# Patient Record
Sex: FEMALE | Race: WHITE | NOT HISPANIC OR LATINO | Employment: OTHER | URBAN - METROPOLITAN AREA
[De-identification: names, ages, dates, MRNs, and addresses within clinical notes are randomized per-mention and may not be internally consistent; named-entity substitution may affect disease eponyms.]

---

## 2017-03-06 ENCOUNTER — TRANSCRIBE ORDERS (OUTPATIENT)
Dept: ADMINISTRATIVE | Facility: HOSPITAL | Age: 82
End: 2017-03-06

## 2017-03-06 DIAGNOSIS — Z12.31 VISIT FOR SCREENING MAMMOGRAM: Primary | ICD-10-CM

## 2017-03-22 DIAGNOSIS — Z12.31 ENCOUNTER FOR SCREENING MAMMOGRAM FOR MALIGNANT NEOPLASM OF BREAST: ICD-10-CM

## 2017-03-24 ENCOUNTER — HOSPITAL ENCOUNTER (OUTPATIENT)
Dept: RADIOLOGY | Facility: HOSPITAL | Age: 82
Discharge: HOME | End: 2017-03-24
Payer: COMMERCIAL

## 2017-03-24 DIAGNOSIS — Z12.31 VISIT FOR SCREENING MAMMOGRAM: ICD-10-CM

## 2017-03-24 PROCEDURE — G0202 SCR MAMMO BI INCL CAD: HCPCS

## 2017-03-27 ENCOUNTER — GENERIC CONVERSION - ENCOUNTER (OUTPATIENT)
Dept: OTHER | Facility: OTHER | Age: 82
End: 2017-03-27

## 2017-04-04 ENCOUNTER — LAB CONVERSION - ENCOUNTER (OUTPATIENT)
Dept: OTHER | Facility: OTHER | Age: 82
End: 2017-04-04

## 2017-04-04 LAB — HBA1C MFR BLD HPLC: 6 % OF TOTAL HGB

## 2017-04-10 ENCOUNTER — ALLSCRIPTS OFFICE VISIT (OUTPATIENT)
Dept: OTHER | Facility: OTHER | Age: 82
End: 2017-04-10

## 2017-05-28 ENCOUNTER — GENERIC CONVERSION - ENCOUNTER (OUTPATIENT)
Dept: OTHER | Facility: OTHER | Age: 82
End: 2017-05-28

## 2017-08-08 ENCOUNTER — LAB CONVERSION - ENCOUNTER (OUTPATIENT)
Dept: OTHER | Facility: OTHER | Age: 82
End: 2017-08-08

## 2017-08-08 LAB
A/G RATIO (HISTORICAL): 1.6 (CALC) (ref 1–2.5)
ALBUMIN SERPL BCP-MCNC: 4.1 G/DL (ref 3.6–5.1)
ALP SERPL-CCNC: 83 U/L (ref 33–130)
ALT SERPL W P-5'-P-CCNC: 22 U/L (ref 6–29)
AST SERPL W P-5'-P-CCNC: 23 U/L (ref 10–35)
BILIRUB SERPL-MCNC: 0.8 MG/DL (ref 0.2–1.2)
BUN SERPL-MCNC: 13 MG/DL (ref 7–25)
BUN/CREA RATIO (HISTORICAL): ABNORMAL (CALC) (ref 6–22)
CALCIUM SERPL-MCNC: 9.3 MG/DL (ref 8.6–10.4)
CHLORIDE SERPL-SCNC: 99 MMOL/L (ref 98–110)
CHOLEST SERPL-MCNC: 237 MG/DL (ref 125–200)
CHOLEST/HDLC SERPL: 4.2 (CALC)
CO2 SERPL-SCNC: 26 MMOL/L (ref 20–31)
CREAT SERPL-MCNC: 0.85 MG/DL (ref 0.6–0.88)
EGFR AFRICAN AMERICAN (HISTORICAL): 73 ML/MIN/1.73M2
EGFR-AMERICAN CALC (HISTORICAL): 63 ML/MIN/1.73M2
GAMMA GLOBULIN (HISTORICAL): 2.6 G/DL (CALC) (ref 1.9–3.7)
GLUCOSE (HISTORICAL): 95 MG/DL (ref 65–99)
HBA1C MFR BLD HPLC: 6 % OF TOTAL HGB
HDLC SERPL-MCNC: 56 MG/DL
LDL CHOLESTEROL (HISTORICAL): 158 MG/DL (CALC)
NON-HDL-CHOL (CHOL-HDL) (HISTORICAL): 181 MG/DL (CALC)
POTASSIUM SERPL-SCNC: 4.2 MMOL/L (ref 3.5–5.3)
SODIUM SERPL-SCNC: 134 MMOL/L (ref 135–146)
TOTAL PROTEIN (HISTORICAL): 6.7 G/DL (ref 6.1–8.1)
TRIGL SERPL-MCNC: 116 MG/DL

## 2017-08-21 ENCOUNTER — ALLSCRIPTS OFFICE VISIT (OUTPATIENT)
Dept: OTHER | Facility: OTHER | Age: 82
End: 2017-08-21

## 2017-08-21 DIAGNOSIS — E11.9 TYPE 2 DIABETES MELLITUS WITHOUT COMPLICATIONS (HCC): ICD-10-CM

## 2017-08-21 DIAGNOSIS — I10 ESSENTIAL (PRIMARY) HYPERTENSION: ICD-10-CM

## 2017-08-21 DIAGNOSIS — E78.5 HYPERLIPIDEMIA: ICD-10-CM

## 2017-09-15 ENCOUNTER — ALLSCRIPTS OFFICE VISIT (OUTPATIENT)
Dept: OTHER | Facility: OTHER | Age: 82
End: 2017-09-15

## 2017-09-29 ENCOUNTER — ALLSCRIPTS OFFICE VISIT (OUTPATIENT)
Dept: OTHER | Facility: OTHER | Age: 82
End: 2017-09-29

## 2017-10-17 ENCOUNTER — HOSPITAL ENCOUNTER (EMERGENCY)
Facility: HOSPITAL | Age: 82
Discharge: HOME | End: 2017-10-17
Attending: EMERGENCY MEDICINE | Admitting: EMERGENCY MEDICINE
Payer: COMMERCIAL

## 2017-10-17 ENCOUNTER — APPOINTMENT (EMERGENCY)
Dept: RADIOLOGY | Facility: HOSPITAL | Age: 82
End: 2017-10-17
Payer: COMMERCIAL

## 2017-10-17 VITALS
WEIGHT: 213 LBS | TEMPERATURE: 97.4 F | SYSTOLIC BLOOD PRESSURE: 235 MMHG | OXYGEN SATURATION: 94 % | HEART RATE: 56 BPM | RESPIRATION RATE: 13 BRPM | DIASTOLIC BLOOD PRESSURE: 107 MMHG

## 2017-10-17 DIAGNOSIS — H81.90 EPISODIC RECURRENT VERTIGO: Primary | ICD-10-CM

## 2017-10-17 LAB
ALBUMIN SERPL BCP-MCNC: 3.9 G/DL (ref 3.5–5)
ALP SERPL-CCNC: 99 U/L (ref 46–116)
ALT SERPL W P-5'-P-CCNC: 26 U/L (ref 12–78)
ANION GAP SERPL CALCULATED.3IONS-SCNC: 7 MMOL/L (ref 4–13)
APTT PPP: 26 SECONDS (ref 23–35)
AST SERPL W P-5'-P-CCNC: 21 U/L (ref 5–45)
BACTERIA UR QL AUTO: NORMAL /HPF
BASOPHILS # BLD AUTO: 0.04 THOUSANDS/ΜL (ref 0–0.1)
BASOPHILS NFR BLD AUTO: 1 % (ref 0–1)
BILIRUB SERPL-MCNC: 0.56 MG/DL (ref 0.2–1)
BILIRUB UR QL STRIP: NEGATIVE
BUN SERPL-MCNC: 12 MG/DL (ref 5–25)
CALCIUM SERPL-MCNC: 8.8 MG/DL (ref 8.3–10.1)
CHLORIDE SERPL-SCNC: 96 MMOL/L (ref 100–108)
CLARITY UR: CLEAR
CO2 SERPL-SCNC: 29 MMOL/L (ref 21–32)
COLOR UR: YELLOW
CREAT SERPL-MCNC: 0.82 MG/DL (ref 0.6–1.3)
EOSINOPHIL # BLD AUTO: 0.05 THOUSAND/ΜL (ref 0–0.61)
EOSINOPHIL NFR BLD AUTO: 1 % (ref 0–6)
ERYTHROCYTE [DISTWIDTH] IN BLOOD BY AUTOMATED COUNT: 12.8 % (ref 11.6–15.1)
GFR SERPL CREATININE-BSD FRML MDRD: 66 ML/MIN/1.73SQ M
GLUCOSE SERPL-MCNC: 146 MG/DL (ref 65–140)
GLUCOSE UR STRIP-MCNC: NEGATIVE MG/DL
HCT VFR BLD AUTO: 38.7 % (ref 34.8–46.1)
HGB BLD-MCNC: 13.6 G/DL (ref 11.5–15.4)
HGB UR QL STRIP.AUTO: ABNORMAL
HYALINE CASTS #/AREA URNS LPF: NORMAL /LPF
INR PPP: 0.96 (ref 0.86–1.16)
KETONES UR STRIP-MCNC: NEGATIVE MG/DL
LEUKOCYTE ESTERASE UR QL STRIP: NEGATIVE
LIPASE SERPL-CCNC: 95 U/L (ref 73–393)
LYMPHOCYTES # BLD AUTO: 1.73 THOUSANDS/ΜL (ref 0.6–4.47)
LYMPHOCYTES NFR BLD AUTO: 22 % (ref 14–44)
MCH RBC QN AUTO: 31.4 PG (ref 26.8–34.3)
MCHC RBC AUTO-ENTMCNC: 35.1 G/DL (ref 31.4–37.4)
MCV RBC AUTO: 89 FL (ref 82–98)
MONOCYTES # BLD AUTO: 0.16 THOUSAND/ΜL (ref 0.17–1.22)
MONOCYTES NFR BLD AUTO: 2 % (ref 4–12)
NEUTROPHILS # BLD AUTO: 5.79 THOUSANDS/ΜL (ref 1.85–7.62)
NEUTS SEG NFR BLD AUTO: 74 % (ref 43–75)
NITRITE UR QL STRIP: NEGATIVE
NON-SQ EPI CELLS URNS QL MICRO: NORMAL /HPF
NRBC BLD AUTO-RTO: 0 /100 WBCS
NT-PROBNP SERPL-MCNC: 1187 PG/ML
PH UR STRIP.AUTO: 7 [PH] (ref 4.5–8)
PLATELET # BLD AUTO: 188 THOUSANDS/UL (ref 149–390)
PMV BLD AUTO: 11.2 FL (ref 8.9–12.7)
POTASSIUM SERPL-SCNC: 4.1 MMOL/L (ref 3.5–5.3)
PROT SERPL-MCNC: 7.8 G/DL (ref 6.4–8.2)
PROT UR STRIP-MCNC: NEGATIVE MG/DL
PROTHROMBIN TIME: 12.8 SECONDS (ref 12.1–14.4)
RBC # BLD AUTO: 4.33 MILLION/UL (ref 3.81–5.12)
RBC #/AREA URNS AUTO: NORMAL /HPF
SODIUM SERPL-SCNC: 132 MMOL/L (ref 136–145)
SP GR UR STRIP.AUTO: 1.01 (ref 1–1.03)
TROPONIN I SERPL-MCNC: <0.02 NG/ML
UROBILINOGEN UR QL STRIP.AUTO: 0.2 E.U./DL
WBC # BLD AUTO: 7.8 THOUSAND/UL (ref 4.31–10.16)
WBC #/AREA URNS AUTO: NORMAL /HPF

## 2017-10-17 PROCEDURE — 83880 ASSAY OF NATRIURETIC PEPTIDE: CPT | Performed by: EMERGENCY MEDICINE

## 2017-10-17 PROCEDURE — 85730 THROMBOPLASTIN TIME PARTIAL: CPT | Performed by: EMERGENCY MEDICINE

## 2017-10-17 PROCEDURE — 36415 COLL VENOUS BLD VENIPUNCTURE: CPT | Performed by: EMERGENCY MEDICINE

## 2017-10-17 PROCEDURE — 81001 URINALYSIS AUTO W/SCOPE: CPT

## 2017-10-17 PROCEDURE — 93005 ELECTROCARDIOGRAM TRACING: CPT | Performed by: EMERGENCY MEDICINE

## 2017-10-17 PROCEDURE — 83690 ASSAY OF LIPASE: CPT | Performed by: EMERGENCY MEDICINE

## 2017-10-17 PROCEDURE — 96374 THER/PROPH/DIAG INJ IV PUSH: CPT

## 2017-10-17 PROCEDURE — 80053 COMPREHEN METABOLIC PANEL: CPT | Performed by: EMERGENCY MEDICINE

## 2017-10-17 PROCEDURE — 71020 HB CHEST X-RAY 2VW FRONTAL&LATL: CPT

## 2017-10-17 PROCEDURE — 70450 CT HEAD/BRAIN W/O DYE: CPT

## 2017-10-17 PROCEDURE — 99285 EMERGENCY DEPT VISIT HI MDM: CPT

## 2017-10-17 PROCEDURE — 85610 PROTHROMBIN TIME: CPT | Performed by: EMERGENCY MEDICINE

## 2017-10-17 PROCEDURE — 84484 ASSAY OF TROPONIN QUANT: CPT | Performed by: EMERGENCY MEDICINE

## 2017-10-17 PROCEDURE — 85025 COMPLETE CBC W/AUTO DIFF WBC: CPT | Performed by: EMERGENCY MEDICINE

## 2017-10-17 RX ORDER — MECLIZINE HCL 12.5 MG/1
12.5 TABLET ORAL 3 TIMES DAILY PRN
Qty: 20 TABLET | Refills: 0 | Status: SHIPPED | OUTPATIENT
Start: 2017-10-17 | End: 2018-09-24 | Stop reason: CLARIF

## 2017-10-17 RX ORDER — CARVEDILOL 12.5 MG/1
12.5 TABLET ORAL 2 TIMES DAILY WITH MEALS
COMMUNITY
End: 2018-02-19

## 2017-10-17 RX ORDER — ONDANSETRON 2 MG/ML
4 INJECTION INTRAMUSCULAR; INTRAVENOUS ONCE
Status: COMPLETED | OUTPATIENT
Start: 2017-10-17 | End: 2017-10-17

## 2017-10-17 RX ORDER — DILTIAZEM HYDROCHLORIDE 240 MG/1
240 CAPSULE, COATED, EXTENDED RELEASE ORAL DAILY
COMMUNITY
End: 2018-06-13 | Stop reason: SDUPTHER

## 2017-10-17 RX ADMIN — ONDANSETRON 4 MG: 2 INJECTION INTRAMUSCULAR; INTRAVENOUS at 19:41

## 2017-10-17 NOTE — ED PROVIDER NOTES
History  Chief Complaint   Patient presents with    Dizziness     pt with hx of vertigo and tinnitus and states she has been having more frequent dizzy spells  70-year-old female presents for evaluation of near syncopal event  Patient was at her friend's doctor's appointment for support when she began having worsening of her chronic right-sided tinnitus followed by lightheadedness, diaphoresis and nausea  Onset at approximately 2:00 p m  today  Patient went to an urgent care center who advised that she come to the emergency department  Patient instead returned home with slightly improving symptoms  Upon returning home, patient was taken to the emergency department by family members for further evaluation  Patient states the acute symptoms have since resolved  Patient has chronic right-sided tinnitus which she states is usually very mild in nature with, but often experiences loudening of the tinnitus immediately prior to the episodes  Patient stopped using her the right hearing aid because of this tinnitus  She has had intermittent episodes for approximately 1-2 years  Today's episode is very similar in character to prior episodes; however, she often is able to lie down and take a nap falling episodes that occur at home and as she was out with friends she was unable to do this which she believes caused the episode to last longer than usual   Patient is slightly concerned that her episodes seem to be coming more frequent in nature          History provided by:  Patient  Dizziness   Quality:  Lightheadedness, vertigo and imbalance  Severity:  Moderate  Onset quality:  Gradual  Duration:  2 hours  Timing:  Constant  Progression:  Partially resolved  Chronicity:  Recurrent  Relieved by:  Lying down  Worsened by:  Nothing  Ineffective treatments:  None tried  Associated symptoms: nausea and tinnitus    Associated symptoms: no chest pain, no diarrhea, no headaches, no shortness of breath, no vomiting and no weakness    Nausea:     Severity:  Mild    Onset quality:  Sudden    Duration:  2 hours    Timing:  Constant    Progression:  Improving  Risk factors: hx of vertigo and multiple medications        Prior to Admission Medications   Prescriptions Last Dose Informant Patient Reported? Taking?   carvedilol (COREG) 12 5 mg tablet 10/17/2017 at Unknown time  Yes Yes   Sig: Take 12 5 mg by mouth 2 (two) times a day with meals   diltiazem (CARDIZEM CD) 240 mg 24 hr capsule 10/17/2017 at Unknown time  Yes Yes   Sig: Take 240 mg by mouth daily   metFORMIN (GLUCOPHAGE) 500 mg tablet 10/17/2017 at Unknown time  Yes Yes   Sig: Take 500 mg by mouth 2 (two) times a day with meals      Facility-Administered Medications: None       Past Medical History:   Diagnosis Date    Diabetes mellitus ([facility] [address] )     Hypertension     Tinnitus     Vertigo        History reviewed  No pertinent surgical history  History reviewed  No pertinent family history  I have reviewed and agree with the history as documented  Social History   Substance Use Topics    Smoking status: Never Smoker    Smokeless tobacco: Never Used    Alcohol use No        Review of Systems   Constitutional: Positive for diaphoresis  Negative for appetite change, chills and fever  HENT: Positive for tinnitus  Negative for congestion, rhinorrhea and sore throat  Respiratory: Negative for cough, chest tightness and shortness of breath  Cardiovascular: Negative for chest pain and leg swelling  Gastrointestinal: Positive for nausea  Negative for abdominal pain, constipation, diarrhea and vomiting  Genitourinary: Negative for dysuria, frequency and hematuria  Musculoskeletal: Negative for myalgias, neck pain and neck stiffness  Skin: Negative for pallor and rash  Neurological: Positive for dizziness and light-headedness  Negative for syncope, weakness and headaches  All other systems reviewed and are negative        Physical Exam  ED Triage Vitals [10/17/17 1703]   Temperature Pulse Respirations Blood Pressure SpO2   (!) 97 4 °F (36 3 °C) 59 18 (!) 189/84 99 %      Temp Source Heart Rate Source Patient Position - Orthostatic VS BP Location FiO2 (%)   Oral Monitor Lying Right arm --      Pain Score       --           Physical Exam   Constitutional: She is oriented to person, place, and time  She appears well-developed and well-nourished  Non-toxic appearance  No distress  HENT:   Head: Normocephalic and atraumatic  Eyes: Conjunctivae and EOM are normal  Pupils are equal, round, and reactive to light  Neck: Normal range of motion  Neck supple  No tracheal deviation present  No thyromegaly present  Cardiovascular: Normal rate, regular rhythm, normal heart sounds and intact distal pulses  Pulmonary/Chest: Effort normal and breath sounds normal    Abdominal: Soft  Bowel sounds are normal  She exhibits no distension  There is no tenderness  Musculoskeletal: Normal range of motion  She exhibits edema (bilateral pedal edema)  She exhibits no deformity  Lymphadenopathy:     She has no cervical adenopathy  Neurological: She is alert and oriented to person, place, and time  She has normal strength  No cranial nerve deficit or sensory deficit  She exhibits normal muscle tone  She displays a negative Romberg sign  Coordination normal    No nystagmus noted  Wide-based shuffling gait which is baseline per family  Skin: Skin is warm and dry  She is not diaphoretic  Psychiatric: She has a normal mood and affect  Her behavior is normal  Judgment and thought content normal    Nursing note and vitals reviewed        ED Medications  Medications   ondansetron (ZOFRAN) injection 4 mg (4 mg Intravenous Given 10/17/17 1941)       Diagnostic Studies  Labs Reviewed   CBC AND DIFFERENTIAL - Abnormal        Result Value Ref Range Status    Monocytes Relative 2 (*) 4 - 12 % Final    Monocytes Absolute 0 16 (*) 0 17 - 1 22 Thousand/µL Final    WBC 7 80  4 31 - 10 16 Thousand/uL Final    RBC 4 33  3 81 - 5 12 Million/uL Final    Hemoglobin 13 6  11 5 - 15 4 g/dL Final    Hematocrit 38 7  34 8 - 46 1 % Final    MCV 89  82 - 98 fL Final    MCH 31 4  26 8 - 34 3 pg Final    MCHC 35 1  31 4 - 37 4 g/dL Final    RDW 12 8  11 6 - 15 1 % Final    MPV 11 2  8 9 - 12 7 fL Final    Platelets 792  570 - 390 Thousands/uL Final    nRBC 0  /100 WBCs Final    Neutrophils Relative 74  43 - 75 % Final    Lymphocytes Relative 22  14 - 44 % Final    Eosinophils Relative 1  0 - 6 % Final    Basophils Relative 1  0 - 1 % Final    Neutrophils Absolute 5 79  1 85 - 7 62 Thousands/µL Final    Lymphocytes Absolute 1 73  0 60 - 4 47 Thousands/µL Final    Eosinophils Absolute 0 05  0 00 - 0 61 Thousand/µL Final    Basophils Absolute 0 04  0 00 - 0 10 Thousands/µL Final   COMPREHENSIVE METABOLIC PANEL - Abnormal     Sodium 132 (*) 136 - 145 mmol/L Final    Chloride 96 (*) 100 - 108 mmol/L Final    Glucose 146 (*) 65 - 140 mg/dL Final    Comment:   If the patient is fasting, the ADA then defines impaired fasting glucose as > 100 mg/dL and diabetes as > or equal to 123 mg/dL  Specimen collection should occur prior to Sulfasalazine administration due to the potential for falsely depressed results  Specimen collection should occur prior to Sulfapyridine administration due to the potential for falsely elevated results  Potassium 4 1  3 5 - 5 3 mmol/L Final    CO2 29  21 - 32 mmol/L Final    Anion Gap 7  4 - 13 mmol/L Final    BUN 12  5 - 25 mg/dL Final    Creatinine 0 82  0 60 - 1 30 mg/dL Final    Comment: Standardized to IDMS reference method    Calcium 8 8  8 3 - 10 1 mg/dL Final    AST 21  5 - 45 U/L Final    Comment:   Specimen collection should occur prior to Sulfasalazine administration due to the potential for falsely depressed results       ALT 26  12 - 78 U/L Final    Comment:   Specimen collection should occur prior to Sulfasalazine and/or Sulfapyridine administration due to the potential for falsely depressed results  Alkaline Phosphatase 99  46 - 116 U/L Final    Total Protein 7 8  6 4 - 8 2 g/dL Final    Albumin 3 9  3 5 - 5 0 g/dL Final    Total Bilirubin 0 56  0 20 - 1 00 mg/dL Final    eGFR 66  ml/min/1 73sq m Final    Narrative:     National Kidney Disease Education Program recommendations are as follows:  GFR calculation is accurate only with a steady state creatinine  Chronic Kidney disease less than 60 ml/min/1 73 sq  meters  Kidney failure less than 15 ml/min/1 73 sq  meters  NT-BNP PRO (BRAIN NATRIURETIC PEPTIDE) - Abnormal     NT-proBNP 1,187 (*) <450 pg/mL Final   ED URINE MACROSCOPIC - Abnormal     Blood, UA Trace (*) Negative Final    Color, UA Yellow   Final    Clarity, UA Clear   Final    pH, UA 7 0  4 5 - 8 0 Final    Leukocytes, UA Negative  Negative Final    Nitrite, UA Negative  Negative Final    Protein, UA Negative  Negative mg/dl Final    Glucose, UA Negative  Negative mg/dl Final    Ketones, UA Negative  Negative mg/dl Final    Urobilinogen, UA 0 2  0 2, 1 0 E U /dl E U /dl Final    Bilirubin, UA Negative  Negative Final    Specific [place], UA 1 010  1 003 - 1 030 Final    Narrative:     CLINITEK RESULT   URINE MICROSCOPIC - Normal    RBC, UA None Seen  None Seen, 0-5 /hpf Final    WBC, UA None Seen  None Seen, 0-5, 5-55, 5-65 /hpf Final    Epithelial Cells None Seen  None Seen, Occasional /hpf Final    Bacteria, UA None Seen  None Seen, Occasional /hpf Final    Hyaline Casts, UA None Seen  None Seen /lpf Final   PROTIME-INR - Normal    Protime 12 8  12 1 - 14 4 seconds Final    INR 0 96  0 86 - 1 16 Final   APTT - Normal    PTT 26  23 - 35 seconds Final    Narrative:      Therapeutic Heparin Range = 60-90 seconds   TROPONIN I - Normal    Troponin I <0 02  <=0 04 ng/mL Final    Narrative:     [employer] Chemistry analyzer 99% cutoff is > 0 04 ng/mL in network labs    o cTnI 99% cutoff is useful only when applied to patients in the clinical setting of myocardial ischemia  o cTnI 99% cutoff should be interpreted in the context of clinical history, ECG findings and possibly cardiac imaging to establish correct diagnosis  o cTnI 99% cutoff may be suggestive but clearly not indicative of a coronary event without the clinical setting of myocardial ischemia  LIPASE - Normal    Lipase 95  73 - 393 u/L Final       CT head without contrast   Final Result      No acute intracranial abnormality  Microangiopathic changes  Workstation performed: [id number]         XR chest 2 views   ED Interpretation   No acute pulmonary pathology          Procedures  ECG 12 Lead Documentation  Date/Time: 10/17/2017 10:33 PM  Performed by: Maria C Villatoro by: Kate Ortiz     Indications / Diagnosis:  Lightheadedness  ECG reviewed by me, the ED Provider: yes    Patient location:  ED  Previous ECG:     Previous ECG:  Compared to current    Comparison ECG info:  10/18/2012 normal EKG    Similarity:  No change  Interpretation:     Interpretation: normal    Rate:     ECG rate:  61    ECG rate assessment: normal    Rhythm:     Rhythm: sinus rhythm    Ectopy:     Ectopy: none    QRS:     QRS axis:  Normal    QRS intervals:  Normal  Conduction:     Conduction: normal    ST segments:     ST segments:  Normal  T waves:     T waves: normal            Phone Consults  ED Phone Contact    ED Course  ED Course as of Oct 17 2237   Tue Oct 17, 2017   2015 NT-proBNP: (!) 1,187           Identification of Seniors at [facility] Most Recent Value   (ISAR) Identification of Seniors at Risk   Before the illness or injury that brought you to the Emergency, did you need someone to help you on a regular basis? 0 Filed at: 10/17/2017 1705   In the last 24 hours, have you needed more help than usual?  0 Filed at: 10/17/2017 1705   Have you been hospitalized for one or more nights during the past 6 months?   0 Filed at: 10/17/2017 1705   In general, do you see well?  0 Filed at: 10/17/2017 1705   In general, do you have serious problems with your memory? 0 Filed at: 10/17/2017 1705   Do you take more than three different medications every day? 1 Filed at: 10/17/2017 1705   ISAR Score  1 Filed at: 10/17/2017 1705                          MDM  Number of Diagnoses or Management Options  Episodic recurrent vertigo: established and worsening  Diagnosis management comments: 51-year-old female presents for evaluation of episode of vertigo  Symptoms seemed to have resolved with the exception of some mild residual nausea  Patient given Zofran with complete resolution  Neurologically intact  Laboratory evaluation remarkable only for an elevation in her BNP  Patient hypertensive in the emergency department  She states she is compliant with medications  No headache or lightheadedness at this time  As she is asymptomatic, will have patient follow up with her primary care provider for potential adjustment medications  ENT follow-up for vertigo  Discussed return precautions with the patient and her family  Amount and/or Complexity of Data Reviewed  Clinical lab tests: ordered and reviewed  Tests in the radiology section of CPT®: ordered and reviewed  Independent visualization of images, tracings, or specimens: yes    Patient Progress  Patient progress: stable    CritCare Time    Disposition  Final diagnoses:   Episodic recurrent vertigo     ED Disposition     ED Disposition Condition Comment    Discharge  [address] discharge to home/self care  Condition at discharge: Stable        Follow-up Information     Follow up With Specialties Details Why Contact Info Additional [address] [facility]  Schedule an appointment as soon as possible for a visit in 1 week for re-evaluation of your vertigo [address] [facility]    [address] [address] [facility]     Perry Faster, DO Family Medicine Schedule an appointment as soon as possible for a visit in 3 days to follow up for your hypertension [address] [facility]  [phone]       [address] Emergency Department Emergency Medicine Go to If symptoms worsen [address]  [phone]  ED, [address], [address]        Discharge Medication List as of 10/17/2017  9:20 PM      START taking these medications    Details   meclizine (ANTIVERT) 12 5 MG tablet Take 1 tablet by mouth 3 (three) times a day as needed for dizziness, Starting Tue 10/17/2017, Print         CONTINUE these medications which have NOT CHANGED    Details   carvedilol (COREG) 12 5 mg tablet Take 12 5 mg by mouth 2 (two) times a day with meals, Historical Med      diltiazem (CARDIZEM CD) 240 mg 24 hr capsule Take 240 mg by mouth daily, Historical Med      metFORMIN (GLUCOPHAGE) 500 mg tablet Take 500 mg by mouth 2 (two) times a day with meals, Historical Med           No discharge procedures on file  ED Provider  Attending physically available and evaluated Shruthi Sher I managed the patient along with the ED Attending      Electronically Signed by       Sidney Jasso MD  Resident  10/17/17 6627

## 2017-10-17 NOTE — ED ATTENDING ATTESTATION
Saumya Mota MD, saw and evaluated the patient  I have discussed the patient with the resident/non-physician practitioner and agree with the resident's/non-physician practitioner's findings, Plan of Care, and MDM as documented in the resident's/non-physician practitioner's note, except where noted  All available labs and Radiology studies were reviewed  At this point I agree with the current assessment done in the Emergency Department    I have conducted an independent evaluation of this patient a history and physical is as follows:    Ringing in right ear with vertigo and nausea  With no HA or focal weakness     No fever no cp no sob    pmh   DM    Vertigo for over a year   HTN    Exam no nystagmus is noted neck no JVD lungs clear heart regular abdomen soft nontender neurologic exam 5/5 motor strength gait is wide-based however she is able to walk  Impression vertigo ill-defined    Plan symptomatic care CT of head labs re-evaluation      Critical Care Time  CritCare Time

## 2017-10-18 ENCOUNTER — ALLSCRIPTS OFFICE VISIT (OUTPATIENT)
Dept: OTHER | Facility: OTHER | Age: 82
End: 2017-10-18

## 2017-10-18 LAB
ATRIAL RATE: 61 BPM
P AXIS: 63 DEGREES
PR INTERVAL: 180 MS
QRS AXIS: 63 DEGREES
QRSD INTERVAL: 98 MS
QT INTERVAL: 434 MS
QTC INTERVAL: 436 MS
T WAVE AXIS: 55 DEGREES
VENTRICULAR RATE: 61 BPM

## 2017-10-18 NOTE — DISCHARGE INSTRUCTIONS
Dizziness   WHAT YOU NEED TO KNOW:   Dizziness is a feeling of being off balance or unsteady  Common causes of dizziness are an inner ear fluid imbalance or a lack of oxygen in your blood  Dizziness may be acute (lasts 3 days or less) or chronic (lasts longer than 3 days)  You may have dizzy spells that last from seconds to a few hours  DISCHARGE INSTRUCTIONS:   Return to the emergency department if:   · You have a headache and a stiff neck  · You have shaking chills and a fever  · You vomit over and over with no relief  · Your vomit or bowel movements are red or black  · You have pain in your chest, back, or abdomen  · You have numbness, especially in your face, arms, or legs  · You have trouble moving your arms or legs  · You are confused  Contact your healthcare provider if:   · You have a fever  · Your symptoms do not get better with treatment  · You have questions or concerns about your condition or care  Manage your symptoms:   · Do not drive  or operate heavy machinery when you are dizzy  · Get up slowly  from sitting or lying down  · Drink plenty of liquids  Liquids help prevent dehydration  Ask how much liquid to drink each day and which liquids are best for you  Follow up with your healthcare provider as directed:  Write down your questions so you remember to ask them during your visits  © 2017 [address] Information is for End User's use only and may not be sold, redistributed or otherwise used for commercial purposes  All illustrations and images included in CareNotes® are the copyrighted property of [employer]  or [address]  The above information is an  only  It is not intended as medical advice for individual conditions or treatments  Talk to your doctor, nurse or pharmacist before following any medical regimen to see if it is safe and effective for you

## 2017-10-23 NOTE — PROGRESS NOTES
Assessment  1  Hypertension (401 9) (I10)   2  Type 2 diabetes mellitus (250 00) (E11 9)   3  Vertigo, intermittent (780 4) (R42)    Plan   Hypertension    · Carvedilol 12 5 MG Oral Tablet; TAKE 1 TABLET TWICE DAILY  Hypertension, Vertigo, intermittent    · Cartia  MG Oral Capsule Extended Release 24 Hour; take 1 capsule daily   · Lisinopril 5 MG Oral Tablet; take one tablet by mouth every day    1 Mike Villela MD, Fabricio Headley  (Otolaryngology) Co-Management  *  Status: Hold For - Scheduling  Requested for: 11EBS9675  Ordered; For: Vertigo, intermittent;  Ordered By: Gypsy Fraire  Performed:   Due: 64AJE5505     Chief Complaint  Pt here for ER follow up      History of Present Illness  The patient presents for follow-up of essential hypertension  The patient states she has been doing poorly with her blood pressure control since the last visit  She has no comorbid illnesses  Symptoms: new onset of dizziness  Associated symptoms include no headache,-- no focal neurologic deficits-- and-- no memory loss  Home monitoring: The patient is not checking blood pressure at home  Medications: the patient complains of medication side effects  Review of Systems    Constitutional: feeling poorly  Eyes: No complaints of eye pain, no red eyes, no eyesight problems, no discharge, no dry eyes, no itching of eyes  ENT: no complaints of earache, no loss of hearing, no nose bleeds, no nasal discharge, no sore throat, no hoarseness  Cardiovascular: No complaints of slow heart rate, no fast heart rate, no chest pain, no palpitations, no leg claudication, no lower extremity edema  Respiratory: No complaints of shortness of breath, no wheezing, no cough, no SOB on exertion, no orthopnea, no PND  Gastrointestinal: abdominal pain  Genitourinary: No complaints of dysuria, no incontinence, no pelvic pain, no dysmenorrhea, no vaginal discharge or bleeding  Musculoskeletal: low back pain     Integumentary: No complaints of skin rash or lesions, no itching, no skin wounds, no breast pain or lump  Neurological: dizziness  Psychiatric: Not suicidal, no sleep disturbance, no anxiety or depression, no change in personality, no emotional problems  Endocrine: No complaints of proptosis, no hot flashes, no muscle weakness, no deepening of the voice, no feelings of weakness  Hematologic/Lymphatic: No complaints of swollen glands, no swollen glands in the neck, does not bleed easily, does not bruise easily  Active Problems  1  Abdominal bloating (787 3) (R14 0)   2  Abdominal pain (789 00) (R10 9)   3  Abscess (682 9) (L02 91)   4  Acute cystitis with hematuria (595 0) (N30 01)   5  Anxiety (300 00) (F41 9)   6  Chronic constipation (564 00) (K59 09)   7  Chronic low back pain without sciatica, unspecified back pain laterality (724 2,338 29)   (M54 5,G89 29)   8  Diabetic peripheral neuropathy (250 60,357 2) (E11 42)   9  Dysuria (788 1) (R30 0)   10  Encounter for special screening examination for genitourinary disorder (V81 6) (Z13 89)   11  Flatulence (787 3) (R14 3)   12  Flu vaccine need (V04 81) (Z23)   13  Hyperlipidemia (272 4) (E78 5)   14  Hypertension (401 9) (I10)   15  Need for shingles vaccine (V04 89) (Z23)   16  Obesity due to excess calories 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 mass index (BMI)    in 99th percentile for age in pediatric patient (278 00,V85 54) (F27 12,Z14 28)   16  Palpitations (785 1) (R00 2)   18  Post-menopausal (V49 81) (Z78 0)   19  Thyroid enlarged (240 9) (E04 9)   20  Type 2 diabetes mellitus (250 00) (E11 9)   21  Visit for screening mammogram (V76 12) (Z12 31)    Past Medical History  1  Denied: History of alcohol abuse   2  Denied: History of mental disorder   3  Denied: History of substance abuse    The active problems and past medical history were reviewed and updated today  Surgical History  1  History of Knee Surgery    Family History  Family History    1   Denied: Family history of alcohol abuse   2  Family history of malignant neoplasm (V16 9) (Z80 9)   3  Denied: Family history of mental disorder   4  Denied: Family history of substance abuse    Social History   · Active advance directive (V49 89) (Z78 9)   · Always uses seat belt   · Caffeine use (V49 89) (F15 90)   · Exercise: Walking   · Lives with family   · Never a smoker   · No alcohol use   · Non-smoker (V49 89) (Z78 9)   · Primary language is English   · [facility]   · Single   · Supportive and safe  The social history was reviewed and updated today  The social history was reviewed and is unchanged  Current Meds   1  Aspirin 81 MG Oral Tablet Delayed Release; TAKE 1 TABLET DAILY; Therapy: 42RAT9722 to Recorded   2  Carvedilol 12 5 MG Oral Tablet; TAKE 1 TABLET TWICE DAILY  Requested for:   69FPD0259; Last Rx:29Mgc8201 Ordered   3  DilTIAZem  MG Oral Capsule Extended Release 24 Hour; Take 1 capsule by   mouth  daily; Therapy: 51Xav1263 to (Evaluate:90Rnn7337)  Requested for: 80Zsx4140; Last   Rx:64Lwe6803 Ordered   4  Linzess 145 MCG Oral Capsule; TAKE 1 CAPSULE Before meals; Therapy: 02KRL1346 to (Evaluate:21Bft1745)  Requested for: 78SLW9570; Last   Rx:31Tlu6113 Ordered   5  MetFORMIN HCl - 500 MG Oral Tablet; take 2 tablets twice a day; Therapy: 66ZFZ6356 to (Jaclyn Williamson)  Requested for: 82MSW3448; Last   Rx:52Bjb6906 Ordered   6  Multi-Vitamin Oral Tablet; TAKE 1 TABLET DAILY; Therapy: 40KLG1314 to Recorded   7  Phazyme Maximum Strength 250 MG Oral Capsule; take as directed on labelling for   abdominal bloating; Therapy: 00KZN9620 to [phone]); Last Rx:79Quq3770 Ordered   8  Probiotic Daily Oral Capsule; Therapy: 42YAC7427 to Recorded    The medication list was reviewed and updated today  Allergies  1   Statins    Vitals  Vital Signs    Recorded: 26ABJ2896 01:41PM Recorded: 13UCQ5155 01:18PM   Temperature  97 5 F   Heart Rate  64   Respiration  18   Systolic 881, LUE, Sitting 190 Diastolic 90, LUE, Sitting 82   BP CUFF SIZE Large    Height  5 ft 3 in   Weight  218 lb    BMI Calculated  38 62   BSA Calculated  2 01     Physical Exam    Constitutional   General appearance: No acute distress, well appearing and well nourished  Ears, Nose, Mouth, and Throat   Otoscopic examination: Tympanic membranes translucent with normal light reflex  Canals patent without erythema  Pulmonary   Auscultation of lungs: Clear to auscultation  Cardiovascular   Auscultation of heart: Normal rate and rhythm, normal S1 and S2, without murmurs  Abdomen   Abdomen: Non-tender, no masses  Skin   Skin and subcutaneous tissue: Normal without rashes or lesions  Health Management  Health Maintenance   Medicare Annual Wellness Visit; every 1 year; Next Due: 20Apr2015;  Overdue    Future Appointments    Date/Time Provider Specialty Site   10/30/2017 10:15 AM Dineen Canavan, DO Family [address]   12/18/2017 10:15 AM Dineen Canavan, DO Family [address]   12/08/2017 01:30 PM Sherry Ramos [facility] 9     Signatures   Electronically signed by : Reyna Aleman DO; Oct 22 2017  9:12PM EST                       (Author)

## 2017-10-27 NOTE — CONSULTS
Assessment  1  Abdominal bloating (787 3) (R14 0)   2  Chronic constipation (564 00) (K59 09)    Plan  Abdominal bloating    · Phazyme Maximum Strength 250 MG Oral Capsule; take as directed on labelling  for abdominal bloating   Rx By: Deepa Montero; Dispense: 30 Days ; #:30 Capsule; Refill: 0;For: Abdominal bloating; JOVANA = N; Record   · (1) FOLATE; Status:Active; Requested YDB:67XRS4461;    Perform:[facility]; CMR:81ITW4461; Ordered; For:Abdominal bloating; Ordered By:Dhruv Peacock;   · (1) VITAMIN B12; Status:Active; Requested UWM:03OWQ1889;    Perform:[facility]; PGJ:25ZPH4925; Ordered; For:Abdominal bloating; Ordered By:Dhruv Peacock;   · (1) VITAMIN D 25-HYDROXY; Status:Active; Requested WDJ:00ZZO4438;    Perform:[facility]; KTQ:96GFQ0849; Ordered; For:Abdominal bloating; Ordered By:Dhruv Peacock;  Chronic constipation    · Linzess 145 MCG Oral Capsule; take 1 capsule daily   Rx By: Deepa Montero; Dispense: 14 Days ; #:14 Capsule; Refill: 0;For: Chronic constipation; JOVANA = N; Dispense Sample   · Follow-up visit in 3 months Evaluation and Treatment  Follow-up  Status: Hold For -  Scheduling  Requested for: 56JXX9717   Ordered; For: Chronic constipation; Ordered By: Deepa Montero Performed:  Due: 35RIY5421   · (1) TSH; Status:Active; Requested EQA:51RKI4170;    Perform:[facility]; CNM:47JGH6825; Ordered; For:Chronic constipation; Ordered By:Dhruv Peacock;    Discussion/Summary  Discussion Summary:   1) Chronic constipation : increase fluid/ water intake  exercise  Check TSH  Check Folate, B12 for small intestinal bacterial overgrowth  Start Linzess 145 mcg daily and will provide her samples for 2 weeks and have asked her to let us know in two weeks how she is doing and if we need to send in a prescription if she responds well  If she does not respond well or has to much diarrhea then will recommend a smaller dose or Amitiza  no high risk features to prompt a colonoscopy or EGD  Check Vitamin D levels     Dyspepsia : Check B12 and folate for SIBOprobiotics  Phazyme  Follow up in 3 months  Chief Complaint  Chief Complaint Free Text Note Form: Patient consult for  Complains of bloating, feeling gassy, and constipation  History of Present Illness  HPI: 79 y/o lady who presents for symptoms of gassiness and bloating  She does have a long history of bowel issues  Her bloating has been there 3 - 6 months ( worse now than before)  She feels that she is bloated with eating  She has increased passage of flatus and does burp and belch  She has constipation and takes laxatives  Without those she would go 3 - 4 days and when she takes laxatives she has smaller but frequent bowel movements  She has had constipation in the past as well  No bowel accidents  If she has a bowel movement she would feel better from the standpoint of the bloating  She is able to see the bloating  The clothes get tighter  She feels TUMS seem to help her (this is usually at night)  Bloating is usually in the evening  She has used GasX (many years ago) but does not feel that it helped her much  feels that if she is not able to go for a bowel movements she may feel dizziness and palpitations  She feels this is related to her constipation  She drinks water and eats fiber  She takes dulcolax almost every day and miralax in the morning  Her thyroid levels were checked in 2015 and were normal  is eating well  She has trying to watch her diet and has lost some weight  takes probiotics  She started this a month ago and seems to have improved her symptoms slightly  does not have rectal bleeding  No melena  does not have heartburn or reflux and no dysphagia  had a colonoscopy done about 5 years ago and was unremarkable  She has not had an EGD done  History Reviewed: The history was obtained today from the patient and I agree with the documented history        Review of Systems  Complete-Female GI Adult:   Constitutional: No fever, no chills, feels well, no tiredness, no recent weight gain or weight loss  Eyes: No complaints of eye pain, no red eyes, no eyesight problems, no discharge, no dry eyes, no itching of eyes  ENT: no complaints of earache, no loss of hearing, no nose bleeds, no nasal discharge, no sore throat, no hoarseness  Cardiovascular: No complaints of slow heart rate, no fast heart rate, no chest pain, no palpitations, no leg claudication, no lower extremity edema  Respiratory: No complaints of shortness of breath, no wheezing, no cough, no SOB on exertion, no orthopnea, no PND  Gastrointestinal: constipation-- and-- gas, but-- No complaints of abdominal pain, no constipation, no nausea or vomiting, no diarrhea, no bloody stools  Genitourinary: No complaints of dysuria, no incontinence, no pelvic pain, no dysmenorrhea, no vaginal discharge or bleeding  Musculoskeletal: No complaints of arthralgias, no myalgias, no joint swelling or stiffness, no limb pain or swelling  Integumentary: No complaints of skin rash or lesions, no itching, no skin wounds, no breast pain or lump  Neurological: No complaints of headache, no confusion, no convulsions, no numbness, no dizziness or fainting, no tingling, no limb weakness, no difficulty walking  Psychiatric: Not suicidal, no sleep disturbance, no anxiety or depression, no change in personality, no emotional problems  Endocrine: No complaints of proptosis, no hot flashes, no muscle weakness, no deepening of the voice, no feelings of weakness  Hematologic/Lymphatic: No complaints of swollen glands, no swollen glands in the neck, does not bleed easily, does not bruise easily  ROS Reviewed:   ROS reviewed  Active Problems  1  Abdominal bloating (787 3) (R14 0)   2  Abdominal pain (789 00) (R10 9)   3  Abscess (682 9) (L02 91)   4  Acute cystitis with hematuria (595 0) (N30 01)   5  Anxiety (300 00) (F41 9)   6  Chronic low back pain without sciatica, unspecified back pain laterality (724 2,338 29)   (M54 5,G89 29)   7  Diabetic peripheral neuropathy (250 60,357 2) (E11 42)   8  Dysuria (788 1) (R30 0)   9  Encounter for special screening examination for genitourinary disorder (V81 6) (Z13 89)   10  Flatulence (787 3) (R14 3)   11  Flu vaccine need (V04 81) (Z23)   12  Hyperlipidemia (272 4) (E78 5)   13  Hypertension (401 9) (I10)   14  Need for shingles vaccine (V04 89) (Z23)   15  Obesity due to excess calories 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 mass index (BMI)    in 99th percentile for age in pediatric patient (278 00,V85 54) (Z78 55,A70 83)   12  Palpitations (785 1) (R00 2)   17  Post-menopausal (V49 81) (Z78 0)   18  Thyroid enlarged (240 9) (E01 0)   19  Type 2 diabetes mellitus (250 00) (E11 9)   20  Visit for screening mammogram (V76 12) (Z12 31)    Past Medical History  1  Denied: History of alcohol abuse   2  Denied: History of mental disorder   3  Denied: History of substance abuse  Active Problems And Past Medical History Reviewed: The active problems and past medical history were reviewed and updated today  Surgical History  1  History of Knee Surgery  Surgical History Reviewed: The surgical history was reviewed and updated today  Family History  Family History    1  Denied: Family history of alcohol abuse   2  Family history of malignant neoplasm (V16 9) (Z80 9)   3  Denied: Family history of mental disorder   4  Denied: Family history of substance abuse  Family History Reviewed: The family history was reviewed and updated today  Social History   · Active advance directive (V49 89) (Z78 9)   · Always uses seat belt   · Caffeine use (V49 89) (F15 90)   · Exercise: Walking   · Lives with family   · Never a smoker   · No alcohol use   · Non-smoker (V49 89) (Z78 9)   · Primary language is [place]   · [employer]   · Single   · Supportive and safe  Social History Reviewed: The social history was reviewed and updated today  Current Meds   1   Aspirin 81 MG Oral Tablet Delayed Release; TAKE 1 TABLET DAILY; Therapy: 19UDL3637 to Recorded   2  Carvedilol 12 5 MG Oral Tablet; TAKE 1 TABLET TWICE DAILY  Requested for:   99NNY8939; Last Rx:80Xvg6442 Ordered   3  DilTIAZem  MG Oral Capsule Extended Release 24 Hour; Take 1 capsule by   mouth  daily; Therapy: 15Apj3717 to (Evaluate:50Xtu8202)  Requested for: 36Ytq6656; Last   Rx:66Yfr4096 Ordered   4  MetFORMIN HCl - 500 MG Oral Tablet; take 2 tablets twice a day; Therapy: 87OXC7619 to (Janeal Stagers)  Requested for: 88DGO7524; Last   Rx:32Ovs7695 Ordered   5  Multi-Vitamin Oral Tablet; TAKE 1 TABLET DAILY; Therapy: 05KIR1965 to Recorded   6  Probiotic Daily Oral Capsule; Therapy: 15VHB4524 to Recorded  Medication List Reviewed: The medication list was reviewed and updated today  Allergies  1  Statins    Vitals  Vital Signs    Recorded: 41FWX1936 09:06AM   Heart Rate 78   Systolic 027, LUE, Sitting   Diastolic 82, LUE, Sitting   BP CUFF SIZE Large   Height 5 ft 3 in   Weight 216 lb 6 0 oz   BMI Calculated 38 33   BSA Calculated 2   O2 Saturation 99     Physical Exam    Constitutional   General appearance: No acute distress, well appearing and well nourished  Eyes   Conjunctiva and lids: No swelling, erythema or discharge  Pupils and irises: Equal, round and reactive to light  Ears, Nose, Mouth, and Throat   External inspection of ears and nose: Normal     Oropharynx: Normal with no erythema, edema, exudate or lesions  Pulmonary   Respiratory effort: No increased work of breathing or signs of respiratory distress  Auscultation of lungs: Clear to auscultation  Cardiovascular   Auscultation of heart: Normal rate and rhythm, normal S1 and S2, without murmurs  Examination of extremities for edema and/or varicosities: Normal     Abdomen   Abdomen: Non-tender, no masses  Liver and spleen: No hepatomegaly or splenomegaly  Lymphatic   Palpation of lymph nodes in neck: No lymphadenopathy      Musculoskeletal Gait and station: Normal     Skin   Skin and subcutaneous tissue: Normal without rashes or lesions  Neurologic   Cranial nerves: Cranial nerves 2-12 intact      Psychiatric   Orientation to person, place, and time: Normal          Future Appointments    Date/Time Provider Specialty Site   12/18/2017 10:15 AM [facility] [address]     Signatures   Electronically signed by : Keri Wilburn MD; Sep 29 2017  9:44AM EST                       (Author)

## 2017-10-30 ENCOUNTER — GENERIC CONVERSION - ENCOUNTER (OUTPATIENT)
Dept: OTHER | Facility: OTHER | Age: 82
End: 2017-10-30

## 2017-10-30 DIAGNOSIS — E11.9 TYPE 2 DIABETES MELLITUS WITHOUT COMPLICATIONS (HCC): ICD-10-CM

## 2017-10-31 ENCOUNTER — TRANSCRIBE ORDERS (OUTPATIENT)
Dept: ADMINISTRATIVE | Facility: HOSPITAL | Age: 82
End: 2017-10-31

## 2017-10-31 DIAGNOSIS — R42 VERTIGO: ICD-10-CM

## 2017-10-31 DIAGNOSIS — E04.1 THYROID NODULE: Primary | ICD-10-CM

## 2017-11-22 ENCOUNTER — HOSPITAL ENCOUNTER (OUTPATIENT)
Dept: ULTRASOUND IMAGING | Facility: HOSPITAL | Age: 82
Discharge: HOME | End: 2017-11-22
Payer: COMMERCIAL

## 2017-11-22 ENCOUNTER — HOSPITAL ENCOUNTER (OUTPATIENT)
Dept: NON INVASIVE DIAGNOSTICS | Facility: CLINIC | Age: 82
Discharge: HOME | End: 2017-11-22
Payer: COMMERCIAL

## 2017-11-22 ENCOUNTER — GENERIC CONVERSION - ENCOUNTER (OUTPATIENT)
Dept: OTHER | Facility: OTHER | Age: 82
End: 2017-11-22

## 2017-11-22 ENCOUNTER — HOSPITAL ENCOUNTER (OUTPATIENT)
Dept: MRI IMAGING | Facility: HOSPITAL | Age: 82
Discharge: HOME | End: 2017-11-22
Payer: COMMERCIAL

## 2017-11-22 DIAGNOSIS — R42 VERTIGO: ICD-10-CM

## 2017-11-22 DIAGNOSIS — E11.9 TYPE 2 DIABETES MELLITUS WITHOUT COMPLICATIONS (HCC): ICD-10-CM

## 2017-11-22 DIAGNOSIS — E04.1 THYROID NODULE: ICD-10-CM

## 2017-11-22 PROCEDURE — 93925 LOWER EXTREMITY STUDY: CPT

## 2017-11-22 PROCEDURE — 76536 US EXAM OF HEAD AND NECK: CPT

## 2017-11-22 PROCEDURE — 93923 UPR/LXTR ART STDY 3+ LVLS: CPT

## 2017-11-22 PROCEDURE — A9585 GADOBUTROL INJECTION: HCPCS | Performed by: PHYSICIAN ASSISTANT

## 2017-11-22 PROCEDURE — 70553 MRI BRAIN STEM W/O & W/DYE: CPT

## 2017-11-22 RX ADMIN — GADOBUTROL 9 ML: 604.72 INJECTION INTRAVENOUS at 11:41

## 2017-12-04 ENCOUNTER — GENERIC CONVERSION - ENCOUNTER (OUTPATIENT)
Dept: FAMILY MEDICINE CLINIC | Facility: CLINIC | Age: 82
End: 2017-12-04

## 2017-12-08 ENCOUNTER — TRANSCRIBE ORDERS (OUTPATIENT)
Dept: ADMINISTRATIVE | Facility: HOSPITAL | Age: 82
End: 2017-12-08

## 2017-12-08 ENCOUNTER — APPOINTMENT (OUTPATIENT)
Dept: RADIOLOGY | Facility: MEDICAL CENTER | Age: 82
End: 2017-12-08
Payer: COMMERCIAL

## 2017-12-08 ENCOUNTER — ALLSCRIPTS OFFICE VISIT (OUTPATIENT)
Dept: OTHER | Facility: OTHER | Age: 82
End: 2017-12-08

## 2017-12-08 ENCOUNTER — APPOINTMENT (OUTPATIENT)
Dept: LAB | Facility: MEDICAL CENTER | Age: 82
End: 2017-12-08
Payer: COMMERCIAL

## 2017-12-08 DIAGNOSIS — K59.09 OTHER CONSTIPATION: ICD-10-CM

## 2017-12-08 DIAGNOSIS — D53.9 SIMPLE CHRONIC ANEMIA: Primary | ICD-10-CM

## 2017-12-08 DIAGNOSIS — R14.0 ABDOMINAL DISTENSION (GASEOUS): ICD-10-CM

## 2017-12-08 DIAGNOSIS — D53.9 SIMPLE CHRONIC ANEMIA: ICD-10-CM

## 2017-12-08 LAB
ERYTHROCYTE [DISTWIDTH] IN BLOOD BY AUTOMATED COUNT: 12.7 % (ref 11.6–15.1)
HCT VFR BLD AUTO: 37.5 % (ref 34.8–46.1)
HGB BLD-MCNC: 13.3 G/DL (ref 11.5–15.4)
MCH RBC QN AUTO: 32.8 PG (ref 26.8–34.3)
MCHC RBC AUTO-ENTMCNC: 35.5 G/DL (ref 31.4–37.4)
MCV RBC AUTO: 92 FL (ref 82–98)
PLATELET # BLD AUTO: 209 THOUSANDS/UL (ref 149–390)
PMV BLD AUTO: 11.4 FL (ref 8.9–12.7)
RBC # BLD AUTO: 4.06 MILLION/UL (ref 3.81–5.12)
VIT B12 SERPL-MCNC: 2128 PG/ML (ref 100–900)
WBC # BLD AUTO: 9.16 THOUSAND/UL (ref 4.31–10.16)

## 2017-12-08 PROCEDURE — 85027 COMPLETE CBC AUTOMATED: CPT

## 2017-12-08 PROCEDURE — 74000 HB X-RAY EXAM OF ABDOMEN (SINGLE ANTEROPOSTERIOR VIEW): CPT

## 2017-12-08 PROCEDURE — 36415 COLL VENOUS BLD VENIPUNCTURE: CPT

## 2017-12-08 PROCEDURE — 82607 VITAMIN B-12: CPT

## 2017-12-09 NOTE — PROGRESS NOTES
Assessment    1  Chronic constipation (564 00) (K59 09)   2  Abdominal bloating (787 3) (R14 0)   3  Abdominal pain (789 00) (R10 9)    Plan  Abdominal bloating    · Phazyme Maximum Strength 250 MG Oral Capsule; take as directed on labellingfor abdominal bloating   Rx By: Za Sykes; Dispense: 30 Days ; #:30 Capsule; Refill: 0;Abdominal bloating; JOVANA = N; Record  Abdominal bloating, Chronic constipation    · * XR ABDOMEN 1 VIEW KUB; Status:Resulted - Requires Verification;   Done:08Dec2017 02:15PM   Due:08Dec2018; Ordered; For:Abdominal bloating, Chronic constipation; Ordered By:Eloy Cruz; Anemia, deficiency    · (1) CBC/ PLT (NO DIFF); Status:Active; Requested for:08Dec2017;    Perform:[facility]; AXB:11KXJ4679; Ordered; For:Anemia, deficiency; Ordered By:Eloy Cruz;   · (1) VITAMIN B12; Status:Active; Requested for:08Dec2017;    Perform:[facility]; XQX:[id number]; Ordered; For:Anemia, deficiency; Ordered By:Eloy Cruz;  Chronic constipation    · Linzess 290 MCG Oral Capsule; take 1 capsule daily   Rx By: Za Sykes; Dispense: 16 Days ; #:30 Capsule; Refill: 3;Chronic constipation; JOVANA = N; Verified Transmission to [facility]; Last Updated By: [facility]; 12/8/2017 1:58:42 PM    Discussion/Summary  Discussion Summary: This is a very pleasant 24-year-old female here for follow-up of chronic constipation  Chronic constipation: admits to a longstanding history of constipation her entire life with recent worsening over the past 2 months  She states she has not had a decent bowel movement in about 2 months, she is only having bowel movements with enemas, which are liquid  This could be overflow diarrhea   On exam she has decreased bowel sounds and she is also complaining of nausea and bloatingcheck abdominal x-rayif negative, will give bowel preparation for evacuationthen restart Linzess 145 mcg dailyif 145 mcg does not provide relief, can increase to 290Phazymebowel prep and linzess do not relieve her constipation, consider colonoscopy to r/o underlying masselevated vitamin B12/anemia: patient's lab work from October revealed anemia with a hemoglobin of 6 0 she also had vitamin B12 checked in September, which was elevated at 1 892  I will recheck these tests and consider referral to Hematology if continue to be [id number]  Counseling Documentation With Imm: The patient was counseled regarding instructions for management  Patient's Capacity to Self-Care: Patient is able to Self-Care  Medication SE Review and Pt Understands Tx: Possible side effects of new medications were reviewed with the patient/guardian today  Chief Complaint  Chief Complaint Free Text Note Form: f/u on labs  Pt c/o constipation, abdominal bloating, and nausea  History of Present Illness  HPI: 25-year-old female here for follow-up of chronic constipation  She was last in started on Linzess 45  She states decent bowel movement in about 2 months and she has only been having liquid stools with a laxative  Complains of abdominal bloating and nausea that has been worsening recently  she states that years ago, which was normal    History Reviewed: The history was obtained today from the patient and I agree with the documented history  Review of Systems  Complete-Female GI Adult:  Constitutional: No fever, no chills, feels well, no tiredness, no recent weight gain or weight loss  Eyes: No complaints of eye pain, no red eyes, no eyesight problems, no discharge, no dry eyes, no itching of eyes  ENT: no complaints of earache, no loss of hearing, no nose bleeds, no nasal discharge, no sore throat, no hoarseness  Cardiovascular: No complaints of slow heart rate, no fast heart rate, no chest pain, no palpitations, no leg claudication, no lower extremity edema  Respiratory: No complaints of shortness of breath, no wheezing, no cough, no SOB on exertion, no orthopnea, no PND    Gastrointestinal: No complaints of abdominal pain, no constipation, no nausea or vomiting, no diarrhea, no bloody stools  Genitourinary: No complaints of dysuria, no incontinence, no pelvic pain, no dysmenorrhea, no vaginal discharge or bleeding  Musculoskeletal: No complaints of arthralgias, no myalgias, no joint swelling or stiffness, no limb pain or swelling  Integumentary: No complaints of skin rash or lesions, no itching, no skin wounds, no breast pain or lump  Neurological: No complaints of headache, no confusion, no convulsions, no numbness, no dizziness or fainting, no tingling, no limb weakness, no difficulty walking  Psychiatric: Not suicidal, no sleep disturbance, no anxiety or depression, no change in personality, no emotional problems  Endocrine: No complaints of proptosis, no hot flashes, no muscle weakness, no deepening of the voice, no feelings of weakness  Hematologic/Lymphatic: No complaints of swollen glands, no swollen glands in the neck, does not bleed easily, does not bruise easily  ROS Reviewed:   ROS reviewed  Active Problems    1  Abdominal bloating (787 3) (R14 0)   2  Abdominal pain (789 00) (R10 9)   3  Abscess (682 9) (L02 91)   4  Acute cystitis with hematuria (595 0) (N30 01)   5  Anxiety (300 00) (F41 9)   6  Chronic constipation (564 00) (K59 09)   7  Chronic low back pain without sciatica, unspecified back pain laterality (724 2,338 29) (M54 5,G89 29)   8  Diabetic peripheral neuropathy (250 60,357 2) (E11 42)   9  Dysuria (788 1) (R30 0)   10  Encounter for special screening examination for genitourinary disorder (V81 6) (Z13 89)   11  Flatulence (787 3) (R14 3)   12  Flu vaccine need (V04 81) (Z23)   13  Hyperlipidemia (272 4) (E78 5)   14  Hypertension (401 9) (I10)   15  Need for shingles vaccine (V04 89) (Z23)   16  Numbness in left leg (782 0) (R20 0)   17   Obesity due to excess calories 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 mass index (BMI)  in 99th percentile for age in pediatric patient (278 00,V85 54) (X81 44,P96 53)   18  Palpitations (785 1) (R00 2)   19  Post-menopausal (V49 81) (Z78 0)   20  Thyroid enlarged (240 9) (E04 9)   21  Type 2 diabetes mellitus (250 00) (E11 9)   22  Vertigo, intermittent (780 4) (R42)   23  Visit for screening mammogram (V76 12) (Z12 31)    Past Medical History  1  Denied: History of alcohol abuse   2  Denied: History of mental disorder   3  Denied: History of substance abuse  Active Problems And Past Medical History Reviewed: The active problems and past medical history were reviewed and updated today  Surgical History  1  History of Knee Surgery  Surgical History Reviewed: The surgical history was reviewed and updated today  Family History  Family History    1  Denied: Family history of alcohol abuse   2  Family history of malignant neoplasm (V16 9) (Z80 9)   3  Denied: Family history of mental disorder   4  Denied: Family history of substance abuse  Family History Reviewed: The family history was reviewed and updated today  Social History     · Active advance directive (V49 89) (Z78 9)   · Always uses seat belt   · Caffeine use (V49 89) (F15 90)   · Exercise: Walking   · Lives with family   · Never a smoker   · No alcohol use   · Non-smoker (V49 89) (Z78 9)   · Primary language is [place]   · [employer]   · Single   · Supportive and safe  Social History Reviewed: The social history was reviewed and updated today  Current Meds   1  Aspirin 81 MG Oral Tablet Delayed Release; TAKE 1 TABLET DAILY; Therapy: 75TWI7824 to Recorded   2  Cartia  MG Oral Capsule Extended Release 24 Hour; take 1 capsule daily; Therapy: 58KBX2133 to (Evaluate:93Pba6583)  Requested for: 13RYN4983; Last Rx:18Oct2017 Ordered   3  Carvedilol 12 5 MG Oral Tablet; TAKE 1 TABLET TWICE DAILY  Requested for: 02THB4609; Last Rx:18Oct2017 Ordered   4  Glucosamine Chondroitin Joint Oral Tablet; Therapy: (Recorded:30Oct2017) to Recorded   5   Linzess 145 MCG Oral Capsule; TAKE 1 CAPSULE Before meals; Therapy: 72RVU5119 to (Evaluate:47Gqz2116)  Requested for: 06OVE2915; Last Rx:80Zqx5833 Ordered   6  Lisinopril 5 MG Oral Tablet; take one tablet by mouth every day; Therapy: 63NKX2123 to (Evaluate:09Vnh6465)  Requested for: 52ECD4485; Last Rx:72Uwe8651 Ordered   7  Meclizine HCl - 12 5 MG Oral Tablet; TAKE 1 TABLET DAILY; Therapy: 02YDA0117 to Recorded   8  MetFORMIN HCl - 500 MG Oral Tablet; take 2 tablets twice a day; Therapy: 08RZT3452 to (Merrick Driscoll)  Requested for: 61FZD0609; Last Rx:47Fqx4889 Ordered   9  Multi-Vitamin Oral Tablet; TAKE 1 TABLET DAILY; Therapy: 01SWB4454 to Recorded   10  Phazyme Maximum Strength 250 MG Oral Capsule; take as directed on labelling for  abdominal bloating; Therapy: 39UJU9371 to [phone]); Last Rx:56Cdm1375 Ordered   11  Probiotic Daily Oral Capsule; Therapy: 88XWB5747 to Recorded  Medication List Reviewed: The medication list was reviewed and updated today  Allergies  1  Statins    Vitals  Vital Signs    Recorded: 66QVX5870 01:35PM   Temperature 98 2 F, Tympanic   Heart Rate 65   Systolic 476, LUE, Sitting   Diastolic 82, LUE, Sitting   Height 5 ft 3 in   Weight 218 lb    BMI Calculated 38 62   BSA Calculated 2 01   O2 Saturation 98, RA       Physical Exam   Constitutional  General appearance: No acute distress, well appearing and well nourished  Eyes  Conjunctiva and lids: No swelling, erythema or discharge  Ears, Nose, Mouth, and Throat  External inspection of ears and nose: Normal    Pulmonary  Respiratory effort: No increased work of breathing or signs of respiratory distress  Auscultation of lungs: Clear to auscultation  Cardiovascular  Auscultation of heart: Normal rate and rhythm, normal S1 and S2, without murmurs  Abdomen  Abdomen: Abnormal   The abdomen was rounded  Bowel sounds were diminished  The abdomen was soft and nontender  The abdomen was not firm-- and-- not rigid  No rebound tenderness  No guarding  Liver and spleen: No hepatomegaly or splenomegaly  Musculoskeletal  Gait and station: Normal    Psychiatric  Orientation to person, place, and time: Normal    Mood and affect: Normal          Results/Data  * XR ABDOMEN 1 VIEW KUB 24Bax7902 02:15PM Emma QUEZADA Order Number: [id number]     Test Name Result Flag Reference   XR ABDOMEN 1 VIEW KUB (Report)       ABDOMEN   INDICATION: Bloating, constipation, gas   COMPARISON: None  VIEWS: AP supine   IMAGES: 3   FINDINGS:   There is a nonobstructive bowel gas pattern  No discernible free air on this supine study  Upright or left lateral decubitus imaging is more sensitive to detect subtle free air in the appropriate setting  No pathologic calcifications or soft tissue masses  Visualized lung bases are clear  There are severe degenerative changes of the lower lumbar spine with mild levoscoliosis  IMPRESSION:   Normal bowel gas pattern  Workstation performed: [id number]   Signed by:  Ray Diego MD  12/8/17       Health Management  Health Maintenance   Medicare Annual Wellness Visit; every 1 year; Next Due: 05Hvw9030; Overdue    Attending Note  Collaborating Physician Note: Collaborating Physician: I agree with the Advanced Practitioner note        Future Appointments    Date/Time Provider Specialty Site   12/18/2017 10:15 AM Apple Sandoval DO Family Medicine [place] [address] FAMILY PRACTICE       Signatures   Electronically signed by : Amelia Rivers [facility]; Dec  8 2017  2:10PM EST                       (Author)    Electronically signed by : Amelia Rivers [facility]; Dec  8 2017  4:01PM EST                       (Author)    Electronically signed by : Ty De Leon MD; Dec  8 2017  5:53PM EST                       (Author)

## 2017-12-11 ENCOUNTER — GENERIC CONVERSION - ENCOUNTER (OUTPATIENT)
Dept: OTHER | Facility: OTHER | Age: 82
End: 2017-12-11

## 2017-12-12 ENCOUNTER — LAB CONVERSION - ENCOUNTER (OUTPATIENT)
Dept: OTHER | Facility: OTHER | Age: 82
End: 2017-12-12

## 2017-12-12 LAB
A/G RATIO (HISTORICAL): 1.6 (CALC) (ref 1–2.5)
ALBUMIN SERPL BCP-MCNC: 4 G/DL (ref 3.6–5.1)
ALP SERPL-CCNC: 88 U/L (ref 33–130)
ALT SERPL W P-5'-P-CCNC: 15 U/L (ref 6–29)
AST SERPL W P-5'-P-CCNC: 18 U/L (ref 10–35)
BILIRUB SERPL-MCNC: 0.7 MG/DL (ref 0.2–1.2)
BUN SERPL-MCNC: 15 MG/DL (ref 7–25)
BUN/CREA RATIO (HISTORICAL): 17 (CALC) (ref 6–22)
CALCIUM SERPL-MCNC: 9.5 MG/DL (ref 8.6–10.4)
CHLORIDE SERPL-SCNC: 98 MMOL/L (ref 98–110)
CO2 SERPL-SCNC: 29 MMOL/L (ref 20–31)
CREAT SERPL-MCNC: 0.89 MG/DL (ref 0.6–0.88)
EGFR AFRICAN AMERICAN (HISTORICAL): 69 ML/MIN/1.73M2
EGFR-AMERICAN CALC (HISTORICAL): 60 ML/MIN/1.73M2
GAMMA GLOBULIN (HISTORICAL): 2.5 G/DL (CALC) (ref 1.9–3.7)
GLUCOSE (HISTORICAL): 104 MG/DL (ref 65–99)
HBA1C MFR BLD HPLC: 5.7 % OF TOTAL HGB
POTASSIUM SERPL-SCNC: 4.3 MMOL/L (ref 3.5–5.3)
SODIUM SERPL-SCNC: 133 MMOL/L (ref 135–146)
TOTAL PROTEIN (HISTORICAL): 6.5 G/DL (ref 6.1–8.1)

## 2017-12-18 ENCOUNTER — ALLSCRIPTS OFFICE VISIT (OUTPATIENT)
Dept: OTHER | Facility: OTHER | Age: 82
End: 2017-12-18

## 2017-12-20 NOTE — PROGRESS NOTES
Assessment  1  Diabetic peripheral neuropathy (250 60,357 2) (E11 42)   2  Chronic constipation (564 00) (K59 09)   3  Hypertension (401 9) (I10)   4  Type 2 diabetes mellitus (250 00) (E11 9)    Plan  Encounter for special screening examination for genitourinary disorder    · *VB - Urinary Incontinence Screen (Dx Z13 89 Screen for UI); Status:Resulted - RequiresVerification,Retrospective By Protocol Authorization;   Done: 92JPD3673 10:50AM  Hypertension    · Carvedilol 12 5 MG Oral Tablet; TAKE 1 TABLET TWICE DAILY  Hypertension, Vertigo, intermittent    · Cartia  MG Oral Capsule Extended Release 24 Hour; Take 1 capsule bymouth  daily   · Lisinopril 5 MG Oral Tablet; take one tablet by mouth every day  Type 2 diabetes mellitus    · MetFORMIN HCl - 500 MG Oral Tablet; take 2 tablets twice a day   · (1) BASIC METABOLIC PROFILE; Status:Active; Requested for:44Uim9605;    · (1) HEMOGLOBIN A1C; Status:Active; Requested for:00Wec4796;    · Follow-up visit in 13 weeks Evaluation and Treatment  Follow-up  Status: Complete Done: 13UKP0227    Chief Complaint  f/u dmII/hypertension/review labs      History of Present Illness  The patient is here today for a follow-up visit  Her diabetes mellitus type 2 is improving  Her hypertension is primary  She has no significant interval events  Symptoms: The patient denies any symptoms currently  Lifestyle and Disease Management: Diet: She consumes a diverse and healthy diet  Weight Issues: She has weight concerns  Exercise: She exercises regularly  Smoking: She does not use tobacco  Alcohol: She denies alcohol use  Drug Use: She denies drug use  also c/o mid back pain to lt lumbar area      Review of Systems   Constitutional: feeling poorly  Eyes: No complaints of eye pain, no red eyes, no eyesight problems, no discharge, no dry eyes, no itching of eyes    ENT: no complaints of earache, no loss of hearing, no nose bleeds, no nasal discharge, no sore throat, no hoarseness  Cardiovascular: No complaints of slow heart rate, no fast heart rate, no chest pain, no palpitations, no leg claudication, no lower extremity edema  Respiratory: No complaints of shortness of breath, no wheezing, no cough, no SOB on exertion, no orthopnea, no PND  Gastrointestinal: constipation  Genitourinary: No complaints of dysuria, no incontinence, no pelvic pain, no dysmenorrhea, no vaginal discharge or bleeding  Musculoskeletal: low back pain  Integumentary: No complaints of skin rash or lesions, no itching, no skin wounds, no breast pain or lump  Neurological: numbness-- and-- dizziness  Psychiatric: Not suicidal, no sleep disturbance, no anxiety or depression, no change in personality, no emotional problems  Endocrine: No complaints of proptosis, no hot flashes, no muscle weakness, no deepening of the voice, no feelings of weakness  Hematologic/Lymphatic: No complaints of swollen glands, no swollen glands in the neck, does not bleed easily, does not bruise easily  Preventive Quality 65 and Older: Urinary Incontinence Symptoms includes: urinary frequency and nocturia, but no urinary incontinence, no incomplete bladder emptying, no urinary urgency, no urinary hesitancy, no dysuria, no weak stream, no intermittent stream, no post-void dribbling, no vaginal pressure and no vaginal dryness       Active Problems  1  Abdominal bloating (787 3) (R14 0)   2  Abdominal pain (789 00) (R10 9)   3  Abscess (682 9) (L02 91)   4  Acute cystitis with hematuria (595 0) (N30 01)   5  Anemia, deficiency (281 9) (D53 9)   6  Anxiety (300 00) (F41 9)   7  Chronic constipation (564 00) (K59 09)   8  Chronic low back pain without sciatica, unspecified back pain laterality (724 2,338 29) (M54 5,G89 29)   9  Diabetic peripheral neuropathy (250 60,357 2) (E11 42)   10  Dysuria (788 1) (R30 0)   11  Encounter for special screening examination for genitourinary disorder (V81 6) (Z13 89)   12   Flatulence (787 3) (R14 3)   13  Flu vaccine need (V04 81) (Z23)   14  Hyperlipidemia (272 4) (E78 5)   15  Hypertension (401 9) (I10)   16  Need for shingles vaccine (V04 89) (Z23)   17  Numbness in left leg (782 0) (R20 0)   18  Obesity due to excess calories 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 mass index (BMI)  in 99th percentile for age in pediatric patient (278 00,V85 54) (D01 01,Z64 66)   23  Palpitations (785 1) (R00 2)   20  Post-menopausal (V49 81) (Z78 0)   21  Thyroid enlarged (240 9) (E04 9)   22  Type 2 diabetes mellitus (250 00) (E11 9)   23  Vertigo, intermittent (780 4) (R42)   24  Visit for screening mammogram (V76 12) (Z12 31)    Past Medical History  1  Denied: History of alcohol abuse   2  Denied: History of mental disorder   3  Denied: History of substance abuse    Surgical History  1  History of Knee Surgery    Family History  Family History    1  Denied: Family history of alcohol abuse   2  Family history of malignant neoplasm (V16 9) (Z80 9)   3  Denied: Family history of mental disorder   4  Denied: Family history of substance abuse    Social History   · Active advance directive (V49 89) (Z78 9)   · Always uses seat belt   · Caffeine use (V49 89) (F15 90)   · Exercise: Walking   · Lives with family   · Never a smoker   · No alcohol use   · Non-smoker (V49 89) (Z78 9)   · Primary language is English   · [employer]   · Single   · Supportive and safe  The social history was reviewed and updated today  The social history was reviewed and is unchanged  Current Meds   1  Aspirin 81 MG Oral Tablet Delayed Release; TAKE 1 TABLET DAILY; Therapy: 06UQA3569 to Recorded   2  Cartia  MG Oral Capsule Extended Release 24 Hour; take 1 capsule daily; Therapy: 86GNO9496 to (Evaluate:42Fnf0224)  Requested for: 60NWZ6226; Last Rx:18Oct2017 Ordered   3  Carvedilol 12 5 MG Oral Tablet; TAKE 1 TABLET TWICE DAILY  Requested for: 54RHY9743; Last Rx:18Oct2017 Ordered   4   Glucosamine Chondroitin Joint Oral Tablet; Therapy: (Recorded:30Oct2017) to Recorded   5  Golytely 227 1 GM Oral Solution Reconstituted; TAKE AS DIRECTED; Therapy: 52ELH6858 to (Last Rx:92Beq4264)  Requested for: 89QHK0656 Ordered   6  Linzess 290 MCG Oral Capsule; take 1 capsule daily; Therapy: 19OWC3696 to (Evaluate:45Bsr8404)  Requested for: 90EME2585; Last Rx:18Uuo5769 Ordered   7  Lisinopril 5 MG Oral Tablet; take one tablet by mouth every day; Therapy: 47VRV7506 to (Evaluate:41Loq5914)  Requested for: 87TMJ3946; Last Rx:98Edf7348 Ordered   8  Meclizine HCl - 12 5 MG Oral Tablet; TAKE 1 TABLET DAILY; Therapy: 83DUI9739 to Recorded   9  MetFORMIN HCl - 500 MG Oral Tablet; take 2 tablets twice a day; Therapy: 84IKO0110 to (Kimberlee Garcia)  Requested for: 19QTA0303; Last Rx:70Pza1316 Ordered   10  Multi-Vitamin Oral Tablet; TAKE 1 TABLET DAILY; Therapy: 99QHW2683 to Recorded   11  Probiotic Daily Oral Capsule; Therapy: 98TJP6051 to Recorded    The medication list was reviewed and updated today  Allergies  1  Statins    Vitals  Vital Signs    Recorded: 28FAM2971 10:35AM   Heart Rate 72   Respiration 18   Systolic 099   Diastolic 80   Height 5 ft 3 in   Weight 214 lb 3 2 oz   BMI Calculated 37 94   BSA Calculated 1 99     Physical Exam   Constitutional  General appearance: No acute distress, well appearing and well nourished  Ears, Nose, Mouth, and Throat  Otoscopic examination: Tympanic membranes translucent with normal light reflex  Canals patent without erythema  Pulmonary  Auscultation of lungs: Clear to auscultation  Cardiovascular  Auscultation of heart: Normal rate and rhythm, normal S1 and S2, without murmurs  Abdomen  Abdomen: Non-tender, no masses  Skin  Skin and subcutaneous tissue: Normal without rashes or lesions     Psychiatric  Orientation to person, place, and time: Normal    Mood and affect: Normal          Results/Data  *VB - Urinary Incontinence Screen (Dx Z13 89 Screen for UI) 80ETU9876 10:50AM Daly Valladares     Test Name Result Flag Reference   Urinary Incontinence Assessment 23Qbz3327                   Falls Risk Assessment (Dx Z13 89 Screen for Neurologic Disorder) 67MVY2736 10:41AM User, Ahs     Test Name Result Flag Reference   Falls Risk      1 fall without injury in the past year     PHQ-2 Adult Depression Screening 29Qbw9142 10:41AM User, [facility]hs     Test Name Result Flag Reference   PHQ-2 Adult Depression Score 0     Over the last two weeks, how often have you been bothered by any of the following problems? Little interest or pleasure in doing things: Not at all - 0 Feeling down, depressed, or hopeless: Not at all - 0   PHQ-2 Adult Depression Screening Negative         Health Management  Health Maintenance   Medicare Annual Wellness Visit; every 1 year; Next Due: 20Apr2015;  Overdue    Future Appointments    Date/Time Provider Specialty Site   03/19/2018 10:00 AM Amor Nair DO Family Medicine [address] PRACTICE     Signatures   Electronically signed by : Edgar Schultz DO; Dec 19 2017  8:56AM EST                       (Author)

## 2018-01-10 NOTE — RESULT NOTES
Verified Results  VAS LOWER LIMB ARTERIAL DUPLEX, COMPLETE BILATERAL/GRAFTS 22Nov2017 08:27AM Becky Urban Order Number: [id number]    - Patient Instructions: To schedule this appointment, please contact Central Scheduling at [phone]  Test Name Result Flag Reference   VAS LOWER LIMB ARTERIAL DUPLEX, COMPLETE BILATERAL/GRAFTS (Report)     [facility] REPORT   CLINICAL:   Indications: Patient presents with bilateral leg pain and numbness of feet  Risk Factors: The patient has history of HTN and diabetes (diabetes)  She has   no history of hyperlipidemia  The patient current weight is 213 lb  Clinical   Right Pressure: 173/ mm Hg, Left Pressure: 174/ mm Hg  FINDINGS:      Segment        Rig Left                        PSV PSV    Common Femoral Artery 118 126    Prox Profunda      81  98    Prox SFA        124 120    Mid SFA         89  98    Dist SFA        73  75    Proximal Pop      101  92    Distal Pop       134 280    Dist  Ant  Tibial    53  49    Dist Peroneal        64             CONCLUSION:   Impression:   RIGHT LOWER LIMB:   This resting evaluation shows no evidence of significant lower extremity   arterial occlusive disease  Ankle/Brachial index: 0 90  Normal range  PVR/ PPG tracings are dampened  Metatarsal pressure of 145 mmHg   Great toe pressure of 78 mmHg, within the healing range  LEFT LOWER LIMB:   This resting evaluation shows no evidence of significant lower extremity   arterial occlusive disease  Ankle/Brachial index: 0 78  Moderate claudication range  PVR/ PPG tracings are dampened  Metatarsal pressure of 78 mmHg   Great toe pressure of 74 mmHg, within the healing range      No prior study for comparison  Recommend repeat testing in 1year as per protocol unless otherwise indicated        SIGNATURE:   Electronically Signed by: Niesha Lovell MD, [address] on 2017-11-22 01:50:35 PM

## 2018-01-10 NOTE — PROGRESS NOTES
Plan   Hypertension    · Cardizem  MG Oral Capsule Extended Release 24 Hour (DiltiaZEM HCl  ER Coated Beads); TAKE 1 CAPSULE DAILY   · Carvedilol 12 5 MG Oral Tablet; TAKE 1 TABLET TWICE DAILY  Type 2 diabetes mellitus    · MetFORMIN HCl - 500 MG Oral Tablet; take 2 tablets twice a day   · (1) BASIC METABOLIC PROFILE; Status:Active; Requested for:22Mjf4842;    · (1) HEMOGLOBIN A1C; Status:Active; Requested for:09Qtx3918;     *VB - Urinary Incontinence Screen (Dx Z13 89 Screen for UI); Status:Resulted - Requires Verification,Retrospective By Protocol Authorization;   Done: [id number] 12:00AM  Due:31Lqe5929; Last Updated By:Ronda Neves; 12/6/2016 11:13:07 AM;Ordered;    For:Encounter for special screening examination for genitourinary disorder; Ordered By:Christiano Slade; Discussion/Summary  Impression: Initial Annual Wellness Visit  Cardiovascular screening and counseling: screening not indicated  Diabetes screening and counseling: screening not indicated  Colorectal cancer screening and counseling: screening not indicated  Breast cancer screening and counseling: screening not indicated  Cervical cancer screening and counseling: screening not indicated  Osteoporosis screening and counseling: screening not indicated  Abdominal aortic aneurysm screening and counseling: screening not indicated  Glaucoma screening and counseling: screening is current  HIV screening and counseling: screening not indicated  Immunizations: influenza vaccine is up to date this year, the lifetime pneumococcal vaccine has been completed, hepatitis B vaccination series is not indicated at this time due to the patient's low risk of tammi the disease and Zostavax vaccination up to date  Advance Directive Planning: complete and up to date  Patient Discussion: plan discussed with the patient, follow-up visit needed in one year        Chief Complaint  annual wellness visit      History of Present Illness  Welcome to Medicare and Wellness Visits: The patient is being seen for the initial annual wellness visit  Medicare Screening and Risk Factors   Hospitalizations: no previous hospitalizations  Medicare Screening Tests Risk Questions   Abdominal aortic aneurysm risk assessment: none indicated  Osteoporosis risk assessment: none indicated  HIV risk assessment: none indicated  Drug and Alcohol Use: The patient has never smoked cigarettes and has never used smokeless tobacco  The patient reports occasional alcohol use  Alcohol concern:   The patient has no concerns about alcohol abuse  She has never used illicit drugs  Diet and Physical Activity: Current diet includes well balanced meals, 2 servings of fruit per day, 2 servings of vegetables per day, 1 servings of meat per day, 1 servings of whole grains per day, 1 servings of dairy products per day, 1 cups of coffee per day, 2 cups of tea per day, 0 cans of regular soda per day and 0 cans of diet soda per day  She exercises infrequently  Exercise: walking  Mood Disorder and Cognitive Impairment Screening: PHQ-9 Depression Scale   Over the past 2 weeks, how often have you been bothered by the following problems? 1 ) Little interest or pleasure in doing things? Not at all    2 ) Feeling down, depressed or hopeless? Not at all    3 ) Trouble falling asleep or sleeping too much? Not at all    4 ) Feeling tired or having little energy? Not at all    5 ) Poor appetite or overeating? Not at all    6 ) Feeling bad about yourself, or that you are a failure, or have let yourself or your family down? Not at all    7 ) Trouble concentrating on things, such as reading a newspaper or watching television? Not at all    8 ) Moving or speaking so slowly that other people could have noticed, or the opposite, moving or speaking faster than usual? Not at all    9 ) Thoughts that you would be off dead or of hurting yourself in some way? Not at all     TOTAL SCORE: 0    Functional Ability/Level of Safety: Hearing is normal bilaterally, normal in the right ear, normal in the left ear, with hearing aid and a hearing aid is used  The patient is currently able to do activities of daily living without limitations, able to do instrumental activities of daily living without limitations, able to participate in social activities without limitations and able to drive without limitations  Activities of daily living details: does not need help using the phone, no transportation help needed, does not need help shopping, no meal preparation help needed, does not need help doing housework, does not need help doing laundry, does not need help managing medications and does not need help managing money  Fall risk factors:  deconditioning, but The patient fell 0 times in the past 12 months , no polypharmacy, no alcohol use, no mobility impairment, no antidepressant use, no postural hypotension, no sedative use, no visual impairment, no urinary incontinence, no antihypertensive use, no cognitive impairment, up and go test was normal and no previous fall  Home safety risk factors:  no unfamiliar surroundings, no loose rugs, no poor household lighting, no uneven floors, no household clutter, grab bars in the bathroom and handrails on the stairs  Advance Directives: Advance directives: living will, durable power of  for health care directives and advance directives  Co-Managers and Medical Equipment/Suppliers: See Patient Care Team   Preventive Quality Program 65 and Older: The patient currently has no urinary incontinence symptoms  Urinary Incontinence Symptoms includes: no urinary incontinence, no incomplete bladder emptying, no urinary frequency, no urinary urgency, no urinary hesitancy, no dysuria, no nocturia, no straining, no weak stream, no intermittent stream, no post-void dribbling, no vaginal pressure and no vaginal dryness        Active Problems    1  Abscess (682 9) (L02 91)   2   Acute cystitis with hematuria (595 0) (N30 01)   3  Anxiety (300 00) (F41 9)   4  Chronic low back pain without sciatica, unspecified back pain laterality (724 2,338 29)   (M54 5,G89 29)   5  Diabetic peripheral neuropathy (250 60,357 2) (E11 42)   6  Dysuria (788 1) (R30 0)   7  Encounter for special screening examination for genitourinary disorder (V81 6) (Z13 89)   8  Flatulence (787 3) (R14 3)   9  Flu vaccine need (V04 81) (Z23)   10  Hyperlipidemia (272 4) (E78 5)   11  Hypertension (401 9) (I10)   12  Need for shingles vaccine (V04 89) (Z23)   13  Post-menopausal (V49 81) (Z78 0)   14  Thyroid enlarged (240 9) (E04 9)   15  Type 2 diabetes mellitus (250 00) (E11 9)   16  Visit for screening mammogram (V76 12) (Z12 31)    Past Medical History    · Denied: History of alcohol abuse   · Denied: History of mental disorder   · Denied: History of substance abuse    Surgical History    · History of Knee Surgery    Family History  Family History    · Denied: Family history of alcohol abuse   · Family history of malignant neoplasm (V16 9) (Z80 9)   · Denied: Family history of mental disorder   · Denied: Family history of substance abuse    Social History    · Active advance directive (V49 89) (Z78 9)   · Always uses seat belt   · Caffeine use (V49 89) (F15 90)   · Exercise: Walking   · Lives with family   · sister   · Never a smoker   · No alcohol use   · Non-smoker (V49 89) (Z78 9)   · Primary language is English   · [employer]   · Single   · Supportive and safe    Current Meds   1  Cardizem  MG Oral Capsule Extended Release 24 Hour; TAKE 1 CAPSULE   DAILY  Requested for: 64LWC2258; Last Rx:30Nov2015 Ordered   2  Carvedilol 12 5 MG Oral Tablet; TAKE 1 TABLET TWICE DAILY  Requested for:   17UHJ0010; Last Rx:30Nov2015 Ordered   3  MetFORMIN HCl - 500 MG Oral Tablet; take 2 tablets twice a day; Therapy: 30XCJ0451 to (Kenneth Kirkland)  Requested for: 58IOG6757; Last   Rx:30Nov2015 Ordered    Allergies    1  Statins    Immunizations   ** Printed in Appendix #1 below  Vitals  Signs    Heart Rate: 78  Systolic: 945  Diastolic: 72  Height: 5 ft 3 in  Weight: 223 lb   BMI Calculated: 39 5  BSA Calculated: 2 03    Results/Data  Falls Risk Assessment (Dx Z13 89 Screen for Neurologic Disorder) 72LXG9320 10:45AM User, Ahs     Test Name Result Flag Reference   Falls Risk      No falls in the past year     PHQ-9 Adult Depression Screening 01Uqz2861 10:45AM User, Ahs     Test Name Result Flag Reference   PHQ-9 Adult Depression Score 0     Over the last two weeks, how often have you been bothered by any of the following problems? Little interest or pleasure in doing things: Not at all - 0  Feeling down, depressed, or hopeless: Not at all - 0  Trouble falling or staying asleep, or sleeping too much: Not at all - 0  Feeling tired or having little energy: Not at all - 0  Poor appetite or over eating: Not at all - 0  Feeling bad about yourself - or that you are a failure or have let yourself or your family down: Not at all - 0  Trouble concentrating on things, such as reading the newspaper or watching television: Not at all - 0  Moving or speaking so slowly that other people could have noticed  Or the opposite -  being so fidgety or restless that you have been moving around a lot more than usual: Not at all - 0  Thoughts that you would be better off dead, or of hurting yourself in some way: Not at all - 0   PHQ-9 Adult Depression Screening Negative     PHQ-9 Difficulty Level Not difficult at all     PHQ-9 Severity No Depression         Health Management  Health Maintenance   Medicare Annual Wellness Visit; every 1 year; Next Due: 2015;  Overdue    Signatures   Electronically signed by : Sonya Rouse DO; Dec 18 2016  9:07PM EST                       (Author)    Appendix #1     Patient: Stevie Blackwood ; : 1934; MRN: [id number]      1 2 3 4 5 6    Influenza  Oct 2011 Oct 2012 11-Nov-2013 [id number] 30-Nov-2015 Sep 2016 PPSV  30-Nov-2015         Pneumo Other  Oct 1997         Zoster  07-Oct-2016

## 2018-01-13 VITALS
OXYGEN SATURATION: 98 % | SYSTOLIC BLOOD PRESSURE: 148 MMHG | DIASTOLIC BLOOD PRESSURE: 76 MMHG | TEMPERATURE: 97.3 F | HEART RATE: 72 BPM | HEIGHT: 63 IN | WEIGHT: 218.5 LBS | RESPIRATION RATE: 18 BRPM | BODY MASS INDEX: 38.71 KG/M2

## 2018-01-13 VITALS
HEART RATE: 78 BPM | DIASTOLIC BLOOD PRESSURE: 82 MMHG | BODY MASS INDEX: 38.34 KG/M2 | SYSTOLIC BLOOD PRESSURE: 184 MMHG | OXYGEN SATURATION: 99 % | HEIGHT: 63 IN | WEIGHT: 216.38 LBS

## 2018-01-13 VITALS
BODY MASS INDEX: 38.27 KG/M2 | HEART RATE: 74 BPM | SYSTOLIC BLOOD PRESSURE: 120 MMHG | DIASTOLIC BLOOD PRESSURE: 82 MMHG | WEIGHT: 216 LBS | HEIGHT: 63 IN

## 2018-01-13 NOTE — RESULT NOTES
Verified Results  (1) URINE CULTURE [id number] 04:09PM Edgar Goldberg     Test Name Result Flag Reference   CLINICAL REPORT (Report)     Test:        Urine culture  Specimen Type:   Urine  Specimen Date:   11/11/2016 4:09 PM  Result Date:    11/13/2016 11:55 AM  Result Status:   Final result  Resulting Lab:   [facility] [address] [facility] [address]            Tel: [phone]      CULTURE                                       ------------------                                   70,000-79,000 cfu/ml Escherichia coli      SUSCEPTIBILITY                                   ------------------                                                       Escherichia coli  METHOD                 RHIANNA  -------------------------------------  -------------------------  AMPICILLIN ($$)             >16 00 ug/ml Resistant  AMPICILLIN + SULBACTAM ($)       <=8/4 ug/ml  Susceptible  AZTREONAM ($$$)             <=8 ug/ml   Susceptible  CEFAZOLIN ($)              <=8 00 ug/ml Susceptible  CIPROFLOXACIN ($)            >2 00 ug/ml  Resistant  GENTAMICIN ($$)             <=4 ug/ml   Susceptible  LEVOFLOXACIN ($)            >4 00 ug/ml  Resistant  NITROFURANTOIN             <=32 ug/ml  Susceptible  PIPERACILLIN + TAZOBACTAM ($$$)     <=16 ug/ml  Susceptible  TETRACYCLINE              <=4 ug/ml   Susceptible  TOBRAMYCIN ($)             <=4 ug/ml   Susceptible  TRIMETHOPRIM + SULFAMETHOXAZOLE ($$$)  <=2/38 ug/ml Susceptible       Plan  Dysuria    · Sulfamethoxazole-Trimethoprim 800-160 MG Oral Tablet; TAKE 1 TABLET TWICE  DAILY UNTIL FINISHED

## 2018-01-14 VITALS
BODY MASS INDEX: 37.76 KG/M2 | RESPIRATION RATE: 18 BRPM | HEART RATE: 66 BPM | WEIGHT: 213.13 LBS | TEMPERATURE: 98.2 F | DIASTOLIC BLOOD PRESSURE: 76 MMHG | SYSTOLIC BLOOD PRESSURE: 180 MMHG | HEIGHT: 63 IN

## 2018-01-15 VITALS
RESPIRATION RATE: 18 BRPM | SYSTOLIC BLOOD PRESSURE: 168 MMHG | WEIGHT: 218 LBS | BODY MASS INDEX: 38.62 KG/M2 | DIASTOLIC BLOOD PRESSURE: 90 MMHG | HEART RATE: 64 BPM | HEIGHT: 63 IN | TEMPERATURE: 97.5 F

## 2018-01-15 NOTE — RESULT NOTES
Verified Results  * MAMMO SCREENING BILATERAL W CAD [id number] 01:26PM [place]     Test Name Result Flag Reference   MAMMO SCREENING BILATERAL W CAD (Report)     Patient History:   Patient is postmenopausal and is nulliparous  Family history of ovarian cancer at age 36 in niece  Patient has never smoked  Patient's BMI is 39 9  Reason for exam: screening, asymptomatic  Mammo Screening Bilateral W CAD: March 24, 2017 - Check In #:    [de-identified]   Bilateral MLO, CC, and XCCL view(s) were taken  Technologist: RT Edgar(R)(M)   Prior study comparison: March 23, 2016, mammo screening bilateral   W CAD performed at [address] [place] [address]  March 30, 2015, bilateral digital screening mammogram, performed at [address] [facility]  There are scattered fibroglandular densities  No dominant soft tissue mass, architectural distortion or    suspicious calcifications are noted in either breast   The skin    and nipple structures are within normal limits  Scattered benign   appearing calcifications are noted  No mammographic evidence of malignancy  No significant changes when compared with prior studies  ACR BI-RADSï¾® Assessments: BiRad:2 - Benign     Recommendation:   Routine screening mammogram of both breasts in 1 year  A    reminder letter will be scheduled  Analyzed by CAD     8-10% of cancers will be missed on mammography  Management of a    palpable abnormality must be based on clinical grounds  Patients   will be notified of their results via letter from our facility  Accredited by [employer] of [employer] and FDA       Transcription Location: KAYLEY [facility] 98: [id number]     Risk Value(s):   Tyrer-Cuzick 10 Year: 0 600%, Tyrer-Cuzick Lifetime: 0 600%,    Myriad Table: 3 0%, DUSTIN 5 Year: 1 5%, NCI Lifetime: 1 9%   Signed by:   Von Boss MD   3/24/17

## 2018-01-19 ENCOUNTER — GENERIC CONVERSION - ENCOUNTER (OUTPATIENT)
Dept: OTHER | Facility: OTHER | Age: 83
End: 2018-01-19

## 2018-01-22 VITALS
BODY MASS INDEX: 38.52 KG/M2 | WEIGHT: 217.4 LBS | HEIGHT: 63 IN | SYSTOLIC BLOOD PRESSURE: 148 MMHG | DIASTOLIC BLOOD PRESSURE: 82 MMHG | RESPIRATION RATE: 18 BRPM | HEART RATE: 68 BPM

## 2018-01-23 VITALS
DIASTOLIC BLOOD PRESSURE: 80 MMHG | HEIGHT: 63 IN | HEART RATE: 72 BPM | BODY MASS INDEX: 37.95 KG/M2 | RESPIRATION RATE: 18 BRPM | SYSTOLIC BLOOD PRESSURE: 142 MMHG | WEIGHT: 214.2 LBS

## 2018-01-23 VITALS
HEIGHT: 63 IN | BODY MASS INDEX: 38.62 KG/M2 | WEIGHT: 218 LBS | HEART RATE: 65 BPM | SYSTOLIC BLOOD PRESSURE: 138 MMHG | TEMPERATURE: 98.2 F | DIASTOLIC BLOOD PRESSURE: 82 MMHG | OXYGEN SATURATION: 98 %

## 2018-01-23 NOTE — RESULT NOTES
Discussion/Summary   high vitamin B12   normal cbc     Verified Results  (1) VITAMIN B12 [id number] 02:55PM [facility]     Test Name Result Flag Reference   VITAMIN B12 2128 pg/mL H 100-900     (1) CBC/ PLT (NO DIFF) [id number] 02:55PM [facility]     Test Name Result Flag Reference   HEMATOCRIT 37 5 %  34 8-46 1   HEMOGLOBIN 13 3 g/dL  11 5-15 4   MCHC 35 5 g/dL  31 4-37 4   MCH 32 8 pg  26 8-34 3   MCV 92 fL  82-98   PLATELET COUNT 830 Thousands/uL  149-390   RBC COUNT 4 06 Million/uL  3 81-5 12   RDW 12 7 %  11 6-15 1   WBC COUNT 9 16 Thousand/uL  4 31-10 16   MPV 11 4 fL  8 9-12 7     * XR ABDOMEN 1 VIEW KUB [id number] 02:15PM [facility]    Order Number: [id number]     Test Name Result Flag Reference   XR ABDOMEN 1 VIEW KUB (Report)     ABDOMEN     INDICATION: Bloating, constipation, gas     COMPARISON: None  VIEWS: AP supine     IMAGES: 3     FINDINGS:     There is a nonobstructive bowel gas pattern  No discernible free air on this supine study  Upright or left lateral decubitus imaging is more sensitive to detect subtle free air in the appropriate setting  No pathologic calcifications or soft tissue masses  Visualized lung bases are clear  There are severe degenerative changes of the lower lumbar spine with mild levoscoliosis  IMPRESSION:     Normal bowel gas pattern         Workstation performed: [id number]     Signed by:   Van De Souza MD   12/8/17

## 2018-01-24 VITALS
HEIGHT: 63 IN | SYSTOLIC BLOOD PRESSURE: 142 MMHG | DIASTOLIC BLOOD PRESSURE: 92 MMHG | WEIGHT: 214.38 LBS | TEMPERATURE: 98.1 F | BODY MASS INDEX: 37.98 KG/M2 | RESPIRATION RATE: 16 BRPM | HEART RATE: 66 BPM

## 2018-02-19 ENCOUNTER — OFFICE VISIT (OUTPATIENT)
Dept: FAMILY MEDICINE CLINIC | Facility: CLINIC | Age: 83
End: 2018-02-19
Payer: COMMERCIAL

## 2018-02-19 VITALS
DIASTOLIC BLOOD PRESSURE: 80 MMHG | HEIGHT: 62 IN | RESPIRATION RATE: 18 BRPM | HEART RATE: 60 BPM | SYSTOLIC BLOOD PRESSURE: 150 MMHG | WEIGHT: 218 LBS | TEMPERATURE: 98.1 F | BODY MASS INDEX: 40.12 KG/M2

## 2018-02-19 DIAGNOSIS — E11.9 TYPE 2 DIABETES MELLITUS WITHOUT COMPLICATION, WITHOUT LONG-TERM CURRENT USE OF INSULIN (HCC): Primary | ICD-10-CM

## 2018-02-19 DIAGNOSIS — I10 ESSENTIAL HYPERTENSION: ICD-10-CM

## 2018-02-19 DIAGNOSIS — K59.03 DRUG-INDUCED CONSTIPATION: ICD-10-CM

## 2018-02-19 PROCEDURE — 99213 OFFICE O/P EST LOW 20 MIN: CPT | Performed by: FAMILY MEDICINE

## 2018-02-19 PROCEDURE — 1101F PT FALLS ASSESS-DOCD LE1/YR: CPT | Performed by: FAMILY MEDICINE

## 2018-02-19 RX ORDER — MECLIZINE HCL 12.5 MG/1
1 TABLET ORAL DAILY
COMMUNITY
Start: 2017-12-08 | End: 2018-02-19 | Stop reason: SDUPTHER

## 2018-02-19 RX ORDER — LISINOPRIL 5 MG/1
1 TABLET ORAL DAILY
COMMUNITY
Start: 2017-10-18 | End: 2018-07-11 | Stop reason: ALTCHOICE

## 2018-02-19 RX ORDER — CARVEDILOL 12.5 MG/1
1 TABLET ORAL 2 TIMES DAILY
COMMUNITY
End: 2018-03-13 | Stop reason: SDUPTHER

## 2018-02-19 RX ORDER — GLUCOSAM/MSM/CHOND/HYALURON AC 750-60-150
TABLET ORAL
COMMUNITY
End: 2021-10-05

## 2018-02-21 NOTE — PROGRESS NOTES
Assessment/Plan:    Type 2 diabetes mellitus ([facility] [address] )  Pt feels well at this time seems to have had side effects from metformin       Diagnoses and all orders for this visit:    Type 2 diabetes mellitus without complication, without long-term current use of insulin (HCC)    Drug-induced constipation    Essential hypertension    Other orders  -     aspirin 81 MG tablet; Take 1 tablet by mouth daily  -     carvedilol (COREG) 12 5 mg tablet; Take 1 tablet by mouth 2 (two) times a day  -     Discontinue: meclizine (ANTIVERT) 12 5 MG tablet; Take 1 tablet by mouth daily  -     Glucos-Chondroit-Hyaluron-MSM (GLUCOSAMINE CHONDROITIN JOINT) TABS; Take by mouth  -     lisinopril (ZESTRIL) 5 mg tablet; Take 1 tablet by mouth daily  -     Multiple Vitamin (MULTI-VITAMIN DAILY PO); Take 1 tablet by mouth daily  -     Probiotic Product (PROBIOTIC DAILY PO); Take by mouth          Subjective:      Patient ID: Brock Bryson is a 80 y o  female  Pt here for recheck visit about her gi symptoms  Feeling much better since stopping  Metformin  Will monitor sugars and adjust meds as needed        The following portions of the patient's history were reviewed and updated as appropriate: current medications, past family history, past medical history, past social history, past surgical history and problem list     Review of Systems   Constitutional: Negative  HENT: Negative  Eyes: Negative  Respiratory: Negative  Cardiovascular: Negative  Gastrointestinal: Negative  Endocrine: Negative  Genitourinary: Negative  Musculoskeletal: Negative  Skin: Negative  Allergic/Immunologic: Negative  Neurological: Negative  Hematological: Negative  Psychiatric/Behavioral: Negative  Objective:      /80 (BP Location: Left arm, Patient Position: Sitting, Cuff Size: Adult)   Pulse 60   Temp 98 1 °F (36 7 °C) (Temporal)   Resp 18   Ht 5' 1 5" (1 562 m)   Wt 98 9 kg (218 lb)   Breastfeeding?  No   BMI 40 52 kg/m²          Physical Exam   Constitutional: She is oriented to person, place, and time  She appears well-developed and well-nourished  HENT:   Head: Normocephalic  Eyes: EOM are normal  Pupils are equal, round, and reactive to light  Neck: No thyromegaly present  Cardiovascular: Normal rate and regular rhythm  No murmur heard  Pulmonary/Chest: Effort normal and breath sounds normal  No respiratory distress  She has no wheezes  She has no rales  Abdominal: She exhibits no distension  Musculoskeletal: She exhibits no edema  Lymphadenopathy:     She has no cervical adenopathy  Neurological: She is alert and oriented to person, place, and time  Skin: Skin is warm  Psychiatric: She has a normal mood and affect   Her behavior is normal

## 2018-03-13 ENCOUNTER — OFFICE VISIT (OUTPATIENT)
Dept: FAMILY MEDICINE CLINIC | Facility: CLINIC | Age: 83
End: 2018-03-13
Payer: COMMERCIAL

## 2018-03-13 VITALS
HEIGHT: 61 IN | HEART RATE: 68 BPM | WEIGHT: 219.13 LBS | DIASTOLIC BLOOD PRESSURE: 110 MMHG | BODY MASS INDEX: 41.37 KG/M2 | TEMPERATURE: 97.8 F | SYSTOLIC BLOOD PRESSURE: 240 MMHG | RESPIRATION RATE: 18 BRPM

## 2018-03-13 DIAGNOSIS — I10 ESSENTIAL HYPERTENSION: Primary | ICD-10-CM

## 2018-03-13 PROCEDURE — 99214 OFFICE O/P EST MOD 30 MIN: CPT | Performed by: FAMILY MEDICINE

## 2018-03-13 RX ORDER — CARVEDILOL 25 MG/1
25 TABLET ORAL 2 TIMES DAILY
Qty: 60 TABLET | Refills: 2 | Status: SHIPPED | OUTPATIENT
Start: 2018-03-13 | End: 2018-06-21 | Stop reason: SDUPTHER

## 2018-03-13 NOTE — ASSESSMENT & PLAN NOTE
Poor sleep last night and not feeling well today  Ringing in ears past few days  bp taken earlier today was   200   Still high this afernoon  Some visual disturbance this am  Here for evaluation today

## 2018-03-13 NOTE — PROGRESS NOTES
Assessment/Plan:    Hypertension  Poor sleep last night and not feeling well today  Ringing in ears past few days  bp taken earlier today was   200   Still high this afernoon  Some visual disturbance this am  Here for evaluation today       Diagnoses and all orders for this visit:    Essential hypertension  -     carvedilol (COREG) 25 mg tablet; Take 1 tablet (25 mg total) by mouth 2 (two) times a day for 30 days          Subjective:      Patient ID: Srini Patricia is a 80 y o  female  HPI    The following portions of the patient's history were reviewed and updated as appropriate: allergies, current medications, past family history, past medical history, past social history, past surgical history and problem list     Review of Systems   HENT: Positive for tinnitus  Eyes: Positive for visual disturbance  Neurological: Positive for dizziness  All other systems reviewed and are negative  Objective:      BP (!) 240/110 (BP Location: Left arm, Patient Position: Sitting, Cuff Size: Standard)   Pulse 68   Temp 97 8 °F (36 6 °C) (Temporal)   Resp 18   Ht 5' 1 25" (1 556 m)   Wt 99 4 kg (219 lb 2 oz)   BMI 41 07 kg/m²          Physical Exam   Constitutional: She is oriented to person, place, and time  She appears well-developed and well-nourished  HENT:   Head: Normocephalic  Eyes: EOM are normal  Pupils are equal, round, and reactive to light  Neck: No thyromegaly present  Cardiovascular: Normal rate and regular rhythm  No murmur heard  Pulmonary/Chest: Effort normal and breath sounds normal  No respiratory distress  She has no wheezes  She has no rales  Abdominal: She exhibits no distension  Musculoskeletal: She exhibits no edema  Lymphadenopathy:     She has no cervical adenopathy  Neurological: She is alert and oriented to person, place, and time  Skin: Skin is warm  Psychiatric: She has a normal mood and affect   Her behavior is normal

## 2018-03-14 LAB
BUN SERPL-MCNC: 9 MG/DL (ref 7–25)
BUN/CREAT SERPL: ABNORMAL (CALC) (ref 6–22)
CALCIUM SERPL-MCNC: 9.6 MG/DL (ref 8.6–10.4)
CHLORIDE SERPL-SCNC: 97 MMOL/L (ref 98–110)
CO2 SERPL-SCNC: 28 MMOL/L (ref 20–31)
CREAT SERPL-MCNC: 0.88 MG/DL (ref 0.6–0.88)
GLUCOSE SERPL-MCNC: 119 MG/DL (ref 65–99)
HBA1C MFR BLD: 6 % OF TOTAL HGB
POTASSIUM SERPL-SCNC: 4.7 MMOL/L (ref 3.5–5.3)
SL AMB EGFR AFRICAN AMERICAN: 70 ML/MIN/1.73M2
SL AMB EGFR NON AFRICAN AMERICAN: 60 ML/MIN/1.73M2
SODIUM SERPL-SCNC: 133 MMOL/L (ref 135–146)

## 2018-03-16 ENCOUNTER — OFFICE VISIT (OUTPATIENT)
Dept: FAMILY MEDICINE CLINIC | Facility: CLINIC | Age: 83
End: 2018-03-16
Payer: COMMERCIAL

## 2018-03-16 VITALS
WEIGHT: 217.5 LBS | HEIGHT: 61 IN | SYSTOLIC BLOOD PRESSURE: 158 MMHG | TEMPERATURE: 98.3 F | RESPIRATION RATE: 16 BRPM | BODY MASS INDEX: 41.07 KG/M2 | DIASTOLIC BLOOD PRESSURE: 86 MMHG | HEART RATE: 64 BPM

## 2018-03-16 DIAGNOSIS — I10 ESSENTIAL HYPERTENSION: Primary | ICD-10-CM

## 2018-03-16 PROCEDURE — 99213 OFFICE O/P EST LOW 20 MIN: CPT | Performed by: FAMILY MEDICINE

## 2018-03-16 NOTE — PROGRESS NOTES
Assessment/Plan:    No problem-specific Assessment & Plan notes found for this encounter  There are no diagnoses linked to this encounter  Subjective:      Patient ID: Brock Bryson is a 80 y o  female  HPI    The following portions of the patient's history were reviewed and updated as appropriate: allergies, current medications, past family history, past medical history, past social history, past surgical history and problem list     Review of Systems   Constitutional: Negative  HENT: Negative  Eyes: Negative  Respiratory: Negative  Cardiovascular: Negative  Gastrointestinal: Negative  Endocrine: Negative  Genitourinary: Negative  Musculoskeletal: Negative  Skin: Negative  Allergic/Immunologic: Negative  Neurological: Negative  Hematological: Negative  Psychiatric/Behavioral: Negative  Objective:      /86 (BP Location: Right arm, Patient Position: Sitting, Cuff Size: Standard)   Pulse 64   Temp 98 3 °F (36 8 °C) (Temporal)   Resp 16   Ht 5' 1 25" (1 556 m)   Wt 98 7 kg (217 lb 8 oz)   BMI 40 76 kg/m²          Physical Exam   Constitutional: She is oriented to person, place, and time  She appears well-developed and well-nourished  HENT:   Head: Normocephalic  Eyes: EOM are normal  Pupils are equal, round, and reactive to light  Neck: No thyromegaly present  Cardiovascular: Normal rate and regular rhythm  No murmur heard  Pulmonary/Chest: Effort normal and breath sounds normal  No respiratory distress  She has no wheezes  She has no rales  Abdominal: She exhibits no distension  Musculoskeletal: She exhibits no edema  Lymphadenopathy:     She has no cervical adenopathy  Neurological: She is alert and oriented to person, place, and time  Skin: Skin is warm  Psychiatric: She has a normal mood and affect   Her behavior is normal

## 2018-03-26 ENCOUNTER — OFFICE VISIT (OUTPATIENT)
Dept: FAMILY MEDICINE CLINIC | Facility: CLINIC | Age: 83
End: 2018-03-26
Payer: COMMERCIAL

## 2018-03-26 VITALS
RESPIRATION RATE: 18 BRPM | HEART RATE: 72 BPM | HEIGHT: 61 IN | WEIGHT: 219.9 LBS | SYSTOLIC BLOOD PRESSURE: 132 MMHG | DIASTOLIC BLOOD PRESSURE: 76 MMHG | BODY MASS INDEX: 41.52 KG/M2

## 2018-03-26 DIAGNOSIS — I10 ESSENTIAL HYPERTENSION: Primary | ICD-10-CM

## 2018-03-26 DIAGNOSIS — E11.9 TYPE 2 DIABETES MELLITUS WITHOUT COMPLICATION, WITHOUT LONG-TERM CURRENT USE OF INSULIN (HCC): ICD-10-CM

## 2018-03-26 PROCEDURE — 99214 OFFICE O/P EST MOD 30 MIN: CPT | Performed by: FAMILY MEDICINE

## 2018-03-26 NOTE — PROGRESS NOTES
Assessment/Plan:    No problem-specific Assessment & Plan notes found for this encounter  Diagnoses and all orders for this visit:    Essential hypertension  Comments:  no complaints  feeling better    Type 2 diabetes mellitus without complication, without long-term current use of insulin (HCC)  Comments:  sugars wekk controlled  will stay off metformin  Orders:  -     Basic metabolic panel; Future  -     HEMOGLOBIN A1C W/ EAG ESTIMATION; Future          Subjective:      Patient ID: Torres Sloan is a 80 y o  female  Here for follow up of chronic medical conditions  Dm and htn      Hypertension     Diabetes         The following portions of the patient's history were reviewed and updated as appropriate: allergies, current medications, past family history, past medical history, past social history, past surgical history and problem list     Review of Systems   All other systems reviewed and are negative  Objective:      /76 (BP Location: Right arm, Patient Position: Sitting, Cuff Size: Adult)   Pulse 72   Resp 18   Ht 5' 1 25" (1 556 m)   Wt 99 7 kg (219 lb 14 4 oz)   BMI 41 21 kg/m²          Physical Exam   Constitutional: She is oriented to person, place, and time  She appears well-developed and well-nourished  HENT:   Head: Normocephalic  Eyes: EOM are normal  Pupils are equal, round, and reactive to light  Neck: No thyromegaly present  Cardiovascular: Normal rate and regular rhythm  No murmur heard  Pulmonary/Chest: Effort normal and breath sounds normal  No respiratory distress  She has no wheezes  She has no rales  Abdominal: She exhibits no distension  Musculoskeletal: She exhibits no edema  Lymphadenopathy:     She has no cervical adenopathy  Neurological: She is alert and oriented to person, place, and time  Skin: Skin is warm  Psychiatric: She has a normal mood and affect   Her behavior is normal

## 2018-03-28 LAB
LEFT EYE DIABETIC RETINOPATHY: NORMAL
RIGHT EYE DIABETIC RETINOPATHY: NORMAL

## 2018-04-23 ENCOUNTER — EVALUATION (OUTPATIENT)
Dept: PHYSICAL THERAPY | Facility: REHABILITATION | Age: 83
End: 2018-04-23
Payer: COMMERCIAL

## 2018-04-23 DIAGNOSIS — M54.50 CHRONIC LEFT-SIDED LOW BACK PAIN WITHOUT SCIATICA: ICD-10-CM

## 2018-04-23 DIAGNOSIS — G89.29 CHRONIC LEFT-SIDED LOW BACK PAIN WITHOUT SCIATICA: ICD-10-CM

## 2018-04-23 DIAGNOSIS — R26.9 GAIT ABNORMALITY: Primary | ICD-10-CM

## 2018-04-23 PROCEDURE — G8990 OTHER PT/OT CURRENT STATUS: HCPCS | Performed by: PHYSICAL THERAPIST

## 2018-04-23 PROCEDURE — 97110 THERAPEUTIC EXERCISES: CPT | Performed by: PHYSICAL THERAPIST

## 2018-04-23 PROCEDURE — 97112 NEUROMUSCULAR REEDUCATION: CPT | Performed by: PHYSICAL THERAPIST

## 2018-04-23 PROCEDURE — G8991 OTHER PT/OT GOAL STATUS: HCPCS | Performed by: PHYSICAL THERAPIST

## 2018-04-23 PROCEDURE — 97161 PT EVAL LOW COMPLEX 20 MIN: CPT | Performed by: PHYSICAL THERAPIST

## 2018-04-23 NOTE — PROGRESS NOTES
PT Evaluation     Today's date: 2018  Patient name: Abbi Oquendo  : 1934  MRN: [id number]  Referring provider: Jennifer Carpenter DO  Dx:   Encounter Diagnosis     ICD-10-CM    1  Gait abnormality R26 9    2  Chronic left-sided low back pain without sciatica M54 5     G89 29        Start Time: 0800  Stop Time: 0900  Total time in clinic (min): 60 minutes    Assessment  Impairments: abnormal coordination, abnormal gait, abnormal muscle tone, abnormal or restricted ROM, impaired balance and lacks appropriate home exercise program    Assessment details: Abbi Oquendo is a 80 y o  female who presents with complaints of Gait abnormality  Chronic left-sided low back pain without sciatica  No further referral appears necessary at this time based upon examination results  Patient reports limitations in walking, standing, and ADLs as well as 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  She will benefit from therapy to focus on lumbar ROM and strength as well as improving balance  Prognosis is good given HEP compliance and PT 2-3x/wk  Positive prognostic indicators include positive attitude toward recovery  Please contact me if you have any questions or recommendations  Thank you for the opportunity to share in  [address] [place] [address]  Understanding of Dx/Px/POC: good   Prognosis: good    Plan  Patient would benefit from: skilled PT  Planned modality interventions: thermotherapy: hydrocollator packs  Planned therapy interventions: manual therapy, therapeutic exercise, therapeutic training, graded activity, graded exercise, graded motor, home exercise program, gait training, functional ROM exercises, abdominal trunk stabilization and balance  Frequency: 2x week  Duration in weeks: 6  Treatment plan discussed with: patient        Subjective Evaluation    History of Present Illness  Mechanism of injury: Patient would like to improve her walking and feel more secure with balance   She would like to progress to silver sneakers after therapy  She is afraid of falling but is hesitant to use cane as she has to care for her sister  Bilateral knee replacements in 2008  She reports coldness in the back of her feet and legs improved with heating blanket  Patient reports history of vertigo not positional related  Patient reports that she has low back pain that worsens as the day progresses which feels as though it greatly effects her walking  Quality of life: good    Pain  Current pain ratin  At best pain ratin  At worst pain ratin  Quality: pulling (stiffness left side from left shoulder down to waist)  Aggravating factors: walking  Progression: no change    Treatments  Previous treatment: chiropractic  Patient Goals  Patient goals for therapy: improved balance          Objective     Ambulation     Observational Gait   Base of support: increased    Comments   Decreased gait speed, standing posture left lateral lean    Neuro Exam :   Functional mobility   Sit to stand:  Independent  Bed mobility:  Independent  Falls   No falls in last 6 months      Dizziness   Frequency:  Sporadic    Functional Outcome Measures   Single leg stance left eyes open:  Unable without UE assist  Single leg stance left eyes closed:  Unable without UE assist  TU  5x sit to stand: 21    Neurologic Exam     Gait, Coordination, and Reflexes     Gait  Gait: wide-basedDecreased gait speed, standing posture left lateral lean     Squat assess: poor mechanics  ¼ squat assess: unable    Edmond-Hallpike: Left- neg  Right- neg  Lumbar  % of normal   Flex  100   Extn  50   SB Left 100   SB Right 75, pull   ROT Left 100   ROT Right 100         MMT    Hip       L       R   Flex  4 4   Extn  3+ 3+   Abd  4 4   Add  IR  4+ 4+   ER  4+ 4+                 MMT    Knee         L        R   Flex  5 5   Extn  4+ 4+                     MMT    Ankle       L        R   PF 4 4   DF  4+ 4+   EHL 4+ 4+   Ever   5 5     Palpation:   Myotomes (L/R):intact      Reflexes:  1+ BLE L4, S1  Straight leg raise:   L= -     R=  -             Transverse abdominis: Bent knee fall out= Poor      Hip/SI joint:        Multifidus activation = poor  Joint mobility:   WNL                Precautions: emotional vertigo, bilateral hearing aids, HTN, Type 2 Dm, poor balance    Daily Treatment Diary     Exercise Diary  4/23 marching 2x15            SLR flexion 2x10 ea            glute set 2x10, 5"            Seated Ta 2x10, 5"            Squat 2x10            Semi-tanden balance 2x30"                                                                                                                                                                                                      Modalities                                                       Short Term:  1  Pt will report decreased levels of pain by at least 2 subjective ratings in 4 weeks  2  Pt will demonstrate improved ROM by at least 10 degrees in 4 weeks  3  Pt will demonstrate improved strength by 1/2 grade  4  Pt will be able to walk greater than 500' without stopping in 4 weeks  Long Term:   1  Pt will be independent in their HEP in 8 weeks  2  Pt will be be pain free with IADL's  3  Pt will demonstrate sit to stand 5x, 15 seconds in 8 weeks    4  Pt will be able to walk > 5 minutes in 8 weeks           Flowsheet Rows    Flowsheet Row Most Recent Value   PT/OT G-Codes   FOTO information reviewed  No   Assessment Type  Evaluation   G code set  Other PT/OT Primary   Other PT Primary Current Status ()  CM   Other PT Primary Goal Status ()  CL

## 2018-04-25 ENCOUNTER — OFFICE VISIT (OUTPATIENT)
Dept: PHYSICAL THERAPY | Facility: REHABILITATION | Age: 83
End: 2018-04-25
Payer: COMMERCIAL

## 2018-04-25 DIAGNOSIS — G89.29 CHRONIC LEFT-SIDED LOW BACK PAIN WITHOUT SCIATICA: ICD-10-CM

## 2018-04-25 DIAGNOSIS — M54.50 CHRONIC LEFT-SIDED LOW BACK PAIN WITHOUT SCIATICA: ICD-10-CM

## 2018-04-25 DIAGNOSIS — R26.9 GAIT ABNORMALITY: Primary | ICD-10-CM

## 2018-04-25 PROCEDURE — 97112 NEUROMUSCULAR REEDUCATION: CPT | Performed by: PHYSICAL THERAPIST

## 2018-04-25 PROCEDURE — 97110 THERAPEUTIC EXERCISES: CPT | Performed by: PHYSICAL THERAPIST

## 2018-04-25 NOTE — PROGRESS NOTES
Daily Note     Today's date: 2018  Patient name: Abbi Oquendo  : 1934  MRN: [id number]  Referring provider: Hernandez Diaz PA-C  Dx:   Encounter Diagnosis     ICD-10-CM    1  Gait abnormality R26 9    2  Chronic left-sided low back pain without sciatica M54 5     G89 29        Start Time: 1100  Stop Time: 1155  Total time in clinic (min): 55 minutes    Subjective: Patient reports doing okay today, just tired  Objective: See treatment diary below  Precautions: emotional vertigo, bilateral hearing aids, HTN, Type 2 Dm, poor balance     Daily Treatment Diary      Exercise Diary                     marching 2x15  1x15                   SLR flexion 2x10 ea  2x10 ea                    glute set 2x10, 5"  2x10, 5"                   Seated Ta 2x10, 5"                     Squat 2x10  2x10                   Semi-tanden balance 2x30"  3x30" ea                    Recumbent bike   10'                    RLE clamshell   1x10, 5"                   Standing hip abd     3x10                                                                                                                                                 Assessment: Tolerated treatment fair  Patient reported back irritation with standing hip abduction today when moving to the left  Constant cueing needed for proper exercise performance today  Good performance semi-tandem stance with CCG  Plan: Progress treatment as tolerated

## 2018-05-02 ENCOUNTER — OFFICE VISIT (OUTPATIENT)
Dept: PHYSICAL THERAPY | Facility: REHABILITATION | Age: 83
End: 2018-05-02
Payer: COMMERCIAL

## 2018-05-02 DIAGNOSIS — G89.29 CHRONIC LEFT-SIDED LOW BACK PAIN WITHOUT SCIATICA: ICD-10-CM

## 2018-05-02 DIAGNOSIS — R26.9 GAIT ABNORMALITY: Primary | ICD-10-CM

## 2018-05-02 DIAGNOSIS — M54.50 CHRONIC LEFT-SIDED LOW BACK PAIN WITHOUT SCIATICA: ICD-10-CM

## 2018-05-02 PROCEDURE — 97110 THERAPEUTIC EXERCISES: CPT | Performed by: PHYSICAL THERAPY ASSISTANT

## 2018-05-02 PROCEDURE — 97112 NEUROMUSCULAR REEDUCATION: CPT | Performed by: PHYSICAL THERAPY ASSISTANT

## 2018-05-02 NOTE — PROGRESS NOTES
Daily Note     Today's date: 5/3/2018  Patient name: Nhi Weber  : 1934  MRN: [id number]  Referring provider: Robert Alvarez PA-C  Dx:   Encounter Diagnosis     ICD-10-CM    1  Gait abnormality R26 9    2  Chronic left-sided low back pain without sciatica M54 5     G89 29        Start Time: 1015  Stop Time: 1115  Total time in clinic (min): 60 minutes    Subjective: Pt reports she feels like her balance is getting a little better  She reports good compliance with HEP  Objective: See treatment diary below  Precautions: emotional vertigo, bilateral hearing aids, HTN, Type 2 Dm, poor balance     Daily Treatment Diary      Exercise Diary                 marching 2x15  1x15  3x10  3x10               SLR flexion 2x10 ea  2x10 ea   2x10  2x10               glute set 2x10, 5"  2x10, 5"  2x10  5"  2x10, 5"               Seated Ta 2x10, 5"    2x10 5"  2x10, 5"               Squat 2x10  2x10  2x10  3x10               Semi-tanden balance 2x30"  3x30" ea   30"x3  3x30" ea               Recumbent bike   10'  10'  10'                RLE clamshell   1x10, 5"    1x10, 5"               Standing hip abd     3x10  3x10  3x10                FTEO on foam        3x30"                                                                                                                     Assessment: Tolerated treatment fair  Patient reported back irritation with standing hip abduction today when moving to the left  Constant cueing needed for proper exercise performance today  Good performance semi-tandem stance with CCG  Plan: Progress treatment as tolerated

## 2018-05-04 ENCOUNTER — OFFICE VISIT (OUTPATIENT)
Dept: PHYSICAL THERAPY | Facility: REHABILITATION | Age: 83
End: 2018-05-04
Payer: COMMERCIAL

## 2018-05-04 DIAGNOSIS — R26.9 GAIT ABNORMALITY: Primary | ICD-10-CM

## 2018-05-04 DIAGNOSIS — G89.29 CHRONIC LEFT-SIDED LOW BACK PAIN WITHOUT SCIATICA: ICD-10-CM

## 2018-05-04 DIAGNOSIS — M54.50 CHRONIC LEFT-SIDED LOW BACK PAIN WITHOUT SCIATICA: ICD-10-CM

## 2018-05-04 PROCEDURE — 97110 THERAPEUTIC EXERCISES: CPT | Performed by: PHYSICAL THERAPIST

## 2018-05-04 PROCEDURE — 97112 NEUROMUSCULAR REEDUCATION: CPT | Performed by: PHYSICAL THERAPIST

## 2018-05-04 NOTE — PROGRESS NOTES
Daily Note     Today's date: 2018  Patient name: Agustina Ferrer  : 1934  MRN: [id number]  Referring provider: Jigar Gage PA-C  Dx:   Encounter Diagnosis     ICD-10-CM    1  Gait abnormality R26 9    2  Chronic left-sided low back pain without sciatica M54 5     G89 29        Start Time: 1115  Stop Time: 1200  Total time in clinic (min): 45 minutes    Subjective: Reports walking a little better but her back continues to bother her with walking and with lifting her left leg up  Objective: See treatment diary below  Precautions: emotional vertigo, bilateral hearing aids, HTN, Type 2 Dm, poor balance     Daily Treatment Diary      Exercise Diary               marching 2x15  1x15  3x10  3x10  HOLD             HEP - SLR flexion, glute set, squat, seated TA, tandem stance 2x10 ea  2x10 ea   2x10  2x10  HELD             Recumbent bike   10'  10'  10'  10'              RLE clamshell   1x10, 5"    1x10, 5"               Standing hip abd     3x10  3x10  3x10  HELD              FTEO on foam        3x30"                calf raises         3x12              TA iso          20x5"              supine hip abd  OTB         20x5"              supine hip add           20x5"                   Assessment: Patient reported feeling better with addition of stabilization exercises  Moderate to constant cueing needed for proper exercise performance  Held marching exercise as patient unable to complete without right lateral trunk lean  Plan: Progress treatment as tolerated

## 2018-05-07 ENCOUNTER — OFFICE VISIT (OUTPATIENT)
Dept: FAMILY MEDICINE CLINIC | Facility: CLINIC | Age: 83
End: 2018-05-07
Payer: COMMERCIAL

## 2018-05-07 VITALS
DIASTOLIC BLOOD PRESSURE: 78 MMHG | SYSTOLIC BLOOD PRESSURE: 140 MMHG | HEART RATE: 58 BPM | BODY MASS INDEX: 41.35 KG/M2 | TEMPERATURE: 97.4 F | WEIGHT: 219 LBS | HEIGHT: 61 IN | RESPIRATION RATE: 18 BRPM

## 2018-05-07 DIAGNOSIS — R30.0 DYSURIA: Primary | ICD-10-CM

## 2018-05-07 LAB
SL AMB  POCT GLUCOSE, UA: ABNORMAL
SL AMB LEUKOCYTE ESTERASE,UA: ABNORMAL
SL AMB POCT BILIRUBIN,UA: ABNORMAL
SL AMB POCT BLOOD,UA: ABNORMAL
SL AMB POCT CLARITY,UA: ABNORMAL
SL AMB POCT COLOR,UA: YELLOW
SL AMB POCT KETONES,UA: 5
SL AMB POCT NITRITE,UA: ABNORMAL
SL AMB POCT PH,UA: 6.5
SL AMB POCT SPECIFIC GRAVITY,UA: 1
SL AMB POCT URINE PROTEIN: 15
SL AMB POCT UROBILINOGEN: 0.2

## 2018-05-07 PROCEDURE — 87086 URINE CULTURE/COLONY COUNT: CPT | Performed by: FAMILY MEDICINE

## 2018-05-07 PROCEDURE — 81002 URINALYSIS NONAUTO W/O SCOPE: CPT | Performed by: FAMILY MEDICINE

## 2018-05-07 PROCEDURE — 99214 OFFICE O/P EST MOD 30 MIN: CPT | Performed by: FAMILY MEDICINE

## 2018-05-07 RX ORDER — CIPROFLOXACIN 500 MG/1
500 TABLET, FILM COATED ORAL EVERY 12 HOURS SCHEDULED
Qty: 6 TABLET | Refills: 0 | Status: SHIPPED | OUTPATIENT
Start: 2018-05-07 | End: 2018-05-07

## 2018-05-07 RX ORDER — CIPROFLOXACIN 500 MG/1
500 TABLET, FILM COATED ORAL EVERY 12 HOURS SCHEDULED
Qty: 6 TABLET | Refills: 0 | Status: SHIPPED | OUTPATIENT
Start: 2018-05-07 | End: 2018-05-10

## 2018-05-08 LAB — BACTERIA UR CULT: NORMAL

## 2018-05-09 ENCOUNTER — OFFICE VISIT (OUTPATIENT)
Dept: PHYSICAL THERAPY | Facility: REHABILITATION | Age: 83
End: 2018-05-09
Payer: COMMERCIAL

## 2018-05-09 DIAGNOSIS — G89.29 CHRONIC LEFT-SIDED LOW BACK PAIN WITHOUT SCIATICA: ICD-10-CM

## 2018-05-09 DIAGNOSIS — M54.50 CHRONIC LEFT-SIDED LOW BACK PAIN WITHOUT SCIATICA: ICD-10-CM

## 2018-05-09 DIAGNOSIS — R26.9 GAIT ABNORMALITY: Primary | ICD-10-CM

## 2018-05-09 PROCEDURE — 97110 THERAPEUTIC EXERCISES: CPT

## 2018-05-09 PROCEDURE — 97112 NEUROMUSCULAR REEDUCATION: CPT

## 2018-05-09 NOTE — PROGRESS NOTES
Daily Note     Today's date: 2018  Patient name: Becky Castro  : 1934  MRN: [id number]  Referring provider: Colten Little PA-C  Dx:   Encounter Diagnosis     ICD-10-CM    1  Gait abnormality R26 9    2  Chronic left-sided low back pain without sciatica M54 5     G89 29                   Subjective: Patient offers no new complaints at this time  1:1 w/ PTA      Objective: See treatment diary below  Precautions: emotional vertigo, bilateral hearing aids, HTN, Type 2 Dm, poor balance     Daily Treatment Diary      Exercise Diary           marching 2x15  1x15  3x10  3x10  HOLD  HOLD         HEP - SLR flexion, glute set, squat, seated TA, tandem stance 2x10 ea  2x10 ea   2x10  2x10  HELD  HOLD         Recumbent bike   10'  10'  10'  10'  10'          RLE clamshell   1x10, 5"    1x10, 5"    5" 2x10 ea         Standing hip abd     3x10  3x10  3x10  HELD  HELD          FTEO on foam        3x30"    3x30"          calf raises         3x12  3x12          TA iso          20x5"  5"x20          supine hip abd  OTB         20x5"  5"x20         TA BKFO      2x10       supine hip add           20x5"  5" x20               Assessment: Patient did well with all TE as listed, reports no changes in sx during or post tx  Plan: Progress treatment as tolerated

## 2018-05-11 ENCOUNTER — OFFICE VISIT (OUTPATIENT)
Dept: PHYSICAL THERAPY | Facility: REHABILITATION | Age: 83
End: 2018-05-11
Payer: COMMERCIAL

## 2018-05-11 DIAGNOSIS — G89.29 CHRONIC LEFT-SIDED LOW BACK PAIN WITHOUT SCIATICA: ICD-10-CM

## 2018-05-11 DIAGNOSIS — R26.9 GAIT ABNORMALITY: Primary | ICD-10-CM

## 2018-05-11 DIAGNOSIS — M54.50 CHRONIC LEFT-SIDED LOW BACK PAIN WITHOUT SCIATICA: ICD-10-CM

## 2018-05-11 PROCEDURE — 97112 NEUROMUSCULAR REEDUCATION: CPT

## 2018-05-11 PROCEDURE — 97110 THERAPEUTIC EXERCISES: CPT

## 2018-05-11 NOTE — PROGRESS NOTES
Daily Note     Today's date: 2018  Patient name: Jim Alexander  : 1934  MRN: [id number]  Referring provider: Ros Brown PA-C  Dx:   Encounter Diagnosis     ICD-10-CM    1  Gait abnormality R26 9    2  Chronic left-sided low back pain without sciatica M54 5     G89 29                   Subjective: Patient offers no new complaints at this time  1:1 w/ PTA      Objective: See treatment diary below  Precautions: emotional vertigo, bilateral hearing aids, HTN, Type 2 Dm, poor balance     Daily Treatment Diary      Exercise Diary         marching 2x15  1x15  3x10  3x10  HOLD  HOLD  HOLD       HEP - SLR flexion, glute set, squat, seated TA, tandem stance 2x10 ea  2x10 ea   2x10  2x10  HELD  HOLD  HOLD       Recumbent bike   10'  10'  10'  10'  10'  10'        RLE clamshell   1x10, 5"    1x10, 5"    5" 2x10 ea  5"  2x10       Standing hip abd     3x10  3x10  3x10  HELD  HELD  HELD        FTEO on foam        3x30"    3x30"  3x30"        calf raises         3x12  3x12  3x12        TA iso          20x5"  5"x20  5" x20        supine hip abd  OTB         20x5"  5"x20  5"x20       TA BKFO      2x10 2x10      supine hip add           20x5"  5" x20               Assessment:  Patient is progressing well, good tolerance for all TE as listed  Plan: Progress treatment as tolerated

## 2018-05-16 ENCOUNTER — OFFICE VISIT (OUTPATIENT)
Dept: PHYSICAL THERAPY | Facility: REHABILITATION | Age: 83
End: 2018-05-16
Payer: COMMERCIAL

## 2018-05-16 DIAGNOSIS — G89.29 CHRONIC LEFT-SIDED LOW BACK PAIN WITHOUT SCIATICA: ICD-10-CM

## 2018-05-16 DIAGNOSIS — M54.50 CHRONIC LEFT-SIDED LOW BACK PAIN WITHOUT SCIATICA: ICD-10-CM

## 2018-05-16 DIAGNOSIS — R26.9 GAIT ABNORMALITY: Primary | ICD-10-CM

## 2018-05-16 PROCEDURE — 97110 THERAPEUTIC EXERCISES: CPT

## 2018-05-16 PROCEDURE — 97112 NEUROMUSCULAR REEDUCATION: CPT

## 2018-05-16 NOTE — PROGRESS NOTES
Daily Note     Today's date: 2018  Patient name: Kb Schuster  : 1934  MRN: [id number]  Referring provider: Tristen Gonsales PA-C  Dx:   Encounter Diagnosis     ICD-10-CM    1  Gait abnormality R26 9    2  Chronic left-sided low back pain without sciatica M54 5     G89 29                   Subjective: Patient states that she is feeling good today reports no c/o pain at this time  1:1 w/ PTA      Objective: See treatment diary below  Precautions: emotional vertigo, bilateral hearing aids, HTN, Type 2 Dm, poor balance     Daily Treatment Diary      Exercise Diary       marching 2x15  1x15  3x10  3x10  HOLD  HOLD  HOLD  NP     HEP - SLR flexion, glute set, squat, seated TA, tandem stance 2x10 ea  2x10 ea   2x10  2x10  HELD  HOLD  HOLD  NP     Recumbent bike   10'  10'  10'  10'  10'  10'  10'      RLE clamshell   1x10, 5"    1x10, 5"    5" 2x10 ea  5"  2x10  5" 2x10     Standing hip abd     3x10  3x10  3x10  HELD  HELD  HELD  3x10      FTEO on foam        3x30"    3x30"  3x30"  3x30"      calf raises         3x12  3x12  3x12  3x12      TA iso          20x5"  5"x20  5" x20        supine hip abd  OTB         20x5"  5"x20  5"x20  5" x20     TA BKFO      2x10 2x10      supine hip add           20x5"  5" x20    5" x20           Assessment:  Patient has no c/o pain during or post tx  Plan: Progress treatment as tolerated

## 2018-05-18 ENCOUNTER — OFFICE VISIT (OUTPATIENT)
Dept: PHYSICAL THERAPY | Facility: REHABILITATION | Age: 83
End: 2018-05-18
Payer: COMMERCIAL

## 2018-05-18 ENCOUNTER — TELEPHONE (OUTPATIENT)
Dept: FAMILY MEDICINE CLINIC | Facility: CLINIC | Age: 83
End: 2018-05-18

## 2018-05-18 DIAGNOSIS — R26.9 GAIT ABNORMALITY: Primary | ICD-10-CM

## 2018-05-18 PROCEDURE — 97110 THERAPEUTIC EXERCISES: CPT

## 2018-05-18 PROCEDURE — 97112 NEUROMUSCULAR REEDUCATION: CPT

## 2018-05-18 NOTE — PROGRESS NOTES
Daily Note     Today's date: 2018  Patient name: Vandana Blank  : 1934  MRN: [id number]  Referring provider: Brandon Nix PA-C  Dx:   Encounter Diagnosis     ICD-10-CM    1  Gait abnormality R26 9                   Subjective: Patient has no new complaints at this time  1:1 w/ PTA      Objective: See treatment diary below  Precautions: emotional vertigo, bilateral hearing aids, HTN, Type 2 Dm, poor balance     Daily Treatment Diary      Exercise Diary     marching 2x15  1x15  3x10  3x10  HOLD  HOLD  HOLD  NP     HEP - SLR flexion, glute set, squat, seated TA, tandem stance 2x10 ea  2x10 ea   2x10  2x10  HELD  HOLD  HOLD  NP     Recumbent bike   10'  10'  10'  10'  10'  10'  10'  10'    RLE clamshell   1x10, 5"    1x10, 5"    5" 2x10 ea  5"  2x10  5" 2x10     Standing hip abd     3x10  3x10  3x10  HELD  HELD  HELD  3x10  3x10    FTEO on foam        3x30"    3x30"  3x30"  3x30"  3x30"    calf raises         3x12  3x12  3x12  3x12  3x12    TA iso          20x5"  5"x20  5" x20        supine hip abd  OTB         20x5"  5"x20  5"x20  5" x20     TA BKFO      2x10 2x10      supine hip add           20x5"  5" x20    5" x20     FSU/LSU         2x10 ea   Ball toss semi tandem         1x20 ea   Multifidus press         3"  2x10         Assessment: Patient did well with new TE added today, reports no pain during or post session today  Plan: Progress treatment as tolerated

## 2018-05-21 ENCOUNTER — TELEPHONE (OUTPATIENT)
Dept: FAMILY MEDICINE CLINIC | Facility: CLINIC | Age: 83
End: 2018-05-21

## 2018-05-22 ENCOUNTER — TELEPHONE (OUTPATIENT)
Dept: FAMILY MEDICINE CLINIC | Facility: CLINIC | Age: 83
End: 2018-05-22

## 2018-05-23 ENCOUNTER — OFFICE VISIT (OUTPATIENT)
Dept: PHYSICAL THERAPY | Facility: REHABILITATION | Age: 83
End: 2018-05-23
Payer: COMMERCIAL

## 2018-05-23 DIAGNOSIS — R26.9 GAIT ABNORMALITY: Primary | ICD-10-CM

## 2018-05-23 DIAGNOSIS — M54.50 CHRONIC LEFT-SIDED LOW BACK PAIN WITHOUT SCIATICA: ICD-10-CM

## 2018-05-23 DIAGNOSIS — G89.29 CHRONIC LEFT-SIDED LOW BACK PAIN WITHOUT SCIATICA: ICD-10-CM

## 2018-05-23 PROCEDURE — G8990 OTHER PT/OT CURRENT STATUS: HCPCS | Performed by: PHYSICAL THERAPIST

## 2018-05-23 PROCEDURE — 97110 THERAPEUTIC EXERCISES: CPT | Performed by: PHYSICAL THERAPIST

## 2018-05-23 PROCEDURE — 97112 NEUROMUSCULAR REEDUCATION: CPT | Performed by: PHYSICAL THERAPIST

## 2018-05-23 PROCEDURE — G8991 OTHER PT/OT GOAL STATUS: HCPCS | Performed by: PHYSICAL THERAPIST

## 2018-05-23 NOTE — PROGRESS NOTES
Daily Note     Today's date: 2018  Patient name: Laureano Evans  : 1934  MRN: [id number]  Referring provider: Andrew Knowles PA-C  Dx:   Encounter Diagnosis     ICD-10-CM    1  Gait abnormality R26 9    2  Chronic left-sided low back pain without sciatica M54 5     G89 29      1 on 1 with PT/PTA  Subjective: Patient reports that she has been doing better overall and only has a little stiffness in her back  1:1 w/ PTA      Objective: See treatment diary below  Precautions: emotional vertigo, bilateral hearing aids, HTN, Type 2 Dm, poor balance     Daily Treatment Diary      Exercise Diary      marching  3x10  HOLD  HOLD  HOLD  NP      HEP - SLR flexion, glute set, squat, seated TA, tandem stance  2x10  HELD  HOLD  HOLD  NP      Recumbent bike  10'  10'  10'  10'  10'  10'     RLE clamshell  1x10, 5"    5" 2x10 ea  5"  2x10  5" 2x10   np   Standing hip abd   3x10  HELD Jennifer Hitesh  3x10  3x10 np    FTEO on foam  3x30"    3x30"  3x30"  3x30"  3x30" np    calf raises   3x12  3x12  3x12  3x12  3x12 3x12    TA iso    20x5"  5"x20  5" x20         supine hip abd  OTB   20x5"  5"x20  5"x20  5" x20      TA BKFO   2x10 2x10         supine hip add     20x5"  5" x20    5" x20   np   FSU/LSU      2x10 ea np     Ball toss semi tandem      1x20 ea 1x20 ea  Multifidus press      3"  2x10 3", 2x10 OTB     Toe taps on step       2x10 ea  Nu step       10'     3-way flexion ball roll out       1x10, 5" ea  Assessment: Patient reported fatigue with treatment today  Occasional CCG needed with ball toss exercise and with addition of toe tapping particularly when lifting left foot  Plan: Progress treatment as tolerated

## 2018-05-25 ENCOUNTER — OFFICE VISIT (OUTPATIENT)
Dept: PHYSICAL THERAPY | Facility: REHABILITATION | Age: 83
End: 2018-05-25
Payer: COMMERCIAL

## 2018-05-25 DIAGNOSIS — R26.9 GAIT ABNORMALITY: Primary | ICD-10-CM

## 2018-05-25 PROCEDURE — 97530 THERAPEUTIC ACTIVITIES: CPT

## 2018-05-25 PROCEDURE — 97112 NEUROMUSCULAR REEDUCATION: CPT

## 2018-05-25 PROCEDURE — 97110 THERAPEUTIC EXERCISES: CPT

## 2018-05-25 NOTE — PROGRESS NOTES
Daily Note     Today's date: 2018  Patient name: Kristina Peralta  : 1934  MRN: [id number]  Referring provider: Kavitha Mares PA-C  Dx:   Encounter Diagnosis     ICD-10-CM    1  Gait abnormality R26 9                   Subjective: Patient arrives 10 min late for scheduled appt, pt was accommodated  Patient reports some stiffness in her back but no pain  Objective: See treatment diary below  Precautions: emotional vertigo, bilateral hearing aids, HTN, Type 2 Dm, poor balance     Daily Treatment Diary      Exercise Diary      Recumbent bike  10'  10'  10'  10'  10'  10'  10'    RLE clamshell  1x10, 5"    5" 2x10 ea  5"  2x10  5" 2x10   np np   Standing hip abd   3x10  HELD Laymon Hu  3x10  3x10 np 3x10    FTEO on foam  3x30"    3x30"  3x30"  3x30"  3x30" np np    calf raises   3x12  3x12  3x12  3x12  3x12 3x12 3x12    TA iso    20x5"  5"x20  5" x20          supine hip abd  OTB   20x5"  5"x20  5"x20  5" x20    5" x20   TA BKFO   2x10 2x10    2x10    supine hip add     20x5"  5" x20    5" x20   np np   FSU/LSU      2x10 ea np np   Ball toss semi tandem      1x20 ea 1x20 ea  20ea    Multifidus press      3"  2x10 3", 2x10 OTB 3"  2x10 OTB   Toe taps on step       2x10 ea  2x10 ea   Nu step       10' np   3-way flexion ball roll out       1x10, 5" ea  5"  x10ea                               Assessment: Patient reports fatigue post session  Plan: Progress treatment as tolerated

## 2018-05-28 NOTE — PROGRESS NOTES
Assessment/Plan:         Diagnoses and all orders for this visit:    Dysuria  -     POCT urine dip  -     Urine culture; Future  -     Discontinue: ciprofloxacin (CIPRO) 500 mg tablet; Take 1 tablet (500 mg total) by mouth every 12 (twelve) hours for 3 days  -     ciprofloxacin (CIPRO) 500 mg tablet; Take 1 tablet (500 mg total) by mouth every 12 (twelve) hours for 3 days  -     Urine culture          Subjective:      Patient ID: Kristina Peralta is a 80 y o  female  Here for urinary symptom, dysuria      Urinary Tract Infection          The following portions of the patient's history were reviewed and updated as appropriate: allergies, current medications, past family history, past medical history, past social history, past surgical history and problem list     Review of Systems   Constitutional: Negative  HENT: Negative  Eyes: Negative  Respiratory: Negative  Cardiovascular: Negative  Gastrointestinal: Negative  Endocrine: Negative  Genitourinary: Positive for dysuria  Musculoskeletal: Negative  Skin: Negative  Allergic/Immunologic: Negative  Neurological: Negative  Hematological: Negative  Psychiatric/Behavioral: Negative  Objective:      /78 (BP Location: Left arm, Patient Position: Sitting, Cuff Size: Adult)   Pulse 58   Temp (!) 97 4 °F (36 3 °C) (Temporal)   Resp 18   Ht 5' 1 25" (1 556 m)   Wt 99 3 kg (219 lb)   BMI 41 04 kg/m²          Physical Exam   Constitutional: She is oriented to person, place, and time  She appears well-developed and well-nourished  HENT:   Head: Normocephalic  Eyes: EOM are normal  Pupils are equal, round, and reactive to light  Neck: No thyromegaly present  Cardiovascular: Normal rate and regular rhythm  No murmur heard  Pulmonary/Chest: Effort normal and breath sounds normal  No respiratory distress  She has no wheezes  She has no rales  Abdominal: She exhibits no distension     Musculoskeletal: She exhibits no edema  Lymphadenopathy:     She has no cervical adenopathy  Neurological: She is alert and oriented to person, place, and time  Skin: Skin is warm  Psychiatric: She has a normal mood and affect   Her behavior is normal

## 2018-05-30 ENCOUNTER — OFFICE VISIT (OUTPATIENT)
Dept: PHYSICAL THERAPY | Facility: REHABILITATION | Age: 83
End: 2018-05-30
Payer: COMMERCIAL

## 2018-05-30 DIAGNOSIS — M54.50 CHRONIC LEFT-SIDED LOW BACK PAIN WITHOUT SCIATICA: ICD-10-CM

## 2018-05-30 DIAGNOSIS — G89.29 CHRONIC LEFT-SIDED LOW BACK PAIN WITHOUT SCIATICA: ICD-10-CM

## 2018-05-30 DIAGNOSIS — R26.9 GAIT ABNORMALITY: Primary | ICD-10-CM

## 2018-05-30 PROCEDURE — 97112 NEUROMUSCULAR REEDUCATION: CPT | Performed by: PHYSICAL THERAPIST

## 2018-05-30 PROCEDURE — 97110 THERAPEUTIC EXERCISES: CPT | Performed by: PHYSICAL THERAPIST

## 2018-05-30 NOTE — PROGRESS NOTES
Daily Note     Today's date: 2018  Patient name: Yoly Boucher  : 1934  MRN: [id number]  Referring provider: Jeffy Luevano PA-C  Dx:   Encounter Diagnosis     ICD-10-CM    1  Gait abnormality R26 9    2  Chronic left-sided low back pain without sciatica M54 5     G89 29                   Subjective: Patient arrives 15 min late for scheduled appt, pt was accommodated  Patient reports some stiffness in back after exercises but is pleased with her progress  Objective: See treatment diary below  Precautions: emotional vertigo, bilateral hearing aids, HTN, Type 2 Dm, poor balance     Daily Treatment Diary      Exercise Diary      Recumbent bike  10'  10'  10'  10' 8'    RLE clamshell  5"  2x10  5" 2x10   np np    Standing hip abd   HELD  3x10  3x10 np 3x10     FTEO on foam  3x30"  3x30"  3x30" np np     calf raises  3x12  3x12  3x12 3x12 3x12     TA iso   5" x20           supine hip abd  OTB  5"x20  5" x20    5" x20    TA BKFO 2x10    2x10     supine hip add      5" x20   np np    FSU/LSU   2x10 ea np np    Ball toss semi tandem   1x20 ea 1x20 ea  20ea     Stand on foam ball toss      1x20   Multifidus press   3"  2x10 3", 2x10 OTB 3"  2x10 OTB 3"  2x10 OTB   Toe taps on step    2x10 ea  2x10 ea 4 x 30''   Nu step    10' np    3-way flexion ball roll out    1x10, 5" ea  5"  x10ea 5"x10 ea  Assessment: Patient demonstrates improved balance with progression of exercises today  Added toe taps to HEP  Plan: Discharge to HEP next visit

## 2018-06-01 NOTE — PROGRESS NOTES
Patient being discharged at this time as she will be trying to attend [facility]

## 2018-06-06 LAB
BUN SERPL-MCNC: 14 MG/DL (ref 7–25)
BUN/CREAT SERPL: 14 (CALC) (ref 6–22)
CALCIUM SERPL-MCNC: 9.7 MG/DL (ref 8.6–10.4)
CHLORIDE SERPL-SCNC: 98 MMOL/L (ref 98–110)
CO2 SERPL-SCNC: 27 MMOL/L (ref 20–31)
CREAT SERPL-MCNC: 0.99 MG/DL (ref 0.6–0.88)
EST. AVERAGE GLUCOSE BLD GHB EST-MCNC: 131 (CALC)
EST. AVERAGE GLUCOSE BLD GHB EST-SCNC: 7.3 (CALC)
GLUCOSE SERPL-MCNC: 117 MG/DL (ref 65–99)
HBA1C MFR BLD: 6.2 % OF TOTAL HGB
POTASSIUM SERPL-SCNC: 4.9 MMOL/L (ref 3.5–5.3)
SL AMB EGFR AFRICAN AMERICAN: 61 ML/MIN/1.73M2
SL AMB EGFR NON AFRICAN AMERICAN: 52 ML/MIN/1.73M2
SODIUM SERPL-SCNC: 134 MMOL/L (ref 135–146)

## 2018-06-13 ENCOUNTER — OFFICE VISIT (OUTPATIENT)
Dept: FAMILY MEDICINE CLINIC | Facility: CLINIC | Age: 83
End: 2018-06-13
Payer: COMMERCIAL

## 2018-06-13 VITALS
DIASTOLIC BLOOD PRESSURE: 86 MMHG | SYSTOLIC BLOOD PRESSURE: 134 MMHG | RESPIRATION RATE: 18 BRPM | HEART RATE: 56 BPM | WEIGHT: 219.38 LBS | HEIGHT: 61 IN | BODY MASS INDEX: 41.42 KG/M2

## 2018-06-13 DIAGNOSIS — Z12.39 SCREENING FOR BREAST CANCER: Primary | ICD-10-CM

## 2018-06-13 DIAGNOSIS — I10 ESSENTIAL HYPERTENSION: ICD-10-CM

## 2018-06-13 DIAGNOSIS — E11.9 TYPE 2 DIABETES MELLITUS WITHOUT COMPLICATION, WITHOUT LONG-TERM CURRENT USE OF INSULIN (HCC): ICD-10-CM

## 2018-06-13 PROCEDURE — 99214 OFFICE O/P EST MOD 30 MIN: CPT | Performed by: FAMILY MEDICINE

## 2018-06-13 RX ORDER — CARVEDILOL 25 MG/1
TABLET ORAL
COMMUNITY
Start: 2018-05-09 | End: 2018-06-21 | Stop reason: SDUPTHER

## 2018-06-13 RX ORDER — DILTIAZEM HYDROCHLORIDE 240 MG/1
240 CAPSULE, COATED, EXTENDED RELEASE ORAL DAILY
Qty: 90 CAPSULE | Refills: 3 | Status: SHIPPED | OUTPATIENT
Start: 2018-06-13 | End: 2019-05-28 | Stop reason: SDUPTHER

## 2018-06-14 ENCOUNTER — TELEPHONE (OUTPATIENT)
Dept: FAMILY MEDICINE CLINIC | Facility: CLINIC | Age: 83
End: 2018-06-14

## 2018-06-14 DIAGNOSIS — E11.9 TYPE 2 DIABETES MELLITUS WITHOUT COMPLICATION, WITHOUT LONG-TERM CURRENT USE OF INSULIN (HCC): Primary | ICD-10-CM

## 2018-06-14 PROBLEM — Z12.39 SCREENING FOR BREAST CANCER: Status: ACTIVE | Noted: 2018-06-14

## 2018-06-14 NOTE — TELEPHONE ENCOUNTER
Patient will like to know if you will like to order any Blood work before she comes in for her next appointment in October  Please advice  Patient will like us to mail order to her address if labs are ordered

## 2018-06-15 NOTE — PROGRESS NOTES
Assessment/Plan:         Diagnoses and all orders for this visit:    Screening for breast cancer  -     Mammo screening bilateral w cad; Future    Type 2 diabetes mellitus without complication, without long-term current use of insulin (HCC)    Essential hypertension  -     diltiazem (CARDIZEM CD) 240 mg 24 hr capsule; Take 1 capsule (240 mg total) by mouth daily    Other orders  -     carvedilol (COREG) 25 mg tablet;           Subjective:      Patient ID: Abbi Oquendo is a 80 y o  female  Here for follow up on chronic medical conditions        The following portions of the patient's history were reviewed and updated as appropriate: allergies, current medications, past family history, past medical history, past social history, past surgical history and problem list     Review of Systems   Constitutional: Negative  HENT: Negative  Eyes: Negative  Respiratory: Negative  Cardiovascular: Negative  Gastrointestinal: Negative  Endocrine: Negative  Genitourinary: Negative  Musculoskeletal: Negative  Skin: Negative  Allergic/Immunologic: Negative  Neurological: Negative  Hematological: Negative  Psychiatric/Behavioral: Negative  Objective:      /86 (BP Location: Left arm, Patient Position: Sitting, Cuff Size: Standard)   Pulse 56   Resp 18   Ht 5' 1 25" (1 556 m)   Wt 99 5 kg (219 lb 6 oz)   BMI 41 11 kg/m²          Physical Exam   Constitutional: She is oriented to person, place, and time  She appears well-developed and well-nourished  HENT:   Head: Normocephalic  Eyes: EOM are normal  Pupils are equal, round, and reactive to light  Neck: No thyromegaly present  Cardiovascular: Normal rate and regular rhythm  No murmur heard  Pulmonary/Chest: Effort normal and breath sounds normal  No respiratory distress  She has no wheezes  She has no rales  Abdominal: She exhibits no distension  Musculoskeletal: She exhibits no edema     Lymphadenopathy: She has no cervical adenopathy  Neurological: She is alert and oriented to person, place, and time  Skin: Skin is warm  Psychiatric: She has a normal mood and affect  Her behavior is normal    Nursing note and vitals reviewed      Diabetic Foot Exam

## 2018-06-21 ENCOUNTER — TELEPHONE (OUTPATIENT)
Dept: FAMILY MEDICINE CLINIC | Facility: CLINIC | Age: 83
End: 2018-06-21

## 2018-06-21 DIAGNOSIS — I10 ESSENTIAL HYPERTENSION: ICD-10-CM

## 2018-06-21 RX ORDER — CARVEDILOL 25 MG/1
25 TABLET ORAL 2 TIMES DAILY
Qty: 60 TABLET | Refills: 5 | Status: CANCELLED | OUTPATIENT
Start: 2018-06-21 | End: 2018-07-21

## 2018-06-21 RX ORDER — CARVEDILOL 25 MG/1
TABLET ORAL
Qty: 60 TABLET | Refills: 5 | Status: SHIPPED | OUTPATIENT
Start: 2018-06-21 | End: 2019-01-03 | Stop reason: SDUPTHER

## 2018-07-11 ENCOUNTER — OFFICE VISIT (OUTPATIENT)
Dept: FAMILY MEDICINE CLINIC | Facility: CLINIC | Age: 83
End: 2018-07-11
Payer: COMMERCIAL

## 2018-07-11 VITALS
RESPIRATION RATE: 18 BRPM | TEMPERATURE: 97.4 F | BODY MASS INDEX: 42.97 KG/M2 | HEART RATE: 62 BPM | SYSTOLIC BLOOD PRESSURE: 180 MMHG | HEIGHT: 61 IN | WEIGHT: 227.6 LBS | DIASTOLIC BLOOD PRESSURE: 76 MMHG

## 2018-07-11 DIAGNOSIS — R14.0 ABDOMINAL BLOATING: ICD-10-CM

## 2018-07-11 DIAGNOSIS — E11.9 TYPE 2 DIABETES MELLITUS WITHOUT COMPLICATION, WITHOUT LONG-TERM CURRENT USE OF INSULIN (HCC): Primary | ICD-10-CM

## 2018-07-11 DIAGNOSIS — I10 ESSENTIAL HYPERTENSION: ICD-10-CM

## 2018-07-11 PROCEDURE — 99214 OFFICE O/P EST MOD 30 MIN: CPT | Performed by: NURSE PRACTITIONER

## 2018-07-11 RX ORDER — HYDROCHLOROTHIAZIDE 25 MG/1
25 TABLET ORAL DAILY
Qty: 90 TABLET | Refills: 0 | Status: SHIPPED | OUTPATIENT
Start: 2018-07-11 | End: 2018-08-01 | Stop reason: HOSPADM

## 2018-07-11 RX ORDER — SIMETHICONE 180 MG
180 CAPSULE ORAL 3 TIMES DAILY
Refills: 0 | COMMUNITY
Start: 2018-07-11 | End: 2019-09-27 | Stop reason: ALTCHOICE

## 2018-07-11 NOTE — PROGRESS NOTES
Chief Complaint   Patient presents with    Hypertension    Bloated     after eating    Urine     when she urines its very little  Assessment/Plan:    Essential hypertension  Patient blood pressure is significantly elevated since discontinuing lisinopril  Patient will need another agent for BP control  With edema and elevated pressure  We will try HCTZ and have patient return for BP check  Diagnoses and all orders for this visit:    Type 2 diabetes mellitus without complication, without long-term current use of insulin (HCC)  -     hydrochlorothiazide (HYDRODIURIL) 25 mg tablet; Take 1 tablet (25 mg total) by mouth daily  -     Microalbumin / creatinine urine ratio; Future    Essential hypertension  -     hydrochlorothiazide (HYDRODIURIL) 25 mg tablet; Take 1 tablet (25 mg total) by mouth daily    Abdominal bloating  -     simethicone (MYLICON,GAS-X) 193 MG capsule; Take 1 capsule (180 mg total) by mouth 3 (three) times a day    Other orders  -     Cancel: metFORMIN (GLUCOPHAGE) 500 mg tablet; Take 1 tablet (500 mg total) by mouth daily with breakfast       Subjective:      Patient ID: Becky Castro is a 80 y o  female  Patient states dr Drue Merlin took her lisinopril away due to cough  She is here to recheck  She is having bloating after dinner nightly  She has to take 2 tums every night  This has been occurring for about a month or two  She is also complaining of urinary hesitancy  She went to GI and was on linzess which was watery and that was discontinued  Metformin was discontinued because her sugars were low  Hypertension   This is a chronic problem  The problem has been gradually worsening since onset  Associated symptoms include peripheral edema  Pertinent negatives include no blurred vision, chest pain, headaches, malaise/fatigue, palpitations or shortness of breath  Risk factors for coronary artery disease include dyslipidemia and obesity   Past treatments include calcium channel blockers and beta blockers  The current treatment provides no improvement (worsened with stopping lisinopril )  The following portions of the patient's history were reviewed and updated as appropriate: allergies, current medications, past family history, past medical history, past social history, past surgical history and problem list     Review of Systems   Constitutional: Negative for malaise/fatigue  Eyes: Negative for blurred vision  Respiratory: Negative for shortness of breath  Cardiovascular: Negative for chest pain and palpitations  Neurological: Negative for headaches  Objective:      BP (!) 180/76 (BP Location: Left arm, Patient Position: Sitting, Cuff Size: Adult)   Pulse 62   Temp (!) 97 4 °F (36 3 °C) (Tympanic)   Resp 18   Ht 5' 1 25" (1 556 m)   Wt 103 kg (227 lb 9 6 oz)   Breastfeeding? No   BMI 42 65 kg/m²          Physical Exam   Constitutional: Vital signs are normal  She appears well-developed and well-nourished  She does not have a sickly appearance  She does not appear ill  HENT:   Head: Normocephalic and atraumatic  Neck: Carotid bruit is not present  No thyromegaly present  Cardiovascular: Normal rate, regular rhythm, S1 normal and S2 normal     No murmur heard  Pulses:       Carotid pulses are 2+ on the right side, and 2+ on the left side  +2 Lower extremity Edema   Pulmonary/Chest: Effort normal and breath sounds normal    Abdominal: Soft  Normal appearance and bowel sounds are normal  There is no tenderness  Lymphadenopathy:     She has no cervical adenopathy  Skin: Skin is warm, dry and intact

## 2018-07-16 NOTE — ASSESSMENT & PLAN NOTE
Patient blood pressure is significantly elevated since discontinuing lisinopril  Patient will need another agent for BP control  With edema and elevated pressure  We will try HCTZ and have patient return for BP check

## 2018-07-25 ENCOUNTER — APPOINTMENT (INPATIENT)
Dept: RADIOLOGY | Facility: HOSPITAL | Age: 83
DRG: 644 | End: 2018-07-25
Attending: HOSPITALIST
Payer: COMMERCIAL

## 2018-07-25 ENCOUNTER — APPOINTMENT (EMERGENCY)
Dept: RADIOLOGY | Facility: HOSPITAL | Age: 83
DRG: 644 | End: 2018-07-25
Payer: COMMERCIAL

## 2018-07-25 ENCOUNTER — HOSPITAL ENCOUNTER (INPATIENT)
Facility: HOSPITAL | Age: 83
LOS: 7 days | Discharge: HOME | DRG: 644 | End: 2018-08-01
Attending: EMERGENCY MEDICINE | Admitting: INTERNAL MEDICINE
Payer: COMMERCIAL

## 2018-07-25 DIAGNOSIS — I10 ESSENTIAL HYPERTENSION: ICD-10-CM

## 2018-07-25 DIAGNOSIS — K59.00 CONSTIPATION: ICD-10-CM

## 2018-07-25 DIAGNOSIS — K59.09 CHRONIC CONSTIPATION: ICD-10-CM

## 2018-07-25 DIAGNOSIS — E87.1 HYPONATREMIA: Primary | ICD-10-CM

## 2018-07-25 DIAGNOSIS — R10.9 ABDOMINAL PAIN: ICD-10-CM

## 2018-07-25 DIAGNOSIS — T80.1XXD INTRAVENOUS INFILTRATION, SUBSEQUENT ENCOUNTER: ICD-10-CM

## 2018-07-25 LAB
ALBUMIN SERPL BCP-MCNC: 3.7 G/DL (ref 3.5–5)
ALP SERPL-CCNC: 120 U/L (ref 46–116)
ALT SERPL W P-5'-P-CCNC: 40 U/L (ref 12–78)
ANION GAP SERPL CALCULATED.3IONS-SCNC: 5 MMOL/L (ref 4–13)
ANION GAP SERPL CALCULATED.3IONS-SCNC: 6 MMOL/L (ref 4–13)
ANION GAP SERPL CALCULATED.3IONS-SCNC: 8 MMOL/L (ref 4–13)
ANION GAP SERPL CALCULATED.3IONS-SCNC: 9 MMOL/L (ref 4–13)
ANION GAP SERPL CALCULATED.3IONS-SCNC: 9 MMOL/L (ref 4–13)
AST SERPL W P-5'-P-CCNC: 72 U/L (ref 5–45)
BACTERIA UR QL AUTO: ABNORMAL /HPF
BASOPHILS # BLD AUTO: 0.06 THOUSANDS/ΜL (ref 0–0.1)
BASOPHILS NFR BLD AUTO: 1 % (ref 0–1)
BILIRUB SERPL-MCNC: 0.88 MG/DL (ref 0.2–1)
BILIRUB UR QL STRIP: NEGATIVE
BUN SERPL-MCNC: 10 MG/DL (ref 5–25)
BUN SERPL-MCNC: 12 MG/DL (ref 5–25)
BUN SERPL-MCNC: 12 MG/DL (ref 5–25)
BUN SERPL-MCNC: 13 MG/DL (ref 5–25)
BUN SERPL-MCNC: 14 MG/DL (ref 5–25)
CALCIUM SERPL-MCNC: 7.9 MG/DL (ref 8.3–10.1)
CALCIUM SERPL-MCNC: 8.2 MG/DL (ref 8.3–10.1)
CALCIUM SERPL-MCNC: 8.4 MG/DL (ref 8.3–10.1)
CALCIUM SERPL-MCNC: 8.5 MG/DL (ref 8.3–10.1)
CALCIUM SERPL-MCNC: 8.7 MG/DL (ref 8.3–10.1)
CHLORIDE SERPL-SCNC: 80 MMOL/L (ref 100–108)
CHLORIDE SERPL-SCNC: 81 MMOL/L (ref 100–108)
CHLORIDE SERPL-SCNC: 83 MMOL/L (ref 100–108)
CHLORIDE SERPL-SCNC: 84 MMOL/L (ref 100–108)
CHLORIDE SERPL-SCNC: 86 MMOL/L (ref 100–108)
CHLORIDE UR-SCNC: 21 MMOL/L (ref 10–330)
CLARITY UR: CLEAR
CO2 SERPL-SCNC: 22 MMOL/L (ref 21–32)
CO2 SERPL-SCNC: 25 MMOL/L (ref 21–32)
CO2 SERPL-SCNC: 25 MMOL/L (ref 21–32)
CO2 SERPL-SCNC: 26 MMOL/L (ref 21–32)
CO2 SERPL-SCNC: 27 MMOL/L (ref 21–32)
COLOR UR: YELLOW
CREAT SERPL-MCNC: 0.66 MG/DL (ref 0.6–1.3)
CREAT SERPL-MCNC: 0.74 MG/DL (ref 0.6–1.3)
CREAT SERPL-MCNC: 0.74 MG/DL (ref 0.6–1.3)
CREAT SERPL-MCNC: 0.87 MG/DL (ref 0.6–1.3)
CREAT SERPL-MCNC: 0.97 MG/DL (ref 0.6–1.3)
EOSINOPHIL # BLD AUTO: 0.07 THOUSAND/ΜL (ref 0–0.61)
EOSINOPHIL NFR BLD AUTO: 1 % (ref 0–6)
ERYTHROCYTE [DISTWIDTH] IN BLOOD BY AUTOMATED COUNT: 11.7 % (ref 11.6–15.1)
GFR SERPL CREATININE-BSD FRML MDRD: 54 ML/MIN/1.73SQ M
GFR SERPL CREATININE-BSD FRML MDRD: 61 ML/MIN/1.73SQ M
GFR SERPL CREATININE-BSD FRML MDRD: 75 ML/MIN/1.73SQ M
GFR SERPL CREATININE-BSD FRML MDRD: 75 ML/MIN/1.73SQ M
GFR SERPL CREATININE-BSD FRML MDRD: 81 ML/MIN/1.73SQ M
GLUCOSE SERPL-MCNC: 107 MG/DL (ref 65–140)
GLUCOSE SERPL-MCNC: 140 MG/DL (ref 65–140)
GLUCOSE SERPL-MCNC: 150 MG/DL (ref 65–140)
GLUCOSE SERPL-MCNC: 154 MG/DL (ref 65–140)
GLUCOSE SERPL-MCNC: 162 MG/DL (ref 65–140)
GLUCOSE SERPL-MCNC: 163 MG/DL (ref 65–140)
GLUCOSE SERPL-MCNC: 165 MG/DL (ref 65–140)
GLUCOSE SERPL-MCNC: 176 MG/DL (ref 65–140)
GLUCOSE UR STRIP-MCNC: NEGATIVE MG/DL
HCT VFR BLD AUTO: 36.3 % (ref 34.8–46.1)
HGB BLD-MCNC: 13.1 G/DL (ref 11.5–15.4)
HGB UR QL STRIP.AUTO: NEGATIVE
HYALINE CASTS #/AREA URNS LPF: ABNORMAL /LPF
IMM GRANULOCYTES # BLD AUTO: 0.04 THOUSAND/UL (ref 0–0.2)
IMM GRANULOCYTES NFR BLD AUTO: 1 % (ref 0–2)
KETONES UR STRIP-MCNC: NEGATIVE MG/DL
LEUKOCYTE ESTERASE UR QL STRIP: ABNORMAL
LIPASE SERPL-CCNC: 104 U/L (ref 73–393)
LYMPHOCYTES # BLD AUTO: 1.4 THOUSANDS/ΜL (ref 0.6–4.47)
LYMPHOCYTES NFR BLD AUTO: 18 % (ref 14–44)
MCH RBC QN AUTO: 30.5 PG (ref 26.8–34.3)
MCHC RBC AUTO-ENTMCNC: 36.1 G/DL (ref 31.4–37.4)
MCV RBC AUTO: 85 FL (ref 82–98)
MONOCYTES # BLD AUTO: 0.46 THOUSAND/ΜL (ref 0.17–1.22)
MONOCYTES NFR BLD AUTO: 6 % (ref 4–12)
NEUTROPHILS # BLD AUTO: 5.63 THOUSANDS/ΜL (ref 1.85–7.62)
NEUTS SEG NFR BLD AUTO: 73 % (ref 43–75)
NITRITE UR QL STRIP: NEGATIVE
NON-SQ EPI CELLS URNS QL MICRO: ABNORMAL /HPF
NRBC BLD AUTO-RTO: 0 /100 WBCS
OSMOLALITY UR/SERPL-RTO: 252 MMOL/KG (ref 282–298)
PH UR STRIP.AUTO: 7 [PH] (ref 4.5–8)
PLATELET # BLD AUTO: 161 THOUSANDS/UL (ref 149–390)
PMV BLD AUTO: 11.5 FL (ref 8.9–12.7)
POTASSIUM SERPL-SCNC: 3.4 MMOL/L (ref 3.5–5.3)
POTASSIUM SERPL-SCNC: 3.4 MMOL/L (ref 3.5–5.3)
POTASSIUM SERPL-SCNC: 3.6 MMOL/L (ref 3.5–5.3)
POTASSIUM SERPL-SCNC: 3.8 MMOL/L (ref 3.5–5.3)
POTASSIUM SERPL-SCNC: 4.9 MMOL/L (ref 3.5–5.3)
PROT SERPL-MCNC: 7.5 G/DL (ref 6.4–8.2)
PROT UR STRIP-MCNC: NEGATIVE MG/DL
RBC # BLD AUTO: 4.29 MILLION/UL (ref 3.81–5.12)
RBC #/AREA URNS AUTO: ABNORMAL /HPF
SODIUM SERPL-SCNC: 111 MMOL/L (ref 136–145)
SODIUM SERPL-SCNC: 115 MMOL/L (ref 136–145)
SODIUM SERPL-SCNC: 115 MMOL/L (ref 136–145)
SODIUM SERPL-SCNC: 117 MMOL/L (ref 136–145)
SODIUM SERPL-SCNC: 118 MMOL/L (ref 136–145)
SP GR UR STRIP.AUTO: 1.02 (ref 1–1.03)
TSH SERPL DL<=0.05 MIU/L-ACNC: 0.82 UIU/ML (ref 0.36–3.74)
UROBILINOGEN UR QL STRIP.AUTO: 1 E.U./DL
WBC # BLD AUTO: 7.66 THOUSAND/UL (ref 4.31–10.16)
WBC #/AREA URNS AUTO: ABNORMAL /HPF

## 2018-07-25 PROCEDURE — 82948 REAGENT STRIP/BLOOD GLUCOSE: CPT

## 2018-07-25 PROCEDURE — 81001 URINALYSIS AUTO W/SCOPE: CPT | Performed by: HOSPITALIST

## 2018-07-25 PROCEDURE — 74177 CT ABD & PELVIS W/CONTRAST: CPT

## 2018-07-25 PROCEDURE — 85025 COMPLETE CBC W/AUTO DIFF WBC: CPT | Performed by: EMERGENCY MEDICINE

## 2018-07-25 PROCEDURE — 83935 ASSAY OF URINE OSMOLALITY: CPT | Performed by: HOSPITALIST

## 2018-07-25 PROCEDURE — 71046 X-RAY EXAM CHEST 2 VIEWS: CPT

## 2018-07-25 PROCEDURE — 80053 COMPREHEN METABOLIC PANEL: CPT | Performed by: EMERGENCY MEDICINE

## 2018-07-25 PROCEDURE — 96374 THER/PROPH/DIAG INJ IV PUSH: CPT

## 2018-07-25 PROCEDURE — 36415 COLL VENOUS BLD VENIPUNCTURE: CPT

## 2018-07-25 PROCEDURE — 84443 ASSAY THYROID STIM HORMONE: CPT | Performed by: HOSPITALIST

## 2018-07-25 PROCEDURE — 99222 1ST HOSP IP/OBS MODERATE 55: CPT | Performed by: INTERNAL MEDICINE

## 2018-07-25 PROCEDURE — 84300 ASSAY OF URINE SODIUM: CPT | Performed by: HOSPITALIST

## 2018-07-25 PROCEDURE — 82436 ASSAY OF URINE CHLORIDE: CPT | Performed by: INTERNAL MEDICINE

## 2018-07-25 PROCEDURE — 83930 ASSAY OF BLOOD OSMOLALITY: CPT | Performed by: HOSPITALIST

## 2018-07-25 PROCEDURE — 96361 HYDRATE IV INFUSION ADD-ON: CPT

## 2018-07-25 PROCEDURE — 99285 EMERGENCY DEPT VISIT HI MDM: CPT

## 2018-07-25 PROCEDURE — 99223 1ST HOSP IP/OBS HIGH 75: CPT | Performed by: HOSPITALIST

## 2018-07-25 PROCEDURE — 80048 BASIC METABOLIC PNL TOTAL CA: CPT | Performed by: EMERGENCY MEDICINE

## 2018-07-25 PROCEDURE — 83690 ASSAY OF LIPASE: CPT | Performed by: EMERGENCY MEDICINE

## 2018-07-25 PROCEDURE — 80048 BASIC METABOLIC PNL TOTAL CA: CPT | Performed by: HOSPITALIST

## 2018-07-25 RX ORDER — ONDANSETRON 2 MG/ML
4 INJECTION INTRAMUSCULAR; INTRAVENOUS ONCE
Status: COMPLETED | OUTPATIENT
Start: 2018-07-25 | End: 2018-07-25

## 2018-07-25 RX ORDER — MECLIZINE HCL 12.5 MG/1
12.5 TABLET ORAL 3 TIMES DAILY PRN
Status: DISCONTINUED | OUTPATIENT
Start: 2018-07-25 | End: 2018-08-01 | Stop reason: HOSPADM

## 2018-07-25 RX ORDER — SODIUM CHLORIDE 9 MG/ML
30 INJECTION, SOLUTION INTRAVENOUS CONTINUOUS
Status: DISCONTINUED | OUTPATIENT
Start: 2018-07-25 | End: 2018-07-26 | Stop reason: SDUPTHER

## 2018-07-25 RX ORDER — POLYETHYLENE GLYCOL 3350 17 G/17G
17 POWDER, FOR SOLUTION ORAL DAILY
Status: DISCONTINUED | OUTPATIENT
Start: 2018-07-25 | End: 2018-07-26

## 2018-07-25 RX ORDER — MAGNESIUM HYDROXIDE/ALUMINUM HYDROXICE/SIMETHICONE 120; 1200; 1200 MG/30ML; MG/30ML; MG/30ML
30 SUSPENSION ORAL ONCE
Status: COMPLETED | OUTPATIENT
Start: 2018-07-25 | End: 2018-07-25

## 2018-07-25 RX ORDER — LACTULOSE 20 G/30ML
20 SOLUTION ORAL ONCE
Status: COMPLETED | OUTPATIENT
Start: 2018-07-25 | End: 2018-07-25

## 2018-07-25 RX ORDER — SODIUM PHOSPHATE, DIBASIC AND SODIUM PHOSPHATE, MONOBASIC 7; 19 G/133ML; G/133ML
1 ENEMA RECTAL ONCE
Status: COMPLETED | OUTPATIENT
Start: 2018-07-25 | End: 2018-07-25

## 2018-07-25 RX ORDER — CARVEDILOL 25 MG/1
25 TABLET ORAL ONCE
Status: COMPLETED | OUTPATIENT
Start: 2018-07-25 | End: 2018-07-25

## 2018-07-25 RX ORDER — POTASSIUM CHLORIDE 20 MEQ/1
40 TABLET, EXTENDED RELEASE ORAL ONCE
Status: COMPLETED | OUTPATIENT
Start: 2018-07-25 | End: 2018-07-25

## 2018-07-25 RX ORDER — ONDANSETRON 2 MG/ML
4 INJECTION INTRAMUSCULAR; INTRAVENOUS EVERY 6 HOURS PRN
Status: DISCONTINUED | OUTPATIENT
Start: 2018-07-25 | End: 2018-08-01 | Stop reason: HOSPADM

## 2018-07-25 RX ORDER — ASPIRIN 81 MG/1
81 TABLET, CHEWABLE ORAL DAILY
Status: DISCONTINUED | OUTPATIENT
Start: 2018-07-25 | End: 2018-08-01 | Stop reason: HOSPADM

## 2018-07-25 RX ORDER — DILTIAZEM HYDROCHLORIDE 120 MG/1
240 CAPSULE, COATED, EXTENDED RELEASE ORAL DAILY
Status: DISCONTINUED | OUTPATIENT
Start: 2018-07-26 | End: 2018-08-01 | Stop reason: HOSPADM

## 2018-07-25 RX ORDER — DILTIAZEM HYDROCHLORIDE 240 MG/1
240 CAPSULE, COATED, EXTENDED RELEASE ORAL ONCE
Status: COMPLETED | OUTPATIENT
Start: 2018-07-25 | End: 2018-07-25

## 2018-07-25 RX ORDER — AMOXICILLIN 250 MG
1 CAPSULE ORAL
Status: DISCONTINUED | OUTPATIENT
Start: 2018-07-25 | End: 2018-07-26

## 2018-07-25 RX ORDER — BISACODYL 10 MG
10 SUPPOSITORY, RECTAL RECTAL DAILY PRN
Status: DISCONTINUED | OUTPATIENT
Start: 2018-07-25 | End: 2018-08-01 | Stop reason: HOSPADM

## 2018-07-25 RX ORDER — SACCHAROMYCES BOULARDII 250 MG
250 CAPSULE ORAL 2 TIMES DAILY
Status: DISCONTINUED | OUTPATIENT
Start: 2018-07-25 | End: 2018-08-01 | Stop reason: HOSPADM

## 2018-07-25 RX ORDER — SODIUM CHLORIDE 9 MG/ML
50 INJECTION, SOLUTION INTRAVENOUS CONTINUOUS
Status: DISCONTINUED | OUTPATIENT
Start: 2018-07-25 | End: 2018-07-25

## 2018-07-25 RX ORDER — CARVEDILOL 25 MG/1
25 TABLET ORAL 2 TIMES DAILY
Status: DISCONTINUED | OUTPATIENT
Start: 2018-07-25 | End: 2018-08-01 | Stop reason: HOSPADM

## 2018-07-25 RX ADMIN — LACTULOSE 20 G: 20 SOLUTION ORAL at 16:10

## 2018-07-25 RX ADMIN — POLYETHYLENE GLYCOL 3350 17 G: 17 POWDER, FOR SOLUTION ORAL at 16:10

## 2018-07-25 RX ADMIN — SODIUM CHLORIDE 50 ML/HR: 0.9 INJECTION, SOLUTION INTRAVENOUS at 12:15

## 2018-07-25 RX ADMIN — ALUMINUM HYDROXIDE, MAGNESIUM HYDROXIDE, AND SIMETHICONE 30 ML: 200; 200; 20 SUSPENSION ORAL at 06:52

## 2018-07-25 RX ADMIN — SODIUM PHOSPHATE 1 ENEMA: 7; 19 ENEMA RECTAL at 11:06

## 2018-07-25 RX ADMIN — Medication 250 MG: at 16:10

## 2018-07-25 RX ADMIN — POTASSIUM CHLORIDE 40 MEQ: 1500 TABLET, EXTENDED RELEASE ORAL at 16:10

## 2018-07-25 RX ADMIN — CARVEDILOL 25 MG: 25 TABLET, FILM COATED ORAL at 18:17

## 2018-07-25 RX ADMIN — INSULIN LISPRO 1 UNITS: 100 INJECTION, SOLUTION INTRAVENOUS; SUBCUTANEOUS at 22:04

## 2018-07-25 RX ADMIN — INSULIN LISPRO 1 UNITS: 100 INJECTION, SOLUTION INTRAVENOUS; SUBCUTANEOUS at 18:18

## 2018-07-25 RX ADMIN — LIDOCAINE HYDROCHLORIDE 15 ML: 20 SOLUTION ORAL; TOPICAL at 06:51

## 2018-07-25 RX ADMIN — ONDANSETRON 4 MG: 2 INJECTION, SOLUTION INTRAMUSCULAR; INTRAVENOUS at 06:47

## 2018-07-25 RX ADMIN — SODIUM CHLORIDE 1000 ML: 0.9 INJECTION, SOLUTION INTRAVENOUS at 08:28

## 2018-07-25 RX ADMIN — CARVEDILOL 25 MG: 25 TABLET, FILM COATED ORAL at 08:51

## 2018-07-25 RX ADMIN — ASPIRIN 81 MG 81 MG: 81 TABLET ORAL at 16:11

## 2018-07-25 RX ADMIN — IOHEXOL 100 ML: 350 INJECTION, SOLUTION INTRAVENOUS at 09:55

## 2018-07-25 RX ADMIN — POTASSIUM CHLORIDE 40 MEQ: 1500 TABLET, EXTENDED RELEASE ORAL at 19:54

## 2018-07-25 RX ADMIN — ONDANSETRON 4 MG: 2 INJECTION, SOLUTION INTRAMUSCULAR; INTRAVENOUS at 08:45

## 2018-07-25 RX ADMIN — SODIUM CHLORIDE 30 ML/HR: 0.9 INJECTION, SOLUTION INTRAVENOUS at 19:53

## 2018-07-25 RX ADMIN — DILTIAZEM HYDROCHLORIDE 240 MG: 240 CAPSULE, COATED, EXTENDED RELEASE ORAL at 08:18

## 2018-07-25 RX ADMIN — ENOXAPARIN SODIUM 40 MG: 40 INJECTION SUBCUTANEOUS at 16:10

## 2018-07-25 RX ADMIN — Medication 250 MG: at 22:04

## 2018-07-25 RX ADMIN — Medication 1 TABLET: at 22:04

## 2018-07-25 NOTE — ASSESSMENT & PLAN NOTE
· Outpatient uncontrolled hypertension with hypertensive urgency  · Patient's lisinopril as outpatient was discontinued due to cough and HCTZ was started  · Continue home Coreg 25 Twice a day, Cardizem 240 mg daily  Monitor blood pressure with that today, most recent blood pressure is 152  · Given her history of diabetes, can consider ARB if her blood pressure is still high    Would avoid amlodipine or nifedipine due to mention about slight edema as outpatient

## 2018-07-25 NOTE — ED NOTES
Pt tolerated enema / denying any pain  Pt was only able to get liquid out, no BM        Mel Velazco, RN  07/25/18 Roma Higgins RN  07/25/18 1371

## 2018-07-25 NOTE — ASSESSMENT & PLAN NOTE
· Suspect hypovolemic secondary to recent HCTZ use + some component of excess water intake  · Check serum osmolality, urine sodium and osmolality  Check CXR, TSH  · No neurological symptoms  · Status post 1 L of IV fluid in the emergency room, sodium improved from 01/01 18769 and remains the same on repeat check the  · With placed on IV fluids NS at 50 cc/hour for 4 hours  Placed on 1200 cc fluid restriction   Check a BMP 4 hours from now and then every 4 hours  · Nephrology consulted, please inform Nephrology with repeat BMP checks and follow-up recommendations about further fluids  · Hold HCTZ from here on

## 2018-07-25 NOTE — ED NOTES
Lab called about CMP results; they advised not released d/t critical sodium       Rebecca Mckenna RN  13/41/38 7002

## 2018-07-25 NOTE — ASSESSMENT & PLAN NOTE
Lab Results   Component Value Date    HGBA1C 6 2 (H) 06/05/2018       No results for input(s): POCGLU in the last 72 hours  Blood Sugar Average: Last 72 hrs:       Was taken off metformin 500 Twice a day due to low blood sugars at home    Placed on ISS here and monitor sugars

## 2018-07-25 NOTE — ED PROVIDER NOTES
History  Chief Complaint   Patient presents with    Nausea     Pt states that she feels like she has to throw up but can't  Pt states she is bloated   Constipation     Pt reports she has nopt had a proper bowl movement in a week  Patient is a 80year old F with a past medical history hypertension, constipation who presents with abdominal pain, distention and nausea  Patient reports that for approximately 1 week she has been unable to have a normal bowel movement and reports having a bowel movement as pellets that are hardened stool  Over the last few days, she has been having worsening abdominal bloating and diffuse sensation of distention and pressure, denies actual Sterling Dominick then yesterday started to developed nausea with the sensation of needing to vomit, but being unable to do so  Reports that she was previously on Linzess for her constipation, but has not been on that medication for a while  Has tried MiraLax and Metamucil without success  Denies chest pain, shortness of breath, fevers, chills, blood in the stools, dysuria, hematuria  Assessment and Plan:  Likely constipation, will get a CT scan of the abdomen and pelvis to rule out obstruction  Labs to evaluate for leukocytosis, anemia, kidney function, LFTs  Treat symptomatically  Reassess  Prior to Admission Medications   Prescriptions Last Dose Informant Patient Reported? Taking?    Glucos-Chondroit-Hyaluron-MSM (GLUCOSAMINE CHONDROITIN JOINT) TABS   Yes No   Sig: Take by mouth   Multiple Vitamin (MULTI-VITAMIN DAILY PO)   Yes No   Sig: Take 1 tablet by mouth daily   Probiotic Product (PROBIOTIC DAILY PO)   Yes No   Sig: Take by mouth   aspirin 81 MG tablet 7/24/2018 at Unknown time  Yes Yes   Sig: Take 1 tablet by mouth daily   carvedilol (COREG) 25 mg tablet 7/24/2018 at Unknown time  No Yes   Sig: TAKE 1 TABLET BY MOUTH TWICE DAILY   diltiazem (CARDIZEM CD) 240 mg 24 hr capsule 7/24/2018 at Unknown time  No Yes   Sig: Take 1 capsule (240 mg total) by mouth daily   hydrochlorothiazide (HYDRODIURIL) 25 mg tablet   No No   Sig: Take 1 tablet (25 mg total) by mouth daily   meclizine (ANTIVERT) 12 5 MG tablet   No No   Sig: Take 1 tablet by mouth 3 (three) times a day as needed for dizziness   simethicone (MYLICON,GAS-X) 111 MG capsule   No No   Sig: Take 1 capsule (180 mg total) by mouth 3 (three) times a day      Facility-Administered Medications: None       Past Medical History:   Diagnosis Date    Anemia     Anxiety     Diabetes mellitus ([address] )     Diabetic peripheral neuropathy (HCC)     Hyperlipidemia     Hypertension     Thyroid enlarged 2/23/2015    Tinnitus     Vertigo        Past Surgical History:   Procedure Laterality Date    EAR SURGERY Left     KNEE SURGERY         Family History   Problem Relation Age of Onset    Diabetes Mother     Heart disease Mother     Heart disease Father     Stroke Father     Cancer Family      I have reviewed and agree with the history as documented  Social History   Substance Use Topics    Smoking status: Never Smoker    Smokeless tobacco: Never Used    Alcohol use No        Review of Systems   Constitutional: Positive for appetite change  Negative for chills and fever  HENT: Negative for congestion and rhinorrhea  Eyes: Negative for photophobia and visual disturbance  Respiratory: Negative for cough and shortness of breath  Cardiovascular: Negative for chest pain and palpitations  Gastrointestinal: Positive for abdominal distention, abdominal pain, constipation and nausea  Negative for anal bleeding, blood in stool, diarrhea, rectal pain and vomiting  Genitourinary: Negative for dysuria and hematuria  Musculoskeletal: Negative for back pain, neck pain and neck stiffness  Skin: Negative for pallor and rash  Neurological: Negative for dizziness, light-headedness and headaches         Physical Exam  ED Triage Vitals   Temperature Pulse Respirations Blood Pressure SpO2   07/25/18 0622 07/25/18 0616 07/25/18 0622 07/25/18 0616 07/25/18 0616   (!) 97 4 °F (36 3 °C) 61 20 (!) 223/89 97 %      Temp Source Heart Rate Source Patient Position - Orthostatic VS BP Location FiO2 (%)   07/25/18 0622 07/25/18 0616 07/25/18 0616 07/25/18 0616 --   Oral Monitor Lying Right arm       Pain Score       07/25/18 0616       No Pain           Orthostatic Vital Signs  Vitals:    07/25/18 0818 07/25/18 0851 07/25/18 0900 07/25/18 0930   BP: (!) 187/137 (!) 182/74 (!) 182/74 170/72   Pulse:  56 (!) 54 58   Patient Position - Orthostatic VS:   Lying Lying       Physical Exam   Constitutional: She is oriented to person, place, and time  She appears well-developed and well-nourished  No distress  HENT:   Head: Normocephalic and atraumatic  Right Ear: External ear normal    Left Ear: External ear normal    Nose: Nose normal    Mouth/Throat: Oropharynx is clear and moist    Eyes: Conjunctivae and EOM are normal  Pupils are equal, round, and reactive to light  Neck: Normal range of motion  Neck supple  Cardiovascular: Normal rate, regular rhythm and intact distal pulses  Exam reveals no gallop and no friction rub  Murmur heard  Pulmonary/Chest: Effort normal and breath sounds normal  No respiratory distress  She has no wheezes  She has no rales  She exhibits no tenderness  Abdominal: Soft  Bowel sounds are normal  She exhibits no distension  There is tenderness  There is no rebound and no guarding  Musculoskeletal: Normal range of motion  She exhibits no edema  Neurological: She is alert and oriented to person, place, and time  GCS eye subscore is 4  GCS verbal subscore is 5  GCS motor subscore is 6  Moves all 4 extremities    Skin: Skin is warm and dry  No rash noted  She is not diaphoretic  No erythema  No pallor  Psychiatric: She has a normal mood and affect  Her behavior is normal    Nursing note and vitals reviewed        ED Medications  Medications   sodium phosphate-biphosphate (FLEET) enema 1 enema (not administered)   ondansetron (ZOFRAN) injection 4 mg (4 mg Intravenous Given 7/25/18 0647)   lidocaine viscous (XYLOCAINE) 2 % mucosal solution 15 mL (15 mL Swish & Spit Given 7/25/18 0651)   aluminum-magnesium hydroxide-simethicone (MYLANTA) 200-200-20 mg/5 mL oral suspension 30 mL (30 mL Oral Given 7/25/18 0652)   carvedilol (COREG) tablet 25 mg (25 mg Oral Given 7/25/18 0851)   diltiazem (CARDIZEM CD) 24 hr capsule 240 mg (240 mg Oral Given 7/25/18 0818)   sodium chloride 0 9 % bolus 1,000 mL (0 mL Intravenous Stopped 7/25/18 1009)   ondansetron (ZOFRAN) injection 4 mg (4 mg Intravenous Given 7/25/18 0845)   iohexol (OMNIPAQUE) 350 MG/ML injection (MULTI-DOSE) 100 mL (100 mL Intravenous Given 7/25/18 0955)       Diagnostic Studies  Results Reviewed     Procedure Component Value Units Date/Time    Basic metabolic panel [[id number]]     Lab Status:  No result Specimen:  Blood     Basic metabolic panel [[id number]]  (Abnormal) Collected:  07/25/18 0829    Lab Status:  Final result Specimen:  Blood from Arm, Right Updated:  07/25/18 0909     Sodium 115 (L) mmol/L      Potassium 3 8 mmol/L      Chloride 81 (L) mmol/L      CO2 25 mmol/L      Anion Gap 9 mmol/L      BUN 12 mg/dL      Creatinine 0 74 mg/dL      Glucose 162 (H) mg/dL      Calcium 8 5 mg/dL      eGFR 75 ml/min/1 73sq m     Narrative:         National Kidney Disease Education Program recommendations are as follows:  GFR calculation is accurate only with a steady state creatinine  Chronic Kidney disease less than 60 ml/min/1 73 sq  meters  Kidney failure less than 15 ml/min/1 73 sq  meters      Comprehensive metabolic panel [[id number]]  (Abnormal) Collected:  07/25/18 0627    Lab Status:  Final result Specimen:  Blood from Arm, Right Updated:  07/25/18 0801     Sodium 111 (LL) mmol/L      Potassium 4 9 mmol/L      Chloride 80 (L) mmol/L      CO2 22 mmol/L      Anion Gap 9 mmol/L      BUN 13 mg/dL      Creatinine 0 74 mg/dL      Glucose 176 (H) mg/dL      Calcium 8 7 mg/dL      AST 72 (H) U/L      ALT 40 U/L      Alkaline Phosphatase 120 (H) U/L      Total Protein 7 5 g/dL      Albumin 3 7 g/dL      Total Bilirubin 0 88 mg/dL      eGFR 75 ml/min/1 73sq m     Narrative:         National Kidney Disease Education Program recommendations are as follows:  GFR calculation is accurate only with a steady state creatinine  Chronic Kidney disease less than 60 ml/min/1 73 sq  meters  Kidney failure less than 15 ml/min/1 73 sq  meters  Lipase [[id number]]  (Normal) Collected:  07/25/18 0627    Lab Status:  Final result Specimen:  Blood from Arm, Right Updated:  07/25/18 0745     Lipase 104 u/L     CBC and differential [[id number]] Collected:  07/25/18 0627    Lab Status:  Final result Specimen:  Blood from Arm, Right Updated:  07/25/18 0646     WBC 7 66 Thousand/uL      RBC 4 29 Million/uL      Hemoglobin 13 1 g/dL      Hematocrit 36 3 %      MCV 85 fL      MCH 30 5 pg      MCHC 36 1 g/dL      RDW 11 7 %      MPV 11 5 fL      Platelets 854 Thousands/uL      nRBC 0 /100 WBCs      Neutrophils Relative 73 %      Immat GRANS % 1 %      Lymphocytes Relative 18 %      Monocytes Relative 6 %      Eosinophils Relative 1 %      Basophils Relative 1 %      Neutrophils Absolute 5 63 Thousands/µL      Immature Grans Absolute 0 04 Thousand/uL      Lymphocytes Absolute 1 40 Thousands/µL      Monocytes Absolute 0 46 Thousand/µL      Eosinophils Absolute 0 07 Thousand/µL      Basophils Absolute 0 06 Thousands/µL                  CT abdomen pelvis with contrast   Final Result by Eloy Wood MD (07/25 1012)      No acute intra-abdominal abnormality  Workstation performed: [id number]               Procedures  Procedures      Phone Consults  ED Phone Contact    ED Course  ED Course as of Jul 25 1035 Wed Jul 25, 2018   0803 Verified by repeat analysis, but will still recheck BMP  will give 1L NS bolus   Of note, patient was recently started on HCTZ Sodium: (!!) 111   [id number] Sodium: (!) 115   1023 SLIM requesting that I discuss case with nephrology prior to admission  [phone] Discussed case with Nephrology, Dr Dilma Mukherjee  Asked to have the patient on fluid restriction, will repeat another BMP currently  Asked me to relate to slim that he would like another BMP q 4 hours and that he will evaluate the patient shortly  Believes that step-down level 2 admission is appropriate at this time  MDM  Number of Diagnoses or Management Options  Abdominal pain:   Constipation:   Hyponatremia:   Diagnosis management comments: Hyponatremia- seemingly acute in onset  Recently started HCTZ  Non-focal neuro exam  Given 1 L IVF  Abdominal pain likely secondary to constipation- normal CT AP- will treat with fleet enema  Admit to [facility] [place] [facility]    Disposition  Final diagnoses:   Hyponatremia   Abdominal pain   Constipation     Time reflects when diagnosis was documented in both MDM as applicable and the Disposition within this note     Time User Action Codes Description Comment    7/25/2018 10:18 AM aPvel Swanson [E87 1] Hyponatremia     7/25/2018 10:18 AM Pavel Swanson [R10 9] Abdominal pain     7/25/2018 10:18 AM Pavel Johnson Add [K59 00] Constipation       ED Disposition     ED Disposition Condition Comment    Admit  Case was discussed with Dr Yadira Shaikh and the patient's admission status was agreed to be Admission Status: inpatient status to the service of Dr Yadira Shaikh   Follow-up Information    None         Patient's Medications   Discharge Prescriptions    No medications on file     No discharge procedures on file  ED Provider  Attending physically available and evaluated Abbi Oquendo  I managed the patient along with the ED Attending      Electronically Signed by         Loretta Perales DO  07/25/18 4314

## 2018-07-25 NOTE — MEDICAL STUDENT
MEDICAL STUDENT  Inpatient Progress Note for TRAINING ONLY  Not Part of Legal Medical Record       Progress Note Marek Stephens 80 y o  female MRN: [id number]    Unit/Bed#: [facility] 739-66 Encounter: [id number]      Assessment/Plan:  79 y/o M with hyponatremia and constipation    1  Hyponatremia  -etiology unclear at this point  Most likely due to a component of her starting HCTZ recently +/ some possible fluid retention given increased oral intake, some mild LE edema possible bibasilar crackles and abd distension  Would recommend a workup for hyponatremia, including serum osm, urine osm, and urine Na, and to trend her Na+ carefully and repeat BMP at 1500 and reassess at that point    -Agree with gentle IVF at 50 mL/ hour and fluid restriction of 1200mL  -Possible Na+ tablets if sodium still not rising at next BMP  -Careful for overcorrection; unsure if hyponatremia is a chronic or acute issue  Max Na+ today would want to be is 120    -Check PVR due to reports of decreased urination  2  Constipation  -consider starting lactulose  -would also recommend checking PVR due to reports of decreased urination  3  HTN  - BP fluctuating form [phone] systolic   -Since d/c HCTZ, recommend starting hydralazine 25mg BID  Subjective:   Pt is an 79 y/o  F PMHx of DM, HTN, Tinnitus, and Vertigo  She presented to the ED this morning due to constipation x7 days  She says that she has had bouts of constipation on and off for many years, and that this was a particularly bad incident  She has been taking metamucil, mirilax, and GasX, daily, but still has not had a normal BM since last Wednesday  This has been associated with some nausea, vomitting, decreased urination, tenderness of her abdomen, dry cough, tickle in the back of her throat, and tinnitus (which is normal for her)  She denies any headache, blurry vision, trouble swallowing, chest pain, SOB, vomiting, dysuria, or extremity weakness   She states that her primary doctor/PA switched her lisinopril to HCTZ roughly two weeks ago due to developing a dry cough  She also mentioned that over the past week or so she has been consuming more and more water, roughly 8-9 glasses per day  She says that she has had increased urge to urinate, but that for the past week since her constipation started she has not been able to pee much and feels like she is not emptying her bladder completely  Objective:     Vitals: Blood pressure 152/68, pulse (!) 54, temperature 97 9 °F (36 6 °C), temperature source Oral, resp  rate 20, height 5' 2" (1 575 m), weight 102 kg (225 lb), SpO2 99 %, not currently breastfeeding  ,Body mass index is 41 15 kg/m²  Intake/Output Summary (Last 24 hours) at 07/25/18 1244  Last data filed at 07/25/18 1009   Gross per 24 hour   Intake             1000 ml   Output                0 ml   Net             1000 ml   Results for Isamar Perez (MRN [id number]) as of 7/25/2018 12:54   Ref   Range 7/25/2018 11:09   Sodium Latest Ref Range: 136 - 145 mmol/L 115 (L)   Potassium Latest Ref Range: 3 5 - 5 3 mmol/L 3 4 (L)   Chloride Latest Ref Range: 100 - 108 mmol/L 83 (L)   CO2 Latest Ref Range: 21 - 32 mmol/L 26   Anion Gap Latest Ref Range: 4 - 13 mmol/L 6   BUN Latest Ref Range: 5 - 25 mg/dL 10   Creatinine Latest Ref Range: 0 60 - 1 30 mg/dL 0 66   Glucose Latest Ref Range: 65 - 140 mg/dL 140   Calcium Latest Ref Range: 8 3 - 10 1 mg/dL 7 9 (L)       Physical Exam: /68   Pulse (!) 54   Temp 97 9 °F (36 6 °C) (Oral)   Resp 20   Ht 5' 2" (1 575 m)   Wt 102 kg (225 lb)   SpO2 99%   BMI 41 15 kg/m²   General appearance: alert and oriented, in no acute distress  Neck: no adenopathy, no carotid bruit, no JVD and supple, symmetrical, trachea midline  Lungs: crackles  Heart: regular rate and rhythm, S1, S2 normal, no murmur, click, rub or gallop  Abdomen: abnormal findings:  distended and tenderness to palpation of the LUQ  Extremities: edema mild nonpitting edema of LE     Invasive Devices     Peripheral Intravenous Line            Peripheral IV 07/25/18 Right Forearm less than 1 day    Peripheral IV 07/25/18 Right Hand less than 1 day                Lab, Imaging and other studies: I have personally reviewed pertinent reports      VTE Pharmacologic Prophylaxis: Sequential compression device (Venodyne)   VTE Mechanical Prophylaxis: sequential compression device

## 2018-07-25 NOTE — H&P
H&P- Shruthi Frame 1934, 80 y o  female MRN: [id number]    Unit/Bed#: [facility] 509-01 Encounter: [id number]    Primary Care Provider: Reyna Aleman DO   Date and time admitted to hospital: 7/25/2018  6:10 AM        * Hyponatremia   Assessment & Plan    · Suspect secondary to recent HCTZ use + component of excess water intake  · Check serum osmolality, urine sodium and osmolality  Check CXR, TSH  · No neurological symptoms  · Status post 1 L of IV fluid in the emergency room, sodium improved from 01/01 43655 and remains the same on repeat check the  · With placed on IV fluids NS at 50 cc/hour for 4 hours  Placed on 1200 cc fluid restriction  Check a BMP 4 hours from now and then every 4 hours  · Nephrology consulted, please inform Nephrology with repeat BMP checks and follow-up recommendations about further fluids  · Hold HCTZ from here on        Constipation   Assessment & Plan    · Place on Senokot S daily, MiraLax daily, 1 time lactulose now and use p r n  suppository  · Patient's is status post 1 time Fleet enema in the emergency room without relief  · Patient needs to be on a stool softener daily and not just p r n  to avoid constipation from getting worse  · She follows with Gastroenterology as outpatient, but has not seen them recently  · CT abdomen pelvis negative         Type 2 diabetes mellitus without complication, without long-term current use of insulin ([facility] )   Assessment & Plan    Lab Results   Component Value Date    HGBA1C 6 2 (H) 06/05/2018       No results for input(s): POCGLU in the last 72 hours  Blood Sugar Average: Last 72 hrs:       Was taken off metformin 500 Twice a day due to low blood sugars at home    Placed on ISS here and monitor sugars          Essential hypertension   Assessment & Plan    · Outpatient uncontrolled hypertension with hypertensive urgency  · Patient's lisinopril as outpatient was discontinued due to cough and HCTZ was started  · Continue home Coreg 25 Twice a day, Cardizem 240 mg daily  Monitor blood pressure with that today, most recent blood pressure is 152  · Given her history of diabetes, can consider ARB if her blood pressure is still high  Would avoid amlodipine or nifedipine due to mention about slight edema as outpatient          ED d/w nephrology & placed as level 2 SD    History and Physical - [facility]    Patient Information: Jarad Chandler 80 y o  female MRN: [id number]  Unit/Bed#: [facility] 233-18 Encounter: [id number]  Admitting Physician: Tiffanie Wakefield MD  PCP: Ignacio Braga DO  Date of Admission:  07/25/18    Assessment/Plan:    Hospital Problem List:     Principal Problem:    Hyponatremia  Active Problems:    Constipation    Essential hypertension    Type 2 diabetes mellitus without complication, without long-term current use of insulin ([facility])      VTE Prophylaxis: Enoxaparin (Lovenox)  / sequential compression device   Code Status: FC  POLST: There is no POLST form on file for this patient (pre-hospital)    Anticipated Length of Stay:  Patient will be admitted on an Inpatient basis with an anticipated length of stay of  > 2 midnights  Justification for Hospital Stay: hyponatremia    Total Time for Visit, including Counseling / Coordination of Care: 45 minutes  Greater than 50% of this total time spent on direct patient counseling and coordination of care  Chief Complaint:   Abdominal discomfort & constipation    History of Present Illness:    Jarad Chandler is a 80 y o  female who presents due to persistent abdominal discomfort secondary to constipation and inability to move bowels sufficiently at home  Patient does have history of chronic constipation which has worsened in the last several years  She was referred by PCP to Gastroenterology as outpatient who recommended her to use Linzess but that would lead to excessive watery diarrhea hence the patient has not been taking it    Patient currently has been taking Metamucil and added MiraLax and last couple of days  But since last 1 week he has not had a good bowel movement, on and off has a small path coming out, at times she also has watery output but not a good bowel movement  No matter how much she pushes it is unsuccessful  She also has abdominal bloating  In last week or so she has noticed more nausea and decrease in appetite and overall not feeling good  Denies any headache or dizziness or blurring of vision  No speech disturbance no syncopal or confusion  A her PCP had recently taken her off the lisinopril due to cough and that led to high blood pressures hence she was started on HCTZ from 07/11/2018  Since then she thinks that she has been urinating less frequently, she feels like she has to go but does not he  She had baseline also drinks lot of water, but due to decreased urine output she was drinking more to be able to go    Review of Systems:    Review of Systems   Constitutional: Positive for appetite change  Negative for activity change, chills and fever  HENT: Negative for rhinorrhea and sore throat  Respiratory: Negative for cough, shortness of breath and wheezing  Cardiovascular: Negative for chest pain, palpitations and leg swelling  Gastrointestinal: Positive for abdominal distention, constipation and nausea  Negative for blood in stool, diarrhea and vomiting  Genitourinary: Negative for difficulty urinating, dysuria and hematuria  Neurological: Negative for dizziness, seizures, syncope and light-headedness  All other systems reviewed and are negative        Past Medical and Surgical History:     Past Medical History:   Diagnosis Date    Anemia     Anxiety     Diabetes mellitus ([facility]     Diabetic peripheral neuropathy ([facility]     Hyperlipidemia     Hypertension     Thyroid enlarged 2/23/2015    Tinnitus     Vertigo        Past Surgical History:   Procedure Laterality Date    EAR SURGERY Left     KNEE SURGERY         Meds/Allergies:    Prior to Admission medications    Medication Sig Start Date End Date Taking? Authorizing Provider   aspirin 81 MG tablet Take 1 tablet by mouth daily 9/29/17  Yes Historical Provider, MD   carvedilol (COREG) 25 mg tablet TAKE 1 TABLET BY MOUTH TWICE DAILY 6/21/18  Yes Merlyn Weber MD   diltiazem (CARDIZEM CD) 240 mg 24 hr capsule Take 1 capsule (240 mg total) by mouth daily 6/13/18  Yes Stephon Lackey DO   hydrochlorothiazide (HYDRODIURIL) 25 mg tablet Take 1 tablet (25 mg total) by mouth daily 7/11/18  Yes CALVIN Payan   meclizine (ANTIVERT) 12 5 MG tablet Take 1 tablet by mouth 3 (three) times a day as needed for dizziness 10/17/17  Yes Madison Astorga MD   Multiple Vitamin (MULTI-VITAMIN DAILY PO) Take 1 tablet by mouth daily 9/29/17  Yes Historical Provider, MD   Glucos-Chondroit-Hyaluron-MSM (GLUCOSAMINE CHONDROITIN JOINT) TABS Take by mouth    Historical Provider, MD   Probiotic Product (PROBIOTIC DAILY PO) Take by mouth 9/29/17   Historical Provider, MD   simethicone (MYLICON,GAS-X) 063 MG capsule Take 1 capsule (180 mg total) by mouth 3 (three) times a day 7/11/18   Unknown CALVIN Hooper     I have reviewed home medications using allscripts  Allergies:    Allergies   Allergen Reactions    Lisinopril Cough    Statins        Social History:     Marital Status: Single   Occupation: unemployed  Patient Pre-hospital Living Situation: with sister  Patient Pre-hospital Level of Mobility: active  Patient Pre-hospital Diet Restrictions: diabetic  Substance Use History:   History   Alcohol Use No     History   Smoking Status    Never Smoker   Smokeless Tobacco    Never Used     History   Drug Use No       Family History:    non-contributory    Physical Exam:     Vitals:   Blood Pressure: 152/68 (07/25/18 1225)  Pulse: (!) 54 (07/25/18 1225)  Temperature: 97 9 °F (36 6 °C) (07/25/18 1225)  Temp Source: Oral (07/25/18 1225)  Respirations: 20 (07/25/18 1225)  Height: 5' 2" (157 5 cm) (07/25/18 8967)  Weight - Scale: 102 kg (225 lb) (07/25/18 0616)  SpO2: 99 % (07/25/18 1225)    Physical Exam   Constitutional: She is oriented to person, place, and time  She appears well-developed and well-nourished  No distress  HENT:   Head: Normocephalic and atraumatic  Mouth/Throat: Oropharynx is clear and moist    Eyes: Conjunctivae are normal  No scleral icterus  Cardiovascular: Normal rate and regular rhythm  Murmur heard  Pulmonary/Chest: Effort normal and breath sounds normal  No respiratory distress  She has no wheezes  She has no rales  Abdominal: Soft  She exhibits no distension  There is no tenderness  There is no rebound  Musculoskeletal: She exhibits no edema  Neurological: She is alert and oriented to person, place, and time  Skin: She is not diaphoretic  Vitals reviewed  Additional Data:     Lab Results: I have personally reviewed pertinent reports  Results from last 7 days  Lab Units 07/25/18  0627   WBC Thousand/uL 7 66   HEMOGLOBIN g/dL 13 1   HEMATOCRIT % 36 3   PLATELETS Thousands/uL 161   NEUTROS PCT % 73   LYMPHS PCT % 18   MONOS PCT % 6   EOS PCT % 1       Results from last 7 days  Lab Units 07/25/18  1109  07/25/18  0627   SODIUM mmol/L 115*  < > 111*   POTASSIUM mmol/L 3 4*  < > 4 9   CHLORIDE mmol/L 83*  < > 80*   CO2 mmol/L 26  < > 22   BUN mg/dL 10  < > 13   CREATININE mg/dL 0 66  < > 0 74   CALCIUM mg/dL 7 9*  < > 8 7   TOTAL PROTEIN g/dL  --   --  7 5   BILIRUBIN TOTAL mg/dL  --   --  0 88   ALK PHOS U/L  --   --  120*   ALT U/L  --   --  40   AST U/L  --   --  72*   GLUCOSE RANDOM mg/dL 140  < > 176*   < > = values in this interval not displayed  Imaging: I have personally reviewed pertinent reports  Ct Abdomen Pelvis With Contrast    Result Date: 7/25/2018  Narrative: CT ABDOMEN AND PELVIS WITH IV CONTRAST INDICATION:   abdominal pain  COMPARISON:  5/13/2009 TECHNIQUE:  CT examination of the abdomen and pelvis was performed   Axial, sagittal, and coronal 2D reformatted images were created from the source data and submitted for interpretation  Radiation dose length product (DLP) for this visit:  1131 22 mGy-cm   This examination, like all CT scans performed in the [facility], was performed utilizing techniques to minimize radiation dose exposure, including the use of iterative reconstruction and automated exposure control  IV Contrast:  100 mL of iohexol (OMNIPAQUE) Enteric Contrast:  Enteric contrast was not administered  FINDINGS: ABDOMEN LOWER CHEST:  Stable 3 mm right middle lobe pulmonary nodule is noted  LIVER/BILIARY TREE:  Unremarkable  GALLBLADDER:  No calcified gallstones  No pericholecystic inflammatory change  SPLEEN:  Calcified granulomata are noted in the spleen  No suspicious splenic mass  PANCREAS:  Unremarkable  ADRENAL GLANDS:  Unremarkable  KIDNEYS/URETERS:  One or more sharply circumscribed subcentimeter renal hypodensities are noted  These lesions are too small to accurately characterize, but are statistically most likely to represent benign cortical renal cyst(s)  According to the guidelines published in the [facility] Paper of the ACR Incidental Findings Committee (Radiology 2010), no further workup of these lesions is recommended  STOMACH AND BOWEL:  Unremarkable  APPENDIX:  A normal appendix was visualized  ABDOMINOPELVIC CAVITY:  No ascites or free intraperitoneal air  No lymphadenopathy  VESSELS:  Unremarkable for patient's age  PELVIS REPRODUCTIVE ORGANS:  Unremarkable for patient's age  URINARY BLADDER:  Unremarkable  ABDOMINAL WALL/INGUINAL REGIONS:  Unremarkable  OSSEOUS STRUCTURES:  There are age appropriate degenerative changes  No acute fracture or destructive osseous lesion  Impression: No acute intra-abdominal abnormality   Workstation performed: [id number]       EKG, Pathology, and Other Studies Reviewed on Admission:   · EKG: not available    Allscripts / Epic Records Reviewed: Yes     ** Please Note: This note has been constructed using a voice recognition system   **

## 2018-07-25 NOTE — ED ATTENDING ATTESTATION
Kurt Koch MD, saw and evaluated the patient  I have discussed the patient with the resident/non-physician practitioner and agree with the resident's/non-physician practitioner's findings, Plan of Care, and MDM as documented in the resident's/non-physician practitioner's note, except where noted  All available labs and Radiology studies were reviewed  At this point I agree with the current assessment done in the Emergency Department  I have conducted an independent evaluation of this patient a history and physical is as follows: This 66-year-old female presents with a few day history of nausea, decreased appetite, upper abdominal bloating, constipation  Patient states things got worse over the last 24 hours she presents the ER for further evaluation  Patient did recently have hydrochlorothiazide added to her medications and lisinopril stopped  Patient denies any syncope, vomiting, fevers, recent travel  Patient denies any history of intra-abdominal surgeries  On exam patient is well appearing, vital signs demonstrate hypertension however patient is not taking her morning medications  Patient is neurologically intact without confusion  Patient has a soft with minimal upper abdominal tenderness  She is obese  Patient has clear lung sounds, regular heart sounds with a 3/6 systolic murmur  Will check basic blood work, CT scan of abdomen  Blood work is significant for sodium of 111  Will give IV fluids, recheck sodium, admit patient    Critical Care Time  CritCare Time    Procedures

## 2018-07-25 NOTE — CONSULTS
Consultation - Nephrology   Polly Figueroa 80 y o  female MRN: [id number]  Unit/Bed#: [facility] 509-01 Encounter: [id number]    ASSESSMENT AND PLAN:  Patient is a 80-year-old female with significant medical issues of diabetes, hypertension for many years, presented with worsening constipation  She was found to have severe hyponatremia and we are consulted for the same  Severe hyponatremia, acute versus acute on chronic, previous serum sodium in low to mid 130s in 2017  -serum sodium 111 on admission has improved to 115  Status post IV 1 L normal saline in the ER  Now started on IV normal saline 50 mL/hour   -goal serum sodium correction no more than 8 to 10 mEq in 24 hr by tomorrow a m   -hyponatremia could be secondary to subtle hypovolemia, use of HCTZ, significant free water intake (80 to 90 oz total liquid intake daily) in the setting of nausea may cause SIADH as well   -discontinue HCTZ, last dose was yesterday as per patient  -check serum osmolality, urine osmolality, urine sodium, urine chloride  -strict fluid restriction 1 2 L per day  -patient has been having some nausea for last one week  Mental status okay and oriented x3 denies any headache    Mild hypokalemia, serum potassium 3 4 below goal this could be secondary to use of HCTZ as well  -will give potassium chloride 40 mEq once today  -check serum magnesium in a m  Hypertension  -initially hypertensive urgency upon admission, now has improved  Last blood pressure 140/67   -continue to monitor on carvedilol and diltiazem  Holding HCTZ  -avoid low BP  Goal SBP 130s    Constipation, management as per primary team     Discussed with primary team     HISTORY OF PRESENT ILLNESS:  Requesting Physician: Michael Hernandez MD  Reason for Consult:  Hyponatremia    Polly Figueroa is a 80y o  year old female who was admitted to [facility] after presenting with worsening constipation   A renal consultation is requested today for assistance in the management of hyponatremia  Old medical records were reviewed  Patient's previous baseline serum sodium seems to be low to mid 130s  Patient has long-term constipation issues which has been progressively worsening  Due to same she was started on different constipation regimen by her PCP  Due to continued to worsen constipation and failure to improve despite different regimen she presented to the hospital as she was also having abdominal discomfort  She has been having intermittent nausea for last one week although she denies any vomiting  She has been having poor p o  intake  She also tries to drink more water to help with her constipation and she admits to be drinking around 80 to 90 oz of total liquid intake daily  She denies any chest pain or shortness of Breath  She was on lisinopril for hypertension and started having cough when her lisinopril was changed to HCTZ earlier this month by PCP  She took her last dose of HCTZ yesterday  She denies any urinary complaint  She denies any lately worsening leg edema  No previous history of cancer  No recent NSAID use  No chronic pain issues      PAST MEDICAL HISTORY:  Past Medical History:   Diagnosis Date    Anemia     Anxiety     Diabetes mellitus ([address] )     Diabetic peripheral neuropathy (HCC)     Hyperlipidemia     Hypertension     Thyroid enlarged 2/23/2015    Tinnitus     Vertigo        PAST SURGICAL HISTORY:  Past Surgical History:   Procedure Laterality Date    EAR SURGERY Left     KNEE SURGERY         ALLERGIES:  Allergies   Allergen Reactions    Lisinopril Cough    Statins        SOCIAL HISTORY:  History   Alcohol Use No     History   Drug Use No     History   Smoking Status    Never Smoker   Smokeless Tobacco    Never Used       FAMILY HISTORY:  Family History   Problem Relation Age of Onset    Diabetes Mother     Heart disease Mother     Heart disease Father     Stroke Father     Cancer Family MEDICATIONS:    Current Facility-Administered Medications:     aspirin chewable tablet 81 mg, 81 mg, Oral, Daily, Valarie Horne MD    bisacodyl (DULCOLAX) rectal suppository 10 mg, 10 mg, Rectal, Daily PRN, Michaela Wesley MD    carvedilol (COREG) tablet 25 mg, 25 mg, Oral, BID, Michaela Wesley MD    [START ON 7/26/2018] diltiazem (CARDIZEM CD) 24 hr capsule 240 mg, 240 mg, Oral, Daily, Valarie Horne MD    enoxaparin (LOVENOX) subcutaneous injection 40 mg, 40 mg, Subcutaneous, Daily, Valarie Horne MD    insulin lispro (HumaLOG) 100 units/mL subcutaneous injection 1-5 Units, 1-5 Units, Subcutaneous, HS, Valarie Horne MD    insulin lispro (HumaLOG) 100 units/mL subcutaneous injection 1-6 Units, 1-6 Units, Subcutaneous, TID AC **AND** Fingerstick Glucose (POCT), , , TID AC, Valarie Horne MD    lactulose 20 g/30 mL oral solution 20 g, 20 g, Oral, Once, Michaela Wesley MD    meclizine (ANTIVERT) tablet 12 5 mg, 12 5 mg, Oral, TID PRN, Michaela Wesley MD    ondansetron (ZOFRAN) injection 4 mg, 4 mg, Intravenous, Q6H PRN, Valarie Horne MD    polyethylene glycol (MIRALAX) packet 17 g, 17 g, Oral, Daily, Valarie Horne MD    saccharomyces boulardii (FLORASTOR) capsule 250 mg, 250 mg, Oral, BID, Valarie Horne MD    senna-docusate sodium (SENOKOT S) 8 6-50 mg per tablet 1 tablet, 1 tablet, Oral, HS, Valarie Horne MD    sodium chloride 0 9 % infusion, 50 mL/hr, Intravenous, Continuous, Valarie Horne MD    REVIEW OF SYSTEMS:  Complete 10 point review of systems were obtained and discussed in length with the patient  Complete review of systems were negative / unremarkable except mentioned in the HPI section       PHYSICAL EXAM:  Current Weight: Weight - Scale: 102 kg (225 lb)  First Weight: Weight - Scale: 102 kg (225 lb)  Vitals:    07/25/18 1500   BP: 148/67   Pulse: (!) 54   Resp: 20   Temp: 98 1 °F (36 7 °C)   SpO2: 97%       Intake/Output Summary (Last 24 hours) at 07/25/18 1545  Last data filed at 07/25/18 1332   Gross per 24 hour   Intake             1180 ml   Output              700 ml   Net              480 ml       Physical Examination:  General:  Lying in bed, no acute distress  Eyes:  No conjunctival pallor, sclerae anicteric  ENT:  External examination of ears and nose unremarkable  Neck:  Supple, no JVD present  Respiratory:  Bilateral air entry present, no crackles appreciated  CVS:  S1, S2 present  GI:  Soft, nontender, nondistended  CNS:  Active alert oriented x3  Extremities:  No significant pitting edema in lower extremities  Psych:  Conscious, coherent, oriented  Skin:  No new rash in legs    Invasive Devices:      Lab Results:     Results from last 7 days  Lab Units 07/25/18  1109 07/25/18  0829 07/25/18  0627   WBC Thousand/uL  --   --  7 66   HEMOGLOBIN g/dL  --   --  13 1   HEMATOCRIT %  --   --  36 3   PLATELETS Thousands/uL  --   --  161   SODIUM mmol/L 115* 115* 111*   POTASSIUM mmol/L 3 4* 3 8 4 9   CHLORIDE mmol/L 83* 81* 80*   CO2 mmol/L 26 25 22   BUN mg/dL 10 12 13   CREATININE mg/dL 0 66 0 74 0 74   CALCIUM mg/dL 7 9* 8 5 8 7   TOTAL PROTEIN g/dL  --   --  7 5   GLUCOSE RANDOM mg/dL 140 162* 176*       Other Studies:   No results found for this or any previous visit  Results for orders placed during the hospital encounter of 10/17/17   XR chest 2 views    Narrative CHEST     INDICATION:  Dizziness  COMPARISON:  None    VIEWS:  Frontal and lateral projections    IMAGES:  2    FINDINGS:         Cardiomediastinal silhouette appears unremarkable  The lungs are clear  No pneumothorax or pleural effusion  Visualized osseous structures appear within normal limits for the patient's age  Impression No active pulmonary disease  Workstation performed: [id number]       CT scan of abdomen and pelvis with contrast on admission shows likely simple renal cyst, no acute intra-abdominal abnormalities      Portions of the record may have been created with voice recognition software  Occasional wrong word or "sound a like" substitutions may have occurred due to the inherent limitations of voice recognition software  Read the chart carefully and recognize, using context, where substitutions have occurred

## 2018-07-25 NOTE — ASSESSMENT & PLAN NOTE
· Place on Senokot S daily, MiraLax daily, 1 time lactulose now and use p r n  suppository  · Patient's is status post 1 time Fleet enema in the emergency room without relief  · Patient needs to be on a stool softener daily and not just p r n  to avoid constipation from getting worse  · She follows with Gastroenterology as outpatient, but has not seen them recently  · CT abdomen pelvis negative

## 2018-07-26 PROBLEM — R10.9 ABDOMINAL PAIN: Status: ACTIVE | Noted: 2017-08-23

## 2018-07-26 PROBLEM — K59.09 CHRONIC CONSTIPATION: Status: ACTIVE | Noted: 2018-02-19

## 2018-07-26 PROBLEM — E66.09 OBESITY DUE TO EXCESS CALORIES WITHOUT SERIOUS COMORBIDITY WITH BODY MASS INDEX (BMI) IN 99TH PERCENTILE FOR AGE IN PEDIATRIC PATIENT: Status: ACTIVE | Noted: 2017-08-23

## 2018-07-26 LAB
ANION GAP SERPL CALCULATED.3IONS-SCNC: 5 MMOL/L (ref 4–13)
ANION GAP SERPL CALCULATED.3IONS-SCNC: 6 MMOL/L (ref 4–13)
ANION GAP SERPL CALCULATED.3IONS-SCNC: 6 MMOL/L (ref 4–13)
ANION GAP SERPL CALCULATED.3IONS-SCNC: 7 MMOL/L (ref 4–13)
ANION GAP SERPL CALCULATED.3IONS-SCNC: 8 MMOL/L (ref 4–13)
ANION GAP SERPL CALCULATED.3IONS-SCNC: 8 MMOL/L (ref 4–13)
BUN SERPL-MCNC: 14 MG/DL (ref 5–25)
BUN SERPL-MCNC: 15 MG/DL (ref 5–25)
BUN SERPL-MCNC: 16 MG/DL (ref 5–25)
BUN SERPL-MCNC: 16 MG/DL (ref 5–25)
BUN SERPL-MCNC: 21 MG/DL (ref 5–25)
BUN SERPL-MCNC: 22 MG/DL (ref 5–25)
CALCIUM SERPL-MCNC: 8.1 MG/DL (ref 8.3–10.1)
CALCIUM SERPL-MCNC: 8.2 MG/DL (ref 8.3–10.1)
CALCIUM SERPL-MCNC: 8.3 MG/DL (ref 8.3–10.1)
CALCIUM SERPL-MCNC: 8.5 MG/DL (ref 8.3–10.1)
CALCIUM SERPL-MCNC: 8.6 MG/DL (ref 8.3–10.1)
CALCIUM SERPL-MCNC: 8.7 MG/DL (ref 8.3–10.1)
CHLORIDE SERPL-SCNC: 86 MMOL/L (ref 100–108)
CHLORIDE SERPL-SCNC: 86 MMOL/L (ref 100–108)
CHLORIDE SERPL-SCNC: 88 MMOL/L (ref 100–108)
CHLORIDE SERPL-SCNC: 89 MMOL/L (ref 100–108)
CHLORIDE SERPL-SCNC: 89 MMOL/L (ref 100–108)
CHLORIDE SERPL-SCNC: 90 MMOL/L (ref 100–108)
CO2 SERPL-SCNC: 21 MMOL/L (ref 21–32)
CO2 SERPL-SCNC: 23 MMOL/L (ref 21–32)
CO2 SERPL-SCNC: 27 MMOL/L (ref 21–32)
CO2 SERPL-SCNC: 27 MMOL/L (ref 21–32)
CO2 SERPL-SCNC: 28 MMOL/L (ref 21–32)
CO2 SERPL-SCNC: 29 MMOL/L (ref 21–32)
CREAT SERPL-MCNC: 0.92 MG/DL (ref 0.6–1.3)
CREAT SERPL-MCNC: 0.95 MG/DL (ref 0.6–1.3)
CREAT SERPL-MCNC: 0.96 MG/DL (ref 0.6–1.3)
CREAT SERPL-MCNC: 0.97 MG/DL (ref 0.6–1.3)
CREAT SERPL-MCNC: 0.98 MG/DL (ref 0.6–1.3)
CREAT SERPL-MCNC: 1.17 MG/DL (ref 0.6–1.3)
GFR SERPL CREATININE-BSD FRML MDRD: 43 ML/MIN/1.73SQ M
GFR SERPL CREATININE-BSD FRML MDRD: 53 ML/MIN/1.73SQ M
GFR SERPL CREATININE-BSD FRML MDRD: 54 ML/MIN/1.73SQ M
GFR SERPL CREATININE-BSD FRML MDRD: 54 ML/MIN/1.73SQ M
GFR SERPL CREATININE-BSD FRML MDRD: 55 ML/MIN/1.73SQ M
GFR SERPL CREATININE-BSD FRML MDRD: 57 ML/MIN/1.73SQ M
GLUCOSE SERPL-MCNC: 108 MG/DL (ref 65–140)
GLUCOSE SERPL-MCNC: 110 MG/DL (ref 65–140)
GLUCOSE SERPL-MCNC: 112 MG/DL (ref 65–140)
GLUCOSE SERPL-MCNC: 115 MG/DL (ref 65–140)
GLUCOSE SERPL-MCNC: 121 MG/DL (ref 65–140)
GLUCOSE SERPL-MCNC: 123 MG/DL (ref 65–140)
GLUCOSE SERPL-MCNC: 124 MG/DL (ref 65–140)
GLUCOSE SERPL-MCNC: 126 MG/DL (ref 65–140)
GLUCOSE SERPL-MCNC: 133 MG/DL (ref 65–140)
GLUCOSE SERPL-MCNC: 137 MG/DL (ref 65–140)
OSMOLALITY UR: 224 MMOL/KG
POTASSIUM SERPL-SCNC: 4.1 MMOL/L (ref 3.5–5.3)
POTASSIUM SERPL-SCNC: 4.4 MMOL/L (ref 3.5–5.3)
POTASSIUM SERPL-SCNC: 4.6 MMOL/L (ref 3.5–5.3)
POTASSIUM SERPL-SCNC: 4.9 MMOL/L (ref 3.5–5.3)
SODIUM 24H UR-SCNC: 24 MOL/L
SODIUM SERPL-SCNC: 117 MMOL/L (ref 136–145)
SODIUM SERPL-SCNC: 121 MMOL/L (ref 136–145)
SODIUM SERPL-SCNC: 122 MMOL/L (ref 136–145)

## 2018-07-26 PROCEDURE — G8979 MOBILITY GOAL STATUS: HCPCS

## 2018-07-26 PROCEDURE — 82948 REAGENT STRIP/BLOOD GLUCOSE: CPT

## 2018-07-26 PROCEDURE — 80048 BASIC METABOLIC PNL TOTAL CA: CPT | Performed by: HOSPITALIST

## 2018-07-26 PROCEDURE — 99232 SBSQ HOSP IP/OBS MODERATE 35: CPT | Performed by: INTERNAL MEDICINE

## 2018-07-26 PROCEDURE — G8978 MOBILITY CURRENT STATUS: HCPCS

## 2018-07-26 PROCEDURE — 97163 PT EVAL HIGH COMPLEX 45 MIN: CPT

## 2018-07-26 RX ORDER — DOCUSATE SODIUM 100 MG/1
100 CAPSULE, LIQUID FILLED ORAL 2 TIMES DAILY
Status: DISCONTINUED | OUTPATIENT
Start: 2018-07-26 | End: 2018-08-01 | Stop reason: HOSPADM

## 2018-07-26 RX ORDER — POLYETHYLENE GLYCOL 3350 17 G/17G
17 POWDER, FOR SOLUTION ORAL 2 TIMES DAILY
Status: DISCONTINUED | OUTPATIENT
Start: 2018-07-26 | End: 2018-07-28

## 2018-07-26 RX ORDER — AMOXICILLIN 250 MG
2 CAPSULE ORAL
Status: DISCONTINUED | OUTPATIENT
Start: 2018-07-26 | End: 2018-08-01 | Stop reason: HOSPADM

## 2018-07-26 RX ORDER — HYDRALAZINE HYDROCHLORIDE 20 MG/ML
5 INJECTION INTRAMUSCULAR; INTRAVENOUS EVERY 6 HOURS PRN
Status: DISCONTINUED | OUTPATIENT
Start: 2018-07-26 | End: 2018-08-01 | Stop reason: HOSPADM

## 2018-07-26 RX ORDER — LACTULOSE 20 G/30ML
20 SOLUTION ORAL 3 TIMES DAILY PRN
Status: DISCONTINUED | OUTPATIENT
Start: 2018-07-26 | End: 2018-07-30

## 2018-07-26 RX ORDER — MAGNESIUM HYDROXIDE/ALUMINUM HYDROXICE/SIMETHICONE 120; 1200; 1200 MG/30ML; MG/30ML; MG/30ML
15 SUSPENSION ORAL EVERY 4 HOURS PRN
Status: DISCONTINUED | OUTPATIENT
Start: 2018-07-26 | End: 2018-08-01 | Stop reason: HOSPADM

## 2018-07-26 RX ORDER — SODIUM CHLORIDE 450 MG/100ML
30 INJECTION, SOLUTION INTRAVENOUS CONTINUOUS
Status: DISCONTINUED | OUTPATIENT
Start: 2018-07-26 | End: 2018-07-26

## 2018-07-26 RX ADMIN — ASPIRIN 81 MG 81 MG: 81 TABLET ORAL at 08:50

## 2018-07-26 RX ADMIN — Medication 250 MG: at 08:50

## 2018-07-26 RX ADMIN — CARVEDILOL 25 MG: 25 TABLET, FILM COATED ORAL at 08:50

## 2018-07-26 RX ADMIN — POLYETHYLENE GLYCOL 3350 17 G: 17 POWDER, FOR SOLUTION ORAL at 17:43

## 2018-07-26 RX ADMIN — ENOXAPARIN SODIUM 40 MG: 40 INJECTION SUBCUTANEOUS at 08:50

## 2018-07-26 RX ADMIN — Medication 250 MG: at 17:42

## 2018-07-26 RX ADMIN — LACTULOSE 20 G: 20 SOLUTION ORAL at 17:47

## 2018-07-26 RX ADMIN — ALUMINUM HYDROXIDE, MAGNESIUM HYDROXIDE, AND SIMETHICONE 15 ML: 200; 200; 20 SUSPENSION ORAL at 23:14

## 2018-07-26 RX ADMIN — CARVEDILOL 25 MG: 25 TABLET, FILM COATED ORAL at 17:42

## 2018-07-26 RX ADMIN — BISACODYL 10 MG: 10 SUPPOSITORY RECTAL at 17:47

## 2018-07-26 RX ADMIN — SODIUM CHLORIDE 30 ML/HR: 0.45 INJECTION, SOLUTION INTRAVENOUS at 00:53

## 2018-07-26 RX ADMIN — POLYETHYLENE GLYCOL 3350 17 G: 17 POWDER, FOR SOLUTION ORAL at 08:50

## 2018-07-26 RX ADMIN — HYDRALAZINE HYDROCHLORIDE 5 MG: 20 INJECTION INTRAMUSCULAR; INTRAVENOUS at 20:12

## 2018-07-26 RX ADMIN — DOCUSATE SODIUM 100 MG: 100 CAPSULE, LIQUID FILLED ORAL at 17:42

## 2018-07-26 RX ADMIN — DOCUSATE SODIUM 100 MG: 100 CAPSULE, LIQUID FILLED ORAL at 08:50

## 2018-07-26 RX ADMIN — DILTIAZEM HYDROCHLORIDE 240 MG: 120 CAPSULE, COATED, EXTENDED RELEASE ORAL at 08:50

## 2018-07-26 NOTE — ASSESSMENT & PLAN NOTE
· To be continued on Coreg and Cardizem, discontinue hydrochlorothiazide, do not resume upon discharge secondary to severe hyponatremia  · Blood pressure acceptable, continue to monitor

## 2018-07-26 NOTE — PHYSICAL THERAPY NOTE
Physical Therapy Evaluation     Patient's Name: Yoly Boucher    Admitting Diagnosis  Hyponatremia [E87 1]  Abdominal pain [R10 9]  Constipation [K59 00]    Problem List  Patient Active Problem List   Diagnosis    [facility] ([facility] [address] [facility]    Essential hypertension    Thyroid enlarged    Type 2 diabetes mellitus without complication, without long-term current use of insulin (HCC)    Chronic constipation    Screening for breast cancer    Abdominal bloating    Hyponatremia    Abdominal pain    Anxiety    Chronic low back pain without sciatica    Hyperlipidemia    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       Past Medical History  Past Medical History:   Diagnosis Date    Anemia     Anxiety     Diabetes mellitus ([facility] [address] [facility]     Diabetic peripheral neuropathy ([facility] [address] [facility]     Hyperlipidemia     Hypertension     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 8/23/2017    Thyroid enlarged 2/23/2015    Tinnitus     Vertigo        Past Surgical History  Past Surgical History:   Procedure Laterality Date    EAR SURGERY Left     KNEE SURGERY        07/26/18 1430   Note Type   Note type Eval only   Pain Assessment   Pain Assessment No/denies pain   Pain Score No Pain   Home Living   Type of Home Apartment   Home Layout One level  (1 MARTELL)   Home Equipment Walker;Cane   Prior Function   Level of [place] Independent with ADLs and functional mobility   Lives With Family  (sister)   ADL Assistance Independent   IADLs Needs assistance   Falls in the last 6 months 0   Comments Patient lives w/ her sister in a 1 story apartment w/ 1 MARTELL  PTA, patient was independent in [facility]'s   Patient is currently driving and is physicall active in [employer] program     Restrictions/Precautions   Weight Bearing Precautions Per Order No Other Precautions Chair Alarm; Impulsive;Multiple lines;Telemetry; Fall Risk;Hard of hearing   General   Family/Caregiver Present Yes  (sister and niece)   Cognition   Overall Cognitive Status WFL   Arousal/Participation Cooperative   Orientation Level Oriented X4   Memory Unable to assess   Following Commands Follows one step commands without difficulty   Comments Patient was pleasant and agreeable to therapy session  RLE Assessment   RLE Assessment (strength grossly assessed w/ mobility 4/5)   LLE Assessment   LLE Assessment (strength grossly assessed w/ mobility 4/5)   Coordination   Movements are Fluid and Coordinated 0   Coordination and Movement Description impulsive and quick movement; decreased safety awareness   Sensation WFL   Light Touch   RLE Light Touch Grossly intact   LLE Light Touch Grossly intact   Transfers   Sit to Stand 5  Supervision   Additional items Impulsive   Stand to Sit 5  Supervision   Additional items Armrests; Impulsive   Ambulation/Elevation   Gait pattern (increased speed w/ decreased safety awareness)   Gait Assistance 4  Minimal assist   Additional items Assist x 1;Verbal cues   Assistive Device Rolling walker   Distance 200'; w/ min A x1 and RW; patient impulsive & requires verbal cues for proper RW use and for safe mobility   Balance   Static Sitting Good   Dynamic Sitting Fair +   Static Standing Fair   Dynamic Standing Fair   Ambulatory Fair -   Endurance Deficit   Endurance Deficit No   Activity Tolerance   Activity Tolerance Patient tolerated treatment well   Nurse Made Aware Yes, Valery   Assessment   Prognosis Good   Problem List Impaired balance;Decreased mobility; Decreased coordination;Decreased safety awareness;Decreased strength   Assessment Patient seen today for a high complexity physical therapy evaluation   Patient is an 80year old female with pmh including HTN, anemia, DM, diabetic neuropathy, HLD, and vertigo presenting [facility] on 7/25/18 with abdominal pain, nausea and constipation  Dx includes hyponatremia  Patient lives w/ her sister in a 1 SH w/ 1 MARTELL  PTA, patient was independent in ADL's  Patient does not use an AD at baseline and is currently driving  Patient is an active participant in the [employer] program  Physical therapy was consulted to assess mobility and discharge recommendation  Upon arrival, patient denied pain and was agreeable to therapy session  Patient performed sit to stand transfer w/ supervision however, was impulsive and required verbal cues for safe transfers  Patient demonstrated the ability to ambulate 200' w/ RW and min A x1  Patient presents w/ an unsteady, impulsive gait pattern w/ decreased safety awareness and decreased safety w/ RW  Overall, patient presents w/ decreased BLE strength, decreased static/dynamic balance, decreased safety awareness and impaired functional mobility  Due to unstable medical status, decline in functional mobility and increased fall risk, patient will benefit from skilled physical therapy to address listed impairments  Recommend discharge to home w/ family support and home PT when medically stable  Patient to continue w/ RW use and family states they will investigate modifications for personal RW  Barriers to Discharge None   Goals   Patient Goals to go home   STG Expiration Date 08/09/18   Short Term Goal #1 1-2 wk: (1) perform bed mobility and transfers w/ independence (2) improve BLE strength by 1/2-1 grade (3) improve static/dynamic balance to good (4) ambulate 200-250' w/ RW and independence (5) ascend/descend 1 step w/ independence to enter home (6) participate in all therapeutic exercise addressing BLE strength, gait and equipment training, static/dynamic balance, stair training and safe/functional mobility   Treatment Day 0   Plan   Treatment/Interventions Functional transfer training;LE strengthening/ROM; Elevations; Therapeutic exercise; Endurance training;Patient/family training;Gait training; Bed mobility; Equipment eval/education   PT Frequency Other (Comment)  (3-5x/wk)   Recommendation   Recommendation Home with family support;Home PT   Equipment Recommended Walker  (RW)   PT - OK to Discharge Yes  (to home w/ family support and home PT when stable)   Additional Comments Patient left in chair w/ chair alarm on and call bell in lap      Modified Chano Scale   Modified [place] Scale 3   Barthel Index   Feeding 10   Bathing 5   Grooming Score 5   Dressing Score 10   Bladder Score 10   Bowels Score 10   Toilet Use Score 5   Transfers (Bed/Chair) Score 10   Mobility (Level Surface) Score 10   Stairs Score 0   Barthel Index Score 75           Destinee Joseph, SPT

## 2018-07-26 NOTE — PHYSICAL THERAPY NOTE
Physical Therapy Evaluation     Patient's Name: Yeni Briggs    Admitting Diagnosis  Hyponatremia [E87 1]  Abdominal pain [R10 9]  Constipation [K59 00]    Problem List  Patient Active Problem List   Diagnosis    [facility] [address] [facility]    Essential hypertension    Thyroid enlarged    Type 2 diabetes mellitus without complication, without long-term current use of insulin (HCC)    Chronic constipation    Screening for breast cancer    Abdominal bloating    Hyponatremia    Abdominal pain    Anxiety    Chronic low back pain without sciatica    Hyperlipidemia    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       Past Medical History  Past Medical History:   Diagnosis Date    Anemia     Anxiety     Diabetes mellitus ([facility] [address] [facility]     Diabetic peripheral neuropathy ([facility] [address] [facility]     Hyperlipidemia     Hypertension     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 8/23/2017    Thyroid enlarged 2/23/2015    Tinnitus     Vertigo        Past Surgical History  Past Surgical History:   Procedure Laterality Date    EAR SURGERY Left     KNEE SURGERY        07/26/18 1430   Note Type   Note type Eval only   Pain Assessment   Pain Assessment No/denies pain   Pain Score No Pain   Home Living   Type of Home Apartment   Home Layout One level  (1 MARTELL)   Home Equipment Walker;Cane   Prior Function   Level of [place] Independent with ADLs and functional mobility   Lives With Family  (sister)   ADL Assistance Independent   IADLs Needs assistance   Falls in the last 6 months 0   Comments Patient lives w/ her sister in a 1 story apartment w/ 1 MARTELL  PTA, patient was independent in [facility]'s   Patient is currently driving and is physicall active in [employer] program     Restrictions/Precautions   Weight Bearing Precautions Per Order No Other Precautions Chair Alarm; Impulsive;Multiple lines;Telemetry; Fall Risk;Hard of hearing   General   Family/Caregiver Present Yes  (sister and niece)   Cognition   Overall Cognitive Status WFL   Arousal/Participation Cooperative   Orientation Level Oriented X4   Memory Unable to assess   Following Commands Follows one step commands without difficulty   Comments Patient was pleasant and agreeable to therapy session  RLE Assessment   RLE Assessment (strength grossly assessed w/ mobility 4/5)   LLE Assessment   LLE Assessment (strength grossly assessed w/ mobility 4/5)   Coordination   Movements are Fluid and Coordinated 0   Coordination and Movement Description impulsive and quick movement; decreased safety awareness   Sensation WFL   Light Touch   RLE Light Touch Grossly intact   LLE Light Touch Grossly intact   Transfers   Sit to Stand 5  Supervision   Additional items Impulsive   Stand to Sit 5  Supervision   Additional items Armrests; Impulsive   Ambulation/Elevation   Gait pattern (increased speed w/ decreased safety awareness)   Gait Assistance 4  Minimal assist   Additional items Assist x 1;Verbal cues   Assistive Device Rolling walker   Distance 200'; w/ min A x1 and RW; patient impulsive & requires verbal cues for proper RW use and for safe mobility   Balance   Static Sitting Good   Dynamic Sitting Fair +   Static Standing Fair   Dynamic Standing Fair   Ambulatory Fair -   Endurance Deficit   Endurance Deficit No   Activity Tolerance   Activity Tolerance Patient tolerated treatment well   Nurse Made Aware Yes, Valery   Assessment   Prognosis Good   Problem List Impaired balance;Decreased mobility; Decreased coordination;Decreased safety awareness;Decreased strength   Assessment Patient seen today for a high complexity physical therapy evaluation   Patient is an 80year old female with pmh including HTN, anemia, DM, diabetic neuropathy, HLD, and vertigo presenting to [facility] on 7/25/18 with abdominal pain, nausea and constipation  Dx includes hyponatremia  Patient lives w/ her sister in a 1 SH w/ 1 MARTELL  PTA, patient was independent in ADL's  Patient does not use an AD at baseline and is currently driving  Patient is an active participant in the [employer] program  Physical therapy was consulted to assess mobility and discharge recommendation  Upon arrival, patient denied pain and was agreeable to therapy session  Patient performed sit to stand transfer w/ supervision however, was impulsive and required verbal cues for safe transfers  Patient demonstrated the ability to ambulate 200' w/ RW and min A x1  Patient presents w/ an unsteady, impulsive gait pattern w/ decreased safety awareness and decreased safety w/ RW  Overall, patient presents w/ decreased BLE strength, decreased static/dynamic balance, decreased safety awareness and impaired functional mobility  Due to unstable medical status, decline in functional mobility and increased fall risk, patient will benefit from skilled physical therapy to address listed impairments  Recommend discharge to home w/ family support and home PT when medically stable  Patient to continue w/ RW use and family states they will investigate modifications for personal RW  Barriers to Discharge None   Goals   Patient Goals to go home   STG Expiration Date 08/09/18   Short Term Goal #1 1-2 wk: (1) perform bed mobility and transfers w/ independence (2) improve BLE strength by 1/2-1 grade (3) improve static/dynamic balance to good (4) ambulate 200-250' w/ RW and independence (5) ascend/descend 1 flight of stairs w/ independence (6) participate in all therapeutic exercise addressing BLE strength, gait and equipment training, static/dynamic balance, stair training and safe/functional mobility   Treatment Day 0   Plan   Treatment/Interventions Functional transfer training;LE strengthening/ROM; Elevations; Therapeutic exercise; Endurance training;Patient/family training;Gait training; Bed mobility; Equipment eval/education   PT Frequency Other (Comment)  (3-5x/wk)   Recommendation   Recommendation Home with family support;Home PT   Equipment Recommended Walker  (RW)   PT - OK to Discharge Yes  (to home w/ family support and home PT when stable)   Additional Comments Patient left in chair w/ chair alarm on and call bell in lap      Modified Chano Scale   Modified [place] Scale 3   Barthel Index   Feeding 10   Bathing 5   Grooming Score 5   Dressing Score 10   Bladder Score 10   Bowels Score 10   Toilet Use Score 5   Transfers (Bed/Chair) Score 10   Mobility (Level Surface) Score 10   Stairs Score 0   Barthel Index Score 75       Destinee Joseph, SPT

## 2018-07-26 NOTE — PROGRESS NOTES
NEPHROLOGY PROGRESS NOTE   Rajiv Mariscal 80 y o  female MRN: [id number]  Unit/Bed#: [facility] 509-01 Encounter: [id number]  Reason for Consult: Hyponatremia    ASSESSMENT AND PLAN:  Patient is a 44-year-old female with significant medical issues of diabetes, hypertension for many years, presented with worsening constipation  She was found to have severe hyponatremia and we are consulted for the same      Severe hyponatremia, acute versus acute on chronic, previous serum sodium in low to mid 130s in 2017  -serum sodium 111 on admission has improved to 121 in less than 24 hr when patient was started on half normal saline with decrease in serum sodium to 117 at the end of 24 hr    -now remains off IV fluid  -repeat BMP results to follow  Will make further recommendations based on serum sodium  If does not improve further, consider IV normal saline gentle trial   Continue BMP Q four hourly for now  Goal serum sodium 125 to 127 by tomorrow a m    -patient's nausea has much improved  She remains asymptomatic  -hyponatremia could be secondary to subtle hypovolemia, use of HCTZ, significant free water intake (80 to 90 oz total liquid intake daily) in the setting of nausea may cause SIADH as well   -discontinued HCTZ  -serum osmolality 252, urine chloride 21, urine sodium and urine osmolality pending    -strict fluid restriction 1 2 L per day     Mild hypokalemia, resolved  Serum potassium  -Mg in AM       Hypertension  -blood pressure overall acceptable and somewhat fluctuating    -continue to monitor on carvedilol and diltiazem  Holding HCTZ  -avoid low BP  Goal SBP 130s     Constipation, management as per primary team      Discussed with primary team     SUBJECTIVE:  Patient seen and examined at bedside  No chest pain, shortness of breath, nausea, vomiting, abdominal pain or diarrhea  No urinary complaints       OBJECTIVE:  Current Weight: Weight - Scale: 102 kg (225 lb)  Vitals:    07/26/18 1125   BP: 146/66   Pulse: (!) 51   Resp: 18   Temp: 98 3 °F (36 8 °C)   SpO2: 99%       Intake/Output Summary (Last 24 hours) at 07/26/18 1241  Last data filed at 07/26/18 1229   Gross per 24 hour   Intake           1100 5 ml   Output             2800 ml   Net          -1699 5 ml     Physical Examination:  General:  Sitting in chair, no acute distress   Eyes:  No conjunctival pallor, sclerae anicteric  ENT:  External examination of ears and nose unremarkable  Neck:  Supple  Respiratory:  Bilateral air entry, no crackles appreciated  CVS:  S1, S2 present  GI:  Soft, nontender, nondistended  CNS:  Active alert oriented x3  Extremities:  No significant pitting edema in lower extremities  Skin:  No new rash in legs    Medications:    Current Facility-Administered Medications:     aspirin chewable tablet 81 mg, 81 mg, Oral, Daily, Lu Espinoza MD, 81 mg at 07/26/18 0850    bisacodyl (DULCOLAX) rectal suppository 10 mg, 10 mg, Rectal, Daily PRN, Lu Espinoza MD    carvedilol (COREG) tablet 25 mg, 25 mg, Oral, BID, Valarie Horne MD, 25 mg at 07/26/18 0850    diltiazem (CARDIZEM CD) 24 hr capsule 240 mg, 240 mg, Oral, Daily, Lu Espinoza MD, 240 mg at 07/26/18 0850    docusate sodium (COLACE) capsule 100 mg, 100 mg, Oral, BID, Jules Chapman MD, 100 mg at 07/26/18 0850    enoxaparin (LOVENOX) subcutaneous injection 40 mg, 40 mg, Subcutaneous, Daily, Valarie Horne MD, 40 mg at 07/26/18 0850    insulin lispro (HumaLOG) 100 units/mL subcutaneous injection 1-5 Units, 1-5 Units, Subcutaneous, HS, Lu Espinoza MD, 1 Units at 07/25/18 2204    insulin lispro (HumaLOG) 100 units/mL subcutaneous injection 1-6 Units, 1-6 Units, Subcutaneous, TID AC, 1 Units at 07/25/18 1818 **AND** Fingerstick Glucose (POCT), , , TID AC, Valarie Horne MD    lactulose 20 g/30 mL oral solution 20 g, 20 g, Oral, TID PRN, Jules Chapman MD    meclizine (ANTIVERT) tablet 12 5 mg, 12 5 mg, Oral, TID PRN, MD Sanchez Contreras ondansetron (ZOFRAN) injection 4 mg, 4 mg, Intravenous, Q6H PRN, Graciela Maurer MD    polyethylene glycol (MIRALAX) packet 17 g, 17 g, Oral, BID, Balbir Sesay MD, 17 g at 07/26/18 0850    saccharomyces boulardii (FLORASTOR) capsule 250 mg, 250 mg, Oral, BID, Graciela Maurer MD, 250 mg at 07/26/18 0850    senna-docusate sodium (SENOKOT S) 8 6-50 mg per tablet 2 tablet, 2 tablet, Oral, HS, Balbir Sesay MD    Laboratory Results:    Results from last 7 days  Lab Units 07/26/18  0806 07/26/18  0420 07/26/18  0010 07/25/18  2143 07/25/18  1736 07/25/18  1109 07/25/18  0829 07/25/18  0627   WBC Thousand/uL  --   --   --   --   --   --   --  7 66   HEMOGLOBIN g/dL  --   --   --   --   --   --   --  13 1   HEMATOCRIT %  --   --   --   --   --   --   --  36 3   PLATELETS Thousands/uL  --   --   --   --   --   --   --  161   SODIUM mmol/L 117* 121* 121* 118* 117* 115* 115* 111*   POTASSIUM mmol/L 4 9 4 4 4 1 3 6 3 4* 3 4* 3 8 4 9   CHLORIDE mmol/L 89* 88* 86* 86* 84* 83* 81* 80*   CO2 mmol/L 21 27 27 27 25 26 25 22   BUN mg/dL 14 15 16 14 12 10 12 13   CREATININE mg/dL 0 92 0 95 0 96 0 97 0 87 0 66 0 74 0 74   CALCIUM mg/dL 8 3 8 5 8 1* 8 2* 8 4 7 9* 8 5 8 7   TOTAL PROTEIN g/dL  --   --   --   --   --   --   --  7 5   GLUCOSE RANDOM mg/dL 133 115 108 107 150* 140 162* 176*       No results found for this or any previous visit  Results for orders placed during the hospital encounter of 07/25/18   XR chest pa & lateral    Narrative CHEST     INDICATION:, Evaluate for neoplasm    COMPARISON:  October 17, 2017    EXAM PERFORMED/VIEWS:  XR CHEST PA & LATERAL      FINDINGS:    Cardiomegaly seen    No acute consolidation or congestion seen  Calcification seen in the left upper lung suggest granuloma  This is unchanged from the previous study of October 17, 2017  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    Scoliosis with convexity to the right side seen      Impression Cardiomegaly  No acute consolidation or congestion  Left upper lobe lung nodule suggest granuloma, stable since the previous study of October 17, 2017    Workstation performed: [id number]       No results found for this or any previous visit  No results found for this or any previous visit  No results found for this or any previous visit  No results found for this or any previous visit  Portions of the record may have been created with voice recognition software  Occasional wrong word or "sound a like" substitutions may have occurred due to the inherent limitations of voice recognition software  Read the chart carefully and recognize, using context, where substitutions have occurred

## 2018-07-26 NOTE — CASE MANAGEMENT
Initial Clinical Review    Admission: Date/Time/Statement: 7/25/18 @ 1033 INPATIENT    Orders Placed This Encounter   Procedures    Inpatient Admission     Standing Status:   Standing     Number of Occurrences:   1     Order Specific Question:   Admitting Physician     Answer:   Matthew Oliveira [[id number]]     Order Specific Question:   Level of Care     Answer:   Level 2 Stepdown / HOT [14]     Order Specific Question:   Estimated length of stay     Answer:   More than 2 Midnights     Order Specific Question:   Certification     Answer:   I certify that inpatient services are medically necessary for this patient for a duration of greater than two midnights  See H&P and MD Progress Notes for additional information about the patient's course of treatment  ED: Date/Time/Mode of Arrival:   ED Arrival Information     Expected Arrival Acuity Means of Arrival Escorted By Service Admission Type    - 7/25/2018 06:07 Urgent Wheelchair Self General Medicine Urgent    Arrival Complaint    Abdominal Pain          Chief Complaint:   Chief Complaint   Patient presents with    Nausea     Pt states that she feels like she has to throw up but can't  Pt states she is bloated   Constipation     Pt reports she has not had a proper bowl movement in a week  History of Illness:     Polly Figueroa is a 80 y o  female who presents due to persistent abdominal discomfort secondary to constipation and inability to move bowels sufficiently at home  Patient does have history of chronic constipation which has worsened in the last several years  Patient currently has been taking Metamucil and added MiraLax and last couple of days  She also has abdominal bloating  In last week or so she has noticed more nausea and decrease in appetite and overall not feeling good  Her PCP had recently taken her off the lisinopril due to cough and that led to high blood pressures hence she was started on HCTZ from 07/11/2018    Since then she thinks that she has been urinating less frequently  She at baseline also drinks lot of water, but due to decreased urine output she was drinking more to be able to go        ED Vital Signs:   ED Triage Vitals   Temperature Pulse Respirations Blood Pressure SpO2   07/25/18 0622 07/25/18 0616 07/25/18 0622 07/25/18 0616 07/25/18 0616   (!) 97 4 °F (36 3 °C) 61 20 (!) 223/89 97 %   No Pain        Wt Readings from Last 1 Encounters:   07/25/18 102 kg (225 lb)       Vital Signs (abnormal):     Date/Time      Temp      Pulse      Resp         BP  SpO2       07/25/18 1030  --  56   24   171/74  99 %   07/25/18 0930  --  58  20  170/72  97 %   07/25/18 0851  --  56  --   182/74  --   07/25/18 0739  --  55  20   183/77  95 %   07/25/18 0630  --  58  20   200/83  98 %     Abnormal Labs/Diagnostic Test Results:     Sodium = 111, repeat in 2 hours = 115, repeat in 5 hours = 115    Serum osmolality = 252    Glucose = 176, AST = 72, Alk Phos = 120    CT abdomen and pelvis: no acute intra-abdominal abnormality  Chest x-ray: no acute consolidation or congestion      ED Treatment:   Medication Administration from 07/25/2018 0607 to 07/25/2018 1219       Date/Time Order Dose Route Action     07/25/2018 0647 ondansetron (ZOFRAN) injection 4 mg 4 mg Intravenous Given     07/25/2018 0651 lidocaine viscous (XYLOCAINE) 2 % mucosal solution 15 mL 15 mL Swish & Spit Given     07/25/2018 0652 aluminum-magnesium hydroxide-simethicone (MYLANTA) 200-200-20 mg/5 mL oral suspension 30 mL 30 mL Oral Given     07/25/2018 0851 carvedilol (COREG) tablet 25 mg 25 mg Oral Given     07/25/2018 0818 diltiazem (CARDIZEM CD) 24 hr capsule 240 mg 240 mg Oral Given     07/25/2018 0828 sodium chloride 0 9 % bolus 1,000 mL 1,000 mL Intravenous New Bag     07/25/2018 0845 ondansetron (ZOFRAN) injection 4 mg 4 mg Intravenous Given     07/25/2018 0955 iohexol (OMNIPAQUE) 350 MG/ML injection (MULTI-DOSE) 100 mL 100 mL Intravenous Given     07/25/2018 1106 sodium phosphate-biphosphate (FLEET) enema 1 enema 1 enema Rectal Given     07/25/2018 1215 sodium chloride 0 9 % infusion 50 mL/hr Intravenous New Bag          Past Medical/Surgical History: Active Ambulatory Problems     Diagnosis Date Noted    Diabetic peripheral neuropathy ([facility] ) 08/08/2014    Essential hypertension 08/08/2014    Thyroid enlarged 02/23/2015    Type 2 diabetes mellitus without complication, without long-term current use of insulin ([facility] ) 08/08/2014    Constipation 02/19/2018    Screening for breast cancer 06/14/2018    Abdominal bloating 09/15/2017     Past Medical History:   Diagnosis Date    Anemia     Anxiety     Diabetes mellitus ([facility] )     Diabetic peripheral neuropathy ([facility])     Hyperlipidemia     Hypertension     Thyroid enlarged 2/23/2015    Tinnitus     Vertigo        Admitting Diagnosis: Hyponatremia [E87 1]  Abdominal pain [R10 9]  Constipation [K59 00]    Age/Sex: 80 y o  female    Assessment/Plan:          * Hyponatremia   Assessment & Plan     · Suspect secondary to recent HCTZ use + component of excess water intake  · Check serum osmolality, urine sodium and osmolality  Check CXR, TSH  · No neurological symptoms  · Status post 1 L of IV fluid in the emergency room, sodium improved from 111  To 115 and remains the same on repeat check the  · With placed on IV fluids NS at 50 cc/hour for 4 hours  Placed on 1200 cc fluid restriction   Check a BMP 4 hours from now and then every 4 hours  · Nephrology consulted, please inform Nephrology with repeat BMP checks and follow-up recommendations about further fluids  · Hold HCTZ from here on          Constipation   Assessment & Plan     · Place on Senokot S daily, MiraLax daily, 1 time lactulose now and use p r n  suppository  · Patient's is status post 1 time Fleet enema in the emergency room without relief  · Patient needs to be on a stool softener daily and not just p r n  to avoid constipation from getting worse  · She follows with Gastroenterology as outpatient, but has not seen them recently  · CT abdomen pelvis negative           Type 2 diabetes mellitus without complication, without long-term current use of insulin ([facility])   Assessment & Plan             Lab Results   Component Value Date     HGBA1C 6 2 (H) 06/05/2018         Blood Sugar Average: Last 72 hrs:        Was taken off metformin 500 Twice a day due to low blood sugars at home  Placed on ISS here and monitor sugars            Essential hypertension   Assessment & Plan     · Outpatient uncontrolled hypertension with hypertensive urgency  · Patient's lisinopril as outpatient was discontinued due to cough and HCTZ was started  · Continue home Coreg 25 Twice a day, Cardizem 240 mg daily  Monitor blood pressure with that today, most recent blood pressure is 152  · Given her history of diabetes, can consider ARB if her blood pressure is still high    Would avoid amlodipine or nifedipine due to mention about slight edema as outpatient            Admission Orders:  Nephrology consult, BMP Q4H, telemetry monitoring, I&O, sequential compression device, PT eval and treat, fluid restriction 1200 ml daily    Scheduled Meds:   Current Facility-Administered Medications:  aspirin 81 mg Oral Daily   bisacodyl 10 mg Rectal Daily PRN   carvedilol 25 mg Oral BID   diltiazem 240 mg Oral Daily   docusate sodium 100 mg Oral BID   enoxaparin 40 mg Subcutaneous Daily   insulin lispro 1-5 Units Subcutaneous HS   insulin lispro 1-6 Units Subcutaneous TID AC   lactulose 20 g Oral TID PRN   meclizine 12 5 mg Oral TID PRN   ondansetron 4 mg Intravenous Q6H PRN   polyethylene glycol 17 g Oral BID   saccharomyces boulardii 250 mg Oral BID   senna-docusate sodium 2 tablet Oral HS     Continuous Infusions:      Sodium chloride 0 9% @ 50 ml/hour                      Intravenous    =======================================================================7/25 Nephrology Consult    Patient is a 80-year-old female with significant medical issues of diabetes, hypertension for many years, presented with worsening constipation  She was found to have severe hyponatremia and we are consulted for the same      Severe hyponatremia, acute versus acute on chronic, previous serum sodium in low to mid 130s in 2017  -serum sodium 111 on admission has improved to 115  Status post IV 1 L normal saline in the ER  Now started on IV normal saline 50 mL/hour   -goal serum sodium correction no more than 8 to 10 mEq in 24 hr by tomorrow a m   -hyponatremia could be secondary to subtle hypovolemia, use of HCTZ, significant free water intake (80 to 90 oz total liquid intake daily) in the setting of nausea may cause SIADH as well   -discontinue HCTZ, last dose was yesterday as per patient  -check serum osmolality, urine osmolality, urine sodium, urine chloride  -strict fluid restriction 1 2 L per day  -patient has been having some nausea for last one week  Mental status okay and oriented x3 denies any headache     Mild hypokalemia, serum potassium 3 4 below goal this could be secondary to use of HCTZ as well  -will give potassium chloride 40 mEq once today  -check serum magnesium in a m      Hypertension  -initially hypertensive urgency upon admission, now has improved  Last blood pressure 140/67   -continue to monitor on carvedilol and diltiazem  Holding HCTZ  -avoid low BP  Goal SBP 130s          Thank you,  Chinyere Aqq  [facility] Utilization Review Department  Phone: [phone]; Fax [phone]  ATTENTION: The Network Utilization Review Department is now centralized for our 9 Facilities  Make a note that we have a new phone and fax numbers for our Department  Please call with any questions or concerns to [phone] and carefully follow the prompts so that you are directed to the right person   All voicemails are confidential  Fax any determinations, approvals, denials, and requests for initial or continue stay review clinical to [phone]  Due to HIGH CALL volume, it would be easier if you could please send faxed requests to expedite your requests and in part, help us provide discharge notifications faster

## 2018-07-26 NOTE — SOCIAL WORK
CM met with pt to discuss DCP and cm role  Pt lives with her sister Surya Hendrickson in a [address] apartment with [address]ste  Prior to admission pt was independent with ambulation and with ADLS  No prior hx of VNA  Pt's preference for pharmacy is [facility] in [place]  Pt denies any hx of drug/ETOH or inpt psych stays  Patient/caregiver received discharge checklist  Content reviewed  Patient/caregiver encouraged to participate in discharge plan of care prior to discharge home  CM reviewed d/c planning process including the following: identifying help at home, patient preference for d/c planning needs, Discharge Lounge, [facility] Meds to Bed program, availability of treatment team to discuss questions or concerns patient and/or family may have regarding understanding medications and recognizing signs and symptoms once discharged  CM also encouraged patient to follow up with all recommended appointments after discharge  Patient advised of importance for patient and family to participate in managing patients medical well being

## 2018-07-26 NOTE — PLAN OF CARE
Problem: PHYSICAL THERAPY ADULT  Goal: Performs mobility at highest level of function for planned discharge setting  See evaluation for individualized goals  Treatment/Interventions: Functional transfer training, LE strengthening/ROM, Elevations, Therapeutic exercise, Endurance training, Patient/family training, Gait training, Bed mobility, Equipment eval/education  Equipment Recommended: Walker (RW)       See flowsheet documentation for full assessment, interventions and recommendations  Prognosis: Good  Problem List: Impaired balance, Decreased mobility, Decreased coordination, Decreased safety awareness, Decreased strength  Assessment: Patient seen today for a high complexity physical therapy evaluation  Patient is an 80year old female with pmh including HTN, anemia, DM, diabetic neuropathy, HLD, and vertigo presenting to [facility] on 7/25/18 with abdominal pain, nausea and constipation  Dx includes hyponatremia  Patient lives w/ her sister in a 1 [address] w/ 1 MARTELL  PTA, patient was independent in ADL's  Patient does not use an AD at baseline and is currently driving  [facility] an [email or web address]  Physical therapy was consulted to assess mobility and discharge recommendation  Upon arrival, patient denied pain and was agreeable to therapy session  Patient performed sit to stand transfer w/ supervision however, was impulsive and required verbal cues for safe transfers  Patient demonstrated the ability to ambulate 200' w/ RW and min A x1  Patient presents w/ an unsteady, impulsive gait pattern w/ decreased safety awareness and decreased safety w/ RW  Overall, patient presents w/ decreased BLE strength, decreased static/dynamic balance, decreased safety awareness and impaired functional mobility  Due to unstable medical status, decline in functional mobility and increased fall risk, patient will benefit from skilled physical therapy to address listed impairments   Recommend discharge to home w/ family support and home PT when medically stable  Patient to continue w/ RW use and family states they will investigate modifications for personal RW  Barriers to Discharge: None     Recommendation: Home with family support, Home PT     PT - OK to Discharge: (S) Yes (to home w/ family support and home PT when stable)    See flowsheet documentation for full assessment

## 2018-07-26 NOTE — ASSESSMENT & PLAN NOTE
· Possibly related to hydrochlorothiazide use, excessive water intake, possible SIADH secondary to ongoing abdominal pain and nausea  · Patient improved with use of normal saline and then 1/2 normal saline, was restriction and discontinuation of hydrochlorothiazide  · Sodium on admission 111, currently 117  · Nephrologist following, continue with q 4 hours BMP for now, desired sodium tomorrow morning between 125 and 127  · Patient with history of chronic hyponatremia with an average sodium around 130  · IV fluids at this time have been discontinued

## 2018-07-26 NOTE — NURSING NOTE
Spoke with Dr Americo Croft re: Na+ 118 & K+3 6; to continue IVF of NSS @ 30cc/hr and call for Na+ >120

## 2018-07-26 NOTE — ASSESSMENT & PLAN NOTE
Lab Results   Component Value Date    HGBA1C 6 2 (H) 06/05/2018       Recent Labs      07/25/18   1559  07/25/18   2123  07/26/18   0633  07/26/18   1102   POCGLU  165*  163*  123  110       Blood Sugar Average: Last 72 hrs:  (P) 143     Reports control blood sugar at home for which metformin was recently discontinued, remained stable here on insulin sliding scale

## 2018-07-26 NOTE — MEDICAL STUDENT
MEDICAL STUDENT  Inpatient Progress Note for TRAINING ONLY  Not Part of Legal Medical Record       Progress Note Raúl Min 80 y o  female MRN: [id number]    Unit/Bed#: [facility] 307-19 Encounter: [id number]      Assessment/Plan:  81 y/o F with hyponatremia and constipation  1  Hyponatremia:  -Na improved from 118 to 121 last night at midnight when pt was on NS 30cc/hr, was then given 1/2 NS at 30 cc/hr and Na dropped to 117 this AM  Appears pt back on NS now  Etiology of hyponatremia most likely 2/2 to pt's recent use of HCTZ +/- some possible hypovelemia with her excessive water drinking at home   -Workup still pending; serum osm of 252 indicating a true hyponatremia  Urine Cl of 17 and urine osm still pending; most likely some component of ADH present possible 2/2 patients Nausea at home for the past week and some mild hypovolemia  Will reassess after urine osm return    -would recommend continuing with some gentle NS at 30cc/hr and with fluid restriction of 1200mL +/- some Na tabs if sodium not improving on next BMP  Would recheck BMP at 1200    -? Why pt made 2 2 L of urine overnight while on fluid restriction and stopping HCTZ  Subjective:   Pt states she is feeling much better today, possibly contributed by her fluid resucitation and better Na levels  She says that compared to yesterday she is feeling much less groggy and appears more alert and oriented on conversation today  She states that she has still not had a BM despite aggressive laxative administration, but has produced a lot of urine overnight  She was having issues urinating before coming to hospital, which she feels have resolved and she no longer feels like she can't empty her bladder fully  She states she is adhering to her fluid restriction and only drinking what is given to her and overall feels much better  She denies any Headache, fatigue, N/V, abd tenderness, chest pain or SOB       Objective:     Vitals: Blood pressure 160/69, pulse (!) 52, temperature 98 °F (36 7 °C), temperature source Oral, resp  rate 18, height 5' 2" (1 575 m), weight 102 kg (225 lb), SpO2 99 %, not currently breastfeeding  ,Body mass index is 41 15 kg/m²  Intake/Output Summary (Last 24 hours) at 07/26/18 1001  Last data filed at 07/26/18 0900   Gross per 24 hour   Intake             1400 ml   Output             2800 ml   Net            -1400 ml   Results for Erendira Brasher (MRN [id number]) as of 7/26/2018 10:00   Ref  Range 7/26/2018 08:06   eGFR Latest Units: ml/min/1 73sq m 57   Sodium Latest Ref Range: 136 - 145 mmol/L 117 (L)   Potassium Latest Ref Range: 3 5 - 5 3 mmol/L 4 9   Chloride Latest Ref Range: 100 - 108 mmol/L 89 (L)   CO2 Latest Ref Range: 21 - 32 mmol/L 21   Anion Gap Latest Ref Range: 4 - 13 mmol/L 7   BUN Latest Ref Range: 5 - 25 mg/dL 14   Creatinine Latest Ref Range: 0 60 - 1 30 mg/dL 0 92   Glucose Latest Ref Range: 65 - 140 mg/dL 133   Calcium Latest Ref Range: 8 3 - 10 1 mg/dL 8 3     7/25: Serum Osm- 251; urine Cl- 21  Physical Exam: /69   Pulse (!) 52   Temp 98 °F (36 7 °C) (Oral)   Resp 18   Ht 5' 2" (1 575 m)   Wt 102 kg (225 lb)   SpO2 99%   BMI 41 15 kg/m²   General appearance: alert and oriented, in no acute distress  Lungs: clear to auscultation bilaterally  Heart: S1, S2 normal, no S3 or S4, systolic murmur: late systolic 2/6, low pitch at 2nd left intercostal space, no click and no rub  Extremities: extremities normal, warm and well-perfused; no cyanosis, clubbing, or edema     Invasive Devices     Peripheral Intravenous Line            Peripheral IV 07/25/18 Right Forearm 1 day    Peripheral IV 07/25/18 Right Hand 1 day                Lab, Imaging and other studies: I have personally reviewed pertinent reports      VTE Pharmacologic Prophylaxis: Enoxaparin (Lovenox)  VTE Mechanical Prophylaxis: sequential compression device

## 2018-07-26 NOTE — ASSESSMENT & PLAN NOTE
· Patient with history of chronic constipation, evaluated by Gastroenterology in the past, she was started on Linzess that she did not take after few doses as this was provoking severe diarrhea  · She received multiple agents yesterday upon admission including enema without bowel movement  · Increase dose of senna, start Colace, increase MiraLax to b i d , lactulose 20 g t i d  p r n  if no bowel movement for greater than 2 days, Dulcolax suppositories p r n   · Monitor response to therapy

## 2018-07-26 NOTE — PROGRESS NOTES
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   Assessment & Plan    Patient discussed weight loss with primary care physician        Chronic constipation   Assessment & Plan    · Patient with history of chronic constipation, evaluated by Gastroenterology in the past, she was started on Linzess that she did not take after few doses as this was provoking severe diarrhea  · She received multiple agents yesterday upon admission including enema without bowel movement  · Increase dose of senna, start Colace, increase MiraLax to b i d , lactulose 20 g t i d  p r n  if no bowel movement for greater than 2 days, Dulcolax suppositories p r n   · Monitor response to therapy        Type 2 diabetes mellitus without complication, without long-term current use of insulin [facility])   Assessment & Plan    Lab Results   Component Value Date    HGBA1C 6 2 (H) 06/05/2018       Recent Labs      07/25/18   1559  07/25/18   2123  07/26/18   0633  07/26/18   1102   POCGLU  165*  163*  123  110       Blood Sugar Average: Last 72 hrs:  (P) 143     Reports control blood sugar at home for which metformin was recently discontinued, remained stable here on insulin sliding scale        Essential hypertension   Assessment & Plan    · To be continued on Coreg and Cardizem, discontinue hydrochlorothiazide, do not resume upon discharge secondary to severe hyponatremia  · Blood pressure acceptable, continue to monitor        * Hyponatremia   Assessment & Plan    · Possibly related to hydrochlorothiazide use, excessive water intake, possible SIADH secondary to ongoing abdominal pain and nausea  · Patient improved with use of normal saline and then 1/2 normal saline, was restriction and discontinuation of hydrochlorothiazide  · Sodium on admission 111, currently 117  · Nephrologist following, continue with q 4 hours BMP for now, desired sodium tomorrow morning between 125 and 127  · Patient with history of chronic hyponatremia with an average sodium around 130  · IV fluids at this time have been discontinued          VTE Pharmacologic Prophylaxis: yes  Pharmacologic: Heparin  Mechanical VTE Prophylaxis in Place: Yes    Patient Centered Rounds: I have performed bedside rounds with nursing staff today  Discussions with Specialists or Other Care Team Provider:  Nephrology    Education and Discussions with Family / Patient:  Patient, nephew at bedside    Time Spent for Care: 45 minutes  More than 50% of total time spent on counseling and coordination of care as described above  Current Length of Stay: 1 day(s)    Current Patient Status: Inpatient   Certification Statement: The patient will continue to require additional inpatient hospital stay due to Please refer to above    Discharge Plan: To be determined, pending PT and OT evaluation    Code Status: Level 1 - Full Code      Subjective:   Patient is feeling much better today  No abdominal pain  No nausea  Awaiting bowel movement, otherwise no complaints or concerns    Objective:     Vitals:   Temp (24hrs), Av 3 °F (36 8 °C), Min:98 °F (36 7 °C), Max:98 6 °F (37 °C)    HR:  [51-58] 51  Resp:  [18-20] 18  BP: (108-164)/(51-72) 146/66  SpO2:  [94 %-99 %] 99 %  Body mass index is 41 15 kg/m²  Input and Output Summary (last 24 hours): Intake/Output Summary (Last 24 hours) at 18 1440  Last data filed at 18 1229   Gross per 24 hour   Intake            920 5 ml   Output             2100 ml   Net          -1179 5 ml       Physical Exam:     Physical Exam   Constitutional: She is oriented to person, place, and time  She appears well-developed     Cardiovascular: Normal rate, regular rhythm and normal heart sounds  Exam reveals no friction rub  No murmur heard  Pulmonary/Chest: Effort normal  No respiratory distress  She has no wheezes  She has no rales  Abdominal: Soft  She exhibits no distension  There is no tenderness  There is no rebound  Musculoskeletal: She exhibits no edema  Neurological: She is alert and oriented to person, place, and time  She exhibits normal muscle tone  Skin: Skin is warm  Psychiatric: She has a normal mood and affect  Additional Data:     Labs:      Results from last 7 days  Lab Units 07/25/18  0627   WBC Thousand/uL 7 66   HEMOGLOBIN g/dL 13 1   HEMATOCRIT % 36 3   PLATELETS Thousands/uL 161   NEUTROS PCT % 73   LYMPHS PCT % 18   MONOS PCT % 6   EOS PCT % 1       Results from last 7 days  Lab Units 07/26/18  1213  07/25/18  0627   SODIUM mmol/L 121*  < > 111*   POTASSIUM mmol/L 4 6  < > 4 9   CHLORIDE mmol/L 86*  < > 80*   CO2 mmol/L 29  < > 22   BUN mg/dL 16  < > 13   CREATININE mg/dL 0 97  < > 0 74   CALCIUM mg/dL 8 7  < > 8 7   TOTAL PROTEIN g/dL  --   --  7 5   BILIRUBIN TOTAL mg/dL  --   --  0 88   ALK PHOS U/L  --   --  120*   ALT U/L  --   --  40   AST U/L  --   --  72*   GLUCOSE RANDOM mg/dL 121  < > 176*   < > = values in this interval not displayed  * I Have Reviewed All Lab Data Listed Above  * Additional Pertinent Lab Tests Reviewed:  All Labs Within Last 24 Hours Reviewed    Imaging:    Imaging Reports Reviewed Today Include:  None  Imaging Personally Reviewed by Myself Includes:  None    Recent Cultures (last 7 days):           Last 24 Hours Medication List:     Current Facility-Administered Medications:  aspirin 81 mg Oral Daily Valarie Horne MD   bisacodyl 10 mg Rectal Daily PRN Gualberto Suero MD   carvedilol 25 mg Oral BID Valarie Horne MD   diltiazem 240 mg Oral Daily Valarie Horne MD   docusate sodium 100 mg Oral BID Sony Martinez MD   enoxaparin 40 mg Subcutaneous Daily Gualberto Suero MD insulin lispro 1-5 Units Subcutaneous HS Pablito Forbes MD   insulin lispro 1-6 Units Subcutaneous TID AC Valarie Horne MD   lactulose 20 g Oral TID PRN Marshia Dance, MD   meclizine 12 5 mg Oral TID PRN Valarie Horne MD   ondansetron 4 mg Intravenous Q6H PRN Valarie Horne MD   polyethylene glycol 17 g Oral BID Marshia Dance, MD   saccharomyces boulardii 250 mg Oral BID Pablito Forbes MD   senna-docusate sodium 2 tablet Oral HS Marshia Dance, MD        Today, Patient Was Seen By: Marshia Dance, MD    ** Please Note: Dragon 360 Dictation voice to text software may have been used in the creation of this document   **

## 2018-07-26 NOTE — RESTORATIVE TECHNICIAN NOTE
Restorative Specialist Mobility Note       Activity: Bedrest, Dangle, Stand at bedside, Turn, Chair     Assistive Device: None     Ambulation Response:  Tolerated fairly well  Repositioned: Sitting, Up in chair (Chair alarm on)

## 2018-07-27 PROBLEM — D72.829 LEUKOCYTOSIS: Status: ACTIVE | Noted: 2018-07-27

## 2018-07-27 LAB
ANION GAP SERPL CALCULATED.3IONS-SCNC: 6 MMOL/L (ref 4–13)
ANION GAP SERPL CALCULATED.3IONS-SCNC: 7 MMOL/L (ref 4–13)
ANION GAP SERPL CALCULATED.3IONS-SCNC: 7 MMOL/L (ref 4–13)
ANION GAP SERPL CALCULATED.3IONS-SCNC: 8 MMOL/L (ref 4–13)
ANION GAP SERPL CALCULATED.3IONS-SCNC: 8 MMOL/L (ref 4–13)
BASOPHILS # BLD AUTO: 0.03 THOUSANDS/ΜL (ref 0–0.1)
BASOPHILS NFR BLD AUTO: 0 % (ref 0–1)
BUN SERPL-MCNC: 21 MG/DL (ref 5–25)
BUN SERPL-MCNC: 22 MG/DL (ref 5–25)
BUN SERPL-MCNC: 23 MG/DL (ref 5–25)
BUN SERPL-MCNC: 23 MG/DL (ref 5–25)
BUN SERPL-MCNC: 24 MG/DL (ref 5–25)
CALCIUM SERPL-MCNC: 8.1 MG/DL (ref 8.3–10.1)
CALCIUM SERPL-MCNC: 8.4 MG/DL (ref 8.3–10.1)
CALCIUM SERPL-MCNC: 8.5 MG/DL (ref 8.3–10.1)
CALCIUM SERPL-MCNC: 8.7 MG/DL (ref 8.3–10.1)
CALCIUM SERPL-MCNC: 8.9 MG/DL (ref 8.3–10.1)
CHLORIDE SERPL-SCNC: 88 MMOL/L (ref 100–108)
CHLORIDE SERPL-SCNC: 88 MMOL/L (ref 100–108)
CHLORIDE SERPL-SCNC: 90 MMOL/L (ref 100–108)
CHLORIDE SERPL-SCNC: 90 MMOL/L (ref 100–108)
CHLORIDE SERPL-SCNC: 92 MMOL/L (ref 100–108)
CO2 SERPL-SCNC: 23 MMOL/L (ref 21–32)
CO2 SERPL-SCNC: 23 MMOL/L (ref 21–32)
CO2 SERPL-SCNC: 24 MMOL/L (ref 21–32)
CO2 SERPL-SCNC: 25 MMOL/L (ref 21–32)
CO2 SERPL-SCNC: 25 MMOL/L (ref 21–32)
CREAT SERPL-MCNC: 0.96 MG/DL (ref 0.6–1.3)
CREAT SERPL-MCNC: 0.98 MG/DL (ref 0.6–1.3)
CREAT SERPL-MCNC: 1.04 MG/DL (ref 0.6–1.3)
CREAT SERPL-MCNC: 1.05 MG/DL (ref 0.6–1.3)
CREAT SERPL-MCNC: 1.21 MG/DL (ref 0.6–1.3)
EOSINOPHIL # BLD AUTO: 0 THOUSAND/ΜL (ref 0–0.61)
EOSINOPHIL NFR BLD AUTO: 0 % (ref 0–6)
ERYTHROCYTE [DISTWIDTH] IN BLOOD BY AUTOMATED COUNT: 12.1 % (ref 11.6–15.1)
GFR SERPL CREATININE-BSD FRML MDRD: 41 ML/MIN/1.73SQ M
GFR SERPL CREATININE-BSD FRML MDRD: 49 ML/MIN/1.73SQ M
GFR SERPL CREATININE-BSD FRML MDRD: 49 ML/MIN/1.73SQ M
GFR SERPL CREATININE-BSD FRML MDRD: 53 ML/MIN/1.73SQ M
GFR SERPL CREATININE-BSD FRML MDRD: 54 ML/MIN/1.73SQ M
GLUCOSE SERPL-MCNC: 124 MG/DL (ref 65–140)
GLUCOSE SERPL-MCNC: 144 MG/DL (ref 65–140)
GLUCOSE SERPL-MCNC: 151 MG/DL (ref 65–140)
GLUCOSE SERPL-MCNC: 151 MG/DL (ref 65–140)
GLUCOSE SERPL-MCNC: 158 MG/DL (ref 65–140)
GLUCOSE SERPL-MCNC: 169 MG/DL (ref 65–140)
GLUCOSE SERPL-MCNC: 176 MG/DL (ref 65–140)
GLUCOSE SERPL-MCNC: 192 MG/DL (ref 65–140)
GLUCOSE SERPL-MCNC: 200 MG/DL (ref 65–140)
HCT VFR BLD AUTO: 33.5 % (ref 34.8–46.1)
HGB BLD-MCNC: 11.5 G/DL (ref 11.5–15.4)
IMM GRANULOCYTES # BLD AUTO: 0.07 THOUSAND/UL (ref 0–0.2)
IMM GRANULOCYTES NFR BLD AUTO: 1 % (ref 0–2)
LYMPHOCYTES # BLD AUTO: 0.32 THOUSANDS/ΜL (ref 0.6–4.47)
LYMPHOCYTES NFR BLD AUTO: 2 % (ref 14–44)
MAGNESIUM SERPL-MCNC: 2.2 MG/DL (ref 1.6–2.6)
MCH RBC QN AUTO: 30.6 PG (ref 26.8–34.3)
MCHC RBC AUTO-ENTMCNC: 34.3 G/DL (ref 31.4–37.4)
MCV RBC AUTO: 89 FL (ref 82–98)
MONOCYTES # BLD AUTO: 0.74 THOUSAND/ΜL (ref 0.17–1.22)
MONOCYTES NFR BLD AUTO: 5 % (ref 4–12)
NEUTROPHILS # BLD AUTO: 14.39 THOUSANDS/ΜL (ref 1.85–7.62)
NEUTS SEG NFR BLD AUTO: 92 % (ref 43–75)
NRBC BLD AUTO-RTO: 0 /100 WBCS
PHOSPHATE SERPL-MCNC: 2.7 MG/DL (ref 2.3–4.1)
PLATELET # BLD AUTO: 150 THOUSANDS/UL (ref 149–390)
PMV BLD AUTO: 11.1 FL (ref 8.9–12.7)
POTASSIUM SERPL-SCNC: 4 MMOL/L (ref 3.5–5.3)
POTASSIUM SERPL-SCNC: 4.2 MMOL/L (ref 3.5–5.3)
POTASSIUM SERPL-SCNC: 4.3 MMOL/L (ref 3.5–5.3)
POTASSIUM SERPL-SCNC: 4.3 MMOL/L (ref 3.5–5.3)
POTASSIUM SERPL-SCNC: 4.7 MMOL/L (ref 3.5–5.3)
PROCALCITONIN SERPL-MCNC: 15.56 NG/ML
RBC # BLD AUTO: 3.76 MILLION/UL (ref 3.81–5.12)
SODIUM 24H UR-SCNC: 17 MOL/L
SODIUM SERPL-SCNC: 120 MMOL/L (ref 136–145)
SODIUM SERPL-SCNC: 120 MMOL/L (ref 136–145)
SODIUM SERPL-SCNC: 121 MMOL/L (ref 136–145)
SODIUM SERPL-SCNC: 121 MMOL/L (ref 136–145)
SODIUM SERPL-SCNC: 122 MMOL/L (ref 136–145)
WBC # BLD AUTO: 15.55 THOUSAND/UL (ref 4.31–10.16)

## 2018-07-27 PROCEDURE — 80048 BASIC METABOLIC PNL TOTAL CA: CPT | Performed by: HOSPITALIST

## 2018-07-27 PROCEDURE — 84100 ASSAY OF PHOSPHORUS: CPT | Performed by: INTERNAL MEDICINE

## 2018-07-27 PROCEDURE — 85025 COMPLETE CBC W/AUTO DIFF WBC: CPT | Performed by: INTERNAL MEDICINE

## 2018-07-27 PROCEDURE — 99222 1ST HOSP IP/OBS MODERATE 55: CPT | Performed by: INTERNAL MEDICINE

## 2018-07-27 PROCEDURE — 97530 THERAPEUTIC ACTIVITIES: CPT

## 2018-07-27 PROCEDURE — 82948 REAGENT STRIP/BLOOD GLUCOSE: CPT

## 2018-07-27 PROCEDURE — 97116 GAIT TRAINING THERAPY: CPT

## 2018-07-27 PROCEDURE — 97112 NEUROMUSCULAR REEDUCATION: CPT

## 2018-07-27 PROCEDURE — 83735 ASSAY OF MAGNESIUM: CPT | Performed by: INTERNAL MEDICINE

## 2018-07-27 PROCEDURE — 84300 ASSAY OF URINE SODIUM: CPT | Performed by: INTERNAL MEDICINE

## 2018-07-27 PROCEDURE — 99232 SBSQ HOSP IP/OBS MODERATE 35: CPT | Performed by: INTERNAL MEDICINE

## 2018-07-27 PROCEDURE — 87040 BLOOD CULTURE FOR BACTERIA: CPT | Performed by: INTERNAL MEDICINE

## 2018-07-27 PROCEDURE — 84145 PROCALCITONIN (PCT): CPT | Performed by: INTERNAL MEDICINE

## 2018-07-27 PROCEDURE — 80048 BASIC METABOLIC PNL TOTAL CA: CPT | Performed by: INTERNAL MEDICINE

## 2018-07-27 RX ORDER — DICYCLOMINE HYDROCHLORIDE 10 MG/1
10 CAPSULE ORAL
Status: DISCONTINUED | OUTPATIENT
Start: 2018-07-27 | End: 2018-08-01 | Stop reason: HOSPADM

## 2018-07-27 RX ORDER — METOCLOPRAMIDE HYDROCHLORIDE 5 MG/ML
5 INJECTION INTRAMUSCULAR; INTRAVENOUS ONCE
Status: COMPLETED | OUTPATIENT
Start: 2018-07-27 | End: 2018-07-27

## 2018-07-27 RX ORDER — SODIUM CHLORIDE 9 MG/ML
75 INJECTION, SOLUTION INTRAVENOUS CONTINUOUS
Status: DISCONTINUED | OUTPATIENT
Start: 2018-07-27 | End: 2018-07-31

## 2018-07-27 RX ORDER — CALCIUM CARBONATE 200(500)MG
500 TABLET,CHEWABLE ORAL DAILY PRN
Status: DISCONTINUED | OUTPATIENT
Start: 2018-07-27 | End: 2018-08-01 | Stop reason: HOSPADM

## 2018-07-27 RX ADMIN — METOCLOPRAMIDE 5 MG: 5 INJECTION, SOLUTION INTRAMUSCULAR; INTRAVENOUS at 01:51

## 2018-07-27 RX ADMIN — CEFEPIME HYDROCHLORIDE 2000 MG: 2 INJECTION, POWDER, FOR SOLUTION INTRAVENOUS at 19:04

## 2018-07-27 RX ADMIN — POLYETHYLENE GLYCOL 3350 17 G: 17 POWDER, FOR SOLUTION ORAL at 17:51

## 2018-07-27 RX ADMIN — DOCUSATE SODIUM 100 MG: 100 CAPSULE, LIQUID FILLED ORAL at 17:50

## 2018-07-27 RX ADMIN — SENNOSIDES AND DOCUSATE SODIUM 2 TABLET: 8.6; 5 TABLET ORAL at 21:18

## 2018-07-27 RX ADMIN — SODIUM CHLORIDE 50 ML/HR: 0.9 INJECTION, SOLUTION INTRAVENOUS at 10:40

## 2018-07-27 RX ADMIN — Medication 250 MG: at 17:50

## 2018-07-27 RX ADMIN — Medication 250 MG: at 08:24

## 2018-07-27 RX ADMIN — CARVEDILOL 25 MG: 25 TABLET, FILM COATED ORAL at 08:23

## 2018-07-27 RX ADMIN — ENOXAPARIN SODIUM 40 MG: 40 INJECTION SUBCUTANEOUS at 08:23

## 2018-07-27 RX ADMIN — INSULIN LISPRO 1 UNITS: 100 INJECTION, SOLUTION INTRAVENOUS; SUBCUTANEOUS at 07:48

## 2018-07-27 RX ADMIN — CARVEDILOL 25 MG: 25 TABLET, FILM COATED ORAL at 17:50

## 2018-07-27 RX ADMIN — INSULIN LISPRO 1 UNITS: 100 INJECTION, SOLUTION INTRAVENOUS; SUBCUTANEOUS at 16:33

## 2018-07-27 RX ADMIN — METRONIDAZOLE 500 MG: 500 INJECTION, SOLUTION INTRAVENOUS at 21:19

## 2018-07-27 RX ADMIN — DOCUSATE SODIUM 100 MG: 100 CAPSULE, LIQUID FILLED ORAL at 08:23

## 2018-07-27 RX ADMIN — DICYCLOMINE HYDROCHLORIDE 10 MG: 10 CAPSULE ORAL at 21:18

## 2018-07-27 RX ADMIN — ASPIRIN 81 MG 81 MG: 81 TABLET ORAL at 08:23

## 2018-07-27 RX ADMIN — Medication 500 MG: at 01:50

## 2018-07-27 RX ADMIN — DILTIAZEM HYDROCHLORIDE 240 MG: 120 CAPSULE, COATED, EXTENDED RELEASE ORAL at 08:23

## 2018-07-27 RX ADMIN — DICYCLOMINE HYDROCHLORIDE 10 MG: 10 CAPSULE ORAL at 19:04

## 2018-07-27 RX ADMIN — POLYETHYLENE GLYCOL 3350 17 G: 17 POWDER, FOR SOLUTION ORAL at 08:24

## 2018-07-27 RX ADMIN — INSULIN LISPRO 1 UNITS: 100 INJECTION, SOLUTION INTRAVENOUS; SUBCUTANEOUS at 21:18

## 2018-07-27 NOTE — RESTORATIVE TECHNICIAN NOTE
Restorative Specialist Mobility Note       Activity: Chair, Dangle, Stand at bedside, Turn, Ambulate in room, Ambulate in cano, Egan privileges     Assistive Device: Front wheel walker     Ambulation Response:  Tolerated fairly well  Repositioned: Sitting, Other (Comment) (pt left sitting in bathroom)

## 2018-07-27 NOTE — PROGRESS NOTES
NEPHROLOGY PROGRESS NOTE   Kerry Bryson 80 y o  female MRN: [id number]  Unit/Bed#: [facility] 509-01 Encounter: [id number]  Reason for Consult: Hyponatremia    ASSESSMENT AND PLAN:  Patient is a 28-year-old female with significant medical issues of diabetes, hypertension for many years, presented with worsening constipation  Hank Franco was found to have severe hyponatremia and we are consulted for the same      Severe hyponatremia, acute versus acute on chronic, previous serum sodium in low to mid 130s in 2017  -serum sodium 111 on admission, initially improved rapidly requiring half normal saline  Over last 24 hr serum sodium has plateaued with last sodium 120  She remained off IV fluid yesterday   -restart IV normal saline at 50 mL/hour  -BMP Q six hourly  -patient's nausea has much improved  She remains asymptomatic  -hyponatremia could be secondary to subtle hypovolemia, use of HCTZ, significant free water intake (80 to 90 oz total liquid intake daily) in the setting of nausea may cause SIADH as well   -remains off  HCTZ  -serum osmolality 252, urine chloride 21, urine sodium 17 and urine osmolality 224    -strict fluid restriction 1 2 L per day    MINA, baseline serum creatinine 0 9  -serum creatinine has worsened from 0 9 to 1 2 today   -this could be secondary to blood pressure variability  -urinalysis shows no proteinuria or hematuria  -check bladder scan for PVR  -closely monitor BMP  -avoid nephrotoxins     Hypertension  -blood pressure overall acceptable  -continue to monitor on carvedilol and diltiazem   Holding HCTZ  -avoid low BP   Goal SBP 130s     Constipation, management as per primary team      Discussed with primary team    SUBJECTIVE:  Patient seen and examined at bedside  No chest pain, shortness of breath, nausea, vomiting, abdominal pain or diarrhea  No urinary complaints       OBJECTIVE:  Current Weight: Weight - Scale: 102 kg (225 lb)  Vitals:    07/27/18 1050   BP: 139/68   Pulse: 70   Resp: 18 Temp: 98 5 °F (36 9 °C)   SpO2:        Intake/Output Summary (Last 24 hours) at 07/27/18 1136  Last data filed at 07/27/18 1040   Gross per 24 hour   Intake             1260 ml   Output             1200 ml   Net               60 ml     Physical Examination:  General:  Sitting in chair, no acute distress   Eyes:  Mild conjunctival pallor, sclerae anicteric  ENT:  External examination of ears and nose unremarkable  Neck:  Supple  Respiratory:  Bilateral air entry present, no obvious crackles appreciated  CVS:  S1, S2 present  GI:  Soft, nontender, nondistended  CNS:  Active alert oriented x3  Extremities:  No significant pitting edema in lower extremities  Skin:  No new rash in legs    Medications:    Current Facility-Administered Medications:     aluminum-magnesium hydroxide-simethicone (MYLANTA) 200-200-20 mg/5 mL oral suspension 15 mL, 15 mL, Oral, Q4H PRN, Anthony Madden PA-C, 15 mL at 07/26/18 2314    aspirin chewable tablet 81 mg, 81 mg, Oral, Daily, Valarie Horne MD, 81 mg at 07/27/18 7112    bisacodyl (DULCOLAX) rectal suppository 10 mg, 10 mg, Rectal, Daily PRN, Anthony Pearson MD, 10 mg at 07/26/18 1747    calcium carbonate (TUMS) chewable tablet 500 mg, 500 mg, Oral, Daily PRN, Princess Caballero PA-C, 500 mg at 07/27/18 0150    carvedilol (COREG) tablet 25 mg, 25 mg, Oral, BID, Valarie Horne MD, 25 mg at 07/27/18 0823    diltiazem (CARDIZEM CD) 24 hr capsule 240 mg, 240 mg, Oral, Daily, Valarie Horne MD, 240 mg at 07/27/18 4936    docusate sodium (COLACE) capsule 100 mg, 100 mg, Oral, BID, Kiesha Kim MD, 100 mg at 07/27/18 0823    enoxaparin (LOVENOX) subcutaneous injection 40 mg, 40 mg, Subcutaneous, Daily, Valarie Horne MD, 40 mg at 07/27/18 9526    hydrALAZINE (APRESOLINE) injection 5 mg, 5 mg, Intravenous, Q6H PRN, Chelsie Doll MD, 5 mg at 07/26/18 2012    insulin lispro (HumaLOG) 100 units/mL subcutaneous injection 1-5 Units, 1-5 Units, Subcutaneous, HS, Idalia Gomez MD, 1 Units at 07/25/18 2204    insulin lispro (HumaLOG) 100 units/mL subcutaneous injection 1-6 Units, 1-6 Units, Subcutaneous, TID AC, 1 Units at 07/27/18 0748 **AND** Fingerstick Glucose (POCT), , , TID AC, Valarie Horne MD    lactulose 20 g/30 mL oral solution 20 g, 20 g, Oral, TID PRN, Baldomero Madrigal MD, 20 g at 07/26/18 1747    meclizine (ANTIVERT) tablet 12 5 mg, 12 5 mg, Oral, TID PRN, Idalia Gomez MD    ondansetron (ZOFRAN) injection 4 mg, 4 mg, Intravenous, Q6H PRN, Idalia Gomez MD    polyethylene glycol (MIRALAX) packet 17 g, 17 g, Oral, BID, Baldomero Madrigal MD, 17 g at 07/27/18 0824    saccharomyces boulardii (FLORASTOR) capsule 250 mg, 250 mg, Oral, BID, Idalia Gomez MD, 250 mg at 07/27/18 0824    senna-docusate sodium (SENOKOT S) 8 6-50 mg per tablet 2 tablet, 2 tablet, Oral, HS, Baldomero Madrigal MD    sodium chloride 0 9 % infusion, 50 mL/hr, Intravenous, Continuous, Aida Jim MD, Last Rate: 50 mL/hr at 07/27/18 1040, 50 mL/hr at 07/27/18 1040    Laboratory Results:    Results from last 7 days  Lab Units 07/27/18  0915 07/27/18  0504 07/27/18  0008 07/26/18 2012 07/26/18  1614 07/26/18  1213 07/26/18  0806  07/25/18  0627   WBC Thousand/uL  --  15 55*  --   --   --   --   --   --  7 66   HEMOGLOBIN g/dL  --  11 5  --   --   --   --   --   --  13 1   HEMATOCRIT %  --  33 5*  --   --   --   --   --   --  36 3   PLATELETS Thousands/uL  --  150  --   --   --   --   --   --  161   SODIUM mmol/L 120* 122* 121* 122* 121* 121* 117*  < > 111*   POTASSIUM mmol/L 4 3 4 2 4 0 4 4 4 4 4 6 4 9  < > 4 9   CHLORIDE mmol/L 88* 92* 90* 89* 90* 86* 89*  < > 80*   CO2 mmol/L 25 23 23 28 23 29 21  < > 22   BUN mg/dL 21 22 23 21 22 16 14  < > 13   CREATININE mg/dL 1 21 0 96 1 04 1 17 0 98 0 97 0 92  < > 0 74   CALCIUM mg/dL 8 9 8 4 8 5 8 6 8 2* 8 7 8 3  < > 8 7   MAGNESIUM mg/dL  --  2 2  --   --   --   --   --   --   --    PHOSPHORUS mg/dL  -- 2 7  --   --   --   --   --   --   --    TOTAL PROTEIN g/dL  --   --   --   --   --   --   --   --  7 5   GLUCOSE RANDOM mg/dL 192* 151* 124 137 112 121 133  < > 176*   < > = values in this interval not displayed  No results found for this or any previous visit  Results for orders placed during the hospital encounter of 07/25/18   XR chest pa & lateral    Narrative CHEST     INDICATION:, Evaluate for neoplasm    COMPARISON:  October 17, 2017    EXAM PERFORMED/VIEWS:  XR CHEST PA & LATERAL      FINDINGS:    Cardiomegaly seen    No acute consolidation or congestion seen  Calcification seen in the left upper lung suggest granuloma  This is unchanged from the previous study of October 17, 2017  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  Scoliosis with convexity to the right side seen      Impression Cardiomegaly  No acute consolidation or congestion  Left upper lobe lung nodule suggest granuloma, stable since the previous study of October 17, 2017    Workstation performed: [id number]       No results found for this or any previous visit  No results found for this or any previous visit  No results found for this or any previous visit  No results found for this or any previous visit  Portions of the record may have been created with voice recognition software  Occasional wrong word or "sound a like" substitutions may have occurred due to the inherent limitations of voice recognition software  Read the chart carefully and recognize, using context, where substitutions have occurred

## 2018-07-27 NOTE — ASSESSMENT & PLAN NOTE
· Patient with history of chronic constipation, evaluated by Gastroenterology in the past, she was started on Linzess that she did not take after few doses as this was provoking severe diarrhea  · Still no bowel movement while on multiple medications, patient complains of 2 episodes of diarrhea yesterday  · Possible overflow diarrhea?   If again diarrhea obtain stool sample for C difficile  · Appreciated gastroenterology consult, continue with current regimen, repeat enema today, if no bowel movement start GoLYTELY tomorrow  · Monitor clinically

## 2018-07-27 NOTE — PHYSICAL THERAPY NOTE
Physical Therapy Progress Note     07/27/18 1420   Pain Assessment   Pain Assessment No/denies pain   Pain Score No Pain   Restrictions/Precautions   Weight Bearing Precautions Per Order No   Other Precautions Chair Alarm; Bed Alarm;Multiple lines;Telemetry; Fall Risk;Pain   General   Chart Reviewed Yes   Response to Previous Treatment Patient with no complaints from previous session  Family/Caregiver Present Yes   Cognition   Overall Cognitive Status WFL   Arousal/Participation Alert   Attention Within functional limits   Orientation Level Oriented X4   Memory Unable to assess   Following Commands Follows one step commands without difficulty   Subjective   Subjective denies pain; no c/o   Bed Mobility   Rolling R 5  Supervision   Rolling L 5  Supervision   Supine to Sit 5  Supervision   Sit to Supine 5  Supervision   Transfers   Sit to Stand 5  Supervision   Stand to Sit 5  Supervision   Stand pivot 5  Supervision   Toilet transfer 5  Supervision   Ambulation/Elevation   Gait pattern WNL   Gait Assistance 5  Supervision   Assistive Device Rolling walker   Distance 350' S with RW;   Stair Management Assistance Not tested   Balance   Static Sitting Good   Dynamic Sitting Fair +   Static Standing Fair   Dynamic Standing Fair   Ambulatory Fair   Endurance Deficit   Endurance Deficit No   Activity Tolerance   Activity Tolerance Patient tolerated treatment well   Nurse Made Aware yes   Assessment   Prognosis Good   Problem List Impaired balance;Decreased mobility; Decreased coordination;Decreased safety awareness;Decreased strength   Assessment Pt seated in bedside recliner prior to session; able to STS/SPT with S; amb 350' S with RW ; returned to room and requested use of bathroom; able to xfer on/off toilet with S and use of grab bars; xfer in/out of bed with S and bed rails; finished session supine in bed with all needs in reach and alarms active; recommend cont PT POC;     Barriers to Discharge None   Goals Patient Goals to go home;   STG Expiration Date 08/09/18   Treatment Day 1   Plan   Treatment/Interventions ADL retraining;Functional transfer training;LE strengthening/ROM; Elevations; Therapeutic exercise; Endurance training;Cognitive reorientation;Patient/family training;Equipment eval/education; Bed mobility;Gait training   Progress Progressing toward goals   PT Frequency (3-5x/week)   Recommendation   Recommendation Home PT; Home with family support   Equipment Recommended Walker   PT - OK to Discharge Yes  (when medically cleared)     Deepa Larios, PTA

## 2018-07-27 NOTE — RESTORATIVE TECHNICIAN NOTE
Restorative Specialist Mobility Note       Activity: Chair, Dangle, Stand at bedside, Turn, Ambulate in room, Ambulate in cano     Assistive Device: Front wheel walker     Ambulation Response:  Tolerated fairly well  Repositioned: Sitting, Up in chair

## 2018-07-27 NOTE — PROGRESS NOTES
Patient complaining of "burping, and being cold " demanding to see a doctor to "fix her up " SLIM aware of burping Mylanta ordered  Patient still unhappy about not seeing a doctor for her burping  RN explained that indigestion and bloating are symptoms of the laxatives and stool softeners that she had received the past few days  Patient still not satisfied with this information

## 2018-07-27 NOTE — CONSULTS
Consultation - 126 [facility]  Kadi Garcia 80 y o  female MRN: [id number]  Unit/Bed#: [facility] 509-01 Encounter: [id number]        Inpatient consult to gastroenterology  Consult performed by: Amor Sutton ordered by: Katy Quiles      Reason for Consult / Principal Problem: chronic constipation    ASSESSMENT AND PLAN:      Chronic constipation  Abdominal pain  -Patient without a bm for 10 days, CT without obstruction  -Agree with increasing miralax, will repeat enema today  -If no bm overnight would recommend trialing bowel prep tomorrow  -Need to be discharged on bowel regimen (recommend miralax twice daily)  -Outpatient followup to discuss regimen/ recommend colonoscopy to rule out obstructing process  -f/u RUQ ultrasound given leukocytosis    ______________________________________________________________________    HPI:      Aminah Bhatti is an 81 y/o female with a history of HTN, chronic constipation, DM who presented 7/25 with abdominal discomfort and not moving her bowels x 1 week  She has a chronic history of constipation which has worsened over the last 5 years  She was found to be severely hyponatremic in the ED at 111, is now 120  This is in the setting of having started HCTZ 7/11/18 due to developing cough on lisinopril versus SIADH  In regards to her constipation she states that she was not taking anything daily at home  She had tried linzess in December and the 145 mcg dose was ineffective 290 mcg caused diarrhea so she stopped it, she did not follow-up and was not taking anything after that  She will go 4-5 days without having a bm, she becomes bloated nauseated and develops pain  She states that when she did not have bms despite her daughter giving her miralax once daily and metamucil she presented to the ER  She reports diffuse abdominal discomfort and belching  She denies vomiting and is tolerating a diet  CT with contrast was unremarkable  There is no anemia  White count was normal however jumped to 15 today  TSH is normal  Her last colonoscopy was 8 years go at [place] [address] that she states was unremarkable  She denies weight loss      REVIEW OF SYSTEMS:    CONSTITUTIONAL: Denies any fever, chills, rigors, and weight loss  HEENT: No earache or tinnitus  Denies hearing loss or visual disturbances  CARDIOVASCULAR: No chest pain or palpitations  RESPIRATORY: Denies any cough, hemoptysis, shortness of breath or dyspnea on exertion  GASTROINTESTINAL: As noted in the History of Present Illness  GENITOURINARY: No problems with urination  Denies any hematuria or dysuria  NEUROLOGIC: No dizziness or vertigo, denies headaches  MUSCULOSKELETAL: Denies any muscle or joint pain  SKIN: Denies skin rashes or itching  ENDOCRINE: Denies excessive thirst  Denies intolerance to heat or cold  PSYCHOSOCIAL: Denies depression or anxiety  Denies any recent memory loss         Historical Information   Past Medical History:   Diagnosis Date    Anemia     Anxiety     Diabetes mellitus ([address] )     Diabetic peripheral neuropathy (HCC)     Hyperlipidemia     Hypertension     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 8/23/2017    Thyroid enlarged 2/23/2015    Tinnitus     Vertigo      Past Surgical History:   Procedure Laterality Date    EAR SURGERY Left     KNEE SURGERY       Social History   History   Alcohol Use No     History   Drug Use No     History   Smoking Status    Never Smoker   Smokeless Tobacco    Never Used     Family History   Problem Relation Age of Onset    Diabetes Mother     Heart disease Mother     Heart disease Father     Stroke Father     Cancer Family        Meds/Allergies     Prescriptions Prior to Admission   Medication    aspirin 81 MG tablet    carvedilol (COREG) 25 mg tablet    diltiazem (CARDIZEM CD) 240 mg 24 hr capsule    hydrochlorothiazide (HYDRODIURIL) 25 mg tablet    meclizine (ANTIVERT) 12 5 MG tablet    Multiple Vitamin (MULTI-VITAMIN DAILY PO)    Glucos-Chondroit-Hyaluron-MSM (GLUCOSAMINE CHONDROITIN JOINT) TABS    Probiotic Product (PROBIOTIC DAILY PO)    simethicone (MYLICON,GAS-X) 968 MG capsule     Current Facility-Administered Medications   Medication Dose Route Frequency    aluminum-magnesium hydroxide-simethicone (MYLANTA) 200-200-20 mg/5 mL oral suspension 15 mL  15 mL Oral Q4H PRN    aspirin chewable tablet 81 mg  81 mg Oral Daily    bisacodyl (DULCOLAX) rectal suppository 10 mg  10 mg Rectal Daily PRN    calcium carbonate (TUMS) chewable tablet 500 mg  500 mg Oral Daily PRN    carvedilol (COREG) tablet 25 mg  25 mg Oral BID    diltiazem (CARDIZEM CD) 24 hr capsule 240 mg  240 mg Oral Daily    docusate sodium (COLACE) capsule 100 mg  100 mg Oral BID    enoxaparin (LOVENOX) subcutaneous injection 40 mg  40 mg Subcutaneous Daily    hydrALAZINE (APRESOLINE) injection 5 mg  5 mg Intravenous Q6H PRN    insulin lispro (HumaLOG) 100 units/mL subcutaneous injection 1-5 Units  1-5 Units Subcutaneous HS    insulin lispro (HumaLOG) 100 units/mL subcutaneous injection 1-6 Units  1-6 Units Subcutaneous TID AC    lactulose 20 g/30 mL oral solution 20 g  20 g Oral TID PRN    meclizine (ANTIVERT) tablet 12 5 mg  12 5 mg Oral TID PRN    ondansetron (ZOFRAN) injection 4 mg  4 mg Intravenous Q6H PRN    polyethylene glycol (MIRALAX) packet 17 g  17 g Oral BID    saccharomyces boulardii (FLORASTOR) capsule 250 mg  250 mg Oral BID    senna-docusate sodium (SENOKOT S) 8 6-50 mg per tablet 2 tablet  2 tablet Oral HS    sodium chloride 0 9 % infusion  50 mL/hr Intravenous Continuous       Allergies   Allergen Reactions    Lisinopril Cough    Statins            Objective     Blood pressure 139/68, pulse 70, temperature 98 5 °F (36 9 °C), temperature source Oral, resp   rate 18, height 5' 2" (1 575 m), weight 102 kg (225 lb), SpO2 94 %, not currently breastfeeding  Body mass index is 41 15 kg/m²  Intake/Output Summary (Last 24 hours) at 07/27/18 1450  Last data filed at 07/27/18 1300   Gross per 24 hour   Intake             1260 ml   Output             1200 ml   Net               60 ml         PHYSICAL EXAM:      General Appearance:   Alert, cooperative, no distress   HEENT:   Normocephalic, atraumatic, anicteric      Neck:  Supple, symmetrical, trachea midline   Lungs:   Clear to auscultation bilaterally   Heart[de-identified]   Regular rate and rhythm; no murmur, rub, or gallop  Abdomen:   Soft, mild diffuse tenderness, mildly distended; normal bowel sounds   Genitalia:   Deferred    Rectal:   Deferred    Extremities:  No cyanosis, clubbing or edema    Pulses:  2+ and symmetric all extremities    Skin:  No jaundice, rashes, or lesions    Lymph nodes:  No palpable cervical lymphadenopathy        Lab Results:   Admission on 07/25/2018   No results displayed because visit has over 200 results  Imaging Studies: I have personally reviewed pertinent imaging studies

## 2018-07-27 NOTE — PROGRESS NOTES
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   Assessment & Plan    Patient discussed weight loss with primary care physician        Hyponatremia   Assessment & Plan    · Possibly related to hydrochlorothiazide use, excessive water intake, possible SIADH secondary to ongoing abdominal pain and nausea  · Patient improved with use of normal saline and then 1/2 normal saline, was restriction and discontinuation of hydrochlorothiazide  · Sodium on admission 111, currently 120  · Nephrologist restarted normal saline today, 50 cc an hour, continue with BMP q 6 hours  · Patient with history of chronic hyponatremia with an average sodium around 130          Chronic constipation   Assessment & Plan    · Patient with history of chronic constipation, evaluated by Gastroenterology in the past, she was started on Linzess that she did not take after few doses as this was provoking severe diarrhea  · Still no bowel movement while on multiple medications, patient complains of 2 episodes of diarrhea yesterday  · Possible overflow diarrhea?   If again diarrhea obtain stool sample for C difficile  · Appreciated gastroenterology consult, continue with current regimen, repeat enema today, if no bowel movement start GoLYTELY tomorrow  · Monitor clinically        Type 2 diabetes mellitus without complication, without long-term current use of insulin [facility])   Assessment & Plan    Lab Results   Component Value Date    HGBA1C 6 2 (H) 06/05/2018       Recent Labs      07/26/18   2105  07/27/18   0609  07/27/18   1052  07/27/18   1619   POCGLU  126  158*  144*  151*       Blood Sugar Average: Last 72 hrs:  (P) 141 8     Reports control blood sugar at home for which metformin was recently discontinued, remained stable here on insulin sliding scale        Essential hypertension   Assessment & Plan    · To be continued on Coreg and Cardizem, discontinue hydrochlorothiazide, do not resume upon discharge secondary to severe hyponatremia  · Blood pressure acceptable, continue to monitor        * Leukocytosis   Assessment & Plan    Patient with leukocytosis on the CBC this morning but at the time of CBC patient was non febrile  Last temperature check 100 1  Recent chest x-ray 7/25 without infiltrates, patient with no cough or respiratory symptoms so pneumonia is suspected  Recent CT abdomen and pelvis on July 25th unremarkable  Urinalysis on the day of admission within normal limits and patient with no urinary symptoms  The only complaint that the patient had this morning was of transient right upper quadrant pain and bloating  Now complains of bloating but right upper quadrant pain is resolved  On recent CT scan no inflammation of gallbladder or stones are visible  At this time, given in temperature obtain blood culture, start cefepime and Flagyl and obtain ultrasound of the right upper quadrant  Obtain procalcitonin  Plan to discontinue antibiotics if cultures, ultrasound, procalcitonin negative and patient remained stable with no fever          VTE Pharmacologic Prophylaxis: yes  Pharmacologic: Heparin  Mechanical VTE Prophylaxis in Place: Yes     Patient Centered Rounds: I have performed bedside rounds with nursing staff today      Discussions with Specialists or Other Care Team Provider:  Nephrology     Education and Discussions with Family / Patient:  Patient, multiple family members at bedside     Time Spent for Care: 35 minutes    More than 50% of total time spent on counseling and coordination of care as described above      Current Length of Stay: 2 day(s)     Current Patient Status: Inpatient   Certification Statement: The patient will continue to require additional inpatient hospital stay due to Please refer to above     Discharge Plan: To be determined     Code Status: Level 1 - Full Code    Subjective:   Patient is feeling well now  She had transient with soda of right upper quadrant pain and bloating this morning  Still some persistent bloating  Only complaint is that she did not yet had a bowel movement    Objective:     Vitals:   Temp (24hrs), Av 7 °F (37 1 °C), Min:97 9 °F (36 6 °C), Max:100 1 °F (37 8 °C)    HR:  [57-72] 65  Resp:  [18-20] 20  BP: (139-172)/(60-68) 159/68  SpO2:  [94 %-98 %] 94 %  Body mass index is 41 15 kg/m²  Input and Output Summary (last 24 hours): Intake/Output Summary (Last 24 hours) at 18 1756  Last data filed at 18 1501   Gross per 24 hour   Intake              780 ml   Output              925 ml   Net             -145 ml       Physical Exam:     Physical Exam   Constitutional: She is oriented to person, place, and time  She appears well-developed  Cardiovascular: Normal rate, regular rhythm and normal heart sounds  Exam reveals no friction rub  No murmur heard  Pulmonary/Chest: Effort normal  No respiratory distress  She has no wheezes  She has no rales  Abdominal: Soft  She exhibits no distension  There is no tenderness  There is no rebound  Musculoskeletal: She exhibits no edema  Neurological: She is alert and oriented to person, place, and time  She exhibits normal muscle tone  Skin: Skin is warm  Psychiatric: She has a normal mood and affect         Additional Data:     Labs:      Results from last 7 days  Lab Units 18  0504   WBC Thousand/uL 15 55*   HEMOGLOBIN g/dL 11 5   HEMATOCRIT % 33 5*   PLATELETS Thousands/uL 150   NEUTROS PCT % 92*   LYMPHS PCT % 2*   MONOS PCT % 5   EOS PCT % 0       Results from last 7 days  Lab Units 18  1505  18  0627   SODIUM mmol/L 120*  < > 111*   POTASSIUM mmol/L 4 7  < > 4 9   CHLORIDE mmol/L 88*  < > 80*   CO2 mmol/L 24  < > 22   BUN mg/dL 24  < > 13   CREATININE mg/dL 1 05  < > 0 74   CALCIUM mg/dL 8 7  < > 8 7   TOTAL PROTEIN g/dL  --   --  7 5   BILIRUBIN TOTAL mg/dL  --   --  0 88   ALK PHOS U/L  --   --  120*   ALT U/L  --   --  40   AST U/L  --   --  72*   GLUCOSE RANDOM mg/dL 169*  < > 176*   < > = values in this interval not displayed  * I Have Reviewed All Lab Data Listed Above  * Additional Pertinent Lab Tests Reviewed:  All Labs Within Last 24 Hours Reviewed    Imaging:    Imaging Reports Reviewed Today Include:  Images done during this admission  Imaging Personally Reviewed by Myself Includes:  None    Recent Cultures (last 7 days):           Last 24 Hours Medication List:     Current Facility-Administered Medications:  aluminum-magnesium hydroxide-simethicone 15 mL Oral Q4H PRN Olivia Dubois PA-C    aspirin 81 mg Oral Daily Valarie Horne MD    bisacodyl 10 mg Rectal Daily PRN Joni Lovett MD    calcium carbonate 500 mg Oral Daily PRN Olivia Dubois PA-C    carvedilol 25 mg Oral BID Joni Lovett MD    cefepime 2,000 mg Intravenous Q12H Pily Mcdonald MD    diltiazem 240 mg Oral Daily Valarie Horne MD    docusate sodium 100 mg Oral BID Pily Mcdonald MD    enoxaparin 40 mg Subcutaneous Daily Valarie Horne MD    hydrALAZINE 5 mg Intravenous Q6H PRN Tania Desai MD    insulin lispro 1-5 Units Subcutaneous HS Joni Lovett MD    insulin lispro 1-6 Units Subcutaneous TID AC Valarie Horne MD    lactulose 20 g Oral TID PRN Pily Mcdonald MD    meclizine 12 5 mg Oral TID PRN Joni Lovett MD    metroNIDAZOLE 500 mg Intravenous Q8H Pily Mcdonald MD    ondansetron 4 mg Intravenous Q6H PRN Valarie Horne MD    polyethylene glycol 17 g Oral BID Pily Mcdonald MD    saccharomyces boulardii 250 mg Oral BID Joni Lovett MD    senna-docusate sodium 2 tablet Oral HS Pily Mcdonald MD    sodium chloride 50 mL/hr Intravenous Yunior Maravilla MD Last Rate: 50 mL/hr (07/27/18 1040)        Today, Patient Was Seen By: Thu Nava MD    ** Please Note: Dragon 360 Dictation voice to text software may have been used in the creation of this document   **

## 2018-07-27 NOTE — SOCIAL WORK
Cm met with pt, sister Libertad Pena and her niece  Advised therapy recommending home PT, pt is refusing services at this time

## 2018-07-27 NOTE — PROGRESS NOTES
Rounds with [facility] (Dr Ted Correa)  Sodium to 120, NSS IVF restarted  GI consult for continued diarrhea

## 2018-07-27 NOTE — ASSESSMENT & PLAN NOTE
Lab Results   Component Value Date    HGBA1C 6 2 (H) 06/05/2018       Recent Labs      07/26/18   2105  07/27/18   0609  07/27/18   1052  07/27/18   1619   POCGLU  126  158*  144*  151*       Blood Sugar Average: Last 72 hrs:  (P) 141 8     Reports control blood sugar at home for which metformin was recently discontinued, remained stable here on insulin sliding scale

## 2018-07-27 NOTE — ASSESSMENT & PLAN NOTE
Patient with leukocytosis on the CBC this morning but at the time of CBC patient was non febrile  Last temperature check 100 1  Recent chest x-ray 7/25 without infiltrates, patient with no cough or respiratory symptoms so pneumonia is suspected  Recent CT abdomen and pelvis on July 25th unremarkable  Urinalysis on the day of admission within normal limits and patient with no urinary symptoms  The only complaint that the patient had this morning was of transient right upper quadrant pain and bloating    Now complains of bloating but right upper quadrant pain is resolved  On recent CT scan no inflammation of gallbladder or stones are visible  At this time, given in temperature obtain blood culture, start cefepime and Flagyl and obtain ultrasound of the right upper quadrant  Obtain procalcitonin  Plan to discontinue antibiotics if cultures, ultrasound, procalcitonin negative and patient remained stable with no fever

## 2018-07-27 NOTE — ASSESSMENT & PLAN NOTE
· Possibly related to hydrochlorothiazide use, excessive water intake, possible SIADH secondary to ongoing abdominal pain and nausea  · Patient improved with use of normal saline and then 1/2 normal saline, was restriction and discontinuation of hydrochlorothiazide  · Sodium on admission 111, currently 120  · Nephrologist restarted normal saline today, 50 cc an hour, continue with BMP q 6 hours  · Patient with history of chronic hyponatremia with an average sodium around 130

## 2018-07-28 LAB
ALBUMIN SERPL BCP-MCNC: 3.1 G/DL (ref 3.5–5)
ALBUMIN SERPL BCP-MCNC: 3.2 G/DL (ref 3.5–5)
ALP SERPL-CCNC: 91 U/L (ref 46–116)
ALP SERPL-CCNC: 95 U/L (ref 46–116)
ALT SERPL W P-5'-P-CCNC: 37 U/L (ref 12–78)
ALT SERPL W P-5'-P-CCNC: 37 U/L (ref 12–78)
ANION GAP SERPL CALCULATED.3IONS-SCNC: 6 MMOL/L (ref 4–13)
ANION GAP SERPL CALCULATED.3IONS-SCNC: 6 MMOL/L (ref 4–13)
ANION GAP SERPL CALCULATED.3IONS-SCNC: 7 MMOL/L (ref 4–13)
ANION GAP SERPL CALCULATED.3IONS-SCNC: 8 MMOL/L (ref 4–13)
AST SERPL W P-5'-P-CCNC: 25 U/L (ref 5–45)
AST SERPL W P-5'-P-CCNC: 28 U/L (ref 5–45)
BASOPHILS # BLD AUTO: 0.04 THOUSANDS/ΜL (ref 0–0.1)
BASOPHILS NFR BLD AUTO: 1 % (ref 0–1)
BILIRUB DIRECT SERPL-MCNC: 0.22 MG/DL (ref 0–0.2)
BILIRUB DIRECT SERPL-MCNC: 0.26 MG/DL (ref 0–0.2)
BILIRUB SERPL-MCNC: 0.62 MG/DL (ref 0.2–1)
BILIRUB SERPL-MCNC: 0.66 MG/DL (ref 0.2–1)
BUN SERPL-MCNC: 16 MG/DL (ref 5–25)
BUN SERPL-MCNC: 17 MG/DL (ref 5–25)
BUN SERPL-MCNC: 18 MG/DL (ref 5–25)
BUN SERPL-MCNC: 19 MG/DL (ref 5–25)
CALCIUM SERPL-MCNC: 8.1 MG/DL (ref 8.3–10.1)
CALCIUM SERPL-MCNC: 8.1 MG/DL (ref 8.3–10.1)
CALCIUM SERPL-MCNC: 8.3 MG/DL (ref 8.3–10.1)
CALCIUM SERPL-MCNC: 8.3 MG/DL (ref 8.3–10.1)
CHLORIDE SERPL-SCNC: 91 MMOL/L (ref 100–108)
CHLORIDE SERPL-SCNC: 93 MMOL/L (ref 100–108)
CHLORIDE UR-SCNC: 48 MMOL/L (ref 10–330)
CO2 SERPL-SCNC: 23 MMOL/L (ref 21–32)
CO2 SERPL-SCNC: 25 MMOL/L (ref 21–32)
CO2 SERPL-SCNC: 25 MMOL/L (ref 21–32)
CO2 SERPL-SCNC: 26 MMOL/L (ref 21–32)
CREAT SERPL-MCNC: 0.87 MG/DL (ref 0.6–1.3)
CREAT SERPL-MCNC: 0.93 MG/DL (ref 0.6–1.3)
CREAT SERPL-MCNC: 1.01 MG/DL (ref 0.6–1.3)
CREAT SERPL-MCNC: 1.09 MG/DL (ref 0.6–1.3)
EOSINOPHIL # BLD AUTO: 0.03 THOUSAND/ΜL (ref 0–0.61)
EOSINOPHIL NFR BLD AUTO: 0 % (ref 0–6)
ERYTHROCYTE [DISTWIDTH] IN BLOOD BY AUTOMATED COUNT: 12.3 % (ref 11.6–15.1)
GFR SERPL CREATININE-BSD FRML MDRD: 47 ML/MIN/1.73SQ M
GFR SERPL CREATININE-BSD FRML MDRD: 51 ML/MIN/1.73SQ M
GFR SERPL CREATININE-BSD FRML MDRD: 57 ML/MIN/1.73SQ M
GFR SERPL CREATININE-BSD FRML MDRD: 61 ML/MIN/1.73SQ M
GLUCOSE SERPL-MCNC: 133 MG/DL (ref 65–140)
GLUCOSE SERPL-MCNC: 158 MG/DL (ref 65–140)
GLUCOSE SERPL-MCNC: 160 MG/DL (ref 65–140)
GLUCOSE SERPL-MCNC: 160 MG/DL (ref 65–140)
GLUCOSE SERPL-MCNC: 162 MG/DL (ref 65–140)
GLUCOSE SERPL-MCNC: 173 MG/DL (ref 65–140)
GLUCOSE SERPL-MCNC: 218 MG/DL (ref 65–140)
GLUCOSE SERPL-MCNC: 224 MG/DL (ref 65–140)
HCT VFR BLD AUTO: 31.2 % (ref 34.8–46.1)
HGB BLD-MCNC: 10.5 G/DL (ref 11.5–15.4)
IMM GRANULOCYTES # BLD AUTO: 0.04 THOUSAND/UL (ref 0–0.2)
IMM GRANULOCYTES NFR BLD AUTO: 1 % (ref 0–2)
LYMPHOCYTES # BLD AUTO: 0.86 THOUSANDS/ΜL (ref 0.6–4.47)
LYMPHOCYTES NFR BLD AUTO: 11 % (ref 14–44)
MAGNESIUM SERPL-MCNC: 2.1 MG/DL (ref 1.6–2.6)
MCH RBC QN AUTO: 30.2 PG (ref 26.8–34.3)
MCHC RBC AUTO-ENTMCNC: 33.7 G/DL (ref 31.4–37.4)
MCV RBC AUTO: 90 FL (ref 82–98)
MONOCYTES # BLD AUTO: 0.56 THOUSAND/ΜL (ref 0.17–1.22)
MONOCYTES NFR BLD AUTO: 7 % (ref 4–12)
NEUTROPHILS # BLD AUTO: 6 THOUSANDS/ΜL (ref 1.85–7.62)
NEUTS SEG NFR BLD AUTO: 80 % (ref 43–75)
NRBC BLD AUTO-RTO: 0 /100 WBCS
OSMOLALITY UR: 388 MMOL/KG
PHOSPHATE SERPL-MCNC: 2.2 MG/DL (ref 2.3–4.1)
PLATELET # BLD AUTO: 115 THOUSANDS/UL (ref 149–390)
PMV BLD AUTO: 11.1 FL (ref 8.9–12.7)
POTASSIUM SERPL-SCNC: 4.1 MMOL/L (ref 3.5–5.3)
POTASSIUM SERPL-SCNC: 4.2 MMOL/L (ref 3.5–5.3)
POTASSIUM SERPL-SCNC: 4.2 MMOL/L (ref 3.5–5.3)
POTASSIUM SERPL-SCNC: 4.3 MMOL/L (ref 3.5–5.3)
PROT SERPL-MCNC: 6.2 G/DL (ref 6.4–8.2)
PROT SERPL-MCNC: 6.2 G/DL (ref 6.4–8.2)
RBC # BLD AUTO: 3.48 MILLION/UL (ref 3.81–5.12)
SODIUM 24H UR-SCNC: 45 MOL/L
SODIUM SERPL-SCNC: 120 MMOL/L (ref 136–145)
SODIUM SERPL-SCNC: 122 MMOL/L (ref 136–145)
SODIUM SERPL-SCNC: 125 MMOL/L (ref 136–145)
SODIUM SERPL-SCNC: 125 MMOL/L (ref 136–145)
URATE SERPL-MCNC: 3.8 MG/DL (ref 2–6.8)
WBC # BLD AUTO: 7.53 THOUSAND/UL (ref 4.31–10.16)

## 2018-07-28 PROCEDURE — 82948 REAGENT STRIP/BLOOD GLUCOSE: CPT

## 2018-07-28 PROCEDURE — 99232 SBSQ HOSP IP/OBS MODERATE 35: CPT | Performed by: INTERNAL MEDICINE

## 2018-07-28 PROCEDURE — 84550 ASSAY OF BLOOD/URIC ACID: CPT | Performed by: INTERNAL MEDICINE

## 2018-07-28 PROCEDURE — 83935 ASSAY OF URINE OSMOLALITY: CPT | Performed by: INTERNAL MEDICINE

## 2018-07-28 PROCEDURE — 36569 INSJ PICC 5 YR+ W/O IMAGING: CPT

## 2018-07-28 PROCEDURE — 80076 HEPATIC FUNCTION PANEL: CPT | Performed by: PHYSICIAN ASSISTANT

## 2018-07-28 PROCEDURE — 99232 SBSQ HOSP IP/OBS MODERATE 35: CPT | Performed by: PHYSICIAN ASSISTANT

## 2018-07-28 PROCEDURE — 84100 ASSAY OF PHOSPHORUS: CPT | Performed by: INTERNAL MEDICINE

## 2018-07-28 PROCEDURE — 82436 ASSAY OF URINE CHLORIDE: CPT | Performed by: INTERNAL MEDICINE

## 2018-07-28 PROCEDURE — 80076 HEPATIC FUNCTION PANEL: CPT | Performed by: INTERNAL MEDICINE

## 2018-07-28 PROCEDURE — 85025 COMPLETE CBC W/AUTO DIFF WBC: CPT | Performed by: INTERNAL MEDICINE

## 2018-07-28 PROCEDURE — 80048 BASIC METABOLIC PNL TOTAL CA: CPT | Performed by: INTERNAL MEDICINE

## 2018-07-28 PROCEDURE — 83735 ASSAY OF MAGNESIUM: CPT | Performed by: INTERNAL MEDICINE

## 2018-07-28 PROCEDURE — 02HV33Z INSERTION OF INFUSION DEVICE INTO SUPERIOR VENA CAVA, PERCUTANEOUS APPROACH: ICD-10-PCS | Performed by: INTERNAL MEDICINE

## 2018-07-28 PROCEDURE — 84300 ASSAY OF URINE SODIUM: CPT | Performed by: INTERNAL MEDICINE

## 2018-07-28 PROCEDURE — C1751 CATH, INF, PER/CENT/MIDLINE: HCPCS

## 2018-07-28 RX ORDER — POLYETHYLENE GLYCOL 3350 17 G/17G
17 POWDER, FOR SOLUTION ORAL DAILY
Status: DISCONTINUED | OUTPATIENT
Start: 2018-07-29 | End: 2018-07-30

## 2018-07-28 RX ADMIN — DOCUSATE SODIUM 100 MG: 100 CAPSULE, LIQUID FILLED ORAL at 17:59

## 2018-07-28 RX ADMIN — ASPIRIN 81 MG 81 MG: 81 TABLET ORAL at 08:44

## 2018-07-28 RX ADMIN — DICYCLOMINE HYDROCHLORIDE 10 MG: 10 CAPSULE ORAL at 21:21

## 2018-07-28 RX ADMIN — Medication 250 MG: at 17:59

## 2018-07-28 RX ADMIN — Medication 250 MG: at 08:44

## 2018-07-28 RX ADMIN — SODIUM CHLORIDE 75 ML/HR: 0.9 INJECTION, SOLUTION INTRAVENOUS at 12:00

## 2018-07-28 RX ADMIN — POLYETHYLENE GLYCOL 3350 17 G: 17 POWDER, FOR SOLUTION ORAL at 08:45

## 2018-07-28 RX ADMIN — CARVEDILOL 25 MG: 25 TABLET, FILM COATED ORAL at 08:45

## 2018-07-28 RX ADMIN — SENNOSIDES AND DOCUSATE SODIUM 2 TABLET: 8.6; 5 TABLET ORAL at 21:21

## 2018-07-28 RX ADMIN — ENOXAPARIN SODIUM 40 MG: 40 INJECTION SUBCUTANEOUS at 08:45

## 2018-07-28 RX ADMIN — INSULIN LISPRO 2 UNITS: 100 INJECTION, SOLUTION INTRAVENOUS; SUBCUTANEOUS at 16:46

## 2018-07-28 RX ADMIN — METRONIDAZOLE 500 MG: 500 INJECTION, SOLUTION INTRAVENOUS at 13:29

## 2018-07-28 RX ADMIN — METRONIDAZOLE 500 MG: 500 INJECTION, SOLUTION INTRAVENOUS at 21:21

## 2018-07-28 RX ADMIN — DILTIAZEM HYDROCHLORIDE 240 MG: 120 CAPSULE, COATED, EXTENDED RELEASE ORAL at 08:44

## 2018-07-28 RX ADMIN — INSULIN LISPRO 1 UNITS: 100 INJECTION, SOLUTION INTRAVENOUS; SUBCUTANEOUS at 10:54

## 2018-07-28 RX ADMIN — DICYCLOMINE HYDROCHLORIDE 10 MG: 10 CAPSULE ORAL at 10:53

## 2018-07-28 RX ADMIN — CARVEDILOL 25 MG: 25 TABLET, FILM COATED ORAL at 17:59

## 2018-07-28 RX ADMIN — ONDANSETRON 4 MG: 2 INJECTION, SOLUTION INTRAMUSCULAR; INTRAVENOUS at 21:30

## 2018-07-28 RX ADMIN — DICYCLOMINE HYDROCHLORIDE 10 MG: 10 CAPSULE ORAL at 15:32

## 2018-07-28 RX ADMIN — INSULIN LISPRO 2 UNITS: 100 INJECTION, SOLUTION INTRAVENOUS; SUBCUTANEOUS at 21:22

## 2018-07-28 RX ADMIN — DOCUSATE SODIUM 100 MG: 100 CAPSULE, LIQUID FILLED ORAL at 08:44

## 2018-07-28 RX ADMIN — DIBASIC SODIUM PHOSPHATE, MONOBASIC POTASSIUM PHOSPHATE AND MONOBASIC SODIUM PHOSPHATE 2 TABLET: 852; 155; 130 TABLET ORAL at 10:51

## 2018-07-28 RX ADMIN — CEFEPIME HYDROCHLORIDE 2000 MG: 2 INJECTION, POWDER, FOR SOLUTION INTRAVENOUS at 17:59

## 2018-07-28 NOTE — ASSESSMENT & PLAN NOTE
· Patient with history of chronic constipation, evaluated by Gastroenterology in the past, she was started on Linzess that she did not take after few doses as this was provoking severe diarrhea  · Patient was evaluated by Gastroenterology, input was appreciated, patient was started on Bentyl for possible irritable bowel with constipation   · Patient had bowel movement today, she feels symptomatically improved

## 2018-07-28 NOTE — PROGRESS NOTES
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   Assessment & Plan    Patient discussed weight loss with primary care physician        Hyponatremia   Assessment & Plan    · Possibly related to hydrochlorothiazide use, excessive water intake, possible SIADH secondary to ongoing abdominal pain and nausea  · Patient improved with use of normal saline and then 1/2 normal saline, was restriction and discontinuation of hydrochlorothiazide  · Sodium on admission 111, currently 122  · Continue with normal saline as per Nephrology recommendation, 75 cc an hour, continue with BMP q 6 hours  · Patient with history of chronic hyponatremia with an average sodium around 130          Chronic constipation   Assessment & Plan    · Patient with history of chronic constipation, evaluated by Gastroenterology in the past, she was started on Linzess that she did not take after few doses as this was provoking severe diarrhea  · Patient was evaluated by Gastroenterology, input was appreciated, patient was started on Bentyl for possible irritable bowel with constipation   · Patient had bowel movement today, she feels symptomatically improved        Type 2 diabetes mellitus without complication, without long-term current use of insulin [facility])   Assessment & Plan    Lab Results   Component Value Date    HGBA1C 6 2 (H) 06/05/2018       Recent Labs      07/27/18   2111  07/28/18   0655  07/28/18   1052  07/28/18   1607   POCGLU  200*  133  158*  224*       Blood Sugar Average: Last 72 hrs:  (P) 152 [id number]     Reports control blood sugar at home for which metformin was recently discontinued, remained stable here on insulin sliding scale        Essential hypertension   Assessment & Plan    · To be continued on Coreg and Cardizem, discontinue hydrochlorothiazide, do not resume upon discharge secondary to severe hyponatremia  · Blood pressure acceptable, continue to monitor        * Leukocytosis   Assessment & Plan    Patient with leukocytosis on the CBC on July 27 but at the time of CBC patient was non febrile  Maximum temperature 100 1  Recent chest x-ray 7/25 without infiltrates, patient with no cough or respiratory symptoms so pneumonia is suspected  Recent CT abdomen and pelvis on July 25th unremarkable  Urinalysis on the day of admission within normal limits and patient with no urinary symptoms  The only complaint that the patient had the morning in question was of transient right upper quadrant pain and bloating  On recent CT scan no inflammation of gallbladder or stones are visible  For now, continue with cefepime and Flagyl and obtain ultrasound of the right upper quadrant    Plan to discontinue antibiotics if cultures, ultrasound negative and patient remained stable with no fever          VTE Pharmacologic Prophylaxis: yes  Pharmacologic: Heparin  Mechanical VTE Prophylaxis in Place: Yes     Patient Centered Rounds: I have performed bedside rounds with nursing staff today      Discussions with Specialists or Other Care Team Provider:  Nephrology, GI     Education and Discussions with Family / Patient:  Patient, nephew bedside     Time Spent for Care: 40 minutes   More than 50% of total time spent on counseling and coordination of care as described above      Current Length of Stay: 3 day(s)     Current Patient Status: Inpatient   Certification Statement: The patient will continue to require additional inpatient hospital stay due to Please refer to above     Discharge Plan:  To be determined     Code Status: Level 1 - Full Code       Subjective:   Patient is feeling well today  She has no complaints    She had bowel movement and now she is feeling relieved  Objective:     Vitals:   Temp (24hrs), Av 4 °F (36 9 °C), Min:97 9 °F (36 6 °C), Max:98 9 °F (37 2 °C)    HR:  [59-67] 61  Resp:  [18-24] 20  BP: (118-174)/(57-74) 141/66  SpO2:  [95 %-99 %] 99 %  Body mass index is 41 15 kg/m²  Input and Output Summary (last 24 hours): Intake/Output Summary (Last 24 hours) at 18 1651  Last data filed at 18 1229   Gross per 24 hour   Intake             1072 ml   Output             1550 ml   Net             -478 ml       Physical Exam:     Physical Exam   Constitutional: She is oriented to person, place, and time  She appears well-developed  Cardiovascular: Normal rate, regular rhythm and normal heart sounds  Exam reveals no friction rub  No murmur heard  Pulmonary/Chest: Effort normal  No respiratory distress  She has no wheezes  She has no rales  Abdominal: Soft  She exhibits no distension  There is no tenderness  There is no rebound  Musculoskeletal: She exhibits no edema  Neurological: She is alert and oriented to person, place, and time  She exhibits normal muscle tone  Skin: Skin is warm  Psychiatric: She has a normal mood and affect  Additional Data:     Labs:      Results from last 7 days  Lab Units 18  0458   WBC Thousand/uL 7 53   HEMOGLOBIN g/dL 10 5*   HEMATOCRIT % 31 2*   PLATELETS Thousands/uL 115*   NEUTROS PCT % 80*   LYMPHS PCT % 11*   MONOS PCT % 7   EOS PCT % 0       Results from last 7 days  Lab Units 18  1342 18  1339   SODIUM mmol/L 125*  --    POTASSIUM mmol/L 4 3  --    CHLORIDE mmol/L 91*  --    CO2 mmol/L 26  --    BUN mg/dL 18  --    CREATININE mg/dL 1 09  --    CALCIUM mg/dL 8 1*  --    TOTAL PROTEIN g/dL  --  6 2*   BILIRUBIN TOTAL mg/dL  --  0 62   ALK PHOS U/L  --  95   ALT U/L  --  37   AST U/L  --  28   GLUCOSE RANDOM mg/dL 162*  --            * I Have Reviewed All Lab Data Listed Above  * Additional Pertinent Lab Tests Reviewed:  All Labs Within Last 24 Hours Reviewed    Imaging:    Imaging Reports Reviewed Today Include:  Pending abdominal ultrasound  Imaging Personally Reviewed by Myself Includes:  None    Recent Cultures (last 7 days):           Last 24 Hours Medication List:     Current Facility-Administered Medications:  aluminum-magnesium hydroxide-simethicone 15 mL Oral Q4H PRN Zaida Jones PA-C    aspirin 81 mg Oral Daily Valarie Horne MD    bisacodyl 10 mg Rectal Daily PRN Mame Russ MD    calcium carbonate 500 mg Oral Daily PRN Zaida Jones PA-C    carvedilol 25 mg Oral BID Mame Russ MD    cefepime 2,000 mg Intravenous Q12H Maxwell Duvall MD Last Rate: Stopped (07/27/18 1930)   dicyclomine 10 mg Oral 4x Daily (AC & HS) Grazyna Higgins MD    diltiazem 240 mg Oral Daily Valarie Horne MD    docusate sodium 100 mg Oral BID Maxwell Duvall MD    enoxaparin 40 mg Subcutaneous Daily Mame Russ MD    hydrALAZINE 5 mg Intravenous Q6H PRN Derrick Fothergill, MD    insulin lispro 1-5 Units Subcutaneous HS Mame Russ MD    insulin lispro 1-6 Units Subcutaneous TID AC Valarie Horne MD    lactulose 20 g Oral TID PRN Maxwell Duvall MD    meclizine 12 5 mg Oral TID PRN Mame Russ MD    metroNIDAZOLE 500 mg Intravenous Q8H Maxwell Duvall MD Last Rate: 500 mg (07/28/18 1329)   ondansetron 4 mg Intravenous Q6H PRN Mame Russ MD    [START ON 7/29/2018] polyethylene glycol 17 g Oral Daily Eloy Cruz PA-C    saccharomyces boulardii 250 mg Oral BID Mame Russ MD    senna-docusate sodium 2 tablet Oral HS Maxwell Duvall MD    sodium chloride 75 mL/hr Intravenous Continuous Westminsterrinku Krishna MD Last Rate: 75 mL/hr (07/28/18 1200)        Today, Patient Was Seen By: Maxwell Duvall MD    ** Please Note: Dragon 360 Dictation voice to text software may have been used in the creation of this document   **

## 2018-07-28 NOTE — ASSESSMENT & PLAN NOTE
Lab Results   Component Value Date    HGBA1C 6 2 (H) 06/05/2018       Recent Labs      07/27/18   2111  07/28/18   0655  07/28/18   1052  07/28/18   1607   POCGLU  200*  133  158*  224*       Blood Sugar Average: Last 72 hrs:  (P) 152 [id number]     Reports control blood sugar at home for which metformin was recently discontinued, remained stable here on insulin sliding scale

## 2018-07-28 NOTE — PROGRESS NOTES
NEPHROLOGY PROGRESS NOTE   Sameer Maynard 80 y o  female MRN: [id number]  Unit/Bed#: [facility] 509-01 Encounter: [id number]  Reason for Consult: hyponatremia    ASSESSMENT AND PLAN:  Patient is a 80-year-old female with significant medical issues of diabetes, hypertension for many years, presented with worsening constipation  Nena Murrell was found to have severe hyponatremia and we are consulted for the same      Severe hyponatremia, acute versus acute on chronic, previous serum sodium in low to mid 130s in 2017  -serum sodium 111 on admission, initially improved rapidly requiring half normal saline    -started on IV normal saline yesterday and eventually increased rate to 75 ml/hour  Since 5 AM she remains off IVF due to no IV access  -BMP Q six hourly  -patient's nausea has much improved   She remains asymptomatic  -hyponatremia could be secondary to subtle hypovolemia, use of HCTZ, significant free water intake (80 to 90 oz total liquid intake daily) in the setting of nausea may cause SIADH as well   -remains off  HCTZ  -serum osmolality 252, urine chloride 21, urine sodium 17 and urine osmolality 224    - repeat urine lytes  -strict fluid restriction 1 2 L per day     Hypertension  -blood pressure overall acceptable with occasional higher readings    -continue to monitor on carvedilol and diltiazem   Holding HCTZ  -avoid low BP   Goal SBP 130s     Constipation, management as per primary team      Discussed with Dr Vila [place]:  Patient seen and examined at bedside  No chest pain, shortness of breath, nausea, vomiting, abdominal pain or diarrhea  No urinary complaints       OBJECTIVE:  Current Weight: Weight - Scale: 102 kg (225 lb)  Vitals:    07/28/18 0705   BP: 170/74   Pulse: 59   Resp: 21   Temp: 98 6 °F (37 °C)   SpO2: 95%       Intake/Output Summary (Last 24 hours) at 07/28/18 1021  Last data filed at 07/28/18 0826   Gross per 24 hour   Intake             1252 ml   Output             1100 ml   Net 152 ml       Physical Examination:  General: no acute distress   Eyes: no conjunctival pallor  ENT: external exam of ear and nose normal  Neck: supple  Respiratory:  B/l air entry present  CVS: s1, s2 present  GI: soft NT ND BS+nt  CNS: AAOX3  Extremities: no edema in LE  Skin: no new rash in legs    Medications:    Current Facility-Administered Medications:     aluminum-magnesium hydroxide-simethicone (MYLANTA) 200-200-20 mg/5 mL oral suspension 15 mL, 15 mL, Oral, Q4H PRN, Lizett Madden PA-C, 15 mL at 07/26/18 2314    aspirin chewable tablet 81 mg, 81 mg, Oral, Daily, Valarie Horne MD, 81 mg at 07/28/18 0844    bisacodyl (DULCOLAX) rectal suppository 10 mg, 10 mg, Rectal, Daily PRN, Teresa Freitas MD, 10 mg at 07/26/18 1747    calcium carbonate (TUMS) chewable tablet 500 mg, 500 mg, Oral, Daily PRN, Margie Vazquez PA-C, 500 mg at 07/27/18 0150    carvedilol (COREG) tablet 25 mg, 25 mg, Oral, BID, Valarie Horne MD, 25 mg at 07/28/18 0845    cefepime (MAXIPIME) 2,000 mg in dextrose 5 % 50 mL IVPB, 2,000 mg, Intravenous, Q12H, Elbert Casas MD, Stopped at 07/27/18 1930    dicyclomine (BENTYL) capsule 10 mg, 10 mg, Oral, 4x Daily (AC & HS), Ronny Mcburney, MD, 10 mg at 07/27/18 2118    diltiazem (CARDIZEM CD) 24 hr capsule 240 mg, 240 mg, Oral, Daily, Valarie Horne MD, 240 mg at 07/28/18 0844    docusate sodium (COLACE) capsule 100 mg, 100 mg, Oral, BID, Elbert Casas MD, 100 mg at 07/28/18 0844    enoxaparin (LOVENOX) subcutaneous injection 40 mg, 40 mg, Subcutaneous, Daily, Valarie Horne MD, 40 mg at 07/28/18 0845    hydrALAZINE (APRESOLINE) injection 5 mg, 5 mg, Intravenous, Q6H PRN, Eric Guardado MD, 5 mg at 07/26/18 2012    insulin lispro (HumaLOG) 100 units/mL subcutaneous injection 1-5 Units, 1-5 Units, Subcutaneous, HS, Valarie Horne MD, 1 Units at 07/27/18 2118    insulin lispro (HumaLOG) 100 units/mL subcutaneous injection 1-6 Units, 1-6 Units, Subcutaneous, TID AC, 1 Units at 07/27/18 1633 **AND** Fingerstick Glucose (POCT), , , TID AC, Valarie Horne MD    lactulose 20 g/30 mL oral solution 20 g, 20 g, Oral, TID PRN, Brigitte Estrada MD, 20 g at 07/26/18 1747    meclizine (ANTIVERT) tablet 12 5 mg, 12 5 mg, Oral, TID PRN, Jane Barnes MD    metroNIDAZOLE (FLAGYL) IVPB (premix) 500 mg, 500 mg, Intravenous, Q8H, Brigitte Estrada MD, Last Rate: 200 mL/hr at 07/27/18 2119, 500 mg at 07/27/18 2119    ondansetron (ZOFRAN) injection 4 mg, 4 mg, Intravenous, Q6H PRN, Jane Barnes MD    polyethylene glycol (MIRALAX) packet 17 g, 17 g, Oral, BID, Brigitte Estrada MD, 17 g at 07/28/18 0845    potassium-sodium phosphateS (K-PHOS,PHOSPHA 250) -250 mg tablet 2 tablet, 2 tablet, Oral, Once, Brigitte Estrada MD    saccharomyces boulardii (FLORASTOR) capsule 250 mg, 250 mg, Oral, BID, Jane Barnes MD, 250 mg at 07/28/18 0844    senna-docusate sodium (SENOKOT S) 8 6-50 mg per tablet 2 tablet, 2 tablet, Oral, HS, Brigitte Estrada MD, 2 tablet at 07/27/18 2118    sodium chloride 0 9 % infusion, 75 mL/hr, Intravenous, Continuous, Rebekah Hairston MD, Stopped at 07/28/18 0500    Laboratory Results:    Results from last 7 days  Lab Units 07/28/18  0804 07/28/18  0458 07/28/18  0322 07/27/18  2056 07/27/18  1505 07/27/18  0915 07/27/18  0504 07/27/18  0008  07/25/18  0627   WBC Thousand/uL  --  7 53  --   --   --   --  15 55*  --   --  7 66   HEMOGLOBIN g/dL  --  10 5*  --   --   --   --  11 5  --   --  13 1   HEMATOCRIT %  --  31 2*  --   --   --   --  33 5*  --   --  36 3   PLATELETS Thousands/uL  --  115*  --   --   --   --  150  --   --  161   SODIUM mmol/L 122*  --  120* 121* 120* 120* 122* 121*  < > 111*   POTASSIUM mmol/L 4 2  --  4 2 4 3 4 7 4 3 4 2 4 0  < > 4 9   CHLORIDE mmol/L 91*  --  91* 90* 88* 88* 92* 90*  < > 80*   CO2 mmol/L 25  --  23 25 24 25 23 23  < > 22   BUN mg/dL 16  --  19 23 24 21 22 23  < > 13 CREATININE mg/dL 0 93  --  0 87 0 98 1 05 1 21 0 96 1 04  < > 0 74   CALCIUM mg/dL 8 3  --  8 1* 8 1* 8 7 8 9 8 4 8 5  < > 8 7   MAGNESIUM mg/dL  --   --  2 1  --   --   --  2 2  --   --   --    PHOSPHORUS mg/dL  --   --  2 2*  --   --   --  2 7  --   --   --    TOTAL PROTEIN g/dL  --   --   --   --   --   --   --   --   --  7 5   GLUCOSE RANDOM mg/dL 160*  --  160* 176* 169* 192* 151* 124  < > 176*   < > = values in this interval not displayed  No results found for this or any previous visit  Results for orders placed during the hospital encounter of 07/25/18   XR chest pa & lateral    Narrative CHEST     INDICATION:, Evaluate for neoplasm    COMPARISON:  October 17, 2017    EXAM PERFORMED/VIEWS:  XR CHEST PA & LATERAL      FINDINGS:    Cardiomegaly seen    No acute consolidation or congestion seen  Calcification seen in the left upper lung suggest granuloma  This is unchanged from the previous study of October 17, 2017  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  Scoliosis with convexity to the right side seen      Impression Cardiomegaly  No acute consolidation or congestion  Left upper lobe lung nodule suggest granuloma, stable since the previous study of October 17, 2017    Workstation performed: [id number]       No results found for this or any previous visit  No results found for this or any previous visit  No results found for this or any previous visit  No results found for this or any previous visit  Portions of the record may have been created with voice recognition software  Occasional wrong word or "sound a like" substitutions may have occurred due to the inherent limitations of voice recognition software  Read the chart carefully and recognize, using context, where substitutions have occurred

## 2018-07-28 NOTE — ASSESSMENT & PLAN NOTE
· Possibly related to hydrochlorothiazide use, excessive water intake, possible SIADH secondary to ongoing abdominal pain and nausea  · Patient improved with use of normal saline and then 1/2 normal saline, was restriction and discontinuation of hydrochlorothiazide  · Sodium on admission 111, currently 122  · Continue with normal saline as per Nephrology recommendation, 75 cc an hour, continue with BMP q 6 hours  · Patient with history of chronic hyponatremia with an average sodium around 130

## 2018-07-28 NOTE — ASSESSMENT & PLAN NOTE
Patient with leukocytosis on the CBC on July 27 but at the time of CBC patient was non febrile  Maximum temperature 100 1  Recent chest x-ray 7/25 without infiltrates, patient with no cough or respiratory symptoms so pneumonia is suspected  Recent CT abdomen and pelvis on July 25th unremarkable  Urinalysis on the day of admission within normal limits and patient with no urinary symptoms  The only complaint that the patient had the morning in question was of transient right upper quadrant pain and bloating  On recent CT scan no inflammation of gallbladder or stones are visible  For now, continue with cefepime and Flagyl and obtain ultrasound of the right upper quadrant    Plan to discontinue antibiotics if cultures, ultrasound negative and patient remained stable with no fever

## 2018-07-28 NOTE — PROGRESS NOTES
Progress Note Sherri Patel 80 y o  female MRN: [id number]    Unit/Bed#: [facility] 509-01 Encounter: [id number]      ASSESSMENT/ PLAN:   1  Chronic constipation:  She was given enema and MiraLax was increased to 2 times daily  Patient's nurse admits to bowel movement last evening and episode of diarrhea today  -decrease miralax to once daily   -continue senna  -if diarrhea persists, recommend d/c both miralax and senna until resolves     2  RUQ pain:  Episode of right upper quadrant abdominal pain yesterday  Internal Medicine states she had slightly elevated temperature and u/s was ordered  Pain now resolved  This was most likely secondary to her constipation  -check LFTs  -f/u RUQ u/s    Subjective:     Patient seen and examined     Objective:     Vitals: Blood pressure (!) 174/70, pulse 67, temperature 98 5 °F (36 9 °C), temperature source Oral, resp  rate 22, height 5' 2" (1 575 m), weight 102 kg (225 lb), SpO2 96 %, not currently breastfeeding  ,Body mass index is 41 15 kg/m²  Intake/Output Summary (Last 24 hours) at 07/28/18 1313  Last data filed at 07/28/18 1229   Gross per 24 hour   Intake             1072 ml   Output             1725 ml   Net             -653 ml       Physical Exam:     Physical Exam   Constitutional: She is oriented to person, place, and time  She appears well-developed and well-nourished  HENT:   Head: Normocephalic and atraumatic  Neck: Normal range of motion  Neck supple  Cardiovascular: Normal rate, regular rhythm and normal heart sounds  Exam reveals no gallop and no friction rub  No murmur heard  Pulmonary/Chest: Effort normal and breath sounds normal  No respiratory distress  She has no wheezes  She has no rales  Abdominal: Soft  Bowel sounds are normal  She exhibits no distension  There is no tenderness  There is no rebound  Musculoskeletal: Normal range of motion  She exhibits no edema, tenderness or deformity     Neurological: She is alert and oriented to person, place, and time  Skin: Skin is warm and dry  Psychiatric: She has a normal mood and affect  Invasive Devices     Peripherally Inserted Central Catheter Line            PICC Line 98/97/37 Right Basilic less than 1 day                Lab Results:      Results from last 7 days  Lab Units 07/28/18  0458   WBC Thousand/uL 7 53   HEMOGLOBIN g/dL 10 5*   HEMATOCRIT % 31 2*   PLATELETS Thousands/uL 115*   NEUTROS PCT % 80*   LYMPHS PCT % 11*   MONOS PCT % 7   EOS PCT % 0       Results from last 7 days  Lab Units 07/28/18  0804  07/25/18  0627   SODIUM mmol/L 122*  < > 111*   POTASSIUM mmol/L 4 2  < > 4 9   CHLORIDE mmol/L 91*  < > 80*   CO2 mmol/L 25  < > 22   BUN mg/dL 16  < > 13   CREATININE mg/dL 0 93  < > 0 74   CALCIUM mg/dL 8 3  < > 8 7   TOTAL PROTEIN g/dL  --   --  7 5   BILIRUBIN TOTAL mg/dL  --   --  0 88   ALK PHOS U/L  --   --  120*   ALT U/L  --   --  40   AST U/L  --   --  72*   GLUCOSE RANDOM mg/dL 160*  < > 176*   < > = values in this interval not displayed  Results from last 7 days  Lab Units 07/25/18  0627   LIPASE u/L 104       Imaging Studies: I have personally reviewed pertinent imaging studies  Xr Chest Pa & Lateral    Result Date: 7/25/2018  Impression: Cardiomegaly No acute consolidation or congestion Left upper lobe lung nodule suggest granuloma, stable since the previous study of October 17, 2017    Ct Abdomen Pelvis With Contrast    Result Date: 7/25/2018  Impression: No acute intra-abdominal abnormality

## 2018-07-28 NOTE — PROCEDURES
Insert PICC line  Date/Time: 7/28/2018 12:01 PM  Performed by: Kaylee Freitas by: Paulo Huitron     Patient location:  Bedside  Other Assisting Provider: Yes (comment) (Catarino Burgos)    Consent:     Consent obtained:  Written    Consent given by:  Patient    Procedural risks discussed: Consent per md     Alternatives discussed: Consent per md   Universal protocol:     Procedure explained and questions answered to patient or proxy's satisfaction: yes      Relevant documents present and verified: yes      Test results available and properly labeled: yes      Radiology Images displayed and confirmed  If images not available, report reviewed: yes      Required blood products, implants, devices, and special equipment available: yes      Site/side marked: yes      Immediately prior to procedure, a time out was called: yes      Patient identity confirmed:  Verbally with patient and arm band  Pre-procedure details:     Hand hygiene: Hand hygiene performed prior to insertion      Sterile barrier technique: All elements of maximal sterile technique followed      Skin preparation:  ChloraPrep    Skin preparation agent: Skin preparation agent completely dried prior to procedure    Indications:     PICC line indications: no peripheral vascular access    Anesthesia (see MAR for exact dosages):      Anesthesia method:  Local infiltration    Local anesthetic:  Lidocaine 1% w/o epi  Procedure details:     Location:  Basilic    Vessel type: vein      Laterality:  Right    Approach: percutaneous technique used      Patient position:  Flat    Procedural supplies:  Double lumen    Catheter size:  5 Fr    Landmarks identified: yes      Ultrasound guidance: yes      Sterile ultrasound techniques: Sterile gel and sterile probe covers were used      Number of attempts:  1    Successful placement: yes      Vessel of catheter tip end:  Sherlock 3CG confirmed    Total catheter length (cm):  45    Catheter out on skin (cm):  0    Max flow rate:  999ml/hr    Arm circumference:  34  Post-procedure details:     Post-procedure:  Dressing applied and securement device placed    Assessment:  Blood return through all ports and free fluid flow    Post-procedure complications: none      Patient tolerance of procedure:   Tolerated well, no immediate complications

## 2018-07-29 ENCOUNTER — APPOINTMENT (INPATIENT)
Dept: RADIOLOGY | Facility: HOSPITAL | Age: 83
DRG: 644 | End: 2018-07-29
Payer: COMMERCIAL

## 2018-07-29 LAB
ALBUMIN SERPL BCP-MCNC: 3.2 G/DL (ref 3.5–5)
ALP SERPL-CCNC: 110 U/L (ref 46–116)
ALT SERPL W P-5'-P-CCNC: 43 U/L (ref 12–78)
ANION GAP SERPL CALCULATED.3IONS-SCNC: 4 MMOL/L (ref 4–13)
ANION GAP SERPL CALCULATED.3IONS-SCNC: 6 MMOL/L (ref 4–13)
ANION GAP SERPL CALCULATED.3IONS-SCNC: 6 MMOL/L (ref 4–13)
ANION GAP SERPL CALCULATED.3IONS-SCNC: 9 MMOL/L (ref 4–13)
AST SERPL W P-5'-P-CCNC: 32 U/L (ref 5–45)
BASOPHILS # BLD AUTO: 0.04 THOUSANDS/ΜL (ref 0–0.1)
BASOPHILS NFR BLD AUTO: 1 % (ref 0–1)
BILIRUB DIRECT SERPL-MCNC: 0.19 MG/DL (ref 0–0.2)
BILIRUB SERPL-MCNC: 0.43 MG/DL (ref 0.2–1)
BUN SERPL-MCNC: 14 MG/DL (ref 5–25)
BUN SERPL-MCNC: 15 MG/DL (ref 5–25)
BUN SERPL-MCNC: 15 MG/DL (ref 5–25)
BUN SERPL-MCNC: 17 MG/DL (ref 5–25)
CALCIUM SERPL-MCNC: 7.9 MG/DL (ref 8.3–10.1)
CALCIUM SERPL-MCNC: 8 MG/DL (ref 8.3–10.1)
CALCIUM SERPL-MCNC: 8.1 MG/DL (ref 8.3–10.1)
CALCIUM SERPL-MCNC: 8.2 MG/DL (ref 8.3–10.1)
CHLORIDE SERPL-SCNC: 94 MMOL/L (ref 100–108)
CHLORIDE SERPL-SCNC: 94 MMOL/L (ref 100–108)
CHLORIDE SERPL-SCNC: 96 MMOL/L (ref 100–108)
CHLORIDE SERPL-SCNC: 96 MMOL/L (ref 100–108)
CO2 SERPL-SCNC: 25 MMOL/L (ref 21–32)
CO2 SERPL-SCNC: 26 MMOL/L (ref 21–32)
CO2 SERPL-SCNC: 27 MMOL/L (ref 21–32)
CO2 SERPL-SCNC: 27 MMOL/L (ref 21–32)
CREAT SERPL-MCNC: 0.79 MG/DL (ref 0.6–1.3)
CREAT SERPL-MCNC: 0.85 MG/DL (ref 0.6–1.3)
CREAT SERPL-MCNC: 0.9 MG/DL (ref 0.6–1.3)
CREAT SERPL-MCNC: 0.95 MG/DL (ref 0.6–1.3)
EOSINOPHIL # BLD AUTO: 0.16 THOUSAND/ΜL (ref 0–0.61)
EOSINOPHIL NFR BLD AUTO: 3 % (ref 0–6)
ERYTHROCYTE [DISTWIDTH] IN BLOOD BY AUTOMATED COUNT: 12.6 % (ref 11.6–15.1)
GFR SERPL CREATININE-BSD FRML MDRD: 55 ML/MIN/1.73SQ M
GFR SERPL CREATININE-BSD FRML MDRD: 59 ML/MIN/1.73SQ M
GFR SERPL CREATININE-BSD FRML MDRD: 63 ML/MIN/1.73SQ M
GFR SERPL CREATININE-BSD FRML MDRD: 69 ML/MIN/1.73SQ M
GLUCOSE SERPL-MCNC: 109 MG/DL (ref 65–140)
GLUCOSE SERPL-MCNC: 113 MG/DL (ref 65–140)
GLUCOSE SERPL-MCNC: 132 MG/DL (ref 65–140)
GLUCOSE SERPL-MCNC: 142 MG/DL (ref 65–140)
GLUCOSE SERPL-MCNC: 144 MG/DL (ref 65–140)
GLUCOSE SERPL-MCNC: 159 MG/DL (ref 65–140)
GLUCOSE SERPL-MCNC: 179 MG/DL (ref 65–140)
GLUCOSE SERPL-MCNC: 97 MG/DL (ref 65–140)
HCT VFR BLD AUTO: 30.3 % (ref 34.8–46.1)
HGB BLD-MCNC: 10.2 G/DL (ref 11.5–15.4)
IMM GRANULOCYTES # BLD AUTO: 0.02 THOUSAND/UL (ref 0–0.2)
IMM GRANULOCYTES NFR BLD AUTO: 0 % (ref 0–2)
LYMPHOCYTES # BLD AUTO: 1.21 THOUSANDS/ΜL (ref 0.6–4.47)
LYMPHOCYTES NFR BLD AUTO: 25 % (ref 14–44)
MAGNESIUM SERPL-MCNC: 2.2 MG/DL (ref 1.6–2.6)
MCH RBC QN AUTO: 30.8 PG (ref 26.8–34.3)
MCHC RBC AUTO-ENTMCNC: 33.7 G/DL (ref 31.4–37.4)
MCV RBC AUTO: 92 FL (ref 82–98)
MONOCYTES # BLD AUTO: 0.71 THOUSAND/ΜL (ref 0.17–1.22)
MONOCYTES NFR BLD AUTO: 15 % (ref 4–12)
NEUTROPHILS # BLD AUTO: 2.74 THOUSANDS/ΜL (ref 1.85–7.62)
NEUTS SEG NFR BLD AUTO: 56 % (ref 43–75)
NRBC BLD AUTO-RTO: 0 /100 WBCS
PHOSPHATE SERPL-MCNC: 3 MG/DL (ref 2.3–4.1)
PLATELET # BLD AUTO: 106 THOUSANDS/UL (ref 149–390)
PMV BLD AUTO: 11.1 FL (ref 8.9–12.7)
POTASSIUM SERPL-SCNC: 4 MMOL/L (ref 3.5–5.3)
POTASSIUM SERPL-SCNC: 4.1 MMOL/L (ref 3.5–5.3)
POTASSIUM SERPL-SCNC: 4.2 MMOL/L (ref 3.5–5.3)
POTASSIUM SERPL-SCNC: 4.3 MMOL/L (ref 3.5–5.3)
PROT SERPL-MCNC: 6.4 G/DL (ref 6.4–8.2)
RBC # BLD AUTO: 3.31 MILLION/UL (ref 3.81–5.12)
SODIUM SERPL-SCNC: 127 MMOL/L (ref 136–145)
SODIUM SERPL-SCNC: 127 MMOL/L (ref 136–145)
SODIUM SERPL-SCNC: 128 MMOL/L (ref 136–145)
SODIUM SERPL-SCNC: 128 MMOL/L (ref 136–145)
WBC # BLD AUTO: 4.88 THOUSAND/UL (ref 4.31–10.16)

## 2018-07-29 PROCEDURE — 82948 REAGENT STRIP/BLOOD GLUCOSE: CPT

## 2018-07-29 PROCEDURE — 80076 HEPATIC FUNCTION PANEL: CPT | Performed by: SURGERY

## 2018-07-29 PROCEDURE — 99232 SBSQ HOSP IP/OBS MODERATE 35: CPT | Performed by: INTERNAL MEDICINE

## 2018-07-29 PROCEDURE — 84100 ASSAY OF PHOSPHORUS: CPT | Performed by: INTERNAL MEDICINE

## 2018-07-29 PROCEDURE — 80048 BASIC METABOLIC PNL TOTAL CA: CPT | Performed by: INTERNAL MEDICINE

## 2018-07-29 PROCEDURE — 99221 1ST HOSP IP/OBS SF/LOW 40: CPT | Performed by: SURGERY

## 2018-07-29 PROCEDURE — 76705 ECHO EXAM OF ABDOMEN: CPT

## 2018-07-29 PROCEDURE — 85025 COMPLETE CBC W/AUTO DIFF WBC: CPT | Performed by: INTERNAL MEDICINE

## 2018-07-29 PROCEDURE — 83735 ASSAY OF MAGNESIUM: CPT | Performed by: INTERNAL MEDICINE

## 2018-07-29 RX ORDER — OXYCODONE HYDROCHLORIDE AND ACETAMINOPHEN 5; 325 MG/1; MG/1
1 TABLET ORAL ONCE
Status: DISCONTINUED | OUTPATIENT
Start: 2018-07-29 | End: 2018-07-29

## 2018-07-29 RX ADMIN — DICYCLOMINE HYDROCHLORIDE 10 MG: 10 CAPSULE ORAL at 06:29

## 2018-07-29 RX ADMIN — CARVEDILOL 25 MG: 25 TABLET, FILM COATED ORAL at 17:32

## 2018-07-29 RX ADMIN — METRONIDAZOLE 500 MG: 500 INJECTION, SOLUTION INTRAVENOUS at 13:33

## 2018-07-29 RX ADMIN — METRONIDAZOLE 500 MG: 500 INJECTION, SOLUTION INTRAVENOUS at 21:19

## 2018-07-29 RX ADMIN — SENNOSIDES AND DOCUSATE SODIUM 2 TABLET: 8.6; 5 TABLET ORAL at 21:20

## 2018-07-29 RX ADMIN — DICYCLOMINE HYDROCHLORIDE 10 MG: 10 CAPSULE ORAL at 11:17

## 2018-07-29 RX ADMIN — CEFEPIME HYDROCHLORIDE 2000 MG: 2 INJECTION, POWDER, FOR SOLUTION INTRAVENOUS at 17:31

## 2018-07-29 RX ADMIN — POLYETHYLENE GLYCOL 3350 17 G: 17 POWDER, FOR SOLUTION ORAL at 08:15

## 2018-07-29 RX ADMIN — ASPIRIN 81 MG 81 MG: 81 TABLET ORAL at 08:14

## 2018-07-29 RX ADMIN — DICYCLOMINE HYDROCHLORIDE 10 MG: 10 CAPSULE ORAL at 16:39

## 2018-07-29 RX ADMIN — Medication 250 MG: at 08:14

## 2018-07-29 RX ADMIN — DILTIAZEM HYDROCHLORIDE 240 MG: 120 CAPSULE, COATED, EXTENDED RELEASE ORAL at 08:14

## 2018-07-29 RX ADMIN — DOCUSATE SODIUM 100 MG: 100 CAPSULE, LIQUID FILLED ORAL at 08:14

## 2018-07-29 RX ADMIN — ENOXAPARIN SODIUM 40 MG: 40 INJECTION SUBCUTANEOUS at 08:14

## 2018-07-29 RX ADMIN — Medication 250 MG: at 17:32

## 2018-07-29 RX ADMIN — SODIUM CHLORIDE 75 ML/HR: 0.9 INJECTION, SOLUTION INTRAVENOUS at 20:44

## 2018-07-29 RX ADMIN — DICYCLOMINE HYDROCHLORIDE 10 MG: 10 CAPSULE ORAL at 21:20

## 2018-07-29 RX ADMIN — DOCUSATE SODIUM 100 MG: 100 CAPSULE, LIQUID FILLED ORAL at 17:32

## 2018-07-29 RX ADMIN — METRONIDAZOLE 500 MG: 500 INJECTION, SOLUTION INTRAVENOUS at 05:11

## 2018-07-29 RX ADMIN — CARVEDILOL 25 MG: 25 TABLET, FILM COATED ORAL at 08:14

## 2018-07-29 RX ADMIN — CEFEPIME HYDROCHLORIDE 2000 MG: 2 INJECTION, POWDER, FOR SOLUTION INTRAVENOUS at 06:28

## 2018-07-29 NOTE — PROGRESS NOTES
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   Assessment & Plan    Patient discussed weight loss with primary care physician        Hyponatremia   Assessment & Plan    · Possibly related to hydrochlorothiazide use, excessive water intake, possible SIADH secondary to ongoing abdominal pain and nausea  · Patient improved with use of normal saline and then 1/2 normal saline, was restriction and discontinuation of hydrochlorothiazide  · Sodium on admission 111, currently 128  · Continue with normal saline as per Nephrology recommendation, 75 cc an hour, continue with BMP q 6 hours  · Patient with history of chronic hyponatremia with an average sodium around 130          Chronic constipation   Assessment & Plan    · Patient with history of chronic constipation, evaluated by Gastroenterology in the past, she was started on Linzess that she did not take after few doses as this was provoking severe diarrhea  · Patient was evaluated by Gastroenterology while in the hospital, input was appreciated, patient was started on Bentyl for possible irritable bowel with constipation   · Patient a bowel movement on July 28, continue with current regimen upon discharge        Type 2 diabetes mellitus without complication, without long-term current use of insulin [facility])   Assessment & Plan    Lab Results   Component Value Date    HGBA1C 6 2 (H) 06/05/2018       Recent Labs      07/28/18   1607  07/28/18   2057  07/29/18   0618  07/29/18   1037   POCGLU  224*  218*  97  159*       Blood Sugar Average: Last 72 hrs:  (P) 151 [id number]     Reports control blood sugar at home for which metformin was recently discontinued, remained stable here on insulin sliding scale        Essential hypertension   Assessment & Plan    · To be continued on Coreg and Cardizem, discontinue hydrochlorothiazide, do not resume upon discharge secondary to severe hyponatremia  · Blood pressure acceptable, continue to monitor        * Leukocytosis   Assessment & Plan    Patient with leukocytosis on the CBC on July 27 but at the time of CBC patient was non febrile  Maximum temperature 100 1  Recent chest x-ray 7/25 without infiltrates, patient with no cough or respiratory symptoms so pneumonia is suspected  Recent CT abdomen and pelvis on July 25th unremarkable  Urinalysis on the day of admission within normal limits and patient with no urinary symptoms  The only complaint that the patient had the morning in question was of transient right upper quadrant pain and bloating  On recent CT scan no inflammation of gallbladder or stones are visible  For now, continue with cefepime and Flagyl and obtain ultrasound of the right upper quadrant    Blood culture negative, patient remains non febrile, ultrasound right upper quadrant was performed, pending reading, if negative for cholecystitis will discontinue antibiotics          VTE Pharmacologic Prophylaxis: yes  Pharmacologic: Heparin  Mechanical VTE Prophylaxis in Place: Yes     Patient Centered Rounds: I have performed bedside rounds with nursing staff today      Discussions with Specialists or Other Care Team Provider:  Nephrology      Education and Discussions with Family / Patient:  Patient, sister, niece at bedside     Time Spent for Care: 40 minutes   More than 50% of total time spent on counseling and coordination of care as described above      Current Length of Stay: 4 day(s)     Current Patient Status: Inpatient   Certification Statement: The patient will continue to require additional inpatient hospital stay due to Please refer to above     Discharge Plan:  Home when medically stable     Code Status: Level 1 - Full Code    Subjective:   Patient is feeling well  She has no concerns at this time    Objective:     Vitals:   Temp (24hrs), Av 3 °F (36 8 °C), Min:97 9 °F (36 6 °C), Max:98 5 °F (36 9 °C)    HR:  [53-85] 53  Resp:  [18-20] 18  BP: (120-160)/(56-74) 120/56  SpO2:  [96 %-99 %] 98 %  Body mass index is 41 15 kg/m²  Input and Output Summary (last 24 hours): Intake/Output Summary (Last 24 hours) at 18 1408  Last data filed at 18 1201   Gross per 24 hour   Intake              458 ml   Output             1700 ml   Net            -1242 ml       Physical Exam:     Physical Exam   Constitutional: She is oriented to person, place, and time  She appears well-developed  Cardiovascular: Normal rate, regular rhythm and normal heart sounds  Exam reveals no friction rub  No murmur heard  Pulmonary/Chest: Effort normal  No respiratory distress  She has no wheezes  She has no rales  Abdominal: Soft  She exhibits no distension  There is no tenderness  There is no rebound  Musculoskeletal: She exhibits no edema  Neurological: She is alert and oriented to person, place, and time  She exhibits normal muscle tone  Skin: Skin is warm  Psychiatric: She has a normal mood and affect         Additional Data:     Labs:      Results from last 7 days  Lab Units 18  0515   WBC Thousand/uL 4 88   HEMOGLOBIN g/dL 10 2*   HEMATOCRIT % 30 3*   PLATELETS Thousands/uL 106*   NEUTROS PCT % 56   LYMPHS PCT % 25   MONOS PCT % 15*   EOS PCT % 3       Results from last 7 days  Lab Units 18  1204  18  1339   SODIUM mmol/L 128*  < >  --    POTASSIUM mmol/L 4 0  < >  --    CHLORIDE mmol/L 94*  < >  --    CO2 mmol/L 25  < >  --    BUN mg/dL 14  < >  --    CREATININE mg/dL 0 85  < >  --    CALCIUM mg/dL 8 1*  < >  --    TOTAL PROTEIN g/dL  --   --  6 2*   BILIRUBIN TOTAL mg/dL  --   --  0 62   ALK PHOS U/L  --   --  95   ALT U/L  --   --  37   AST U/L  --   --  28   GLUCOSE RANDOM mg/dL 144*  < >  --    < > = values in this interval not displayed  * I Have Reviewed All Lab Data Listed Above  * Additional Pertinent Lab Tests Reviewed: All Labs Within Last 24 Hours Reviewed    Imaging:    Imaging Reports Reviewed Today Include:  None  Imaging Personally Reviewed by Myself Includes:  None    Recent Cultures (last 7 days):       Results from last 7 days  Lab Units 07/27/18  1823   BLOOD CULTURE  No Growth at 24 hrs  No Growth at 24 hrs         Last 24 Hours Medication List:     Current Facility-Administered Medications:  aluminum-magnesium hydroxide-simethicone 15 mL Oral Q4H PRN Brett Constantino PA-C    aspirin 81 mg Oral Daily Valarie Horne MD    bisacodyl 10 mg Rectal Daily PRN Caden Vitale MD    calcium carbonate 500 mg Oral Daily PRN Brett Constantino PA-C    carvedilol 25 mg Oral BID Caden Vitale MD    cefepime 2,000 mg Intravenous Q12H Mone Alexandra MD Last Rate: 2,000 mg (07/29/18 6986)   dicyclomine 10 mg Oral 4x Daily (AC & HS) Liliana Acevedo MD    diltiazem 240 mg Oral Daily Valarie Horne MD    docusate sodium 100 mg Oral BID Mone Alexandra MD    enoxaparin 40 mg Subcutaneous Daily Caden Vitale MD    hydrALAZINE 5 mg Intravenous Q6H PRN Debby Cross MD    insulin lispro 1-5 Units Subcutaneous HS Caden Vitale MD    insulin lispro 1-6 Units Subcutaneous TID AC Valarie Horne MD    lactulose 20 g Oral TID PRN Mone Alexandra MD    meclizine 12 5 mg Oral TID PRN Caden Vitale MD    metroNIDAZOLE 500 mg Intravenous Q8H Mone Alexandra MD Last Rate: 500 mg (07/29/18 1333)   ondansetron 4 mg Intravenous Q6H PRN Valarie Horne MD    polyethylene glycol 17 g Oral Daily Eloy Cruz PA-C    saccharomyces boulardii 250 mg Oral BID Caden Vitale MD    senna-docusate sodium 2 tablet Oral HS Mone Alexandra MD    sodium chloride 75 mL/hr Intravenous Continuous Lorrin Hodgkins, MD Last Rate: 75 mL/hr (07/28/18 1200) Today, Patient Was Seen By: Jossie Jenkins MD    ** Please Note: Dragon 360 Dictation voice to text software may have been used in the creation of this document   **

## 2018-07-29 NOTE — ASSESSMENT & PLAN NOTE
Lab Results   Component Value Date    HGBA1C 6 2 (H) 06/05/2018       Recent Labs      07/28/18   1607  07/28/18 2057  07/29/18   0618  07/29/18   1037   POCGLU  224*  218*  97  159*       Blood Sugar Average: Last 72 hrs:  (P) 151 [id number]     Reports control blood sugar at home for which metformin was recently discontinued, remained stable here on insulin sliding scale

## 2018-07-29 NOTE — ASSESSMENT & PLAN NOTE
Patient with leukocytosis on the CBC on July 27 but at the time of CBC patient was non febrile  Maximum temperature 100 1  Recent chest x-ray 7/25 without infiltrates, patient with no cough or respiratory symptoms so pneumonia is suspected  Recent CT abdomen and pelvis on July 25th unremarkable  Urinalysis on the day of admission within normal limits and patient with no urinary symptoms  The only complaint that the patient had the morning in question was of transient right upper quadrant pain and bloating  On recent CT scan no inflammation of gallbladder or stones are visible  For now, continue with cefepime and Flagyl and obtain ultrasound of the right upper quadrant    Blood culture negative, patient remains non febrile, ultrasound right upper quadrant was performed, pending reading, if negative for cholecystitis will discontinue antibiotics

## 2018-07-29 NOTE — ASSESSMENT & PLAN NOTE
· Possibly related to hydrochlorothiazide use, excessive water intake, possible SIADH secondary to ongoing abdominal pain and nausea  · Patient improved with use of normal saline and then 1/2 normal saline, was restriction and discontinuation of hydrochlorothiazide  · Sodium on admission 111, currently 128  · Continue with normal saline as per Nephrology recommendation, 75 cc an hour, continue with BMP q 6 hours  · Patient with history of chronic hyponatremia with an average sodium around 130

## 2018-07-29 NOTE — CONSULTS
Consultation - General Surgery   Saqib Norwood 80 y o  female MRN: [id number]  Unit/Bed#: [facility] 509-01 Encounter: [id number]    Assessment/Plan     Assessment:  51-year-old female with chronic constipation and what sounds like possible motility issues, as evidenced by postprandial bloating and burping  She denies any sort of right upper quadrant pain, and although she has a gallbladder wall thickness of 8 mm, there is no other findings to suggest gallbladder inflammation such as elevated LFTs or WBC  Unlikely to have acalculous cholecystitis due to lack of symptomatology as well as non-septic / critically ill state  Plan:  Continue GI w/u for motility issues  Bowel regimen per GI- Miralax daily  Electrolyte correction  Can consider HIDA if true RUQ pain presents, however with currently not indicated  Please call with any questions or worsening of symptoms    History of Present Illness     HPI:  Saqib Norwood is a 80 y o  female admitted for abdominal distension & discomfort secondary to constipation  The patient has a history of chronic constipation (recently worsening) who has been seen by GI for this issue  The patient states that as soon as she eats anything, she feels bloated in her upper abdomen and needs to burp  She denies abdominal pain, just notes a discomfort when the bloating occurs  Denies RUQ or epigastric pain with PO intake  Believes her last C-scope was 7-8 yrs ago and normal  She does have a hx of DM but it is controlled at preset with diet  U/S was obtained today to r/o biliary disease  Her gallbladder shows no stones or sludges, without pericholecystic inflammation   However, her GB wall is thickened up to 8 mm         Inpatient consult to Acute Care Surgery     Date/Time 7/29/2018 6:18 PM     Performed by  Sylvie Childs by Jai Moncada              Review of Systems Denies nausea, vomiting, diarrhea, fevers, chills, weight loss, headaches    Historical Information Past Medical History:   Diagnosis Date    Anemia     Anxiety     Diabetes mellitus ([address] )     Diabetic peripheral neuropathy (HCC)     Hyperlipidemia     Hypertension     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 8/23/2017    Thyroid enlarged 2/23/2015    Tinnitus     Vertigo      Past Surgical History:   Procedure Laterality Date    EAR SURGERY Left     KNEE SURGERY       Social History   History   Alcohol Use No     History   Drug Use No     History   Smoking Status    Never Smoker   Smokeless Tobacco    Never Used     Family History:   Family History   Problem Relation Age of Onset    Diabetes Mother     Heart disease Mother     Heart disease Father     Stroke Father     Cancer Family        Meds/Allergies   current meds:   Current Facility-Administered Medications   Medication Dose Route Frequency    aluminum-magnesium hydroxide-simethicone (MYLANTA) 200-200-20 mg/5 mL oral suspension 15 mL  15 mL Oral Q4H PRN    aspirin chewable tablet 81 mg  81 mg Oral Daily    bisacodyl (DULCOLAX) rectal suppository 10 mg  10 mg Rectal Daily PRN    calcium carbonate (TUMS) chewable tablet 500 mg  500 mg Oral Daily PRN    carvedilol (COREG) tablet 25 mg  25 mg Oral BID    cefepime (MAXIPIME) 2,000 mg in dextrose 5 % 50 mL IVPB  2,000 mg Intravenous Q12H    dicyclomine (BENTYL) capsule 10 mg  10 mg Oral 4x Daily (AC & HS)    diltiazem (CARDIZEM CD) 24 hr capsule 240 mg  240 mg Oral Daily    docusate sodium (COLACE) capsule 100 mg  100 mg Oral BID    enoxaparin (LOVENOX) subcutaneous injection 40 mg  40 mg Subcutaneous Daily    hydrALAZINE (APRESOLINE) injection 5 mg  5 mg Intravenous Q6H PRN    insulin lispro (HumaLOG) 100 units/mL subcutaneous injection 1-5 Units  1-5 Units Subcutaneous HS    insulin lispro (HumaLOG) 100 units/mL subcutaneous injection 1-6 Units  1-6 Units Subcutaneous TID AC    lactulose 20 g/30 mL oral solution 20 g  20 g Oral TID PRN    meclizine (ANTIVERT) tablet 12 5 mg  12 5 mg Oral TID PRN    metroNIDAZOLE (FLAGYL) IVPB (premix) 500 mg  500 mg Intravenous Q8H    ondansetron (ZOFRAN) injection 4 mg  4 mg Intravenous Q6H PRN    polyethylene glycol (MIRALAX) packet 17 g  17 g Oral Daily    saccharomyces boulardii (FLORASTOR) capsule 250 mg  250 mg Oral BID    senna-docusate sodium (SENOKOT S) 8 6-50 mg per tablet 2 tablet  2 tablet Oral HS    sodium chloride 0 9 % infusion  75 mL/hr Intravenous Continuous     Allergies   Allergen Reactions    Lisinopril Cough    Statins        Objective   First Vitals:   Blood Pressure: (!) 223/89 (07/25/18 0616)  Pulse: 61 (07/25/18 0616)  Temperature: (!) 97 4 °F (36 3 °C) (07/25/18 0622)  Temp Source: Oral (07/25/18 0622)  Respirations: 20 (07/25/18 0622)  Height: 5' 2" (157 5 cm) (07/25/18 0616)  Weight - Scale: 102 kg (225 lb) (07/25/18 0616)  SpO2: 97 % (07/25/18 0616)    Current Vitals:   Blood Pressure: 158/65 (07/29/18 1512)  Pulse: (!) 53 (07/29/18 1512)  Temperature: 97 8 °F (36 6 °C) (07/29/18 1512)  Temp Source: Oral (07/29/18 1512)  Respirations: 18 (07/29/18 1512)  Height: 5' 2" (157 5 cm) (07/25/18 0616)  Weight - Scale: 102 kg (225 lb) (07/25/18 0616)  SpO2: 98 % (07/29/18 1512)      Intake/Output Summary (Last 24 hours) at 07/29/18 1818  Last data filed at 07/29/18 1731   Gross per 24 hour   Intake              938 ml   Output             2500 ml   Net            -1562 ml       Invasive Devices     Peripherally Inserted Central Catheter Line            PICC Line 41/54/75 Right Basilic 1 day                Physical Exam   Physical Exam:   Gen: A&O, NAD  Neuro:GCS 15  Cardio: RRR  Lungs: CTAB  Abd: Soft, very mild distension in the upper abdomen  Non tender in RUQ to deep palpation  Negative peter's  Non tender throughout abdomen  Ext: Warm, dry  Vasc;  Intact    Lab Results:   CBC:   Lab Results   Component Value Date    WBC 4 88 07/29/2018    HGB 10 2 (L) 07/29/2018    HCT 30 3 (L) 07/29/2018    MCV 92 07/29/2018     (L) 07/29/2018    MCH 30 8 07/29/2018    MCHC 33 7 07/29/2018    RDW 12 6 07/29/2018    MPV 11 1 07/29/2018    NRBC 0 07/29/2018   , CMP:   Lab Results   Component Value Date     (L) 07/29/2018    K 4 2 07/29/2018    CL 96 (L) 07/29/2018    CO2 26 07/29/2018    ANIONGAP 6 07/29/2018    BUN 15 07/29/2018    CREATININE 0 95 07/29/2018    GLUCOSE 179 (H) 07/29/2018    CALCIUM 8 2 (L) 07/29/2018    EGFR 55 07/29/2018     Imaging: I have personally reviewed pertinent reports  US right upper quadrant   Final Result by Uvaldo Givens DO (07/29 1422)      Gallbladder wall thickening up to 8 mm with trace adjacent fluid  No cholelithiasis or biliary dilatation  Workstation performed: [id number]         XR chest pa & lateral   Final Result by Julio Cesar Parham MD (07/25 1738)      Cardiomegaly   No acute consolidation or congestion   Left upper lobe lung nodule suggest granuloma, stable since the previous study of October 17, 2017      Workstation performed: [id number]         CT abdomen pelvis with contrast   Final Result by Deejay Ace MD (07/25 1012)      No acute intra-abdominal abnormality  Workstation performed: [id number]           EKG, Pathology, and Other Studies: I have personally reviewed pertinent reports  Counseling / Coordination of Care  Total floor / unit time spent today 30 minutes  Greater than 50% of total time was spent with the patient and / or family counseling and / or coordination of care  A description of the counseling / coordination of care: Karissa Carter

## 2018-07-29 NOTE — ASSESSMENT & PLAN NOTE
· Patient with history of chronic constipation, evaluated by Gastroenterology in the past, she was started on Linzess that she did not take after few doses as this was provoking severe diarrhea  · Patient was evaluated by Gastroenterology while in the hospital, input was appreciated, patient was started on Bentyl for possible irritable bowel with constipation   · Patient a bowel movement on July 28, continue with current regimen upon discharge

## 2018-07-29 NOTE — PROGRESS NOTES
NEPHROLOGY PROGRESS NOTE   Eric Morel 80 y o  female MRN: [id number]  Unit/Bed#: [facility] 509-01 Encounter: [id number]  Reason for Consult: Hyponatremia    ASSESSMENT AND PLAN:  Patient is a 49-year-old female with significant medical issues of diabetes, hypertension for many years, presented with worsening constipation  Lincoln Freitas was found to have severe hyponatremia and we are consulted for the same      Severe hyponatremia, acute versus acute on chronic, previous serum sodium in low to mid 130s in 2017  -serum sodium 111 on admission, initially improved rapidly requiring half normal saline    -started on IV normal saline and serum sodium seems to be appropriately improving to 127 today ene King Continue current IV normal saline at 75 mL/hour for now  If serum sodium dropping further, will discontinue fluid  -BMP Q six hourly  -patient's nausea has much improved   She remains asymptomatic  -hyponatremia could be secondary to subtle hypovolemia, use of HCTZ, significant free water intake (80 to 90 oz total liquid intake daily) in the setting of nausea may cause SIADH as well   -remains off  HCTZ  -serum osmolality 252, repeat urine chloride 48, urine sodium 45, urine osmolality 388 this is with ongoing IV fluid repletion      -strict fluid restriction 1 2 L per day     Hypertension  -blood pressure overall acceptable with occasional higher readings    -continue to monitor on carvedilol and diltiazem   Holding HCTZ  -avoid low BP   Goal SBP 130s     Constipation, management as per primary team      Discussed with Dr Keara Mendosa:  Patient seen and examined at bedside  No chest pain, shortness of breath, nausea, vomiting, abdominal pain or diarrhea  No urinary complaints       OBJECTIVE:  Current Weight: Weight - Scale: 102 kg (225 lb)  Vitals:    07/29/18 0702   BP: 160/74   Pulse: 60   Resp: 18   Temp: 98 5 °F (36 9 °C)   SpO2:        Intake/Output Summary (Last 24 hours) at 07/29/18 9248  Last data filed at 07/29/18 0758   Gross per 24 hour   Intake              447 ml   Output             2100 ml   Net            -1653 ml       Physical Examination:  General:  Sitting in chair, no acute distress   Eyes:  Mild conjunctival pallor, sclerae anicteric  ENT:  External examination of ears and nose unremarkable  Neck:  Supple  Respiratory:  Bilateral air entry present, no crackles appreciated  CVS:  S1, S2 present  GI:  Soft, nontender, nondistended  CNS:  Active alert oriented x3  Extremities:  No significant edema lower extremities  Skin:  No new rash in legs    Medications:    Current Facility-Administered Medications:     aluminum-magnesium hydroxide-simethicone (MYLANTA) 200-200-20 mg/5 mL oral suspension 15 mL, 15 mL, Oral, Q4H PRN, Guerda Madden PA-C, 15 mL at 07/26/18 2314    aspirin chewable tablet 81 mg, 81 mg, Oral, Daily, Valarie Horne MD, 81 mg at 07/29/18 9504    bisacodyl (DULCOLAX) rectal suppository 10 mg, 10 mg, Rectal, Daily PRN, Mame Grewal MD, 10 mg at 07/26/18 1747    calcium carbonate (TUMS) chewable tablet 500 mg, 500 mg, Oral, Daily PRN, Chandler Navarro PA-C, 500 mg at 07/27/18 0150    carvedilol (COREG) tablet 25 mg, 25 mg, Oral, BID, Valarie Horne MD, 25 mg at 07/29/18 0814    cefepime (MAXIPIME) 2,000 mg in dextrose 5 % 50 mL IVPB, 2,000 mg, Intravenous, Q12H, Thu Nava MD, Last Rate: 100 mL/hr at 07/29/18 0628, 2,000 mg at 07/29/18 6699    dicyclomine (BENTYL) capsule 10 mg, 10 mg, Oral, 4x Daily (AC & HS), Floyd Navarro MD, 10 mg at 07/29/18 0629    diltiazem (CARDIZEM CD) 24 hr capsule 240 mg, 240 mg, Oral, Daily, Valarie Horne MD, 240 mg at 07/29/18 0814    docusate sodium (COLACE) capsule 100 mg, 100 mg, Oral, BID, Thu Nava MD, 100 mg at 07/29/18 0814    enoxaparin (LOVENOX) subcutaneous injection 40 mg, 40 mg, Subcutaneous, Daily, Valarie Horne MD, 40 mg at 07/29/18 0814    hydrALAZINE (APRESOLINE) injection 5 mg, 5 mg, Intravenous, Q6H PRN, Baylee Torres MD, 5 mg at 07/26/18 2012    insulin lispro (HumaLOG) 100 units/mL subcutaneous injection 1-5 Units, 1-5 Units, Subcutaneous, HS, Jeremy Culver MD, 2 Units at 07/28/18 2122    insulin lispro (HumaLOG) 100 units/mL subcutaneous injection 1-6 Units, 1-6 Units, Subcutaneous, TID AC, 2 Units at 07/28/18 1646 **AND** Fingerstick Glucose (POCT), , , TID AC, Valarie B MD Coleen    lactulose 20 g/30 mL oral solution 20 g, 20 g, Oral, TID PRN, Bryn Lorenzana MD, 20 g at 07/26/18 1747    meclizine (ANTIVERT) tablet 12 5 mg, 12 5 mg, Oral, TID PRN, Jeremy Culver MD    metroNIDAZOLE (FLAGYL) IVPB (premix) 500 mg, 500 mg, Intravenous, Q8H, Bryn Lorenzana MD, Last Rate: 200 mL/hr at 07/29/18 0511, 500 mg at 07/29/18 0511    ondansetron (ZOFRAN) injection 4 mg, 4 mg, Intravenous, Q6H PRN, Jeremy Culver MD, 4 mg at 07/28/18 2130    polyethylene glycol (MIRALAX) packet 17 g, 17 g, Oral, Daily, Eloy Cruz PA-C, 17 g at 07/29/18 0815    saccharomyces boulardii (FLORASTOR) capsule 250 mg, 250 mg, Oral, BID, Jeremy Culver MD, 250 mg at 07/29/18 0814    senna-docusate sodium (SENOKOT S) 8 6-50 mg per tablet 2 tablet, 2 tablet, Oral, HS, Bryn Lorenzana MD, 2 tablet at 07/28/18 2121    sodium chloride 0 9 % infusion, 75 mL/hr, Intravenous, Continuous, Buck Elizabeth MD, Last Rate: 75 mL/hr at 07/28/18 1200, 75 mL/hr at 07/28/18 1200    Laboratory Results:    Results from last 7 days  Lab Units 07/29/18  0516 07/29/18  0515 07/29/18  0041 07/28/18  1809 07/28/18  1342 07/28/18  1339 07/28/18  0804 07/28/18  0458 07/28/18  0322 07/27/18  2056  07/27/18  0504  07/25/18  0627   WBC Thousand/uL  --  4 88  --   --   --   --   --  7 53  --   --   --  15 55*  --  7 66   HEMOGLOBIN g/dL  --  10 2*  --   --   --   --   --  10 5*  --   --   --  11 5  --  13 1   HEMATOCRIT %  --  30 3*  --   --   --   --   --  31 2*  --   --   --  33 5*  --  36 3   PLATELETS Thousands/uL  --  106*  --   --   --   --   --  115*  --   --   --  150  --  161   SODIUM mmol/L 127*  --  127* 125* 125*  --  122*  --  120* 121*  < > 122*  < > 111*   POTASSIUM mmol/L 4 1  --  4 3 4 1 4 3  --  4 2  --  4 2 4 3  < > 4 2  < > 4 9   CHLORIDE mmol/L 96*  --  94* 93* 91*  --  91*  --  91* 90*  < > 92*  < > 80*   CO2 mmol/L 27  --  27 25 26  --  25  --  23 25  < > 23  < > 22   BUN mg/dL 15  --  17 17 18  --  16  --  19 23  < > 22  < > 13   CREATININE mg/dL 0 79  --  0 90 1 01 1 09  --  0 93  --  0 87 0 98  < > 0 96  < > 0 74   CALCIUM mg/dL 7 9*  --  8 0* 8 3 8 1*  --  8 3  --  8 1* 8 1*  < > 8 4  < > 8 7   MAGNESIUM mg/dL  --  2 2  --   --   --   --   --   --  2 1  --   --  2 2  --   --    PHOSPHORUS mg/dL  --  3 0  --   --   --   --   --   --  2 2*  --   --  2 7  --   --    TOTAL PROTEIN g/dL  --   --   --   --   --  6 2* 6 2*  --   --   --   --   --   --  7 5   GLUCOSE RANDOM mg/dL 109  --  113 173* 162*  --  160*  --  160* 176*  < > 151*  < > 176*   < > = values in this interval not displayed  No results found for this or any previous visit  Results for orders placed during the hospital encounter of 07/25/18   XR chest pa & lateral    Narrative CHEST     INDICATION:, Evaluate for neoplasm    COMPARISON:  October 17, 2017    EXAM PERFORMED/VIEWS:  XR CHEST PA & LATERAL      FINDINGS:    Cardiomegaly seen    No acute consolidation or congestion seen  Calcification seen in the left upper lung suggest granuloma  This is unchanged from the previous study of October 17, 2017  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  Scoliosis with convexity to the right side seen      Impression Cardiomegaly  No acute consolidation or congestion  Left upper lobe lung nodule suggest granuloma, stable since the previous study of October 17, 2017    Workstation performed: [id number]       No results found for this or any previous visit  No results found for this or any previous visit    No results found for this or any previous visit  No results found for this or any previous visit  Portions of the record may have been created with voice recognition software  Occasional wrong word or "sound a like" substitutions may have occurred due to the inherent limitations of voice recognition software  Read the chart carefully and recognize, using context, where substitutions have occurred

## 2018-07-29 NOTE — PLAN OF CARE
DISCHARGE PLANNING - CARE MANAGEMENT     Discharge to post-acute care or home with appropriate resources Progressing        METABOLIC, FLUID AND ELECTROLYTES - ADULT     Electrolytes maintained within normal limits Progressing        Nutrition/Hydration-ADULT     Nutrient/Hydration intake appropriate for improving, restoring or maintaining nutritional needs Progressing        Potential for Falls     Patient will remain free of falls Progressing

## 2018-07-30 PROBLEM — D69.6 THROMBOCYTOPENIA (HCC): Status: ACTIVE | Noted: 2018-07-30

## 2018-07-30 PROBLEM — T80.1XXA IV INFILTRATION: Status: ACTIVE | Noted: 2018-07-30

## 2018-07-30 LAB
ALBUMIN SERPL BCP-MCNC: 2.7 G/DL (ref 3.5–5)
ALP SERPL-CCNC: 103 U/L (ref 46–116)
ALT SERPL W P-5'-P-CCNC: 37 U/L (ref 12–78)
ANION GAP SERPL CALCULATED.3IONS-SCNC: 6 MMOL/L (ref 4–13)
ANION GAP SERPL CALCULATED.3IONS-SCNC: 7 MMOL/L (ref 4–13)
AST SERPL W P-5'-P-CCNC: 25 U/L (ref 5–45)
BASOPHILS # BLD AUTO: 0.04 THOUSANDS/ΜL (ref 0–0.1)
BASOPHILS NFR BLD AUTO: 1 % (ref 0–1)
BILIRUB DIRECT SERPL-MCNC: 0.15 MG/DL (ref 0–0.2)
BILIRUB SERPL-MCNC: 0.28 MG/DL (ref 0.2–1)
BUN SERPL-MCNC: 14 MG/DL (ref 5–25)
BUN SERPL-MCNC: 16 MG/DL (ref 5–25)
CALCIUM SERPL-MCNC: 7.9 MG/DL (ref 8.3–10.1)
CALCIUM SERPL-MCNC: 8.1 MG/DL (ref 8.3–10.1)
CHLORIDE SERPL-SCNC: 97 MMOL/L (ref 100–108)
CHLORIDE SERPL-SCNC: 98 MMOL/L (ref 100–108)
CO2 SERPL-SCNC: 25 MMOL/L (ref 21–32)
CO2 SERPL-SCNC: 26 MMOL/L (ref 21–32)
CREAT SERPL-MCNC: 0.77 MG/DL (ref 0.6–1.3)
CREAT SERPL-MCNC: 0.9 MG/DL (ref 0.6–1.3)
EOSINOPHIL # BLD AUTO: 0.2 THOUSAND/ΜL (ref 0–0.61)
EOSINOPHIL NFR BLD AUTO: 4 % (ref 0–6)
ERYTHROCYTE [DISTWIDTH] IN BLOOD BY AUTOMATED COUNT: 12.7 % (ref 11.6–15.1)
GFR SERPL CREATININE-BSD FRML MDRD: 59 ML/MIN/1.73SQ M
GFR SERPL CREATININE-BSD FRML MDRD: 71 ML/MIN/1.73SQ M
GLUCOSE SERPL-MCNC: 109 MG/DL (ref 65–140)
GLUCOSE SERPL-MCNC: 119 MG/DL (ref 65–140)
GLUCOSE SERPL-MCNC: 123 MG/DL (ref 65–140)
GLUCOSE SERPL-MCNC: 126 MG/DL (ref 65–140)
GLUCOSE SERPL-MCNC: 178 MG/DL (ref 65–140)
GLUCOSE SERPL-MCNC: 193 MG/DL (ref 65–140)
HCT VFR BLD AUTO: 30.3 % (ref 34.8–46.1)
HGB BLD-MCNC: 10 G/DL (ref 11.5–15.4)
IMM GRANULOCYTES # BLD AUTO: 0.01 THOUSAND/UL (ref 0–0.2)
IMM GRANULOCYTES NFR BLD AUTO: 0 % (ref 0–2)
LYMPHOCYTES # BLD AUTO: 1.35 THOUSANDS/ΜL (ref 0.6–4.47)
LYMPHOCYTES NFR BLD AUTO: 29 % (ref 14–44)
MAGNESIUM SERPL-MCNC: 2.1 MG/DL (ref 1.6–2.6)
MCH RBC QN AUTO: 30.7 PG (ref 26.8–34.3)
MCHC RBC AUTO-ENTMCNC: 33 G/DL (ref 31.4–37.4)
MCV RBC AUTO: 93 FL (ref 82–98)
MONOCYTES # BLD AUTO: 0.61 THOUSAND/ΜL (ref 0.17–1.22)
MONOCYTES NFR BLD AUTO: 13 % (ref 4–12)
NEUTROPHILS # BLD AUTO: 2.43 THOUSANDS/ΜL (ref 1.85–7.62)
NEUTS SEG NFR BLD AUTO: 53 % (ref 43–75)
NRBC BLD AUTO-RTO: 0 /100 WBCS
PF4 HEPARIN CMPLX AB SER QL: NEGATIVE
PHOSPHATE SERPL-MCNC: 3.3 MG/DL (ref 2.3–4.1)
PLATELET # BLD AUTO: 111 THOUSANDS/UL (ref 149–390)
PMV BLD AUTO: 11.1 FL (ref 8.9–12.7)
POTASSIUM SERPL-SCNC: 4.1 MMOL/L (ref 3.5–5.3)
POTASSIUM SERPL-SCNC: 4.1 MMOL/L (ref 3.5–5.3)
PROT SERPL-MCNC: 5.7 G/DL (ref 6.4–8.2)
RBC # BLD AUTO: 3.26 MILLION/UL (ref 3.81–5.12)
SODIUM SERPL-SCNC: 129 MMOL/L (ref 136–145)
SODIUM SERPL-SCNC: 130 MMOL/L (ref 136–145)
WBC # BLD AUTO: 4.64 THOUSAND/UL (ref 4.31–10.16)

## 2018-07-30 PROCEDURE — 84100 ASSAY OF PHOSPHORUS: CPT | Performed by: INTERNAL MEDICINE

## 2018-07-30 PROCEDURE — 80076 HEPATIC FUNCTION PANEL: CPT | Performed by: INTERNAL MEDICINE

## 2018-07-30 PROCEDURE — 97116 GAIT TRAINING THERAPY: CPT

## 2018-07-30 PROCEDURE — 99232 SBSQ HOSP IP/OBS MODERATE 35: CPT | Performed by: INTERNAL MEDICINE

## 2018-07-30 PROCEDURE — 86022 PLATELET ANTIBODIES: CPT | Performed by: INTERNAL MEDICINE

## 2018-07-30 PROCEDURE — 83735 ASSAY OF MAGNESIUM: CPT | Performed by: INTERNAL MEDICINE

## 2018-07-30 PROCEDURE — 82948 REAGENT STRIP/BLOOD GLUCOSE: CPT

## 2018-07-30 PROCEDURE — 80048 BASIC METABOLIC PNL TOTAL CA: CPT | Performed by: INTERNAL MEDICINE

## 2018-07-30 PROCEDURE — 99232 SBSQ HOSP IP/OBS MODERATE 35: CPT | Performed by: PHYSICIAN ASSISTANT

## 2018-07-30 PROCEDURE — 97110 THERAPEUTIC EXERCISES: CPT

## 2018-07-30 PROCEDURE — 85025 COMPLETE CBC W/AUTO DIFF WBC: CPT | Performed by: INTERNAL MEDICINE

## 2018-07-30 RX ORDER — VANCOMYCIN HYDROCHLORIDE 1 G/200ML
15 INJECTION, SOLUTION INTRAVENOUS EVERY 12 HOURS
Status: DISCONTINUED | OUTPATIENT
Start: 2018-07-30 | End: 2018-08-01 | Stop reason: HOSPADM

## 2018-07-30 RX ORDER — POLYETHYLENE GLYCOL 3350 17 G/17G
17 POWDER, FOR SOLUTION ORAL 2 TIMES DAILY
Qty: 60 EACH | Refills: 0 | Status: SHIPPED | OUTPATIENT
Start: 2018-07-30 | End: 2018-08-01 | Stop reason: HOSPADM

## 2018-07-30 RX ORDER — AMOXICILLIN 250 MG
2 CAPSULE ORAL
Qty: 60 TABLET | Refills: 0 | Status: SHIPPED | OUTPATIENT
Start: 2018-07-30 | End: 2018-08-01 | Stop reason: HOSPADM

## 2018-07-30 RX ORDER — POLYETHYLENE GLYCOL 3350 17 G/17G
17 POWDER, FOR SOLUTION ORAL 2 TIMES DAILY
Status: DISCONTINUED | OUTPATIENT
Start: 2018-07-30 | End: 2018-08-01 | Stop reason: HOSPADM

## 2018-07-30 RX ORDER — DOCUSATE SODIUM 100 MG/1
100 CAPSULE, LIQUID FILLED ORAL 2 TIMES DAILY
Qty: 10 CAPSULE | Refills: 0 | Status: SHIPPED | OUTPATIENT
Start: 2018-07-30 | End: 2018-08-01

## 2018-07-30 RX ORDER — DICYCLOMINE HYDROCHLORIDE 10 MG/1
10 CAPSULE ORAL
Qty: 90 CAPSULE | Refills: 0 | Status: SHIPPED | OUTPATIENT
Start: 2018-07-30 | End: 2018-08-16 | Stop reason: SDUPTHER

## 2018-07-30 RX ORDER — BISACODYL 10 MG
10 SUPPOSITORY, RECTAL RECTAL DAILY PRN
Qty: 12 SUPPOSITORY | Refills: 0 | Status: SHIPPED | OUTPATIENT
Start: 2018-07-30 | End: 2018-08-01

## 2018-07-30 RX ADMIN — ASPIRIN 81 MG 81 MG: 81 TABLET ORAL at 08:21

## 2018-07-30 RX ADMIN — DOCUSATE SODIUM 100 MG: 100 CAPSULE, LIQUID FILLED ORAL at 08:21

## 2018-07-30 RX ADMIN — DILTIAZEM HYDROCHLORIDE 240 MG: 120 CAPSULE, COATED, EXTENDED RELEASE ORAL at 08:21

## 2018-07-30 RX ADMIN — ENOXAPARIN SODIUM 40 MG: 40 INJECTION SUBCUTANEOUS at 08:21

## 2018-07-30 RX ADMIN — METRONIDAZOLE 500 MG: 500 INJECTION, SOLUTION INTRAVENOUS at 05:40

## 2018-07-30 RX ADMIN — INSULIN LISPRO 1 UNITS: 100 INJECTION, SOLUTION INTRAVENOUS; SUBCUTANEOUS at 16:20

## 2018-07-30 RX ADMIN — DICYCLOMINE HYDROCHLORIDE 10 MG: 10 CAPSULE ORAL at 16:20

## 2018-07-30 RX ADMIN — CARVEDILOL 25 MG: 25 TABLET, FILM COATED ORAL at 08:21

## 2018-07-30 RX ADMIN — Medication 500 MG: at 21:00

## 2018-07-30 RX ADMIN — DICYCLOMINE HYDROCHLORIDE 10 MG: 10 CAPSULE ORAL at 06:05

## 2018-07-30 RX ADMIN — CEFEPIME HYDROCHLORIDE 2000 MG: 2 INJECTION, POWDER, FOR SOLUTION INTRAVENOUS at 06:05

## 2018-07-30 RX ADMIN — INSULIN LISPRO 1 UNITS: 100 INJECTION, SOLUTION INTRAVENOUS; SUBCUTANEOUS at 11:42

## 2018-07-30 RX ADMIN — BISACODYL 10 MG: 5 TABLET, COATED ORAL at 10:28

## 2018-07-30 RX ADMIN — SENNOSIDES AND DOCUSATE SODIUM 2 TABLET: 8.6; 5 TABLET ORAL at 21:00

## 2018-07-30 RX ADMIN — DICYCLOMINE HYDROCHLORIDE 10 MG: 10 CAPSULE ORAL at 11:44

## 2018-07-30 RX ADMIN — CARVEDILOL 25 MG: 25 TABLET, FILM COATED ORAL at 17:14

## 2018-07-30 RX ADMIN — DOCUSATE SODIUM 100 MG: 100 CAPSULE, LIQUID FILLED ORAL at 17:14

## 2018-07-30 RX ADMIN — VANCOMYCIN HYDROCHLORIDE 1000 MG: 1 INJECTION, SOLUTION INTRAVENOUS at 17:14

## 2018-07-30 RX ADMIN — Medication 250 MG: at 08:21

## 2018-07-30 RX ADMIN — Medication 250 MG: at 17:14

## 2018-07-30 RX ADMIN — DICYCLOMINE HYDROCHLORIDE 10 MG: 10 CAPSULE ORAL at 21:00

## 2018-07-30 NOTE — PROGRESS NOTES
Progress Note Rafi Estimable 80 y o  female MRN: [id number]    Unit/Bed#: [facility] 509-01 Encounter: [id number]    Subjective:     Patient seen and examined at bedside  She reports feeling OK  Last BM was 2 days ago  She denies abdominal pain  She is eating well  No nausea or vomiting  Objective:     Vitals: Blood pressure 166/71, pulse 55, temperature 98 4 °F (36 9 °C), temperature source Oral, resp  rate 20, height 5' 2" (1 575 m), weight 102 kg (225 lb), SpO2 99 %, not currently breastfeeding  ,Body mass index is 41 15 kg/m²        Intake/Output Summary (Last 24 hours) at 07/30/18 1020  Last data filed at 07/30/18 9433   Gross per 24 hour   Intake          4666 75 ml   Output             2200 ml   Net          2466 75 ml       Physical Exam:     General Appearance: Alert, appears stated age and cooperative  Lungs: Clear to auscultation bilaterally, no rales or rhonchi, no labored breathing/accessory muscle use  Heart: Regular rate and rhythm, S1, S2 normal, no murmur, click, rub or gallop  Abdomen: Mild distention, Soft, non-tender; bowel sounds normal; no masses or no organomegaly  Extremities: No cyanosis, edema    Invasive Devices     Peripherally Inserted Central Catheter Line            PICC Line 78/50/22 Right Basilic 1 day                Lab Results:    Results from last 7 days  Lab Units 07/30/18  0539   WBC Thousand/uL 4 64   HEMOGLOBIN g/dL 10 0*   HEMATOCRIT % 30 3*   PLATELETS Thousands/uL 111*   NEUTROS PCT % 53   LYMPHS PCT % 29   MONOS PCT % 13*   EOS PCT % 4       Results from last 7 days  Lab Units 07/30/18  0539   SODIUM mmol/L 130*   POTASSIUM mmol/L 4 1   CHLORIDE mmol/L 98*   CO2 mmol/L 25   BUN mg/dL 14   CREATININE mg/dL 0 77   CALCIUM mg/dL 8 1*   TOTAL PROTEIN g/dL 5 7*   BILIRUBIN TOTAL mg/dL 0 28   ALK PHOS U/L 103   ALT U/L 37   AST U/L 25   GLUCOSE RANDOM mg/dL 109           Results from last 7 days  Lab Units 07/25/18  0627   LIPASE u/L 104       Imaging Studies: I have personally reviewed pertinent imaging studies  Xr Chest Pa & Lateral    Result Date: 7/25/2018  Impression: Cardiomegaly No acute consolidation or congestion Left upper lobe lung nodule suggest granuloma, stable since the previous study of October 17, 2017 Workstation performed: [id number]     Us Right Upper Quadrant    Result Date: 7/29/2018  Impression: Gallbladder wall thickening up to 8 mm with trace adjacent fluid  No cholelithiasis or biliary dilatation  Workstation performed: [id number]     Ct Abdomen Pelvis With Contrast    Result Date: 7/25/2018  Impression: No acute intra-abdominal abnormality  Workstation performed: [id number]       Assessment and Plan:    Discussed plan with SLIM    1) Chronic constipation: Last BM was 2 days ago  She is currently getting MiraLAX daily and Colace twice daily  Patient states Dulcolax tablets typically work for her      -Order Dulcolax 10 mg PO once   -Recommend MiraLAX twice daily and Colace twice daily  -If she has a bowel movement today, she is OK for discharge from GI standpoint with outpatient follow-up in the next 2-4 weeks for consideration of Linzess or Amitiza for chronic constipation     2) RUQ pain: RUQ ultrasound showed gallbladder wall thickening up to 8 mm with trace adjacent fluid  She denies abdominal pain, nausea, vomiting  No fever or leukocytosis   General Surgery evaluated patient and found no indication for surgical intervention      -No surgical intervention indicated at this time

## 2018-07-30 NOTE — ASSESSMENT & PLAN NOTE
Lab Results   Component Value Date    HGBA1C 6 2 (H) 06/05/2018       Recent Labs      07/29/18   1613  07/29/18   2104  07/30/18   0658  07/30/18   1045   POCGLU  132  142*  119  178*       Blood Sugar Average: Last 72 hrs:  (P) 874 [id number]     Reports control blood sugar at home for which metformin was recently discontinued, remained stable here on insulin sliding scale

## 2018-07-30 NOTE — ASSESSMENT & PLAN NOTE
Patient with variable platelet count during this hospital stay, she was on Lovenox for DVT prophylaxis up to this morning  Platelets today with further reduction at 111, T4s score 1, low probability for HI T  Send HIV screening and although Lovenox today, if HD screening negative with also low probability on the T4 score, resume Lovenox if patient will not be discharged home within the next 24 hours

## 2018-07-30 NOTE — ASSESSMENT & PLAN NOTE
Patient developed infiltration of IV site on Saturday July 28  At the beginning the site was edematous and then edema improved dramatically consult and Monday  This morning patient appeared improved but upon re-evaluation of the patient prior plan discharge he was noted that she is developing a very mild form of cellulitis involving the end and lower portion of the skin of the forearm    There is mild erythema and mild warmth, edema is almost completely resolved, there is no pain  There is no purulence at the previous site of IV access, diffuse bruising of the left and right upper extremity after multiple blood drawn  PICC line on the upper portion of the right arm appears to be in good position with no tenderness and no edema and no erythema surrounding the site  Will given vancomycin 15 milligram/kg every 12 hours today afternoon and tomorrow morning and evaluate in a m , possible discharge with p o  doxycycline or Bactrim to complete 7-10 days of treatment  This was discussed with sister, patient, niece all in agreement and understanding

## 2018-07-30 NOTE — PLAN OF CARE
Problem: PHYSICAL THERAPY ADULT  Goal: Performs mobility at highest level of function for planned discharge setting  See evaluation for individualized goals  Treatment/Interventions: Functional transfer training, LE strengthening/ROM, Elevations, Therapeutic exercise, Endurance training, Patient/family training, Gait training, Bed mobility, Equipment eval/education  Equipment Recommended: Walker (RW)       See flowsheet documentation for full assessment, interventions and recommendations  Outcome: Progressing  Prognosis: Good  Problem List: Decreased strength, Decreased endurance, Impaired balance, Decreased mobility, Decreased safety awareness  Assessment: Pt resting in bed at time of PT session  Pt reports feeling better today and is willing to participate in PT  Pt able to perform all bed mobility and transfers with slightly less A required compared to previous session, however slight VC needed for hand placement and safety  Pt able to tolerate increased ambulation distances this session with S and the use of a RW  No LOB noted with gait training  Slight cueing needed for safety during direction changes with RW  Pt able to perform all LE therex this session with a focus on static and dynamic seated balance without any increase in complaints  Slight VC and TC needed for proper form and pacing with all therex at this time however carryover noted from previous sessions  Pt assisted back into chair at conclusion of PT session with all needs within reach  Pt and pts family deny any further questions at this time  PT will continue to follow during hospital stay  D/C recommendation when medically cleared is home PT  Barriers to Discharge: None     Recommendation: Home PT, Home with family support     PT - OK to Discharge: Yes (when medically cleared )    See flowsheet documentation for full assessment

## 2018-07-30 NOTE — PROGRESS NOTES
NEPHROLOGY PROGRESS NOTE   Polly Figueroa 80 y o  female MRN: [id number]  Unit/Bed#: [facility] 509-01 Encounter: [id number]  Reason for Consult:  Hyponatremia    ASSESSMENT/PLAN:  1  Hyponatremia, likely secondary to volume depletion, could be component of underline SIADH, thiazide induced  · Serum osmolality 252, urine chloride 48, initial sodium was 17, repeat urine sodium 45, repeat urine osmolality 388  2  Hypertension, overall stable, if diuretic needed would use a loop diuretic  3  Constipation, overall resolved, feeling better    PLAN:  · Overall sodium level has improved  · Continue to hold hydrochlorothiazide, if needed would prefer loop diuretic with close observation sodium levels  · Repeat BMP in 1 week  · The continue with fluid restriction 1500 cc per day  · Follow-up in the office in 2-4 weeks    SUBJECTIVE:  Patient is feeling fairly well today  Appetite is improving  Denies any chest pain or shortness of Breath  Complains of some slight swelling in her right hand  Review of Systems    OBJECTIVE:  Current Weight: Weight - Scale: 102 kg (225 lb)  Vitals:    07/29/18 2032 07/29/18 2300 07/30/18 0256 07/30/18 0742   BP: 144/64 161/68 136/58 166/71   BP Location:    Left arm   Pulse: 58 58 63 55   Resp: 18 20 20 20   Temp: 97 7 °F (36 5 °C) 97 6 °F (36 4 °C) 97 6 °F (36 4 °C) 98 4 °F (36 9 °C)   TempSrc: Oral Oral Oral Oral   SpO2: 96% 99% 98% 99%   Weight:       Height:           Intake/Output Summary (Last 24 hours) at 07/30/18 1038  Last data filed at 07/30/18 0811   Gross per 24 hour   Intake          4666 75 ml   Output             2200 ml   Net          2466 75 ml       Physical Exam   Constitutional: She is oriented to person, place, and time  No distress  Eyes: Conjunctivae are normal    Neck: Neck supple  Cardiovascular: Normal rate and regular rhythm  Pulmonary/Chest: Effort normal and breath sounds normal    Abdominal: Soft  She exhibits no distension     Musculoskeletal: She exhibits edema (Trace edema bilaterally)  Neurological: She is alert and oriented to person, place, and time  Skin: Skin is warm and dry         Medications:    Current Facility-Administered Medications:     aluminum-magnesium hydroxide-simethicone (MYLANTA) 200-200-20 mg/5 mL oral suspension 15 mL, 15 mL, Oral, Q4H PRN, Cornell Shi PA-C, 15 mL at 07/26/18 2314    aspirin chewable tablet 81 mg, 81 mg, Oral, Daily, Tiffanie Wakefield MD, 81 mg at 07/30/18 0821    bisacodyl (DULCOLAX) EC tablet 10 mg, 10 mg, Oral, Daily PRN, Nick Lu MD    bisacodyl (DULCOLAX) rectal suppository 10 mg, 10 mg, Rectal, Daily PRN, Tiffanie Wakefield MD, 10 mg at 07/26/18 1747    calcium carbonate (TUMS) chewable tablet 500 mg, 500 mg, Oral, Daily PRN, Cornell Shi PA-C, 500 mg at 07/27/18 0150    carvedilol (COREG) tablet 25 mg, 25 mg, Oral, BID, Tiffanie Wakefield MD, 25 mg at 07/30/18 0821    dicyclomine (BENTYL) capsule 10 mg, 10 mg, Oral, 4x Daily (AC & HS), Kevin Colon MD, 10 mg at 07/30/18 0605    diltiazem (CARDIZEM CD) 24 hr capsule 240 mg, 240 mg, Oral, Daily, Tiffanie Wakefield MD, 240 mg at 07/30/18 0821    docusate sodium (COLACE) capsule 100 mg, 100 mg, Oral, BID, Nick Lu MD, 100 mg at 07/30/18 0821    hydrALAZINE (APRESOLINE) injection 5 mg, 5 mg, Intravenous, Q6H PRN, Norma Hampton MD, 5 mg at 07/26/18 2012    insulin lispro (HumaLOG) 100 units/mL subcutaneous injection 1-5 Units, 1-5 Units, Subcutaneous, HS, Tiffanie Wakefield MD, 2 Units at 07/28/18 2122    insulin lispro (HumaLOG) 100 units/mL subcutaneous injection 1-6 Units, 1-6 Units, Subcutaneous, TID AC, 2 Units at 07/28/18 1646 **AND** Fingerstick Glucose (POCT), , , TID AC, Valarie Horne MD    meclizine (ANTIVERT) tablet 12 5 mg, 12 5 mg, Oral, TID PRN, Tiffanie Wakefield MD    ondansetron (ZOFRAN) injection 4 mg, 4 mg, Intravenous, Q6H PRN, Tiffanie Wakefield MD, 4 mg at 07/28/18 7708    polyethylene glycol (MIRALAX) packet 17 g, 17 g, Oral, BID, Narinder Sanches MD    saccharomyces boulardii (FLORASTOR) capsule 250 mg, 250 mg, Oral, BID, Melquiades Blue MD, 250 mg at 07/30/18 0821    senna-docusate sodium (SENOKOT S) 8 6-50 mg per tablet 2 tablet, 2 tablet, Oral, HS, Narinder Sanches MD, 2 tablet at 07/29/18 2120    sodium chloride 0 9 % infusion, 75 mL/hr, Intravenous, Continuous, Lazarus Graves MD, Last Rate: 75 mL/hr at 07/29/18 2044, 75 mL/hr at 07/29/18 2044    Laboratory Results:    Results from last 7 days  Lab Units 07/30/18  0539 07/30/18  0013 07/29/18  1733 07/29/18  1204 07/29/18  0516 07/29/18  0515 07/29/18  0041 07/28/18  1809  07/28/18  1339 07/28/18  0804 07/28/18  0458 07/28/18  0322  07/27/18  0504  07/25/18  0627   WBC Thousand/uL 4 64  --   --   --   --  4 88  --   --   --   --   --  7 53  --   --  15 55*  --  7 66   HEMOGLOBIN g/dL 10 0*  --   --   --   --  10 2*  --   --   --   --   --  10 5*  --   --  11 5  --  13 1   HEMATOCRIT % 30 3*  --   --   --   --  30 3*  --   --   --   --   --  31 2*  --   --  33 5*  --  36 3   PLATELETS Thousands/uL 111*  --   --   --   --  106*  --   --   --   --   --  115*  --   --  150  --  161   SODIUM mmol/L 130* 129* 128* 128* 127*  --  127* 125*  < >  --  122*  --  120*  < > 122*  < > 111*   POTASSIUM mmol/L 4 1 4 1 4 2 4 0 4 1  --  4 3 4 1  < >  --  4 2  --  4 2  < > 4 2  < > 4 9   CHLORIDE mmol/L 98* 97* 96* 94* 96*  --  94* 93*  < >  --  91*  --  91*  < > 92*  < > 80*   CO2 mmol/L 25 26 26 25 27  --  27 25  < >  --  25  --  23  < > 23  < > 22   BUN mg/dL 14 16 15 14 15  --  17 17  < >  --  16  --  19  < > 22  < > 13   CREATININE mg/dL 0 77 0 90 0 95 0 85 0 79  --  0 90 1 01  < >  --  0 93  --  0 87  < > 0 96  < > 0 74   CALCIUM mg/dL 8 1* 7 9* 8 2* 8 1* 7 9*  --  8 0* 8 3  < >  --  8 3  --  8 1*  < > 8 4  < > 8 7   MAGNESIUM mg/dL 2 1  --   --   --   --  2 2  --   --   --   --   --   --  2 1  --  2 2  --   --    PHOSPHORUS mg/dL 3 3  --   --   -- --  3 0  --   --   --   --   --   --  2 2*  --  2 7  --   --    TOTAL PROTEIN g/dL 5 7*  --  6 4  --   --   --   --   --   --  6 2* 6 2*  --   --   --   --   --  7 5   GLUCOSE RANDOM mg/dL 109 123 179* 144* 109  --  113 173*  < >  --  160*  --  160*  < > 151*  < > 176*   < > = values in this interval not displayed

## 2018-07-30 NOTE — ASSESSMENT & PLAN NOTE
· Patient with history of chronic constipation, evaluated by Gastroenterology in the past, she was started on Linzess that she did not take after few doses as this was provoking severe diarrhea  · Patient was evaluated by Gastroenterology, she is currently on multiple agents for constipation with good result, patient is having bowel movement every other day  · Same regimen including Bentyl that was started by Gastroenterology for possible irritable bowel disease with constipation features be continued upon discharge  · Last bowel movement today

## 2018-07-30 NOTE — CASE MANAGEMENT
Thank you,  Chinyere Aqq  [facility] Utilization Review Department  Phone: [phone]; Fax [phone]  ATTENTION: The Network Utilization Review Department is now centralized for our 9 Facilities  Make a note that we have a new phone and fax numbers for our Department  Please call with any questions or concerns to [phone] and carefully follow the prompts so that you are directed to the right person  All voicemails are confidential  Fax any determinations, approvals, denials, and requests for initial or continue stay review clinical to [phone]  Due to HIGH CALL volume, it would be easier if you could please send faxed requests to expedite your requests and in part, help us provide discharge notifications faster  Continued Stay Review    Date: 7/30/18 Monday ACUTE MED SURG LEVEL OF CARE    Vital Signs: /96 (BP Location: Left arm)   Pulse 60   Temp 97 7 °F (36 5 °C) (Oral)   Resp 18   Ht 5' 2" (1 575 m)   Wt 102 kg (225 lb)   SpO2 97%   BMI 41 15 kg/m²                Vitals:     07/29/18 2032 07/29/18 2300 07/30/18 0256 07/30/18 0742   BP: 144/64 161/68 136/58 166/71   BP Location:       Left arm   Pulse: 58 58 63 55   Resp: 18 20 20 20   Temp: 97 7 °F (36 5 °C) 97 6 °F (36 4 °C) 97 6 °F (36 4 °C) 98 4 °F (36 9 °C)   TempSrc: Oral Oral Oral Oral   SpO2: 96% 99% 98% 99%   Weight:           Height:                    Intake/Output Summary (Last 24 hours) at 07/30/18 1038    Gross per 24 hour   Intake          4666 75 ml   Output             2200 ml   Net          2466 75 ml       Diet Erwin/CHO Controlled; Consistent Carbohydrate Diet Level 2 (5 carb servings/75 grams CHO/meal);  Sodium 2 Gm, Fluid Restriction 1200 ML       Continuous IV Infusions:   sodium chloride 75 mL/hr Last Rate: 75 mL/hr (07/29/18 2044)       Medications:   Scheduled Meds:   Current Facility-Administered Medications:  aluminum-magnesium hydroxide-simethicone 15 mL Oral Q4H PRN Storm Victor Hugo Charlotte Beck PA-C    aspirin 81 mg Oral Daily Anthony Pearson MD    bisacodyl 10 mg Oral Daily PRN Kiesha Kim MD    bisacodyl 10 mg Rectal Daily PRN Anthony Pearson MD    calcium carbonate 500 mg Oral Daily PRN Princess Caballero PA-C    carvedilol 25 mg Oral BID Anthony Pearson MD    dicyclomine 10 mg Oral 4x Daily (AC & HS) Cala Rubinstein, MD    diltiazem 240 mg Oral Daily Anthony Pearson MD    docusate sodium 100 mg Oral BID Kiesha Kim MD    hydrALAZINE 5 mg Intravenous Q6H PRN Chelsie Doll MD    insulin lispro 1-5 Units Subcutaneous HS Anthony Pearson MD    insulin lispro 1-6 Units Subcutaneous TID AC Valarie Horne MD    meclizine 12 5 mg Oral TID PRN Anthony Pearson MD    ondansetron 4 mg Intravenous Q6H PRN Anthony Pearson MD    polyethylene glycol 17 g Oral BID Kiesha Kim MD    saccharomyces boulardii 250 mg Oral BID Anthony Pearson MD    senna-docusate sodium 2 tablet Oral HS Kiesha Kim MD    sodium chloride 75 mL/hr Intravenous Continuous Yuriy Arambula MD Last Rate: 75 mL/hr (07/29/18 2044)       PRN Meds:     aluminum-magnesium hydroxide-simethicone    Bisacodyl 10 mg po qDAy prn given x 1/ 24 hrs    calcium carbonate    hydrALAZINE    meclizine    ondansetron      LABS/Diagnostic Results:  Results from last 7 days  Lab Units 07/30/18  0539 07/30/18  0013 07/29/18  1733 07/29/18  1204 07/29/18  0516 07/29/18  0515 07/29/18  0041 07/28/18  1809   07/28/18  1339 07/28/18  0804 07/28/18  0458 07/28/18  0322   07/27/18  0504   07/25/18  0627   WBC Thousand/uL 4 64  --   --   --   --  4 88  --   --   --   --   --  7 53  --   --  15 55*  --  7 66   HEMOGLOBIN g/dL 10 0*  --   --   --   --  10 2*  --   --   --   --   --  10 5*  --   --  11 5  --  13 1   HEMATOCRIT % 30 3*  --   --   --   --  30 3*  --   --   --   --   --  31 2*  --   --  33 5*  --  36 3   PLATELETS Thousands/uL 111*  --   --   --   --  106*  --   --   --   --   --  115*  --   --  150 --  161   SODIUM mmol/L 130* 129* 128* 128* 127*  --  127* 125*  < >  --  122*  --  120*  < > 122*  < > 111*   POTASSIUM mmol/L 4 1 4 1 4 2 4 0 4 1  --  4 3 4 1  < >  --  4 2  --  4 2  < > 4 2  < > 4 9   CHLORIDE mmol/L 98* 97* 96* 94* 96*  --  94* 93*  < >  --  91*  --  91*  < > 92*  < > 80*   CO2 mmol/L 25 26 26 25 27  --  27 25  < >  --  25  --  23  < > 23  < > 22   BUN mg/dL 14 16 15 14 15  --  17 17  < >  --  16  --  19  < > 22  < > 13   CREATININE mg/dL 0 77 0 90 0 95 0 85 0 79  --  0 90 1 01  < >  --  0 93  --  0 87  < > 0 96  < > 0 74   CALCIUM mg/dL 8 1* 7 9* 8 2* 8 1* 7 9*  --  8 0* 8 3  < >  --  8 3  --  8 1*  < > 8 4  < > 8 7   MAGNESIUM mg/dL 2 1  --   --   --   --  2 2  --   --   --   --   --   --  2 1  --  2 2  --   --    PHOSPHORUS mg/dL 3 3  --   --   --   --  3 0  --   --   --   --   --   --  2 2*  --  2 7  --   --    TOTAL PROTEIN g/dL 5 7*  --  6 4  --   --   --   --   --   --  6 2* 6 2*  --   --   --   --   --  7 5   GLUCOSE RANDOM mg/dL 109 123 179* 144* 109  --  113 173*  < >  --  160*  --  160*  < > 151*  < > 176*         Age/Sex: 80 y o  female       Assessment/Plan:  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   Assessment & Plan     Patient discussed weight loss with primary care physician          Hyponatremia   Assessment & Plan     · Possibly related to hydrochlorothiazide use, excessive water intake, possible SIADH secondary to ongoing abdominal pain and nausea  · Patient improved with use of normal saline and then 1/2 normal saline, was restriction and discontinuation of hydrochlorothiazide  · Sodium on admission 111, currently 128  · Continue with normal saline as per Nephrology recommendation, 75 cc an hour, continue with BMP q 6 hours  · Patient with history of chronic hyponatremia with an average sodium around 130             Chronic constipation   Assessment & Plan     · Patient with history of chronic constipation, evaluated by Gastroenterology in the past, she was started on Linzess that she did not take after few doses as this was provoking severe diarrhea  · Patient was evaluated by Gastroenterology while in the hospital, input was appreciated, patient was started on Bentyl for possible irritable bowel with constipation   · Patient a bowel movement on July 28, continue with current regimen upon discharge          Type 2 diabetes mellitus without complication, without long-term current use of insulin [facility])   Assessment & Plan             Lab Results   Component Value Date     HGBA1C 6 2 (H) 06/05/2018                Recent Labs      07/28/18   1607  07/28/18   2057  07/29/18   0618  07/29/18   1037   POCGLU  224*  218*  97  159*         Blood Sugar Average: Last 72 hrs:  (P) 151 [id number]      Reports control blood sugar at home for which metformin was recently discontinued, remained stable here on insulin sliding scale          Essential hypertension   Assessment & Plan     · To be continued on Coreg and Cardizem, discontinue hydrochlorothiazide, do not resume upon discharge secondary to severe hyponatremia  · Blood pressure acceptable, continue to monitor          * Leukocytosis   Assessment & Plan     Patient with leukocytosis on the CBC on July 27 but at the time of CBC patient was non febrile  Maximum temperature 100 1  Recent chest x-ray 7/25 without infiltrates, patient with no cough or respiratory symptoms so pneumonia is suspected  Recent CT abdomen and pelvis on July 25th unremarkable  Urinalysis on the day of admission within normal limits and patient with no urinary symptoms  The only complaint that the patient had the morning in question was of transient right upper quadrant pain and bloating  On recent CT scan no inflammation of gallbladder or stones are visible  For now, continue with cefepime and Flagyl and obtain ultrasound of the right upper quadrant     Blood culture negative, patient remains non febrile, ultrasound right upper quadrant was performed, pending reading, if negative for cholecystitis will discontinue antibiotics             VTE Pharmacologic Prophylaxis: yes  Pharmacologic: Heparin  Mechanical VTE Prophylaxis in Place: Yes      Current Patient Status: Inpatient   Certification Statement: The patient will continue to require additional inpatient hospital stay            Discharge Plan:   [address] [facility]   Treatment/Interventions ADL retraining;Functional transfer training;LE strengthening/ROM; Elevations; Therapeutic exercise; Endurance training;Cognitive reorientation;Patient/family training;Equipment eval/education; Bed mobility;Gait training   Progress Progressing toward goals   PT Frequency (3-5x/week)   Recommendation   Recommendation Home PT; Home with family support   Equipment Recommended Walker       CASE MANAGEMENT FOLLOWING CLOSELY FOR ALL DISCHARGE NEEDS

## 2018-07-30 NOTE — PHYSICAL THERAPY NOTE
PHYSICAL THERAPY NOTE          Patient Name: Melodie ESCOBEDO Date: 7/30/2018 07/30/18 1828   Pain Assessment   Pain Assessment No/denies pain   Restrictions/Precautions   Weight Bearing Precautions Per Order No   Other Precautions Multiple lines;Telemetry; Fall Risk   General   Chart Reviewed Yes   Response to Previous Treatment Patient with no complaints from previous session  Family/Caregiver Present Yes   Cognition   Overall Cognitive Status WFL   Arousal/Participation Alert   Attention Within functional limits   Orientation Level Oriented X4   Memory Within functional limits   Following Commands Follows one step commands without difficulty   Subjective   Subjective pt willing to participate in PT    Bed Mobility   Additional Comments NA, Pt OOB in chair at time of PT session    Transfers   Sit to Stand 5  Supervision   Additional items Increased time required;Verbal cues   Stand to Sit 5  Supervision   Additional items Increased time required;Verbal cues   Additional Comments VC needed for hand placement and safety    Ambulation/Elevation   Gait pattern Foward flexed; Short stride   Gait Assistance 5  Supervision   Assistive Device Rolling walker   Distance 150ft x 2    Balance   Static Sitting Good   Static Standing Fair +   Ambulatory Fair   Endurance Deficit   Endurance Deficit No   Activity Tolerance   Activity Tolerance Patient tolerated treatment well   Nurse Made Aware pt appropriate to be seen per nsg    Exercises   TKR Sitting;10 reps;AROM; Bilateral  (x 2 sets )   Assessment   Prognosis Good   Problem List Decreased strength;Decreased endurance; Impaired balance;Decreased mobility; Decreased safety awareness   Assessment Pt resting in bed at time of PT session  Pt reports feeling better today and is willing to participate in PT   Pt able to perform all bed mobility and transfers with slightly less A required compared to previous session, however slight VC needed for hand placement and safety  Pt able to tolerate increased ambulation distances this session with S and the use of a RW  No LOB noted with gait training  Slight cueing needed for safety during direction changes with RW  Pt able to perform all LE therex this session with a focus on static and dynamic seated balance without any increase in complaints  Slight VC and TC needed for proper form and pacing with all therex at this time however carryover noted from previous sessions  Pt assisted back into chair at conclusion of PT session with all needs within reach  Pt and pts family deny any further questions at this time  PT will continue to follow during hospital stay  D/C recommendation when medically cleared is home PT  Barriers to Discharge None   Goals   Patient Goals to go home    STG Expiration Date 08/09/18   Treatment Day 2   Plan   Treatment/Interventions Functional transfer training;LE strengthening/ROM; Therapeutic exercise; Endurance training;Equipment eval/education; Bed mobility;Gait training;Patient/family training;Spoke to nursing;Family   Progress Progressing toward goals   PT Frequency Other (Comment)  (3-5x a week )   Recommendation   Recommendation Home PT; Home with family support   Equipment Recommended Walker   PT - OK to Discharge Yes  (when medically cleared )   Eliza Pugh PT

## 2018-07-30 NOTE — RESTORATIVE TECHNICIAN NOTE
Restorative Specialist Mobility Note       Activity: Chair, Dangle, Stand at bedside, Turn, Ambulate in room, Ambulate in cano     Assistive Device: Front wheel walker     Ambulation Response: Tolerated fairly well  Repositioned: Sitting, Up in chair              Anti-Embolism Device On:  Bilateral, Sequential compression devices, below knee

## 2018-07-30 NOTE — ASSESSMENT & PLAN NOTE
· To be continued on Coreg and Cardizem, discontinue hydrochlorothiazide, do not resume upon discharge secondary to severe hyponatremia  · Blood pressure acceptable, some elevations of the been noted, patient will require further follow-up with primary care physician for evaluation of possible medication adjustment    Average systolic between 439 and 549 in the hospital with occasionally peak at 160

## 2018-07-30 NOTE — ASSESSMENT & PLAN NOTE
· Possibly related to hydrochlorothiazide use, excessive water intake, possible SIADH secondary to ongoing abdominal pain and nausea  · Patient improved with use of normal saline and then 1/2 normal saline, was restriction and discontinuation of hydrochlorothiazide  · Sodium on admission 111, currently 130  · Patient was treated with normal saline for 3 days, currently discontinued  · Patient with history of chronic hyponatremia with an average sodium around 130  · Discussed with Nephrology, repeat BMP on Friday and follow up as an outpatient  · Hydrochlorothiazide has been added to patient medication allergy list

## 2018-07-30 NOTE — ASSESSMENT & PLAN NOTE
Patient with leukocytosis on the CBC on July 27 but at the time of CBC patient was non febrile  Maximum temperature 100 1 July 27, she was started on cefepime and Flagyl for possible cholecystitis as she was complaining of right upper quadrant pain  Recent chest x-ray 7/25 without infiltrates, patient with no cough or respiratory symptoms so pneumonia is suspected  Recent CT abdomen and pelvis on July 25th unremarkable  Urinalysis on the day of admission within normal limits and patient with no urinary symptoms    On recent CT scan no inflammation of gallbladder or stones are visible  Ultrasound of the right upper quadrant with minimal thickening of the gallbladder, evaluated by surgery, no suspicion for a calculus cholecystitis as LFTs remain within normal limits  Discontinue cefepime and Flagyl today      Blood culture negative, patient remains non febrile

## 2018-07-30 NOTE — PROGRESS NOTES
Progress Note Delta Deo 1934, 80 y o  female MRN: [id number]    Unit/Bed#: [facility] 509-01 Encounter: [id number]    Primary Care Provider: Grant Langston DO   Date and time admitted to hospital: 7/25/2018  6:10 AM        Leukocytosis   Assessment & Plan    Patient with leukocytosis on the CBC on July 27 but at the time of CBC patient was non febrile  Maximum temperature 100 1 July 27, she was started on cefepime and Flagyl for possible cholecystitis as she was complaining of right upper quadrant pain  Recent chest x-ray 7/25 without infiltrates, patient with no cough or respiratory symptoms so pneumonia is suspected  Recent CT abdomen and pelvis on July 25th unremarkable  Urinalysis on the day of admission within normal limits and patient with no urinary symptoms    On recent CT scan no inflammation of gallbladder or stones are visible  Ultrasound of the right upper quadrant with minimal thickening of the gallbladder, evaluated by surgery, no suspicion for a calculus cholecystitis as LFTs remain within normal limits  Discontinue cefepime and Flagyl today      Blood culture negative, patient remains non febrile        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   Assessment & Plan    Patient discussed weight loss with primary care physician        Hyponatremia   Assessment & Plan    · Possibly related to hydrochlorothiazide use, excessive water intake, possible SIADH secondary to ongoing abdominal pain and nausea  · Patient improved with use of normal saline and then 1/2 normal saline, was restriction and discontinuation of hydrochlorothiazide  · Sodium on admission 111, currently 130  · Patient was treated with normal saline for 3 days, currently discontinued  · Patient with history of chronic hyponatremia with an average sodium around 130  · Discussed with Nephrology, repeat BMP on Friday and follow up as an outpatient  · Hydrochlorothiazide has been added to patient medication allergy list          Chronic constipation   Assessment & Plan    · Patient with history of chronic constipation, evaluated by Gastroenterology in the past, she was started on Linzess that she did not take after few doses as this was provoking severe diarrhea  · Patient was evaluated by Gastroenterology, she is currently on multiple agents for constipation with good result, patient is having bowel movement every other day  · Same regimen including Bentyl that was started by Gastroenterology for possible irritable bowel disease with constipation features be continued upon discharge  · Last bowel movement today        Type 2 diabetes mellitus without complication, without long-term current use of insulin [facility])   Assessment & Plan    Lab Results   Component Value Date    HGBA1C 6 2 (H) 06/05/2018       Recent Labs      07/29/18   1613  07/29/18   2104  07/30/18   0658  07/30/18   1045   POCGLU  132  142*  119  178*       Blood Sugar Average: Last 72 hrs:  (P) 886 [id number]     Reports control blood sugar at home for which metformin was recently discontinued, remained stable here on insulin sliding scale        Essential hypertension   Assessment & Plan    · To be continued on Coreg and Cardizem, discontinue hydrochlorothiazide, do not resume upon discharge secondary to severe hyponatremia  · Blood pressure acceptable, some elevations of the been noted, patient will require further follow-up with primary care physician for evaluation of possible medication adjustment  Average systolic between 821 and 093 in the hospital with occasionally peak at 160        * IV infiltration   Assessment & Plan    Patient developed infiltration of IV site on Saturday July 28  At the beginning the site was edematous and then edema improved dramatically consult and Monday    This morning patient appeared improved but upon re-evaluation of the patient prior plan discharge he was noted that she is developing a very mild form of cellulitis involving the end and lower portion of the skin of the forearm  There is mild erythema and mild warmth, edema is almost completely resolved, there is no pain  There is no purulence at the previous site of IV access, diffuse bruising of the left and right upper extremity after multiple blood drawn  PICC line on the upper portion of the right arm appears to be in good position with no tenderness and no edema and no erythema surrounding the site  Will given vancomycin 15 milligram/kg every 12 hours today afternoon and tomorrow morning and evaluate in a m , possible discharge with p o  doxycycline or Bactrim to complete 7-10 days of treatment  This was discussed with sister, patient, niece all in agreement and understanding          VTE Pharmacologic Prophylaxis: yes  Pharmacologic:  Lovenox currently on hold   Mechanical VTE Prophylaxis in Place: Yes     Patient Centered Rounds: I have performed bedside rounds with nursing staff today      Discussions with Specialists or Other Care Team Provider:  Nephrology, Gastroenterology      Education and Discussions with Family / Patient:  Patient, sister, niece at bedside     Time Spent for Care: 40 minutes   More than 50% of total time spent on counseling and coordination of care as described above      Current Length of Stay: 5 day(s)     Current Patient Status: Inpatient   Certification Statement: The patient will continue to require additional inpatient hospital stay due to Please refer to above     Discharge Plan:  Home when medically stable     Code Status: Level 1 - Full Code    Subjective:   Patient is feeling well  She has no complaints of concerns  She denies pains on the right upper extremity      Objective:     Vitals:   Temp (24hrs), Av 8 °F (36 6 °C), Min:97 6 °F (36 4 °C), Max:98 4 °F (36 9 °C)    HR:  [54-63] 54  Resp:  [18-20] 20  BP: (136-166)/(58-96) 148/68  SpO2:  [96 %-99 %] 97 %  Body mass index is 41 15 kg/m²  Input and Output Summary (last 24 hours): Intake/Output Summary (Last 24 hours) at 07/30/18 1548  Last data filed at 07/30/18 1526   Gross per 24 hour   Intake          4253 75 ml   Output             2400 ml   Net          1853 75 ml       Physical Exam:     Physical Exam   Constitutional: She is oriented to person, place, and time  She appears well-developed  Cardiovascular: Normal rate, regular rhythm and normal heart sounds  Exam reveals no friction rub  No murmur heard  Pulmonary/Chest: Effort normal  No respiratory distress  She has no wheezes  She has no rales  Abdominal: Soft  She exhibits no distension  There is no tenderness  There is no rebound  Musculoskeletal: She exhibits edema (Very mild edema of the right hand and lower portion of the right forearm with very mild erythema and warmth, no pain, no in duration, no drainage)  Neurological: She is alert and oriented to person, place, and time  She exhibits normal muscle tone  Skin: Skin is warm  Psychiatric: She has a normal mood and affect  Additional Data:     Labs:      Results from last 7 days  Lab Units 07/30/18  0539   WBC Thousand/uL 4 64   HEMOGLOBIN g/dL 10 0*   HEMATOCRIT % 30 3*   PLATELETS Thousands/uL 111*   NEUTROS PCT % 53   LYMPHS PCT % 29   MONOS PCT % 13*   EOS PCT % 4       Results from last 7 days  Lab Units 07/30/18  0539   SODIUM mmol/L 130*   POTASSIUM mmol/L 4 1   CHLORIDE mmol/L 98*   CO2 mmol/L 25   BUN mg/dL 14   CREATININE mg/dL 0 77   CALCIUM mg/dL 8 1*   TOTAL PROTEIN g/dL 5 7*   BILIRUBIN TOTAL mg/dL 0 28   ALK PHOS U/L 103   ALT U/L 37   AST U/L 25   GLUCOSE RANDOM mg/dL 109           * I Have Reviewed All Lab Data Listed Above  * Additional Pertinent Lab Tests Reviewed:  All Labs Within Last 24 Hours Reviewed    Imaging:    Imaging Reports Reviewed Today Include:  Ultrasound of the abdomen  Imaging Personally Reviewed by Myself Includes:  None    Recent Cultures (last 7 days):       Results from last 7 days  Lab Units 07/27/18  1823   BLOOD CULTURE  No Growth at 48 hrs  No Growth at 48 hrs  Last 24 Hours Medication List:     Current Facility-Administered Medications:  aluminum-magnesium hydroxide-simethicone 15 mL Oral Q4H PRN Margie Vazquez PA-C    aspirin 81 mg Oral Daily Valarie Horne MD    bisacodyl 10 mg Oral Daily PRN Elbert Casas MD    bisacodyl 10 mg Rectal Daily PRN Teresa Freitas MD    calcium carbonate 500 mg Oral Daily PRN Margie Vazquez PA-C    carvedilol 25 mg Oral BID Teresa Freitas MD    dicyclomine 10 mg Oral 4x Daily (AC & HS) Ronny Mcburney, MD    diltiazem 240 mg Oral Daily Valarie Horne MD    docusate sodium 100 mg Oral BID Elbert Casas MD    hydrALAZINE 5 mg Intravenous Q6H PRN Eric Guardado MD    insulin lispro 1-5 Units Subcutaneous HS Teresa Freitas MD    insulin lispro 1-6 Units Subcutaneous TID AC Valarie Horne MD    meclizine 12 5 mg Oral TID PRN Valarie Horne MD    ondansetron 4 mg Intravenous Q6H PRN Valarie Horne MD    polyethylene glycol 17 g Oral BID Elbert Casas MD    saccharomyces boulardii 250 mg Oral BID Teresa Freitas MD    senna-docusate sodium 2 tablet Oral HS Elbert Casas MD    sodium chloride 75 mL/hr Intravenous Continuous Sapna Plata MD Last Rate: 75 mL/hr (07/29/18 2044)   vancomycin 15 mg/kg (Adjusted) Intravenous Q12H Elbert Casas MD         Today, Patient Was Seen By: Elbert Casas MD    ** Please Note: Dragon 360 Dictation voice to text software may have been used in the creation of this document   **

## 2018-07-30 NOTE — DISCHARGE INSTRUCTIONS
Please take all your medications as instructed  Do not resume hydrochlorothiazide  For your blood pressure control, please FU with PCP to establish if another medications should be added or changed  Repeat blood work on Friday and FU results with PCP and nephrology  Take all your anti-constipation medications as instructed  Follow-up with all the physicians that have been recommended to you  Constipation   WHAT YOU NEED TO KNOW:   Constipation is when you have hard, dry bowel movements, or you go longer than usual between bowel movements  DISCHARGE INSTRUCTIONS:   Seek care immediately if:   · You have blood in your bowel movements  · You have a fever and abdominal pain with the constipation  Contact your healthcare provider if:   · Your constipation gets worse  · You start to vomit  · You have questions or concerns about your condition or care  Medicines:   · Medicine or a fiber supplement  may help make your bowel movement softer  A laxative may help relax and loosen your intestines to help you have a bowel movement  You may also be given medicine to increase fluid in your intestines  The fluid may help move bowel movements through your intestines  · Take your medicine as directed  Contact your healthcare provider if you think your medicine is not helping or if you have side effects  Tell him of her if you are allergic to any medicine  Keep a list of the medicines, vitamins, and herbs you take  Include the amounts, and when and why you take them  Bring the list or the pill bottles to follow-up visits  Carry your medicine list with you in case of an emergency  Manage your constipation:   · Drink liquids as directed  You may need to drink extra liquids to help soften and move your bowels  Ask how much liquid to drink each day and which liquids are best for you  · Eat high-fiber foods  This may help decrease constipation by adding bulk to your bowel movements   High-fiber foods include fruit, vegetables, whole-grain breads and cereals, and beans  Your healthcare provider or dietitian can help you create a high-fiber meal plan  · Exercise regularly  Regular physical activity can help stimulate your intestines  Ask which exercises are best for you  · Schedule a time each day to have a bowel movement  This may help train your body to have regular bowel movements  Bend forward while you are on the toilet to help move the bowel movement out  Sit on the toilet for at least 10 minutes, even if you do not have a bowel movement  Follow up with your healthcare provider as directed:  Write down your questions so you remember to ask them during your visits  © 2017 [facility] Information is for End User's use only and may not be sold, redistributed or otherwise used for commercial purposes  All illustrations and images included in CareNotes® are the copyrighted property of [employer] M [employer]  or [employer]  The above information is an  only  It is not intended as medical advice for individual conditions or treatments  Talk to your doctor, nurse or pharmacist before following any medical regimen to see if it is safe and effective for you  Hypertension   WHAT YOU NEED TO KNOW:   Hypertension is high blood pressure (BP)  Your BP is the force of your blood moving against the walls of your arteries  Normal BP is less than 120/80  Prehypertension is between 120/80 and 139/89  Hypertension is 140/90 or higher  Hypertension causes your BP to get so high that your heart has to work much harder than normal  This can damage your heart  You can control hypertension with a healthy lifestyle or medicines  A controlled blood pressure helps protect your organs, such as your heart, lungs, brain, and kidneys    DISCHARGE INSTRUCTIONS:   Call 911 for any of the following:   · You have discomfort in your chest that feels like squeezing, pressure, fullness, or pain      · You become confused or have difficulty speaking  · You suddenly feel lightheaded or have trouble breathing  · You have pain or discomfort in your back, neck, jaw, stomach, or arm  Seek care immediately if:   · You have a severe headache or vision loss  · You have weakness in an arm or leg  Contact your healthcare provider if:   · You feel faint, dizzy, confused, or drowsy  · You have been taking your BP medicine and your BP is still higher than your healthcare provider says it should be  · You have questions or concerns about your condition or care  Medicines: You may  need any of the following:  · Medicine  may be used to help lower your BP  You may need more than one type of medicine  Take the medicine exactly as directed  · Diuretics  help decrease extra fluid that collects in your body  This will help lower your BP  You may urinate more often while you take this medicine  · Cholesterol medicine  helps lower your cholesterol level  A low cholesterol level helps prevent heart disease and makes it easier to control your blood pressure  · Take your medicine as directed  Contact your healthcare provider if you think your medicine is not helping or if you have side effects  Tell him or her if you are allergic to any medicine  Keep a list of the medicines, vitamins, and herbs you take  Include the amounts, and when and why you take them  Bring the list or the pill bottles to follow-up visits  Carry your medicine list with you in case of an emergency  Follow up with your healthcare provider as directed: You will need to return to have your BP checked and to have other lab tests done  Write down your questions so you remember to ask them during your visits  Manage hypertension:  Talk with your healthcare provider about these and other ways to manage hypertension:  · Check your BP at home  Sit and rest for 5 minutes before you take your BP   Extend your arm and support it on a flat surface  Your arm should be at the same level as your heart  Follow the directions that came with your BP monitor  If possible, take at least 2 BP readings each time  Take your BP at least twice a day at the same times each day, such as morning and evening  Keep a record of your BP readings and bring it to your follow-up visits  Ask your healthcare provider what your BP should be  · Limit sodium (salt) as directed  Too much sodium can affect your fluid balance  Check labels to find low-sodium or no-salt-added foods  Some low-sodium foods use potassium salts for flavor  Too much potassium can also cause health problems  Your healthcare provider will tell you how much sodium and potassium are safe for you to have in a day  He or she may recommend that you limit sodium to 2,300 mg a day  · Follow the meal plan recommended by your healthcare provider  A dietitian or your provider can give you more information on low-sodium plans or the DASH (Dietary Approaches to Stop Hypertension) eating plan  The DASH plan is low in sodium, unhealthy fats, and total fat  It is high in potassium, calcium, and fiber  · Exercise to maintain a healthy weight  Exercise at least 30 minutes per day, on most days of the week  This will help decrease your blood pressure  Ask your healthcare provider about the best exercise plan for you  · Decrease stress  This may help lower your BP  Learn ways to relax, such as deep breathing or listening to music  · Limit alcohol  Women should limit alcohol to 1 drink a day  Men should limit alcohol to 2 drinks a day  A drink of alcohol is 12 ounces of beer, 5 ounces of wine, or 1½ ounces of liquor  · Do not smoke  Nicotine and other chemicals in cigarettes and cigars can increase your BP and also cause lung damage  Ask your healthcare provider for information if you currently smoke and need help to quit   E-cigarettes or smokeless tobacco still contain nicotine  Talk to your healthcare provider before you use these products  · Manage any other health conditions you have  Health conditions such as diabetes can increase your risk for hypertension  Follow your healthcare provider's instructions and take all your medicines as directed  © 2017 [facility] Information is for End User's use only and may not be sold, redistributed or otherwise used for commercial purposes  All illustrations and images included in CareNotes® are the copyrighted property of A [employer] A M , Inc  or Yan Miranda  The above information is an  only  It is not intended as medical advice for individual conditions or treatments  Talk to your doctor, nurse or pharmacist before following any medical regimen to see if it is safe and effective for you  Hyponatremia   WHAT YOU NEED TO KNOW:   Hyponatremia occurs when the amount of sodium (salt) in your blood is lower than normal  Sodium is an electrolyte (mineral) that helps your muscles, heart, and digestive system work properly  It helps control blood pressure and fluid balance  DISCHARGE INSTRUCTIONS:   Follow up with your healthcare provider as directed: You may need to return for more tests  Write down your questions so you remember to ask them during your visits  Nutrition:  You may need to increase your intake of sodium  Foods that are high in sodium include milk, packaged snacks such as pretzels, or processed meats (hwang, sausage, and ham)  Ask your dietitian to help you create a meal plan that is right for you  Liquids: Follow your healthcare provider's advice if you need to limit the amount of liquid you drink  Ask how much liquid to drink each day and which liquids are best for you  You may be asked to drink liquids that have water, sugar, and salt, such as juices, milk, or sports drinks  These liquids help your body hold in fluid and prevent dehydration     Contact your healthcare provider if:   · You have muscle cramps or twitching  · You feel very weak or tired  · You have nausea or are vomiting  · You have questions or concerns about your condition or care  Seek care immediately or call 911 if:   · You have a seizure  · You have an irregular heartbeat  · You have trouble breathing  · You cannot move your arms and legs  · You are confused or cannot think clearly  © 2017 [facility] Information is for End User's use only and may not be sold, redistributed or otherwise used for commercial purposes  All illustrations and images included in CareNotes® are the copyrighted property of A [employer] M [employer]  or Yan Miranda  The above information is an  only  It is not intended as medical advice for individual conditions or treatments  Talk to your doctor, nurse or pharmacist before following any medical regimen to see if it is safe and effective for you

## 2018-07-31 ENCOUNTER — TELEPHONE (OUTPATIENT)
Dept: NEPHROLOGY | Facility: CLINIC | Age: 83
End: 2018-07-31

## 2018-07-31 LAB
ANION GAP SERPL CALCULATED.3IONS-SCNC: 5 MMOL/L (ref 4–13)
BASOPHILS # BLD AUTO: 0.06 THOUSANDS/ΜL (ref 0–0.1)
BASOPHILS NFR BLD AUTO: 1 % (ref 0–1)
BUN SERPL-MCNC: 10 MG/DL (ref 5–25)
CALCIUM SERPL-MCNC: 8.8 MG/DL (ref 8.3–10.1)
CHLORIDE SERPL-SCNC: 96 MMOL/L (ref 100–108)
CO2 SERPL-SCNC: 26 MMOL/L (ref 21–32)
CREAT SERPL-MCNC: 0.7 MG/DL (ref 0.6–1.3)
EOSINOPHIL # BLD AUTO: 0.22 THOUSAND/ΜL (ref 0–0.61)
EOSINOPHIL NFR BLD AUTO: 4 % (ref 0–6)
ERYTHROCYTE [DISTWIDTH] IN BLOOD BY AUTOMATED COUNT: 12.4 % (ref 11.6–15.1)
GFR SERPL CREATININE-BSD FRML MDRD: 80 ML/MIN/1.73SQ M
GLUCOSE SERPL-MCNC: 119 MG/DL (ref 65–140)
GLUCOSE SERPL-MCNC: 125 MG/DL (ref 65–140)
GLUCOSE SERPL-MCNC: 133 MG/DL (ref 65–140)
GLUCOSE SERPL-MCNC: 136 MG/DL (ref 65–140)
GLUCOSE SERPL-MCNC: 163 MG/DL (ref 65–140)
HCT VFR BLD AUTO: 33.5 % (ref 34.8–46.1)
HGB BLD-MCNC: 11.2 G/DL (ref 11.5–15.4)
IMM GRANULOCYTES # BLD AUTO: 0.02 THOUSAND/UL (ref 0–0.2)
IMM GRANULOCYTES NFR BLD AUTO: 0 % (ref 0–2)
LYMPHOCYTES # BLD AUTO: 1.5 THOUSANDS/ΜL (ref 0.6–4.47)
LYMPHOCYTES NFR BLD AUTO: 26 % (ref 14–44)
MAGNESIUM SERPL-MCNC: 2.1 MG/DL (ref 1.6–2.6)
MCH RBC QN AUTO: 30.4 PG (ref 26.8–34.3)
MCHC RBC AUTO-ENTMCNC: 33.4 G/DL (ref 31.4–37.4)
MCV RBC AUTO: 91 FL (ref 82–98)
MONOCYTES # BLD AUTO: 0.53 THOUSAND/ΜL (ref 0.17–1.22)
MONOCYTES NFR BLD AUTO: 9 % (ref 4–12)
NEUTROPHILS # BLD AUTO: 3.35 THOUSANDS/ΜL (ref 1.85–7.62)
NEUTS SEG NFR BLD AUTO: 60 % (ref 43–75)
NRBC BLD AUTO-RTO: 0 /100 WBCS
PHOSPHATE SERPL-MCNC: 3 MG/DL (ref 2.3–4.1)
PLATELET # BLD AUTO: 150 THOUSANDS/UL (ref 149–390)
PMV BLD AUTO: 11.1 FL (ref 8.9–12.7)
POTASSIUM SERPL-SCNC: 4 MMOL/L (ref 3.5–5.3)
RBC # BLD AUTO: 3.68 MILLION/UL (ref 3.81–5.12)
SODIUM SERPL-SCNC: 127 MMOL/L (ref 136–145)
WBC # BLD AUTO: 5.68 THOUSAND/UL (ref 4.31–10.16)

## 2018-07-31 PROCEDURE — 83735 ASSAY OF MAGNESIUM: CPT | Performed by: INTERNAL MEDICINE

## 2018-07-31 PROCEDURE — 82948 REAGENT STRIP/BLOOD GLUCOSE: CPT

## 2018-07-31 PROCEDURE — 97530 THERAPEUTIC ACTIVITIES: CPT

## 2018-07-31 PROCEDURE — 99232 SBSQ HOSP IP/OBS MODERATE 35: CPT | Performed by: INTERNAL MEDICINE

## 2018-07-31 PROCEDURE — 84100 ASSAY OF PHOSPHORUS: CPT | Performed by: INTERNAL MEDICINE

## 2018-07-31 PROCEDURE — 80048 BASIC METABOLIC PNL TOTAL CA: CPT | Performed by: INTERNAL MEDICINE

## 2018-07-31 PROCEDURE — 85025 COMPLETE CBC W/AUTO DIFF WBC: CPT | Performed by: INTERNAL MEDICINE

## 2018-07-31 RX ORDER — SULFAMETHOXAZOLE AND TRIMETHOPRIM 800; 160 MG/1; MG/1
1 TABLET ORAL EVERY 12 HOURS SCHEDULED
Status: DISCONTINUED | OUTPATIENT
Start: 2018-07-31 | End: 2018-08-01

## 2018-07-31 RX ORDER — FUROSEMIDE 10 MG/ML
40 INJECTION INTRAMUSCULAR; INTRAVENOUS ONCE
Status: DISCONTINUED | OUTPATIENT
Start: 2018-07-31 | End: 2018-07-31

## 2018-07-31 RX ORDER — FUROSEMIDE 10 MG/ML
20 INJECTION INTRAMUSCULAR; INTRAVENOUS ONCE
Status: COMPLETED | OUTPATIENT
Start: 2018-07-31 | End: 2018-07-31

## 2018-07-31 RX ADMIN — DICYCLOMINE HYDROCHLORIDE 10 MG: 10 CAPSULE ORAL at 21:24

## 2018-07-31 RX ADMIN — VANCOMYCIN HYDROCHLORIDE 1000 MG: 1 INJECTION, SOLUTION INTRAVENOUS at 15:52

## 2018-07-31 RX ADMIN — CARVEDILOL 25 MG: 25 TABLET, FILM COATED ORAL at 17:35

## 2018-07-31 RX ADMIN — CARVEDILOL 25 MG: 25 TABLET, FILM COATED ORAL at 08:16

## 2018-07-31 RX ADMIN — DICYCLOMINE HYDROCHLORIDE 10 MG: 10 CAPSULE ORAL at 07:36

## 2018-07-31 RX ADMIN — Medication 500 MG: at 02:30

## 2018-07-31 RX ADMIN — DILTIAZEM HYDROCHLORIDE 240 MG: 120 CAPSULE, COATED, EXTENDED RELEASE ORAL at 08:16

## 2018-07-31 RX ADMIN — DICYCLOMINE HYDROCHLORIDE 10 MG: 10 CAPSULE ORAL at 17:36

## 2018-07-31 RX ADMIN — FUROSEMIDE 20 MG: 10 INJECTION, SOLUTION INTRAMUSCULAR; INTRAVENOUS at 10:19

## 2018-07-31 RX ADMIN — SULFAMETHOXAZOLE AND TRIMETHOPRIM 1 TABLET: 800; 160 TABLET ORAL at 21:24

## 2018-07-31 RX ADMIN — ASPIRIN 81 MG 81 MG: 81 TABLET ORAL at 08:16

## 2018-07-31 RX ADMIN — Medication 250 MG: at 08:16

## 2018-07-31 RX ADMIN — INSULIN LISPRO 1 UNITS: 100 INJECTION, SOLUTION INTRAVENOUS; SUBCUTANEOUS at 11:47

## 2018-07-31 RX ADMIN — DICYCLOMINE HYDROCHLORIDE 10 MG: 10 CAPSULE ORAL at 11:48

## 2018-07-31 RX ADMIN — Medication 250 MG: at 17:35

## 2018-07-31 RX ADMIN — SENNOSIDES AND DOCUSATE SODIUM 2 TABLET: 8.6; 5 TABLET ORAL at 21:24

## 2018-07-31 RX ADMIN — VANCOMYCIN HYDROCHLORIDE 1000 MG: 1 INJECTION, SOLUTION INTRAVENOUS at 05:03

## 2018-07-31 NOTE — PROGRESS NOTES
Progress Note Yasemin Mendez 1934, 80 y o  female MRN: [id number]    Unit/Bed#: [facility] 509-01 Encounter: [id number]    Primary Care Provider: Alban Anne DO   Date and time admitted to hospital: 7/25/2018  6:10 AM        Thrombocytopenia [facility])   Assessment & Plan    Patient with variable platelet count during this hospital stay, she was on Lovenox for DVT prophylaxis up to this morning  Platelets today with further reduction at 111, T4s score 1, low probability for HI T  Send HIV screening and although Lovenox today, if HD screening negative with also low probability on the T4 score, resuming Lovenox for today        Leukocytosis   Assessment & Plan    "Patient with leukocytosis on the CBC on July 27 but at the time of CBC patient was non febrile  Maximum temperature 100 1 July 27, she was started on cefepime and Flagyl for possible cholecystitis as she was complaining of right upper quadrant pain  Recent chest x-ray 7/25 without infiltrates, patient with no cough or respiratory symptoms so pneumonia is suspected  Recent CT abdomen and pelvis on July 25th unremarkable  Urinalysis on the day of admission within normal limits and patient with no urinary symptoms    On recent CT scan no inflammation of gallbladder or stones are visible  Ultrasound of the right upper quadrant with minimal thickening of the gallbladder, evaluated by surgery, no suspicion for a calculus cholecystitis as LFTs remain within normal limits  Discontinue cefepime and Flagyl today      Blood culture negative, patient remains non febrile"        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   Assessment & Plan    Patient discussed weight loss with primary care physician        Hyponatremia   Assessment & Plan    · Possibly related to hydrochlorothiazide use, excessive water intake, possible SIADH secondary to ongoing abdominal pain and nausea  · Patient improved with use of normal saline and then 1/2 normal saline, was restriction and discontinuation of hydrochlorothiazide  · Sodium on admission 111, currently 130  · Patient was treated with normal saline for 3 days, currently discontinued  · Patient with history of chronic hyponatremia with an average sodium around 130  · Discussed with Nephrology, repeat BMP on Friday and follow up as an outpatient  · Hydrochlorothiazide has been added to patient medication allergy list          Chronic constipation   Assessment & Plan    · Patient with history of chronic constipation, evaluated by Gastroenterology in the past, she was started on Linzess that she did not take after few doses as this was provoking severe diarrhea  · Patient was evaluated by Gastroenterology, she is currently on multiple agents for constipation with good result, patient is having bowel movement every other day  · Same regimen including Bentyl that was started by Gastroenterology for possible irritable bowel disease with constipation features be continued upon discharge  · Last bowel movement today        Type 2 diabetes mellitus without complication, without long-term current use of insulin [facility])   Assessment & Plan    Lab Results   Component Value Date    HGBA1C 6 2 (H) 06/05/2018       Recent Labs      07/30/18   2059  07/31/18   0659  07/31/18   1036  07/31/18   1608   POCGLU  126  133  163*  125       Blood Sugar Average: Last 72 hrs:  (P) 449 [id number]     Reports control blood sugar at home for which metformin was recently discontinued, remained stable here on insulin sliding scale        Essential hypertension   Assessment & Plan    · To be continued on Coreg and Cardizem, discontinue hydrochlorothiazide, do not resume upon discharge secondary to severe hyponatremia  · Blood pressure acceptable, some elevations of the been noted, patient will require further follow-up with primary care physician for evaluation of possible medication adjustment  Average systolic between 658 and 771 in the hospital with occasionally peak at 160  · Her blood pressure was elevated, received a dose of lasix  Will continue in put and output, monitor blood pressure        * IV infiltration   Assessment & Plan    Patient developed infiltration of IV site on Saturday July 28  At the beginning the site was edematous and then edema improved dramatically consult and Monday  This morning patient appeared improved but upon re-evaluation of the patient prior plan discharge he was noted that she is developing a very mild form of cellulitis involving the end and lower portion of the skin of the forearm  There is mild erythema and mild warmth, edema is almost completely resolved, there is no pain  There is no purulence at the previous site of IV access, diffuse bruising of the left and right upper extremity after multiple blood drawn  PICC line on the upper portion of the right arm appears to be in good position with no tenderness and no edema and no erythema surrounding the site  Will given vancomycin 15 milligram/kg every 12 hours today afternoon and tomorrow morning and evaluate in a m , possible discharge when bp is stable started with p o  Bactrim today  This was discussed with sister, patient, niece all in agreement and understanding              VTE Pharmacologic Prophylaxis:   Pharmacologic: Enoxaparin (Lovenox)  Mechanical VTE Prophylaxis in Place: Yes    Patient Centered Rounds: I have performed bedside rounds with nursing staff today  Discussions with Specialists or Other Care Team Provider: nephrology    Education and Discussions with Family / Patient: patient    Time Spent for Care: 45 minutes  More than 50% of total time spent on counseling and coordination of care as described above      Current Length of Stay: 6 day(s)    Current Patient Status: Inpatient   Certification Statement: The patient will continue to require additional inpatient hospital stay due to hypertension, low sodium    Discharge Plan: possibly tomorrow if bp is stable    Code Status: Level 1 - Full Code      Subjective:   I am doing well  I want to know if my blood pressure increased because I am excited to go home  Objective:     Vitals:   Temp (24hrs), Av 2 °F (36 8 °C), Min:97 8 °F (36 6 °C), Max:98 5 °F (36 9 °C)    HR:  [57-65] 57  Resp:  [20] 20  BP: (145-210)/() 164/80  SpO2:  [97 %-100 %] 100 %  Body mass index is 41 15 kg/m²  Input and Output Summary (last 24 hours): Intake/Output Summary (Last 24 hours) at 18 1628  Last data filed at 18 1450   Gross per 24 hour   Intake             1385 ml   Output             4500 ml   Net            -3115 ml       Physical Exam:     Physical Exam   Constitutional: She is oriented to person, place, and time  She appears well-developed and well-nourished  No distress  HENT:   Head: Normocephalic and atraumatic  Right Ear: External ear normal    Mouth/Throat: No oropharyngeal exudate  Eyes: Conjunctivae are normal  Pupils are equal, round, and reactive to light  Right eye exhibits no discharge  Left eye exhibits no discharge  No scleral icterus  Neck: Normal range of motion  Neck supple  JVD present  No tracheal deviation present  No thyromegaly present  Cardiovascular: Normal rate, regular rhythm and intact distal pulses  Exam reveals no gallop and no friction rub  Murmur heard  Pulmonary/Chest: Effort normal  No respiratory distress  She has no wheezes  She has rales  She exhibits no tenderness  Abdominal: Soft  Bowel sounds are normal  She exhibits no distension  There is no tenderness  There is no rebound and no guarding  Musculoskeletal: Normal range of motion  She exhibits no edema, tenderness or deformity  Lymphadenopathy:     She has no cervical adenopathy  Neurological: She is alert and oriented to person, place, and time  She has normal reflexes   She displays normal reflexes  No cranial nerve deficit  She exhibits normal muscle tone  Skin: Skin is warm and dry  No rash noted  She is not diaphoretic  No erythema  No pallor  Psychiatric: She has a normal mood and affect  Her behavior is normal  Judgment and thought content normal    Nursing note and vitals reviewed  Additional Data:     Labs:      Results from last 7 days  Lab Units 07/31/18  0458   WBC Thousand/uL 5 68   HEMOGLOBIN g/dL 11 2*   HEMATOCRIT % 33 5*   PLATELETS Thousands/uL 150   NEUTROS PCT % 60   LYMPHS PCT % 26   MONOS PCT % 9   EOS PCT % 4       Results from last 7 days  Lab Units 07/31/18  0458 07/30/18  0539   SODIUM mmol/L 127* 130*   POTASSIUM mmol/L 4 0 4 1   CHLORIDE mmol/L 96* 98*   CO2 mmol/L 26 25   BUN mg/dL 10 14   CREATININE mg/dL 0 70 0 77   CALCIUM mg/dL 8 8 8 1*   TOTAL PROTEIN g/dL  --  5 7*   BILIRUBIN TOTAL mg/dL  --  0 28   ALK PHOS U/L  --  103   ALT U/L  --  37   AST U/L  --  25   GLUCOSE RANDOM mg/dL 119 109           * I Have Reviewed All Lab Data Listed Above  * Additional Pertinent Lab Tests Reviewed: [address] [facility] Admission Reviewed    Imaging:    Imaging Reports Reviewed Today Include:    Imaging Personally Reviewed by Myself Includes:     Recent Cultures (last 7 days):       Results from last 7 days  Lab Units 07/27/18  1823   BLOOD CULTURE  No Growth at 72 hrs  No Growth at 72 hrs         Last 24 Hours Medication List:     Current Facility-Administered Medications:  aluminum-magnesium hydroxide-simethicone 15 mL Oral Q4H PRN Bharat Tijerina PA-C    aspirin 81 mg Oral Daily Valarie Horne MD    bisacodyl 10 mg Oral Daily PRN Simran Gleason MD    bisacodyl 10 mg Rectal Daily PRN Ravi Phillip MD    calcium carbonate 500 mg Oral Daily PRN Bharat Tijerina PA-C    carvedilol 25 mg Oral BID Ravi Phillip MD    dicyclomine 10 mg Oral 4x Daily (AC & HS) Prasanth Hodge MD    diltiazem 240 mg Oral Daily Ravi Phillip MD    docusate sodium 100 mg Oral BID Sony Martinez MD    [START ON 8/1/2018] enoxaparin 40 mg Subcutaneous Q24H [address] Debra Elias MD    hydrALAZINE 5 mg Intravenous Q6H PRN Renu Naranjo MD    insulin lispro 1-5 Units Subcutaneous HS Gualberto Suero MD    insulin lispro 1-6 Units Subcutaneous TID AC Valarie Horne MD    meclizine 12 5 mg Oral TID PRN Valarie Horne MD    ondansetron 4 mg Intravenous Q6H PRN Valarie Horne MD    polyethylene glycol 17 g Oral BID Sony Martinez MD    saccharomyces boulardii 250 mg Oral BID Gualberto Suero MD    senna-docusate sodium 2 tablet Oral HS Sony Martinez MD    sulfamethoxazole-trimethoprim 1 tablet Oral Q12H [address] Debra Elias MD    vancomycin 15 mg/kg (Adjusted) Intravenous Q12H Sony Martinez MD Last Rate: 1,000 mg (07/31/18 1552)        Today, Patient Was Seen By: Debra Elias MD    ** Please Note: Dictation voice to text software may have been used in the creation of this document   **

## 2018-07-31 NOTE — RESTORATIVE TECHNICIAN NOTE
Restorative Specialist Mobility Note       Activity: Bedrest, Dangle, Stand at bedside, Turn, Chair     Assistive Device: None     Ambulation Response: Tolerated fairly well  Repositioned: Sitting, Up in chair              Anti-Embolism Device On:  Bilateral, Sequential compression devices, below knee

## 2018-07-31 NOTE — ASSESSMENT & PLAN NOTE
Lab Results   Component Value Date    HGBA1C 6 2 (H) 06/05/2018       Recent Labs      07/30/18   2059  07/31/18   0659  07/31/18   1036  07/31/18   1608   POCGLU  126  133  163*  125       Blood Sugar Average: Last 72 hrs:  (P) 379 [id number]     Reports control blood sugar at home for which metformin was recently discontinued, remained stable here on insulin sliding scale

## 2018-07-31 NOTE — ASSESSMENT & PLAN NOTE
"Patient with leukocytosis on the CBC on July 27 but at the time of CBC patient was non febrile  Maximum temperature 100 1 July 27, she was started on cefepime and Flagyl for possible cholecystitis as she was complaining of right upper quadrant pain  Recent chest x-ray 7/25 without infiltrates, patient with no cough or respiratory symptoms so pneumonia is suspected  Recent CT abdomen and pelvis on July 25th unremarkable  Urinalysis on the day of admission within normal limits and patient with no urinary symptoms    On recent CT scan no inflammation of gallbladder or stones are visible  Ultrasound of the right upper quadrant with minimal thickening of the gallbladder, evaluated by surgery, no suspicion for a calculus cholecystitis as LFTs remain within normal limits  Discontinue cefepime and Flagyl today      Blood culture negative, patient remains non febrile"

## 2018-07-31 NOTE — TELEPHONE ENCOUNTER
----- Message from Msaon April, DO sent at 7/30/2018 10:41 AM EDT -----  Needs follow-up in 2 weeks, anticipated discharge today

## 2018-07-31 NOTE — PHYSICAL THERAPY NOTE
Physical Therapy Treatment Note       07/31/18 1300   Pain Assessment   Pain Assessment No/denies pain   Pain Score No Pain   Restrictions/Precautions   Weight Bearing Precautions Per Order No   Braces or Orthoses Other (Comment)  (None)   Other Precautions Fall Risk;Hard of hearing; Chair Alarm   General   Chart Reviewed Yes   Additional Pertinent History Spoke with Cristal Long RN regarding recent history of elevated BP  Per RN, MD is aware of elevated BP  Per report, PCA recently obtained manual BP  Chart review indicates: 160/88 @ 11:30AM    Response to Previous Treatment Patient with no complaints from previous session  Family/Caregiver Present Yes   Cognition   Overall Cognitive Status WFL   Arousal/Participation Alert; Cooperative   Attention Within functional limits   Orientation Level Oriented X4   Memory Within functional limits   Following Commands Follows multistep commands without difficulty   Comments Patient agreeable to participate in skilled PT treatment session  Subjective   Subjective "I don't have any pain  I can walk further "   Bed Mobility   Additional Comments NA, Pt OOB in recliner chair at time of PT session  Family present at bedside  Transfers   Sit to Stand 5  Supervision   Additional items Assist x 1; Increased time required;Verbal cues   Stand to Sit 5  Supervision   Additional items Assist x 1; Increased time required;Verbal cues   Toilet transfer 4  Minimal assistance   Additional items Assist x 1; Increased time required;Standard toilet; Other  (Grab bar)   Additional Comments VC's required for hand placement during toilet transfer for enhanced patient safety  Spoke with RN, recommended commode frame over toilet for ease of transfer  Patient reports she has a raised toilet seat at home with grab bar  Ambulation/Elevation   Gait pattern Inconsistent jodie; Short stride   Gait Assistance 5  Supervision   Additional items Assist x 1;Verbal cues   Assistive Device Rolling walker Distance 100 ft   Stair Management Assistance Not tested   Balance   Static Sitting Good   Dynamic Sitting Fair +   Static Standing Fair +   Dynamic Standing Fair   Ambulatory Fair   Endurance Deficit   Endurance Deficit No  (Further ambulation distance deferred - due to recent history of elevated BP - pt asymptomatic throughout session)   Activity Tolerance   Activity Tolerance Patient tolerated treatment well   Nurse Made Aware Yes, Dylan Samano RN verbalized patient appropriate for PT treatment; made aware of session outcomes/recommendations   Exercises   Marching Standing;5 reps;AROM; Bilateral  (B UE support at [facility])   Assessment   Prognosis Good   Problem List Decreased strength;Decreased endurance; Impaired balance;Decreased mobility; Decreased safety awareness; Impaired hearing   Assessment Pt seen for PT treatment session this date with interventions consisting of gait training w/ emphasis on improving pt's ability to ambulate level surfaces x 100 ft with Supervision provided by therapist with RW, Therapeutic exercise consisting of: AROM 5 reps B LE in standing with B UE support on walker and therapeutic activity consisting of training: sit<>stand transfers (including toilet transfer)  Pt agreeable to PT treatment session upon arrival, pt found seated OOB in recliner, in no apparent distress, A&O x 4 and responsive  At this time, pt demonstrates slow progress toward goals 2* medical status limitations (recent history of elevated BP) - pt asymptomatic  Provided pt and family education regarding general safety/safety awareness with verbal understanding  Post session: pt returned back to recliner, chair alarm engaged, all needs in reach and RN notified of session findings/recommendations  Continue to recommend Home PT with family support at time of d/c in order to maximize pt's functional independence and safety w/ mobility  Pt continues to be functioning below baseline level, and remains limited 2* factors listed above  PT will continue to see pt while here in order to address the deficits listed above and provide interventions consistent w/ POC in effort to achieve STGs  Barriers to Discharge None   Goals   Patient Goals "I want to go home to use my own toilet"   STG Expiration Date 08/09/18   Short Term Goal #1 STGs remain appropriate   Treatment Day 3   Plan   Treatment/Interventions Functional transfer training;LE strengthening/ROM; Elevations; Therapeutic exercise; Endurance training;Patient/family training;Equipment eval/education; Bed mobility;Gait training;Spoke to nursing;Family   Progress Slow progress, medical status limitations  (Recent history of elevated BP - pt asymptomatic)   PT Frequency Other (Comment)  (3-5x/wk)   Recommendation   Recommendation Home with family support;Home PT   Equipment Recommended Walker   PT - OK to Discharge Yes  (When medically cleared; home with family support & home PT)   Additional Comments At end of session: pt seated in recliner chair, chair alarm engaged, all needs in reach, family present at bedside       Trina Han PT, DPT    Time of PT treatment session: 12:35-13:00  25 minutes

## 2018-07-31 NOTE — PLAN OF CARE
Problem: PHYSICAL THERAPY ADULT  Goal: Performs mobility at highest level of function for planned discharge setting  See evaluation for individualized goals  Treatment/Interventions: Functional transfer training, LE strengthening/ROM, Elevations, Therapeutic exercise, Endurance training, Patient/family training, Gait training, Bed mobility, Equipment eval/education  Equipment Recommended: Walker (RW)       See flowsheet documentation for full assessment, interventions and recommendations  Prognosis: Good  Problem List: Decreased strength, Decreased endurance, Impaired balance, Decreased mobility, Decreased safety awareness, Impaired hearing  Assessment: Pt seen for PT treatment session this date with interventions consisting of gait training w/ emphasis on improving pt's ability to ambulate level surfaces x 100 ft with Supervision provided by therapist with RW, Therapeutic exercise consisting of: AROM 5 reps B LE in standing with B UE support on walker and therapeutic activity consisting of training: sit<>stand transfers (including toilet transfer)  Pt agreeable to PT treatment session upon arrival, pt found seated OOB in recliner, in no apparent distress, A&O x 4 and responsive  At this time, pt demonstrates slow progress toward goals 2* medical status limitations (recent history of elevated BP) - pt asymptomatic  Provided pt and family education regarding general safety/safety awareness with verbal understanding  Post session: pt returned back to recliner, chair alarm engaged, all needs in reach and RN notified of session findings/recommendations  Continue to recommend Home PT with family support at time of d/c in order to maximize pt's functional independence and safety w/ mobility  Pt continues to be functioning below baseline level, and remains limited 2* factors listed above   PT will continue to see pt while here in order to address the deficits listed above and provide interventions consistent w/ POC in effort to achieve STGs  Barriers to Discharge: None     Recommendation: Home with family support, Home PT     PT - OK to Discharge: Yes (When medically cleared; home with family support & home PT)    See flowsheet documentation for full assessment

## 2018-07-31 NOTE — PROGRESS NOTES
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 component of underline SIADH, thiazide induced  · Serum osmolality 252, urine chloride 48, initial sodium was 17, repeat urine sodium 45, repeat urine osmolality 388  2  Hypertension, Uncontrolled, add loop diuretic  3  Constipation, overall resolved, feeling better    PLAN:  · Given decrease in serum sodium and some development of edema, IV Lasix  · Continue fluid restriction  · Hopeful improvement of blood pressure with diuresis  Likely will need continue diuretic therapy as an outpatient, i e  loop diuretic  SUBJECTIVE:  Seen examined  Patient awake alert  Overall is feeling better although does complain of some abdominal bloating  No chest pain or shortness of breath  Denies any lower extremity swelling  Review of Systems    OBJECTIVE:  Current Weight: Weight - Scale: 102 kg (225 lb)  Vitals:    07/30/18 2300 07/31/18 0712 07/31/18 0820 07/31/18 1130   BP: 145/66 (!) 210/90 164/100 160/88   BP Location:  Left arm Left arm    Pulse: 59 65  61   Resp: 20 20  20   Temp: 97 8 °F (36 6 °C) 98 2 °F (36 8 °C)  98 3 °F (36 8 °C)   TempSrc: Oral Oral  Oral   SpO2: 98% 97%  99%   Weight:       Height:           Intake/Output Summary (Last 24 hours) at 07/31/18 1150  Last data filed at 07/31/18 1129   Gross per 24 hour   Intake             1085 ml   Output             3300 ml   Net            -2215 ml       Physical Exam   Constitutional: No distress  HENT:   Head: Normocephalic  Eyes: No scleral icterus  Neck: Neck supple  Cardiovascular: Normal rate and regular rhythm  Pulmonary/Chest: Effort normal and breath sounds normal    Abdominal: Soft  She exhibits distension  There is no tenderness  Musculoskeletal: She exhibits edema (trace)  Neurological: She is alert  Skin: Skin is warm and dry  Psychiatric: She has a normal mood and affect         Medications:    Current Facility-Administered Medications:     aluminum-magnesium hydroxide-simethicone (MYLANTA) 200-200-20 mg/5 mL oral suspension 15 mL, 15 mL, Oral, Q4H PRN, CODY KaurC, 15 mL at 07/26/18 2314    aspirin chewable tablet 81 mg, 81 mg, Oral, Daily, Jane Barnes MD, 81 mg at 07/31/18 0816    bisacodyl (DULCOLAX) EC tablet 10 mg, 10 mg, Oral, Daily PRN, Brigitte Estrada MD    bisacodyl (DULCOLAX) rectal suppository 10 mg, 10 mg, Rectal, Daily PRN, Jane Barnes MD, 10 mg at 07/26/18 1747    calcium carbonate (TUMS) chewable tablet 500 mg, 500 mg, Oral, Daily PRN, Sameera Tamez PA-C, 500 mg at 07/31/18 0230    carvedilol (COREG) tablet 25 mg, 25 mg, Oral, BID, Jane Barnes MD, 25 mg at 07/31/18 0816    dicyclomine (BENTYL) capsule 10 mg, 10 mg, Oral, 4x Daily (AC & HS), Afsaneh Barnes MD, 10 mg at 07/31/18 1148    diltiazem (CARDIZEM CD) 24 hr capsule 240 mg, 240 mg, Oral, Daily, Jane Barnes MD, 240 mg at 07/31/18 0816    docusate sodium (COLACE) capsule 100 mg, 100 mg, Oral, BID, Brigitte Estrada MD, 100 mg at 07/30/18 1714    hydrALAZINE (APRESOLINE) injection 5 mg, 5 mg, Intravenous, Q6H PRN, Cheryl Hinton MD, 5 mg at 07/26/18 2012    insulin lispro (HumaLOG) 100 units/mL subcutaneous injection 1-5 Units, 1-5 Units, Subcutaneous, HS, Jane Barnes MD, 2 Units at 07/28/18 2122    insulin lispro (HumaLOG) 100 units/mL subcutaneous injection 1-6 Units, 1-6 Units, Subcutaneous, TID AC, 1 Units at 07/31/18 1147 **AND** Fingerstick Glucose (POCT), , , TID AC, Valarie Horne MD    meclizine (ANTIVERT) tablet 12 5 mg, 12 5 mg, Oral, TID PRN, Jane Barnes MD    ondansetron (ZOFRAN) injection 4 mg, 4 mg, Intravenous, Q6H PRN, Valarie Horne MD, 4 mg at 07/28/18 5358    polyethylene glycol (MIRALAX) packet 17 g, 17 g, Oral, BID, Brigitte Estrada MD    saccharomyces boulardii (FLORASTOR) capsule 250 mg, 250 mg, Oral, BID, Jane Barnes MD, 250 mg at 07/31/18 0816    senna-docusate sodium (SENOKOT S) 8 6-50 mg per tablet 2 tablet, 2 tablet, Oral, HS, Brigitte Estrada MD, 2 tablet at 07/30/18 2100    vancomycin (VANCOCIN) IVPB (premix) 1,000 mg, 15 mg/kg (Adjusted), Intravenous, Q12H, Brigitte Estrada MD, Last Rate: 200 mL/hr at 07/31/18 0503, 1,000 mg at 07/31/18 0503    Laboratory Results:    Results from last 7 days  Lab Units 07/31/18  0458 07/30/18  0539 07/30/18  0013 07/29/18  1733 07/29/18  1204 07/29/18  0516 07/29/18  0515 07/29/18  0041  07/28/18  1339 07/28/18  0804 07/28/18  0458 07/28/18  0322  07/27/18  0504  07/25/18  0627   WBC Thousand/uL 5 68 4 64  --   --   --   --  4 88  --   --   --   --  7 53  --   --  15 55*  --  7 66   HEMOGLOBIN g/dL 11 2* 10 0*  --   --   --   --  10 2*  --   --   --   --  10 5*  --   --  11 5  --  13 1   HEMATOCRIT % 33 5* 30 3*  --   --   --   --  30 3*  --   --   --   --  31 2*  --   --  33 5*  --  36 3   PLATELETS Thousands/uL 150 111*  --   --   --   --  106*  --   --   --   --  115*  --   --  150  --  161   SODIUM mmol/L 127* 130* 129* 128* 128* 127*  --  127*  < >  --  122*  --  120*  < > 122*  < > 111*   POTASSIUM mmol/L 4 0 4 1 4 1 4 2 4 0 4 1  --  4 3  < >  --  4 2  --  4 2  < > 4 2  < > 4 9   CHLORIDE mmol/L 96* 98* 97* 96* 94* 96*  --  94*  < >  --  91*  --  91*  < > 92*  < > 80*   CO2 mmol/L 26 25 26 26 25 27  --  27  < >  --  25  --  23  < > 23  < > 22   BUN mg/dL 10 14 16 15 14 15  --  17  < >  --  16  --  19  < > 22  < > 13   CREATININE mg/dL 0 70 0 77 0 90 0 95 0 85 0 79  --  0 90  < >  --  0 93  --  0 87  < > 0 96  < > 0 74   CALCIUM mg/dL 8 8 8 1* 7 9* 8 2* 8 1* 7 9*  --  8 0*  < >  --  8 3  --  8 1*  < > 8 4  < > 8 7   MAGNESIUM mg/dL 2 1 2 1  --   --   --   --  2 2  --   --   --   --   --  2 1  --  2 2  --   --    PHOSPHORUS mg/dL 3 0 3 3  --   --   -- --  3 0  --   --   --   --   --  2 2*  --  2 7  --   --    TOTAL PROTEIN g/dL  --  5 7*  --  6 4  --   --   --   --   --  6 2* 6 2*  --   --   --   --   --  7 5   GLUCOSE RANDOM mg/dL 119 109 123 179* 144* 109  --  113  < >  --  160*  --  160*  < > 151*  < > 176*   < > = values in this interval not displayed

## 2018-07-31 NOTE — ASSESSMENT & PLAN NOTE
Patient developed infiltration of IV site on Saturday July 28  At the beginning the site was edematous and then edema improved dramatically consult and Monday  This morning patient appeared improved but upon re-evaluation of the patient prior plan discharge he was noted that she is developing a very mild form of cellulitis involving the end and lower portion of the skin of the forearm  There is mild erythema and mild warmth, edema is almost completely resolved, there is no pain  There is no purulence at the previous site of IV access, diffuse bruising of the left and right upper extremity after multiple blood drawn  PICC line on the upper portion of the right arm appears to be in good position with no tenderness and no edema and no erythema surrounding the site  Will given vancomycin 15 milligram/kg every 12 hours today afternoon and tomorrow morning and evaluate in a m , possible discharge when bp is stable started with p o  Bactrim today    This was discussed with sister, patient, niece all in agreement and understanding

## 2018-07-31 NOTE — ASSESSMENT & PLAN NOTE
· To be continued on Coreg and Cardizem, discontinue hydrochlorothiazide, do not resume upon discharge secondary to severe hyponatremia  · Blood pressure acceptable, some elevations of the been noted, patient will require further follow-up with primary care physician for evaluation of possible medication adjustment  Average systolic between 460 and 917 in the hospital with occasionally peak at 160  · Her blood pressure was elevated, received a dose of lasix   Will continue in put and output, monitor blood pressure

## 2018-07-31 NOTE — ASSESSMENT & PLAN NOTE
Patient with variable platelet count during this hospital stay, she was on Lovenox for DVT prophylaxis up to this morning  Platelets today with further reduction at 111, T4s score 1, low probability for HI T  Send HIV screening and although Lovenox today, if HD screening negative with also low probability on the T4 score, resuming Lovenox for today

## 2018-08-01 VITALS
HEART RATE: 61 BPM | TEMPERATURE: 98.6 F | DIASTOLIC BLOOD PRESSURE: 72 MMHG | RESPIRATION RATE: 18 BRPM | BODY MASS INDEX: 41.41 KG/M2 | HEIGHT: 62 IN | WEIGHT: 225 LBS | OXYGEN SATURATION: 97 % | SYSTOLIC BLOOD PRESSURE: 145 MMHG

## 2018-08-01 LAB
ANION GAP SERPL CALCULATED.3IONS-SCNC: 5 MMOL/L (ref 4–13)
BACTERIA BLD CULT: NORMAL
BACTERIA BLD CULT: NORMAL
BUN SERPL-MCNC: 11 MG/DL (ref 5–25)
CALCIUM SERPL-MCNC: 8.4 MG/DL (ref 8.3–10.1)
CHLORIDE SERPL-SCNC: 98 MMOL/L (ref 100–108)
CO2 SERPL-SCNC: 29 MMOL/L (ref 21–32)
CREAT SERPL-MCNC: 0.84 MG/DL (ref 0.6–1.3)
GFR SERPL CREATININE-BSD FRML MDRD: 64 ML/MIN/1.73SQ M
GLUCOSE SERPL-MCNC: 113 MG/DL (ref 65–140)
GLUCOSE SERPL-MCNC: 113 MG/DL (ref 65–140)
GLUCOSE SERPL-MCNC: 145 MG/DL (ref 65–140)
POTASSIUM SERPL-SCNC: 3.8 MMOL/L (ref 3.5–5.3)
SODIUM SERPL-SCNC: 132 MMOL/L (ref 136–145)
VANCOMYCIN TROUGH SERPL-MCNC: 13.3 UG/ML (ref 10–20)

## 2018-08-01 PROCEDURE — 99232 SBSQ HOSP IP/OBS MODERATE 35: CPT | Performed by: INTERNAL MEDICINE

## 2018-08-01 PROCEDURE — 80202 ASSAY OF VANCOMYCIN: CPT | Performed by: INTERNAL MEDICINE

## 2018-08-01 PROCEDURE — 99239 HOSP IP/OBS DSCHRG MGMT >30: CPT | Performed by: INTERNAL MEDICINE

## 2018-08-01 PROCEDURE — 82948 REAGENT STRIP/BLOOD GLUCOSE: CPT

## 2018-08-01 PROCEDURE — 80048 BASIC METABOLIC PNL TOTAL CA: CPT | Performed by: INTERNAL MEDICINE

## 2018-08-01 RX ORDER — BISACODYL 10 MG
10 SUPPOSITORY, RECTAL RECTAL DAILY PRN
Qty: 12 SUPPOSITORY | Refills: 0 | Status: SHIPPED | OUTPATIENT
Start: 2018-08-01 | End: 2018-08-16 | Stop reason: ALTCHOICE

## 2018-08-01 RX ORDER — SULFAMETHOXAZOLE AND TRIMETHOPRIM 400; 80 MG/1; MG/1
2 TABLET ORAL EVERY 12 HOURS SCHEDULED
Qty: 28 TABLET | Refills: 0 | Status: SHIPPED | OUTPATIENT
Start: 2018-08-01 | End: 2018-08-01

## 2018-08-01 RX ORDER — FUROSEMIDE 20 MG/1
20 TABLET ORAL DAILY
Qty: 30 TABLET | Refills: 0 | Status: SHIPPED | OUTPATIENT
Start: 2018-08-02 | End: 2018-08-01

## 2018-08-01 RX ORDER — FUROSEMIDE 20 MG/1
20 TABLET ORAL DAILY
Status: DISCONTINUED | OUTPATIENT
Start: 2018-08-01 | End: 2018-08-01 | Stop reason: HOSPADM

## 2018-08-01 RX ORDER — FUROSEMIDE 20 MG/1
20 TABLET ORAL DAILY
Qty: 30 TABLET | Refills: 0 | Status: SHIPPED | OUTPATIENT
Start: 2018-08-02 | End: 2018-08-20 | Stop reason: SDUPTHER

## 2018-08-01 RX ORDER — DOCUSATE SODIUM 100 MG/1
100 CAPSULE, LIQUID FILLED ORAL 2 TIMES DAILY
Qty: 10 CAPSULE | Refills: 0 | Status: SHIPPED | OUTPATIENT
Start: 2018-08-01 | End: 2021-01-27

## 2018-08-01 RX ORDER — SULFAMETHOXAZOLE AND TRIMETHOPRIM 400; 80 MG/1; MG/1
2 TABLET ORAL EVERY 12 HOURS SCHEDULED
Qty: 28 TABLET | Refills: 0 | Status: SHIPPED | OUTPATIENT
Start: 2018-08-01 | End: 2018-08-08

## 2018-08-01 RX ADMIN — DILTIAZEM HYDROCHLORIDE 240 MG: 120 CAPSULE, COATED, EXTENDED RELEASE ORAL at 08:26

## 2018-08-01 RX ADMIN — DICYCLOMINE HYDROCHLORIDE 10 MG: 10 CAPSULE ORAL at 08:27

## 2018-08-01 RX ADMIN — DOCUSATE SODIUM 100 MG: 100 CAPSULE, LIQUID FILLED ORAL at 08:29

## 2018-08-01 RX ADMIN — DICYCLOMINE HYDROCHLORIDE 10 MG: 10 CAPSULE ORAL at 11:59

## 2018-08-01 RX ADMIN — VANCOMYCIN HYDROCHLORIDE 1000 MG: 1 INJECTION, SOLUTION INTRAVENOUS at 04:57

## 2018-08-01 RX ADMIN — ENOXAPARIN SODIUM 40 MG: 40 INJECTION SUBCUTANEOUS at 08:31

## 2018-08-01 RX ADMIN — CARVEDILOL 25 MG: 25 TABLET, FILM COATED ORAL at 08:27

## 2018-08-01 RX ADMIN — FUROSEMIDE 20 MG: 20 TABLET ORAL at 11:59

## 2018-08-01 RX ADMIN — ASPIRIN 81 MG 81 MG: 81 TABLET ORAL at 08:25

## 2018-08-01 RX ADMIN — Medication 250 MG: at 08:26

## 2018-08-01 NOTE — PLAN OF CARE
Problem: Potential for Falls  Goal: Patient will remain free of falls  INTERVENTIONS:  - Assess patient frequently for physical needs  -  Identify cognitive and physical deficits and behaviors that affect risk of falls    -  [place]ager fall precautions as indicated by assessment   - Educate patient/family on patient safety including physical limitations  - Instruct patient to call for assistance with activity based on assessment  - Modify environment to reduce risk of injury  - Consider OT/PT consult to assist with strengthening/mobility   Outcome: Progressing      Problem: METABOLIC, FLUID AND ELECTROLYTES - ADULT  Goal: Electrolytes maintained within normal limits  INTERVENTIONS:  - Monitor labs and assess patient for signs and symptoms of electrolyte imbalances  - Administer electrolyte replacement as ordered  - Monitor response to electrolyte replacements, including repeat lab results as appropriate  - Instruct patient on fluid and nutrition as appropriate   Outcome: Progressing      Problem: DISCHARGE PLANNING - CARE MANAGEMENT  Goal: Discharge to post-acute care or home with appropriate resources  INTERVENTIONS:  - Conduct assessment to determine patient/family and health care team treatment goals, and need for post-acute services based on payer coverage, community resources, and patient preferences, and barriers to discharge  - Address psychosocial, clinical, and financial barriers to discharge as identified in assessment in conjunction with the patient/family and health care team  - Arrange appropriate level of post-acute services according to patient's   needs and preference and payer coverage in collaboration with the physician and health care team  - Communicate with and update the patient/family, physician, and health care team regarding progress on the discharge plan  - Arrange appropriate transportation to post-acute venues   Outcome: Progressing      Problem: Nutrition/Hydration-ADULT  Goal: Nutrient/Hydration intake appropriate for improving, restoring or maintaining nutritional needs  Monitor and assess patient's nutrition/hydration status for malnutrition (ex- brittle hair, bruises, dry skin, pale skin and conjunctiva, muscle wasting, smooth red tongue, and disorientation)  Collaborate with interdisciplinary team and initiate plan and interventions as ordered  Monitor patient's weight and dietary intake as ordered or per policy  Utilize nutrition screening tool and intervene per policy  Determine patient's food preferences and provide high-protein, high-caloric foods as appropriate       INTERVENTIONS:  - Monitor oral intake, urinary output, labs, and treatment plans  - Assess nutrition and hydration status and recommend course of action  - Evaluate amount of meals eaten  - Assist patient with eating if necessary   - Allow adequate time for meals  - Recommend/ encourage appropriate diets, oral nutritional supplements, and vitamin/mineral supplements  - Order, calculate, and assess calorie counts as needed  - Recommend, monitor, and adjust tube feedings and TPN/PPN based on assessed needs  - Assess need for intravenous fluids  - Provide specific nutrition/hydration education as appropriate  - Include patient/family/caregiver in decisions related to nutrition   Outcome: Progressing

## 2018-08-01 NOTE — PROGRESS NOTES
NEPHROLOGY PROGRESS NOTE   Agustina Chowchilla 80 y o  female MRN: [id number]  Unit/Bed#: [facility] 509-01 Encounter: [id number]  Reason for Consult:  Hyponatremia    ASSESSMENT/PLAN:  1  Hyponatremia, multifactorial -  initially secondary to volume depletion, could be component of underline SIADH, thiazide induced  · Serum osmolality 252, urine chloride 48, initial sodium was 17, repeat urine sodium 45, repeat urine osmolality 388  2   Hypertension, Uncontrolled, add loop diuretic  3  Constipation, overall resolved, feeling better     PLAN:  · Overall sodium levels improving  · Continue with mild fluid restriction  · Continue with Lasix, oral Lasix 20 mg daily  · Blood pressure overall is improved, recommend checking blood pressures on a daily basis, discussed with family  Record for diary  · Repeat BMP in 1 week  · Stable for discharge from Nephrology standpoint  SUBJECTIVE:  Seen and examined  Family at bedside  No active chest pain or shortness of Breath  Less abdominal bloating  No significant lower extremity swelling  Review of Systems    OBJECTIVE:  Current Weight: Weight - Scale: 102 kg (225 lb)  Vitals:    08/01/18 0020 08/01/18 0639 08/01/18 0825 08/01/18 1100   BP: 147/67 (!) 189/78 164/62 156/72   BP Location: Left arm Left arm Left arm Left arm   Pulse: 65 59 56    Resp: 20 18 16    Temp: (!) 97 3 °F (36 3 °C) 97 5 °F (36 4 °C)     TempSrc: Oral Oral     SpO2: 99% 96%     Weight:       Height:           Intake/Output Summary (Last 24 hours) at 08/01/18 1121  Last data filed at 08/01/18 1053   Gross per 24 hour   Intake             1371 ml   Output             3100 ml   Net            -1729 ml       Physical Exam   Constitutional: No distress  HENT:   Head: Normocephalic  Neck: Neck supple  Cardiovascular: Normal rate and regular rhythm  Pulmonary/Chest: Effort normal and breath sounds normal    Abdominal: Soft  She exhibits distension  There is no tenderness     Musculoskeletal: She exhibits edema (Trace edema)  Neurological: She is alert  Skin: Skin is warm and dry  Psychiatric: She has a normal mood and affect         Medications:     Current Facility-Administered Medications:     aluminum-magnesium hydroxide-simethicone (MYLANTA) 200-200-20 mg/5 mL oral suspension 15 mL, 15 mL, Oral, Q4H PRN, Brett Constantino PA-C, 15 mL at 07/26/18 2314    aspirin chewable tablet 81 mg, 81 mg, Oral, Daily, Caden Vitale MD, 81 mg at 08/01/18 0825    bisacodyl (DULCOLAX) EC tablet 10 mg, 10 mg, Oral, Daily PRN, Mone Alexandra MD    bisacodyl (DULCOLAX) rectal suppository 10 mg, 10 mg, Rectal, Daily PRN, Caden Vitale MD, 10 mg at 07/26/18 1747    calcium carbonate (TUMS) chewable tablet 500 mg, 500 mg, Oral, Daily PRN, Brett Constantino PA-C, 500 mg at 07/31/18 0230    carvedilol (COREG) tablet 25 mg, 25 mg, Oral, BID, Valarie Horne MD, 25 mg at 08/01/18 0827    dicyclomine (BENTYL) capsule 10 mg, 10 mg, Oral, 4x Daily (AC & HS), Liliana Acevedo MD, 10 mg at 08/01/18 0827    diltiazem (CARDIZEM CD) 24 hr capsule 240 mg, 240 mg, Oral, Daily, Caden Vitale MD, 240 mg at 08/01/18 0826    docusate sodium (COLACE) capsule 100 mg, 100 mg, Oral, BID, Mone Alexandra MD, 100 mg at 08/01/18 0829    enoxaparin (LOVENOX) subcutaneous injection 40 mg, 40 mg, Subcutaneous, Q24H [facility], Tato Wilder MD, 40 mg at 08/01/18 0831    furosemide (LASIX) tablet 20 mg, 20 mg, Oral, Daily, Taya Hearn,     hydrALAZINE (APRESOLINE) injection 5 mg, 5 mg, Intravenous, Q6H PRN, Debby Cross MD, 5 mg at 07/26/18 2012    insulin lispro (HumaLOG) 100 units/mL subcutaneous injection 1-5 Units, 1-5 Units, Subcutaneous, HS, Valarie Horne MD, 2 Units at 07/28/18 2122    insulin lispro (HumaLOG) 100 units/mL subcutaneous injection 1-6 Units, 1-6 Units, Subcutaneous, TID AC, 1 Units at 07/31/18 1147 **AND** Fingerstick Glucose (POCT), , , TID AC, MD Lorenzo Ashley (ANTIVERT) tablet 12 5 mg, 12 5 mg, Oral, TID PRN, Jane Barnes MD    ondansetron (ZOFRAN) injection 4 mg, 4 mg, Intravenous, Q6H PRN, Jane Barnes MD, 4 mg at 07/28/18 2130    polyethylene glycol (MIRALAX) packet 17 g, 17 g, Oral, BID, Brigitte Estrada MD    saccharomyces boulardii (FLORASTOR) capsule 250 mg, 250 mg, Oral, BID, Jane Barnes MD, 250 mg at 08/01/18 0826    senna-docusate sodium (SENOKOT S) 8 6-50 mg per tablet 2 tablet, 2 tablet, Oral, HS, Brigitte Estrada MD, 2 tablet at 07/31/18 2124    vancomycin (VANCOCIN) IVPB (premix) 1,000 mg, 15 mg/kg (Adjusted), Intravenous, Q12H, Brigitte Estrada MD, Stopped at 08/01/18 0701    Laboratory Results:    Results from last 7 days  Lab Units 08/01/18  1020 07/31/18  0458 07/30/18  0539 07/30/18  0013 07/29/18  1733 07/29/18  1204 07/29/18  0516 07/29/18  0515  07/28/18  1339 07/28/18  0804 07/28/18  0458 07/28/18  0322  07/27/18  0504   WBC Thousand/uL  --  5 68 4 64  --   --   --   --  4 88  --   --   --  7 53  --   --  15 55*   HEMOGLOBIN g/dL  --  11 2* 10 0*  --   --   --   --  10 2*  --   --   --  10 5*  --   --  11 5   HEMATOCRIT %  --  33 5* 30 3*  --   --   --   --  30 3*  --   --   --  31 2*  --   --  33 5*   PLATELETS Thousands/uL  --  150 111*  --   --   --   --  106*  --   --   --  115*  --   --  150   SODIUM mmol/L 132* 127* 130* 129* 128* 128* 127*  --   < >  --  122*  --  120*  < > 122*   POTASSIUM mmol/L 3 8 4 0 4 1 4 1 4 2 4 0 4 1  --   < >  --  4 2  --  4 2  < > 4 2   CHLORIDE mmol/L 98* 96* 98* 97* 96* 94* 96*  --   < >  --  91*  --  91*  < > 92*   CO2 mmol/L 29 26 25 26 26 25 27  --   < >  --  25  --  23  < > 23   BUN mg/dL 11 10 14 16 15 14 15  --   < >  --  16  --  19  < > 22   CREATININE mg/dL 0 84 0 70 0 77 0 90 0 95 0 85 0 79  --   < >  --  0 93  --  0 87  < > 0 96   CALCIUM mg/dL 8 4 8 8 8 1* 7 9* 8 2* 8 1* 7 9*  --   < >  --  8 3  --  8 1*  < > 8 4   MAGNESIUM mg/dL  --  2 1 2 1  --   --   --   --  2 2  --   -- --   --  2 1  --  2 2   PHOSPHORUS mg/dL  --  3 0 3 3  --   --   --   --  3 0  --   --   --   --  2 2*  --  2 7   TOTAL PROTEIN g/dL  --   --  5 7*  --  6 4  --   --   --   --  6 2* 6 2*  --   --   --   --    GLUCOSE RANDOM mg/dL 145* 119 109 123 179* 144* 109  --   < >  --  160*  --  160*  < > 151*   < > = values in this interval not displayed

## 2018-08-01 NOTE — DISCHARGE SUMMARY
Discharge Summary - Kristina Peralta 80 y o  female MRN: [id number]    Unit/Bed#: [facility] 366-09 Encounter: [id number]    Admission Date:   Admission Orders     Ordered        07/25/18 1033  Inpatient Admission  Once               Admitting Diagnosis: Hyponatremia [E87 1]  Abdominal pain [R10 9]  Constipation [K59 00]    HPI:  "Kristina Peralta is a 80 y o  female who presents due to persistent abdominal discomfort secondary to constipation and inability to move bowels sufficiently at home  Patient does have history of chronic constipation which has worsened in the last several years  She was referred by PCP to Gastroenterology as outpatient who recommended her to use Linzess but that would lead to excessive watery diarrhea hence the patient has not been taking it  Patient currently has been taking Metamucil and added MiraLax and last couple of days  But since last 1 week he has not had a good bowel movement, on and off has a small path coming out, at times she also has watery output but not a good bowel movement  No matter how much she pushes it is unsuccessful  She also has abdominal bloating  In last week or so she has noticed more nausea and decrease in appetite and overall not feeling good  Denies any headache or dizziness or blurring of vision  No speech disturbance no syncopal or confusion  A her PCP had recently taken her off the lisinopril due to cough and that led to high blood pressures hence she was started on HCTZ from 07/11/2018  Since then she thinks that she has been urinating less frequently, she feels like she has to go but does not he  She had baseline also drinks lot of water, but due to decreased urine output she was drinking more to be able to go"    Procedures Performed: No orders of the defined types were placed in this encounter        Summary of Hospital Course:  Patient has hyponatremia, possibly related to HCTZ use along with water intake, possibly SIADH was considered with abdominal pain on going and nausea  He improved with normal saline and fluid restriction used  Her sodium was 111, on 1/2 normal saline, then went up to 117, nephrology was followed, BMP was followed for q 4hrly, sodium improved to 125 to 127 and went up on iv fluids  SHe was given more fluids  She was placed on lasix po 20 mg for diuresis since her blood pressure went high, possibly due to fluid administration  Her sodium is 132 today, would continue with lasix 20 mg daily, needs to check BMP next before continuing lasix  She needs to follow with her primary doctor  She has appointment with nephrology outpatient  Discontinue HCTZ, fluid restriction is recommended  She is obese with excess calories without serius comorbidity with BMI in 99th percentile for age in pediatric patient  She has chronic constipation and was started on Linzess, she has severe diarrhea, GI consulted for started on Bentyl for possible irritable bowel with constipation  Patient had bowel movement today, she feels symptomatically improved  Elevation in leukocytosis on CBC, no cough, no respiratory symptoms, CT abdomen and pelvis was negative, except for gallstones  She was on cefepime and flagyl and ultrasound of upper quadrant related to constipation, not gallbladder related issues  Antibiotics were stopped  Patient has swelling of the right and left extremities due to iv infiltration infection  She was placed on iv vancomycin and switched bactrim for 7 days to complete outpatient  We also managed hypertension as her blood pressure was not under control  Also, her diabetes is under control, restarted home medicaitons      Significant Findings, Care, Treatment and Services Provided: as above    Complications: none    Discharge Diagnosis: severe hyponatremia    Resolved Problems  Date Reviewed: 7/31/2018    None          Condition at Discharge: stable         Discharge instructions/Information to patient and family:   See after visit summary for information provided to patient and family  Provisions for Follow-Up Care:  See after visit summary for information related to follow-up care and any pertinent home health orders  PCP: Mikael Koyanagi, DO    Disposition: Home    Planned Readmission: No    Discharge Statement   I spent 45 minutes discharging the patient  This time was spent on the day of discharge  I had direct contact with the patient on the day of discharge  Additional documentation is required if more than 30 minutes were spent on discharge  Discharge Medications:  See after visit summary for reconciled discharge medications provided to patient and family

## 2018-08-02 ENCOUNTER — TELEPHONE (OUTPATIENT)
Dept: FAMILY MEDICINE CLINIC | Facility: CLINIC | Age: 83
End: 2018-08-02

## 2018-08-02 ENCOUNTER — TRANSITIONAL CARE MANAGEMENT (OUTPATIENT)
Dept: FAMILY MEDICINE CLINIC | Facility: CLINIC | Age: 83
End: 2018-08-02

## 2018-08-02 ENCOUNTER — TELEPHONE (OUTPATIENT)
Dept: NEPHROLOGY | Facility: CLINIC | Age: 83
End: 2018-08-02

## 2018-08-02 NOTE — TELEPHONE ENCOUNTER
Spoke to patient's Niece Varinder Garrido) and according to her Felipa Ramirez will be following up with Nephrology and Gastroenterology  After following up with them Felipa Ramirez will call us back if it is necessary

## 2018-08-04 LAB
BUN SERPL-MCNC: 11 MG/DL (ref 7–25)
BUN/CREAT SERPL: ABNORMAL (CALC) (ref 6–22)
CALCIUM SERPL-MCNC: 9.2 MG/DL (ref 8.6–10.4)
CHLORIDE SERPL-SCNC: 96 MMOL/L (ref 98–110)
CO2 SERPL-SCNC: 31 MMOL/L (ref 20–31)
CREAT SERPL-MCNC: 0.82 MG/DL (ref 0.6–0.88)
GLUCOSE SERPL-MCNC: 121 MG/DL (ref 65–139)
POTASSIUM SERPL-SCNC: 4.2 MMOL/L (ref 3.5–5.3)
SL AMB EGFR AFRICAN AMERICAN: 76 ML/MIN/1.73M2
SL AMB EGFR NON AFRICAN AMERICAN: 66 ML/MIN/1.73M2
SODIUM SERPL-SCNC: 133 MMOL/L (ref 135–146)

## 2018-08-16 ENCOUNTER — TELEPHONE (OUTPATIENT)
Dept: FAMILY MEDICINE CLINIC | Facility: CLINIC | Age: 83
End: 2018-08-16

## 2018-08-16 ENCOUNTER — OFFICE VISIT (OUTPATIENT)
Dept: FAMILY MEDICINE CLINIC | Facility: CLINIC | Age: 83
End: 2018-08-16
Payer: COMMERCIAL

## 2018-08-16 VITALS
TEMPERATURE: 98.1 F | DIASTOLIC BLOOD PRESSURE: 84 MMHG | HEART RATE: 64 BPM | WEIGHT: 212 LBS | SYSTOLIC BLOOD PRESSURE: 130 MMHG | BODY MASS INDEX: 39.01 KG/M2 | HEIGHT: 62 IN

## 2018-08-16 DIAGNOSIS — I10 ESSENTIAL HYPERTENSION: ICD-10-CM

## 2018-08-16 DIAGNOSIS — E11.9 TYPE 2 DIABETES MELLITUS WITHOUT COMPLICATION, WITHOUT LONG-TERM CURRENT USE OF INSULIN (HCC): Primary | ICD-10-CM

## 2018-08-16 DIAGNOSIS — R10.84 GENERALIZED ABDOMINAL PAIN: ICD-10-CM

## 2018-08-16 DIAGNOSIS — R01.1 SYSTOLIC MURMUR: ICD-10-CM

## 2018-08-16 DIAGNOSIS — E87.1 HYPONATREMIA: ICD-10-CM

## 2018-08-16 PROCEDURE — 1111F DSCHRG MED/CURRENT MED MERGE: CPT | Performed by: NURSE PRACTITIONER

## 2018-08-16 PROCEDURE — 99214 OFFICE O/P EST MOD 30 MIN: CPT | Performed by: NURSE PRACTITIONER

## 2018-08-16 RX ORDER — MAG HYDROX/ALUMINUM HYD/SIMETH 400-400-40
1 SUSPENSION, ORAL (FINAL DOSE FORM) ORAL DAILY
COMMUNITY
End: 2021-02-08 | Stop reason: SDUPTHER

## 2018-08-16 RX ORDER — DICYCLOMINE HYDROCHLORIDE 10 MG/1
10 CAPSULE ORAL
Qty: 120 CAPSULE | Refills: 3 | Status: SHIPPED | OUTPATIENT
Start: 2018-08-16 | End: 2019-04-25 | Stop reason: SDUPTHER

## 2018-08-16 NOTE — PROGRESS NOTES
Chief Complaint   Patient presents with   [facility] F/U     79 y/o is here for a F/U visit from being admitted to [facility]        Assessment/Plan:     Diagnoses and all orders for this visit:    Type 2 diabetes mellitus without complication, without long-term current use of insulin ([address] )  -     Hemoglobin A1C; Future  -     Basic metabolic panel; Future    Essential hypertension  -     Hemoglobin A1C; Future  -     Basic metabolic panel; Future  -     Echo complete with contrast if indicated; Future    Hyponatremia  -     Basic metabolic panel; Future  -     dicyclomine (BENTYL) 10 mg capsule; Take 1 capsule (10 mg total) by mouth 4 (four) times a day (before meals and at bedtime)    Generalized abdominal pain    Systolic murmur  -     Echo complete with contrast if indicated; Future    Other orders  -     Cholecalciferol (VITAMIN D3) 5000 units CAPS; Take 1 capsule by mouth daily          Subjective:      Patient ID: Abbi Oquendo is a 80 y o  female  Kidney follow up on Monday   She is frustrated about fluid restriction but advised on need to to moderate so that salt does not drop again  Patient was also confused about her medications  Her medications were reviewed and updated  The following portions of the patient's history were reviewed and updated as appropriate: allergies, current medications, past family history, past medical history, past social history, past surgical history and problem list     Review of Systems   Constitutional: Negative for fatigue and unexpected weight change  HENT: Negative for trouble swallowing  Eyes: Negative for visual disturbance  Respiratory: Negative for chest tightness and shortness of breath  Cardiovascular: Positive for palpitations  Negative for chest pain and leg swelling  Gastrointestinal: Positive for abdominal distention, abdominal pain and constipation  Negative for blood in stool     Endocrine: Negative for polydipsia, polyphagia and polyuria  Genitourinary: Negative for difficulty urinating  Musculoskeletal: Negative for joint swelling  Skin: Negative for wound  Hematological: Bruises/bleeds easily  Objective:      /84   Pulse 64   Temp 98 1 °F (36 7 °C)   Ht 5' 2" (1 575 m)   Wt 96 2 kg (212 lb)   BMI 38 78 kg/m²          Physical Exam   Constitutional: She is oriented to person, place, and time  She appears well-developed and well-nourished  HENT:   Head: Normocephalic and atraumatic  Neck: No JVD present  No thyromegaly present  Cardiovascular: Normal rate and regular rhythm  Murmur heard  Pulmonary/Chest: Effort normal and breath sounds normal    Abdominal: Soft  Bowel sounds are normal    Musculoskeletal: She exhibits no edema  Lymphadenopathy:     She has no cervical adenopathy  Neurological: She is alert and oriented to person, place, and time  Skin: Skin is warm and dry  Multiple bruises on arms from blood work done in hospital    Psychiatric: She has a normal mood and affect  Thought content normal    Nursing note and vitals reviewed

## 2018-08-19 NOTE — PROGRESS NOTES
OFFICE FOLLOW UP - Nephrology   Jim Alexander 80 y o  female MRN: [id number]       ASSESSMENT and PLAN:  Amalia Hicks was seen today for follow-up and hyponatremia  Diagnoses and all orders for this visit:    Hyponatremia  -suspect secondary to volume depletion and thiazide use  -Sodium increased 133 on 08/03/2018  -no current sodium level noted, especially being off Lasix for 10 days and increase oral fluids  -will check BMP now, and I suspect sodium to be lower with increased oral intake and off loop diuretic  -patient instructed to take Lasix which has been reordered daily and maintain a fluid restriction of 48 oz  -fluid restriction information given  -once BMP reviewed further recommendations will be forthcoming  -will also see the patient in three months with updated blood work  -     Basic metabolic panel; Future  -     Basic metabolic panel; Future  -     furosemide (LASIX) 20 mg tablet; Take 1 tablet (20 mg total) by mouth daily    Essential hypertension  -BP was on the higher side upon arrival secondary to anxious  -BP decreased to 158/68 after 10 minutes  -currently no change in blood pressure medications at this time    Type 2 diabetes mellitus without complication, without long-term current use of insulin ([facility])  -per primary team    Chronic constipation  -per patient this is improving  -per primary team      Patient Instructions   All questions asked and answered  The patient has been instructed to call office at [phone] with any questions or concerns  The patient has been instructed to obtain prescribed blood work and urine studies prior to next appointment    Education material " Kidney disease: Understanding and Limiting Fluids" given to patient today  Limit fluids to 48 oz per day  Get BMP ( blood work) in AM  To check sodium level  Return in 3 months for sodium follow up      HPI: Jim Alexander is a 80 y o  female who is here for Follow-up and Hyponatremia    The patient returns to office for hyponatremia follow up  She was recently admitted for complaints of worsening constipation and was found to have acute hyponatremia  Admission sodium was 111 and was discharged on 8/1/2018 with sodium of 132  Hyponatremia was thought to be a component of volume depletion and thiazide use  She was discharged on Lasix 20 mg daily  BMP on 8/3/2018 reported sodium of 133  On discussion she is unsure why she is here to see a kidney doctor  Feels constipation somewhat better  Denies chest pain, shortness of breath, nausea, vomiting, urinary issues, fevers or chills, confusion or falls  She reports being off Lasix for approximately 10 days secondary to prescription ending  She reports increasing fluids  We had long discussion concerning hyponatremia and how to assist with increase in sodium level  ROS:   All the systems were reviewed and were negative except as documented on the HPI  Allergies: Hydrochlorothiazide;  Lisinopril; and Statins    Medications:   Current Outpatient Prescriptions:     aspirin 81 MG tablet, Take 1 tablet by mouth daily, Disp: , Rfl:     carvedilol (COREG) 25 mg tablet, TAKE 1 TABLET BY MOUTH TWICE DAILY, Disp: 60 tablet, Rfl: 5    Cholecalciferol (VITAMIN D3) 5000 units CAPS, Take 1 capsule by mouth daily, Disp: , Rfl:     dicyclomine (BENTYL) 10 mg capsule, Take 1 capsule (10 mg total) by mouth 4 (four) times a day (before meals and at bedtime), Disp: 120 capsule, Rfl: 3    diltiazem (CARDIZEM CD) 240 mg 24 hr capsule, Take 1 capsule (240 mg total) by mouth daily, Disp: 90 capsule, Rfl: 3    docusate sodium (COLACE) 100 mg capsule, Take 1 capsule (100 mg total) by mouth 2 (two) times a day, Disp: 10 capsule, Rfl: 0    furosemide (LASIX) 20 mg tablet, Take 1 tablet (20 mg total) by mouth daily, Disp: 90 tablet, Rfl: 3    Glucos-Chondroit-Hyaluron-MSM (GLUCOSAMINE CHONDROITIN JOINT) TABS, Take by mouth, Disp: , Rfl:     meclizine (ANTIVERT) 12 5 MG tablet, Take 1 tablet by mouth 3 (three) times a day as needed for dizziness, Disp: 20 tablet, Rfl: 0    Multiple Vitamin (MULTI-VITAMIN DAILY PO), Take 1 tablet by mouth daily, Disp: , Rfl:     Probiotic Product (PROBIOTIC DAILY PO), Take by mouth, Disp: , Rfl:     simethicone (MYLICON,GAS-X) 696 MG capsule, Take 1 capsule (180 mg total) by mouth 3 (three) times a day, Disp: , Rfl: 0    Past Medical History:   Diagnosis Date    Anemia     Anxiety     Diabetes mellitus ([address] )     Diabetic peripheral neuropathy (HCC)     Hyperlipidemia     Hypertension     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 8/23/2017    Thyroid enlarged 2/23/2015    Tinnitus     Vertigo      Past Surgical History:   Procedure Laterality Date    EAR SURGERY Left     KNEE SURGERY       Family History   Problem Relation Age of Onset    Diabetes Mother     Heart disease Mother     Heart disease Father     Stroke Father     Cancer Family       reports that she has never smoked  She has never used smokeless tobacco  She reports that she does not drink alcohol or use drugs  Physical Exam:   Vitals:    08/20/18 1517   BP: 170/64   BP Location: Left arm   Patient Position: Sitting   Weight: 97 5 kg (215 lb)   Height: 5' 2" (1 575 m)     Body mass index is 39 32 kg/m²      General: cooperative, in not acute distress  Eyes: conjunctivae pink, anicteric sclerae  ENT: lips and mucous membranes moist  Neck: supple, no JVD  Chest: clear breath sounds bilateral, no crackles, ronchus or wheezings  CVS:  normal rate, regular rhythm, soft murmur  Abdomen: soft, non-tender, non-distended, normoactive bowel sounds  Extremities:  Trace edema of both legs  Skin: no rash  Neuro: awake, alert, oriented      Lab Results:  Results for orders placed or performed in visit on 87/77/86   Basic metabolic panel   Result Value Ref Range    SL AMB GLUCOSE 121 65 - 139 mg/dL    BUN 11 7 - 25 mg/dL    Creatinine, Serum 0 82 0 60 - 0 88 mg/dL    eGFR Non  66 > OR = 60 mL/min/1 73m2    SL AMB EGFR  76 > OR = 60 mL/min/1 73m2    SL AMB BUN/CREATININE RATIO NOT APPLICABLE 6 - 22 (calc)    SL AMB SODIUM 133 (L) 135 - 146 mmol/L    SL AMB POTASSIUM 4 2 3 5 - 5 3 mmol/L    SL AMB CHLORIDE 96 (L) 98 - 110 mmol/L    SL AMB CARBON DIOXIDE 31 20 - 31 mmol/L    SL AMB CALCIUM 9 2 8 6 - 10 4 mg/dL               Portions of the record may have been created with voice recognition software  Occasional wrong word or "sound a like" substitutions may have occurred due to the inherent limitations of voice recognition software  Read the chart carefully and recognize, using context, where substitutions have occurred  If you have any questions, please contact the dictating provider

## 2018-08-20 ENCOUNTER — OFFICE VISIT (OUTPATIENT)
Dept: NEPHROLOGY | Facility: CLINIC | Age: 83
End: 2018-08-20
Payer: COMMERCIAL

## 2018-08-20 VITALS
BODY MASS INDEX: 39.56 KG/M2 | WEIGHT: 215 LBS | HEIGHT: 62 IN | SYSTOLIC BLOOD PRESSURE: 158 MMHG | DIASTOLIC BLOOD PRESSURE: 68 MMHG

## 2018-08-20 DIAGNOSIS — K59.09 CHRONIC CONSTIPATION: ICD-10-CM

## 2018-08-20 DIAGNOSIS — E87.1 HYPONATREMIA: Primary | ICD-10-CM

## 2018-08-20 DIAGNOSIS — I10 ESSENTIAL HYPERTENSION: ICD-10-CM

## 2018-08-20 DIAGNOSIS — E11.9 TYPE 2 DIABETES MELLITUS WITHOUT COMPLICATION, WITHOUT LONG-TERM CURRENT USE OF INSULIN (HCC): ICD-10-CM

## 2018-08-20 PROCEDURE — 99214 OFFICE O/P EST MOD 30 MIN: CPT | Performed by: NURSE PRACTITIONER

## 2018-08-20 RX ORDER — FUROSEMIDE 20 MG/1
20 TABLET ORAL DAILY
Qty: 90 TABLET | Refills: 3 | Status: SHIPPED | OUTPATIENT
Start: 2018-08-20 | End: 2019-05-22 | Stop reason: SDUPTHER

## 2018-08-20 NOTE — PATIENT INSTRUCTIONS
All questions asked and answered  The patient has been instructed to call office at [phone] with any questions or concerns    The patient has been instructed to obtain prescribed blood work and urine studies prior to next appointment    Education material " Kidney disease: Understanding and Limiting Fluids" given to patient today  Limit fluids to 48 oz per day  Get BMP ( blood work) in AM  To check sodium level  Return in 3 months for sodium follow up

## 2018-08-21 LAB
BUN SERPL-MCNC: 12 MG/DL (ref 7–25)
BUN/CREAT SERPL: 12 (CALC) (ref 6–22)
CALCIUM SERPL-MCNC: 9.5 MG/DL (ref 8.6–10.4)
CHLORIDE SERPL-SCNC: 101 MMOL/L (ref 98–110)
CO2 SERPL-SCNC: 29 MMOL/L (ref 20–32)
CREAT SERPL-MCNC: 1.03 MG/DL (ref 0.6–0.88)
GLUCOSE SERPL-MCNC: 125 MG/DL (ref 65–139)
POTASSIUM SERPL-SCNC: 4.5 MMOL/L (ref 3.5–5.3)
SL AMB EGFR AFRICAN AMERICAN: 58 ML/MIN/1.73M2
SL AMB EGFR NON AFRICAN AMERICAN: 50 ML/MIN/1.73M2
SODIUM SERPL-SCNC: 135 MMOL/L (ref 135–146)

## 2018-08-23 ENCOUNTER — TELEPHONE (OUTPATIENT)
Dept: OTHER | Facility: HOSPITAL | Age: 83
End: 2018-08-23

## 2018-08-23 NOTE — TELEPHONE ENCOUNTER
I left a message on Delma's phone  Sodium level has normalized  I told her to continue current plan of care and call the office if she has any issues

## 2018-09-05 ENCOUNTER — OFFICE VISIT (OUTPATIENT)
Dept: GASTROENTEROLOGY | Facility: CLINIC | Age: 83
End: 2018-09-05
Payer: COMMERCIAL

## 2018-09-05 VITALS
HEART RATE: 57 BPM | TEMPERATURE: 98.3 F | DIASTOLIC BLOOD PRESSURE: 73 MMHG | HEIGHT: 62 IN | BODY MASS INDEX: 39.67 KG/M2 | SYSTOLIC BLOOD PRESSURE: 183 MMHG | WEIGHT: 215.6 LBS

## 2018-09-05 DIAGNOSIS — K59.09 CHRONIC CONSTIPATION: Primary | ICD-10-CM

## 2018-09-05 PROCEDURE — 99213 OFFICE O/P EST LOW 20 MIN: CPT | Performed by: INTERNAL MEDICINE

## 2018-09-05 NOTE — PROGRESS NOTES
[facility] - Outpatient Consultation  Laureano Evans 80 y o  female MRN: [id number]  Encounter: [id number]          ASSESSMENT AND PLAN:      Constipation:    - improved with colace  Now to baseline with regular formed stool   - patient was counseled on the importance of increased water intake and fiber intake    - CT was negative with acute findings  No indication for repeat colonoscopy for now  Bloating:  - relieved by bentyl    Patient has significant improvement with constipation  Follow up as needed  ______________________________________________________________________    HPI:   59-year-old female presented for follow-up on constipation  Patient was recently admitted admitted to the hospital for abdominal pain and bloating  She was found to have chronic constipation  She started taking Colace twice a day with significant improvement of her constipation  She also takes Bentyl p r n  And it relieves her abdominal bloating  She reported  Having bowel movement once every other day and brown color  Denies any blood in the stool  During the most recent admission her CT scan was negative for any acute findings  Her colonoscopy was around 8 years ago and was with unremarkable findings  REVIEW OF SYSTEMS:    CONSTITUTIONAL: Denies any fever, chills, rigors, and weight loss  HEENT: No earache or tinnitus  Denies hearing loss or visual disturbances  CARDIOVASCULAR: No chest pain or palpitations  RESPIRATORY: Denies any cough, hemoptysis, shortness of breath or dyspnea on exertion  GASTROINTESTINAL: As noted in the History of Present Illness  GENITOURINARY: No problems with urination  Denies any hematuria or dysuria  NEUROLOGIC: No dizziness or vertigo, denies headaches  MUSCULOSKELETAL: Denies any muscle or joint pain  SKIN: Denies skin rashes or itching  ENDOCRINE: Denies excessive thirst  Denies intolerance to heat or cold    PSYCHOSOCIAL: Denies depression or anxiety  Denies any recent memory loss  Historical Information   Past Medical History:   Diagnosis Date    Anemia     Anxiety     Diabetes mellitus ([address] )     Diabetic peripheral neuropathy (HCC)     Hyperlipidemia     Hypertension     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 8/23/2017    Thyroid enlarged 2/23/2015    Tinnitus     Vertigo      Past Surgical History:   Procedure Laterality Date    EAR SURGERY Left     KNEE SURGERY       Social History   History   Alcohol Use No     History   Drug Use No     History   Smoking Status    Never Smoker   Smokeless Tobacco    Never Used     Family History   Problem Relation Age of Onset    Diabetes Mother     Heart disease Mother     Heart disease Father     Stroke Father     Cancer Family        Meds/Allergies       Current Outpatient Prescriptions:     aspirin 81 MG tablet    carvedilol (COREG) 25 mg tablet    Cholecalciferol (VITAMIN D3) 5000 units CAPS    dicyclomine (BENTYL) 10 mg capsule    diltiazem (CARDIZEM CD) 240 mg 24 hr capsule    docusate sodium (COLACE) 100 mg capsule    furosemide (LASIX) 20 mg tablet    Glucos-Chondroit-Hyaluron-MSM (GLUCOSAMINE CHONDROITIN JOINT) TABS    meclizine (ANTIVERT) 12 5 MG tablet    Multiple Vitamin (MULTI-VITAMIN DAILY PO)    Probiotic Product (PROBIOTIC DAILY PO)    simethicone (MYLICON,GAS-X) 773 MG capsule    Allergies   Allergen Reactions    Hydrochlorothiazide      Hyponatremia, severe    Lisinopril Cough    Statins            Objective     Blood pressure (!) 183/73, pulse 57, temperature 98 3 °F (36 8 °C), temperature source Tympanic, height 5' 2" (1 575 m), weight 97 8 kg (215 lb 9 6 oz), not currently breastfeeding  Body mass index is 39 43 kg/m²          PHYSICAL EXAM:      General Appearance:   Alert, cooperative, no distress   HEENT:   Normocephalic, atraumatic, anicteric      Neck:  Supple, symmetrical, trachea midline   Lungs:   Clear to auscultation bilaterally; no rales, rhonchi or wheezing; respirations unlabored    Heart[de-identified]   Regular rate and rhythm; no murmur, rub, or gallop  Abdomen:   Soft, non-tender, non-distended; normal bowel sounds; no masses, no organomegaly    Genitalia:   Deferred    Rectal:   Deferred    Extremities:  No cyanosis, clubbing or edema    Pulses:  2+ and symmetric    Skin:  No jaundice, rashes, or lesions    Lymph nodes:  No palpable cervical lymphadenopathy        Lab Results:   No visits with results within 1 Day(s) from this visit  Latest known visit with results is:   Orders Only on 08/21/2018   Component Date Value    Glucose 08/21/2018 125     BUN 08/21/2018 12     Creatinine 08/21/2018 1 03*    eGFR Non African American 08/21/2018 50*    SL AMB EGFR  AMER* 08/21/2018 58*    SL AMB BUN/CREATININE RA* 08/21/2018 12     Sodium 08/21/2018 135     SL AMB POTASSIUM 08/21/2018 4 5     Chloride 08/21/2018 101     CO2 08/21/2018 29     SL AMB CALCIUM 08/21/2018 9 5          Radiology Results:   No results found

## 2018-09-12 ENCOUNTER — TELEPHONE (OUTPATIENT)
Dept: FAMILY MEDICINE CLINIC | Facility: CLINIC | Age: 83
End: 2018-09-12

## 2018-09-12 ENCOUNTER — HOSPITAL ENCOUNTER (OUTPATIENT)
Dept: NON INVASIVE DIAGNOSTICS | Facility: HOSPITAL | Age: 83
Discharge: HOME | End: 2018-09-12
Payer: COMMERCIAL

## 2018-09-12 DIAGNOSIS — I10 ESSENTIAL HYPERTENSION: ICD-10-CM

## 2018-09-12 DIAGNOSIS — R01.1 SYSTOLIC MURMUR: ICD-10-CM

## 2018-09-12 PROCEDURE — 93306 TTE W/DOPPLER COMPLETE: CPT | Performed by: INTERNAL MEDICINE

## 2018-09-12 PROCEDURE — 93306 TTE W/DOPPLER COMPLETE: CPT

## 2018-09-18 ENCOUNTER — TELEPHONE (OUTPATIENT)
Dept: FAMILY MEDICINE CLINIC | Facility: CLINIC | Age: 83
End: 2018-09-18

## 2018-09-18 LAB
ALBUMIN/CREAT UR: 20 MCG/MG CREAT
BUN SERPL-MCNC: 18 MG/DL (ref 7–25)
BUN/CREAT SERPL: 19 (CALC) (ref 6–22)
CALCIUM SERPL-MCNC: 9 MG/DL (ref 8.6–10.4)
CHLORIDE SERPL-SCNC: 103 MMOL/L (ref 98–110)
CO2 SERPL-SCNC: 28 MMOL/L (ref 20–32)
CREAT SERPL-MCNC: 0.94 MG/DL (ref 0.6–0.88)
CREAT UR-MCNC: 82 MG/DL (ref 20–275)
GLUCOSE SERPL-MCNC: 123 MG/DL (ref 65–99)
HBA1C MFR BLD: 6.5 % OF TOTAL HGB
MICROALBUMIN UR-MCNC: 1.6 MG/DL
POTASSIUM SERPL-SCNC: 4 MMOL/L (ref 3.5–5.3)
SL AMB EGFR AFRICAN AMERICAN: 65 ML/MIN/1.73M2
SL AMB EGFR NON AFRICAN AMERICAN: 56 ML/MIN/1.73M2
SODIUM SERPL-SCNC: 137 MMOL/L (ref 135–146)

## 2018-09-18 NOTE — TELEPHONE ENCOUNTER
----- Message from Alexandro Mendoza MD sent at 9/18/2018  4:35 PM EDT -----  All lab stable, no changes in meds

## 2018-09-24 ENCOUNTER — OFFICE VISIT (OUTPATIENT)
Dept: FAMILY MEDICINE CLINIC | Facility: CLINIC | Age: 83
End: 2018-09-24
Payer: COMMERCIAL

## 2018-09-24 VITALS
SYSTOLIC BLOOD PRESSURE: 128 MMHG | DIASTOLIC BLOOD PRESSURE: 68 MMHG | RESPIRATION RATE: 18 BRPM | HEART RATE: 56 BPM | TEMPERATURE: 97.9 F | HEIGHT: 62 IN | WEIGHT: 217.25 LBS | BODY MASS INDEX: 39.98 KG/M2

## 2018-09-24 DIAGNOSIS — I10 ESSENTIAL HYPERTENSION: ICD-10-CM

## 2018-09-24 DIAGNOSIS — Z23 NEED FOR INFLUENZA VACCINATION: ICD-10-CM

## 2018-09-24 DIAGNOSIS — E11.9 TYPE 2 DIABETES MELLITUS WITHOUT COMPLICATION, WITHOUT LONG-TERM CURRENT USE OF INSULIN (HCC): Primary | ICD-10-CM

## 2018-09-24 DIAGNOSIS — E78.5 HYPERLIPIDEMIA, UNSPECIFIED HYPERLIPIDEMIA TYPE: ICD-10-CM

## 2018-09-24 PROBLEM — E87.1 HYPONATREMIA: Status: RESOLVED | Noted: 2018-07-25 | Resolved: 2018-09-24

## 2018-09-24 PROBLEM — T80.1XXA IV INFILTRATION: Status: RESOLVED | Noted: 2018-07-30 | Resolved: 2018-09-24

## 2018-09-24 PROCEDURE — G0008 ADMIN INFLUENZA VIRUS VAC: HCPCS

## 2018-09-24 PROCEDURE — 3078F DIAST BP <80 MM HG: CPT | Performed by: FAMILY MEDICINE

## 2018-09-24 PROCEDURE — 3008F BODY MASS INDEX DOCD: CPT | Performed by: FAMILY MEDICINE

## 2018-09-24 PROCEDURE — 99213 OFFICE O/P EST LOW 20 MIN: CPT | Performed by: FAMILY MEDICINE

## 2018-09-24 PROCEDURE — 3074F SYST BP LT 130 MM HG: CPT | Performed by: FAMILY MEDICINE

## 2018-09-24 PROCEDURE — 90662 IIV NO PRSV INCREASED AG IM: CPT

## 2018-09-24 NOTE — PROGRESS NOTES
Assessment/Plan:    No problem-specific Assessment & Plan notes found for this encounter  Diagnoses and all orders for this visit:    Type 2 diabetes mellitus without complication, without long-term current use of insulin (HCC)  -     Basic metabolic panel; Future    Essential hypertension  -     Magnesium; Future    Hyperlipidemia, unspecified hyperlipidemia type  -     Lipid Panel with Direct LDL reflex; Future          Subjective:      Patient ID: Jeff Stewart is a 80 y o  female  Hypertension   This is a chronic problem  The current episode started more than 1 year ago  The problem has been waxing and waning since onset  The problem is controlled  Pertinent negatives include no anxiety, blurred vision, chest pain, headaches, peripheral edema or shortness of breath  There are no associated agents to hypertension  Risk factors for coronary artery disease include post-menopausal state and obesity  Past treatments include calcium channel blockers, beta blockers and diuretics  The current treatment provides moderate improvement  The following portions of the patient's history were reviewed and updated as appropriate: allergies, current medications, past family history, past medical history, past social history, past surgical history and problem list       Review of Systems   Constitutional: Negative for activity change, appetite change and unexpected weight change  Eyes: Negative for blurred vision  Respiratory: Negative for shortness of breath  Cardiovascular: Negative for chest pain  Musculoskeletal: Negative for arthralgias  Neurological: Negative for headaches  Objective:      /68 (BP Location: Right arm, Patient Position: Sitting, Cuff Size: Standard)   Pulse 56   Temp 97 9 °F (36 6 °C) (Temporal)   Resp 18   Ht 5' 2" (1 575 m)   Wt 98 5 kg (217 lb 4 oz)   BMI 39 74 kg/m²          Physical Exam   Constitutional: She appears well-developed and well-nourished  Neck: No thyromegaly present  Cardiovascular: Normal rate, regular rhythm and normal heart sounds  Pulmonary/Chest: Breath sounds normal    Musculoskeletal: She exhibits no edema  Lymphadenopathy:     She has no cervical adenopathy  Vitals reviewed

## 2018-10-29 LAB
BUN SERPL-MCNC: 18 MG/DL (ref 7–25)
BUN/CREAT SERPL: 17 (CALC) (ref 6–22)
CALCIUM SERPL-MCNC: 9.7 MG/DL (ref 8.6–10.4)
CHLORIDE SERPL-SCNC: 104 MMOL/L (ref 98–110)
CO2 SERPL-SCNC: 31 MMOL/L (ref 20–32)
CREAT SERPL-MCNC: 1.05 MG/DL (ref 0.6–0.88)
GLUCOSE SERPL-MCNC: 118 MG/DL (ref 65–99)
POTASSIUM SERPL-SCNC: 4.4 MMOL/L (ref 3.5–5.3)
SL AMB EGFR AFRICAN AMERICAN: 56 ML/MIN/1.73M2
SL AMB EGFR NON AFRICAN AMERICAN: 49 ML/MIN/1.73M2
SODIUM SERPL-SCNC: 141 MMOL/L (ref 135–146)

## 2018-10-31 ENCOUNTER — TELEPHONE (OUTPATIENT)
Dept: FAMILY MEDICINE CLINIC | Facility: CLINIC | Age: 83
End: 2018-10-31

## 2018-11-29 ENCOUNTER — OFFICE VISIT (OUTPATIENT)
Dept: NEPHROLOGY | Facility: CLINIC | Age: 83
End: 2018-11-29
Payer: COMMERCIAL

## 2018-11-29 VITALS
HEIGHT: 62 IN | SYSTOLIC BLOOD PRESSURE: 122 MMHG | BODY MASS INDEX: 40.48 KG/M2 | DIASTOLIC BLOOD PRESSURE: 62 MMHG | HEART RATE: 60 BPM | WEIGHT: 220 LBS

## 2018-11-29 DIAGNOSIS — I12.9 BENIGN HYPERTENSION WITH CKD (CHRONIC KIDNEY DISEASE) STAGE III (HCC): ICD-10-CM

## 2018-11-29 DIAGNOSIS — N18.30 CKD (CHRONIC KIDNEY DISEASE), STAGE III (HCC): Primary | ICD-10-CM

## 2018-11-29 DIAGNOSIS — E87.1 HYPONATREMIA: ICD-10-CM

## 2018-11-29 DIAGNOSIS — R60.0 BILATERAL LEG EDEMA: ICD-10-CM

## 2018-11-29 DIAGNOSIS — N18.30 BENIGN HYPERTENSION WITH CKD (CHRONIC KIDNEY DISEASE) STAGE III (HCC): ICD-10-CM

## 2018-11-29 PROCEDURE — 4040F PNEUMOC VAC/ADMIN/RCVD: CPT | Performed by: INTERNAL MEDICINE

## 2018-11-29 PROCEDURE — 99214 OFFICE O/P EST MOD 30 MIN: CPT | Performed by: INTERNAL MEDICINE

## 2018-11-29 NOTE — PROGRESS NOTES
NEPHROLOGY OUTPATIENT PROGRESS NOTE   Diana Gregorio 80 y o  female MRN: [id number]  DATE: 11/29/2018  Reason for visit:   Chief Complaint   Patient presents with    Follow-up    Hyponatremia     ASSESSMENT and PLAN:  CKD stage IIIA, baseline creatinine 0 9 to 1  -CKD likely secondary to long-term hypertension and diabetes  -last creatinine 1 0 in September 2018 overall stable  -avoid nephrotoxins or NSAIDs  -urinalysis in July 2018 showed no proteinuria, no hematuria  Urine microalbumin/creatinine ratio 20 mg in September 2018    Hypertension  -blood pressure overall acceptable uncontrolled in the office today   -continue to monitor on current regimen with carvedilol and diltiazem    Hyponatremia  -initially hyponatremia thought to be secondary to hypovolemia/HCTZ +/- SIADH component  -since then serum sodium remains overall stable with last serum sodium 141   -she continues to follow fluid restriction on 1 5 L per day   -remains on Lasix 20 mg p o  Daily, continue same  -check BMP before next visit  -previous workup includes serum osmolality 252, urine sodium 45, urine osmolality 388  History of leg edema, overall stable and much improved at this time, continue same with Lasix      Diagnoses and all orders for this visit:    CKD (chronic kidney disease), stage III ([facility] )  -     Basic metabolic panel; Future  -     Phosphorus; Future  -     Protein / creatinine ratio, urine; Future  -     Basic metabolic panel  -     Phosphorus    Hyponatremia    Benign hypertension with CKD (chronic kidney disease) stage III ([facility])        SUBJECTIVE / HPI:  Patient is a 79-year-old female with significant medical issues of hypertension for many years, prior history of long-term diabetes and now remains of antidiabetic medication, CKD stage IIIA with baseline creatinine 0 9 to 1, chronic back pain issues, history of hyponatremia, comes for regular follow-up  Patient last serum creatinine 1 0 overall stable at baseline    Last serum sodium 141 over baseline  She seems to be following fluid restriction 1 5 L per day  Patient has chronic back pain issues which is overall stable  She denies taking any NSAIDs  Denies any urinary complaint  Denies any chest pain or shortness of breath  No nausea or vomiting  REVIEW OF SYSTEMS:  More than 10 point review of systems were obtained and discussed in length with the patient  Complete review of systems were negative / unremarkable except mentioned above  PHYSICAL EXAM:  Vitals:    11/29/18 1038   BP: 122/62   BP Location: Left arm   Patient Position: Sitting   Cuff Size: Adult   Pulse: 60   Weight: 99 8 kg (220 lb)   Height: 5' 2" (1 575 m)     Body mass index is 40 24 kg/m²  Physical Exam   Constitutional: She is oriented to person, place, and time  She appears well-developed and well-nourished  HENT:   Head: Normocephalic and atraumatic  Right Ear: External ear normal    Left Ear: External ear normal    Eyes: Pupils are equal, round, and reactive to light  Conjunctivae and EOM are normal    Neck: Neck supple  No JVD present  Cardiovascular: Normal rate and normal heart sounds  Pulmonary/Chest: Effort normal and breath sounds normal  She has no wheezes  She has no rales  Abdominal: Soft  Bowel sounds are normal  She exhibits no distension  There is no tenderness  Musculoskeletal: She exhibits no edema or tenderness  Neurological: She is alert and oriented to person, place, and time  Skin: Skin is warm and dry  No rash noted  Psychiatric: She has a normal mood and affect  Her behavior is normal    Vitals reviewed        PAST MEDICAL HISTORY:  Past Medical History:   Diagnosis Date    Anemia     Anxiety     CKD (chronic kidney disease), stage III ([facility] ) 11/29/2018    Diabetes mellitus ([facility] )     Diabetic peripheral neuropathy (HCC)     Hyperlipidemia     Hypertension     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 8/23/2017    Thyroid enlarged 2/23/2015    Tinnitus     Vertigo        PAST SURGICAL HISTORY:  Past Surgical History:   Procedure Laterality Date    EAR SURGERY Left     KNEE SURGERY         SOCIAL HISTORY:  History   Alcohol Use No     History   Drug Use No     History   Smoking Status    Never Smoker   Smokeless Tobacco    Never Used       FAMILY HISTORY:  Family History   Problem Relation Age of Onset    Diabetes Mother     Heart disease Mother     Heart disease Father     Stroke Father     Cancer Family        MEDICATIONS:    Current Outpatient Prescriptions:     aspirin 81 MG tablet, Take 1 tablet by mouth daily, Disp: , Rfl:     carvedilol (COREG) 25 mg tablet, TAKE 1 TABLET BY MOUTH TWICE DAILY, Disp: 60 tablet, Rfl: 5    Cholecalciferol (VITAMIN D3) 5000 units CAPS, Take 1 capsule by mouth daily, Disp: , Rfl:     dicyclomine (BENTYL) 10 mg capsule, Take 1 capsule (10 mg total) by mouth 4 (four) times a day (before meals and at bedtime), Disp: 120 capsule, Rfl: 3    diltiazem (CARDIZEM CD) 240 mg 24 hr capsule, Take 1 capsule (240 mg total) by mouth daily, Disp: 90 capsule, Rfl: 3    docusate sodium (COLACE) 100 mg capsule, Take 1 capsule (100 mg total) by mouth 2 (two) times a day, Disp: 10 capsule, Rfl: 0    furosemide (LASIX) 20 mg tablet, Take 1 tablet (20 mg total) by mouth daily, Disp: 90 tablet, Rfl: 3    Glucos-Chondroit-Hyaluron-MSM (GLUCOSAMINE CHONDROITIN JOINT) TABS, Take by mouth, Disp: , Rfl:     Multiple Vitamin (MULTI-VITAMIN DAILY PO), Take 1 tablet by mouth daily, Disp: , Rfl:     Probiotic Product (PROBIOTIC DAILY PO), Take by mouth, Disp: , Rfl:     simethicone (MYLICON,GAS-X) 096 MG capsule, Take 1 capsule (180 mg total) by mouth 3 (three) times a day, Disp: , Rfl: 0    Lab Results:   Results for orders placed or performed in visit on 03/34/93   Basic metabolic panel   Result Value Ref Range    Glucose, Random 118 (H) 65 - 99 mg/dL    BUN 18 7 - 25 mg/dL    Creatinine 1 05 (H) 0 60 - 0 88 mg/dL    eGFR Non  49 (L) > OR = 60 mL/min/1 73m2    SL AMB EGFR  56 (L) > OR = 60 mL/min/1 73m2    SL AMB BUN/CREATININE RATIO 17 6 - 22 (calc)    Sodium 141 135 - 146 mmol/L    Potassium 4 4 3 5 - 5 3 mmol/L    Chloride 104 98 - 110 mmol/L    CO2 31 20 - 32 mmol/L    SL AMB CALCIUM 9 7 8 6 - 10 4 mg/dL

## 2018-11-29 NOTE — LETTER
November 29, 2018     Unknown Alvarado, [address] [facility] [address]    Patient: Abihlash Tan   YOB: 1934   Date of Visit: 11/29/2018       Dear Dr Sharmin Roberts:    Thank you for referring [place] Pancho to me for evaluation  Below are my notes for this consultation  If you have questions, please do not hesitate to call me  I look forward to following your patient along with you  Sincerely,        Pratima Barrios MD        CC: No Recipients  Pratima Barrios MD  11/29/2018 11:21 AM  Sign at close encounter  [address] 80 y o  female MRN: [id number]  DATE: 11/29/2018  Reason for visit:   Chief Complaint   Patient presents with    Follow-up    Hyponatremia     ASSESSMENT and PLAN:  CKD stage IIIA, baseline creatinine 0 9 to 1  -CKD likely secondary to long-term hypertension and diabetes  -last creatinine 1 0 in September 2018 overall stable  -avoid nephrotoxins or NSAIDs  -urinalysis in July 2018 showed no proteinuria, no hematuria  Urine microalbumin/creatinine ratio 20 mg in September 2018    Hypertension  -blood pressure overall acceptable uncontrolled in the office today   -continue to monitor on current regimen with carvedilol and diltiazem    Hyponatremia  -initially hyponatremia thought to be secondary to hypovolemia/HCTZ +/- SIADH component  -since then serum sodium remains overall stable with last serum sodium 141   -she continues to follow fluid restriction on 1 5 L per day   -remains on Lasix 20 mg p o  Daily, continue same  -check BMP before next visit  -previous workup includes serum osmolality 252, urine sodium 45, urine osmolality 388  History of leg edema, overall stable and much improved at this time, continue same with Lasix      Diagnoses and all orders for this visit:    CKD (chronic kidney disease), stage III ([facility] [address] )  -     Basic metabolic panel; Future  -     Phosphorus;  Future  -     Protein / creatinine ratio, urine; Future  -     Basic metabolic panel  -     Phosphorus    Hyponatremia    Benign hypertension with CKD (chronic kidney disease) stage III (HCC)        SUBJECTIVE / HPI:  Patient is a 70-year-old female with significant medical issues of hypertension for many years, prior history of long-term diabetes and now remains of antidiabetic medication, CKD stage IIIA with baseline creatinine 0 9 to 1, chronic back pain issues, history of hyponatremia, comes for regular follow-up  Patient last serum creatinine 1 0 overall stable at baseline  Last serum sodium 141 over baseline  She seems to be following fluid restriction 1 5 L per day  Patient has chronic back pain issues which is overall stable  She denies taking any NSAIDs  Denies any urinary complaint  Denies any chest pain or shortness of breath  No nausea or vomiting  REVIEW OF SYSTEMS:  More than 10 point review of systems were obtained and discussed in length with the patient  Complete review of systems were negative / unremarkable except mentioned above  PHYSICAL EXAM:  Vitals:    11/29/18 1038   BP: 122/62   BP Location: Left arm   Patient Position: Sitting   Cuff Size: Adult   Pulse: 60   Weight: 99 8 kg (220 lb)   Height: 5' 2" (1 575 m)     Body mass index is 40 24 kg/m²  Physical Exam   Constitutional: She is oriented to person, place, and time  She appears well-developed and well-nourished  HENT:   Head: Normocephalic and atraumatic  Right Ear: External ear normal    Left Ear: External ear normal    Eyes: Pupils are equal, round, and reactive to light  Conjunctivae and EOM are normal    Neck: Neck supple  No JVD present  Cardiovascular: Normal rate and normal heart sounds  Pulmonary/Chest: Effort normal and breath sounds normal  She has no wheezes  She has no rales  Abdominal: Soft  Bowel sounds are normal  She exhibits no distension  There is no tenderness  Musculoskeletal: She exhibits no edema or tenderness     Neurological: She is alert and oriented to person, place, and time  Skin: Skin is warm and dry  No rash noted  Psychiatric: She has a normal mood and affect  Her behavior is normal    Vitals reviewed        PAST MEDICAL HISTORY:  Past Medical History:   Diagnosis Date    Anemia     Anxiety     CKD (chronic kidney disease), stage III ([facility] [address] ) 11/29/2018    [facility] mellitus [facility] )     Diabetic peripheral neuropathy (HCC)     Hyperlipidemia     Hypertension     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 8/23/2017    Thyroid enlarged 2/23/2015    Tinnitus     Vertigo        PAST SURGICAL HISTORY:  Past Surgical History:   Procedure Laterality Date    EAR SURGERY Left     KNEE SURGERY         SOCIAL HISTORY:  History   Alcohol Use No     History   Drug Use No     History   Smoking Status    Never Smoker   Smokeless Tobacco    Never Used       FAMILY HISTORY:  Family History   Problem Relation Age of Onset    Diabetes Mother     Heart disease Mother     Heart disease Father     Stroke Father     Cancer Family        MEDICATIONS:    Current Outpatient Prescriptions:     aspirin 81 MG tablet, Take 1 tablet by mouth daily, Disp: , Rfl:     carvedilol (COREG) 25 mg tablet, TAKE 1 TABLET BY MOUTH TWICE DAILY, Disp: 60 tablet, Rfl: 5    Cholecalciferol (VITAMIN D3) 5000 units CAPS, Take 1 capsule by mouth daily, Disp: , Rfl:     dicyclomine (BENTYL) 10 mg capsule, Take 1 capsule (10 mg total) by mouth 4 (four) times a day (before meals and at bedtime), Disp: 120 capsule, Rfl: 3    diltiazem (CARDIZEM CD) 240 mg 24 hr capsule, Take 1 capsule (240 mg total) by mouth daily, Disp: 90 capsule, Rfl: 3    docusate sodium (COLACE) 100 mg capsule, Take 1 capsule (100 mg total) by mouth 2 (two) times a day, Disp: 10 capsule, Rfl: 0    furosemide (LASIX) 20 mg tablet, Take 1 tablet (20 mg total) by mouth daily, Disp: 90 tablet, Rfl: 3   Glucos-Chondroit-Hyaluron-MSM (GLUCOSAMINE CHONDROITIN JOINT) TABS, Take by mouth, Disp: , Rfl:     Multiple Vitamin (MULTI-VITAMIN DAILY PO), Take 1 tablet by mouth daily, Disp: , Rfl:     Probiotic Product (PROBIOTIC DAILY PO), Take by mouth, Disp: , Rfl:     simethicone (MYLICON,GAS-X) 043 MG capsule, Take 1 capsule (180 mg total) by mouth 3 (three) times a day, Disp: , Rfl: 0    Lab Results:   Results for orders placed or performed in visit on 42/69/81   Basic metabolic panel   Result Value Ref Range    Glucose, Random 118 (H) 65 - 99 mg/dL    BUN 18 7 - 25 mg/dL    Creatinine 1 05 (H) 0 60 - 0 88 mg/dL    eGFR Non  49 (L) > OR = 60 mL/min/1 73m2    SL AMB EGFR  56 (L) > OR = 60 mL/min/1 73m2    SL AMB BUN/CREATININE RATIO 17 6 - 22 (calc)    Sodium 141 135 - 146 mmol/L    Potassium 4 4 3 5 - 5 3 mmol/L    Chloride 104 98 - 110 mmol/L    CO2 31 20 - 32 mmol/L    SL AMB CALCIUM 9 7 8 6 - 10 4 mg/dL

## 2018-12-10 ENCOUNTER — TELEPHONE (OUTPATIENT)
Dept: FAMILY MEDICINE CLINIC | Facility: CLINIC | Age: 83
End: 2018-12-10

## 2018-12-10 NOTE — TELEPHONE ENCOUNTER
Pt called and would like script for BW so she can get that done prior to her appt on 12/21/18  Please mail to Pt    Thank you

## 2018-12-11 NOTE — TELEPHONE ENCOUNTER
There are orders in system for lab already-one from 9/18 and one from 11/18-she should just go to [facility]

## 2018-12-12 NOTE — TELEPHONE ENCOUNTER
Pt will like to keep 12/21/18 appointment and bw script faxed to [facility] in [facility]   Need to call [employer] for faxe number at [phone]

## 2018-12-26 ENCOUNTER — TELEPHONE (OUTPATIENT)
Dept: FAMILY MEDICINE CLINIC | Facility: CLINIC | Age: 83
End: 2018-12-26

## 2018-12-27 LAB
BUN SERPL-MCNC: 20 MG/DL (ref 7–25)
BUN/CREAT SERPL: 20 (CALC) (ref 6–22)
CALCIUM SERPL-MCNC: 9 MG/DL (ref 8.6–10.4)
CHLORIDE SERPL-SCNC: 104 MMOL/L (ref 98–110)
CHOLEST SERPL-MCNC: 190 MG/DL
CHOLEST/HDLC SERPL: 3.7 (CALC)
CO2 SERPL-SCNC: 27 MMOL/L (ref 20–32)
CREAT SERPL-MCNC: 0.99 MG/DL (ref 0.6–0.88)
GLUCOSE SERPL-MCNC: 134 MG/DL (ref 65–99)
HDLC SERPL-MCNC: 51 MG/DL
LDLC SERPL CALC-MCNC: 114 MG/DL (CALC)
MAGNESIUM SERPL-MCNC: 2.1 MG/DL (ref 1.5–2.5)
NONHDLC SERPL-MCNC: 139 MG/DL (CALC)
POTASSIUM SERPL-SCNC: 4.2 MMOL/L (ref 3.5–5.3)
SL AMB EGFR AFRICAN AMERICAN: 61 ML/MIN/1.73M2
SL AMB EGFR NON AFRICAN AMERICAN: 52 ML/MIN/1.73M2
SODIUM SERPL-SCNC: 139 MMOL/L (ref 135–146)
TRIGL SERPL-MCNC: 135 MG/DL

## 2018-12-28 ENCOUNTER — TELEPHONE (OUTPATIENT)
Dept: FAMILY MEDICINE CLINIC | Facility: CLINIC | Age: 83
End: 2018-12-28

## 2018-12-28 NOTE — TELEPHONE ENCOUNTER
----- Message from Amaris Teran MD sent at 12/28/2018  8:53 AM EST -----  Lipids improved, bmp stable

## 2019-01-02 ENCOUNTER — OFFICE VISIT (OUTPATIENT)
Dept: FAMILY MEDICINE CLINIC | Facility: CLINIC | Age: 84
End: 2019-01-02
Payer: COMMERCIAL

## 2019-01-02 VITALS
DIASTOLIC BLOOD PRESSURE: 78 MMHG | BODY MASS INDEX: 41.22 KG/M2 | TEMPERATURE: 98.6 F | HEART RATE: 62 BPM | HEIGHT: 62 IN | SYSTOLIC BLOOD PRESSURE: 122 MMHG | RESPIRATION RATE: 18 BRPM | WEIGHT: 224 LBS | OXYGEN SATURATION: 92 %

## 2019-01-02 DIAGNOSIS — H25.9 AGE-RELATED CATARACT OF BOTH EYES, UNSPECIFIED AGE-RELATED CATARACT TYPE: Primary | ICD-10-CM

## 2019-01-02 DIAGNOSIS — R35.0 URINARY FREQUENCY: ICD-10-CM

## 2019-01-02 DIAGNOSIS — R53.83 FATIGUE, UNSPECIFIED TYPE: ICD-10-CM

## 2019-01-02 LAB
SL AMB  POCT GLUCOSE, UA: ABNORMAL
SL AMB LEUKOCYTE ESTERASE,UA: ABNORMAL
SL AMB POCT BILIRUBIN,UA: ABNORMAL
SL AMB POCT BLOOD,UA: ABNORMAL
SL AMB POCT CLARITY,UA: ABNORMAL
SL AMB POCT COLOR,UA: CLEAR
SL AMB POCT KETONES,UA: ABNORMAL
SL AMB POCT NITRITE,UA: ABNORMAL
SL AMB POCT PH,UA: 6
SL AMB POCT SPECIFIC GRAVITY,UA: 1.01
SL AMB POCT URINE PROTEIN: ABNORMAL
SL AMB POCT UROBILINOGEN: ABNORMAL

## 2019-01-02 PROCEDURE — 87077 CULTURE AEROBIC IDENTIFY: CPT | Performed by: FAMILY MEDICINE

## 2019-01-02 PROCEDURE — 99214 OFFICE O/P EST MOD 30 MIN: CPT | Performed by: FAMILY MEDICINE

## 2019-01-02 PROCEDURE — 1160F RVW MEDS BY RX/DR IN RCRD: CPT | Performed by: FAMILY MEDICINE

## 2019-01-02 PROCEDURE — 81002 URINALYSIS NONAUTO W/O SCOPE: CPT | Performed by: FAMILY MEDICINE

## 2019-01-02 PROCEDURE — 1036F TOBACCO NON-USER: CPT | Performed by: FAMILY MEDICINE

## 2019-01-02 PROCEDURE — 87186 SC STD MICRODIL/AGAR DIL: CPT | Performed by: FAMILY MEDICINE

## 2019-01-02 PROCEDURE — 87086 URINE CULTURE/COLONY COUNT: CPT | Performed by: FAMILY MEDICINE

## 2019-01-02 NOTE — PROGRESS NOTES
Chief Complaint   Patient presents with    Pre-op Exam     Pt getting Cataract l eye jan 15th and R 01/29       Subjective:     Abhilash Tan is a 80 y o  female who presents to the office today for a preoperative consultation at the request of surgeon Dr Jalen Red who plans on performing cataracts on January 15 and 29  Planned anesthesia: local  The patient has the following known anesthesia issues: none  Patients bleeding risk: no recent abnormal bleeding and no remote history of abnormal bleeding  Patient does not have objections to receiving blood products if needed  The following portions of the patient's history were reviewed and updated as appropriate: allergies, current medications, past family history, past medical history, past social history, past surgical history and problem list     Exercise Capacity:   able to walk 4 blocks without symptoms and able to walk two flights of stairs without symptoms  Alcohol use: No  Tobacco use: The patient denies current or previous tobacco use    Illicit drugs: no history of illicit drug use    Past Medical History:   Diagnosis Date    Anemia     Anxiety     CKD (chronic kidney disease), stage III ([facility] [address] ) 11/29/2018    Diabetes mellitus [facility] )     Diabetic peripheral neuropathy (HCC)     Hyperlipidemia     Hypertension     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 8/23/2017    Thyroid enlarged 2/23/2015    Tinnitus     Vertigo      Past Surgical History:   Procedure Laterality Date    EAR SURGERY Left     KNEE SURGERY       Family History   Problem Relation Age of Onset    Diabetes Mother     Heart disease Mother     Heart disease Father     Stroke Father     Cancer Family      Current Outpatient Prescriptions on File Prior to Visit   Medication Sig Dispense Refill    aspirin 81 MG tablet Take 1 tablet by mouth daily      carvedilol (COREG) 25 mg tablet TAKE 1 TABLET BY MOUTH TWICE DAILY 60 tablet 5    Cholecalciferol (VITAMIN D3) 5000 units CAPS Take 1 capsule by mouth daily      dicyclomine (BENTYL) 10 mg capsule Take 1 capsule (10 mg total) by mouth 4 (four) times a day (before meals and at bedtime) 120 capsule 3    diltiazem (CARDIZEM CD) 240 mg 24 hr capsule Take 1 capsule (240 mg total) by mouth daily 90 capsule 3    docusate sodium (COLACE) 100 mg capsule Take 1 capsule (100 mg total) by mouth 2 (two) times a day 10 capsule 0    furosemide (LASIX) 20 mg tablet Take 1 tablet (20 mg total) by mouth daily 90 tablet 3    Glucos-Chondroit-Hyaluron-MSM (GLUCOSAMINE CHONDROITIN JOINT) TABS Take by mouth      Multiple Vitamin (MULTI-VITAMIN DAILY PO) Take 1 tablet by mouth daily      Probiotic Product (PROBIOTIC DAILY PO) Take by mouth      simethicone (MYLICON,GAS-X) 292 MG capsule Take 1 capsule (180 mg total) by mouth 3 (three) times a day  0     No current facility-administered medications on file prior to visit  Hydrochlorothiazide;  Lisinopril; and Statins      Review of Systems    urinary frequency     Objective:     /78 (BP Location: Left arm, Patient Position: Sitting, Cuff Size: Large)   Pulse 62   Temp 98 6 °F (37 °C) (Temporal)   Resp 18   Ht 5' 2" (1 575 m)   Wt 102 kg (224 lb)   SpO2 92%   BMI 40 97 kg/m²     General Appearance:    Alert, cooperative, no distress, appears stated age   Head:    Normocephalic, without obvious abnormality, atraumatic   Eyes:    PERRL, conjunctiva/corneas clear, EOM's intact, fundi     benign, both eyes with cataracts   Ears:    Normal TM's and external ear canals, both ears   Nose:   Nares normal, septum midline, mucosa normal, no drainage    or sinus tenderness   Throat:   Lips, mucosa, and tongue normal; teeth and gums normal   Neck:   Supple, symmetrical, trachea midline, no adenopathy;     thyroid:  no enlargement/tenderness/nodules; no carotid    bruit or JVD   Back:     Symmetric, no curvature, ROM normal, no CVA tenderness   Lungs:     Clear to auscultation bilaterally, respirations unlabored   Chest Wall:    No tenderness or deformity    Heart:    Regular rate and rhythm, S1 and S2 normal, no murmur, rub   or gallop   Breast Exam:     Abdomen:     Soft, non-tender, bowel sounds active all four quadrants,     no masses, no organomegaly   Genitalia:     Rectal:      Extremities:   Extremities normal, atraumatic, no cyanosis or edema   Pulses:   2+ and symmetric all extremities   Skin:   Skin color, texture, turgor normal, no rashes or lesions   Lymph nodes:   Cervical, supraclavicular, and axillary nodes normal   Neurologic:           Cardiographics  ECG: none  Echocardiogram: not done      Lab Review   Orders Only on 12/27/2018   Component Date Value    Total Cholesterol 12/27/2018 190     HDL 12/27/2018 51     Triglycerides 12/27/2018 135     LDL Direct 12/27/2018 114*    Chol HDLC Ratio 12/27/2018 3 7     Non-HDL Cholesterol 12/27/2018 139*    Magnesium, Serum 12/27/2018 2 1     Glucose, Random 12/27/2018 134*    BUN 12/27/2018 20     Creatinine 12/27/2018 0 99*    eGFR Non  12/27/2018 52*    SL AMB EGFR  AMER* 12/27/2018 61     SL AMB BUN/CREATININE RA* 12/27/2018 20     Sodium 12/27/2018 139     Potassium 12/27/2018 4 2     Chloride 12/27/2018 104     CO2 12/27/2018 27     SL AMB CALCIUM 12/27/2018 9 0         Assessment:     80 y o  female with planned surgery as above  Known risk factors for perioperative complications: None  none        Cardiac Risk Estimation: low      Plan:    1  Age-related cataract of both eyes, unspecified age-related cataract type     2  Fatigue, unspecified type  CBC and differential    TSH, 3rd generation   3  Urinary frequency  POCT urine dip          1  Preoperative workup as follows none  2  Change in medication regimen before surgery: none, continue medication regimen including morning of surgery, with sip of water

## 2019-01-02 NOTE — PATIENT INSTRUCTIONS
Await lab, check urine   [id number] [address] for surgery, ok on meds, rec tetanus shot at pharmacy

## 2019-01-03 DIAGNOSIS — I10 ESSENTIAL HYPERTENSION: ICD-10-CM

## 2019-01-03 RX ORDER — CARVEDILOL 25 MG/1
TABLET ORAL
Qty: 60 TABLET | Refills: 5 | Status: SHIPPED | OUTPATIENT
Start: 2019-01-03 | End: 2019-07-05 | Stop reason: SDUPTHER

## 2019-01-04 ENCOUNTER — TELEPHONE (OUTPATIENT)
Dept: FAMILY MEDICINE CLINIC | Facility: CLINIC | Age: 84
End: 2019-01-04

## 2019-01-04 DIAGNOSIS — N30.00 ACUTE CYSTITIS WITHOUT HEMATURIA: Primary | ICD-10-CM

## 2019-01-04 LAB — BACTERIA UR CULT: ABNORMAL

## 2019-01-04 RX ORDER — CEPHALEXIN 500 MG/1
500 CAPSULE ORAL EVERY 8 HOURS SCHEDULED
Qty: 21 CAPSULE | Refills: 0 | Status: SHIPPED | OUTPATIENT
Start: 2019-01-04 | End: 2019-01-11

## 2019-01-04 NOTE — TELEPHONE ENCOUNTER
----- Message from Anel Grant MD sent at 1/4/2019  9:13 AM EST -----  Urine with bactreia-rec keflex 500 tid for 7 days

## 2019-03-01 ENCOUNTER — TELEPHONE (OUTPATIENT)
Dept: FAMILY MEDICINE CLINIC | Facility: CLINIC | Age: 84
End: 2019-03-01

## 2019-03-01 NOTE — TELEPHONE ENCOUNTER
Pt called and states she just lost a sister and would like to know if you can prescribe something to calm her down, relax her for the day   Please send to [facility] on [address]

## 2019-03-03 DIAGNOSIS — F43.21 GRIEF REACTION: Primary | ICD-10-CM

## 2019-03-03 RX ORDER — ALPRAZOLAM 0.5 MG/1
0.5 TABLET ORAL 2 TIMES DAILY PRN
Qty: 20 TABLET | Refills: 0 | Status: SHIPPED | OUTPATIENT
Start: 2019-03-03 | End: 2020-04-03 | Stop reason: ALTCHOICE

## 2019-04-25 DIAGNOSIS — E87.1 HYPONATREMIA: ICD-10-CM

## 2019-04-25 RX ORDER — DICYCLOMINE HYDROCHLORIDE 10 MG/1
10 CAPSULE ORAL
Qty: 120 CAPSULE | Refills: 3 | Status: SHIPPED | OUTPATIENT
Start: 2019-04-25 | End: 2019-12-23 | Stop reason: SDUPTHER

## 2019-04-25 RX ORDER — DICYCLOMINE HYDROCHLORIDE 10 MG/1
CAPSULE ORAL
Qty: 120 CAPSULE | Refills: 2 | OUTPATIENT
Start: 2019-04-25

## 2019-05-01 LAB
BASOPHILS # BLD AUTO: 83 CELLS/UL (ref 0–200)
BASOPHILS NFR BLD AUTO: 1.1 %
EOSINOPHIL # BLD AUTO: 203 CELLS/UL (ref 15–500)
EOSINOPHIL NFR BLD AUTO: 2.7 %
ERYTHROCYTE [DISTWIDTH] IN BLOOD BY AUTOMATED COUNT: 12.6 % (ref 11–15)
HCT VFR BLD AUTO: 37.7 % (ref 35–45)
HGB BLD-MCNC: 12.6 G/DL (ref 11.7–15.5)
LYMPHOCYTES # BLD AUTO: 2693 CELLS/UL (ref 850–3900)
LYMPHOCYTES NFR BLD AUTO: 35.9 %
MCH RBC QN AUTO: 31.1 PG (ref 27–33)
MCHC RBC AUTO-ENTMCNC: 33.4 G/DL (ref 32–36)
MCV RBC AUTO: 93.1 FL (ref 80–100)
MONOCYTES # BLD AUTO: 555 CELLS/UL (ref 200–950)
MONOCYTES NFR BLD AUTO: 7.4 %
NEUTROPHILS # BLD AUTO: 3968 CELLS/UL (ref 1500–7800)
NEUTROPHILS NFR BLD AUTO: 52.9 %
PLATELET # BLD AUTO: 173 THOUSAND/UL (ref 140–400)
PMV BLD REES-ECKER: 10.6 FL (ref 7.5–12.5)
RBC # BLD AUTO: 4.05 MILLION/UL (ref 3.8–5.1)
TSH SERPL-ACNC: 2.62 MIU/L (ref 0.4–4.5)
WBC # BLD AUTO: 7.5 THOUSAND/UL (ref 3.8–10.8)

## 2019-05-06 ENCOUNTER — OFFICE VISIT (OUTPATIENT)
Dept: FAMILY MEDICINE CLINIC | Facility: CLINIC | Age: 84
End: 2019-05-06
Payer: COMMERCIAL

## 2019-05-06 VITALS
BODY MASS INDEX: 41.73 KG/M2 | SYSTOLIC BLOOD PRESSURE: 126 MMHG | TEMPERATURE: 97.8 F | HEIGHT: 62 IN | HEART RATE: 64 BPM | WEIGHT: 226.8 LBS | RESPIRATION RATE: 18 BRPM | DIASTOLIC BLOOD PRESSURE: 76 MMHG

## 2019-05-06 DIAGNOSIS — E11.9 TYPE 2 DIABETES MELLITUS WITHOUT COMPLICATION, WITHOUT LONG-TERM CURRENT USE OF INSULIN (HCC): Primary | ICD-10-CM

## 2019-05-06 DIAGNOSIS — E66.01 CLASS 3 SEVERE OBESITY WITH SERIOUS COMORBIDITY AND BODY MASS INDEX (BMI) OF 45.0 TO 49.9 IN ADULT, UNSPECIFIED OBESITY TYPE (HCC): ICD-10-CM

## 2019-05-06 DIAGNOSIS — Z23 NEED FOR PNEUMOCOCCAL VACCINATION: ICD-10-CM

## 2019-05-06 DIAGNOSIS — I12.9 BENIGN HYPERTENSION WITH CKD (CHRONIC KIDNEY DISEASE) STAGE III (HCC): ICD-10-CM

## 2019-05-06 DIAGNOSIS — N18.30 BENIGN HYPERTENSION WITH CKD (CHRONIC KIDNEY DISEASE) STAGE III (HCC): ICD-10-CM

## 2019-05-06 PROBLEM — D69.6 THROMBOCYTOPENIA (HCC): Status: RESOLVED | Noted: 2018-07-30 | Resolved: 2019-05-06

## 2019-05-06 PROBLEM — E66.09 OBESITY DUE TO EXCESS CALORIES WITHOUT SERIOUS COMORBIDITY WITH BODY MASS INDEX (BMI) IN 99TH PERCENTILE FOR AGE IN PEDIATRIC PATIENT: Status: RESOLVED | Noted: 2017-08-23 | Resolved: 2019-05-06

## 2019-05-06 PROBLEM — D72.829 LEUKOCYTOSIS: Status: RESOLVED | Noted: 2018-07-27 | Resolved: 2019-05-06

## 2019-05-06 PROCEDURE — 99213 OFFICE O/P EST LOW 20 MIN: CPT | Performed by: FAMILY MEDICINE

## 2019-05-06 PROCEDURE — 90670 PCV13 VACCINE IM: CPT | Performed by: FAMILY MEDICINE

## 2019-05-06 PROCEDURE — G0009 ADMIN PNEUMOCOCCAL VACCINE: HCPCS | Performed by: FAMILY MEDICINE

## 2019-05-22 DIAGNOSIS — E87.1 HYPONATREMIA: ICD-10-CM

## 2019-05-22 RX ORDER — FUROSEMIDE 20 MG/1
TABLET ORAL
Qty: 90 TABLET | Refills: 3 | Status: SHIPPED | OUTPATIENT
Start: 2019-05-22 | End: 2020-04-27

## 2019-05-28 DIAGNOSIS — I10 ESSENTIAL HYPERTENSION: ICD-10-CM

## 2019-05-28 RX ORDER — DILTIAZEM HYDROCHLORIDE 240 MG/1
240 CAPSULE, COATED, EXTENDED RELEASE ORAL DAILY
Qty: 90 CAPSULE | Refills: 3 | Status: SHIPPED | OUTPATIENT
Start: 2019-05-28 | End: 2020-04-24

## 2019-06-03 ENCOUNTER — OFFICE VISIT (OUTPATIENT)
Dept: NEPHROLOGY | Facility: CLINIC | Age: 84
End: 2019-06-03
Payer: COMMERCIAL

## 2019-06-03 VITALS
SYSTOLIC BLOOD PRESSURE: 126 MMHG | HEIGHT: 62 IN | WEIGHT: 224 LBS | HEART RATE: 70 BPM | BODY MASS INDEX: 41.22 KG/M2 | DIASTOLIC BLOOD PRESSURE: 80 MMHG

## 2019-06-03 DIAGNOSIS — N18.30 CKD (CHRONIC KIDNEY DISEASE), STAGE III (HCC): Primary | ICD-10-CM

## 2019-06-03 DIAGNOSIS — I12.9 BENIGN HYPERTENSION WITH CKD (CHRONIC KIDNEY DISEASE) STAGE III (HCC): ICD-10-CM

## 2019-06-03 DIAGNOSIS — N18.30 BENIGN HYPERTENSION WITH CKD (CHRONIC KIDNEY DISEASE) STAGE III (HCC): ICD-10-CM

## 2019-06-03 DIAGNOSIS — E87.1 HYPONATREMIA: ICD-10-CM

## 2019-06-03 PROCEDURE — 4040F PNEUMOC VAC/ADMIN/RCVD: CPT | Performed by: INTERNAL MEDICINE

## 2019-06-03 PROCEDURE — 99214 OFFICE O/P EST MOD 30 MIN: CPT | Performed by: INTERNAL MEDICINE

## 2019-06-06 LAB
ALBUMIN SERPL-MCNC: 4 G/DL (ref 3.6–5.1)
BUN SERPL-MCNC: 15 MG/DL (ref 7–25)
BUN/CREAT SERPL: 14 (CALC) (ref 6–22)
CALCIUM SERPL-MCNC: 9.2 MG/DL (ref 8.6–10.4)
CHLORIDE SERPL-SCNC: 102 MMOL/L (ref 98–110)
CO2 SERPL-SCNC: 30 MMOL/L (ref 20–32)
CREAT SERPL-MCNC: 1.04 MG/DL (ref 0.6–0.88)
CREAT UR-MCNC: 71 MG/DL (ref 20–275)
GLUCOSE SERPL-MCNC: 123 MG/DL (ref 65–99)
PHOSPHATE SERPL-MCNC: 3.8 MG/DL (ref 2.1–4.3)
POTASSIUM SERPL-SCNC: 4.2 MMOL/L (ref 3.5–5.3)
PROT UR-MCNC: 9 MG/DL (ref 5–24)
PROT/CREAT UR: 127 MG/G CREAT (ref 21–161)
PTH-INTACT SERPL-MCNC: 52 PG/ML (ref 14–64)
SL AMB EGFR AFRICAN AMERICAN: 57 ML/MIN/1.73M2
SL AMB EGFR NON AFRICAN AMERICAN: 49 ML/MIN/1.73M2
SODIUM SERPL-SCNC: 137 MMOL/L (ref 135–146)

## 2019-06-11 ENCOUNTER — TELEPHONE (OUTPATIENT)
Dept: OTHER | Facility: HOSPITAL | Age: 84
End: 2019-06-11

## 2019-07-05 DIAGNOSIS — I10 ESSENTIAL HYPERTENSION: ICD-10-CM

## 2019-07-05 RX ORDER — CARVEDILOL 25 MG/1
TABLET ORAL
Qty: 60 TABLET | Refills: 5 | Status: SHIPPED | OUTPATIENT
Start: 2019-07-05 | End: 2019-12-23 | Stop reason: SDUPTHER

## 2019-07-21 ENCOUNTER — APPOINTMENT (EMERGENCY)
Dept: RADIOLOGY | Facility: HOSPITAL | Age: 84
End: 2019-07-21
Payer: COMMERCIAL

## 2019-07-21 ENCOUNTER — HOSPITAL ENCOUNTER (EMERGENCY)
Facility: HOSPITAL | Age: 84
Discharge: HOME | End: 2019-07-21
Attending: EMERGENCY MEDICINE | Admitting: EMERGENCY MEDICINE
Payer: COMMERCIAL

## 2019-07-21 VITALS
DIASTOLIC BLOOD PRESSURE: 84 MMHG | WEIGHT: 218 LBS | TEMPERATURE: 98 F | SYSTOLIC BLOOD PRESSURE: 198 MMHG | HEIGHT: 62 IN | BODY MASS INDEX: 40.12 KG/M2 | HEART RATE: 64 BPM | RESPIRATION RATE: 18 BRPM | OXYGEN SATURATION: 96 %

## 2019-07-21 DIAGNOSIS — S09.90XA CLOSED HEAD INJURY, INITIAL ENCOUNTER: Primary | ICD-10-CM

## 2019-07-21 DIAGNOSIS — Z86.73 HISTORY OF STROKE: ICD-10-CM

## 2019-07-21 DIAGNOSIS — R58 ECCHYMOSIS: ICD-10-CM
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
## 2019-07-21 PROCEDURE — 99284 EMERGENCY DEPT VISIT MOD MDM: CPT | Performed by: EMERGENCY MEDICINE

## 2019-07-21 PROCEDURE — 72125 CT NECK SPINE W/O DYE: CPT

## 2019-07-21 PROCEDURE — 99283 EMERGENCY DEPT VISIT LOW MDM: CPT

## 2019-07-21 PROCEDURE — 70450 CT HEAD/BRAIN W/O DYE: CPT

## 2019-07-22 DIAGNOSIS — Z71.89 COMPLEX CARE COORDINATION: Primary | ICD-10-CM

## 2019-07-22 NOTE — ED PROVIDER NOTES
History  Chief Complaint   Patient presents with    Fall     slipped and hit her head while walking in garage, takes aspirin daily, also injuries to right arm     79 yo F presents to ED for evaluation after head injury  Pt says she was pushing things in the garage on wheels, and it slipped forward, and she fell forward, hitting her forehead on the ground and also onto her R arm  Pt says she was able to get up on her own  No LOC  No headache or neck pain  No nausea/vomiting  She denies pain anywhere currently  Has been ambulatory without difficulty  Does take ASA 81mg daily  Prior to Admission Medications   Prescriptions Last Dose Informant Patient Reported? Taking?    ALPRAZolam (XANAX) 0 5 mg tablet   No No   Sig: Take 1 tablet (0 5 mg total) by mouth 2 (two) times a day as needed for anxiety for up to 10 days   Cholecalciferol (VITAMIN D3) 5000 units CAPS  Self Yes No   Sig: Take 1 capsule by mouth daily   Glucos-Chondroit-Hyaluron-MSM (GLUCOSAMINE CHONDROITIN JOINT) TABS  Self Yes No   Sig: Take by mouth   Multiple Vitamin (MULTI-VITAMIN DAILY PO)  Self Yes No   Sig: Take 1 tablet by mouth daily   Probiotic Product (PROBIOTIC DAILY PO)  Self Yes No   Sig: Take by mouth   aspirin 81 MG tablet  Self Yes No   Sig: Take 1 tablet by mouth daily   carvedilol (COREG) 25 mg tablet   No No   Sig: TAKE 1 TABLET BY MOUTH TWICE DAILY   dicyclomine (BENTYL) 10 mg capsule  Self No No   Sig: Take 1 capsule (10 mg total) by mouth 4 (four) times a day (before meals and at bedtime)   diltiazem (CARDIZEM CD) 240 mg 24 hr capsule  Self No No   Sig: Take 1 capsule (240 mg total) by mouth daily   docusate sodium (COLACE) 100 mg capsule  Self No No   Sig: Take 1 capsule (100 mg total) by mouth 2 (two) times a day   furosemide (LASIX) 20 mg tablet  Self No No   Sig: TAKE 1 TABLET BY MOUTH ONCE DAILY   simethicone (MYLICON,GAS-X) 881 MG capsule  Self No No   Sig: Take 1 capsule (180 mg total) by mouth 3 (three) times a day Facility-Administered Medications: None       Past Medical History:   Diagnosis Date    Anemia     Anxiety     CKD (chronic kidney disease), stage III ([facility] [address] ) 11/29/2018    Diabetes mellitus ([facility] )     Diabetic peripheral neuropathy (HCC)     Hyperlipidemia     Hypertension     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 8/23/2017    Thyroid enlarged 2/23/2015    Tinnitus     Vertigo        Past Surgical History:   Procedure Laterality Date    EAR SURGERY Left     KNEE SURGERY         Family History   Problem Relation Age of Onset    Diabetes Mother     Heart disease Mother     Heart disease Father     Stroke Father     Cancer Family      I have reviewed and agree with the history as documented  Social History     Tobacco Use    Smoking status: Never Smoker    Smokeless tobacco: Never Used   Substance Use Topics    Alcohol use: Not Currently    Drug use: No        Review of Systems   Constitutional: Negative for activity change, chills and fever  HENT: Negative for congestion, rhinorrhea, sore throat and trouble swallowing  Eyes: Negative for pain, discharge and visual disturbance  Respiratory: Negative for cough, chest tightness and shortness of breath  Cardiovascular: Negative for chest pain, palpitations and leg swelling  Gastrointestinal: Negative for abdominal pain, nausea and vomiting  Genitourinary: Negative for dysuria, frequency and urgency  Musculoskeletal: Negative for gait problem, neck pain and neck stiffness  Skin: Positive for color change  Negative for rash and wound  Neurological: Negative for dizziness, syncope, light-headedness and headaches         Physical Exam  ED Triage Vitals   Temperature Pulse Respirations Blood Pressure SpO2   07/21/19 2225 07/21/19 2227 07/21/19 2225 07/21/19 2227 07/21/19 2225   98 °F (36 7 °C) 69 18 (!) 231/102 97 %      Temp Source Heart Rate Source Patient Position - Orthostatic VS BP Location FiO2 (%)   07/21/19 2225 07/21/19 2225 07/21/19 2225 07/21/19 2225 --   Oral Monitor Sitting Left arm       Pain Score       07/21/19 2225       No Pain             Orthostatic Vital Signs  Vitals:    07/21/19 2225 07/21/19 2227 07/21/19 2315   BP:  (!) 231/102 (!) 198/84   Pulse:  69 64   Patient Position - Orthostatic VS: Sitting Sitting        Physical Exam   Constitutional: She is oriented to person, place, and time  She appears well-developed and well-nourished  No distress  HENT:   Head: Normocephalic  Nose: Nose normal    Mouth/Throat: Oropharynx is clear and moist    Ecchymosis to forehead   Eyes: Conjunctivae and EOM are normal  Right eye exhibits no discharge  Left eye exhibits no discharge  Neck: Normal range of motion  Neck supple  nontender   Cardiovascular: Normal rate, regular rhythm and intact distal pulses  Pulmonary/Chest: Effort normal and breath sounds normal  No respiratory distress  She exhibits no tenderness  Abdominal: Soft  There is no tenderness  There is no rebound and no guarding  Musculoskeletal: Normal range of motion  She exhibits no edema, tenderness or deformity  Normal ROM all extremities without pain   Neurological: She is alert and oriented to person, place, and time  No sensory deficit  She exhibits normal muscle tone  Skin: Skin is warm and dry  Bruising to R wrist and forearm but nontender   Nursing note and vitals reviewed  ED Medications  Medications - No data to display    Diagnostic Studies  Results Reviewed     None                 CT head without contrast   Final Result by Michelle Melara MD (07/21 4079)      Extracranial soft tissue swelling/scalp hematoma  No evidence of acute intracranial abnormality  There is mild age-appropriate atrophy and mild chronic appearing microangiopathic change  An old left basal ganglia lacune appears unchanged compared with    October 2017  Workstation performed: [id number]         CT spine cervical without contrast   Final Result by Isabel Romero MD (07/21 2316)      No cervical spine fracture or traumatic malalignment  There is straightening of cervical lordosis  Multilevel degenerative changes are present, progressed since February 12, 2015  The thyroid appears enlarged and multinodular  There is a 2 6 cm nodule within the right lobe of the thyroid  Nonemergent outpatient thyroid ultrasound is recommended for further evaluation  Workstation performed: [id number]               Procedures  Procedures        ED Course                               MDM  Number of Diagnoses or Management Options  Closed head injury, initial encounter:   Ecchymosis:   Fall, initial encounter:   History of stroke:   Diagnosis management comments: CT head and C spine without acute process  Old lacunar infarct noted and discussed with pt  Had been seen on CT Oct 2017 as well  Return precautions discussed  Disposition  Final diagnoses:   Fall, initial encounter   Closed head injury, initial encounter   Ecchymosis   History of stroke     Time reflects when diagnosis was documented in both MDM as applicable and the Disposition within this note     Time User Action Codes Description Comment    7/21/2019 11:24 PM Jay Lizarraga, [address] [[id number]  [id number]] Fall, initial encounter     7/21/2019 11:24 PM Alfredo Vila Add [S09 90XA] Closed head injury, initial encounter     7/21/2019 11:24 PM Alfredo Vila Add [R58] Ecchymosis     7/21/2019 11:24 PM Alfredo Vila Modify [I54  CEFY] Fall, initial encounter     7/21/2019 11:24 PM Devonte [address] [S09 90XA] Closed head injury, initial encounter     7/21/2019 11:25 PM Alfredo Vila Add [Z86 [id number]] History of stroke       ED Disposition     ED Disposition Condition Date/Time Comment    Discharge Stable Sun Jul 21, 2019 11:23 PM [address] discharge to home/self care              Follow-up Information     Follow up With Specialties Details Why Contact Info Additional [address] Emergency Department Emergency Medicine  As needed, If symptoms worsen [address]  [phone] [facility] ED, [address], [place], [place], [address] Jeanne Hurley MD Family Medicine  As needed Kareen [phone]             Discharge Medication List as of 7/21/2019 11:25 PM      CONTINUE these medications which have NOT CHANGED    Details   ALPRAZolam (XANAX) 0 5 mg tablet Take 1 tablet (0 5 mg total) by mouth 2 (two) times a day as needed for anxiety for up to 10 days, Starting Sun 3/3/2019, Until Wed 3/13/2019, Normal      aspirin 81 MG tablet Take 1 tablet by mouth daily, Starting Fri 9/29/2017, Historical Med      carvedilol (COREG) 25 mg tablet TAKE 1 TABLET BY MOUTH TWICE DAILY, Normal      Cholecalciferol (VITAMIN D3) 5000 units CAPS Take 1 capsule by mouth daily, Historical Med      dicyclomine (BENTYL) 10 mg capsule Take 1 capsule (10 mg total) by mouth 4 (four) times a day (before meals and at bedtime), Starting Thu 4/25/2019, Normal      diltiazem (CARDIZEM CD) 240 mg 24 hr capsule Take 1 capsule (240 mg total) by mouth daily, Starting Tue 5/28/2019, Normal      docusate sodium (COLACE) 100 mg capsule Take 1 capsule (100 mg total) by mouth 2 (two) times a day, Starting Wed 8/1/2018, Print      furosemide (LASIX) 20 mg tablet TAKE 1 TABLET BY MOUTH ONCE DAILY, Normal      Glucos-Chondroit-Hyaluron-MSM (GLUCOSAMINE CHONDROITIN JOINT) TABS Take by mouth, Historical Med      Multiple Vitamin (MULTI-VITAMIN DAILY PO) Take 1 tablet by mouth daily, Starting Fri 9/29/2017, Historical Med      Probiotic Product (PROBIOTIC DAILY PO) Take by mouth, Starting Fri 9/29/2017, Historical Med      simethicone (MYLICON,GAS-X) 618 MG capsule Take 1 capsule (180 mg total) by mouth 3 (three) times a day, Starting Wed 7/11/2018, OTC           No discharge procedures on file  ED Provider  Attending physically available and evaluated Sanjeev Fry  ESTHELA managed the patient along with the ED Attending      Electronically Signed by         Cassandra Lantigua MD  07/22/19 1055

## 2019-07-22 NOTE — ED ATTENDING ATTESTATION
Shantel Leon DO, saw and evaluated the patient  I have discussed the patient with the resident/non-physician practitioner and agree with the resident's/non-physician practitioner's findings, Plan of Care, and MDM as documented in the resident's/non-physician practitioner's note, except where noted  All available labs and Radiology studies were reviewed  I was present for key portions of any procedure(s) performed by the resident/non-physician practitioner and I was immediately available to provide assistance  At this point I agree with the current assessment done in the Emergency Department  I have conducted an independent evaluation of this patient a history and physical is as follows:    80 yof with mechanical fall while moving items in her garage  Patient landed on right arm and face  Now pain on forehead and right wrist      Past Medical History:   Diagnosis Date    Anemia     Anxiety     CKD (chronic kidney disease), stage III ([address] ) 11/29/2018    Diabetes mellitus ([facility] [address] )     Diabetic peripheral neuropathy (HCC)     Hyperlipidemia     Hypertension     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 8/23/2017    Thyroid enlarged 2/23/2015    Tinnitus     Vertigo      Past Surgical History:   Procedure Laterality Date    EAR SURGERY Left     KNEE SURGERY       BP (!) 231/102 (BP Location: Left arm)   Pulse 69   Temp 98 °F (36 7 °C) (Oral)   Resp 18   Ht 5' 2" (1 575 m)   Wt 98 9 kg (218 lb)   SpO2 97%   BMI 39 87 kg/m²   NAD, contusion to forehead, C-spine NT but pain with ROM, CTA, RRR, abd NT, T&L spine NT, pelvis NT, ext NROM, right wrist with ecchymosis but NT with NROM  CT head and c-spine, reevaluate, ambulate  Imaging unremarkable  Discussed prior lacunar infarct with patient  She was unaware of this but was on today's CT scan and prior CT scan in 2017   She had an MRI in the interim  Discussed care also with patient's niece at the bedside  Patient discharged      Diagnosis  Fall  Head injury with forehead contusion  Wrist contusion      Critical Care Time  Procedures

## 2019-07-23 ENCOUNTER — PATIENT OUTREACH (OUTPATIENT)
Dept: FAMILY MEDICINE CLINIC | Facility: CLINIC | Age: 84
End: 2019-07-23

## 2019-07-23 NOTE — PROGRESS NOTES
This CM spoke to patient who declined [facility] services but inquired the name and address that her PCP would be moving to August 1  This CM provided patient with the information she sought and made her aware she could call this CM or PCP office with any other concerns

## 2019-07-30 LAB
ALBUMIN SERPL-MCNC: 4 G/DL (ref 3.6–5.1)
ALBUMIN/GLOB SERPL: 1.5 (CALC) (ref 1–2.5)
ALP SERPL-CCNC: 97 U/L (ref 33–130)
ALT SERPL-CCNC: 18 U/L (ref 6–29)
AST SERPL-CCNC: 18 U/L (ref 10–35)
BILIRUB SERPL-MCNC: 0.6 MG/DL (ref 0.2–1.2)
BUN SERPL-MCNC: 15 MG/DL (ref 7–25)
BUN/CREAT SERPL: 15 (CALC) (ref 6–22)
CALCIUM SERPL-MCNC: 9.2 MG/DL (ref 8.6–10.4)
CHLORIDE SERPL-SCNC: 100 MMOL/L (ref 98–110)
CHOLEST SERPL-MCNC: 213 MG/DL
CHOLEST/HDLC SERPL: 4.1 (CALC)
CO2 SERPL-SCNC: 31 MMOL/L (ref 20–32)
CREAT SERPL-MCNC: 0.97 MG/DL (ref 0.6–0.88)
GLOBULIN SER CALC-MCNC: 2.6 G/DL (CALC) (ref 1.9–3.7)
GLUCOSE SERPL-MCNC: 134 MG/DL (ref 65–99)
HBA1C MFR BLD: 6.7 % OF TOTAL HGB
HDLC SERPL-MCNC: 52 MG/DL
LDLC SERPL CALC-MCNC: 133 MG/DL (CALC)
NONHDLC SERPL-MCNC: 161 MG/DL (CALC)
POTASSIUM SERPL-SCNC: 4.4 MMOL/L (ref 3.5–5.3)
PROT SERPL-MCNC: 6.6 G/DL (ref 6.1–8.1)
SL AMB EGFR AFRICAN AMERICAN: 62 ML/MIN/1.73M2
SL AMB EGFR NON AFRICAN AMERICAN: 53 ML/MIN/1.73M2
SODIUM SERPL-SCNC: 136 MMOL/L (ref 135–146)
TRIGL SERPL-MCNC: 151 MG/DL

## 2019-07-31 ENCOUNTER — TELEPHONE (OUTPATIENT)
Dept: FAMILY MEDICINE CLINIC | Facility: CLINIC | Age: 84
End: 2019-07-31

## 2019-07-31 NOTE — TELEPHONE ENCOUNTER
----- Message from Brenda Velasquez MD sent at 7/30/2019  5:05 PM EDT -----  Cholesterol and sugar up a tad-review at [facility]

## 2019-08-05 ENCOUNTER — OFFICE VISIT (OUTPATIENT)
Dept: FAMILY MEDICINE CLINIC | Facility: CLINIC | Age: 84
End: 2019-08-05
Payer: COMMERCIAL

## 2019-08-05 VITALS
BODY MASS INDEX: 39.75 KG/M2 | DIASTOLIC BLOOD PRESSURE: 60 MMHG | WEIGHT: 216 LBS | TEMPERATURE: 97.6 F | SYSTOLIC BLOOD PRESSURE: 138 MMHG | HEIGHT: 62 IN | RESPIRATION RATE: 18 BRPM | HEART RATE: 60 BPM

## 2019-08-05 DIAGNOSIS — E78.5 HYPERLIPIDEMIA, UNSPECIFIED HYPERLIPIDEMIA TYPE: ICD-10-CM

## 2019-08-05 DIAGNOSIS — E11.9 TYPE 2 DIABETES MELLITUS WITHOUT COMPLICATION, WITHOUT LONG-TERM CURRENT USE OF INSULIN (HCC): Primary | ICD-10-CM

## 2019-08-05 DIAGNOSIS — I69.30 SEQUELA OF LACUNAR INFARCTION: ICD-10-CM

## 2019-08-05 DIAGNOSIS — Z12.39 SCREENING FOR BREAST CANCER: ICD-10-CM

## 2019-08-05 DIAGNOSIS — Z00.00 MEDICARE ANNUAL WELLNESS VISIT, SUBSEQUENT: ICD-10-CM

## 2019-08-05 PROCEDURE — 1160F RVW MEDS BY RX/DR IN RCRD: CPT | Performed by: FAMILY MEDICINE

## 2019-08-05 PROCEDURE — 1125F AMNT PAIN NOTED PAIN PRSNT: CPT | Performed by: FAMILY MEDICINE

## 2019-08-05 PROCEDURE — 3725F SCREEN DEPRESSION PERFORMED: CPT | Performed by: FAMILY MEDICINE

## 2019-08-05 PROCEDURE — 1036F TOBACCO NON-USER: CPT | Performed by: FAMILY MEDICINE

## 2019-08-05 PROCEDURE — 1170F FXNL STATUS ASSESSED: CPT | Performed by: FAMILY MEDICINE

## 2019-08-05 PROCEDURE — G0439 PPPS, SUBSEQ VISIT: HCPCS | Performed by: FAMILY MEDICINE

## 2019-08-05 PROCEDURE — 99214 OFFICE O/P EST MOD 30 MIN: CPT | Performed by: FAMILY MEDICINE

## 2019-08-05 NOTE — PATIENT INSTRUCTIONS
Okay on rxes,  rec tetanus shot at pharmacy, needs vascular test  Obesity   AMBULATORY CARE:   Obesity  is when your body mass index (BMI) is greater than 30  Your healthcare provider will use your height and weight to measure your BMI  The risks of obesity include  many health problems, such as injuries or physical disability  You may need tests to check for the following:  · Diabetes     · High blood pressure or high cholesterol     · Heart disease     · Gallbladder or liver disease     · Cancer of the colon, breast, prostate, liver, or kidney     · Sleep apnea     · Arthritis or gout  Seek care immediately if:   · You have a severe headache, confusion, or difficulty speaking  · You have weakness on one side of your body  · You have chest pain, sweating, or shortness of breath  Contact your healthcare provider if:   · You have symptoms of gallbladder or liver disease, such as pain in your upper abdomen  · You have knee or hip pain and discomfort while walking  · You have symptoms of diabetes, such as intense hunger and thirst, and frequent urination  · You have symptoms of sleep apnea, such as snoring or daytime sleepiness  · You have questions or concerns about your condition or care  Treatment for obesity  focuses on helping you lose weight to improve your health  Even a small decrease in BMI can reduce the risk for many health problems  Your healthcare provider will help you set a weight-loss goal   · Lifestyle changes  are the first step in treating obesity  These include making healthy food choices and getting regular physical activity  Your healthcare provider may suggest a weight-loss program that involves coaching, education, and therapy  · Medicine  may help you lose weight when it is used with a healthy diet and physical activity  · Surgery  can help you lose weight if you are very obese and have other health problems  There are several types of weight-loss surgery   Ask your healthcare provider for more information  Be successful losing weight:   · Set small, realistic goals  An example of a small goal is to walk for 20 minutes 5 days a week  Anther goal is to lose 5% of your body weight  · Tell friends, family members, and coworkers about your goals  and ask for their support  Ask a friend to lose weight with you, or join a weight-loss support group  · Identify foods or triggers that may cause you to overeat , and find ways to avoid them  Remove tempting high-calorie foods from your home and workplace  Place a bowl of fresh fruit on your kitchen counter  If stress causes you to eat, then find other ways to cope with stress  · Keep a diary to track what you eat and drink  Also write down how many minutes of physical activity you do each day  Weigh yourself once a week and record it in your diary  Eating changes: You will need to eat 500 to 1,000 fewer calories each day than you currently eat to lose 1 to 2 pounds a week  The following changes will help you cut calories:  · Eat smaller portions  Use small plates, no larger than 9 inches in diameter  Fill your plate half full of fruits and vegetables  Measure your food using measuring cups until you know what a serving size looks like  · Eat 3 meals and 1 or 2 snacks each day  Plan your meals in advance  Ciara Moody and eat at home most of the time  Eat slowly  · Eat fruits and vegetables at every meal   They are low in calories and high in fiber, which makes you feel full  Do not add butter, margarine, or cream sauce to vegetables  Use herbs to season steamed vegetables  · Eat less fat and fewer fried foods  Eat more baked or grilled chicken and fish  These protein sources are lower in calories and fat than red meat  Limit fast food  Dress your salads with olive oil and vinegar instead of bottled dressing  · Limit the amount of sugar you eat  Do not drink sugary beverages  Limit alcohol    Activity changes:  Physical activity is good for your body in many ways  It helps you burn calories and build strong muscles  It decreases stress and depression, and improves your mood  It can also help you sleep better  Talk to your healthcare provider before you begin an exercise program   · Exercise for at least 30 minutes 5 days a week  Start slowly  Set aside time each day for physical activity that you enjoy and that is convenient for you  It is best to do both weight training and an activity that increases your heart rate, such as walking, bicycling, or swimming  · Find ways to be more active  Do yard work and housecleaning  Walk up the stairs instead of using elevators  Spend your leisure time going to events that require walking, such as outdoor festivals or fairs  This extra physical activity can help you lose weight and keep it off  Follow up with your healthcare provider as directed: You may need to meet with a dietitian  Write down your questions so you remember to ask them during your visits  © 2017 [address] Information is for End User's use only and may not be sold, redistributed or otherwise used for commercial purposes  All illustrations and images included in CareNotes® are the copyrighted property of [employer]  or Yan Miranda  The above information is an  only  It is not intended as medical advice for individual conditions or treatments  Talk to your doctor, nurse or pharmacist before following any medical regimen to see if it is safe and effective for you  Weight Management   AMBULATORY CARE:   Why it is important to manage your weight:  Being overweight increases your risk of health conditions such as heart disease, high blood pressure, type 2 diabetes, and certain types of cancer  It can also increase your risk for osteoarthritis, sleep apnea, and other respiratory problems  Aim for a slow, steady weight loss   Even a small amount of weight loss can lower your risk of health problems  How to lose weight safely:  A safe and healthy way to lose weight is to eat fewer calories and get regular exercise  You can lose up about 1 pound a week by decreasing the number of calories you eat by 500 calories each day  You can decrease calories by eating smaller portion sizes or by cutting out high-calorie foods  Read labels to find out how many calories are in the foods you eat  You can also burn calories with exercise such as walking, swimming, or biking  You will be more likely to keep weight off if you make these changes part of your lifestyle  Healthy meal plan for weight management:  A healthy meal plan includes a variety of foods, contains fewer calories, and helps you stay healthy  A healthy meal plan includes the following:  · Eat whole-grain foods more often  A healthy meal plan should contain fiber  Fiber is the part of grains, fruits, and vegetables that is not broken down by your body  Whole-grain foods are healthy and provide extra fiber in your diet  Some examples of whole-grain foods are whole-wheat breads and pastas, oatmeal, brown rice, and bulgur  · Eat a variety of vegetables every day  Include dark, leafy greens such as spinach, kale, rajesh greens, and mustard greens  Eat yellow and orange vegetables such as carrots, sweet potatoes, and winter squash  · Eat a variety of fruits every day  Choose fresh or canned fruit (canned in its own juice or light syrup) instead of juice  Fruit juice has very little or no fiber  · Eat low-fat dairy foods  Drink fat-free (skim) milk or 1% milk  Eat fat-free yogurt and low-fat cottage cheese  Try low-fat cheeses such as mozzarella and other reduced-fat cheeses  · Choose meat and other protein foods that are low in fat  Choose beans or other legumes such as split peas or lentils  Choose fish, skinless poultry (chicken or turkey), or lean cuts of red meat (beef or pork)   Before you cook meat or poultry, cut off any visible fat  · Use less fat and oil  Try baking foods instead of frying them  Add less fat, such as margarine, sour cream, regular salad dressing and mayonnaise to foods  Eat fewer high-fat foods  Some examples of high-fat foods include french fries, doughnuts, ice cream, and cakes  · Eat fewer sweets  Limit foods and drinks that are high in sugar  This includes candy, cookies, regular soda, and sweetened drinks  Ways to decrease calories:   · Eat smaller portions  ¨ Use a small plate with smaller servings  ¨ Do not eat second helpings  ¨ When you eat at a restaurant, ask for a box and place half of your meal in the box before you eat  ¨ Share an entrée with someone else  · Replace high-calorie snacks with healthy, low-calorie snacks  ¨ Choose fresh fruit, vegetables, fat-free rice cakes, or air-popped popcorn instead of potato chips, nuts, or chocolate  ¨ Choose water or calorie-free drinks instead of soda or sweetened drinks  · Eat regular meals  Skipping meals can lead to overeating later in the day  Eat a healthy snack in place of a meal if you do not have time to eat a regular meal      · Do not shop for groceries when you are hungry  You may be more likely to make unhealthy food choices  Take a grocery list of healthy foods and shop after you have eaten  Exercise:  Exercise at least 30 minutes per day on most days of the week  Some examples of exercise include walking, biking, dancing, and swimming  You can also fit in more physical activity by taking the stairs instead of the elevator or parking farther away from stores  Ask your healthcare provider about the best exercise plan for you  Other things to consider as you try to lose weight:   · Be aware of situations that may give you the urge to overeat, such as eating while watching television  Find ways to avoid these situations  For example, read a book, go for a walk, or do crafts      · Meet with a weight loss support group or friends who are also trying to lose weight  This may help you stay motivated to continue working on your weight loss goals  © 2017 [address] Information is for End User's use only and may not be sold, redistributed or otherwise used for commercial purposes  All illustrations and images included in CareNotes® are the copyrighted property of [employer] M [employer]  or Yan Miranda  The above information is an  only  It is not intended as medical advice for individual conditions or treatments  Talk to your doctor, nurse or pharmacist before following any medical regimen to see if it is safe and effective for you

## 2019-08-05 NOTE — PROGRESS NOTES
Assessment/Plan:    Type 2 diabetes mellitus without complication, without long-term current use of insulin (Formerly [facility])  Lab Results   Component Value Date    HGBA1C 6 7 (H) 07/29/2019     Sugar trending up a little-would watch diet and increase, will do vascular screening and will consider statin  No results for input(s): POCGLU in the last 72 hours  Blood Sugar Average: Last 72 hrs:      Benign hypertension with CKD (chronic kidney disease) stage III ([facility])  Lab stable       Diagnoses and all orders for this visit:    Type 2 diabetes mellitus without complication, without long-term current use of insulin ([facility])  -     Comprehensive metabolic panel; Future  -     Hemoglobin A1C; Future  -     VAS screening; Future    Medicare annual wellness visit, subsequent    Hyperlipidemia, unspecified hyperlipidemia type  -     Lipid Panel with Direct LDL reflex; Future    Screening for breast cancer  -     Mammo screening bilateral w 3d & cad; Future    Sequela of lacunar infarction  -     VAS screening; Future          Subjective:      Patient ID: Yeni Briggs is a 80 y o  female  [facility], had CT scan head done and has some questions about test    Diabetes   She presents for her follow-up diabetic visit  She has type 2 diabetes mellitus  No MedicAlert identification noted  Her disease course has been stable  Pertinent negatives for hypoglycemia include no headaches  There are no diabetic associated symptoms  Pertinent negatives for diabetes include no chest pain and no weakness  Symptoms are stable  There are no diabetic complications  Risk factors for coronary artery disease include diabetes mellitus, dyslipidemia, obesity and post-menopausal  Current diabetic treatment includes diet  She is compliant with treatment all of the time  Her weight is stable  She is following a generally healthy diet  Meal planning includes avoidance of concentrated sweets  She participates in exercise intermittently   Her home blood glucose trend is increasing steadily  An ACE inhibitor/angiotensin II receptor blocker is not being taken  She sees a podiatrist Eye exam is current  The following portions of the patient's history were reviewed and updated as appropriate: allergies, current medications, past family history, past medical history, past social history, past surgical history and problem list       Review of Systems   Constitutional: Negative for activity change, appetite change and unexpected weight change  Respiratory: Negative for shortness of breath  Cardiovascular: Positive for palpitations  Negative for chest pain  Occasional   Neurological: Negative for weakness, numbness and headaches  Objective:      /60 (BP Location: Right arm, Patient Position: Sitting, Cuff Size: Standard)   Pulse 60   Temp 97 6 °F (36 4 °C) (Temporal)   Resp 18   Ht 5' 2" (1 575 m)   Wt 98 kg (216 lb)   BMI 39 51 kg/m²          Physical Exam   Constitutional: She appears well-developed and well-nourished  Neck: No thyromegaly present  Cardiovascular: Normal rate, regular rhythm and normal heart sounds  Pulmonary/Chest: Effort normal and breath sounds normal    Musculoskeletal: She exhibits no edema  Lymphadenopathy:     She has no cervical adenopathy  Vitals reviewed  BMI Counseling: Body mass index is 39 51 kg/m²  Discussed the patient's BMI with her  The BMI is above average  BMI counseling and education was provided to the patient  Nutrition recommendations include reducing portion sizes, decreasing overall calorie intake, 3-5 servings of fruits/vegetables daily, reducing fast food intake and consuming healthier snacks  Exercise recommendations include exercising 3-5 times per week

## 2019-08-05 NOTE — PROGRESS NOTES
Assessment and Plan:     Problem List Items Addressed This Visit     None         History of Present Illness:     Patient presents for Medicare Annual Wellness visit    Patient Care Team:  Jorje Solorio MD as PCP - General (Family Medicine)  Erin Hughes MD     Problem List:     Patient Active Problem List   Diagnosis    Diabetic peripheral neuropathy ([address] )    Benign hypertension with CKD (chronic kidney disease) stage III ([address] )    Thyroid enlarged    Type 2 diabetes mellitus without complication, without long-term current use of insulin ([address] )    Chronic constipation    Screening for breast cancer    Abdominal bloating    Hyponatremia    Abdominal pain    Anxiety    Chronic low back pain without sciatica    Hyperlipidemia    CKD (chronic kidney disease), stage III ([address] )    Bilateral leg edema      Past Medical and Surgical History:     Past Medical History:   Diagnosis Date    Anemia     Anxiety     CKD (chronic kidney disease), stage III ([address] ) 11/29/2018    Diabetes mellitus ([address] )     Diabetic peripheral neuropathy (HCC)     Hyperlipidemia     Hypertension     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 8/23/2017    Thyroid enlarged 2/23/2015    Tinnitus     Vertigo      Past Surgical History:   Procedure Laterality Date    EAR SURGERY Left     KNEE SURGERY        Family History:     Family History   Problem Relation Age of Onset    Diabetes Mother     Heart disease Mother     Heart disease Father     Stroke Father     Cancer Family       Social History:     Social History     Tobacco Use   Smoking Status Never Smoker   Smokeless Tobacco Never Used     Social History     Substance and Sexual Activity   Alcohol Use Not Currently     Social History     Substance and Sexual Activity   Drug Use No      Medications and Allergies:     Current Outpatient Medications   Medication Sig Dispense Refill    aspirin 81 MG tablet Take 1 tablet by mouth daily      carvedilol (COREG) 25 mg tablet TAKE 1 TABLET BY MOUTH TWICE DAILY 60 tablet 5    Cholecalciferol (VITAMIN D3) 5000 units CAPS Take 1 capsule by mouth daily      dicyclomine (BENTYL) 10 mg capsule Take 1 capsule (10 mg total) by mouth 4 (four) times a day (before meals and at bedtime) 120 capsule 3    diltiazem (CARDIZEM CD) 240 mg 24 hr capsule Take 1 capsule (240 mg total) by mouth daily 90 capsule 3    docusate sodium (COLACE) 100 mg capsule Take 1 capsule (100 mg total) by mouth 2 (two) times a day 10 capsule 0    Multiple Vitamin (MULTI-VITAMIN DAILY PO) Take 1 tablet by mouth daily      Probiotic Product (PROBIOTIC DAILY PO) Take by mouth      simethicone (MYLICON,GAS-X) 991 MG capsule Take 1 capsule (180 mg total) by mouth 3 (three) times a day  0    ALPRAZolam (XANAX) 0 5 mg tablet Take 1 tablet (0 5 mg total) by mouth 2 (two) times a day as needed for anxiety for up to 10 days 20 tablet 0    furosemide (LASIX) 20 mg tablet TAKE 1 TABLET BY MOUTH ONCE DAILY (Patient not taking: Reported on 8/5/2019) 90 tablet 3    Glucos-Chondroit-Hyaluron-MSM (GLUCOSAMINE CHONDROITIN JOINT) TABS Take by mouth       No current facility-administered medications for this visit  Allergies   Allergen Reactions    Hydrochlorothiazide      Hyponatremia, severe    Statins       Immunizations:     Immunization History   Administered Date(s) Administered    INFLUENZA 09/01/2016, 10/05/2016, 09/28/2017    Influenza Split High Dose Preservative Free IM 11/18/2014, 11/30/2015, 09/01/2016, 09/28/2017    Influenza TIV (IM) 10/01/2011, 10/01/2012, 11/11/2013    Influenza, high dose seasonal 0 5 mL 09/24/2018    Pneumococcal Conjugate 13-Valent 05/06/2019    Pneumococcal Polysaccharide PPV23 10/01/1997, 11/30/2015    Zoster 10/07/2016      Medicare Screening Tests and Risk Assessments:     Felipa Ramirez is here for her Initial Wellness visit        Health Risk Assessment:  Patient rates overall health as good  Patient feels that their physical health rating is Slightly worse  Eyesight was rated as Slightly worse  Hearing was rated as Slightly worse  Patient feels that their emotional and mental health rating is Same  Pain experienced by patient in the last 7 days has been Some  Patient's pain rating has been 8/10  Patient states that she has experienced no weight loss or gain in last 6 months  Emotional/Mental Health:  Patient has been feeling nervous/anxious  PHQ-9 Depression Screening:    Frequency of the following problems over the past two weeks:      1  Little interest or pleasure in doing things: 0 - not at all      2  Feeling down, depressed, or hopeless: 1 - several days  PHQ-2 Score: 1          Broken Bones/Falls: Fall Risk Assessment:    In the past year, patient has experienced: History of falling in past year    Number of falls: 1    Injured during fall: No      Patient feels unsteady when standing or walking     Patient is worried about falling     Bladder/Bowel:  Patient has not leaked urine accidently in the last six months  Patient reports no loss of bowel control  Immunizations:  Patient has had a flu vaccination within the last year  Patient has received a pneumonia shot  Patient has received a shingles shot  Patient has not received tetanus/diphtheria shot  Home Safety:  Patient does not have trouble with stairs inside or outside of their home  Patient currently reports that there are no safety hazards present in home, working smoke alarms, no working carbon monoxide detectors  Preventative Screenings:   Breast cancer screening performed, no colon cancer screen completed, cholesterol screen completed, glaucoma eye exam completed,     Nutrition:  Current diet: Limited junk food and Regular with servings of the following:    Medications:  Patient is currently taking over-the-counter supplements       List of OTC medications includes: Vitamin d and Multivitamins  Patient is able to manage medications  Lifestyle Choices:  Patient reports no tobacco use  Patient has not smoked or used tobacco in the past   Patient reports no alcohol use  Patient drives a vehicle  Patient wears seat belt  Activities of Daily Living:  Can get out of bed by his or her self, able to dress self, able to make own meals, able to do own shopping, able to bathe self, can do own laundry/housekeeping, can manage own money, pay bills and track expenses    Previous Hospitalizations:  No hospitalization or ED visit in past 12 months        Advanced Directives:  Patient has not decided on power of   Patient has completed advanced directive  Preventative Screening/Counseling:      Cardiovascular:      General: Screening Current      Counseling: Healthy Diet and Healthy Weight          Diabetes:      General: Screening Current      Counseling: Healthy Diet and Healthy Weight          Colorectal Cancer:      General: Screening Not Indicated          Breast Cancer:      General: Risks and Benefits Discussed          Cervical Cancer:      General: Screening Not Indicated          Osteoporosis:      General: Risks and Benefits Discussed      Counseling: Calcium and Vitamin D Intake and Regular Weightbearing Exercise          AAA:      General: Screening Not Indicated          Glaucoma:      General: Risks and Benefits Discussed          HIV:      General: Screening Not Indicated          Hepatitis C:      General: Screening Not Indicated        Advanced Directives:   Patient has living will for healthcare, has durable POA for healthcare, patient has an advanced directive       Immunizations:      Influenza: Influenza UTD This Year      Pneumococcal: Lifetime Vaccine Completed      Zostavax: Zostavax Vaccine UTD      TDAP: Tdap Vaccine UTD

## 2019-08-05 NOTE — ASSESSMENT & PLAN NOTE
Lab Results   Component Value Date    HGBA1C 6 7 (H) 07/29/2019     Sugar trending up a little-would watch diet and increase, will do vascular screening and will consider statin  No results for input(s): POCGLU in the last 72 hours      Blood Sugar Average: Last 72 hrs:

## 2019-09-03 ENCOUNTER — OFFICE VISIT (OUTPATIENT)
Dept: FAMILY MEDICINE CLINIC | Facility: CLINIC | Age: 84
End: 2019-09-03
Payer: COMMERCIAL

## 2019-09-03 VITALS
SYSTOLIC BLOOD PRESSURE: 140 MMHG | WEIGHT: 224.4 LBS | HEART RATE: 60 BPM | HEIGHT: 62 IN | TEMPERATURE: 97.8 F | BODY MASS INDEX: 41.3 KG/M2 | DIASTOLIC BLOOD PRESSURE: 84 MMHG

## 2019-09-03 DIAGNOSIS — J40 BRONCHITIS: Primary | ICD-10-CM

## 2019-09-03 DIAGNOSIS — J40 BRONCHITIS: ICD-10-CM

## 2019-09-03 PROCEDURE — 99213 OFFICE O/P EST LOW 20 MIN: CPT | Performed by: FAMILY MEDICINE

## 2019-09-03 RX ORDER — AZITHROMYCIN 250 MG/1
TABLET, FILM COATED ORAL
Qty: 6 TABLET | Refills: 0 | Status: SHIPPED | OUTPATIENT
Start: 2019-09-03 | End: 2019-09-07

## 2019-09-03 NOTE — PROGRESS NOTES
Assessment and Plan:    Problem List Items Addressed This Visit     None                 There are no diagnoses linked to this encounter  Subjective:      Patient ID: Teddy Ambrosio is a 80 y o  female  CC:    Chief Complaint   Patient presents with   Nahid Like Like Symptoms    Wheezing     for approx 2 weeks this has been going on~cd       HPI:    URI    This is a new problem  The current episode started 1 to 4 weeks ago  The problem has been waxing and waning  There has been no fever (low grade 99)  The fever has been present for less than 1 day  Associated symptoms include coughing, rhinorrhea and wheezing  Pertinent negatives include no congestion, ear pain, headaches, sinus pain or sore throat  She has tried acetaminophen (coricidin) for the symptoms  The following portions of the patient's history were reviewed and updated as appropriate: allergies, current medications, past family history, past medical history, past social history, past surgical history and problem list         Review of Systems   Constitutional: Positive for fever  Negative for chills  HENT: Positive for rhinorrhea and voice change  Negative for congestion, ear pain, sinus pain and sore throat  Respiratory: Positive for cough, shortness of breath and wheezing  Neurological: Negative for headaches  Data to review:       Objective:    Vitals:    09/03/19 1552   BP: 140/84   BP Location: Left arm   Patient Position: Sitting   Cuff Size: Large   Pulse: 60   Temp: 97 8 °F (36 6 °C)   TempSrc: Oral   Weight: 102 kg (224 lb 6 4 oz)   Height: 5' 2" (1 575 m)        Physical Exam   Constitutional: She appears well-developed and well-nourished  HENT:   Right Ear: Tympanic membrane normal    Left Ear: Tympanic membrane normal    Nose: No mucosal edema or rhinorrhea  Right sinus exhibits no maxillary sinus tenderness and no frontal sinus tenderness   Left sinus exhibits no maxillary sinus tenderness and no frontal sinus tenderness  Mouth/Throat: No oropharyngeal exudate, posterior oropharyngeal edema or posterior oropharyngeal erythema  Cardiovascular: Normal rate, regular rhythm and normal heart sounds  Pulmonary/Chest: Effort normal  She has wheezes in the right lower field and the left lower field  Lymphadenopathy:     She has no cervical adenopathy  Vitals reviewed

## 2019-09-04 ENCOUNTER — HOSPITAL ENCOUNTER (OUTPATIENT)
Dept: NON INVASIVE DIAGNOSTICS | Facility: CLINIC | Age: 84
Discharge: HOME | End: 2019-09-04
Payer: COMMERCIAL

## 2019-09-04 DIAGNOSIS — E11.9 TYPE 2 DIABETES MELLITUS WITHOUT COMPLICATION, WITHOUT LONG-TERM CURRENT USE OF INSULIN (HCC): ICD-10-CM

## 2019-09-04 DIAGNOSIS — I69.30 SEQUELA OF LACUNAR INFARCTION: ICD-10-CM

## 2019-09-04 DIAGNOSIS — I73.9 PAD (PERIPHERAL ARTERY DISEASE) (HCC): Primary | ICD-10-CM

## 2019-09-04 PROCEDURE — VASC: Performed by: SURGERY

## 2019-09-04 PROCEDURE — 93922 UPR/L XTREMITY ART 2 LEVELS: CPT

## 2019-09-17 DIAGNOSIS — E11.9 TYPE 2 DIABETES MELLITUS WITHOUT COMPLICATION, WITHOUT LONG-TERM CURRENT USE OF INSULIN (HCC): Primary | ICD-10-CM

## 2019-09-17 RX ORDER — BLOOD-GLUCOSE METER
EACH MISCELLANEOUS DAILY
Qty: 1 EACH | Refills: 0 | Status: SHIPPED | OUTPATIENT
Start: 2019-09-17 | End: 2021-10-29 | Stop reason: SDUPTHER

## 2019-09-20 NOTE — TELEPHONE ENCOUNTER
I called over to schedule the hospital follow up for patient , how ever she is still in the hospital, Patients daughter is going to give us a call when she is discharged to set up the hospital follow up Clear

## 2019-09-27 ENCOUNTER — CONSULT (OUTPATIENT)
Dept: VASCULAR SURGERY | Facility: CLINIC | Age: 84
End: 2019-09-27
Payer: COMMERCIAL

## 2019-09-27 VITALS
WEIGHT: 230.6 LBS | BODY MASS INDEX: 42.44 KG/M2 | SYSTOLIC BLOOD PRESSURE: 138 MMHG | HEIGHT: 62 IN | DIASTOLIC BLOOD PRESSURE: 82 MMHG | HEART RATE: 68 BPM

## 2019-09-27 DIAGNOSIS — E11.9 TYPE 2 DIABETES MELLITUS WITHOUT COMPLICATION, WITHOUT LONG-TERM CURRENT USE OF INSULIN (HCC): Primary | ICD-10-CM

## 2019-09-27 DIAGNOSIS — I73.9 PAD (PERIPHERAL ARTERY DISEASE) (HCC): ICD-10-CM

## 2019-09-27 DIAGNOSIS — N18.30 BENIGN HYPERTENSION WITH CKD (CHRONIC KIDNEY DISEASE) STAGE III (HCC): ICD-10-CM

## 2019-09-27 DIAGNOSIS — E78.5 HYPERLIPIDEMIA, UNSPECIFIED HYPERLIPIDEMIA TYPE: ICD-10-CM

## 2019-09-27 DIAGNOSIS — I12.9 BENIGN HYPERTENSION WITH CKD (CHRONIC KIDNEY DISEASE) STAGE III (HCC): ICD-10-CM

## 2019-09-27 PROCEDURE — 99204 OFFICE O/P NEW MOD 45 MIN: CPT | Performed by: NURSE PRACTITIONER

## 2019-09-27 NOTE — ASSESSMENT & PLAN NOTE
81yo F PMH HTN, HLD, CKD 3, DM II presents to Vascular office after recent vascular screening for results review and evaluation  Patient denies any claudication or rest pain; she has bilateral calf "coolness" at night when trying to sleep  Denies wounds or ulcerations to the feet/legs  She also deneis any abdominal or back pain, denies TIA/CVA symptoms  She is maintained on ASA  We reviewed the vascular screening from 9/4/19 which shows bilateral peripheral artery disease with PARAG R: 0 62, L: 0 59  She has a patent abdominal aorta measuring 2 1cm  Her carotid arteries are <50% ICA stenosis B/L  Recommendations: At this time patient is not having any lifestyle limiting claudication symptoms or rest pain  No need for any urgent or scheduled vascular intervention at this time  We did review that if signs and symptoms progress to lifestyle limiting claudication, she should be considered for possible endovascular procedure  Patient in agreement with plan    Will order lower extremity arterial duplex in 1 year for reassessment and have her come in for risk factor modification visit   -continue with ambulation program  -continue with aspirin daily  -continue with good blood sugar management to decrease complications secondary to diabetes  -continue with good blood pressure management per PCP

## 2019-09-27 NOTE — ASSESSMENT & PLAN NOTE
Lab Results   Component Value Date    HGBA1C 6 7 (H) 07/29/2019   -continue with good BS control per PCP

## 2019-09-27 NOTE — PROGRESS NOTES
Assessment/Plan:    PAD (peripheral artery disease) ([facility] [address] )  79yo F PMH HTN, HLD, CKD 3, DM II presents to Vascular office after recent vascular screening for results review and evaluation  Patient denies any claudication or rest pain; she has bilateral calf "coolness" at night when trying to sleep  Denies wounds or ulcerations to the feet/legs  She also deneis any abdominal or back pain, denies TIA/CVA symptoms  She is maintained on ASA  We reviewed the vascular screening from 9/4/19 which shows bilateral peripheral artery disease with PARAG R: 0 62, L: 0 59  She has a patent abdominal aorta measuring 2 1cm  Her carotid arteries are <50% ICA stenosis B/L  Recommendations: At this time patient is not having any lifestyle limiting claudication symptoms or rest pain  No need for any urgent or scheduled vascular intervention at this time  We did review that if signs and symptoms progress to lifestyle limiting claudication, she should be considered for possible endovascular procedure  Patient in agreement with plan    Will order lower extremity arterial duplex in 1 year for reassessment and have her come in for risk factor modification visit   -continue with ambulation program  -continue with aspirin daily  -continue with good blood sugar management to decrease complications secondary to diabetes  -continue with good blood pressure management per PCP      Type 2 diabetes mellitus without complication, without long-term current use of insulin (HCC)    Lab Results   Component Value Date    HGBA1C 6 7 (H) 07/29/2019   -continue with good BS control per PCP       Diagnoses and all orders for this visit:    Type 2 diabetes mellitus without complication, without long-term current use of insulin (HCC)    PAD (peripheral artery disease) ([facility] )  -     Ambulatory referral to Vascular Surgery  -     VAS lower limb arterial duplex, complete bilateral; Future    Benign hypertension with CKD (chronic kidney disease) stage III (HCC)    Hyperlipidemia, unspecified hyperlipidemia type          Subjective:      Patient ID: Agustina Ferrer is a 80 y o  female  Pt is a new patient referred by Dr Ignacio Good  Pt had a VAS screening on 09/04/19  Pt C/o of B/L Cold calves and feet  This has been going on for 3 + years  Pt C/o that gait problem has gotten worst in the last year  Pt ambulates with Cane  Pt currently takes ASA 81 MG   Yesika Sena is a 81yo F PMH HTN, HLD, CKD 3, DM II presents to Vascular office after recent vascular screening for results review and evaluation  Patient denies any claudication or rest pain; she has bilateral calf "coolness" at night when trying to sleep  Denies wounds or ulcerations to the feet/legs  She also deneis any abdominal or back pain, denies TIA/CVA symptoms  She is a non-smoker  She is maintained on ASA  The following portions of the patient's history were reviewed and updated as appropriate: allergies, current medications, past family history, past medical history, past social history, past surgical history and problem list     Review of Systems   Constitutional: Negative  HENT: Positive for hearing loss and tinnitus  Eyes: Negative  Respiratory: Positive for shortness of breath (W/ Activity) and wheezing  Cardiovascular: Negative  Gastrointestinal: Negative  Endocrine: Negative  Genitourinary: Negative  Musculoskeletal: Positive for back pain and gait problem (Worsen in the last year)  Skin:        Coldness in calves and feet  Allergic/Immunologic: Negative  Hematological: Negative  Psychiatric/Behavioral: Negative  Objective:      /82 (BP Location: Right arm, Patient Position: Sitting)   Pulse 68   Ht 5' 2" (1 575 m)   Wt 105 kg (230 lb 9 6 oz)   BMI 42 18 kg/m²          Physical Exam   Constitutional: She is oriented to person, place, and time  She appears well-developed and well-nourished     HENT:   Head: Normocephalic and atraumatic  Eyes: Pupils are equal, round, and reactive to light  EOM are normal  No scleral icterus  Neck: Normal range of motion  Carotid bruit is not present  Cardiovascular: Normal rate, regular rhythm and normal heart sounds  Pulses:       Carotid pulses are 2+ on the right side, and 2+ on the left side  Radial pulses are 2+ on the right side, and 2+ on the left side  Popliteal pulses are 0 on the right side, and 0 on the left side  Dorsalis pedis pulses are 1+ on the right side, and 1+ on the left side  Posterior tibial pulses are 0 on the right side, and 0 on the left side  Pulmonary/Chest: Effort normal and breath sounds normal    Abdominal: Soft  Normal appearance and bowel sounds are normal    Obese abdomen   Musculoskeletal: Normal range of motion  Neurological: She is alert and oriented to person, place, and time  She has normal strength  Skin: Skin is warm, dry and intact  Capillary refill takes less than 2 seconds  Psychiatric: She has a normal mood and affect  Her speech is normal and behavior is normal  Judgment and thought content normal  Cognition and memory are normal    Nursing note and vitals reviewed  I have reviewed and made appropriate changes to the review of systems input by the medical assistant      Vitals:    09/27/19 1334   BP: 138/82   BP Location: Right arm   Patient Position: Sitting   Pulse: 68   Weight: 105 kg (230 lb 9 6 oz)   Height: 5' 2" (1 575 m)       Patient Active Problem List   Diagnosis    Diabetic peripheral neuropathy (HCC)    Benign hypertension with CKD (chronic kidney disease) stage III (HCC)    Thyroid enlarged    Type 2 diabetes mellitus without complication, without long-term current use of insulin (HCC)    Chronic constipation    Screening for breast cancer    Abdominal bloating    Hyponatremia    Abdominal pain    Anxiety    Chronic low back pain without sciatica    Hyperlipidemia    CKD (chronic kidney disease), stage III ([facility] [address] )    Bilateral leg edema    PAD (peripheral artery disease) (HCC)       Past Surgical History:   Procedure Laterality Date    EAR SURGERY Left     KNEE SURGERY         Family History   Problem Relation Age of Onset    Diabetes Mother     Heart disease Mother     Heart disease Father     Stroke Father     Cancer Family        Social History     Socioeconomic History    Marital status: Single     Spouse name: Not on file    Number of children: Not on file    Years of education: Not on file    Highest education level: Not on file   Occupational History    Not on file   Social Needs    Financial resource strain: Not on file    Food insecurity:     Worry: Not on file     Inability: Not on file    Transportation needs:     Medical: Not on file     Non-medical: Not on file   Tobacco Use    Smoking status: Never Smoker    Smokeless tobacco: Never Used   Substance and Sexual Activity    Alcohol use: Not Currently    Drug use: No    Sexual activity: Never     Partners: Male     Birth control/protection: None   Lifestyle    Physical activity:     Days per week: Not on file     Minutes per session: Not on file    Stress: Not on file   Relationships    Social connections:     Talks on phone: Not on file     Gets together: Not on file     Attends Jewish service: Not on file     Active member of club or organization: Not on file     Attends meetings of clubs or organizations: Not on file     Relationship status: Not on file    Intimate partner violence:     Fear of current or ex partner: Not on file     Emotionally abused: Not on file     Physically abused: Not on file     Forced sexual activity: Not on file   Other Topics Concern    Not on file   Social History Narrative    Active advance directive    Always uses seatbelt    Caffeine use    Exercise-walking    Lives with family, sister    Saul Oh [place]    Supportive and safe       Allergies Allergen Reactions    Hydrochlorothiazide      Hyponatremia, severe    Statins          Current Outpatient Medications:     aspirin 81 MG tablet, Take 1 tablet by mouth daily, Disp: , Rfl:     Blood Glucose Monitoring Suppl (ONE TOUCH ULTRA 2) w/Device KIT, by Does not apply route daily, Disp: 1 each, Rfl: 0    carvedilol (COREG) 25 mg tablet, TAKE 1 TABLET BY MOUTH TWICE DAILY, Disp: 60 tablet, Rfl: 5    Cholecalciferol (VITAMIN D3) 5000 units CAPS, Take 1 capsule by mouth daily, Disp: , Rfl:     dicyclomine (BENTYL) 10 mg capsule, Take 1 capsule (10 mg total) by mouth 4 (four) times a day (before meals and at bedtime), Disp: 120 capsule, Rfl: 3    diltiazem (CARDIZEM CD) 240 mg 24 hr capsule, Take 1 capsule (240 mg total) by mouth daily, Disp: 90 capsule, Rfl: 3    docusate sodium (COLACE) 100 mg capsule, Take 1 capsule (100 mg total) by mouth 2 (two) times a day, Disp: 10 capsule, Rfl: 0    furosemide (LASIX) 20 mg tablet, TAKE 1 TABLET BY MOUTH ONCE DAILY, Disp: 90 tablet, Rfl: 3    Glucos-Chondroit-Hyaluron-MSM (GLUCOSAMINE CHONDROITIN JOINT) TABS, Take by mouth, Disp: , Rfl:     glucose blood (ONE TOUCH ULTRA TEST) test strip, Test daily                        DX: E11 9, Disp: 100 each, Rfl: 1    Multiple Vitamin (MULTI-VITAMIN DAILY PO), Take 1 tablet by mouth daily, Disp: , Rfl:     ONE TOUCH LANCETS MISC, by Does not apply route daily Test daily                  DX:E11 9, Disp: 100 each, Rfl: 1    Probiotic Product (PROBIOTIC DAILY PO), Take by mouth, Disp: , Rfl:     ALPRAZolam (XANAX) 0 5 mg tablet, Take 1 tablet (0 5 mg total) by mouth 2 (two) times a day as needed for anxiety for up to 10 days, Disp: 20 tablet, Rfl: 0

## 2019-09-27 NOTE — PATIENT INSTRUCTIONS
Continue taking aspirin daily  Continue with walking and ambulation on a regular basis  If you have any problems such as pain at night that wakes you or pain on a regular basis with walking, please call our office for further evaluation  Otherwise I am ordering a lower extremity arterial duplex to be done in 1 year, and office visit to follow the ultrasound  If you have any questions, please call our office  Peripheral Artery Disease   WHAT YOU NEED TO KNOW:   What is peripheral artery disease? Peripheral artery disease (PAD) is narrow, weak, or blocked arteries  It may affect any arteries outside of your heart and brain  PAD is usually the result of a buildup of fat and cholesterol, also called plaque, along your artery walls  Inflammation, a blood clot, or abnormal cell growth could also block your arteries  PAD prevents normal blood flow to your legs and arms  You are at risk of an amputation if poor blood flow keeps wounds from healing or causes gangrene (tissue death)  Without treatment, PAD can also cause a heart attack or stroke  What increases my risk for PAD? · Smoking cigarettes     · Diabetes     · High blood pressure     · High cholesterol     · Age older than 40 years    · Heart disease or a family history of heart disease  What are the signs and symptoms of PAD? Mild PAD usually does not cause symptoms  As the disease worsens over time, you may have the following:  · Pain or cramps in your leg or hip while you walk     · A numb, weak, or heavy feeling in your legs     · Dry, scaly, red, or pale skin on your legs     · Thick or brittle nails, or hair loss on your arms and legs     · Foot sores that will not heal     · Burning or aching in your feet and toes while resting (this may be worse when you lie down)  How is PAD diagnosed? · Angiography  is a test that shows pictures of the arteries in your arms and legs   You will be given contrast liquid to help the arteries show up better on the pictures  The pictures will be taken with an MRI or CT scan  Tell the healthcare provider if you have ever had an allergic reaction to contrast liquid  Do not enter the MRI room with anything metal  Metal can cause serious injury  Tell a healthcare provider if you have any metal in or on your body  · Doppler ankle brachial index (PARAG)  is a test that compares blood pressure in your ankles to blood pressure in your arms  This tells your healthcare provider how well blood is flowing through the arteries in your legs  How is PAD treated? Treatment can help reduce your risk of a heart attack, stroke, or amputation  You may need more than one of the following:  · Medicines  may be given  Your healthcare provider may give you any of the following:     ¨ Antiplatelet medicine,  such as aspirin, helps prevent blood clots and reduces the risk of a heart attack or stroke  ¨ Statin medicine  helps lower your cholesterol and prevents your PAD from getting worse  · A supervised exercise program  helps you stay active in normal daily activities and may prevent disability  Healthcare providers will help you safely walk or do strength training exercises 3 times a week for 30 to 60 minutes  You will do this for several months, then transition to walking on your own  · Angioplasty  is a procedure to open your artery so blood can flow through normally  A thin tube called a catheter is used to insert a small balloon into your artery  The balloon is inflated to open your blocked artery, and then removed  A tube called a stent may be placed in your artery to hold it open  · Bypass surgery  is used to make a new connection to your artery with a vein from another part of your body, or an artificial graft  The vein or graft is attached to your artery above and below your blockage  This allows blood to flow around the blocked portion of your artery  How can I manage PAD? · Do not smoke    Nicotine and other chemicals in cigarettes and cigars can worsen PAD  They can also increase your risk for a heart attack or stroke  Ask your healthcare provider for information if you currently smoke and need help to quit  E-cigarettes or smokeless tobacco still contain nicotine  Talk to your healthcare provider before you use these products  · Manage other health conditions  Take your medicines as directed  Follow your healthcare provider's instructions if you have high blood pressure or high cholesterol  Perform foot care and check your blood sugar levels as directed if you have diabetes  · Eat heart healthy foods  Eat whole grains, fruits, and vegetables every day  Limit salt and high-fat foods  Ask your healthcare provider for more information on a heart healthy diet  Ask if you need to lose weight  Your healthcare provider can help you create a healthy weight-loss plan  Call 911 for the following:   · You have any of the following signs of a heart attack:      ¨ Squeezing, pressure, or pain in your chest that lasts longer than 5 minutes or returns    ¨ Discomfort or pain in your back, neck, jaw, stomach, or arm     ¨ Trouble breathing    ¨ Nausea or vomiting    ¨ Lightheadedness or a sudden cold sweat, especially with chest pain or trouble breathing    · You have any of the following signs of a stroke:      ¨ Numbness or drooping on one side of your face     ¨ Weakness in an arm or leg    ¨ Confusion or difficulty speaking    ¨ Dizziness, a severe headache, or vision loss  When should I seek immediate care? · You have sores or wounds that will not heal      · You notice black or discolored skin on your arm or leg  · Your skin is cool to the touch  When should I contact my healthcare provider? · You have leg pain when you walk 1/8 mile (200 meters) or less, even with treatment  · Your legs are red, dry, or pale, even with treatment  · You have questions or concerns about your condition or care    CARE AGREEMENT:   You have the right to help plan your care  Learn about your health condition and how it may be treated  Discuss treatment options with your caregivers to decide what care you want to receive  You always have the right to refuse treatment  The above information is an  only  It is not intended as medical advice for individual conditions or treatments  Talk to your doctor, nurse or pharmacist before following any medical regimen to see if it is safe and effective for you  © 2017 [facility] Information is for End User's use only and may not be sold, redistributed or otherwise used for commercial purposes  All illustrations and images included in CareNotes® are the copyrighted property of A D A M [employer] Inc  or [employer]

## 2019-10-09 ENCOUNTER — TELEPHONE (OUTPATIENT)
Dept: NEPHROLOGY | Facility: CLINIC | Age: 84
End: 2019-10-09

## 2019-10-14 ENCOUNTER — HOSPITAL ENCOUNTER (OUTPATIENT)
Dept: RADIOLOGY | Facility: HOSPITAL | Age: 84
Discharge: HOME | End: 2019-10-14
Payer: COMMERCIAL

## 2019-10-14 DIAGNOSIS — E04.2 MULTIPLE THYROID NODULES: ICD-10-CM

## 2019-10-14 PROCEDURE — 76536 US EXAM OF HEAD AND NECK: CPT

## 2019-10-21 LAB
LEFT EYE DIABETIC RETINOPATHY: NORMAL
RIGHT EYE DIABETIC RETINOPATHY: NORMAL

## 2019-11-15 ENCOUNTER — TRANSCRIBE ORDERS (OUTPATIENT)
Dept: RADIOLOGY | Facility: HOSPITAL | Age: 84
End: 2019-11-15

## 2019-11-15 ENCOUNTER — HOSPITAL ENCOUNTER (OUTPATIENT)
Dept: RADIOLOGY | Facility: HOSPITAL | Age: 84
Discharge: HOME | End: 2019-11-15
Payer: COMMERCIAL

## 2019-11-15 ENCOUNTER — OFFICE VISIT (OUTPATIENT)
Dept: FAMILY MEDICINE CLINIC | Facility: CLINIC | Age: 84
End: 2019-11-15
Payer: COMMERCIAL

## 2019-11-15 VITALS
HEIGHT: 62 IN | HEART RATE: 64 BPM | RESPIRATION RATE: 18 BRPM | BODY MASS INDEX: 41.22 KG/M2 | DIASTOLIC BLOOD PRESSURE: 82 MMHG | SYSTOLIC BLOOD PRESSURE: 138 MMHG | TEMPERATURE: 97.8 F | WEIGHT: 224 LBS

## 2019-11-15 DIAGNOSIS — S76.312A STRAIN OF LEFT PIRIFORMIS MUSCLE, INITIAL ENCOUNTER: ICD-10-CM

## 2019-11-15 DIAGNOSIS — M54.50 CHRONIC BILATERAL LOW BACK PAIN WITHOUT SCIATICA: ICD-10-CM

## 2019-11-15 DIAGNOSIS — G89.29 CHRONIC BILATERAL LOW BACK PAIN WITHOUT SCIATICA: Primary | ICD-10-CM

## 2019-11-15 DIAGNOSIS — M54.50 CHRONIC BILATERAL LOW BACK PAIN WITHOUT SCIATICA: Primary | ICD-10-CM

## 2019-11-15 DIAGNOSIS — G89.29 CHRONIC BILATERAL LOW BACK PAIN WITHOUT SCIATICA: ICD-10-CM

## 2019-11-15 PROBLEM — E87.1 HYPONATREMIA: Status: RESOLVED | Noted: 2018-07-25 | Resolved: 2019-11-15

## 2019-11-15 PROCEDURE — 72110 X-RAY EXAM L-2 SPINE 4/>VWS: CPT

## 2019-11-15 PROCEDURE — 96372 THER/PROPH/DIAG INJ SC/IM: CPT | Performed by: FAMILY MEDICINE

## 2019-11-15 PROCEDURE — 99213 OFFICE O/P EST LOW 20 MIN: CPT | Performed by: FAMILY MEDICINE

## 2019-11-15 PROCEDURE — 1036F TOBACCO NON-USER: CPT | Performed by: FAMILY MEDICINE

## 2019-11-15 PROCEDURE — 1160F RVW MEDS BY RX/DR IN RCRD: CPT | Performed by: FAMILY MEDICINE

## 2019-11-15 PROCEDURE — 73502 X-RAY EXAM HIP UNI 2-3 VIEWS: CPT

## 2019-11-15 RX ORDER — KETOROLAC TROMETHAMINE 30 MG/ML
60 INJECTION, SOLUTION INTRAMUSCULAR; INTRAVENOUS ONCE
Status: COMPLETED | OUTPATIENT
Start: 2019-11-15 | End: 2019-11-15

## 2019-11-15 RX ADMIN — KETOROLAC TROMETHAMINE 60 MG: 30 INJECTION, SOLUTION INTRAMUSCULAR; INTRAVENOUS at 08:52

## 2019-11-15 NOTE — PROGRESS NOTES
Assessment and Plan:    Problem List Items Addressed This Visit        Other    Chronic low back pain without sciatica - Primary     Await xray and rec PT         Relevant Medications    ketorolac (TORADOL) 60 mg/2 mL IM injection 60 mg (Start on 11/15/2019  8:45 AM)    Other Relevant Orders    XR spine lumbar minimum 4 views non injury    Ambulatory referral to Physical Therapy      Other Visit Diagnoses     Strain of left piriformis muscle, initial encounter        Relevant Medications    ketorolac (TORADOL) 60 mg/2 mL IM injection 60 mg (Start on 11/15/2019  8:45 AM)    Other Relevant Orders    XR hip/pelv 2-3 vws left if performed    Ambulatory referral to Physical Therapy                 Diagnoses and all orders for this visit:    Chronic bilateral low back pain without sciatica  -     XR spine lumbar minimum 4 views non injury; Future  -     Ambulatory referral to Physical Therapy; Future  -     ketorolac (TORADOL) 60 mg/2 mL IM injection 60 mg    Strain of left piriformis muscle, initial encounter  -     XR hip/pelv 2-3 vws left if performed; Future  -     Ambulatory referral to Physical Therapy; Future  -     ketorolac (TORADOL) 60 mg/2 mL IM injection 60 mg              Subjective:      Patient ID: Mita Arizmendi is a 80 y o  female  CC:    Chief Complaint   Patient presents with    Back Pain     Patient present today for lower back pain radiating to her left hip for the past month and it is getting worse  Per pt she will like to get x-rays done since she feels something pulling when she walks         HPI:    l sided back and hip pain, radiates 1/2 way down to l knee, sometimes feels like balance might go, pain is  Over l gluteal area      The following portions of the patient's history were reviewed and updated as appropriate: allergies, current medications, past family history, past medical history, past social history, past surgical history and problem list         Review of Systems   Constitutional: Negative for activity change, appetite change and unexpected weight change  Respiratory: Negative for cough and shortness of breath  Cardiovascular: Negative for chest pain and leg swelling  Musculoskeletal: Positive for arthralgias and back pain  L hip and glute pain   Neurological: Negative for dizziness and headaches  Gait abnormality         Data to review:       Objective:    Vitals:    11/15/19 0812 11/15/19 0842   BP: (!) 178/80 138/82   BP Location: Left arm Left arm   Patient Position: Sitting Sitting   Cuff Size: Standard Standard   Pulse: 64    Resp: 18    Temp: 97 8 °F (36 6 °C)    TempSrc: Temporal    Weight: 102 kg (224 lb)    Height: 5' 2" (1 575 m)         Physical Exam   Constitutional: She appears well-developed and well-nourished  Cardiovascular: Normal rate, regular rhythm and normal heart sounds  Pulmonary/Chest: Effort normal and breath sounds normal    Musculoskeletal: She exhibits tenderness  Tenderness l glute and piriformis   Vitals reviewed

## 2019-11-22 DIAGNOSIS — G89.29 CHRONIC BILATERAL LOW BACK PAIN WITHOUT SCIATICA: Primary | ICD-10-CM

## 2019-11-22 DIAGNOSIS — M54.50 CHRONIC BILATERAL LOW BACK PAIN WITHOUT SCIATICA: Primary | ICD-10-CM

## 2019-11-22 DIAGNOSIS — M16.12 PRIMARY OSTEOARTHRITIS OF LEFT HIP: ICD-10-CM

## 2019-12-09 ENCOUNTER — OFFICE VISIT (OUTPATIENT)
Dept: FAMILY MEDICINE CLINIC | Facility: CLINIC | Age: 84
End: 2019-12-09
Payer: COMMERCIAL

## 2019-12-09 VITALS
DIASTOLIC BLOOD PRESSURE: 68 MMHG | RESPIRATION RATE: 16 BRPM | HEART RATE: 68 BPM | HEIGHT: 62 IN | WEIGHT: 218 LBS | SYSTOLIC BLOOD PRESSURE: 114 MMHG | BODY MASS INDEX: 40.12 KG/M2

## 2019-12-09 DIAGNOSIS — M16.12 PRIMARY OSTEOARTHRITIS OF LEFT HIP: ICD-10-CM

## 2019-12-09 DIAGNOSIS — R60.0 BILATERAL LEG EDEMA: ICD-10-CM

## 2019-12-09 DIAGNOSIS — M54.50 CHRONIC BILATERAL LOW BACK PAIN WITHOUT SCIATICA: Primary | ICD-10-CM

## 2019-12-09 DIAGNOSIS — R53.83 OTHER FATIGUE: ICD-10-CM

## 2019-12-09 DIAGNOSIS — G89.29 CHRONIC BILATERAL LOW BACK PAIN WITHOUT SCIATICA: Primary | ICD-10-CM

## 2019-12-09 PROCEDURE — 99214 OFFICE O/P EST MOD 30 MIN: CPT | Performed by: FAMILY MEDICINE

## 2019-12-09 NOTE — PROGRESS NOTES
BMI Counseling: Body mass index is 39 87 kg/m²  The BMI is above normal  Nutrition recommendations include reducing portion sizes, decreasing overall calorie intake, 3-5 servings of fruits/vegetables daily, reducing fast food intake and consuming healthier snacks  Exercise recommendations include exercising 3-5 times per week  Assessment and Plan:    Problem List Items Addressed This Visit        Other    Chronic low back pain without sciatica - Primary     Await ortho eval         Relevant Orders    Ambulatory referral to Orthopedic Surgery    Bilateral leg edema     Check with kidney doc about whether should take 2 lasix/day           Other Visit Diagnoses     Primary osteoarthritis of left hip        Relevant Orders    Ambulatory referral to Orthopedic Surgery    Other fatigue        Relevant Orders    CBC and differential    TSH, 3rd generation                 Diagnoses and all orders for this visit:    Chronic bilateral low back pain without sciatica  -     Ambulatory referral to Orthopedic Surgery; Future    Primary osteoarthritis of left hip  -     Ambulatory referral to Orthopedic Surgery; Future    Other fatigue  -     CBC and differential; Future  -     TSH, 3rd generation; Future    Bilateral leg edema              Subjective:      Patient ID: Ericka Bah is a 80 y o  female  CC:    Chief Complaint   Patient presents with    Follow-up     Pt here today for a follow up  R Harman       HPI:    Here for f/u lipids and BS-lipids up atad, sugar stable, slight decline in kidney function, seeing kidney doc, more tired lately and back hurting her more-cancelled ortho appt last week due to bad weather      The following portions of the patient's history were reviewed and updated as appropriate: allergies, current medications, past family history, past medical history, past social history, past surgical history and problem list       Review of Systems   Constitutional: Positive for fatigue   Negative for activity change, appetite change and unexpected weight change  Respiratory: Negative for shortness of breath  Cardiovascular: Negative for chest pain  Musculoskeletal: Positive for arthralgias and back pain  Hip pain   Neurological: Negative for dizziness and headaches  Data to review:       Objective:    Vitals:    12/09/19 1205   BP: 114/68   BP Location: Left arm   Patient Position: Sitting   Cuff Size: Large   Pulse: 68   Resp: 16   Weight: 98 9 kg (218 lb)   Height: 5' 2" (1 575 m)        Physical Exam   Constitutional: She appears well-developed and well-nourished  Neck: No thyromegaly present  Cardiovascular: Normal rate, regular rhythm and normal heart sounds  Pulmonary/Chest: Effort normal and breath sounds normal    Musculoskeletal: She exhibits edema  Lymphadenopathy:     She has no cervical adenopathy  Vitals reviewed

## 2019-12-10 LAB
ALBUMIN SERPL-MCNC: 4 G/DL (ref 3.6–5.1)
ALBUMIN/GLOB SERPL: 1.5 (CALC) (ref 1–2.5)
ALP SERPL-CCNC: 86 U/L (ref 33–130)
ALT SERPL-CCNC: 14 U/L (ref 6–29)
AST SERPL-CCNC: 21 U/L (ref 10–35)
BILIRUB SERPL-MCNC: 0.8 MG/DL (ref 0.2–1.2)
BUN SERPL-MCNC: 16 MG/DL (ref 7–25)
BUN/CREAT SERPL: 15 (CALC) (ref 6–22)
CALCIUM SERPL-MCNC: 9.2 MG/DL (ref 8.6–10.4)
CHLORIDE SERPL-SCNC: 99 MMOL/L (ref 98–110)
CHOLEST SERPL-MCNC: 221 MG/DL
CHOLEST/HDLC SERPL: 4.3 (CALC)
CO2 SERPL-SCNC: 28 MMOL/L (ref 20–32)
CREAT SERPL-MCNC: 1.08 MG/DL (ref 0.6–0.88)
GLOBULIN SER CALC-MCNC: 2.6 G/DL (CALC) (ref 1.9–3.7)
GLUCOSE SERPL-MCNC: 112 MG/DL (ref 65–99)
HBA1C MFR BLD: 6.7 % OF TOTAL HGB
HDLC SERPL-MCNC: 51 MG/DL
LDLC SERPL CALC-MCNC: 147 MG/DL (CALC)
NONHDLC SERPL-MCNC: 170 MG/DL (CALC)
POTASSIUM SERPL-SCNC: 4.4 MMOL/L (ref 3.5–5.3)
PROT SERPL-MCNC: 6.6 G/DL (ref 6.1–8.1)
REF LAB TEST NAME: NORMAL
REF LAB TEST: NORMAL
SL AMB CLIENT CONTACT: NORMAL
SL AMB EGFR AFRICAN AMERICAN: 54 ML/MIN/1.73M2
SL AMB EGFR NON AFRICAN AMERICAN: 47 ML/MIN/1.73M2
SODIUM SERPL-SCNC: 133 MMOL/L (ref 135–146)
TRIGL SERPL-MCNC: 117 MG/DL
TSH SERPL-ACNC: 2.13 MIU/L (ref 0.4–4.5)

## 2019-12-13 ENCOUNTER — OFFICE VISIT (OUTPATIENT)
Dept: NEPHROLOGY | Facility: CLINIC | Age: 84
End: 2019-12-13
Payer: COMMERCIAL

## 2019-12-13 ENCOUNTER — OFFICE VISIT (OUTPATIENT)
Dept: OBGYN CLINIC | Facility: CLINIC | Age: 84
End: 2019-12-13
Payer: COMMERCIAL

## 2019-12-13 VITALS
WEIGHT: 218 LBS | BODY MASS INDEX: 38.62 KG/M2 | HEIGHT: 63 IN | SYSTOLIC BLOOD PRESSURE: 190 MMHG | DIASTOLIC BLOOD PRESSURE: 72 MMHG | HEART RATE: 80 BPM

## 2019-12-13 VITALS
SYSTOLIC BLOOD PRESSURE: 170 MMHG | WEIGHT: 217 LBS | HEIGHT: 63 IN | DIASTOLIC BLOOD PRESSURE: 80 MMHG | HEART RATE: 80 BPM | BODY MASS INDEX: 38.45 KG/M2

## 2019-12-13 DIAGNOSIS — I12.9 BENIGN HYPERTENSION WITH CKD (CHRONIC KIDNEY DISEASE) STAGE III (HCC): ICD-10-CM

## 2019-12-13 DIAGNOSIS — G89.29 CHRONIC BILATERAL LOW BACK PAIN WITHOUT SCIATICA: Primary | ICD-10-CM

## 2019-12-13 DIAGNOSIS — M16.12 PRIMARY OSTEOARTHRITIS OF LEFT HIP: ICD-10-CM

## 2019-12-13 DIAGNOSIS — E87.1 HYPONATREMIA: ICD-10-CM

## 2019-12-13 DIAGNOSIS — N18.30 CKD (CHRONIC KIDNEY DISEASE), STAGE III (HCC): Primary | ICD-10-CM

## 2019-12-13 DIAGNOSIS — N18.30 BENIGN HYPERTENSION WITH CKD (CHRONIC KIDNEY DISEASE) STAGE III (HCC): ICD-10-CM

## 2019-12-13 DIAGNOSIS — R60.0 BILATERAL LEG EDEMA: ICD-10-CM

## 2019-12-13 DIAGNOSIS — M54.50 CHRONIC BILATERAL LOW BACK PAIN WITHOUT SCIATICA: Primary | ICD-10-CM

## 2019-12-13 PROCEDURE — 99214 OFFICE O/P EST MOD 30 MIN: CPT | Performed by: INTERNAL MEDICINE

## 2019-12-13 PROCEDURE — 99203 OFFICE O/P NEW LOW 30 MIN: CPT | Performed by: PHYSICAL MEDICINE & REHABILITATION

## 2019-12-13 RX ORDER — LISINOPRIL 5 MG/1
5 TABLET ORAL DAILY
Qty: 30 TABLET | Refills: 5 | Status: SHIPPED | OUTPATIENT
Start: 2019-12-13 | End: 2020-08-10

## 2019-12-13 NOTE — PROGRESS NOTES
1  Chronic bilateral low back pain without sciatica  Ambulatory referral to Orthopedic Surgery    Ambulatory referral to Physical Therapy   2  Primary osteoarthritis of left hip  Ambulatory referral to Orthopedic Surgery    Ambulatory referral to Physical Therapy     Orders Placed This Encounter   Procedures    Ambulatory referral to Physical Therapy      Imaging Studies (I personally reviewed images in PACS and report):  Lumbar spine and left hip/pelvis x-rays dated 11/15/2019  These images show severe degenerative changes throughout the lumbar spine with disc space narrowing and posterior element hypertrophy  There are osteoarthritic changes in the left hip that are moderate  No acute osseous abnormalities  Impression:  Chronic low back pain likely secondary to severe spondylosis  We discussed different treatment options and decided to proceed with physical therapy  In terms of pain control, she is limited with her kidney disease  She can take Tylenol as needed up to 3000 mg in 24 hours  I will see her back in about 3 weeks  Return in about 4 weeks (around 1/10/2020)  HPI:  Junior Ca is a 80 y o  female  who presents for evaluation of No chief complaint on file  Onset/Mechanism: Chronic pain for many years that she had been seeing a chiropractor for  She was told she had a tight muscle  Location: Both sides of the low back  Radiation: Denies  Quality: Aching  Provocative: Walking long distances  Severity: Getting worse  Associated Symptoms: Denies numbness/tingling/weakness in the legs  She does get fatigued when walking for long distances  Treatment so far: Chiropractic care  Review of Systems   Constitutional: Positive for activity change  Negative for fever  HENT: Negative for trouble swallowing  Eyes: Negative for visual disturbance  Respiratory: Negative for shortness of breath  Cardiovascular: Negative for chest pain     Gastrointestinal: Negative for abdominal pain         Denies bowel incontinence  Endocrine: Negative for polydipsia  Genitourinary:        Denies urinary incontinence  Musculoskeletal: Positive for back pain  Skin: Negative for rash  Allergic/Immunologic: Negative for immunocompromised state  Neurological:        Denies saddle anesthesia  Hematological: Does not bruise/bleed easily  Psychiatric/Behavioral: Negative for confusion  Following history reviewed and updated:  Past Medical History:   Diagnosis Date    Anemia     Anxiety     CKD (chronic kidney disease), stage III ([facility] ) 11/29/2018    Diabetes mellitus ([facility] )     Diabetic peripheral neuropathy (HCC)     Hyperlipidemia     Hypertension     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 8/23/2017    Thyroid enlarged 2/23/2015    Tinnitus     Vertigo      Past Surgical History:   Procedure Laterality Date    EAR SURGERY Left     KNEE SURGERY       Social History   Social History     Substance and Sexual Activity   Alcohol Use Not Currently     Social History     Substance and Sexual Activity   Drug Use No     Social History     Tobacco Use   Smoking Status Never Smoker   Smokeless Tobacco Never Used     Family History   Problem Relation Age of Onset    Diabetes Mother     Heart disease Mother     Heart disease Father     Stroke Father     Cancer Family      Allergies   Allergen Reactions    Hydrochlorothiazide      Hyponatremia, severe    Statins         Constitutional:  BP (!) 190/72 (BP Location: Right arm, Patient Position: Sitting, Cuff Size: Standard)   Pulse 80   Ht 5' 3" (1 6 m)   Wt 98 9 kg (218 lb)   BMI 38 62 kg/m²    General: NAD  Eyes: Anicteric sclerae  Neck: Supple  Lungs: Unlabored breathing  Cardiovascular: No lower extremity edema  Skin: Intact without erythema  Neurologic: Sensation intact to light touch    Psychiatric: Mood and affect are appropriate  Back Exam     Comments: Inspection: No obvious deformities, lesions or rashes  ROM: Lumbar flexion limited, extension to full for her  Rotation and side-bending are within normal limits for  Palpation:  Tender to palpation across bilateral lower lumbar spinal   There are no step offs  Neuro:  Normal neurological exam   Appropriate strength with bilateral hip flexion, knee extension, ankle dorsiflexion, great toe extension and ankle plantarflexion  Sensation is intact in dermatomes L2-S1  Patellar and Achilles reflexes are hypoactive and equal bilaterally  No clonus  Special tests: Normal bilateral straight leg raise, dural stretch  Positive Thurman's maneuver, FABERE  Procedures - none for this visit  This document was recorded using voice recognition software and errors may be noted

## 2019-12-13 NOTE — PROGRESS NOTES
NEPHROLOGY OUTPATIENT PROGRESS NOTE   Venessa Elizabeth 80 y o  female MRN: [id number]  DATE: 12/13/2019  Reason for visit:   Chief Complaint   Patient presents with    Follow-up    Chronic Kidney Disease    Hyponatremia     ASSESSMENT and PLAN:  CKD stage IIIA, baseline creatinine 0 9 to 1  -CKD likely secondary to long-term hypertension and diabetes  -last creatinine 1 0 in December 2019 overall stable  -avoid nephrotoxins or NSAIDs  -urinalysis in January 2019 shows no proteinuria or hematuria  Upc ratio 127 mg in June 2019       Hypertension  -blood pressure uncontrolled in the office today  Currently remains on carvedilol and diltiazem  Will start lisinopril 5 mg daily  Repeat BMP in couple weeks after starting lisinopril    -she has wrist BP cuff, advised her to check BP with upper arm BP cuff and call back if BP remains persistently greater than 140/90      Hyponatremia  -initially hyponatremia thought to be secondary to hypovolemia/HCTZ +/- SIADH component  -she remains off HCTZ , she has somewhat fluctuating serum sodium with last serum sodium 133 in December 2019    -she admits to be drinking around 65 to 70 oz liquid daily   Recommended to have fluid restriction less than 50 oz per day  -increase Lasix to 20 mg b i d  For next three days and then continue 20 mg daily afterwards   -check BMP before next visit  -previous workup includes serum osmolality 252, urine sodium 45, urine osmolality 388    -will do repeat workup including serum osmolality, urine osmolality, urine sodium, serum uric acid      Leg edema, she has overall lost 6 lb since last visit  She still feels she has off and on worsened leg edema issues  Will increase Lasix as above for next three days and then continue current dosing  Elevate legs while in bed, compression stockings as tolerated      Diagnoses and all orders for this visit:    Benign hypertension with CKD (chronic kidney disease) stage III ([facility])  -     lisinopril (ZESTRIL) 5 mg tablet; Take 1 tablet (5 mg total) by mouth daily    CKD (chronic kidney disease), stage III ([facility] )  -     Basic metabolic panel; Future  -     Basic metabolic panel  -     Basic metabolic panel; Future  -     CBC; Future  -     Phosphorus; Future  -     Microalbumin / creatinine urine ratio; Future  -     UA (URINE) with reflex to Scope; Future  -     Magnesium; Future  -     Basic metabolic panel  -     CBC  -     Phosphorus  -     UA (URINE) with reflex to Scope  -     Magnesium    Bilateral leg edema    Hyponatremia  -     Osmolality-If this is regarding a toxic alcohol, STOP  Test is not routinely indicated  Please consult medical  on call for further guidance ; Future  -     Osmolality, urine; Future  -     Sodium, urine, random; Future  -     Uric acid; Future  -     Osmolality-If this is regarding a toxic alcohol, STOP  Test is not routinely indicated  Please consult medical  on call for further guidance  -     Osmolality, urine  -     Sodium, urine, random  -     Uric acid  -     Basic metabolic panel; Future  -     Basic metabolic panel        SUBJECTIVE / HPI:  Patient is a 80-year-old female with significant medical issues of hypertension for many years, prior history of long-term diabetes and now remains of antidiabetic medication, CKD stage IIIA with baseline creatinine 0 9 to 1, chronic back pain issues, history of hyponatremia, comes for regular follow-up  Patient last serum creatinine 1 0 overall stable at baseline  Last serum sodium 133 in December 2019  She admits to be drinking 65 to 70 oz of liquid daily  Patient has chronic back pain issues which is overall stable  She denies taking any NSAIDs  Denies any urinary complaint  Denies any chest pain or shortness of breath  No nausea or vomiting  REVIEW OF SYSTEMS:  More than 10 point review of systems were obtained and discussed in length with the patient   Complete review of systems were negative / unremarkable except mentioned above  PHYSICAL EXAM:  Vitals:    12/13/19 1458 12/13/19 1528   BP: (!) 182/100 170/80   BP Location: Left arm    Patient Position: Sitting    Cuff Size: Adult    Pulse: 80    Weight: 98 4 kg (217 lb)    Height: 5' 3" (1 6 m)      Body mass index is 38 44 kg/m²  Physical Exam   Constitutional: She is oriented to person, place, and time  She appears well-developed and well-nourished  HENT:   Head: Normocephalic and atraumatic  Right Ear: External ear normal    Left Ear: External ear normal    Eyes: Pupils are equal, round, and reactive to light  Conjunctivae and EOM are normal    Neck: Neck supple  No JVD present  Cardiovascular: Normal rate and normal heart sounds  Pulmonary/Chest: Effort normal and breath sounds normal  She has no wheezes  She has no rales  Abdominal: Soft  Bowel sounds are normal  She exhibits no distension  There is no tenderness  Musculoskeletal: She exhibits edema (Trace edema in legs, nonpitting component present)  She exhibits no tenderness  Neurological: She is alert and oriented to person, place, and time  Skin: Skin is warm and dry  No rash noted  Psychiatric: She has a normal mood and affect  Her behavior is normal    Vitals reviewed        PAST MEDICAL HISTORY:  Past Medical History:   Diagnosis Date    Anemia     Anxiety     Arthritis     CKD (chronic kidney disease), stage III ([address] ) 11/29/2018    Diabetes mellitus ([address] )     Diabetic peripheral neuropathy (HCC)     Hyperlipidemia     Hypertension     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 8/23/2017    Thyroid enlarged 2/23/2015    Tinnitus     Vertigo        PAST SURGICAL HISTORY:  Past Surgical History:   Procedure Laterality Date    EAR SURGERY Left     KNEE SURGERY         SOCIAL HISTORY:  Social History     Substance and Sexual Activity   Alcohol Use Not Currently Social History     Substance and Sexual Activity   Drug Use No     Social History     Tobacco Use   Smoking Status Never Smoker   Smokeless Tobacco Never Used       FAMILY HISTORY:  Family History   Problem Relation Age of Onset    Diabetes Mother     Heart disease Mother     Heart disease Father     Stroke Father     Cancer Family        MEDICATIONS:    Current Outpatient Medications:     ALPRAZolam (XANAX) 0 5 mg tablet, Take 1 tablet (0 5 mg total) by mouth 2 (two) times a day as needed for anxiety for up to 10 days, Disp: 20 tablet, Rfl: 0    aspirin 81 MG tablet, Take 1 tablet by mouth daily, Disp: , Rfl:     Blood Glucose Monitoring Suppl (ONE TOUCH ULTRA 2) w/Device KIT, by Does not apply route daily, Disp: 1 each, Rfl: 0    carvedilol (COREG) 25 mg tablet, TAKE 1 TABLET BY MOUTH TWICE DAILY, Disp: 60 tablet, Rfl: 5    Cholecalciferol (VITAMIN D3) 5000 units CAPS, Take 1 capsule by mouth daily, Disp: , Rfl:     dicyclomine (BENTYL) 10 mg capsule, Take 1 capsule (10 mg total) by mouth 4 (four) times a day (before meals and at bedtime), Disp: 120 capsule, Rfl: 3    diltiazem (CARDIZEM CD) 240 mg 24 hr capsule, Take 1 capsule (240 mg total) by mouth daily, Disp: 90 capsule, Rfl: 3    docusate sodium (COLACE) 100 mg capsule, Take 1 capsule (100 mg total) by mouth 2 (two) times a day, Disp: 10 capsule, Rfl: 0    furosemide (LASIX) 20 mg tablet, TAKE 1 TABLET BY MOUTH ONCE DAILY, Disp: 90 tablet, Rfl: 3    Glucos-Chondroit-Hyaluron-MSM (GLUCOSAMINE CHONDROITIN JOINT) TABS, Take by mouth, Disp: , Rfl:     glucose blood (ONE TOUCH ULTRA TEST) test strip, Test daily                        DX: E11 9, Disp: 100 each, Rfl: 1    Multiple Vitamin (MULTI-VITAMIN DAILY PO), Take 1 tablet by mouth daily, Disp: , Rfl:     ONE TOUCH LANCETS MISC, by Does not apply route daily Test daily                  DX:E11 9, Disp: 100 each, Rfl: 1    Probiotic Product (PROBIOTIC DAILY PO), Take by mouth, Disp: , Rfl:   lisinopril (ZESTRIL) 5 mg tablet, Take 1 tablet (5 mg total) by mouth daily, Disp: 30 tablet, Rfl: 5    Lab Results:   Results for orders placed or performed in visit on 12/06/19   Lipid Panel with Direct LDL reflex   Result Value Ref Range    Total Cholesterol 221 (H) <200 mg/dL    HDL 51 >50 mg/dL    Triglycerides 117 <150 mg/dL    LDL Direct 147 (H) mg/dL (calc)    Chol HDLC Ratio 4 3 <5 0 (calc)    Non-HDL Cholesterol 170 (H) <130 mg/dL (calc)   Comprehensive metabolic panel   Result Value Ref Range    Glucose, Random 112 (H) 65 - 99 mg/dL    BUN 16 7 - 25 mg/dL    Creatinine 1 08 (H) 0 60 - 0 88 mg/dL    eGFR Non  47 (L) > OR = 60 mL/min/1 73m2    eGFR  54 (L) > OR = 60 mL/min/1 73m2    SL AMB BUN/CREATININE RATIO 15 6 - 22 (calc)    Sodium 133 (L) 135 - 146 mmol/L    Potassium 4 4 3 5 - 5 3 mmol/L    Chloride 99 98 - 110 mmol/L    CO2 28 20 - 32 mmol/L    SL AMB CALCIUM 9 2 8 6 - 10 4 mg/dL    Protein, Total 6 6 6 1 - 8 1 g/dL    Albumin 4 0 3 6 - 5 1 g/dL    Globulin 2 6 1 9 - 3 7 g/dL (calc)    Albumin/Globulin Ratio 1 5 1 0 - 2 5 (calc)    TOTAL BILIRUBIN 0 8 0 2 - 1 2 mg/dL    Alkaline Phosphatase 86 33 - 130 U/L    AST 21 10 - 35 U/L    ALT 14 6 - 29 U/L   Hemoglobin A1c (w/out EAG)   Result Value Ref Range    Hemoglobin A1C 6 7 (H) <5 7 % of total Hgb   Test Authorization   Result Value Ref Range    Test Name  TSH     Test Code  [id number]     Client Contact LOWELL MILES     Report Always Message Signature      Always Message     TSH, 3rd generation   Result Value Ref Range    TSH 2 13 0 40 - 4 50 mIU/L

## 2019-12-19 ENCOUNTER — TELEPHONE (OUTPATIENT)
Dept: NEPHROLOGY | Facility: CLINIC | Age: 84
End: 2019-12-19

## 2019-12-19 NOTE — TELEPHONE ENCOUNTER
Left message for patient that appointment was cancelled due to physician not being available and patient put on April recall list  Left office information for a phone call back

## 2019-12-23 ENCOUNTER — APPOINTMENT (OUTPATIENT)
Dept: PHYSICAL THERAPY | Facility: REHABILITATION | Age: 84
End: 2019-12-23
Payer: COMMERCIAL

## 2019-12-23 DIAGNOSIS — I10 ESSENTIAL HYPERTENSION: ICD-10-CM

## 2019-12-23 DIAGNOSIS — E87.1 HYPONATREMIA: ICD-10-CM

## 2019-12-23 RX ORDER — CARVEDILOL 25 MG/1
25 TABLET ORAL 2 TIMES DAILY
Qty: 180 TABLET | Refills: 1 | Status: SHIPPED | OUTPATIENT
Start: 2019-12-23 | End: 2020-04-24

## 2019-12-23 RX ORDER — DICYCLOMINE HYDROCHLORIDE 10 MG/1
10 CAPSULE ORAL
Qty: 360 CAPSULE | Refills: 1 | Status: SHIPPED | OUTPATIENT
Start: 2019-12-23 | End: 2020-04-24

## 2019-12-27 ENCOUNTER — APPOINTMENT (OUTPATIENT)
Dept: PHYSICAL THERAPY | Facility: REHABILITATION | Age: 84
End: 2019-12-27
Payer: COMMERCIAL

## 2020-01-10 ENCOUNTER — EVALUATION (OUTPATIENT)
Dept: PHYSICAL THERAPY | Facility: REHABILITATION | Age: 85
End: 2020-01-10
Payer: COMMERCIAL

## 2020-01-10 DIAGNOSIS — M54.50 CHRONIC BILATERAL LOW BACK PAIN WITHOUT SCIATICA: Primary | ICD-10-CM

## 2020-01-10 DIAGNOSIS — M16.12 PRIMARY OSTEOARTHRITIS OF LEFT HIP: ICD-10-CM

## 2020-01-10 DIAGNOSIS — G89.29 CHRONIC BILATERAL LOW BACK PAIN WITHOUT SCIATICA: Primary | ICD-10-CM

## 2020-01-10 PROCEDURE — 97162 PT EVAL MOD COMPLEX 30 MIN: CPT | Performed by: PHYSICAL THERAPIST

## 2020-01-10 PROCEDURE — 97110 THERAPEUTIC EXERCISES: CPT | Performed by: PHYSICAL THERAPIST

## 2020-01-10 NOTE — PROGRESS NOTES
PT Evaluation     Today's date: 1/10/2020  Patient name: Charlie Finley  : 1934  MRN: [id number]  Referring provider: Jaswant Tidwell DO  Dx:   Encounter Diagnosis     ICD-10-CM    1  Chronic bilateral low back pain without sciatica M54 5 Ambulatory referral to Physical Therapy    G89 29    2  Primary osteoarthritis of left hip M16 12 Ambulatory referral to Physical Therapy                  Assessment  Assessment details: Inder Mendez is an 80 y o  Female presenting to PT w/ a 4-5 year history of posterior belt-line LBP w/o radicular symptoms which has been worsening since onset  Her signs and symptoms and clinical exam findings are consistent with lumbar facet dysfunction secondary to lumbar spine degenerative changes  She presents with impairments such as reduced ROM, reduced physical strength, pain, and impaired balance  These impairments are leading to the below listed functional limitations  Pt would benefit from skilled PT services to address the below listed impairments and functional limitations to encourage return to PLOF  Impairments: abnormal gait, abnormal or restricted ROM, abnormal movement, impaired balance, impaired physical strength, lacks appropriate home exercise program and pain with function  Functional limitations: Difficulty with prolonged standing, walking, lifting/carrying/pushing/pulling objects Understanding of Dx/Px/POC: good   Prognosis: good    Goals  STG: Pt will increase lumbar ROM by 25% in all deficient planes in 4 weeks  STG: Pt will increase LE strength by 1/2 MMT grades in all deficient planes in 4 weeks  STG: Pt will subjectively report pain at worst decreased by 2 points in 4 weeks  STG: Pt will be I w/ initial HEP in 2 weeks  LTG: Pt will be able to lift and carry 10# 20' x 2 w/ <2/10 LBP in 10 weeks to demonstrate ability to complete household duties     LTG: Pt will demonstrate TUG and 5xSTS tests reduced by 10-15 seconds to demonstrate decreased falls risk and increased functional strength in 10 weeks  LTG: Pt will be I w/ comprehensive HEP by d/c  Plan  Patient would benefit from: skilled physical therapy and PT eval  Planned modality interventions: TENS, thermotherapy: hydrocollator packs and cryotherapy  Planned therapy interventions: manual therapy, joint mobilization, massage, neuromuscular re-education, strengthening, stretching, therapeutic activities, therapeutic exercise, therapeutic training, balance, flexibility, functional ROM exercises, graded activity, graded exercise, graded motor and home exercise program  Frequency: 2x week  Duration in visits: 20  Duration in weeks: 10  Plan of Care beginning date: 1/10/2020  Plan of Care expiration date: 3/20/2020  Treatment plan discussed with: patient        Subjective Evaluation    History of Present Illness  Mechanism of injury: Reports 4-5 year history of lower back pain w/o LE symptoms  She states that sitting relieves her symptoms but when she is standing, walking, or completing housework  She states that the symptoms she experiences increase linearly with workload  Denies bowel and bladder changes, N&T, or significant unplanned weight loss  States that her goals are to reduce her pain so that she is able to increase her activity tolerance     Pain  Current pain ratin  At best pain ratin  At worst pain ratin  Quality: throbbing, tight, pressure, pulling and dull ache  Aggravating factors: standing, walking, stair climbing and lifting      Diagnostic Tests  X-ray: abnormal (Severe degenerative changes of the lumbar spine)  Patient Goals  Patient goals for therapy: increased strength, decreased pain, improved balance, increased motion and return to sport/leisure activities          Objective     Concurrent Complaints  Negative for night pain, disturbed sleep, bladder dysfunction, bowel dysfunction and saddle (S4) numbness    Neurological Testing     Sensation     Lumbar   Left   Intact: light touch    Right   Intact: light touch    Reflexes   Left   Patellar (L4): trace (1+)  Achilles (S1): trace (1+)    Right   Patellar (L4): trace (1+)  Achilles (S1): trace (1+)    Active Range of Motion     Lumbar   Flexion:  Restriction level: minimal  Extension:  Restriction level: moderate  Left lateral flexion:  Restriction level: moderate  Right lateral flexion:  Restriction level: moderate  Left rotation:  Restriction level: moderate  Right rotation:  Restriction level: moderate  Mechanical Assessment    Cervical      Thoracic      Lumbar    Standing extension: repeated movements  Pain intensity: better    Strength/Myotome Testing     Left Hip   Planes of Motion   Flexion: 3+    Right Hip   Planes of Motion   Flexion: 3+    Left Knee   Flexion: 4-  Extension: 4    Right Knee   Flexion: 4-  Extension: 4    Left Ankle/Foot   Plantar flexion: 4-  Great toe extension: 4    Right Ankle/Foot   Plantar flexion: 4-  Great toe extension: 4    Tests     Lumbar     Left   Negative passive SLR  Right   Negative passive SLR  Left Pelvic Girdle/Sacrum   Negative: thigh thrust      Right Pelvic Girdle/Sacrum   Negative: thigh thrust      Left Hip   Negative QUINTIN and FADIR  Right Hip   Negative QUINTIN and FADIR       Ambulation     Comments   Ambulates slowly w/ SPC in left hand, bilateral trendelenburg     Functional Assessment        Comments  1/10/2020:   TU 48 s  5x STS: 33 14 s             Precautions: HTN, peripheral neuropathy, falls risk      Manual  1/10                                                                                 Exercise Diary  1/10            SKTC 5x5"            LTR 5x5"            EIS x20            Clamshells 8x5"            Standing ext/abd             Supine bridging             Supine clamshell             STS Modalities

## 2020-01-13 ENCOUNTER — OFFICE VISIT (OUTPATIENT)
Dept: PHYSICAL THERAPY | Facility: REHABILITATION | Age: 85
End: 2020-01-13
Payer: COMMERCIAL

## 2020-01-13 DIAGNOSIS — M16.12 PRIMARY OSTEOARTHRITIS OF LEFT HIP: ICD-10-CM

## 2020-01-13 DIAGNOSIS — G89.29 CHRONIC BILATERAL LOW BACK PAIN WITHOUT SCIATICA: Primary | ICD-10-CM

## 2020-01-13 DIAGNOSIS — M54.50 CHRONIC BILATERAL LOW BACK PAIN WITHOUT SCIATICA: Primary | ICD-10-CM

## 2020-01-13 PROCEDURE — 97110 THERAPEUTIC EXERCISES: CPT | Performed by: PHYSICAL THERAPIST

## 2020-01-13 PROCEDURE — 97112 NEUROMUSCULAR REEDUCATION: CPT | Performed by: PHYSICAL THERAPIST

## 2020-01-13 NOTE — PROGRESS NOTES
Daily Note     Today's date: 2020  Patient name: Anthony Carrasco  : 1934  MRN: [id number]  Referring provider: Kale Price DO  Dx:   Encounter Diagnosis     ICD-10-CM    1  Chronic bilateral low back pain without sciatica M54 5     G89 29    2  Primary osteoarthritis of left hip M16 12                   Subjective: States that she feels okay, has been compliant w/ HEP  Objective: See treatment diary below      Assessment: Significant glute weakness demonstrated throughout session, most noticeable with severe trendelenburg during step-up exercise, occasionally requiring assistance to completed this exercise  She would benefit from continued PT services to reduce risk of falls and increase level of function  Plan: Continue per plan of care        Precautions: HTN, peripheral neuropathy, falls risk      Manual  1/10 1/13                                                                                Exercise Diary  1/10 1/13           SKTC 5x5" Reviewed           LTR 5x5" Reviewed           EIS x20 Reviewed           Clamshells 8x5" Reviewed            Standing ext/abd  2x15 ea           Supine bridging  3x6x5"           Supine clamshell  YH 3x10x5"           STS  3x10 from low mat           Step up  4" x5 ea                                                                                         Bike  8'                                                                   Modalities

## 2020-01-15 ENCOUNTER — OFFICE VISIT (OUTPATIENT)
Dept: PHYSICAL THERAPY | Facility: REHABILITATION | Age: 85
End: 2020-01-15
Payer: COMMERCIAL

## 2020-01-15 DIAGNOSIS — G89.29 CHRONIC BILATERAL LOW BACK PAIN WITHOUT SCIATICA: Primary | ICD-10-CM

## 2020-01-15 DIAGNOSIS — M54.50 CHRONIC BILATERAL LOW BACK PAIN WITHOUT SCIATICA: Primary | ICD-10-CM

## 2020-01-15 DIAGNOSIS — M16.12 PRIMARY OSTEOARTHRITIS OF LEFT HIP: ICD-10-CM

## 2020-01-15 PROCEDURE — 97110 THERAPEUTIC EXERCISES: CPT | Performed by: PHYSICAL THERAPIST

## 2020-01-15 PROCEDURE — 97112 NEUROMUSCULAR REEDUCATION: CPT | Performed by: PHYSICAL THERAPIST

## 2020-01-15 NOTE — PROGRESS NOTES
Daily Note     Today's date: 1/15/2020  Patient name: Bebe Paiz  : 1934  MRN: [id number]  Referring provider: Hector Hill DO  Dx:   Encounter Diagnosis     ICD-10-CM    1  Chronic bilateral low back pain without sciatica M54 5     G89 29    2  Primary osteoarthritis of left hip M16 12                   Subjective: States that she was a little stiff this morning, but feels fine now  Objective: See treatment diary below      Assessment: Demonstrates improved tolerance to therex this session, requires mod VC for correct technique during exercise  Demonstrates moderate fatigue after exercise this session  She would benefit from continued PT services to reduce pain and increase level of function  Plan: Continue per plan of care        Precautions: HTN, peripheral neuropathy, falls risk      Manual  1/10 1/13 1/15                                                                               Exercise Diary  1/10 1/13 1/15          SKTC 5x5" Reviewed           LTR 5x5" Reviewed           EIS x20 Reviewed           Clamshells 8x5" Reviewed            Standing ext/abd  2x15 ea 2x15 ea          Supine bridging  3x6x5" 4x6x5"          Supine clamshell  YH 3x10x5" RH 3x10x5"          STS  3x10 from low mat 3x10 low mat          Step up  4" x5 ea           Marching   x30          Squats   x30                                                              Bike  8' 8'                                                                  Modalities

## 2020-01-20 ENCOUNTER — OFFICE VISIT (OUTPATIENT)
Dept: PHYSICAL THERAPY | Facility: REHABILITATION | Age: 85
End: 2020-01-20
Payer: COMMERCIAL

## 2020-01-20 DIAGNOSIS — M54.50 CHRONIC BILATERAL LOW BACK PAIN WITHOUT SCIATICA: Primary | ICD-10-CM

## 2020-01-20 DIAGNOSIS — G89.29 CHRONIC BILATERAL LOW BACK PAIN WITHOUT SCIATICA: Primary | ICD-10-CM

## 2020-01-20 DIAGNOSIS — M16.12 PRIMARY OSTEOARTHRITIS OF LEFT HIP: ICD-10-CM

## 2020-01-20 PROCEDURE — 97530 THERAPEUTIC ACTIVITIES: CPT | Performed by: PHYSICAL THERAPIST

## 2020-01-20 PROCEDURE — 97112 NEUROMUSCULAR REEDUCATION: CPT | Performed by: PHYSICAL THERAPIST

## 2020-01-20 PROCEDURE — 97110 THERAPEUTIC EXERCISES: CPT | Performed by: PHYSICAL THERAPIST

## 2020-01-20 NOTE — PROGRESS NOTES
Daily Note     Today's date: 2020  Patient name: Tucker Zhao  : 1934  MRN: [id number]  Referring provider: Meaghan Gordon DO  Dx:   Encounter Diagnosis     ICD-10-CM    1  Chronic bilateral low back pain without sciatica M54 5     G89 29    2  Primary osteoarthritis of left hip M16 12                   Subjective: Nothing new to report this session  Objective: See treatment diary below      Assessment: Demonstrates improved hip strength this session seen through ability to step up to 4" step repetitively w/o trendelenburg  Able to progress in hip strengthening program this session  She would benefit from continued PT services to reduce pain and increase level of function  Plan: Continue per plan of care        Precautions: HTN, peripheral neuropathy, falls risk      Manual  1/10 1/13 1/15 1/20                                                                              Exercise Diary  1/10 1/13 1/15 1/20         SKTC 5x5" Reviewed           LTR 5x5" Reviewed           EIS x20 Reviewed           Clamshells 8x5" Reviewed            Standing ext/abd  2x15 ea 2x15 ea 2# 3x15 ea         Supine bridging  3x6x5" 4x6x5" 3x8x5"         Supine clamshell  YH 3x10x5" RH 3x10x5" S/L 3x6x6"         STS  3x10 from low mat 3x10 low mat 3x12, 4#         Step up  4" x5 ea  4" x15 ea         Marching   x30 2# x30         Squats   x30 x30         Stepping    Over strap, lat/fwd/bck x10 ea                                                Bike  8' 8' 10'                                                                 Modalities

## 2020-01-22 ENCOUNTER — OFFICE VISIT (OUTPATIENT)
Dept: PHYSICAL THERAPY | Facility: REHABILITATION | Age: 85
End: 2020-01-22
Payer: COMMERCIAL

## 2020-01-22 DIAGNOSIS — M54.50 CHRONIC BILATERAL LOW BACK PAIN WITHOUT SCIATICA: Primary | ICD-10-CM

## 2020-01-22 DIAGNOSIS — M16.12 PRIMARY OSTEOARTHRITIS OF LEFT HIP: ICD-10-CM

## 2020-01-22 DIAGNOSIS — G89.29 CHRONIC BILATERAL LOW BACK PAIN WITHOUT SCIATICA: Primary | ICD-10-CM

## 2020-01-22 PROCEDURE — 97530 THERAPEUTIC ACTIVITIES: CPT | Performed by: PHYSICAL THERAPIST

## 2020-01-22 PROCEDURE — 97110 THERAPEUTIC EXERCISES: CPT | Performed by: PHYSICAL THERAPIST

## 2020-01-22 PROCEDURE — 97112 NEUROMUSCULAR REEDUCATION: CPT | Performed by: PHYSICAL THERAPIST

## 2020-01-22 NOTE — PROGRESS NOTES
Daily Note     Today's date: 2020  Patient name: Caro Mims  : 1934  MRN: [id number]  Referring provider: Elliott Perez DO  Dx:   Encounter Diagnosis     ICD-10-CM    1  Chronic bilateral low back pain without sciatica M54 5     G89 29    2  Primary osteoarthritis of left hip M16 12                   Subjective: States that she's starting to feel a little better, notices she can move around more without pain  Objective: See treatment diary below      Assessment: Megan Dorman has completed skilled PT services 2x/week for the last 2 weeks regarding lower back and hip pain  Over this time she has made objective improvements in hip and LE strength resulting in improved ability to navigate stairs, which she has not done in a long time, and walk in the community  She also subjectively reports being able to move around her home better and has less lower back pain overall  She would benefit from continued PT services to address her remaining impairments and functional limitations  Plan: Continue per plan of care        Precautions: HTN, peripheral neuropathy, falls risk      Manual  1/10 1/13 1/15 1/20                                                                              Exercise Diary  1/10 1/13 1/15 1/20 1/22        SKTC 5x5" Reviewed           LTR 5x5" Reviewed           EIS x20 Reviewed           Clamshells 8x5" Reviewed            Standing ext/abd  2x15 ea 2x15 ea 2# 3x15 ea HEP, reviewed         Supine bridging  3x6x5" 4x6x5" 3x8x5" HEP reviewed        Supine clamshell  YH 3x10x5" RH 3x10x5" S/L 3x6x6" HEP        STS  3x10 from low mat 3x10 low mat 3x12, 4# 3x12, 8# UE swing        Step up  4" x5 ea  4" x15 ea 6" x15 ea        Marching   x30 2# x30 2# x30        Squats   x30 x30 x30        Stepping    Over strap, lat/fwd/bck x10 ea x15 ea        Walking w/o AD     Fwd/bck 4 laps                                  Bike  8' 8' 10' 12' Modalities

## 2020-01-27 ENCOUNTER — OFFICE VISIT (OUTPATIENT)
Dept: PHYSICAL THERAPY | Facility: REHABILITATION | Age: 85
End: 2020-01-27
Payer: COMMERCIAL

## 2020-01-27 DIAGNOSIS — M54.50 CHRONIC BILATERAL LOW BACK PAIN WITHOUT SCIATICA: Primary | ICD-10-CM

## 2020-01-27 DIAGNOSIS — G89.29 CHRONIC BILATERAL LOW BACK PAIN WITHOUT SCIATICA: Primary | ICD-10-CM

## 2020-01-27 DIAGNOSIS — M16.12 PRIMARY OSTEOARTHRITIS OF LEFT HIP: ICD-10-CM

## 2020-01-27 PROCEDURE — 97112 NEUROMUSCULAR REEDUCATION: CPT | Performed by: PHYSICAL THERAPIST

## 2020-01-27 PROCEDURE — 97530 THERAPEUTIC ACTIVITIES: CPT | Performed by: PHYSICAL THERAPIST

## 2020-01-27 NOTE — PROGRESS NOTES
Daily Note     Today's date: 2020  Patient name: Anthony Carrasco  : 1934  MRN: [id number]  Referring provider: Kale Price DO  Dx:   Encounter Diagnosis     ICD-10-CM    1  Chronic bilateral low back pain without sciatica M54 5     G89 29    2  Primary osteoarthritis of left hip M16 12                   Subjective: Reports continued improvement  Objective: See treatment diary below      Assessment: Completed exercise with correct technique and without reports of pain  Demonstrated moderate fatigue after exercise  Progressed in balance activities this session  Pt would benefit from continued PT services to reduce pain and increase level of function  Plan: Continue per plan of care        Precautions: HTN, peripheral neuropathy, falls risk      Manual  1/10 1/13 1/15 1/20                                                                              Exercise Diary  1/10 1/13 1/15 1/20 1/22        SKTC 5x5" Reviewed           LTR 5x5" Reviewed           EIS x20 Reviewed           Clamshells 8x5" Reviewed            Standing ext/abd  2x15 ea 2x15 ea 2# 3x15 ea HEP, reviewed         Supine bridging  3x6x5" 4x6x5" 3x8x5" HEP reviewed        Supine clamshell  YH 3x10x5" RH 3x10x5" S/L 3x6x6" HEP        STS  3x10 from low mat 3x10 low mat 3x12, 4# 3x12, 8# UE swing x12 8# KB low mat, 3x6 frm chair       Step up  4" x5 ea  4" x15 ea 6" x15 ea 6" fwd/lat       Marching   x30 2# x30 2# x30        Squats   x30 x30 x30 x30       Stepping    Over strap, lat/fwd/bck x10 ea x15 ea x15 ea       Walking w/o AD     Fwd/bck 4 laps Fwd/bck 4 laps       Sidesteps      4 laps                    Bike  8' 8' 10' 12' VG L7 8'                                                               Modalities

## 2020-01-29 ENCOUNTER — OFFICE VISIT (OUTPATIENT)
Dept: PHYSICAL THERAPY | Facility: REHABILITATION | Age: 85
End: 2020-01-29
Payer: COMMERCIAL

## 2020-01-29 DIAGNOSIS — G89.29 CHRONIC BILATERAL LOW BACK PAIN WITHOUT SCIATICA: ICD-10-CM

## 2020-01-29 DIAGNOSIS — M54.50 CHRONIC BILATERAL LOW BACK PAIN WITHOUT SCIATICA: ICD-10-CM

## 2020-01-29 DIAGNOSIS — M16.12 PRIMARY OSTEOARTHRITIS OF LEFT HIP: Primary | ICD-10-CM

## 2020-01-29 PROCEDURE — 97110 THERAPEUTIC EXERCISES: CPT | Performed by: PHYSICAL MEDICINE & REHABILITATION

## 2020-01-29 PROCEDURE — 97112 NEUROMUSCULAR REEDUCATION: CPT | Performed by: PHYSICAL MEDICINE & REHABILITATION

## 2020-01-29 NOTE — PROGRESS NOTES
Daily Note     Today's date: 2020  Patient name: Selvin Hicks  : 1934  MRN: [id number]  Referring provider: Maria Guadalupe Sarabia DO  Dx:   Encounter Diagnosis     ICD-10-CM    1  Primary osteoarthritis of left hip M16 12    2  Chronic bilateral low back pain without sciatica M54 5     G89 29                   Subjective: Patient offers no new complaints  Objective: See treatment diary below      Assessment: Fair tolerance to intervention as charted, activity somewhat limited secondary to fatigue  Plan: Continue per plan of care        Precautions: HTN, peripheral neuropathy, falls risk      Manual  1/10 1/13 1/15 1/20   1/29                                                                           Exercise Diary  1/10 1/13 1/15 1/20 1/22 1/27 1/29      SKTC 5x5" Reviewed           LTR 5x5" Reviewed           EIS x20 Reviewed           Clamshells 8x5" Reviewed            Standing ext/abd  2x15 ea 2x15 ea 2# 3x15 ea HEP, reviewed         Supine bridging  3x6x5" 4x6x5" 3x8x5" HEP reviewed        Supine clamshell  YH 3x10x5" RH 3x10x5" S/L 3x6x6" HEP        STS  3x10 from low mat 3x10 low mat 3x12, 4# 3x12, 8# UE swing x12 8# KB low mat, 3x6 frm chair 3x10 low mat, 8#      Step up  4" x5 ea  4" x15 ea 6" x15 ea 6" fwd/lat       Marching   x30 2# x30 2# x30        Squats   x30 x30 x30 x30 30x      Stepping    Over strap, lat/fwd/bck x10 ea x15 ea x15 ea 15x ea      Walking w/o AD     Fwd/bck 4 laps Fwd/bck 4 laps Fwd/back 2 laps      Sidesteps      4 laps 5 laps                   Bike  8' 8' 10' 12' VG L7 8' VG, 8'                                                              Modalities

## 2020-01-31 ENCOUNTER — OFFICE VISIT (OUTPATIENT)
Dept: OBGYN CLINIC | Facility: CLINIC | Age: 85
End: 2020-01-31
Payer: COMMERCIAL

## 2020-01-31 VITALS
WEIGHT: 217 LBS | DIASTOLIC BLOOD PRESSURE: 80 MMHG | BODY MASS INDEX: 39.93 KG/M2 | HEART RATE: 74 BPM | HEIGHT: 62 IN | SYSTOLIC BLOOD PRESSURE: 180 MMHG

## 2020-01-31 DIAGNOSIS — M25.611 SHOULDER JOINT STIFFNESS, BILATERAL: ICD-10-CM

## 2020-01-31 DIAGNOSIS — G89.29 CHRONIC BILATERAL LOW BACK PAIN WITHOUT SCIATICA: ICD-10-CM

## 2020-01-31 DIAGNOSIS — M25.512 CHRONIC PAIN OF BOTH SHOULDERS: ICD-10-CM

## 2020-01-31 DIAGNOSIS — M47.816 LUMBAR SPONDYLOSIS: ICD-10-CM

## 2020-01-31 DIAGNOSIS — M75.01 BILATERAL ADHESIVE CAPSULITIS OF SHOULDERS: Primary | ICD-10-CM

## 2020-01-31 DIAGNOSIS — M25.612 SHOULDER JOINT STIFFNESS, BILATERAL: ICD-10-CM

## 2020-01-31 DIAGNOSIS — M75.02 BILATERAL ADHESIVE CAPSULITIS OF SHOULDERS: Primary | ICD-10-CM

## 2020-01-31 DIAGNOSIS — M25.511 CHRONIC PAIN OF BOTH SHOULDERS: ICD-10-CM

## 2020-01-31 DIAGNOSIS — G89.29 CHRONIC PAIN OF BOTH SHOULDERS: ICD-10-CM

## 2020-01-31 DIAGNOSIS — M54.50 CHRONIC BILATERAL LOW BACK PAIN WITHOUT SCIATICA: ICD-10-CM

## 2020-01-31 PROCEDURE — 20610 DRAIN/INJ JOINT/BURSA W/O US: CPT | Performed by: PHYSICAL MEDICINE & REHABILITATION

## 2020-01-31 PROCEDURE — 99214 OFFICE O/P EST MOD 30 MIN: CPT | Performed by: PHYSICAL MEDICINE & REHABILITATION

## 2020-01-31 PROCEDURE — 1160F RVW MEDS BY RX/DR IN RCRD: CPT | Performed by: PHYSICAL MEDICINE & REHABILITATION

## 2020-01-31 RX ORDER — LIDOCAINE HYDROCHLORIDE 10 MG/ML
5 INJECTION, SOLUTION INFILTRATION; PERINEURAL
Status: COMPLETED | OUTPATIENT
Start: 2020-01-31 | End: 2020-01-31

## 2020-01-31 RX ORDER — TRIAMCINOLONE ACETONIDE 40 MG/ML
80 INJECTION, SUSPENSION INTRA-ARTICULAR; INTRAMUSCULAR
Status: COMPLETED | OUTPATIENT
Start: 2020-01-31 | End: 2020-01-31

## 2020-01-31 RX ADMIN — LIDOCAINE HYDROCHLORIDE 5 ML: 10 INJECTION, SOLUTION INFILTRATION; PERINEURAL at 14:09

## 2020-01-31 RX ADMIN — TRIAMCINOLONE ACETONIDE 80 MG: 40 INJECTION, SUSPENSION INTRA-ARTICULAR; INTRAMUSCULAR at 14:09

## 2020-01-31 NOTE — PATIENT INSTRUCTIONS
You will be starting physical therapy  It is important to continue the rehab exercises you learn, at home  Rehab addresses the problems that are causing your pain, instead of covering them up, as some other treatment options do  If your pain worsens/does not improve with rehab, we can see you back earlier than planned  Please call our office if that is the case  Otherwise, we will see you on your follow up as scheduled  You had a cortisone (steroid) injection  There are two medicines in this injection, a numbing medicine and a steroid  The numbing medication component usually takes effect within a few minutes and wears off after a few hours, after which your pain may return  The steroid medication typically takes effect in 3-5 days, although some people have benefits sooner, and lasts for a much longer time

## 2020-01-31 NOTE — PROGRESS NOTES
1  Bilateral adhesive capsulitis of shoulders  Ambulatory referral to Physical Therapy   2  Chronic bilateral low back pain without sciatica     3  Chronic pain of both shoulders  Ambulatory referral to Physical Therapy   4  Shoulder joint stiffness, bilateral  Ambulatory referral to Physical Therapy   5  Lumbar spondylosis       Orders Placed This Encounter   Procedures    Large joint arthrocentesis    Ambulatory referral to Physical Therapy        Imaging Studies (I personally reviewed images in PACS and report):  Lumbar spine and left hip/pelvis x-rays dated 11/15/2019  These images show severe degenerative changes throughout the lumbar spine with disc space narrowing and posterior element hypertrophy  There are osteoarthritic changes in the left hip that are moderate  No acute osseous abnormalities      Impression:  Patient is here in follow-up of chronic low back pain likely secondary to severe spondylosis  she has had modest improvement in her symptoms after going through physical therapy  Her physical exam is unchanged  She does not have any neurological impairment  She will continue with physical therapy  She can continue to use Tylenol as needed  Patient also presents with bilateral shoulder stiffness  We did not obtain x-rays of her shoulders today  Her shoulder stiffness is likely secondary to adhesive capsulitis versus rotator cuff pathology  Since she is right handed and is limited with overhead activities, we decided to proceed with a steroid injection into the subacromial region  She had immediate relief of her pain symptoms but continues to have stiffness  She will start physical therapy for this  I will see her back in about 5-6 weeks to assess again  Return in about 6 weeks (around 3/13/2020)  HPI:  Anthony Carrasco is a 80 y o  female  who presents in follow up  Here for   Chief Complaint   Patient presents with    Follow-up       Date of injury:  Chronic    Trajectory of symptoms:  Low back symptoms improving, today she presents with a new complaint, bilateral shoulder stiffness  See above  Review of Systems   Constitutional: Positive for activity change  Negative for fever  HENT: Negative for sore throat and trouble swallowing  Eyes: Negative for visual disturbance  Respiratory: Negative for shortness of breath  Cardiovascular: Negative for chest pain  Gastrointestinal: Negative for abdominal pain  Denies bowel incontinence  Endocrine: Negative for polydipsia  Genitourinary: Negative for difficulty urinating  Denies urinary incontinence  Musculoskeletal: Positive for arthralgias and back pain  Skin: Negative for rash  Allergic/Immunologic: Negative for immunocompromised state  Neurological: Negative for weakness and numbness  Denies saddle anesthesia  Hematological: Does not bruise/bleed easily  Psychiatric/Behavioral: Negative for confusion         Following history reviewed and updated:  Past Medical History:   Diagnosis Date    Anemia     Anxiety     Arthritis     CKD (chronic kidney disease), stage III ([facility] [address] ) 11/29/2018    Diabetes mellitus ([facility] [address] )     Diabetic peripheral neuropathy (HCC)     Hyperlipidemia     Hypertension     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 8/23/2017    Thyroid enlarged 2/23/2015    Tinnitus     Vertigo      Past Surgical History:   Procedure Laterality Date    EAR SURGERY Left     KNEE SURGERY       Social History   Social History     Substance and Sexual Activity   Alcohol Use Not Currently     Social History     Substance and Sexual Activity   Drug Use No     Social History     Tobacco Use   Smoking Status Never Smoker   Smokeless Tobacco Never Used     Family History   Problem Relation Age of Onset    Diabetes Mother     Heart disease Mother     Heart disease Father     Stroke Father    [facility]      Allergies   Allergen Reactions    Hydrochlorothiazide      Hyponatremia, severe    Statins         Constitutional:  BP (!) 180/80 (BP Location: Right arm, Patient Position: Sitting, Cuff Size: Standard)   Pulse 74   Ht 5' 2" (1 575 m)   Wt 98 4 kg (217 lb)   BMI 39 69 kg/m²    General: NAD  Eyes: Clear sclerae  ENT: No inflammation, lesion, or mass of lips  No tracheal deviation  Musculoskeletal: As mentioned below  Integumentary: No visible rashes or skin lesions  Pulmonary/Chest: Effort normal  No respiratory distress  Neuro: CN's grossly intact, BALTAZAR  Psych: Normal affect and judgement  Vascular: WWP  Back Exam     Muscle Strength   The patient has normal back strength  Other   Sensation: normal  Gait: antalgic   Erythema: no back redness  Scars: absent      Right Shoulder Exam     Tenderness   The patient is experiencing tenderness in the acromion  Range of Motion   Active abduction: abnormal   External rotation: abnormal   Forward flexion: abnormal   Internal rotation 0 degrees: abnormal     Tests   Luong test: positive  Impingement: positive    Other   Erythema: absent  Scars: absent  Sensation: normal  Pulse: present      Left Shoulder Exam     Tenderness   The patient is experiencing tenderness in the acromion      Range of Motion   Active abduction: abnormal   External rotation: abnormal   Forward flexion: abnormal   Internal rotation 0 degrees: abnormal   Internal rotation 90 degrees: abnormal     Tests   Luong test: positive  Impingement: positive    Other   Erythema: absent  Scars: absent  Sensation: normal  Pulse: present              Large joint arthrocentesis: R subacromial bursa  Date/Time: 1/31/2020 2:09 PM  Consent given by: patient  Supporting Documentation  Indications: pain   Procedure Details  Location: shoulder - R subacromial bursa  Ultrasound guidance: no  Approach: posterolateral  Medications administered: 5 mL lidocaine 1 %; 80 mg triamcinolone acetonide 40 mg/mL    Patient tolerance: patient tolerated the procedure well with no immediate complications  Dressing:  Sterile dressing applied    Discussed risk of infection, bleeding, fat atrophy/pigmentation, pain/steroid flare and increased blood sugars  Patient aware and expressed understanding

## 2020-02-03 ENCOUNTER — OFFICE VISIT (OUTPATIENT)
Dept: PHYSICAL THERAPY | Facility: REHABILITATION | Age: 85
End: 2020-02-03
Payer: COMMERCIAL

## 2020-02-03 DIAGNOSIS — M16.12 PRIMARY OSTEOARTHRITIS OF LEFT HIP: Primary | ICD-10-CM

## 2020-02-03 DIAGNOSIS — M54.50 CHRONIC BILATERAL LOW BACK PAIN WITHOUT SCIATICA: ICD-10-CM

## 2020-02-03 DIAGNOSIS — G89.29 CHRONIC BILATERAL LOW BACK PAIN WITHOUT SCIATICA: ICD-10-CM

## 2020-02-03 PROCEDURE — 97110 THERAPEUTIC EXERCISES: CPT | Performed by: PHYSICAL THERAPIST

## 2020-02-03 NOTE — PROGRESS NOTES
PT Evaluation     Today's date: 2/3/2020  Patient name: Santos Rizvi  : 1934  MRN: [id number]  Referring provider: Bisi Vega DO  Dx:   Encounter Diagnosis     ICD-10-CM    1  Primary osteoarthritis of left hip M16 12    2  Chronic bilateral low back pain without sciatica M54 5     G89 29                   Assessment  Assessment details: Niranjan Hansen has completed skilled PT services 2x/week for the last 3 weeks regarding lower back and hip pain  Over this time she has made objective improvements in hip and LE strength resulting in improved ability to navigate stairs, which she has not done in a long time, and walk in the community  Objective improvements are also demonstrated through improved scores in objective measures such as 5x STS and TUG tests  She also subjectively reports being able to move around her home better and has less lower back pain overall  Impairments remain in functional LE strength, balance, and lumbar ROM  She would benefit from continued PT services to address her remaining impairments and functional limitations to reduce risk of falls and encourage return to PLOF  Impairments: abnormal gait, abnormal or restricted ROM, abnormal movement, impaired balance, impaired physical strength, lacks appropriate home exercise program and pain with function  Functional limitations: Difficulty with prolonged standing, walking, lifting/carrying/pushing/pulling objects Understanding of Dx/Px/POC: good   Prognosis: good    Goals  STG: Pt will increase lumbar ROM by 25% in all deficient planes in 4 weeks  - Partially met  STG: Pt will increase LE strength by 1/2 MMT grades in all deficient planes in 4 weeks  - MET  STG: Pt will subjectively report pain at worst decreased by 2 points in 4 weeks  - MET  STG: Pt will be I w/ initial HEP in 2 weeks  - MET  LTG: Pt will be able to lift and carry 10# 20' x 2 w/ <2/10 LBP in 10 weeks to demonstrate ability to complete household duties   - Partially met  LTG: Pt will demonstrate TUG and 5xSTS tests reduced by 10-15 seconds to demonstrate decreased falls risk and increased functional strength in 10 weeks  - Partially met  LTG: Pt will be I w/ comprehensive HEP by d/c  - In progress     Plan  Patient would benefit from: skilled physical therapy and PT eval  Planned modality interventions: TENS, thermotherapy: hydrocollator packs and cryotherapy  Planned therapy interventions: manual therapy, joint mobilization, massage, neuromuscular re-education, strengthening, stretching, therapeutic activities, therapeutic exercise, therapeutic training, balance, flexibility, functional ROM exercises, graded activity, graded exercise, graded motor and home exercise program  Frequency: 2x week  Duration in visits: 20  Duration in weeks: 10  Plan of Care beginning date: 1/10/2020  Plan of Care expiration date: 3/20/2020  Treatment plan discussed with: patient        Subjective Evaluation    History of Present Illness  Mechanism of injury: Reports continued improvement w/ PT  States that she feels she can walk better and that she does not have much pain with anything     Pain  Current pain ratin  At best pain ratin  At worst pain ratin  Quality: throbbing, tight, pressure, pulling and dull ache  Aggravating factors: standing, walking, stair climbing and lifting      Diagnostic Tests  X-ray: abnormal (Severe degenerative changes of the lumbar spine)  Patient Goals  Patient goals for therapy: increased strength, decreased pain, improved balance, increased motion and return to sport/leisure activities          Objective     Concurrent Complaints  Negative for night pain, disturbed sleep, bladder dysfunction, bowel dysfunction and saddle (S4) numbness    Neurological Testing     Sensation     Lumbar   Left   Intact: light touch    Right   Intact: light touch    Reflexes   Left   Patellar (L4): trace (1+)  Achilles (S1): trace (1+)    Right   Patellar (L4): trace (1+)  Achilles (S1): trace (1+)    Active Range of Motion     Lumbar   Flexion:  Restriction level: minimal  Extension:  Restriction level: minimal  Left lateral flexion:  Restriction level: minimal  Right lateral flexion:  Restriction level: minimal  Left rotation:  Restriction level: minimal  Right rotation:  Restriction level: minimal  Mechanical Assessment    Cervical      Thoracic      Lumbar    Standing extension: repeated movements  Pain intensity: better    Strength/Myotome Testing     Left Hip   Planes of Motion   Flexion: 3+    Right Hip   Planes of Motion   Flexion: 3+    Left Knee   Flexion: 4  Extension: 4    Right Knee   Flexion: 4  Extension: 4    Left Ankle/Foot   Plantar flexion: 4  Great toe extension: 4    Right Ankle/Foot   Plantar flexion: 4  Great toe extension: 4    Tests     Lumbar     Left   Negative passive SLR  Right   Negative passive SLR  Left Pelvic Girdle/Sacrum   Negative: thigh thrust      Right Pelvic Girdle/Sacrum   Negative: thigh thrust      Left Hip   Negative QUINTIN and FADIR  Right Hip   Negative QUINTIN and FADIR       Ambulation     Comments   Ambulates slowly w/ SPC in left hand, bilateral trendelenburg     Functional Assessment        Comments  1/10/2020:   TU 48 s  5x STS: 33 14 s    2/3/2020:  TU 24 s  5x STS: 31 32 s             Precautions: HTN, peripheral neuropathy, falls risk      Manual  1/10 1/13 1/15 1/20     1/29                                                                                                                                       Exercise Diary  1/10 1/13 1/15 1/20 1/22 1/27 1/29  2/3       SKTC 5x5" Reviewed                   LTR 5x5" Reviewed                   EIS x20 Reviewed                   Clamshells 8x5" Reviewed                    Standing ext/abd   2x15 ea 2x15 ea 2# 3x15 ea HEP, reviewed              Supine bridging   3x6x5" 4x6x5" 3x8x5" HEP reviewed             Supine clamshell   YH 3x10x5" RH 3x10x5" S/L 3x6x6" HEP             STS   3x10 from low mat 3x10 low mat 3x12, 4# 3x12, 8# UE swing x12 8# KB low mat, 3x6 frm chair 3x10 low mat, 8#  5x5 Pad chair       Step up   4" x5 ea   4" x15 ea 6" x15 ea 6" fwd/lat    6" fwd/lat 20x ea       Marching     x30 2# x30 2# x30             Squats     x30 x30 x30 x30 30x         Stepping       Over strap, lat/fwd/bck x10 ea x15 ea x15 ea 15x ea         Walking w/o AD         Fwd/bck 4 laps Fwd/bck 4 laps Fwd/back 2 laps         Sidesteps           4 laps 5 laps                                 Bike   8' 8' 10' 12' VG L7 8' VG, 8'  VG 10'                                                                                                             Modalities

## 2020-02-05 ENCOUNTER — APPOINTMENT (OUTPATIENT)
Dept: PHYSICAL THERAPY | Facility: REHABILITATION | Age: 85
End: 2020-02-05
Payer: COMMERCIAL

## 2020-02-10 ENCOUNTER — OFFICE VISIT (OUTPATIENT)
Dept: PHYSICAL THERAPY | Facility: REHABILITATION | Age: 85
End: 2020-02-10
Payer: COMMERCIAL

## 2020-02-10 DIAGNOSIS — M16.12 PRIMARY OSTEOARTHRITIS OF LEFT HIP: Primary | ICD-10-CM

## 2020-02-10 DIAGNOSIS — G89.29 CHRONIC BILATERAL LOW BACK PAIN WITHOUT SCIATICA: ICD-10-CM

## 2020-02-10 DIAGNOSIS — M54.50 CHRONIC BILATERAL LOW BACK PAIN WITHOUT SCIATICA: ICD-10-CM

## 2020-02-10 PROCEDURE — 97112 NEUROMUSCULAR REEDUCATION: CPT | Performed by: PHYSICAL THERAPIST

## 2020-02-10 NOTE — PROGRESS NOTES
Daily Note     Today's date: 2/10/2020  Patient name: Jose Dangelo  : 1934  MRN: [id number]  Referring provider: Jonathan Carvajal DO  Dx:   Encounter Diagnosis     ICD-10-CM    1  Primary osteoarthritis of left hip M16 12    2  Chronic bilateral low back pain without sciatica M54 5     G89 29                   Subjective: States that she feels okay today  Objective: See treatment diary below      Assessment: Completed exercise with correct technique and without reports of pain  Demonstrated moderate fatigue after exercise  Requires frequent rest breaks throughout session  Pt would benefit from continued PT services to reduce pain and increase level of function  Plan: Continue per plan of care  Progress treatment as tolerated         Precautions: HTN, peripheral neuropathy, falls risk      Manual  1/10 1/13 1/15 1/20     1/29                                                                                                                                       Exercise Diary  1/10 1/13 1/15 1/20 1/22 1/27 1/29  2/3  2/10     SKTC 5x5" Reviewed                   LTR 5x5" Reviewed                   EIS x20 Reviewed                   Clamshells 8x5" Reviewed                    Standing ext/abd   2x15 ea 2x15 ea 2# 3x15 ea HEP, reviewed              Supine bridging   3x6x5" 4x6x5" 3x8x5" HEP reviewed             Supine clamshell   YH 3x10x5" RH 3x10x5" S/L 3x6x6" HEP             STS   3x10 from low mat 3x10 low mat 3x12, 4# 3x12, 8# UE swing x12 8# KB low mat, 3x6 frm chair 3x10 low mat, 8#  5x5 Pad chair  3x15 8# low mat     Step up   4" x5 ea   4" x15 ea 6" x15 ea 6" fwd/lat    6" fwd/lat 20x ea  6" fwd/lat 20xea     Marching     x30 2# x30 2# x30             Squats     x30 x30 x30 x30 30x         Stepping       Over strap, lat/fwd/bck x10 ea x15 ea x15 ea 15x ea         Walking w/o AD         Fwd/bck 4 laps Fwd/bck 4 laps Fwd/back 2 laps    Fwd/back 2 laps     Sidesteps           4 laps 5 laps                                 Bike   8' 8' 10' 12' VG L7 8' VG, 8'  VG 10'  VG L7 10'      Lateral lunge                 2x8 ea                                                                                   Modalities

## 2020-02-12 ENCOUNTER — OFFICE VISIT (OUTPATIENT)
Dept: PHYSICAL THERAPY | Facility: REHABILITATION | Age: 85
End: 2020-02-12
Payer: COMMERCIAL

## 2020-02-12 DIAGNOSIS — M54.50 CHRONIC BILATERAL LOW BACK PAIN WITHOUT SCIATICA: ICD-10-CM

## 2020-02-12 DIAGNOSIS — G89.29 CHRONIC BILATERAL LOW BACK PAIN WITHOUT SCIATICA: ICD-10-CM

## 2020-02-12 DIAGNOSIS — M16.12 PRIMARY OSTEOARTHRITIS OF LEFT HIP: Primary | ICD-10-CM

## 2020-02-12 PROCEDURE — 97110 THERAPEUTIC EXERCISES: CPT | Performed by: PHYSICAL THERAPIST

## 2020-02-12 PROCEDURE — 97112 NEUROMUSCULAR REEDUCATION: CPT | Performed by: PHYSICAL THERAPIST

## 2020-02-12 NOTE — PROGRESS NOTES
Daily Note     Today's date: 2020  Patient name: Carrillo Merida  : 1934  MRN: [id number]  Referring provider: Saeid Sanabria DO  Dx:   Encounter Diagnosis     ICD-10-CM    1  Primary osteoarthritis of left hip M16 12    2  Chronic bilateral low back pain without sciatica M54 5     G89 29                   Subjective: States that she feels okay today  Objective: See treatment diary below      Assessment: Demonstrates improved functional strength this session demonstrated through completing STS transfer w/o UE from a standard chair  Improved balance this session, requiring reduced UE support  She would benefit from continued PT services to reduce risk of falls and increase level of function  Plan: Continue per plan of care  Progress treatment as tolerated         Precautions: HTN, peripheral neuropathy, falls risk      Manual  1/10 1/13 1/15 1/20     1/29                                                                                                                                       Exercise Diary  1/10 1/13 1/15 1/20 1/22 1/27 1/29  2/3  2/10  2/12   SKTC 5x5" Reviewed                   LTR 5x5" Reviewed                   EIS x20 Reviewed                   Clamshells 8x5" Reviewed                    Standing ext/abd   2x15 ea 2x15 ea 2# 3x15 ea HEP, reviewed              Supine bridging   3x6x5" 4x6x5" 3x8x5" HEP reviewed             Supine clamshell   YH 3x10x5" RH 3x10x5" S/L 3x6x6" HEP             STS   3x10 from low mat 3x10 low mat 3x12, 4# 3x12, 8# UE swing x12 8# KB low mat, 3x6 frm chair 3x10 low mat, 8#  5x5 Pad chair  3x15 8# low mat  from chair, 4x8   Step up   4" x5 ea   4" x15 ea 6" x15 ea 6" fwd/lat    6" fwd/lat 20x ea  6" fwd/lat 20xea  6" fwd x20 ea   Marching     x30 2# x30 2# x30             Squats     x30 x30 x30 x30 30x         Stepping       Over strap, lat/fwd/bck x10 ea x15 ea x15 ea 15x ea      20x ea, 2# cuff   Walking w/o AD         Fwd/bck 4 laps Fwd/bck 4 laps Fwd/back 2 laps    Fwd/back 2 laps  Fwd 2 laps   Sidesteps           4 laps 5 laps                                 Bike   8' 8' 10' 12' VG L7 8' VG, 8'  VG 10'  VG L7 10'  VG 40% 10'    Lateral lunge                 2x8 ea      Sidesteps                   2 laps                                                         Modalities

## 2020-02-17 ENCOUNTER — OFFICE VISIT (OUTPATIENT)
Dept: PHYSICAL THERAPY | Facility: REHABILITATION | Age: 85
End: 2020-02-17
Payer: COMMERCIAL

## 2020-02-17 DIAGNOSIS — G89.29 CHRONIC BILATERAL LOW BACK PAIN WITHOUT SCIATICA: ICD-10-CM

## 2020-02-17 DIAGNOSIS — M16.12 PRIMARY OSTEOARTHRITIS OF LEFT HIP: Primary | ICD-10-CM

## 2020-02-17 DIAGNOSIS — M54.50 CHRONIC BILATERAL LOW BACK PAIN WITHOUT SCIATICA: ICD-10-CM

## 2020-02-17 PROCEDURE — 97110 THERAPEUTIC EXERCISES: CPT | Performed by: PHYSICAL THERAPIST

## 2020-02-17 PROCEDURE — 97112 NEUROMUSCULAR REEDUCATION: CPT | Performed by: PHYSICAL THERAPIST

## 2020-02-17 NOTE — PROGRESS NOTES
Daily Note     Today's date: 2020  Patient name: Juan M Montesinos  : 1934  MRN: [id number]  Referring provider: Nancy Craig DO  Dx:   Encounter Diagnosis     ICD-10-CM    1  Primary osteoarthritis of left hip M16 12    2  Chronic bilateral low back pain without sciatica M54 5     G89 29                   Subjective: Nothing new to report today  Objective: See treatment diary below      Assessment: Improved LE strength and balance this session, demonstrates through increase in repetitions and resistance  She would benefit from continued PT services to reduce risk of falls and increase level of function  Plan: Continue per plan of care  Progress treatment as tolerated         Precautions: HTN, peripheral neuropathy, falls risk      Manual  1/10 1/13 1/15 1/20     1/29                                                                                                                                       Exercise Diary  2/17 1/13 1/15 1/20 1/22 1/27 1/29  2/3  2/10  2/12   SKTC  Reviewed                   LTR  Reviewed                   EIS  Reviewed                   Clamshells  Reviewed                    Standing ext/abd   2x15 ea 2x15 ea 2# 3x15 ea HEP, reviewed              Supine bridging   3x6x5" 4x6x5" 3x8x5" HEP reviewed             Supine clamshell   YH 3x10x5" RH 3x10x5" S/L 3x6x6" HEP             STS  from chair 3x10 3x10 from low mat 3x10 low mat 3x12, 4# 3x12, 8# UE swing x12 8# KB 6" low mat, 3x6 frm chair 3x10 low mat, 8#  5x5 Pad chair  3x15 8# low mat  from chair, 4x8   Step up  6" fwd/lat 20x ea 4" x5 ea   4" x15 ea 6" x15 ea 6" fwd/lat    6" fwd/lat 20x ea  6" fwd/lat 20xea  6" fwd x20 ea   Marching     x30 2# x30 2# x30             Squats     x30 x30 x30 x30 30x         Stepping  20x ea 2# cuff     Over strap, lat/fwd/bck x10 ea x15 ea x15 ea 15x ea      20x ea, 2# cuff   Walking w/o AD  Fwd 5 laps       Fwd/bck 4 laps Fwd/bck 4 laps Fwd/back 2 laps    Fwd/back 2 laps  Fwd 2 laps   Sidesteps           4 laps 5 laps                                 Bike  VG 40% 10' 8' 8' 10' 12' VG L7 8' VG, 8'  VG 10'  VG L7 10'  VG 40% 10'    Lateral lunge                 2x8 ea      Sidesteps                   2 laps                                                         Modalities

## 2020-02-19 ENCOUNTER — OFFICE VISIT (OUTPATIENT)
Dept: PHYSICAL THERAPY | Facility: REHABILITATION | Age: 85
End: 2020-02-19
Payer: COMMERCIAL

## 2020-02-19 DIAGNOSIS — M16.12 PRIMARY OSTEOARTHRITIS OF LEFT HIP: Primary | ICD-10-CM

## 2020-02-19 DIAGNOSIS — M54.50 CHRONIC BILATERAL LOW BACK PAIN WITHOUT SCIATICA: ICD-10-CM

## 2020-02-19 DIAGNOSIS — G89.29 CHRONIC BILATERAL LOW BACK PAIN WITHOUT SCIATICA: ICD-10-CM

## 2020-02-19 PROCEDURE — 97110 THERAPEUTIC EXERCISES: CPT | Performed by: PHYSICAL THERAPIST

## 2020-02-19 PROCEDURE — 97112 NEUROMUSCULAR REEDUCATION: CPT | Performed by: PHYSICAL THERAPIST

## 2020-02-19 NOTE — PROGRESS NOTES
Daily Note     Today's date: 2020  Patient name: Selvin Hicks  : 1934  MRN: [id number]  Referring provider: Maria Guadalupe Sarabia DO  Dx:   Encounter Diagnosis     ICD-10-CM    1  Primary osteoarthritis of left hip M16 12    2  Chronic bilateral low back pain without sciatica M54 5     G89 29                   Subjective: Nothing new to report today  Objective: See treatment diary below      Assessment: Progressed in functional strengthening this session  Demonstrates improved balance, less UE use during balance tasks  Continues to be limited in unassisted ambulation secondary to muscular endurance  She would benefit from continued PT services to reduce pain and increase level of function  Plan: Continue per plan of care  Progress treatment as tolerated         Precautions: HTN, peripheral neuropathy, falls risk      Manual  1/10 1/13 1/15 1/20     1/29                                                                                                                                       Exercise Diary  2/17 2/19 1/15 1/20 1/22 1/27 1/29  2/3  2/10  2/12   SKTC                     LTR                     EIS                     Clamshells                     Standing ext/abd   2x15 ea 2# 3x15 ea HEP, reviewed              Supine bridging    4x6x5" 3x8x5" HEP reviewed             Supine clamshell    RH 3x10x5" S/L 3x6x6" HEP             STS  from chair 3x10 From chair 3x8, 5# 3x10 low mat 3x12, 4# 3x12, 8# UE swing x12 8# KB 6" low mat, 3x6 frm chair 3x10 low mat, 8#  5x5 Pad chair  3x15 8# low mat  from chair, 4x8   Step up  6" fwd/lat 20x ea 6" fwd/lat 20x ea   4" x15 ea 6" x15 ea 6" fwd/lat    6" fwd/lat 20x ea  6" fwd/lat 20xea  6" fwd x20 ea   Marching     x30 2# x30 2# x30             Squats     x30 x30 x30 x30 30x         Stepping  20x ea 2# cuff  20x ea 2# cuff   Over strap, lat/fwd/bck x10 ea x15 ea x15 ea 15x ea      20x ea, 2# cuff   Walking w/o AD  Fwd 5 laps Fwd 5 laps     Fwd/bck 4 laps Fwd/bck 4 laps Fwd/back 2 laps    Fwd/back 2 laps  Fwd 2 laps   Sidesteps           4 laps 5 laps                                 Bike  VG 40% 10' VG 40% 8' 8' 10' 12' VG L7 8' VG, 8'  VG 10'  VG L7 10'  VG 40% 10'    Lateral lunge                 2x8 ea      Sidesteps                   2 laps                                                         Modalities

## 2020-02-24 ENCOUNTER — APPOINTMENT (OUTPATIENT)
Dept: PHYSICAL THERAPY | Facility: REHABILITATION | Age: 85
End: 2020-02-24
Payer: COMMERCIAL

## 2020-02-26 ENCOUNTER — OFFICE VISIT (OUTPATIENT)
Dept: PHYSICAL THERAPY | Facility: REHABILITATION | Age: 85
End: 2020-02-26
Payer: COMMERCIAL

## 2020-02-26 DIAGNOSIS — M16.12 PRIMARY OSTEOARTHRITIS OF LEFT HIP: Primary | ICD-10-CM

## 2020-02-26 DIAGNOSIS — M54.50 CHRONIC BILATERAL LOW BACK PAIN WITHOUT SCIATICA: ICD-10-CM

## 2020-02-26 DIAGNOSIS — G89.29 CHRONIC BILATERAL LOW BACK PAIN WITHOUT SCIATICA: ICD-10-CM

## 2020-02-26 PROCEDURE — 97112 NEUROMUSCULAR REEDUCATION: CPT | Performed by: PHYSICAL THERAPIST

## 2020-02-26 PROCEDURE — 97110 THERAPEUTIC EXERCISES: CPT | Performed by: PHYSICAL THERAPIST

## 2020-02-26 NOTE — PROGRESS NOTES
Daily Note     Today's date: 2020  Patient name: Danilo Luna  : 1934  MRN: [id number]  Referring provider: Merlin Mcgrath DO  Dx:   Encounter Diagnosis     ICD-10-CM    1  Primary osteoarthritis of left hip M16 12    2  Chronic bilateral low back pain without sciatica M54 5     G89 29                   Subjective: States that her back and legs have been feeling good, but her shoulders have been giving her trouble  Objective: See treatment diary below    Assessment: Completed exercise with correct technique and without reports of pain  Demonstrated moderate fatigue after exercise  Pt would benefit from continued PT services to reduce pain and increase level of function  Plan: Continue per plan of care  Progress treatment as tolerated         Precautions: HTN, peripheral neuropathy, falls risk      Manual  1/10 1/13 1/15 1/20     1/29                                                                                                                                       Exercise Diary  2/17 2/19 2/26 1/20 1/22 1/27 1/29  2/3  2/10  2/12   SKTC                     LTR                     EIS                     Clamshells                     Standing ext/abd    2# 3x15 ea HEP, reviewed              Supine bridging     3x8x5" HEP reviewed             Supine clamshell     S/L 3x6x6" HEP             STS  from chair 3x10 From chair 3x8, 5# From chair 2x to fatigue (12,10) 3x12, 4# 3x12, 8# UE swing x12 8# KB 6" low mat, 3x6 frm chair 3x10 low mat, 8#  5x5 Pad chair  3x15 8# low mat  from chair, 4x8   Step up  6" fwd/lat 20x ea 6" fwd/lat 20x ea   4" x15 ea 6" x15 ea 6" fwd/lat    6" fwd/lat 20x ea  6" fwd/lat 20xea  6" fwd x20 ea   Marching     High knee walk 3 laps 2# x30 2# x30             Squats      x30 x30 x30 30x         Stepping  20x ea 2# cuff  20x ea 2# cuff   Over strap, lat/fwd/bck x10 ea x15 ea x15 ea 15x ea      20x ea, 2# cuff   Walking w/o AD  Fwd 5 laps Fwd 5 laps  1' x3   Fwd/bck 4 laps Fwd/bck 4 laps Fwd/back 2 laps    Fwd/back 2 laps  Fwd 2 laps   Sidesteps           4 laps 5 laps                                 Bike  VG 40% 10' VG 40% 8' 8' 10' 12' VG L7 8' VG, 8'  VG 10'  VG L7 10'  VG 40% 10'    Lateral lunge                 2x8 ea      Sidesteps                   2 laps                                                         Modalities

## 2020-03-04 ENCOUNTER — OFFICE VISIT (OUTPATIENT)
Dept: PHYSICAL THERAPY | Facility: REHABILITATION | Age: 85
End: 2020-03-04
Payer: COMMERCIAL

## 2020-03-04 DIAGNOSIS — M16.12 PRIMARY OSTEOARTHRITIS OF LEFT HIP: Primary | ICD-10-CM

## 2020-03-04 DIAGNOSIS — G89.29 CHRONIC BILATERAL LOW BACK PAIN WITHOUT SCIATICA: ICD-10-CM

## 2020-03-04 DIAGNOSIS — M54.50 CHRONIC BILATERAL LOW BACK PAIN WITHOUT SCIATICA: ICD-10-CM

## 2020-03-04 PROCEDURE — 97530 THERAPEUTIC ACTIVITIES: CPT | Performed by: PHYSICAL THERAPIST

## 2020-03-04 PROCEDURE — 97110 THERAPEUTIC EXERCISES: CPT | Performed by: PHYSICAL THERAPIST

## 2020-03-04 NOTE — PROGRESS NOTES
Daily Note and Discharge    Today's date: 3/4/2020  Patient name: Amy Adrian  : 1934  MRN: [id number]  Referring provider: Carson Lopez DO  Dx:   Encounter Diagnosis     ICD-10-CM    1  Primary osteoarthritis of left hip M16 12    2  Chronic bilateral low back pain without sciatica M54 5     G89 29                   Subjective: Reports moderate improvement with therapy, she is comfortable being d/c to HEP this session  Objective: See treatment diary below    Assessment: Ana Laura Peralta has completed skilled PT services 2x/week for the last 5 weeks regarding LBP  Over this time she subjectively reports improved function and pain reduction which correlates w/ objective findings of improve 5x STS time and ambulatory tolerance  She will be d/c to HEP this session  STG: Pt will increase lumbar ROM by 25% in all deficient planes in 4 weeks  - MET  STG: Pt will increase LE strength by 1/2 MMT grades in all deficient planes in 4 weeks  - MET  STG: Pt will subjectively report pain at worst decreased by 2 points in 4 weeks  - MET  STG: Pt will be I w/ initial HEP in 2 weeks  - MET  LTG: Pt will be able to lift and carry 10# 20' x 2 w/ <2/10 LBP in 10 weeks to demonstrate ability to complete household duties  - MET  LTG: Pt will demonstrate TUG and 5xSTS tests reduced by 10-15 seconds to demonstrate decreased falls risk and increased functional strength in 10 weeks  - MET  LTG: Pt will be I w/ comprehensive HEP by d/c  - MET    Plan: Continue per plan of care  Progress treatment as tolerated         Precautions: HTN, peripheral neuropathy, falls risk      Manual  1/10 1/13 1/15 1/20  3/4   1/29                                                          Education          HEP, maintenance, progression, 30'                                                                   Exercise Diary  2/17 2/19 2/26 3/4 1/22 1/27 1/29  2/3  2/10  2/12   [facility]                     LTR                     EIS                   Clamshells                     Standing ext/abd     HEP, reviewed              Supine bridging      HEP reviewed             Supine clamshell      HEP             STS  from chair 3x10 From chair 3x8, 5# From chair 2x to fatigue (12,10)  3x12, 8# UE swing x12 8# KB 6" low mat, 3x6 frm chair 3x10 low mat, 8#  5x5 Pad chair  3x15 8# low mat  from chair, 4x8   Step up  6" fwd/lat 20x ea 6" fwd/lat 20x ea    6" x15 ea 6" fwd/lat    6" fwd/lat 20x ea  6" fwd/lat 20xea  6" fwd x20 ea   Marching     High knee walk 3 laps  2# x30             Squats       x30 x30 30x         Stepping  20x ea 2# cuff  20x ea 2# cuff    x15 ea x15 ea 15x ea      20x ea, 2# cuff   Walking w/o AD  Fwd 5 laps Fwd 5 laps  1' x3   Fwd/bck 4 laps Fwd/bck 4 laps Fwd/back 2 laps    Fwd/back 2 laps  Fwd 2 laps   Sidesteps           4 laps 5 laps                                 Bike  VG 40% 10' VG 40% 8' 8' VG 8' 12' VG L7 8' VG, 8'  VG 10'  VG L7 10'  VG 40% 10'    Lateral lunge                 2x8 ea      Sidesteps                   2 laps                                                         Modalities

## 2020-03-13 ENCOUNTER — OFFICE VISIT (OUTPATIENT)
Dept: OBGYN CLINIC | Facility: CLINIC | Age: 85
End: 2020-03-13
Payer: COMMERCIAL

## 2020-03-13 VITALS
WEIGHT: 215 LBS | DIASTOLIC BLOOD PRESSURE: 75 MMHG | HEART RATE: 69 BPM | BODY MASS INDEX: 39.56 KG/M2 | SYSTOLIC BLOOD PRESSURE: 156 MMHG | HEIGHT: 62 IN

## 2020-03-13 DIAGNOSIS — M25.611 SHOULDER JOINT STIFFNESS, BILATERAL: ICD-10-CM

## 2020-03-13 DIAGNOSIS — M25.612 SHOULDER JOINT STIFFNESS, BILATERAL: ICD-10-CM

## 2020-03-13 DIAGNOSIS — G89.29 CHRONIC PAIN OF BOTH SHOULDERS: ICD-10-CM

## 2020-03-13 DIAGNOSIS — M25.511 CHRONIC PAIN OF BOTH SHOULDERS: ICD-10-CM

## 2020-03-13 DIAGNOSIS — M25.512 CHRONIC PAIN OF BOTH SHOULDERS: ICD-10-CM

## 2020-03-13 DIAGNOSIS — M75.02 BILATERAL ADHESIVE CAPSULITIS OF SHOULDERS: ICD-10-CM

## 2020-03-13 DIAGNOSIS — M54.50 CHRONIC BILATERAL LOW BACK PAIN WITHOUT SCIATICA: ICD-10-CM

## 2020-03-13 DIAGNOSIS — M47.816 LUMBAR SPONDYLOSIS: Primary | ICD-10-CM

## 2020-03-13 DIAGNOSIS — M75.01 BILATERAL ADHESIVE CAPSULITIS OF SHOULDERS: ICD-10-CM

## 2020-03-13 DIAGNOSIS — G89.29 CHRONIC BILATERAL LOW BACK PAIN WITHOUT SCIATICA: ICD-10-CM

## 2020-03-13 PROCEDURE — 1160F RVW MEDS BY RX/DR IN RCRD: CPT | Performed by: PHYSICAL MEDICINE & REHABILITATION

## 2020-03-13 PROCEDURE — 99213 OFFICE O/P EST LOW 20 MIN: CPT | Performed by: PHYSICAL MEDICINE & REHABILITATION

## 2020-03-13 PROCEDURE — 3008F BODY MASS INDEX DOCD: CPT | Performed by: PHYSICAL MEDICINE & REHABILITATION

## 2020-03-13 PROCEDURE — 4040F PNEUMOC VAC/ADMIN/RCVD: CPT | Performed by: PHYSICAL MEDICINE & REHABILITATION

## 2020-03-13 PROCEDURE — 2022F DILAT RTA XM EVC RTNOPTHY: CPT | Performed by: PHYSICAL MEDICINE & REHABILITATION

## 2020-03-13 PROCEDURE — 1036F TOBACCO NON-USER: CPT | Performed by: PHYSICAL MEDICINE & REHABILITATION

## 2020-03-13 PROCEDURE — 3078F DIAST BP <80 MM HG: CPT | Performed by: PHYSICAL MEDICINE & REHABILITATION

## 2020-03-13 PROCEDURE — 3077F SYST BP >= 140 MM HG: CPT | Performed by: PHYSICAL MEDICINE & REHABILITATION

## 2020-03-13 PROCEDURE — 3066F NEPHROPATHY DOC TX: CPT | Performed by: PHYSICAL MEDICINE & REHABILITATION

## 2020-03-13 NOTE — PROGRESS NOTES
1  Lumbar spondylosis     2  Chronic bilateral low back pain without sciatica     3  Shoulder joint stiffness, bilateral  Ambulatory referral to Physical Therapy   4  Chronic pain of both shoulders  Ambulatory referral to Physical Therapy   5  Bilateral adhesive capsulitis of shoulders  Ambulatory referral to Physical Therapy     Orders Placed This Encounter   Procedures    Ambulatory referral to Physical Therapy        Imaging Studies (I personally reviewed images in PACS and report):  Lumbar spine and left hip/pelvis x-rays dated 11/15/2019  Alana Markham images show severe degenerative changes throughout the lumbar spine with disc space narrowing and posterior element hypertrophy   There are osteoarthritic changes in the left hip that are moderate   No acute osseous abnormalities      Impression:  Patient is here in follow-up of chronic low back pain likely secondary to severe spondylosis   She has had modest improvement in her symptoms after going through physical therapy  Her physical exam is unchanged  She does not have any neurological impairment  She was discharged from physical therapy and will now continue home exercise program   She can continue to use Tylenol as needed      Patient also presents with bilateral shoulder stiffness  We did not obtain x-rays of her shoulders today  Her shoulder stiffness is likely secondary to adhesive capsulitis versus rotator cuff pathology  She had a steroid injection into the subacromial region on the right side on a previous visit and had some relief of her symptoms but only for about a month  We decided to hold off on any injections today especially because she has not had any physical therapy for this  I placed an order for this but apparently was never started  I placed another order for her to start physical therapy for both of her shoulders  I will see her back in about 6 weeks to see how she is doing    If her shoulder symptoms continue, we will obtain x-rays of them both  No follow-ups on file  HPI:  Selvin Hicks is a 80 y o  female  who presents in follow up  Here for   Chief Complaint   Patient presents with    Follow-up       Date of injury:  Chronic  Trajectory of symptoms:  See above  Review of Systems   Constitutional: Positive for activity change  Negative for fever  HENT: Negative for sore throat and trouble swallowing  Eyes: Negative for visual disturbance  Respiratory: Negative for shortness of breath  Cardiovascular: Negative for chest pain  Gastrointestinal: Negative for abdominal pain  Denies bowel incontinence  Endocrine: Negative for polydipsia  Genitourinary: Negative for difficulty urinating  Denies urinary incontinence  Musculoskeletal: Positive for arthralgias and back pain  Negative for gait problem  Skin: Negative for rash  Allergic/Immunologic: Negative for immunocompromised state  Neurological: Negative for weakness and numbness  Denies saddle anesthesia  Hematological: Does not bruise/bleed easily  Psychiatric/Behavioral: Negative for confusion         Following history reviewed and updated:  Past Medical History:   Diagnosis Date    Anemia     Anxiety     Arthritis     CKD (chronic kidney disease), stage III ([facility] [address] ) 11/29/2018    [facility]     Diabetic peripheral neuropathy (HCC)     Hyperlipidemia     Hypertension     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 8/23/2017    Thyroid enlarged 2/23/2015    Tinnitus     Vertigo      Past Surgical History:   Procedure Laterality Date    EAR SURGERY Left     KNEE SURGERY       Social History   Social History     Substance and Sexual Activity   Alcohol Use Not Currently     Social History     Substance and Sexual Activity   Drug Use No     Social History     Tobacco Use   Smoking Status Never Smoker   Smokeless Tobacco Never Used     Family History   Problem Relation Age of Onset    Diabetes Mother     Heart disease Mother     Heart disease Father     Stroke Father     Cancer Family      Allergies   Allergen Reactions    Hydrochlorothiazide      Hyponatremia, severe    Statins         Constitutional:  /75   Pulse 69   Ht 5' 2" (1 575 m)   Wt 97 5 kg (215 lb)   BMI 39 32 kg/m²    General: NAD  Eyes: Clear sclerae  ENT: No inflammation, lesion, or mass of lips  No tracheal deviation  Musculoskeletal: As mentioned below  Integumentary: No visible rashes or skin lesions  Pulmonary/Chest: Effort normal  No respiratory distress  Neuro: CN's grossly intact, BALTAZAR  Psych: Normal affect and judgement  Vascular: WWP  Back Exam     Tenderness   The patient is experiencing tenderness in the lumbar and sacroiliac  Muscle Strength   The patient has normal back strength  Other   Sensation: normal  Gait: normal   Erythema: no back redness  Scars: absent      Right Shoulder Exam     Range of Motion   Active abduction: abnormal   Forward flexion: normal     Tests   Luong test: positive  Impingement: positive    Other   Erythema: absent  Scars: absent  Sensation: normal  Pulse: present      Left Shoulder Exam     Range of Motion   Active abduction: abnormal   Forward flexion: normal     Tests   Luong test: positive  Impingement: positive    Other   Erythema: absent  Scars: absent  Sensation: normal  Pulse: present              Procedures - none for this visit  Portions of the record may have been created with voice recognition software  Occasional wrong word or "sound a like" substitutions may have occurred due to the inherent limitations of voice recognition software  Read the chart carefully and recognize, using context, where substitutions have occurred

## 2020-03-13 NOTE — PATIENT INSTRUCTIONS
You will be starting physical therapy  It is important to do a home exercise program as you go through PT to speed up your recovery  Rehab addresses the problems that are causing your pain, instead of covering them up, as some other treatment options do  If your pain worsens/does not improve with rehab, we can see you back earlier than planned  Please call our office if that is the case  Otherwise, we will see you on your follow up as scheduled

## 2020-03-27 ENCOUNTER — TELEPHONE (OUTPATIENT)
Dept: NEPHROLOGY | Facility: CLINIC | Age: 85
End: 2020-03-27

## 2020-03-27 NOTE — TELEPHONE ENCOUNTER
Spoke with the patient to confirm her appointment for 4/3/2020 with Dr Cindy Mena and she would like to do a tele-medicine visit  She is aware that her insurance will be billed for the visit        Cindy Ho MA

## 2020-03-31 LAB
ALBUMIN/CREAT UR: 11 MCG/MG CREAT
CREAT UR-MCNC: 46 MG/DL (ref 20–275)
MICROALBUMIN UR-MCNC: 0.5 MG/DL

## 2020-04-01 ENCOUNTER — TELEPHONE (OUTPATIENT)
Dept: NEPHROLOGY | Facility: CLINIC | Age: 85
End: 2020-04-01

## 2020-04-01 LAB
BASOPHILS # BLD AUTO: 88 CELLS/UL (ref 0–200)
BASOPHILS NFR BLD AUTO: 1.3 %
BUN SERPL-MCNC: 15 MG/DL (ref 7–25)
BUN/CREAT SERPL: 14 (CALC) (ref 6–22)
CALCIUM SERPL-MCNC: 9.4 MG/DL (ref 8.6–10.4)
CHLORIDE SERPL-SCNC: 99 MMOL/L (ref 98–110)
CO2 SERPL-SCNC: 24 MMOL/L (ref 20–32)
CREAT SERPL-MCNC: 1.05 MG/DL (ref 0.6–0.88)
EOSINOPHIL # BLD AUTO: 299 CELLS/UL (ref 15–500)
EOSINOPHIL NFR BLD AUTO: 4.4 %
ERYTHROCYTE [DISTWIDTH] IN BLOOD BY AUTOMATED COUNT: 12.6 % (ref 11–15)
GLUCOSE SERPL-MCNC: 119 MG/DL (ref 65–139)
HCT VFR BLD AUTO: 35.9 % (ref 35–45)
HGB BLD-MCNC: 12.7 G/DL (ref 11.7–15.5)
LYMPHOCYTES # BLD AUTO: 2373 CELLS/UL (ref 850–3900)
LYMPHOCYTES NFR BLD AUTO: 34.9 %
MAGNESIUM SERPL-MCNC: 2 MG/DL (ref 1.5–2.5)
MCH RBC QN AUTO: 33.8 PG (ref 27–33)
MCHC RBC AUTO-ENTMCNC: 35.4 G/DL (ref 32–36)
MCV RBC AUTO: 95.5 FL (ref 80–100)
MONOCYTES # BLD AUTO: 592 CELLS/UL (ref 200–950)
MONOCYTES NFR BLD AUTO: 8.7 %
NEUTROPHILS # BLD AUTO: 3448 CELLS/UL (ref 1500–7800)
NEUTROPHILS NFR BLD AUTO: 50.7 %
PHOSPHATE SERPL-MCNC: 3.7 MG/DL (ref 2.1–4.3)
PLATELET # BLD AUTO: 180 THOUSAND/UL (ref 140–400)
PMV BLD REES-ECKER: 11.5 FL (ref 7.5–12.5)
POTASSIUM SERPL-SCNC: 5.3 MMOL/L (ref 3.5–5.3)
RBC # BLD AUTO: 3.76 MILLION/UL (ref 3.8–5.1)
REF LAB TEST NAME: NORMAL
REF LAB TEST: NORMAL
SL AMB CLIENT CONTACT: NORMAL
SL AMB EGFR AFRICAN AMERICAN: 56 ML/MIN/1.73M2
SL AMB EGFR NON AFRICAN AMERICAN: 48 ML/MIN/1.73M2
SODIUM SERPL-SCNC: 137 MMOL/L (ref 135–146)
TSH SERPL-ACNC: 1.97 MIU/L (ref 0.4–4.5)
WBC # BLD AUTO: 6.8 THOUSAND/UL (ref 3.8–10.8)

## 2020-04-03 ENCOUNTER — TELEMEDICINE (OUTPATIENT)
Dept: NEPHROLOGY | Facility: CLINIC | Age: 85
End: 2020-04-03
Payer: COMMERCIAL

## 2020-04-03 DIAGNOSIS — I12.9 BENIGN HYPERTENSION WITH CKD (CHRONIC KIDNEY DISEASE) STAGE III (HCC): Primary | ICD-10-CM

## 2020-04-03 DIAGNOSIS — E87.1 HYPONATREMIA: ICD-10-CM

## 2020-04-03 DIAGNOSIS — N18.30 BENIGN HYPERTENSION WITH CKD (CHRONIC KIDNEY DISEASE) STAGE III (HCC): Primary | ICD-10-CM

## 2020-04-03 DIAGNOSIS — R60.0 BILATERAL LEG EDEMA: ICD-10-CM

## 2020-04-03 DIAGNOSIS — E87.5 HYPERKALEMIA: ICD-10-CM

## 2020-04-03 DIAGNOSIS — N18.30 CKD (CHRONIC KIDNEY DISEASE), STAGE III (HCC): ICD-10-CM

## 2020-04-03 PROCEDURE — G2012 BRIEF CHECK IN BY MD/QHP: HCPCS | Performed by: INTERNAL MEDICINE

## 2020-04-03 RX ORDER — DOXAZOSIN 2 MG/1
2 TABLET ORAL
Qty: 30 TABLET | Refills: 5 | Status: SHIPPED | OUTPATIENT
Start: 2020-04-03 | End: 2020-07-02

## 2020-04-10 ENCOUNTER — OFFICE VISIT (OUTPATIENT)
Dept: FAMILY MEDICINE CLINIC | Facility: CLINIC | Age: 85
End: 2020-04-10
Payer: COMMERCIAL

## 2020-04-10 VITALS
TEMPERATURE: 97.5 F | RESPIRATION RATE: 18 BRPM | HEIGHT: 62 IN | WEIGHT: 226 LBS | HEART RATE: 80 BPM | DIASTOLIC BLOOD PRESSURE: 70 MMHG | SYSTOLIC BLOOD PRESSURE: 162 MMHG | BODY MASS INDEX: 41.59 KG/M2

## 2020-04-10 DIAGNOSIS — N18.30 BENIGN HYPERTENSION WITH CKD (CHRONIC KIDNEY DISEASE) STAGE III (HCC): ICD-10-CM

## 2020-04-10 DIAGNOSIS — E11.9 TYPE 2 DIABETES MELLITUS WITHOUT COMPLICATION, WITHOUT LONG-TERM CURRENT USE OF INSULIN (HCC): Primary | ICD-10-CM

## 2020-04-10 DIAGNOSIS — I12.9 BENIGN HYPERTENSION WITH CKD (CHRONIC KIDNEY DISEASE) STAGE III (HCC): ICD-10-CM

## 2020-04-10 DIAGNOSIS — N18.30 CKD (CHRONIC KIDNEY DISEASE), STAGE III (HCC): ICD-10-CM

## 2020-04-10 PROBLEM — E87.5 HYPERKALEMIA: Status: RESOLVED | Noted: 2020-04-03 | Resolved: 2020-04-10

## 2020-04-10 PROBLEM — E87.1 HYPONATREMIA: Status: RESOLVED | Noted: 2018-07-25 | Resolved: 2020-04-10

## 2020-04-10 PROBLEM — R10.9 ABDOMINAL PAIN: Status: RESOLVED | Noted: 2017-08-23 | Resolved: 2020-04-10

## 2020-04-10 PROCEDURE — 1036F TOBACCO NON-USER: CPT | Performed by: FAMILY MEDICINE

## 2020-04-10 PROCEDURE — 3066F NEPHROPATHY DOC TX: CPT | Performed by: FAMILY MEDICINE

## 2020-04-10 PROCEDURE — 3077F SYST BP >= 140 MM HG: CPT | Performed by: FAMILY MEDICINE

## 2020-04-10 PROCEDURE — 3078F DIAST BP <80 MM HG: CPT | Performed by: FAMILY MEDICINE

## 2020-04-10 PROCEDURE — 3288F FALL RISK ASSESSMENT DOCD: CPT | Performed by: FAMILY MEDICINE

## 2020-04-10 PROCEDURE — 2022F DILAT RTA XM EVC RTNOPTHY: CPT | Performed by: FAMILY MEDICINE

## 2020-04-10 PROCEDURE — 99214 OFFICE O/P EST MOD 30 MIN: CPT | Performed by: FAMILY MEDICINE

## 2020-04-10 PROCEDURE — 4040F PNEUMOC VAC/ADMIN/RCVD: CPT | Performed by: FAMILY MEDICINE

## 2020-04-10 PROCEDURE — 3008F BODY MASS INDEX DOCD: CPT | Performed by: FAMILY MEDICINE

## 2020-04-10 PROCEDURE — 1160F RVW MEDS BY RX/DR IN RCRD: CPT | Performed by: FAMILY MEDICINE

## 2020-04-10 PROCEDURE — 1101F PT FALLS ASSESS-DOCD LE1/YR: CPT | Performed by: FAMILY MEDICINE

## 2020-04-24 DIAGNOSIS — I10 ESSENTIAL HYPERTENSION: ICD-10-CM

## 2020-04-24 DIAGNOSIS — E87.1 HYPONATREMIA: ICD-10-CM

## 2020-04-24 RX ORDER — DILTIAZEM HYDROCHLORIDE 240 MG/1
CAPSULE, EXTENDED RELEASE ORAL
Qty: 90 CAPSULE | Refills: 0 | Status: SHIPPED | OUTPATIENT
Start: 2020-04-24 | End: 2020-09-23

## 2020-04-24 RX ORDER — CARVEDILOL 25 MG/1
TABLET ORAL
Qty: 180 TABLET | Refills: 0 | Status: SHIPPED | OUTPATIENT
Start: 2020-04-24 | End: 2020-09-23

## 2020-04-24 RX ORDER — DICYCLOMINE HYDROCHLORIDE 10 MG/1
CAPSULE ORAL
Qty: 360 CAPSULE | Refills: 0 | Status: SHIPPED | OUTPATIENT
Start: 2020-04-24 | End: 2021-05-19

## 2020-04-27 RX ORDER — FUROSEMIDE 20 MG/1
TABLET ORAL
Qty: 90 TABLET | Refills: 0 | Status: SHIPPED | OUTPATIENT
Start: 2020-04-27 | End: 2020-08-29 | Stop reason: SDUPTHER

## 2020-05-21 ENCOUNTER — EVALUATION (OUTPATIENT)
Dept: PHYSICAL THERAPY | Facility: REHABILITATION | Age: 85
End: 2020-05-21
Payer: COMMERCIAL

## 2020-05-21 DIAGNOSIS — M25.511 CHRONIC RIGHT SHOULDER PAIN: Primary | ICD-10-CM

## 2020-05-21 DIAGNOSIS — G89.29 CHRONIC RIGHT SHOULDER PAIN: Primary | ICD-10-CM

## 2020-05-21 PROCEDURE — 97162 PT EVAL MOD COMPLEX 30 MIN: CPT | Performed by: PHYSICAL THERAPIST

## 2020-05-21 PROCEDURE — 97110 THERAPEUTIC EXERCISES: CPT | Performed by: PHYSICAL THERAPIST

## 2020-05-26 ENCOUNTER — OFFICE VISIT (OUTPATIENT)
Dept: PHYSICAL THERAPY | Facility: REHABILITATION | Age: 85
End: 2020-05-26
Payer: COMMERCIAL

## 2020-05-26 DIAGNOSIS — G89.29 CHRONIC RIGHT SHOULDER PAIN: Primary | ICD-10-CM

## 2020-05-26 DIAGNOSIS — M25.511 CHRONIC RIGHT SHOULDER PAIN: Primary | ICD-10-CM

## 2020-05-26 PROCEDURE — 97110 THERAPEUTIC EXERCISES: CPT | Performed by: PHYSICAL THERAPIST

## 2020-05-27 ENCOUNTER — OFFICE VISIT (OUTPATIENT)
Dept: PHYSICAL THERAPY | Facility: REHABILITATION | Age: 85
End: 2020-05-27
Payer: COMMERCIAL

## 2020-05-27 DIAGNOSIS — G89.29 CHRONIC RIGHT SHOULDER PAIN: Primary | ICD-10-CM

## 2020-05-27 DIAGNOSIS — M25.511 CHRONIC RIGHT SHOULDER PAIN: Primary | ICD-10-CM

## 2020-05-27 PROCEDURE — 97110 THERAPEUTIC EXERCISES: CPT | Performed by: PHYSICAL THERAPIST

## 2020-06-01 ENCOUNTER — OFFICE VISIT (OUTPATIENT)
Dept: PHYSICAL THERAPY | Facility: REHABILITATION | Age: 85
End: 2020-06-01
Payer: COMMERCIAL

## 2020-06-01 DIAGNOSIS — M25.511 CHRONIC RIGHT SHOULDER PAIN: Primary | ICD-10-CM

## 2020-06-01 DIAGNOSIS — G89.29 CHRONIC RIGHT SHOULDER PAIN: Primary | ICD-10-CM

## 2020-06-01 PROCEDURE — 97110 THERAPEUTIC EXERCISES: CPT | Performed by: PHYSICAL THERAPIST

## 2020-06-03 ENCOUNTER — APPOINTMENT (OUTPATIENT)
Dept: PHYSICAL THERAPY | Facility: REHABILITATION | Age: 85
End: 2020-06-03
Payer: COMMERCIAL

## 2020-06-08 ENCOUNTER — OFFICE VISIT (OUTPATIENT)
Dept: PHYSICAL THERAPY | Facility: REHABILITATION | Age: 85
End: 2020-06-08
Payer: COMMERCIAL

## 2020-06-08 DIAGNOSIS — M25.511 CHRONIC RIGHT SHOULDER PAIN: Primary | ICD-10-CM

## 2020-06-08 DIAGNOSIS — G89.29 CHRONIC RIGHT SHOULDER PAIN: Primary | ICD-10-CM

## 2020-06-08 PROCEDURE — 97110 THERAPEUTIC EXERCISES: CPT | Performed by: PHYSICAL THERAPIST

## 2020-06-10 ENCOUNTER — APPOINTMENT (OUTPATIENT)
Dept: PHYSICAL THERAPY | Facility: REHABILITATION | Age: 85
End: 2020-06-10
Payer: COMMERCIAL

## 2020-06-27 LAB
ALBUMIN SERPL-MCNC: 4.2 G/DL (ref 3.6–5.1)
ALBUMIN/GLOB SERPL: 1.8 (CALC) (ref 1–2.5)
ALP SERPL-CCNC: 87 U/L (ref 37–153)
ALT SERPL-CCNC: 13 U/L (ref 6–29)
AST SERPL-CCNC: 17 U/L (ref 10–35)
BILIRUB SERPL-MCNC: 0.7 MG/DL (ref 0.2–1.2)
BUN SERPL-MCNC: 16 MG/DL (ref 7–25)
BUN/CREAT SERPL: 16 (CALC) (ref 6–22)
CALCIUM SERPL-MCNC: 9.5 MG/DL (ref 8.6–10.4)
CHLORIDE SERPL-SCNC: 98 MMOL/L (ref 98–110)
CHOLEST SERPL-MCNC: 221 MG/DL
CHOLEST/HDLC SERPL: 4.2 (CALC)
CO2 SERPL-SCNC: 31 MMOL/L (ref 20–32)
CREAT SERPL-MCNC: 0.97 MG/DL (ref 0.6–0.88)
GLOBULIN SER CALC-MCNC: 2.4 G/DL (CALC) (ref 1.9–3.7)
GLUCOSE SERPL-MCNC: 138 MG/DL (ref 65–99)
HBA1C MFR BLD: 6.7 % OF TOTAL HGB
HDLC SERPL-MCNC: 53 MG/DL
LDLC SERPL CALC-MCNC: 141 MG/DL (CALC)
NONHDLC SERPL-MCNC: 168 MG/DL (CALC)
POTASSIUM SERPL-SCNC: 4.6 MMOL/L (ref 3.5–5.3)
PROT SERPL-MCNC: 6.6 G/DL (ref 6.1–8.1)
SL AMB EGFR AFRICAN AMERICAN: 61 ML/MIN/1.73M2
SL AMB EGFR NON AFRICAN AMERICAN: 53 ML/MIN/1.73M2
SODIUM SERPL-SCNC: 134 MMOL/L (ref 135–146)
TRIGL SERPL-MCNC: 142 MG/DL

## 2020-07-01 DIAGNOSIS — I12.9 BENIGN HYPERTENSION WITH CKD (CHRONIC KIDNEY DISEASE) STAGE III (HCC): ICD-10-CM

## 2020-07-01 DIAGNOSIS — N18.30 BENIGN HYPERTENSION WITH CKD (CHRONIC KIDNEY DISEASE) STAGE III (HCC): ICD-10-CM

## 2020-07-02 ENCOUNTER — OFFICE VISIT (OUTPATIENT)
Dept: FAMILY MEDICINE CLINIC | Facility: CLINIC | Age: 85
End: 2020-07-02
Payer: COMMERCIAL

## 2020-07-02 VITALS
BODY MASS INDEX: 41.18 KG/M2 | TEMPERATURE: 98 F | RESPIRATION RATE: 16 BRPM | HEIGHT: 62 IN | HEART RATE: 60 BPM | DIASTOLIC BLOOD PRESSURE: 90 MMHG | WEIGHT: 223.8 LBS | SYSTOLIC BLOOD PRESSURE: 140 MMHG

## 2020-07-02 DIAGNOSIS — E78.5 HYPERLIPIDEMIA, UNSPECIFIED HYPERLIPIDEMIA TYPE: ICD-10-CM

## 2020-07-02 DIAGNOSIS — E11.9 TYPE 2 DIABETES MELLITUS WITHOUT COMPLICATION, WITHOUT LONG-TERM CURRENT USE OF INSULIN (HCC): Primary | ICD-10-CM

## 2020-07-02 DIAGNOSIS — I73.9 PAD (PERIPHERAL ARTERY DISEASE) (HCC): ICD-10-CM

## 2020-07-02 DIAGNOSIS — R06.00 DYSPNEA, UNSPECIFIED TYPE: ICD-10-CM

## 2020-07-02 DIAGNOSIS — R01.1 HEART MURMUR: ICD-10-CM

## 2020-07-02 PROCEDURE — 3008F BODY MASS INDEX DOCD: CPT | Performed by: FAMILY MEDICINE

## 2020-07-02 PROCEDURE — 1160F RVW MEDS BY RX/DR IN RCRD: CPT | Performed by: FAMILY MEDICINE

## 2020-07-02 PROCEDURE — 3066F NEPHROPATHY DOC TX: CPT | Performed by: FAMILY MEDICINE

## 2020-07-02 PROCEDURE — 3077F SYST BP >= 140 MM HG: CPT | Performed by: FAMILY MEDICINE

## 2020-07-02 PROCEDURE — 4040F PNEUMOC VAC/ADMIN/RCVD: CPT | Performed by: FAMILY MEDICINE

## 2020-07-02 PROCEDURE — 3080F DIAST BP >= 90 MM HG: CPT | Performed by: FAMILY MEDICINE

## 2020-07-02 PROCEDURE — 2022F DILAT RTA XM EVC RTNOPTHY: CPT | Performed by: FAMILY MEDICINE

## 2020-07-02 PROCEDURE — 1036F TOBACCO NON-USER: CPT | Performed by: FAMILY MEDICINE

## 2020-07-02 PROCEDURE — 99214 OFFICE O/P EST MOD 30 MIN: CPT | Performed by: FAMILY MEDICINE

## 2020-07-02 RX ORDER — DOXAZOSIN 2 MG/1
TABLET ORAL
Qty: 90 TABLET | Refills: 2 | Status: SHIPPED | OUTPATIENT
Start: 2020-07-02 | End: 2021-02-08 | Stop reason: SDUPTHER

## 2020-07-02 NOTE — PROGRESS NOTES
Assessment and Plan:    Problem List Items Addressed This Visit        Endocrine    Type 2 diabetes mellitus without complication, without long-term current use of insulin ([facility] [address] ) - Primary       Lab Results   Component Value Date    HGBA1C 6 7 (H) 06/26/2020   reinforce diet         Relevant Orders    Comprehensive metabolic panel    Hemoglobin A1C       Cardiovascular and Mediastinum    PAD (peripheral artery disease) (HCC)     Start simvastatin 10 mg/day            Other    Hyperlipidemia     Start simvastatin         Relevant Orders    Lipid panel      Other Visit Diagnoses     Dyspnea, unspecified type        Relevant Orders    Echo complete with contrast if indicated    Heart murmur        Relevant Orders    Echo complete with contrast if indicated                 Diagnoses and all orders for this visit:    Type 2 diabetes mellitus without complication, without long-term current use of insulin ([facility]  -     Comprehensive metabolic panel; Future  -     Hemoglobin A1C; Future    PAD (peripheral artery disease) (HCC)    Hyperlipidemia, unspecified hyperlipidemia type  -     Lipid panel; Future    Dyspnea, unspecified type  -     Echo complete with contrast if indicated; Future    Heart murmur  -     Echo complete with contrast if indicated; Future              Subjective:      Patient ID: Indiana Ziegler is a 80 y o  female  CC:    Chief Complaint   Patient presents with    Follow-up     Patient is here on a 3 month follow  up  HPI:    F/u diabetes, lipids, -LDL and sugar up a tad, eating more junk, feels short of breath, not sure if age or weight      The following portions of the patient's history were reviewed and updated as appropriate: allergies, current medications, past family history, past medical history, past social history, past surgical history and problem list       Review of Systems   Constitutional: Negative for activity change, appetite change, chills, fatigue and fever     Respiratory: Positive for shortness of breath  Negative for cough  Cardiovascular: Negative for chest pain  Neurological: Negative for dizziness and headaches  Psychiatric/Behavioral: Positive for sleep disturbance  Due to sister's  dementia         Data to review:       Objective:    Vitals:    07/02/20 1356   BP: 140/90   BP Location: Left arm   Patient Position: Sitting   Cuff Size: Standard   Pulse: 60   Resp: 16   Temp: 98 °F (36 7 °C)   Weight: 102 kg (223 lb 12 8 oz)   Height: 5' 2" (1 575 m)        Physical Exam   Constitutional: She appears well-developed and well-nourished  Neck: No thyromegaly present  Cardiovascular: Normal rate, regular rhythm and intact distal pulses  Murmur heard  Systolic murmur is present with a grade of 4/6  Pulmonary/Chest: Effort normal and breath sounds normal    Musculoskeletal: She exhibits no edema  Lymphadenopathy:     She has no cervical adenopathy  Vitals reviewed

## 2020-07-28 ENCOUNTER — TELEPHONE (OUTPATIENT)
Dept: NEPHROLOGY | Facility: CLINIC | Age: 85
End: 2020-07-28

## 2020-07-28 LAB
25(OH)D3 SERPL-MCNC: 34 NG/ML (ref 30–100)
ALBUMIN/CREAT UR: 12 MCG/MG CREAT
BASOPHILS # BLD AUTO: 53 CELLS/UL (ref 0–200)
BASOPHILS NFR BLD AUTO: 0.8 %
BUN SERPL-MCNC: 14 MG/DL (ref 7–25)
BUN/CREAT SERPL: 14 (CALC) (ref 6–22)
CALCIUM SERPL-MCNC: 9.3 MG/DL (ref 8.6–10.4)
CALCIUM SERPL-MCNC: 9.3 MG/DL (ref 8.6–10.4)
CHLORIDE SERPL-SCNC: 100 MMOL/L (ref 98–110)
CO2 SERPL-SCNC: 32 MMOL/L (ref 20–32)
CREAT SERPL-MCNC: 1.03 MG/DL (ref 0.6–0.88)
CREAT UR-MCNC: 99 MG/DL (ref 20–275)
EOSINOPHIL # BLD AUTO: 158 CELLS/UL (ref 15–500)
EOSINOPHIL NFR BLD AUTO: 2.4 %
ERYTHROCYTE [DISTWIDTH] IN BLOOD BY AUTOMATED COUNT: 12.3 % (ref 11–15)
GLUCOSE SERPL-MCNC: 113 MG/DL (ref 65–99)
HCT VFR BLD AUTO: 34.7 % (ref 35–45)
HGB BLD-MCNC: 12.2 G/DL (ref 11.7–15.5)
LYMPHOCYTES # BLD AUTO: 2218 CELLS/UL (ref 850–3900)
LYMPHOCYTES NFR BLD AUTO: 33.6 %
MCH RBC QN AUTO: 34.3 PG (ref 27–33)
MCHC RBC AUTO-ENTMCNC: 35.2 G/DL (ref 32–36)
MCV RBC AUTO: 97.5 FL (ref 80–100)
MICROALBUMIN UR-MCNC: 1.2 MG/DL
MONOCYTES # BLD AUTO: 488 CELLS/UL (ref 200–950)
MONOCYTES NFR BLD AUTO: 7.4 %
NEUTROPHILS # BLD AUTO: 3683 CELLS/UL (ref 1500–7800)
NEUTROPHILS NFR BLD AUTO: 55.8 %
PHOSPHATE SERPL-MCNC: 3.7 MG/DL (ref 2.1–4.3)
PLATELET # BLD AUTO: 173 THOUSAND/UL (ref 140–400)
PMV BLD REES-ECKER: 11.6 FL (ref 7.5–12.5)
POTASSIUM SERPL-SCNC: 4.7 MMOL/L (ref 3.5–5.3)
PTH-INTACT SERPL-MCNC: 66 PG/ML (ref 14–64)
RBC # BLD AUTO: 3.56 MILLION/UL (ref 3.8–5.1)
SL AMB EGFR AFRICAN AMERICAN: 57 ML/MIN/1.73M2
SL AMB EGFR NON AFRICAN AMERICAN: 49 ML/MIN/1.73M2
SODIUM SERPL-SCNC: 137 MMOL/L (ref 135–146)
WBC # BLD AUTO: 6.6 THOUSAND/UL (ref 3.8–10.8)

## 2020-08-07 ENCOUNTER — TELEPHONE (OUTPATIENT)
Dept: NEPHROLOGY | Facility: CLINIC | Age: 85
End: 2020-08-07

## 2020-08-07 NOTE — TELEPHONE ENCOUNTER
Left message to confirm appointment and demographics  Please verify insurance is correct   Needs covid screening questions and office protpcols reviewed

## 2020-08-10 ENCOUNTER — OFFICE VISIT (OUTPATIENT)
Dept: NEPHROLOGY | Facility: CLINIC | Age: 85
End: 2020-08-10
Payer: COMMERCIAL

## 2020-08-10 VITALS
DIASTOLIC BLOOD PRESSURE: 78 MMHG | HEART RATE: 64 BPM | TEMPERATURE: 98.2 F | BODY MASS INDEX: 41.41 KG/M2 | HEIGHT: 62 IN | RESPIRATION RATE: 16 BRPM | WEIGHT: 225 LBS | SYSTOLIC BLOOD PRESSURE: 140 MMHG

## 2020-08-10 DIAGNOSIS — N18.30 BENIGN HYPERTENSION WITH CKD (CHRONIC KIDNEY DISEASE) STAGE III (HCC): Primary | ICD-10-CM

## 2020-08-10 DIAGNOSIS — N18.30 CKD (CHRONIC KIDNEY DISEASE), STAGE III (HCC): ICD-10-CM

## 2020-08-10 DIAGNOSIS — E87.1 HYPONATREMIA: ICD-10-CM

## 2020-08-10 DIAGNOSIS — R60.0 BILATERAL LEG EDEMA: ICD-10-CM

## 2020-08-10 DIAGNOSIS — I12.9 BENIGN HYPERTENSION WITH CKD (CHRONIC KIDNEY DISEASE) STAGE III (HCC): Primary | ICD-10-CM

## 2020-08-10 PROCEDURE — 3066F NEPHROPATHY DOC TX: CPT | Performed by: INTERNAL MEDICINE

## 2020-08-10 PROCEDURE — 3077F SYST BP >= 140 MM HG: CPT | Performed by: INTERNAL MEDICINE

## 2020-08-10 PROCEDURE — 99214 OFFICE O/P EST MOD 30 MIN: CPT | Performed by: INTERNAL MEDICINE

## 2020-08-10 PROCEDURE — 2022F DILAT RTA XM EVC RTNOPTHY: CPT | Performed by: INTERNAL MEDICINE

## 2020-08-10 PROCEDURE — 1036F TOBACCO NON-USER: CPT | Performed by: INTERNAL MEDICINE

## 2020-08-10 PROCEDURE — 3078F DIAST BP <80 MM HG: CPT | Performed by: INTERNAL MEDICINE

## 2020-08-10 PROCEDURE — 4040F PNEUMOC VAC/ADMIN/RCVD: CPT | Performed by: INTERNAL MEDICINE

## 2020-08-10 PROCEDURE — 1160F RVW MEDS BY RX/DR IN RCRD: CPT | Performed by: INTERNAL MEDICINE

## 2020-08-10 PROCEDURE — 3008F BODY MASS INDEX DOCD: CPT | Performed by: INTERNAL MEDICINE

## 2020-08-10 NOTE — PROGRESS NOTES
NEPHROLOGY OUTPATIENT PROGRESS NOTE   Abhilash Tan 80 y o  female MRN: [id number]  DATE: 8/10/2020  Reason for visit:   Chief Complaint   Patient presents with    Follow-up    Chronic Kidney Disease     ASSESSMENT and PLAN:  CKD stage IIIA, baseline creatinine 0 9 to 1  -CKD likely secondary to long-term hypertension and diabetes  -last creatinine 1 0 in July 2020 overall stable  -avoid nephrotoxins or NSAIDs  -urinalysis in January 2019 shows no proteinuria or hematuria  urine microalbumin/creatinine ratio 12 mg nonsignificant in July 2020       Hypertension  -Patient does not check BP at home   -currently on carvedilol, diltiazem, doxazosin and Lasix  She could not tolerate lisinopril due to dry cough  -her BP is slightly above goal in the office today although acceptable  - call back if BP remains persistently greater than 140/90      Mild hyperkalemia, resolved  Advised to follow low potassium diet  -will do repeat labs before next visit      Hyponatremia, resolved  -initially hyponatremia thought to be secondary to hypovolemia/HCTZ +/- SIADH component  -she remains off HCTZ , last serum sodium 137 improved within normal range    -continue to follow mild fluid restriction less than 64 oz per day   -continue Lasix 20 mg daily  BMP before next visit  -previous workup includes serum osmolality 252, urine sodium 45, urine osmolality 388       Leg edema, seems to be stable as per patient  Elevate legs while in bed, compression stockings as tolerated  Diagnoses and all orders for this visit:    Benign hypertension with CKD (chronic kidney disease) stage III (HCC)  -     Microalbumin / creatinine urine ratio; Future    Bilateral leg edema    CKD (chronic kidney disease), stage III (HCC)  -     Basic metabolic panel; Future  -     CBC; Future  -     Microalbumin / creatinine urine ratio; Future  -     Phosphorus; Future  -     PTH, intact; Future  -     Magnesium;  Future  -     Vitamin D 25 hydroxy; Future    Hyponatremia        SUBJECTIVE / HPI:  Patient is a 59-year-old female with significant medical issues of hypertension for many years, prior history of long-term diabetes and now remains off antidiabetic medication, CKD stage IIIA with baseline creatinine 0 9 to 1, chronic back pain issues, history of hyponatremia, presents for regular follow-up of CKD  Patient last serum creatinine overall stable at baseline  She has wrist BP cuff although has not been checking this at home and not sure whether her machine is working  She claims to be drinking around 50 to 60 oz of liquid daily   Patient has chronic back pain issues which is overall stable   She denies taking any NSAIDs   Denies any urinary complaint   Denies any chest pain or shortness of breath   No nausea or vomiting  REVIEW OF SYSTEMS:  More than 10 point review of systems were obtained and discussed in length with the patient  Complete review of systems were negative / unremarkable except mentioned above  PHYSICAL EXAM:  Vitals:    08/10/20 1620 08/10/20 1646   BP: 132/88 140/78   BP Location: Left arm    Patient Position: Sitting    Cuff Size: Large    Pulse: 64    Resp: 16    Temp: 98 2 °F (36 8 °C)    TempSrc: Tympanic    Weight: 102 kg (225 lb)    Height: 5' 2" (1 575 m)      Body mass index is 41 15 kg/m²  Physical Exam  Vitals signs reviewed  Constitutional:       Appearance: She is well-developed  HENT:      Head: Normocephalic and atraumatic  Right Ear: External ear normal       Left Ear: External ear normal    Eyes:      Conjunctiva/sclera: Conjunctivae normal       Pupils: Pupils are equal, round, and reactive to light  Neck:      Musculoskeletal: Neck supple  Vascular: No JVD  Cardiovascular:      Rate and Rhythm: Normal rate  Heart sounds: Normal heart sounds  Pulmonary:      Effort: Pulmonary effort is normal       Breath sounds: Normal breath sounds  No wheezing or rales     Abdominal:      General: Bowel sounds are normal  There is no distension  Palpations: Abdomen is soft  Tenderness: There is no abdominal tenderness  Musculoskeletal:         General: No tenderness  Comments: Mild nonpitting edema in legs   Skin:     General: Skin is warm and dry  Findings: No rash  Neurological:      Mental Status: She is alert and oriented to person, place, and time     Psychiatric:         Behavior: Behavior normal          PAST MEDICAL HISTORY:  Past Medical History:   Diagnosis Date    Anemia     Anxiety     Arthritis     CKD (chronic kidney disease), stage III ([facility] ) 11/29/2018    Diabetes mellitus ([facility] )     Diabetic peripheral neuropathy (HCC)     Hyperlipidemia     Hypertension     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 8/23/2017    Thyroid enlarged 2/23/2015    Tinnitus     Vertigo        PAST SURGICAL HISTORY:  Past Surgical History:   Procedure Laterality Date    APPENDECTOMY      EAR SURGERY Left     KNEE SURGERY      TONSILLECTOMY         SOCIAL HISTORY:  Social History     Substance and Sexual Activity   Alcohol Use Not Currently     Social History     Substance and Sexual Activity   Drug Use No     Social History     Tobacco Use   Smoking Status Never Smoker   Smokeless Tobacco Never Used       FAMILY HISTORY:  Family History   Problem Relation Age of Onset    Diabetes Mother     Heart disease Mother     Heart disease Father     Stroke Father     Cancer Family     Dementia Sister     Liver disease Other        MEDICATIONS:    Current Outpatient Medications:     aspirin 81 MG tablet, Take 1 tablet by mouth daily, Disp: , Rfl:     Blood Glucose Monitoring Suppl (ONE TOUCH ULTRA 2) w/Device KIT, by Does not apply route daily, Disp: 1 each, Rfl: 0    CARTIA  MG 24 hr capsule, Take 1 capsule by mouth once daily, Disp: 90 capsule, Rfl: 0    carvedilol (COREG) 25 mg tablet, Take 1 tablet by mouth twice daily, Disp: 180 tablet, Rfl: 0    Cholecalciferol (VITAMIN D3) 5000 units CAPS, Take 1 capsule by mouth daily, Disp: , Rfl:     dicyclomine (BENTYL) 10 mg capsule, TAKE 1 CAPSULE BY MOUTH 4 TIMES DAILY BEFORE MEAL(S) AND AT BEDTIME, Disp: 360 capsule, Rfl: 0    docusate sodium (COLACE) 100 mg capsule, Take 1 capsule (100 mg total) by mouth 2 (two) times a day, Disp: 10 capsule, Rfl: 0    doxazosin (CARDURA) 2 mg tablet, TAKE 1 TABLET BY MOUTH AT BEDTIME, Disp: 90 tablet, Rfl: 2    furosemide (LASIX) 20 mg tablet, Take 1 tablet by mouth once daily, Disp: 90 tablet, Rfl: 0    Glucos-Chondroit-Hyaluron-MSM (GLUCOSAMINE CHONDROITIN JOINT) TABS, Take by mouth, Disp: , Rfl:     glucose blood (ONE TOUCH ULTRA TEST) test strip, Test daily                        DX: E11 9, Disp: 100 each, Rfl: 1    Multiple Vitamin (MULTI-VITAMIN DAILY PO), Take 1 tablet by mouth daily, Disp: , Rfl:     ONE TOUCH LANCETS MISC, by Does not apply route daily Test daily                  DX:E11 9, Disp: 100 each, Rfl: 1    Probiotic Product (PROBIOTIC DAILY PO), Take by mouth, Disp: , Rfl:     TURMERIC CURCUMIN PO, Take by mouth, Disp: , Rfl:     Lab Results:   Results for orders placed or performed in visit on 07/27/20   Microalbumin, Random Urine (W/Creatinine)   Result Value Ref Range    Creatinine, Urine 99 20 - 275 mg/dL    Microalbum  ,U,Random 1 2 See Note: mg/dL    Microalb/Creat Ratio 12 <30 mcg/mg creat   PTH, Intact and Calcium   Result Value Ref Range    PTH, Intact 66 (H) 14 - 64 pg/mL    Calcium 9 3 8 6 - 10 4 mg/dL   Phosphorus   Result Value Ref Range    Phosphorus, Serum 3 7 2 1 - 4 3 mg/dL   Basic metabolic panel   Result Value Ref Range    Glucose, Random 113 (H) 65 - 99 mg/dL    BUN 14 7 - 25 mg/dL    Creatinine 1 03 (H) 0 60 - 0 88 mg/dL    eGFR Non  49 (L) > OR = 60 mL/min/1 73m2    eGFR  57 (L) > OR = 60 mL/min/1 73m2    SL AMB BUN/CREATININE RATIO 14 6 - 22 (calc)    Sodium 137 135 - 146 mmol/L    Potassium 4 7 3 5 - 5 3 mmol/L    Chloride 100 98 - 110 mmol/L    CO2 32 20 - 32 mmol/L    Calcium 9 3 8 6 - 10 4 mg/dL   CBC and differential   Result Value Ref Range    White Blood Cell Count 6 6 3 8 - 10 8 Thousand/uL    Red Blood Cell Count 3 56 (L) 3 80 - 5 10 Million/uL    Hemoglobin 12 2 11 7 - 15 5 g/dL    HCT 34 7 (L) 35 0 - 45 0 %    MCV 97 5 80 0 - 100 0 fL    MCH 34 3 (H) 27 0 - 33 0 pg    MCHC 35 2 32 0 - 36 0 g/dL    RDW 12 3 11 0 - 15 0 %    Platelet Count 881 849 - 400 Thousand/uL    SL AMB MPV 11 6 7 5 - 12 5 fL    Neutrophils (Absolute) 3,683 1,500 - 7,800 cells/uL    Lymphocytes (Absolute) 2,218 850 - 3,900 cells/uL    Monocytes (Absolute) 488 200 - 950 cells/uL    Eosinophils (Absolute) 158 15 - 500 cells/uL    Basophils ABS 53 0 - 200 cells/uL    Neutrophils 55 8 %    Lymphocytes 33 6 %    Monocytes 7 4 %    Eosinophils 2 4 %    Basophils PCT 0 8 %   Vitamin D 25 hydroxy   Result Value Ref Range    Vitamin D, 25-Hydroxy, Serum 34 30 - 100 ng/mL

## 2020-08-26 DIAGNOSIS — E78.5 HYPERLIPIDEMIA, UNSPECIFIED HYPERLIPIDEMIA TYPE: ICD-10-CM

## 2020-08-26 DIAGNOSIS — E11.9 TYPE 2 DIABETES MELLITUS WITHOUT COMPLICATION, WITHOUT LONG-TERM CURRENT USE OF INSULIN (HCC): Primary | ICD-10-CM

## 2020-08-29 ENCOUNTER — OFFICE VISIT (OUTPATIENT)
Dept: FAMILY MEDICINE CLINIC | Facility: CLINIC | Age: 85
End: 2020-08-29
Payer: COMMERCIAL

## 2020-08-29 VITALS
WEIGHT: 228 LBS | BODY MASS INDEX: 41.96 KG/M2 | TEMPERATURE: 97.8 F | DIASTOLIC BLOOD PRESSURE: 74 MMHG | SYSTOLIC BLOOD PRESSURE: 148 MMHG | HEART RATE: 80 BPM | HEIGHT: 62 IN

## 2020-08-29 DIAGNOSIS — E87.1 HYPONATREMIA: ICD-10-CM

## 2020-08-29 DIAGNOSIS — E11.42 DIABETIC PERIPHERAL NEUROPATHY (HCC): ICD-10-CM

## 2020-08-29 DIAGNOSIS — R06.02 SHORTNESS OF BREATH: ICD-10-CM

## 2020-08-29 DIAGNOSIS — N18.30 BENIGN HYPERTENSION WITH CKD (CHRONIC KIDNEY DISEASE) STAGE III (HCC): Primary | ICD-10-CM

## 2020-08-29 DIAGNOSIS — I12.9 BENIGN HYPERTENSION WITH CKD (CHRONIC KIDNEY DISEASE) STAGE III (HCC): Primary | ICD-10-CM

## 2020-08-29 PROCEDURE — 3008F BODY MASS INDEX DOCD: CPT | Performed by: PHYSICIAN ASSISTANT

## 2020-08-29 PROCEDURE — 2022F DILAT RTA XM EVC RTNOPTHY: CPT | Performed by: PHYSICIAN ASSISTANT

## 2020-08-29 PROCEDURE — 99214 OFFICE O/P EST MOD 30 MIN: CPT | Performed by: PHYSICIAN ASSISTANT

## 2020-08-29 PROCEDURE — 1036F TOBACCO NON-USER: CPT | Performed by: PHYSICIAN ASSISTANT

## 2020-08-29 PROCEDURE — 3066F NEPHROPATHY DOC TX: CPT | Performed by: PHYSICIAN ASSISTANT

## 2020-08-29 PROCEDURE — 1160F RVW MEDS BY RX/DR IN RCRD: CPT | Performed by: PHYSICIAN ASSISTANT

## 2020-08-29 PROCEDURE — 4040F PNEUMOC VAC/ADMIN/RCVD: CPT | Performed by: PHYSICIAN ASSISTANT

## 2020-08-29 PROCEDURE — 3078F DIAST BP <80 MM HG: CPT | Performed by: PHYSICIAN ASSISTANT

## 2020-08-29 PROCEDURE — 3077F SYST BP >= 140 MM HG: CPT | Performed by: PHYSICIAN ASSISTANT

## 2020-08-29 RX ORDER — FUROSEMIDE 20 MG/1
20 TABLET ORAL 2 TIMES DAILY
Qty: 60 TABLET | Refills: 0 | Status: SHIPPED | OUTPATIENT
Start: 2020-08-29 | End: 2020-12-03 | Stop reason: SDUPTHER

## 2020-08-29 NOTE — PROGRESS NOTES
Assessment and Plan:  Patient Instructions   Assessment/plan:  1  Lower extremity edema-this could be multifactorial given her history of stage 3 chronic kidney disease, peripheral artery disease, and diabetes  She does have some shortness of breath with it however her lungs are clear today  I would recommend assessing chest x-ray to rule out effusion or signs of congestive heart failure  Her Lasix will be increased to 20 mg twice daily  She will continue carvedilol  Recommend assessing labs in the next week including BNP, BMP, and urinalysis  2  Chronic kidney disease-stable per Nephrology  Last creatinine was stable at 1 03   3  Type 2 diabetes-stable  No medication changes  4  Peripheral arterial disease-presently stable  Patient is not having any pain or sign of arterial insufficiency in the lower extremities  Recommend follow-up in the next 2 weeks with Dr Tuan Chirinos  Problem List Items Addressed This Visit        Endocrine    Diabetic peripheral neuropathy ([facility] [address] )    Relevant Orders    Basic metabolic panel    NT-BNP PRO    UA w Reflex to Microscopic w Reflex to Culture -Lab Collect       Cardiovascular and Mediastinum    Benign hypertension with CKD (chronic kidney disease) stage III (HCC) - Primary    Relevant Medications    furosemide (LASIX) 20 mg tablet    Other Relevant Orders    Basic metabolic panel    NT-BNP PRO    UA w Reflex to Microscopic w Reflex to Culture -Lab Collect      Other Visit Diagnoses     Hyponatremia        Relevant Medications    furosemide (LASIX) 20 mg tablet    Shortness of breath        Relevant Orders    XR chest pa & lateral    Basic metabolic panel    NT-BNP PRO    UA w Reflex to Microscopic w Reflex to Culture -Lab Collect                 Diagnoses and all orders for this visit:    Benign hypertension with CKD (chronic kidney disease) stage III (HCC)  -     Basic metabolic panel; Future  -     NT-BNP PRO;  Future  -     UA w Reflex to Microscopic w Reflex to Culture -Lab Collect; Future    Diabetic peripheral neuropathy (HCC)  -     Basic metabolic panel; Future  -     NT-BNP PRO; Future  -     UA w Reflex to Microscopic w Reflex to Culture -Lab Collect; Future    Hyponatremia  -     furosemide (LASIX) 20 mg tablet; Take 1 tablet (20 mg total) by mouth 2 (two) times a day    Shortness of breath  -     XR chest pa & lateral; Future  -     Basic metabolic panel; Future  -     NT-BNP PRO; Future  -     UA w Reflex to Microscopic w Reflex to Culture -Lab Collect; Future              Subjective:      Patient ID: Maria Angelucci is a 80 y o  female  CC:    Chief Complaint   Patient presents with    Leg Swelling     Onset a couple weeks  mjs    Shortness of Breath       HPI:    HPI:  This is an 80-year-old female who presents to the office with a history of chronic kidney disease, type 2 diabetes, and peripheral arterial disease  She is concerned with swelling of bilateral lower extremities that started in the past week or 2  She is currently on furosemide 20 mg daily  She does notice that she has been feeling a little bit more fatigued and occasionally having shortness of breath in the past 2 weeks  She is not having any chest pain or feeling of her heart racing  She does not remember doing anything to the legs of either side  There is no history of trauma  Patient notes that symptoms do seem to be worse at the end of the day and better when she wakes up in the morning  The following portions of the patient's history were reviewed and updated as appropriate: allergies, current medications, past family history, past medical history, past social history, past surgical history and problem list       Review of Systems   Constitutional: Negative for chills, fatigue and fever  HENT: Negative for congestion, ear pain and sinus pressure  Eyes: Negative for visual disturbance  Respiratory: Negative for cough, chest tightness and shortness of breath  Cardiovascular: Negative for chest pain and palpitations  Gastrointestinal: Negative for diarrhea, nausea and vomiting  Endocrine: Negative for polyuria  Genitourinary: Negative for dysuria and frequency  Musculoskeletal: Negative for arthralgias and myalgias  Skin: Negative for pallor and rash  Neurological: Negative for dizziness, weakness, light-headedness, numbness and headaches  Psychiatric/Behavioral: Negative for agitation, behavioral problems and sleep disturbance  All other systems reviewed and are negative  Data to review:       Objective:    Vitals:    08/29/20 0906   BP: 148/74   Pulse: 80   Temp: 97 8 °F (36 6 °C)   Weight: 103 kg (228 lb)   Height: 5' 2" (1 575 m)        Physical Exam  Constitutional:       General: She is not in acute distress  Appearance: Normal appearance  HENT:      Head: Normocephalic and atraumatic  Right Ear: Tympanic membrane normal       Left Ear: Tympanic membrane normal       Nose: No congestion or rhinorrhea  Eyes:      Conjunctiva/sclera: Conjunctivae normal       Pupils: Pupils are equal, round, and reactive to light  Neck:      Musculoskeletal: Normal range of motion and neck supple  No muscular tenderness  Vascular: No carotid bruit  Cardiovascular:      Rate and Rhythm: Normal rate and regular rhythm  Heart sounds: No murmur  Pulmonary:      Effort: Pulmonary effort is normal  No respiratory distress  Breath sounds: Normal breath sounds  Abdominal:      Palpations: Abdomen is soft  Musculoskeletal: Normal range of motion  Right lower leg: Edema present  Comments: Patient does have 2+ pitting edema bilateral lower extremities to mid tibia  They are symmetric without erythema  Lymphadenopathy:      Cervical: No cervical adenopathy  Skin:     General: Skin is warm  Capillary Refill: Capillary refill takes less than 2 seconds  Neurological:      General: No focal deficit present  Mental Status: She is alert and oriented to person, place, and time  Psychiatric:         Mood and Affect: Mood normal            BMI Counseling: Body mass index is 41 7 kg/m²  The BMI is above normal  Nutrition recommendations include decreasing portion sizes  Exercise recommendations include exercising 3-5 times per week

## 2020-08-29 NOTE — PATIENT INSTRUCTIONS
Assessment/plan:  1  Lower extremity edema-this could be multifactorial given her history of stage 3 chronic kidney disease, peripheral artery disease, and diabetes  She does have some shortness of breath with it however her lungs are clear today  I would recommend assessing chest x-ray to rule out effusion or signs of congestive heart failure  Her Lasix will be increased to 20 mg twice daily  She will continue carvedilol  Recommend assessing labs in the next week including BNP, BMP, and urinalysis  2  Chronic kidney disease-stable per Nephrology  Last creatinine was stable at 1 03   3  Type 2 diabetes-stable  No medication changes  4  Peripheral arterial disease-presently stable  Patient is not having any pain or sign of arterial insufficiency in the lower extremities  Recommend follow-up in the next 2 weeks with Dr Natalia Puente

## 2020-09-02 LAB
APPEARANCE UR: CLEAR
BACTERIA UR QL AUTO: ABNORMAL /HPF
BILIRUB UR QL STRIP: NEGATIVE
BUN SERPL-MCNC: 12 MG/DL (ref 7–25)
BUN/CREAT SERPL: 12 (CALC) (ref 6–22)
CALCIUM SERPL-MCNC: 9.4 MG/DL (ref 8.6–10.4)
CHLORIDE SERPL-SCNC: 100 MMOL/L (ref 98–110)
CO2 SERPL-SCNC: 33 MMOL/L (ref 20–32)
COLOR UR: YELLOW
CREAT SERPL-MCNC: 1.03 MG/DL (ref 0.6–0.88)
GLUCOSE SERPL-MCNC: 114 MG/DL (ref 65–99)
GLUCOSE UR QL STRIP: NEGATIVE
HGB UR QL STRIP: NEGATIVE
HYALINE CASTS #/AREA URNS LPF: ABNORMAL /LPF
KETONES UR QL STRIP: NEGATIVE
LEUKOCYTE ESTERASE UR QL STRIP: ABNORMAL
NITRITE UR QL STRIP: NEGATIVE
NT-PROBNP SERPL-MCNC: 1365 PG/ML
PH UR STRIP: 7 [PH] (ref 5–8)
POTASSIUM SERPL-SCNC: 4.6 MMOL/L (ref 3.5–5.3)
PROT UR QL STRIP: NEGATIVE
RBC #/AREA URNS HPF: ABNORMAL /HPF
SL AMB EGFR AFRICAN AMERICAN: 57 ML/MIN/1.73M2
SL AMB EGFR NON AFRICAN AMERICAN: 49 ML/MIN/1.73M2
SODIUM SERPL-SCNC: 139 MMOL/L (ref 135–146)
SP GR UR STRIP: 1.01 (ref 1–1.03)
SPECIMEN SOURCE CVX/VAG CYTO: NORMAL
SQUAMOUS #/AREA URNS HPF: ABNORMAL /HPF
TRANSFUSION STATUS PATIENT QL: NORMAL
VZV AG SPEC QL IF: NORMAL
WBC #/AREA URNS HPF: ABNORMAL /HPF

## 2020-09-08 ENCOUNTER — HOSPITAL ENCOUNTER (OUTPATIENT)
Dept: RADIOLOGY | Facility: HOSPITAL | Age: 85
Discharge: HOME | End: 2020-09-08
Payer: COMMERCIAL

## 2020-09-08 ENCOUNTER — HOSPITAL ENCOUNTER (OUTPATIENT)
Dept: NON INVASIVE DIAGNOSTICS | Facility: CLINIC | Age: 85
Discharge: HOME | End: 2020-09-08
Payer: COMMERCIAL

## 2020-09-08 DIAGNOSIS — R06.02 SHORTNESS OF BREATH: ICD-10-CM

## 2020-09-08 DIAGNOSIS — I73.9 PAD (PERIPHERAL ARTERY DISEASE) (HCC): ICD-10-CM

## 2020-09-08 PROCEDURE — 93922 UPR/L XTREMITY ART 2 LEVELS: CPT | Performed by: SURGERY

## 2020-09-08 PROCEDURE — 93925 LOWER EXTREMITY STUDY: CPT

## 2020-09-08 PROCEDURE — 93923 UPR/LXTR ART STDY 3+ LVLS: CPT

## 2020-09-08 PROCEDURE — 93925 LOWER EXTREMITY STUDY: CPT | Performed by: SURGERY

## 2020-09-08 PROCEDURE — 71046 X-RAY EXAM CHEST 2 VIEWS: CPT

## 2020-09-09 ENCOUNTER — TELEPHONE (OUTPATIENT)
Dept: ADMINISTRATIVE | Facility: HOSPITAL | Age: 85
End: 2020-09-09

## 2020-09-09 NOTE — TELEPHONE ENCOUNTER
----- Message from Joyce Dodson sent at 9/9/2020 10:56 AM EDT -----  Call patient for OV with PA-C/CRNP to review study and severity of symptoms

## 2020-09-21 NOTE — PROGRESS NOTES
Assessment and Plan:    Problem List Items Addressed This Visit        Endocrine    Type 2 diabetes mellitus without complication, without long-term current use of insulin ([facility] [address] )       Lab Results   Component Value Date    HGBA1C 6 7 (H) 06/26/2020   BS stable, due for lab 10/20         Relevant Orders    Hemoglobin A1C    Comprehensive metabolic panel       Cardiovascular and Mediastinum    Benign hypertension with CKD (chronic kidney disease) stage III ([address] )     Sees nephrology            Other    Bilateral leg edema     Edema improved after bid Lasix, has upcoming echo and vascular eval ( doppler shows progression of PAD)         Relevant Orders    NT-BNP PRO    Hyperlipidemia     Due for lab 10/20         Relevant Orders    Lipid panel    Comprehensive metabolic panel      Other Visit Diagnoses     Encounter for immunization    -  Primary    Medicare annual wellness visit, subsequent        Paronychia of third toe of right foot        Relevant Medications    cephalexin (KEFLEX) 500 mg capsule                 Diagnoses and all orders for this visit:    Encounter for immunization    Medicare annual wellness visit, subsequent    Type 2 diabetes mellitus without complication, without long-term current use of insulin ([facility] [address] )  -     Hemoglobin A1C; Future  -     Comprehensive metabolic panel; Future    Benign hypertension with CKD (chronic kidney disease) stage III (HCC)    Hyperlipidemia, unspecified hyperlipidemia type  -     Lipid panel; Future  -     Comprehensive metabolic panel; Future    Bilateral leg edema  -     NT-BNP PRO; Future    Paronychia of third toe of right foot  -     cephalexin (KEFLEX) 500 mg capsule; Take 1 capsule (500 mg total) by mouth every 8 (eight) hours for 10 days    Other orders  -     Cancel: influenza vaccine, high-dose, PF 0 7 mL (FLUZONE HIGH-DOSE)              Subjective:      Patient ID: Daryle Kitty is a 80 y o  female      CC:    Chief Complaint   Patient presents with   [facility] Follow-up     pt here to discuss recent labs   Medicare Wellness Visit       HPI:    F/u diabetes and lipids, very frustrated with sister's dementia, her sister isn't taking meds, klein shave help with sister's insurance paying for caregivers, feels well, edema is a bit better, r 3rd toe hurting-has ingrown toenail      The following portions of the patient's history were reviewed and updated as appropriate: allergies, current medications, past family history, past medical history, past social history, past surgical history and problem list         Review of Systems   Constitutional: Negative for activity change, appetite change and fatigue  Respiratory: Negative for cough and shortness of breath  Cardiovascular: Positive for leg swelling  Negative for chest pain and palpitations  Musculoskeletal: Positive for arthralgias  Toe pain-has ingrown toenail   Psychiatric/Behavioral: The patient is not nervous/anxious  Data to review:       Objective:    Vitals:    09/22/20 1416   BP: 138/76   BP Location: Left arm   Patient Position: Sitting   Cuff Size: Adult   Pulse: 70   Temp: 97 8 °F (36 6 °C)   TempSrc: Temporal   Weight: 99 8 kg (220 lb)   Height: 5' 2" (1 575 m)        Physical Exam  Vitals signs reviewed  Constitutional:       Appearance: Normal appearance  She is obese  She is not ill-appearing  Cardiovascular:      Rate and Rhythm: Normal rate and regular rhythm  Pulses: Normal pulses  Heart sounds: Normal heart sounds  Pulmonary:      Effort: Pulmonary effort is normal       Breath sounds: Normal breath sounds  Musculoskeletal:      Right lower leg: Edema present  Left lower leg: Edema present  Comments: 1 plus edema   Skin:     Comments: r 3rd toenail with paronychia   Neurological:      Mental Status: She is alert  BMI Counseling: Body mass index is 40 24 kg/m²   The BMI is above normal  Nutrition recommendations include reducing portion sizes, decreasing overall calorie intake, 3-5 servings of fruits/vegetables daily, reducing fast food intake and consuming healthier snacks  Exercise recommendations include exercising 3-5 times per week

## 2020-09-21 NOTE — PROGRESS NOTES
Assessment and Plan:     Problem List Items Addressed This Visit        Endocrine    Type 2 diabetes mellitus without complication, without long-term current use of insulin ([facility] )       Lab Results   Component Value Date    HGBA1C 6 7 (H) 06/26/2020   BS stable, due for lab 10/20         Relevant Orders    Hemoglobin A1C    Comprehensive metabolic panel       Cardiovascular and Mediastinum    Benign hypertension with CKD (chronic kidney disease) stage III ([address] )     Sees nephrology            Other    Bilateral leg edema     Edema improved after bid Lasix, has upcoming echo and vascular eval ( doppler shows progression of PAD)         Relevant Orders    NT-BNP PRO    Hyperlipidemia     Due for lab 10/20         Relevant Orders    Lipid panel    Comprehensive metabolic panel      Other Visit Diagnoses     Encounter for immunization    -  Primary    Medicare annual wellness visit, subsequent        Paronychia of third toe of right foot        Relevant Medications    cephalexin (KEFLEX) 500 mg capsule           Preventive health issues were discussed with patient, and age appropriate screening tests were ordered as noted in patient's After Visit Summary  Personalized health advice and appropriate referrals for health education or preventive services given if needed, as noted in patient's After Visit Summary       History of Present Illness:     Patient presents for Medicare Annual Wellness visit    Patient Care Team:  Kevin Mendoza MD as PCP - General (Family Medicine)  Zay Ozuna MD     Problem List:     Patient Active Problem List   Diagnosis    Diabetic peripheral neuropathy ([facility] )    Benign hypertension with CKD (chronic kidney disease) stage III ([facility] )    Thyroid enlarged    Type 2 diabetes mellitus without complication, without long-term current use of insulin ([facility] [address] )    Chronic constipation    Screening for breast cancer    Abdominal bloating    Anxiety    Chronic low back pain without sciatica    Hyperlipidemia    CKD (chronic kidney disease), stage III ([facility]    Bilateral leg edema    PAD (peripheral artery disease) ([facility])    Chronic pain of both shoulders    Shoulder joint stiffness, bilateral    Bilateral adhesive capsulitis of shoulders    Lumbar spondylosis      Past Medical and Surgical History:     Past Medical History:   Diagnosis Date    Anemia     Anxiety     Arthritis     CKD (chronic kidney disease), stage III ([facility] ) 11/29/2018    Diabetes mellitus ([facility] )     Diabetic peripheral neuropathy ([facility])     Hyperlipidemia     Hypertension     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 8/23/2017    Thyroid enlarged 2/23/2015    Tinnitus     Vertigo      Past Surgical History:   Procedure Laterality Date    APPENDECTOMY      EAR SURGERY Left     KNEE SURGERY      TONSILLECTOMY        Family History:     Family History   Problem Relation Age of Onset    Diabetes Mother     Heart disease Mother     Heart disease Father     Stroke Father     Cancer Family     Dementia Sister     Liver disease Other       Social History:        Social History     Socioeconomic History    Marital status: Single     Spouse name: None    Number of children: None    Years of education: None    Highest education level: None   Occupational History    None   Social Needs    Financial resource strain: None    Food insecurity     Worry: None     Inability: None    Transportation needs     Medical: None     Non-medical: None   Tobacco Use    Smoking status: Never Smoker    Smokeless tobacco: Never Used   Substance and Sexual Activity    Alcohol use: Not Currently    Drug use: No    Sexual activity: Never     Partners: Male     Birth control/protection: None   Lifestyle    Physical activity     Days per week: None     Minutes per session: None    Stress: None   Relationships    Social connections     Talks on phone: None     Gets together: None     Attends Hindu service: None     Active member of club or organization: None     Attends meetings of clubs or organizations: None     Relationship status: None    Intimate partner violence     Fear of current or ex partner: None     Emotionally abused: None     Physically abused: None     Forced sexual activity: None   Other Topics Concern    None   Social History Narrative    Active advance directive    Always uses seatbelt    Caffeine use    Exercise-walking    Lives with family, sister    Matthiasalexsander Gavirialon Hindu    Supportive and safe      Medications and Allergies:     Current Outpatient Medications   Medication Sig Dispense Refill    aspirin 81 MG tablet Take 1 tablet by mouth daily      Blood Glucose Monitoring Suppl (ONE TOUCH ULTRA 2) w/Device KIT by Does not apply route daily 1 each 0    CARTIA  MG 24 hr capsule Take 1 capsule by mouth once daily 90 capsule 0    carvedilol (COREG) 25 mg tablet Take 1 tablet by mouth twice daily 180 tablet 0    Cholecalciferol (VITAMIN D3) 5000 units CAPS Take 1 capsule by mouth daily      dicyclomine (BENTYL) 10 mg capsule TAKE 1 CAPSULE BY MOUTH 4 TIMES DAILY BEFORE MEAL(S) AND AT BEDTIME 360 capsule 0    docusate sodium (COLACE) 100 mg capsule Take 1 capsule (100 mg total) by mouth 2 (two) times a day 10 capsule 0    doxazosin (CARDURA) 2 mg tablet TAKE 1 TABLET BY MOUTH AT BEDTIME 90 tablet 2    furosemide (LASIX) 20 mg tablet Take 1 tablet (20 mg total) by mouth 2 (two) times a day 60 tablet 0    Glucos-Chondroit-Hyaluron-MSM (GLUCOSAMINE CHONDROITIN JOINT) TABS Take by mouth      glucose blood (ONE TOUCH ULTRA TEST) test strip Test daily                        DX: E11 9 100 each 1    Multiple Vitamin (MULTI-VITAMIN DAILY PO) Take 1 tablet by mouth daily      ONE TOUCH LANCETS MISC by Does not apply route daily Test daily                  DX:E11 9 100 each 1    Probiotic Product (PROBIOTIC DAILY PO) Take by mouth      TURMERIC CURCUMIN PO Take by mouth      cephalexin (KEFLEX) 500 mg capsule Take 1 capsule (500 mg total) by mouth every 8 (eight) hours for 10 days 30 capsule 0     No current facility-administered medications for this visit  Allergies   Allergen Reactions    Hydrochlorothiazide      Hyponatremia, severe    Statins       Immunizations:     Immunization History   Administered Date(s) Administered    INFLUENZA 09/01/2016, 10/05/2016, 09/28/2017    Influenza Split High Dose Preservative Free IM 11/18/2014, 11/30/2015, 09/01/2016, 09/28/2017    Influenza TIV (IM) 10/01/2011, 10/01/2012, 11/11/2013    Influenza, high dose seasonal 0 7 mL 09/24/2018, 09/17/2020    Pneumococcal Conjugate 13-Valent 05/06/2019    Pneumococcal Polysaccharide PPV23 10/01/1997, 11/30/2015    Tdap 09/27/2019    Zoster 10/07/2016    influenza, trivalent, adjuvanted 09/27/2019      Health Maintenance: There are no preventive care reminders to display for this patient  There are no preventive care reminders to display for this patient  Medicare Health Risk Assessment:     /76 (BP Location: Left arm, Patient Position: Sitting, Cuff Size: Adult)   Pulse 70   Temp 97 8 °F (36 6 °C) (Temporal)   Ht 5' 2" (1 575 m)   Wt 99 8 kg (220 lb)   BMI 40 24 kg/m²      Virginia Lowery is here for her Subsequent Wellness visit  Health Risk Assessment:   Patient rates overall health as good  Patient feels that their physical health rating is same  Eyesight was rated as same  Hearing was rated as same  Patient feels that their emotional and mental health rating is same  Pain experienced in the last 7 days has been none  Patient states that she has experienced no weight loss or gain in last 6 months  Depression Screening:   PHQ-2 Score: 0      Fall Risk Screening:    In the past year, patient has experienced: no history of falling in past year      Urinary Incontinence Screening:   Patient has not leaked urine accidently in the last six months  Home Safety:  Patient has trouble with stairs inside or outside of their home  Patient has working smoke alarms and has no working carbon monoxide detector  Home safety hazards include: none  Nutrition:   Current diet is Regular  Medications:   Patient is currently taking over-the-counter supplements  OTC medications include: see medication list  Patient is able to manage medications  Activities of Daily Living (ADLs)/Instrumental Activities of Daily Living (IADLs):   Walk and transfer into and out of bed and chair?: Yes  Dress and groom yourself?: Yes    Bathe or shower yourself?: Yes    Feed yourself? Yes  Do your laundry/housekeeping?: Yes  Manage your money, pay your bills and track your expenses?: Yes  Make your own meals?: Yes    Do your own shopping?: Yes    Previous Hospitalizations:   Any hospitalizations or ED visits within the last 12 months?: No      Advance Care Planning:   Living will: Yes    Durable POA for healthcare:  Yes    Advanced directive: Yes      Comments: [facility]    Cognitive Screening:   Provider or family/friend/caregiver concerned regarding cognition?: No    PREVENTIVE SCREENINGS      Cardiovascular Screening:    General: History Lipid Disorder and Screening Current      Diabetes Screening:     General: History Diabetes and Screening Current      Colorectal Cancer Screening:     General: Screening Not Indicated      Breast Cancer Screening:     General: Screening Not Indicated      Cervical Cancer Screening:    General: Screening Not Indicated      Osteoporosis Screening:    General: Screening Not Indicated      Abdominal Aortic Aneurysm (AAA) Screening:        General: Screening Not Indicated      Lung Cancer Screening:     General: Screening Not Indicated      Hepatitis C Screening:    General: Screening Not Indicated      Nikki Wilburn MD

## 2020-09-22 ENCOUNTER — OFFICE VISIT (OUTPATIENT)
Dept: FAMILY MEDICINE CLINIC | Facility: CLINIC | Age: 85
End: 2020-09-22
Payer: COMMERCIAL

## 2020-09-22 VITALS
SYSTOLIC BLOOD PRESSURE: 138 MMHG | DIASTOLIC BLOOD PRESSURE: 76 MMHG | TEMPERATURE: 97.8 F | HEART RATE: 70 BPM | BODY MASS INDEX: 40.48 KG/M2 | WEIGHT: 220 LBS | HEIGHT: 62 IN

## 2020-09-22 DIAGNOSIS — Z23 ENCOUNTER FOR IMMUNIZATION: Primary | ICD-10-CM

## 2020-09-22 DIAGNOSIS — Z00.00 MEDICARE ANNUAL WELLNESS VISIT, SUBSEQUENT: ICD-10-CM

## 2020-09-22 DIAGNOSIS — E78.5 HYPERLIPIDEMIA, UNSPECIFIED HYPERLIPIDEMIA TYPE: ICD-10-CM

## 2020-09-22 DIAGNOSIS — I12.9 BENIGN HYPERTENSION WITH CKD (CHRONIC KIDNEY DISEASE) STAGE III (HCC): ICD-10-CM

## 2020-09-22 DIAGNOSIS — L03.031 PARONYCHIA OF THIRD TOE OF RIGHT FOOT: ICD-10-CM

## 2020-09-22 DIAGNOSIS — N18.30 BENIGN HYPERTENSION WITH CKD (CHRONIC KIDNEY DISEASE) STAGE III (HCC): ICD-10-CM

## 2020-09-22 DIAGNOSIS — E11.9 TYPE 2 DIABETES MELLITUS WITHOUT COMPLICATION, WITHOUT LONG-TERM CURRENT USE OF INSULIN (HCC): ICD-10-CM

## 2020-09-22 DIAGNOSIS — R60.0 BILATERAL LEG EDEMA: ICD-10-CM

## 2020-09-22 PROCEDURE — G0439 PPPS, SUBSEQ VISIT: HCPCS | Performed by: FAMILY MEDICINE

## 2020-09-22 PROCEDURE — 3725F SCREEN DEPRESSION PERFORMED: CPT | Performed by: FAMILY MEDICINE

## 2020-09-22 PROCEDURE — 1170F FXNL STATUS ASSESSED: CPT | Performed by: FAMILY MEDICINE

## 2020-09-22 PROCEDURE — 1125F AMNT PAIN NOTED PAIN PRSNT: CPT | Performed by: FAMILY MEDICINE

## 2020-09-22 PROCEDURE — 99214 OFFICE O/P EST MOD 30 MIN: CPT | Performed by: FAMILY MEDICINE

## 2020-09-22 RX ORDER — CEPHALEXIN 500 MG/1
500 CAPSULE ORAL EVERY 8 HOURS SCHEDULED
Qty: 30 CAPSULE | Refills: 0 | Status: SHIPPED | OUTPATIENT
Start: 2020-09-22 | End: 2020-10-02

## 2020-09-22 NOTE — ASSESSMENT & PLAN NOTE
Edema improved after bid Lasix, has upcoming echo and vascular eval ( doppler shows progression of PAD)

## 2020-09-23 DIAGNOSIS — I10 ESSENTIAL HYPERTENSION: ICD-10-CM

## 2020-09-23 RX ORDER — CARVEDILOL 25 MG/1
TABLET ORAL
Qty: 180 TABLET | Refills: 1 | Status: SHIPPED | OUTPATIENT
Start: 2020-09-23 | End: 2021-04-01 | Stop reason: SDUPTHER

## 2020-09-23 RX ORDER — DILTIAZEM HYDROCHLORIDE 240 MG/1
CAPSULE, EXTENDED RELEASE ORAL
Qty: 90 CAPSULE | Refills: 1 | Status: SHIPPED | OUTPATIENT
Start: 2020-09-23 | End: 2021-04-01 | Stop reason: SDUPTHER

## 2020-10-06 ENCOUNTER — HOSPITAL ENCOUNTER (OUTPATIENT)
Dept: NON INVASIVE DIAGNOSTICS | Facility: CLINIC | Age: 85
Discharge: HOME | End: 2020-10-06
Payer: COMMERCIAL

## 2020-10-06 DIAGNOSIS — R06.00 DYSPNEA, UNSPECIFIED TYPE: ICD-10-CM

## 2020-10-06 DIAGNOSIS — R01.1 HEART MURMUR: ICD-10-CM

## 2020-10-06 PROCEDURE — 93306 TTE W/DOPPLER COMPLETE: CPT

## 2020-10-16 ENCOUNTER — OFFICE VISIT (OUTPATIENT)
Dept: VASCULAR SURGERY | Facility: CLINIC | Age: 85
End: 2020-10-16
Payer: COMMERCIAL

## 2020-10-16 VITALS
WEIGHT: 223 LBS | SYSTOLIC BLOOD PRESSURE: 142 MMHG | HEIGHT: 62 IN | BODY MASS INDEX: 41.04 KG/M2 | DIASTOLIC BLOOD PRESSURE: 82 MMHG | HEART RATE: 80 BPM

## 2020-10-16 DIAGNOSIS — E78.5 HYPERLIPIDEMIA, UNSPECIFIED HYPERLIPIDEMIA TYPE: ICD-10-CM

## 2020-10-16 DIAGNOSIS — N18.30 BENIGN HYPERTENSION WITH CKD (CHRONIC KIDNEY DISEASE) STAGE III (HCC): ICD-10-CM

## 2020-10-16 DIAGNOSIS — I73.9 PAD (PERIPHERAL ARTERY DISEASE) (HCC): Primary | ICD-10-CM

## 2020-10-16 DIAGNOSIS — I12.9 BENIGN HYPERTENSION WITH CKD (CHRONIC KIDNEY DISEASE) STAGE III (HCC): ICD-10-CM

## 2020-10-16 DIAGNOSIS — N18.31 STAGE 3A CHRONIC KIDNEY DISEASE (HCC): ICD-10-CM

## 2020-10-16 PROCEDURE — 3077F SYST BP >= 140 MM HG: CPT | Performed by: PHYSICIAN ASSISTANT

## 2020-10-16 PROCEDURE — 1036F TOBACCO NON-USER: CPT | Performed by: PHYSICIAN ASSISTANT

## 2020-10-16 PROCEDURE — 3079F DIAST BP 80-89 MM HG: CPT | Performed by: PHYSICIAN ASSISTANT

## 2020-10-16 PROCEDURE — 99214 OFFICE O/P EST MOD 30 MIN: CPT | Performed by: PHYSICIAN ASSISTANT

## 2020-10-16 PROCEDURE — 1160F RVW MEDS BY RX/DR IN RCRD: CPT | Performed by: PHYSICIAN ASSISTANT

## 2020-10-19 ENCOUNTER — HOSPITAL ENCOUNTER (OUTPATIENT)
Dept: ULTRASOUND IMAGING | Facility: HOSPITAL | Age: 85
Discharge: HOME | End: 2020-10-19
Payer: COMMERCIAL

## 2020-10-19 DIAGNOSIS — E04.2 MULTIPLE THYROID NODULES: ICD-10-CM

## 2020-10-19 PROCEDURE — 76536 US EXAM OF HEAD AND NECK: CPT

## 2020-11-04 ENCOUNTER — OFFICE VISIT (OUTPATIENT)
Dept: FAMILY MEDICINE CLINIC | Facility: CLINIC | Age: 85
End: 2020-11-04
Payer: COMMERCIAL

## 2020-11-04 VITALS
HEART RATE: 64 BPM | RESPIRATION RATE: 20 BRPM | WEIGHT: 222 LBS | SYSTOLIC BLOOD PRESSURE: 126 MMHG | HEIGHT: 62 IN | BODY MASS INDEX: 40.85 KG/M2 | TEMPERATURE: 97.8 F | DIASTOLIC BLOOD PRESSURE: 60 MMHG

## 2020-11-04 DIAGNOSIS — M54.50 CHRONIC BILATERAL LOW BACK PAIN WITHOUT SCIATICA: Primary | ICD-10-CM

## 2020-11-04 DIAGNOSIS — G89.29 CHRONIC BILATERAL LOW BACK PAIN WITHOUT SCIATICA: Primary | ICD-10-CM

## 2020-11-04 DIAGNOSIS — R06.00 DYSPNEA ON EXERTION: ICD-10-CM

## 2020-11-04 DIAGNOSIS — E11.9 TYPE 2 DIABETES MELLITUS WITHOUT COMPLICATION, WITHOUT LONG-TERM CURRENT USE OF INSULIN (HCC): ICD-10-CM

## 2020-11-04 DIAGNOSIS — M51.36 DEGENERATIVE DISC DISEASE, LUMBAR: ICD-10-CM

## 2020-11-04 DIAGNOSIS — I12.9 BENIGN HYPERTENSION WITH CKD (CHRONIC KIDNEY DISEASE) STAGE III (HCC): ICD-10-CM

## 2020-11-04 DIAGNOSIS — N18.30 BENIGN HYPERTENSION WITH CKD (CHRONIC KIDNEY DISEASE) STAGE III (HCC): ICD-10-CM

## 2020-11-04 DIAGNOSIS — M79.641 PAIN OF RIGHT HAND: ICD-10-CM

## 2020-11-04 LAB — SL AMB POCT HEMOGLOBIN AIC: 6.3 (ref ?–6.5)

## 2020-11-04 PROCEDURE — 3078F DIAST BP <80 MM HG: CPT | Performed by: FAMILY MEDICINE

## 2020-11-04 PROCEDURE — 3074F SYST BP LT 130 MM HG: CPT | Performed by: FAMILY MEDICINE

## 2020-11-04 PROCEDURE — 1036F TOBACCO NON-USER: CPT | Performed by: FAMILY MEDICINE

## 2020-11-04 PROCEDURE — 1160F RVW MEDS BY RX/DR IN RCRD: CPT | Performed by: FAMILY MEDICINE

## 2020-11-04 PROCEDURE — 99214 OFFICE O/P EST MOD 30 MIN: CPT | Performed by: FAMILY MEDICINE

## 2020-11-04 PROCEDURE — 83036 HEMOGLOBIN GLYCOSYLATED A1C: CPT | Performed by: FAMILY MEDICINE

## 2020-11-04 PROCEDURE — 36416 COLLJ CAPILLARY BLOOD SPEC: CPT | Performed by: FAMILY MEDICINE

## 2020-11-10 LAB
ALBUMIN SERPL-MCNC: 4.3 G/DL (ref 3.6–5.1)
ALBUMIN/GLOB SERPL: 1.7 (CALC) (ref 1–2.5)
ALP SERPL-CCNC: 97 U/L (ref 37–153)
ALT SERPL-CCNC: 16 U/L (ref 6–29)
AST SERPL-CCNC: 17 U/L (ref 10–35)
BILIRUB SERPL-MCNC: 0.8 MG/DL (ref 0.2–1.2)
BUN SERPL-MCNC: 19 MG/DL (ref 7–25)
BUN/CREAT SERPL: 18 (CALC) (ref 6–22)
CALCIUM SERPL-MCNC: 9.3 MG/DL (ref 8.6–10.4)
CHLORIDE SERPL-SCNC: 100 MMOL/L (ref 98–110)
CHOLEST SERPL-MCNC: 241 MG/DL
CHOLEST/HDLC SERPL: 4.5 (CALC)
CO2 SERPL-SCNC: 32 MMOL/L (ref 20–32)
CREAT SERPL-MCNC: 1.08 MG/DL (ref 0.6–0.88)
GLOBULIN SER CALC-MCNC: 2.5 G/DL (CALC) (ref 1.9–3.7)
GLUCOSE SERPL-MCNC: 140 MG/DL (ref 65–99)
HBA1C MFR BLD: 6.6 % OF TOTAL HGB
HDLC SERPL-MCNC: 53 MG/DL
LDLC SERPL CALC-MCNC: 159 MG/DL (CALC)
NONHDLC SERPL-MCNC: 188 MG/DL (CALC)
POTASSIUM SERPL-SCNC: 4.2 MMOL/L (ref 3.5–5.3)
PROT SERPL-MCNC: 6.8 G/DL (ref 6.1–8.1)
SL AMB EGFR AFRICAN AMERICAN: 54 ML/MIN/1.73M2
SL AMB EGFR NON AFRICAN AMERICAN: 46 ML/MIN/1.73M2
SODIUM SERPL-SCNC: 138 MMOL/L (ref 135–146)
TRIGL SERPL-MCNC: 159 MG/DL

## 2020-12-03 ENCOUNTER — TELEPHONE (OUTPATIENT)
Dept: FAMILY MEDICINE CLINIC | Facility: CLINIC | Age: 85
End: 2020-12-03

## 2020-12-03 DIAGNOSIS — E87.1 HYPONATREMIA: ICD-10-CM

## 2020-12-03 RX ORDER — FUROSEMIDE 20 MG/1
20 TABLET ORAL 2 TIMES DAILY
Qty: 60 TABLET | Refills: 5 | Status: SHIPPED | OUTPATIENT
Start: 2020-12-03 | End: 2021-06-14 | Stop reason: SDUPTHER

## 2020-12-03 RX ORDER — ATORVASTATIN CALCIUM 80 MG/1
TABLET, FILM COATED ORAL
COMMUNITY
Start: 2020-11-30

## 2021-01-08 NOTE — PATIENT INSTRUCTIONS
-start lisinopril 5 mg one tablet daily  -strict fluid restriction less than 1 5 L per day / less than 50 oz per day  -take Lasix 20 mg one tablet twice daily for next three days and then continue at one tablet daily afterwards  -blood in urine test in one month and again before next office visit  -continue to monitor blood pressure and call back if blood pressure remains greater than 140/90 08-Jan-2021 17:30

## 2021-01-27 ENCOUNTER — TRANSCRIBE ORDERS (OUTPATIENT)
Dept: PAIN MEDICINE | Facility: CLINIC | Age: 86
End: 2021-01-27

## 2021-01-27 ENCOUNTER — CONSULT (OUTPATIENT)
Dept: PAIN MEDICINE | Facility: CLINIC | Age: 86
End: 2021-01-27
Payer: COMMERCIAL

## 2021-01-27 VITALS
HEART RATE: 68 BPM | DIASTOLIC BLOOD PRESSURE: 70 MMHG | SYSTOLIC BLOOD PRESSURE: 151 MMHG | WEIGHT: 222 LBS | BODY MASS INDEX: 40.6 KG/M2

## 2021-01-27 DIAGNOSIS — M51.16 INTERVERTEBRAL DISC DISORDER WITH RADICULOPATHY OF LUMBAR REGION: Primary | ICD-10-CM

## 2021-01-27 DIAGNOSIS — M54.50 CHRONIC BILATERAL LOW BACK PAIN WITHOUT SCIATICA: ICD-10-CM

## 2021-01-27 DIAGNOSIS — M47.816 LUMBAR SPONDYLOSIS: ICD-10-CM

## 2021-01-27 DIAGNOSIS — G89.29 CHRONIC BILATERAL LOW BACK PAIN WITHOUT SCIATICA: ICD-10-CM

## 2021-01-27 DIAGNOSIS — M51.36 DEGENERATIVE DISC DISEASE, LUMBAR: ICD-10-CM

## 2021-01-27 PROCEDURE — 99204 OFFICE O/P NEW MOD 45 MIN: CPT | Performed by: ANESTHESIOLOGY

## 2021-01-27 PROCEDURE — 1036F TOBACCO NON-USER: CPT | Performed by: ANESTHESIOLOGY

## 2021-01-27 RX ORDER — OMEGA-3-ACID ETHYL ESTERS 1 G/1
2 CAPSULE, LIQUID FILLED ORAL 2 TIMES DAILY
COMMUNITY
End: 2021-10-05

## 2021-01-27 NOTE — PROGRESS NOTES
Assessment  1  Intervertebral disc disorder with radiculopathy of lumbar region    2  Lumbar spondylosis    3  Degenerative disc disease, lumbar    4  Chronic bilateral low back pain without sciatica        Plan  The patient's symptoms, history/physical are consistent with pain that is multifactorial in origin  She has significant spondylosis, degenerative disc disease that is likely leading to stenosis based on her clinical presentation  At this time, I will order an MRI of the lumbar spine to evaluate further  I advised her that I will call with the results and discuss treatment moving forward which will likely include performing an epidural steroid injection  I did discuss that in the future she may be a candidate for medial branch radiofrequency ablation but given the current symptoms, an epidural steroid injection would be indicated  My impressions and treatment recommendations were discussed in detail with the patient who verbalized understanding and had no further questions  Discharge instructions were provided  I personally saw and examined the patient and I agree with the above discussed plan of care  Orders Placed This Encounter   Procedures    MRI lumbar spine without contrast     Standing Status:   Future     Standing Expiration Date:   1/27/2025     Scheduling Instructions: There is no preparation for this test  Please leave your jewelry and valuables at home, wedding rings are the exception  Magnetic nail polish must be removed prior to arrival for your test  Please bring your insurance cards, a form of photo ID and a list of your medications with you  Arrive 15 minutes prior to your appointment time in order to register  Please bring any prior CT or MRI studies of this area that were not performed at a [facility]  To schedule this appointment, please contact Central Scheduling at [phone]              Prior to your appointment, please make sure you complete the MRI Screening Form when you e-Check in for your appointment  This will be available starting 7 days before your appointment in [place]  You may receive an e-mail with an activation code if you do not have a [employer] account  If you do not have access to a device, we will complete your screening at your appointment  Order Specific Question:   What is the patient's sedation requirement? Answer:   No Sedation     Order Specific Question:   Release to patient through [employer]     Answer:   Immediate     Order Specific Question:   Is order priority selected as STAT? Answer:   No     Order Specific Question:   Reason for Exam (FREE TEXT)     Answer:   lbp into legs     New Medications Ordered This Visit   Medications    omega-3-acid ethyl esters (LOVAZA) 1 g capsule     Sig: Take 2 g by mouth 2 (two) times a day       History of Present Illness    Calos Conti is a 80 y o  female referred by Dr Amanda Flynn who presents for consultation in regards to back pain  Symptoms have been present for over 10 years without any precipitating injury or trauma  Pain is moderate to severe rated 8-9/10 on numeric rating scale and felt constantly  Symptoms are dull, aching, stiff with some weakness felt in her legs causing imbalance  She does use a cane for ambulation which helps  Pain symptoms are aggravated physical activity including walking and standing  Symptoms are improved with sitting and lying down  There is no change with coughing, sneezing or bowel movements  Treatment history has included exercise and use of a TESN unit which have provided no relief  I have personally reviewed and/or updated the patient's past medical history, past surgical history, family history, social history, current medications, allergies, and vital signs today  Review of Systems   Constitutional: Negative for fever and unexpected weight change  HENT: Positive for hearing loss  Negative for trouble swallowing      Eyes: Negative for visual disturbance  Respiratory: Positive for shortness of breath  Negative for wheezing  Cardiovascular: Negative for chest pain and palpitations  Gastrointestinal: Negative for constipation, diarrhea, nausea and vomiting  Endocrine: Negative for cold intolerance, heat intolerance and polydipsia  Genitourinary: Negative for difficulty urinating and frequency  Musculoskeletal: Positive for back pain, gait problem and joint swelling  Negative for arthralgias and myalgias  Skin: Negative for rash  Neurological: Negative for dizziness, seizures, syncope, weakness and headaches  Hematological: Does not bruise/bleed easily  Psychiatric/Behavioral: Negative for dysphoric mood  All other systems reviewed and are negative        Patient Active Problem List   Diagnosis    Diabetic peripheral neuropathy (HCC)    Benign hypertension with CKD (chronic kidney disease) stage III (HCC)    Thyroid enlarged    Type 2 diabetes mellitus without complication, without long-term current use of insulin (HCC)    Chronic constipation    Screening for breast cancer    Abdominal bloating    Anxiety    Chronic low back pain without sciatica    Hyperlipidemia    CKD (chronic kidney disease), stage III    Bilateral leg edema    PAD (peripheral artery disease) (HCC)    Chronic pain of both shoulders    Shoulder joint stiffness, bilateral    Bilateral adhesive capsulitis of shoulders    Lumbar spondylosis    Degenerative disc disease, lumbar       Past Medical History:   Diagnosis Date    Anemia     Anxiety     Arthritis     CKD (chronic kidney disease), stage III 11/29/2018    Diabetes mellitus ([facility] [address] )     Diabetic peripheral neuropathy (HCC)     Hyperlipidemia     Hypertension     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 8/23/2017    Thyroid enlarged 2/23/2015    Tinnitus     Vertigo Past Surgical History:   Procedure Laterality Date    APPENDECTOMY      EAR SURGERY Left     KNEE SURGERY      TONSILLECTOMY         Family History   Problem Relation Age of Onset    Diabetes Mother     Heart disease Mother     Heart disease Father     Stroke Father     Cancer Family     Dementia Sister     Liver disease Other        Social History     Occupational History    Not on file   Tobacco Use    Smoking status: Never Smoker    Smokeless tobacco: Never Used   Substance and Sexual Activity    Alcohol use: Not Currently    Drug use: No    Sexual activity: Never     Partners: Male     Birth control/protection: None       Current Outpatient Medications on File Prior to Visit   Medication Sig    aspirin 81 MG tablet Take 1 tablet by mouth daily    atorvastatin (LIPITOR) 80 mg tablet     Blood Glucose Monitoring Suppl (ONE TOUCH ULTRA 2) w/Device KIT by Does not apply route daily    Cartia  MG 24 hr capsule Take 1 capsule by mouth once daily    carvedilol (COREG) 25 mg tablet Take 1 tablet by mouth twice daily    Cholecalciferol (VITAMIN D3) 5000 units CAPS Take 1 capsule by mouth daily    dicyclomine (BENTYL) 10 mg capsule TAKE 1 CAPSULE BY MOUTH 4 TIMES DAILY BEFORE MEAL(S) AND AT BEDTIME    doxazosin (CARDURA) 2 mg tablet TAKE 1 TABLET BY MOUTH AT BEDTIME    furosemide (LASIX) 20 mg tablet Take 1 tablet (20 mg total) by mouth 2 (two) times a day    Glucos-Chondroit-Hyaluron-MSM (GLUCOSAMINE CHONDROITIN JOINT) TABS Take by mouth    Multiple Vitamin (MULTI-VITAMIN DAILY PO) Take 1 tablet by mouth daily    omega-3-acid ethyl esters (LOVAZA) 1 g capsule Take 2 g by mouth 2 (two) times a day    Probiotic Product (PROBIOTIC DAILY PO) Take by mouth    glucose blood (ONE TOUCH ULTRA TEST) test strip Test daily                        DX: E11 9    [DISCONTINUED] docusate sodium (COLACE) 100 mg capsule Take 1 capsule (100 mg total) by mouth 2 (two) times a day    [DISCONTINUED] ONE TOUCH LANCETS MISC by Does not apply route daily Test daily                  DX:E11 9    [DISCONTINUED] TURMERIC CURCUMIN PO Take by mouth     No current facility-administered medications on file prior to visit  Allergies   Allergen Reactions    Hydrochlorothiazide      Hyponatremia, severe    Statins        Physical Exam    /70   Pulse 68   Wt 101 kg (222 lb)   BMI 40 60 kg/m²     Constitutional: normal, well developed, well nourished, alert, in no distress and non-toxic and no overt pain behavior   and obese  Eyes: anicteric  HEENT: grossly intact  Neck: supple, symmetric, trachea midline and no masses   Pulmonary:even and unlabored  Cardiovascular:No edema or pitting edema present  Skin:Normal without rashes or lesions and well hydrated  Psychiatric:Mood and affect appropriate  Neurologic:Cranial Nerves II-XII grossly intact  Musculoskeletal:normal     Lumbar Spine Exam  Appearance:  Scoliosis  Palpation/Tenderness:  left lumbar paraspinal tenderness  right lumbar paraspinal tenderness  Bilateral lumbar facet tenderness at L4-5, L5-S1 with positive facet loading  Range of Motion:  Flexion:  Minimally limited  without pain  Extension:  Moderately limited  with pain  Lateral Flexion - Left:  Moderately limited  with pain  Lateral Flexion - Right:  Moderately limited  with pain  Motor Strength:  Left hip flexion:  5/5  Left hip extension:  5/5  Right hip flexion:  5/5  Right hip extension:  5/5  Left knee flexion:  5/5  Left knee extension:  5/5  Right knee flexion:  5/5  Right knee extension:  5/5  Left foot dorsiflexion:  5/5  Left foot plantar flexion:  5/5  Right foot dorsiflexion:  5/5  Right foot plantar flexion:  5/5  Reflexes:  Left Patellar:  1+   Right Patellar:  1+   Left Achilles:  1+   Right Achilles:  1+   Special Tests:  Left Straight Leg Test:  negative  Right Straight Leg Test:  negative    Imaging    XR LUMBAR SPINE (11/15/2019)     INDICATION:   M54 5: Low back pain  G89 29: Other chronic pain      COMPARISON:  Lumbar spine MR from 11/16/2006      VIEWS:  XR SPINE LUMBAR MINIMUM 4 VIEWS NON INJURY        FINDINGS:     There are 5 non rib bearing lumbar vertebral bodies       There is no evidence of acute fracture or destructive osseous lesion      Mild lumbar scoliosis convex left      Severe degenerative disc space narrowing at all levels of the lumbar spine  There are prominent  osteophyte formations      Severe degenerative facet disease throughout the lower lumbar spine       The pedicles appear intact      Soft tissues are unremarkable      IMPRESSION:     No acute osseous abnormality        Degenerative changes as described  LEFT HIP     INDICATION:   O11 153T: Strain of muscle, fascia and tendon of the posterior muscle group at thigh level, left thigh, initial encounter      COMPARISON:  Abdomen and pelvic CT from 7/25/2018      VIEWS:  XR HIP/PELV 2-3 VWS LEFT  W PELVIS IF PERFORMED         FINDINGS:     There is no acute fracture or dislocation      Mild left hip osteoarthritis  This is evidenced by joint space narrowing and marginal osteophytes      No lytic or blastic osseous lesions      Soft tissues are unremarkable      Please see concurrent lumbar spine plain film report for evaluation of the lumbar spine      IMPRESSION:     No acute osseous abnormality      Mild left hip osteoarthritis

## 2021-01-27 NOTE — PATIENT INSTRUCTIONS
COVID-19 Vaccine [facility] - [phone], Option 7      Core Strengthening Exercises   WHAT YOU NEED TO KNOW:   What do I need to know about core strengthening exercises? Core strengthening exercises help heal and strengthen muscles of your hips, back, and abdomen to prevent reinjury  They are beginning exercises to help support your spine  Ask your healthcare provider if you need to see a physical therapist for more advanced exercises  · Do the exercises on a mat or firm surface  (not on a bed) to support your spine and avoid low back pain  · Do the exercises in the same order every time  to train your muscles to work together  Your healthcare provider will show you how to perform these exercises  Do them every day, or as directed by your healthcare provider  · Move slowly and smoothly  Avoid fast or jerky motions  · Stop if you feel pain  It is normal to feel some discomfort at first  Regular exercise will help decrease your discomfort over time  How do I perform core strengthening exercises safely? Hold each exercise for 5 seconds  When you can do the exercise without pain for 5 seconds, increase your hold to 10 to 15 seconds  When you can do the exercise without pain for 10 to 15 seconds, add the next exercise  Increase the time you hold each exercise, or repeat the exercises as directed  As you do each exercise, breathe normally  Do not hold your breath  · Abdominal bracing:  Lie on your back with your knees bent and feet flat on the floor  Place your arms in a relaxed position beside your body  Pull your belly button in toward your spine  Do not flatten or arch your back  Tighten the abdominal muscles below your belly button  Hold for 5 seconds  Begin all of your exercises with abdominal bracing  You can also practice abdominal bracing throughout the day while you are sitting or standing  · Bridging:  Lie on your back with your knees bent and feet flat on the floor   Rest your arms at your side  Tighten your buttocks, and then lift your hips 1 inch off the floor  Hold for 5 seconds  When you can do this exercise without pain for 10 seconds, increase the distance you lift your hips  A good goal is to be able to lift your hips so that your shoulders, hips, and knees are in a straight line  · Curl up:  Lie on your back with your knees bent and feet flat on the floor  Place your hands, palms down, underneath the curve in your lower back  Next, with your elbows on the floor, lift your shoulders and chest 2 to 3 inches  Keep your head in line with your shoulders  Hold this position for 5 seconds  When you can do this exercise without pain for 10 to 15 seconds, you may add a rotation  While your shoulders and chest are lifted off the ground, turn slightly to the left and hold  Repeat on the other side  · Dead bug:  Lie on your back with your knees bent and feet flat on the floor  Place your arms in a relaxed position beside your body  Begin with abdominal bracing  Next, raise one leg, keeping your knee bent  Hold for 5 seconds  Repeat with the other leg  When you can do this exercise without pain for 10 to 15 seconds, you may raise one straight leg and hold  Repeat with the other leg  · Quadruped:  Place your hands and knees on the floor  Keep your wrists directly below your shoulders and your knees directly below your hips  Pull your belly button in toward your spine  Do not flatten or arch your back  Tighten your abdominal muscles below your belly button  Hold for 5 seconds  When you can do this exercise without pain for 10 to 15 seconds, you may extend one arm and hold  Repeat on the other side  · Side Bridge:      ¨ Standing side bridge:  Stand next to a wall and extend one arm toward the wall  Place your palm flat on the wall with your fingers pointing upward  Begin with abdominal bracing   Next, without moving your feet, slowly bend your arm to 90 degrees  Hold for 5 seconds  Repeat on the other side  When you can do this exercise without pain for 10 to 15 seconds, you may do the bent leg side bridge on the floor  ¨ Bent leg side bridge:  Lie on one side with your legs, hips, and shoulders in a straight line  Prop yourself up onto your forearm so your elbow is directly below your shoulder  Bend your knees back to 90 degrees  Begin with abdominal bracing  Next, lift your hips and balance yourself on your forearm and knees  Hold for 5 seconds  Repeat on the other side  When you can do this exercise without pain for 10 to 15 seconds, you may do the straight leg side bridge on the floor  ¨ Straight leg side bridge:  Lie on one side with your legs, hips, and shoulders in a straight line  Prop yourself up onto your forearm so your elbow is directly below your shoulder  Begin with abdominal bracing  Lift your hips off the floor and balance yourself on your forearm and the outside of your flexed foot  Do not let your ankle bend sideways  Hold for 5 seconds  Repeat on the other side  When you can do this exercise without pain for 10 to 15 seconds, ask your healthcare provider for more advanced exercises  When should I contact my healthcare provider? · Your pain becomes worse  · You have new pain  · You have questions or concerns about your condition, care, or exercise program   CARE AGREEMENT:   You have the right to help plan your care  Learn about your health condition and how it may be treated  Discuss treatment options with your caregivers to decide what care you want to receive  You always have the right to refuse treatment  The above information is an  only  It is not intended as medical advice for individual conditions or treatments  Talk to your doctor, nurse or pharmacist before following any medical regimen to see if it is safe and effective for you    © 2016 [employer] is for End User's use only and may not be sold, redistributed or otherwise used for commercial purposes  All illustrations and images included in CareNotes® are the copyrighted property of [employer]  or [employer]

## 2021-02-08 ENCOUNTER — OFFICE VISIT (OUTPATIENT)
Dept: NEPHROLOGY | Facility: CLINIC | Age: 86
End: 2021-02-08
Payer: COMMERCIAL

## 2021-02-08 VITALS
SYSTOLIC BLOOD PRESSURE: 162 MMHG | BODY MASS INDEX: 40.27 KG/M2 | DIASTOLIC BLOOD PRESSURE: 76 MMHG | WEIGHT: 218.8 LBS | HEIGHT: 62 IN

## 2021-02-08 DIAGNOSIS — N18.30 BENIGN HYPERTENSION WITH CKD (CHRONIC KIDNEY DISEASE) STAGE III (HCC): ICD-10-CM

## 2021-02-08 DIAGNOSIS — I12.9 BENIGN HYPERTENSION WITH CKD (CHRONIC KIDNEY DISEASE) STAGE III (HCC): Primary | ICD-10-CM

## 2021-02-08 DIAGNOSIS — N18.30 BENIGN HYPERTENSION WITH CKD (CHRONIC KIDNEY DISEASE) STAGE III (HCC): Primary | ICD-10-CM

## 2021-02-08 DIAGNOSIS — N25.81 SECONDARY HYPERPARATHYROIDISM OF RENAL ORIGIN (HCC): ICD-10-CM

## 2021-02-08 DIAGNOSIS — R60.0 BILATERAL LEG EDEMA: ICD-10-CM

## 2021-02-08 DIAGNOSIS — I12.9 BENIGN HYPERTENSION WITH CKD (CHRONIC KIDNEY DISEASE) STAGE III (HCC): ICD-10-CM

## 2021-02-08 DIAGNOSIS — N18.31 STAGE 3A CHRONIC KIDNEY DISEASE (HCC): ICD-10-CM

## 2021-02-08 LAB
25(OH)D3 SERPL-MCNC: 30 NG/ML (ref 30–100)
ALBUMIN/CREAT UR: 12 MCG/MG CREAT
BASOPHILS # BLD AUTO: 80 CELLS/UL (ref 0–200)
BASOPHILS NFR BLD AUTO: 1 %
BUN SERPL-MCNC: 19 MG/DL (ref 7–25)
BUN/CREAT SERPL: 17 (CALC) (ref 6–22)
CALCIUM SERPL-MCNC: 9.4 MG/DL (ref 8.6–10.4)
CALCIUM SERPL-MCNC: 9.4 MG/DL (ref 8.6–10.4)
CHLORIDE SERPL-SCNC: 101 MMOL/L (ref 98–110)
CO2 SERPL-SCNC: 30 MMOL/L (ref 20–32)
CREAT SERPL-MCNC: 1.09 MG/DL (ref 0.6–0.88)
CREAT UR-MCNC: 130 MG/DL (ref 20–275)
EOSINOPHIL # BLD AUTO: 248 CELLS/UL (ref 15–500)
EOSINOPHIL NFR BLD AUTO: 3.1 %
ERYTHROCYTE [DISTWIDTH] IN BLOOD BY AUTOMATED COUNT: 12.2 % (ref 11–15)
GLUCOSE SERPL-MCNC: 147 MG/DL (ref 65–99)
HCT VFR BLD AUTO: 38.1 % (ref 35–45)
HGB BLD-MCNC: 12.6 G/DL (ref 11.7–15.5)
LYMPHOCYTES # BLD AUTO: 2544 CELLS/UL (ref 850–3900)
LYMPHOCYTES NFR BLD AUTO: 31.8 %
MAGNESIUM SERPL-MCNC: 2.2 MG/DL (ref 1.5–2.5)
MCH RBC QN AUTO: 30.8 PG (ref 27–33)
MCHC RBC AUTO-ENTMCNC: 33.1 G/DL (ref 32–36)
MCV RBC AUTO: 93.2 FL (ref 80–100)
MICROALBUMIN UR-MCNC: 1.6 MG/DL
MONOCYTES # BLD AUTO: 560 CELLS/UL (ref 200–950)
MONOCYTES NFR BLD AUTO: 7 %
NEUTROPHILS # BLD AUTO: 4568 CELLS/UL (ref 1500–7800)
NEUTROPHILS NFR BLD AUTO: 57.1 %
PHOSPHATE SERPL-MCNC: 3.8 MG/DL (ref 2.1–4.3)
PLATELET # BLD AUTO: 163 THOUSAND/UL (ref 140–400)
PMV BLD REES-ECKER: 11.8 FL (ref 7.5–12.5)
POTASSIUM SERPL-SCNC: 4.6 MMOL/L (ref 3.5–5.3)
PTH-INTACT SERPL-MCNC: 93 PG/ML (ref 14–64)
RBC # BLD AUTO: 4.09 MILLION/UL (ref 3.8–5.1)
SL AMB EGFR AFRICAN AMERICAN: 53 ML/MIN/1.73M2
SL AMB EGFR NON AFRICAN AMERICAN: 46 ML/MIN/1.73M2
SODIUM SERPL-SCNC: 140 MMOL/L (ref 135–146)
WBC # BLD AUTO: 8 THOUSAND/UL (ref 3.8–10.8)

## 2021-02-08 PROCEDURE — 1160F RVW MEDS BY RX/DR IN RCRD: CPT | Performed by: INTERNAL MEDICINE

## 2021-02-08 PROCEDURE — 99214 OFFICE O/P EST MOD 30 MIN: CPT | Performed by: INTERNAL MEDICINE

## 2021-02-08 PROCEDURE — 1036F TOBACCO NON-USER: CPT | Performed by: INTERNAL MEDICINE

## 2021-02-08 RX ORDER — MELATONIN
2000 DAILY
COMMUNITY

## 2021-02-08 RX ORDER — DOXAZOSIN MESYLATE 4 MG/1
4 TABLET ORAL
Qty: 30 TABLET | Refills: 5 | Status: SHIPPED | OUTPATIENT
Start: 2021-02-08 | End: 2021-06-14 | Stop reason: SDUPTHER

## 2021-02-08 NOTE — PROGRESS NOTES
NEPHROLOGY OUTPATIENT PROGRESS NOTE   Melly Caro 80 y o  female MRN: [id number]  DATE: 2/8/2021  Reason for visit:   Chief Complaint   Patient presents with    Follow-up    Chronic Kidney Disease     ASSESSMENT and PLAN:  CKD stage IIIA, baseline creatinine 0 9 to 1  -CKD likely secondary to long-term hypertension and diabetes  -last creatinine 1 0 in February 2021  overall stable  -avoid nephrotoxins or NSAIDs  -urinalysis in August 2020 shows no hematuria or proteinuria  urine microalbumin/creatinine ratio 12 mg nonsignificant in February 2021     Hypertension  -Patient does not check BP at home   -currently on carvedilol, diltiazem, doxazosin and Lasix  She could not tolerate lisinopril due to dry cough  -BP improved on repeat check in the office although still remains above goal   Increase doxazosin from 2 mg to 4 mg daily    -advised her to check BP on a regular basis and call back  if remains persistently greater than 140/90      Hyponatremia, resolved  -initially hyponatremia thought to be secondary to hypovolemia/HCTZ +/- SIADH component  -she remains off HCTZ , last serum sodium 140    -continue to follow mild fluid restriction less than 64 oz per day   -continue Lasix 20 mg b i d  BMP before next visit  -previous workup includes serum osmolality 252, urine sodium 45, urine osmolality 388      Secondary hyperparathyroidism, last PTH 93 in February 2021    -vitamin-D level 30  Continue vitamin-D 2000 units daily  Will monitor PTH before next visit      Leg edema, seems to be stable as per patient   Elevate legs while in bed, compression stockings as tolerated  -currently remains on Lasix 20 mg b i d  She has lost about 6 lb since last visit  Diagnoses and all orders for this visit:    Benign hypertension with CKD (chronic kidney disease) stage III (HCC)  -     doxazosin (CARDURA) 4 mg tablet;  Take 1 tablet (4 mg total) by mouth daily at bedtime    Stage 3a chronic kidney disease  -     Basic metabolic panel; Future  -     CBC; Future  -     Phosphorus; Future  -     PTH, intact; Future  -     Microalbumin / creatinine urine ratio; Future  -     Vitamin D 25 hydroxy; Future    Bilateral leg edema    Secondary hyperparathyroidism of renal origin ([address] )  -     Phosphorus; Future  -     PTH, intact; Future  -     Vitamin D 25 hydroxy; Future    Benign hypertension with CKD (chronic kidney disease) stage III  -     doxazosin (CARDURA) 4 mg tablet; Take 1 tablet (4 mg total) by mouth daily at bedtime    Other orders  -     cholecalciferol (VITAMIN D3) 1,000 units tablet; Take 2,000 Units by mouth daily        SUBJECTIVE / HPI:  Patient is a 80-year-old female with significant medical issues of hypertension for many years, prior history of long-term diabetes and now remains off antidiabetic medication, CKD stage IIIA with baseline creatinine 0 9 to 1, chronic back pain issues, history of hyponatremia, presents for regular follow-up of CKD  Patient last serum creatinine overall stable at baseline  She has wrist BP cuff although has not been checking this at home   Patient has chronic back pain issues which is overall stable   She denies taking any NSAIDs   Denies any urinary complaint   Denies any chest pain or shortness of breath   No nausea or vomiting  REVIEW OF SYSTEMS:  More than 10 point review of systems were obtained and discussed in length with the patient  Complete review of systems were negative / unremarkable except mentioned above  PHYSICAL EXAM:  Vitals:    02/08/21 1328 02/08/21 1405   BP: (!) 182/80 162/76   BP Location: Left arm    Patient Position: Sitting    Cuff Size: Large    Weight: 99 2 kg (218 lb 12 8 oz)    Height: 5' 2" (1 575 m)      Body mass index is 40 02 kg/m²  Physical Exam  Vitals signs reviewed  Constitutional:       Appearance: She is well-developed  HENT:      Head: Normocephalic and atraumatic        Right Ear: External ear normal       Left Ear: External ear normal    Eyes:      Conjunctiva/sclera: Conjunctivae normal       Pupils: Pupils are equal, round, and reactive to light  Neck:      Musculoskeletal: Neck supple  Cardiovascular:      Comments: S1, S2 present  Pulmonary:      Effort: Pulmonary effort is normal       Breath sounds: Normal breath sounds  No wheezing or rales  Abdominal:      General: Bowel sounds are normal  There is no distension  Palpations: Abdomen is soft  Tenderness: There is no abdominal tenderness  Musculoskeletal:         General: No tenderness  Right lower leg: No edema  Left lower leg: No edema  Comments: No significant edema in legs   Skin:     General: Skin is warm and dry  Findings: No rash  Neurological:      Mental Status: She is alert and oriented to person, place, and time     Psychiatric:         Behavior: Behavior normal          PAST MEDICAL HISTORY:  Past Medical History:   Diagnosis Date    Anemia     Anxiety     Arthritis     CKD (chronic kidney disease), stage III 11/29/2018    Diabetes mellitus ([facility] [address] )     Diabetic peripheral neuropathy (HCC)     Hyperlipidemia     Hypertension     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 8/23/2017    Thyroid enlarged 2/23/2015    Tinnitus     Vertigo        PAST SURGICAL HISTORY:  Past Surgical History:   Procedure Laterality Date    APPENDECTOMY      EAR SURGERY Left     KNEE SURGERY      TONSILLECTOMY         SOCIAL HISTORY:  Social History     Substance and Sexual Activity   Alcohol Use Not Currently     Social History     Substance and Sexual Activity   Drug Use No     Social History     Tobacco Use   Smoking Status Never Smoker   Smokeless Tobacco Never Used       FAMILY HISTORY:  Family History   Problem Relation Age of Onset    Diabetes Mother     Heart disease Mother     Heart disease Father     Stroke Father     Cancer Family     Dementia Sister     Liver disease Other        MEDICATIONS:    Current Outpatient Medications:     aspirin 81 MG tablet, Take 1 tablet by mouth daily, Disp: , Rfl:     atorvastatin (LIPITOR) 80 mg tablet, , Disp: , Rfl:     Blood Glucose Monitoring Suppl (ONE TOUCH ULTRA 2) w/Device KIT, by Does not apply route daily, Disp: 1 each, Rfl: 0    Cartia  MG 24 hr capsule, Take 1 capsule by mouth once daily, Disp: 90 capsule, Rfl: 1    carvedilol (COREG) 25 mg tablet, Take 1 tablet by mouth twice daily, Disp: 180 tablet, Rfl: 1    cholecalciferol (VITAMIN D3) 1,000 units tablet, Take 2,000 Units by mouth daily, Disp: , Rfl:     dicyclomine (BENTYL) 10 mg capsule, TAKE 1 CAPSULE BY MOUTH 4 TIMES DAILY BEFORE MEAL(S) AND AT BEDTIME (Patient taking differently: 2 (two) times a day ), Disp: 360 capsule, Rfl: 0    doxazosin (CARDURA) 4 mg tablet, Take 1 tablet (4 mg total) by mouth daily at bedtime, Disp: 30 tablet, Rfl: 5    furosemide (LASIX) 20 mg tablet, Take 1 tablet (20 mg total) by mouth 2 (two) times a day, Disp: 60 tablet, Rfl: 5    Glucos-Chondroit-Hyaluron-MSM (GLUCOSAMINE CHONDROITIN JOINT) TABS, Take by mouth, Disp: , Rfl:     glucose blood (ONE TOUCH ULTRA TEST) test strip, Test daily                        DX: E11 9, Disp: 100 each, Rfl: 1    Multiple Vitamin (MULTI-VITAMIN DAILY PO), Take 1 tablet by mouth daily, Disp: , Rfl:     omega-3-acid ethyl esters (LOVAZA) 1 g capsule, Take 2 g by mouth 2 (two) times a day, Disp: , Rfl:     Probiotic Product (PROBIOTIC DAILY PO), Take by mouth, Disp: , Rfl:     Lab Results:   Results for orders placed or performed in visit on 02/05/21   Microalbumin, Random Urine (W/Creatinine)   Result Value Ref Range    Creatinine, Urine 130 20 - 275 mg/dL    Microalbum  ,U,Random 1 6 See Note: mg/dL    Microalb/Creat Ratio 12 <30 mcg/mg creat   PTH, Intact and Calcium   Result Value Ref Range    PTH, Intact 93 (H) 14 - 64 pg/mL    Calcium 9 4 8 6 - 10 4 mg/dL Magnesium   Result Value Ref Range    Magnesium, Serum 2 2 1 5 - 2 5 mg/dL   Phosphorus   Result Value Ref Range    Phosphorus, Serum 3 8 2 1 - 4 3 mg/dL   Basic metabolic panel   Result Value Ref Range    Glucose, Random 147 (H) 65 - 99 mg/dL    BUN 19 7 - 25 mg/dL    Creatinine 1 09 (H) 0 60 - 0 88 mg/dL    eGFR Non  46 (L) > OR = 60 mL/min/1 73m2    eGFR  53 (L) > OR = 60 mL/min/1 73m2    SL AMB BUN/CREATININE RATIO 17 6 - 22 (calc)    Sodium 140 135 - 146 mmol/L    Potassium 4 6 3 5 - 5 3 mmol/L    Chloride 101 98 - 110 mmol/L    CO2 30 20 - 32 mmol/L    Calcium 9 4 8 6 - 10 4 mg/dL   CBC and differential   Result Value Ref Range    White Blood Cell Count 8 0 3 8 - 10 8 Thousand/uL    Red Blood Cell Count 4 09 3 80 - 5 10 Million/uL    Hemoglobin 12 6 11 7 - 15 5 g/dL    HCT 38 1 35 0 - 45 0 %    MCV 93 2 80 0 - 100 0 fL    MCH 30 8 27 0 - 33 0 pg    MCHC 33 1 32 0 - 36 0 g/dL    RDW 12 2 11 0 - 15 0 %    Platelet Count 069 213 - 400 Thousand/uL    SL AMB MPV 11 8 7 5 - 12 5 fL    Neutrophils (Absolute) 4,568 1,500 - 7,800 cells/uL    Lymphocytes (Absolute) 2,544 850 - 3,900 cells/uL    Monocytes (Absolute) 560 200 - 950 cells/uL    Eosinophils (Absolute) 248 15 - 500 cells/uL    Basophils ABS 80 0 - 200 cells/uL    Neutrophils 57 1 %    Lymphocytes 31 8 %    Monocytes 7 0 %    Eosinophils 3 1 %    Basophils PCT 1 0 %   Vitamin D 25 hydroxy   Result Value Ref Range    Vitamin D, 25-Hydroxy, Serum 30 30 - 100 ng/mL

## 2021-02-14 ENCOUNTER — IMMUNIZATIONS (OUTPATIENT)
Dept: FAMILY MEDICINE CLINIC | Facility: HOSPITAL | Age: 86
End: 2021-02-14

## 2021-02-14 DIAGNOSIS — Z23 ENCOUNTER FOR IMMUNIZATION: Primary | ICD-10-CM

## 2021-02-14 PROCEDURE — 0001A SARS-COV-2 / COVID-19 MRNA VACCINE (PFIZER-BIONTECH) 30 MCG: CPT

## 2021-02-14 PROCEDURE — 91300 SARS-COV-2 / COVID-19 MRNA VACCINE (PFIZER-BIONTECH) 30 MCG: CPT

## 2021-03-05 ENCOUNTER — IMMUNIZATIONS (OUTPATIENT)
Dept: FAMILY MEDICINE CLINIC | Facility: HOSPITAL | Age: 86
End: 2021-03-05

## 2021-03-05 DIAGNOSIS — Z23 ENCOUNTER FOR IMMUNIZATION: Primary | ICD-10-CM

## 2021-03-05 PROCEDURE — 91300 SARS-COV-2 / COVID-19 MRNA VACCINE (PFIZER-BIONTECH) 30 MCG: CPT

## 2021-03-05 PROCEDURE — 0002A SARS-COV-2 / COVID-19 MRNA VACCINE (PFIZER-BIONTECH) 30 MCG: CPT

## 2021-03-25 ENCOUNTER — RA CDI HCC (OUTPATIENT)
Dept: OTHER | Facility: HOSPITAL | Age: 86
End: 2021-03-25

## 2021-03-25 NOTE — PROGRESS NOTES
[place] [address]  coding oppertunities             Chart reviewed, (number of) suggestions sent to provider: 5     Problem listed updated   Provider Accepted, (number of) suggestions accepted: 5              [place] [address]  coding oppertunities             Chart reviewed, (number of) suggestions sent to provider: 5                 E11 22, N18 31 T2DM with diabetic stage 3a CKD ([place] [address] )    E11 42 T2DM with diabetic polyneuropathy ([place] [address] )    I73 9 PVD ([place] [address] )    E66 01 Morbid, severe, obesity due to excess calories [facility])     If this is correct, please document and assess at your next visit 4/1/21

## 2021-03-26 LAB
ALBUMIN SERPL-MCNC: 4.1 G/DL (ref 3.6–5.1)
ALBUMIN/GLOB SERPL: 1.9 (CALC) (ref 1–2.5)
ALP SERPL-CCNC: 102 U/L (ref 37–153)
ALT SERPL-CCNC: 20 U/L (ref 6–29)
AST SERPL-CCNC: 22 U/L (ref 10–35)
BILIRUB SERPL-MCNC: 0.7 MG/DL (ref 0.2–1.2)
BUN SERPL-MCNC: 24 MG/DL (ref 7–25)
BUN/CREAT SERPL: 22 (CALC) (ref 6–22)
CALCIUM SERPL-MCNC: 9.4 MG/DL (ref 8.6–10.4)
CHLORIDE SERPL-SCNC: 101 MMOL/L (ref 98–110)
CHOLEST SERPL-MCNC: 125 MG/DL
CHOLEST/HDLC SERPL: 2.6 (CALC)
CO2 SERPL-SCNC: 33 MMOL/L (ref 20–32)
CREAT SERPL-MCNC: 1.09 MG/DL (ref 0.6–0.88)
GLOBULIN SER CALC-MCNC: 2.2 G/DL (CALC) (ref 1.9–3.7)
GLUCOSE SERPL-MCNC: 165 MG/DL (ref 65–99)
HDLC SERPL-MCNC: 49 MG/DL
LDLC SERPL CALC-MCNC: 60 MG/DL (CALC)
NONHDLC SERPL-MCNC: 76 MG/DL (CALC)
NT-PROBNP SERPL-MCNC: 1020 PG/ML
POTASSIUM SERPL-SCNC: 4.2 MMOL/L (ref 3.5–5.3)
PROT SERPL-MCNC: 6.3 G/DL (ref 6.1–8.1)
SL AMB EGFR AFRICAN AMERICAN: 53 ML/MIN/1.73M2
SL AMB EGFR NON AFRICAN AMERICAN: 46 ML/MIN/1.73M2
SODIUM SERPL-SCNC: 139 MMOL/L (ref 135–146)
TRIGL SERPL-MCNC: 78 MG/DL

## 2021-04-01 ENCOUNTER — OFFICE VISIT (OUTPATIENT)
Dept: FAMILY MEDICINE CLINIC | Facility: CLINIC | Age: 86
End: 2021-04-01
Payer: COMMERCIAL

## 2021-04-01 VITALS
SYSTOLIC BLOOD PRESSURE: 122 MMHG | WEIGHT: 212.6 LBS | HEIGHT: 62 IN | RESPIRATION RATE: 16 BRPM | HEART RATE: 68 BPM | DIASTOLIC BLOOD PRESSURE: 66 MMHG | BODY MASS INDEX: 39.12 KG/M2

## 2021-04-01 DIAGNOSIS — I12.9 BENIGN HYPERTENSION WITH CKD (CHRONIC KIDNEY DISEASE) STAGE III (HCC): ICD-10-CM

## 2021-04-01 DIAGNOSIS — E11.9 TYPE 2 DIABETES MELLITUS WITHOUT COMPLICATION, WITHOUT LONG-TERM CURRENT USE OF INSULIN (HCC): Primary | ICD-10-CM

## 2021-04-01 DIAGNOSIS — G89.29 CHRONIC BILATERAL LOW BACK PAIN WITHOUT SCIATICA: ICD-10-CM

## 2021-04-01 DIAGNOSIS — M79.641 PAIN IN BOTH HANDS: ICD-10-CM

## 2021-04-01 DIAGNOSIS — M79.642 PAIN IN BOTH HANDS: ICD-10-CM

## 2021-04-01 DIAGNOSIS — M54.50 CHRONIC BILATERAL LOW BACK PAIN WITHOUT SCIATICA: ICD-10-CM

## 2021-04-01 DIAGNOSIS — N18.30 BENIGN HYPERTENSION WITH CKD (CHRONIC KIDNEY DISEASE) STAGE III (HCC): ICD-10-CM

## 2021-04-01 DIAGNOSIS — I10 ESSENTIAL HYPERTENSION: ICD-10-CM

## 2021-04-01 PROBLEM — Z12.39 SCREENING FOR BREAST CANCER: Status: RESOLVED | Noted: 2018-06-14 | Resolved: 2021-04-01

## 2021-04-01 LAB — SL AMB POCT HEMOGLOBIN AIC: 7 (ref ?–6.5)

## 2021-04-01 PROCEDURE — 99214 OFFICE O/P EST MOD 30 MIN: CPT | Performed by: FAMILY MEDICINE

## 2021-04-01 PROCEDURE — 83036 HEMOGLOBIN GLYCOSYLATED A1C: CPT | Performed by: FAMILY MEDICINE

## 2021-04-01 RX ORDER — DILTIAZEM HYDROCHLORIDE 240 MG/1
240 CAPSULE, COATED, EXTENDED RELEASE ORAL DAILY
Qty: 90 CAPSULE | Refills: 1 | Status: SHIPPED | OUTPATIENT
Start: 2021-04-01 | End: 2021-04-04

## 2021-04-01 RX ORDER — CARVEDILOL 25 MG/1
25 TABLET ORAL 2 TIMES DAILY
Qty: 180 TABLET | Refills: 1 | Status: SHIPPED | OUTPATIENT
Start: 2021-04-01 | End: 2021-08-02

## 2021-04-01 NOTE — PROGRESS NOTES
Assessment and Plan:    Problem List Items Addressed This Visit        Endocrine    Type 2 diabetes mellitus without complication, without long-term current use of insulin ([facility] [address] ) - Primary    Relevant Orders    POCT hemoglobin A1c       Cardiovascular and Mediastinum    Benign hypertension with CKD (chronic kidney disease) stage III (HCC)    Relevant Medications    diltiazem (Cartia XT) 240 mg 24 hr capsule    carvedilol (COREG) 25 mg tablet       Other    Chronic low back pain without sciatica     Going for MRI back and seeing pain management         Pain in both hands     voltaren gel         Relevant Medications    Diclofenac Sodium (VOLTAREN) 1 %      Other Visit Diagnoses     Essential hypertension        Relevant Medications    diltiazem (Cartia XT) 240 mg 24 hr capsule    carvedilol (COREG) 25 mg tablet                 Diagnoses and all orders for this visit:    Type 2 diabetes mellitus without complication, without long-term current use of insulin ([facility])  -     POCT hemoglobin A1c    Benign hypertension with CKD (chronic kidney disease) stage III (HCC)    Pain in both hands  -     Diclofenac Sodium (VOLTAREN) 1 %; 1 gram four times per day as needed for hand swelling    Chronic bilateral low back pain without sciatica    Essential hypertension  -     diltiazem (Cartia XT) 240 mg 24 hr capsule; Take 1 capsule (240 mg total) by mouth daily  -     carvedilol (COREG) 25 mg tablet; Take 1 tablet (25 mg total) by mouth 2 (two) times a day              Subjective:      Patient ID: Elizabeth Barnard is a 80 y o  female  CC:    Chief Complaint   Patient presents with    Follow-up     Pt here for a follow up and to review lab results  R Harman       HPI:    F/u back pain, hands are bothering her-can't take NSAIDs due to kidneys, lipids improved, sugar up a tad      The following portions of the patient's history were reviewed and updated as appropriate: allergies, current medications, past family history, past medical history, past social history, past surgical history and problem list       Review of Systems   Constitutional: Positive for unexpected weight change  Negative for activity change, appetite change and fatigue  6 lb loss   Respiratory: Negative for shortness of breath  Cardiovascular: Negative for chest pain, palpitations and leg swelling  Musculoskeletal: Positive for arthralgias and back pain  Hands hurt   Neurological: Negative for dizziness and headaches  Data to review:       Objective:    Vitals:    04/01/21 1311   BP: 122/66   BP Location: Left arm   Patient Position: Sitting   Cuff Size: Large   Pulse: 68   Resp: 16   Weight: 96 4 kg (212 lb 9 6 oz)   Height: 5' 2" (1 575 m)        Physical Exam  Vitals signs reviewed  Constitutional:       Appearance: Normal appearance  She is obese  Cardiovascular:      Rate and Rhythm: Normal rate and regular rhythm  Pulses: Normal pulses  Heart sounds: Normal heart sounds  Pulmonary:      Effort: Pulmonary effort is normal       Breath sounds: Normal breath sounds  Musculoskeletal:      Right lower leg: No edema  Left lower leg: No edema  Neurological:      Mental Status: She is alert

## 2021-04-03 DIAGNOSIS — I10 ESSENTIAL HYPERTENSION: ICD-10-CM

## 2021-04-04 RX ORDER — DILTIAZEM HYDROCHLORIDE 240 MG/1
CAPSULE, COATED, EXTENDED RELEASE ORAL
Qty: 90 CAPSULE | Refills: 1 | Status: SHIPPED | OUTPATIENT
Start: 2021-04-04 | End: 2021-10-05

## 2021-04-20 ENCOUNTER — HOSPITAL ENCOUNTER (OUTPATIENT)
Dept: RADIOLOGY | Facility: HOSPITAL | Age: 86
Discharge: HOME | End: 2021-04-20
Attending: ANESTHESIOLOGY
Payer: COMMERCIAL

## 2021-04-20 DIAGNOSIS — M51.16 INTERVERTEBRAL DISC DISORDER WITH RADICULOPATHY OF LUMBAR REGION: ICD-10-CM

## 2021-04-20 PROCEDURE — 72148 MRI LUMBAR SPINE W/O DYE: CPT

## 2021-04-20 PROCEDURE — G1004 CDSM NDSC: HCPCS

## 2021-04-29 ENCOUNTER — TELEPHONE (OUTPATIENT)
Dept: RADIOLOGY | Facility: CLINIC | Age: 86
End: 2021-04-29

## 2021-04-29 DIAGNOSIS — M51.16 INTERVERTEBRAL DISC DISORDER WITH RADICULOPATHY OF LUMBAR REGION: Primary | ICD-10-CM

## 2021-04-29 NOTE — TELEPHONE ENCOUNTER
Please cancel office visit scheduled with [facility] for tomorrow    Discussed MRI lumbar spine results with patient which shows multilevel spinal stenosis most severe at L3-4 L4-5  Recommended proceeding with left L4-5 transforaminal epidural steroid injection which she would like to do    Order placed

## 2021-04-30 NOTE — TELEPHONE ENCOUNTER
Scheduled pt for Lt L4 L5 Tfesi for 5/21/21  Pt denies rx blood thinners  Pt had covid booster on 3/5/21  Went over pre-procedure instructions below:  Nothing to eat or drink 1 hr prior to procedure  Need to arrange transportation  Proper clothing for procedure  If ill or placed on antibiotics please call to reschedule  Covid/travel/ and vaccine instructions

## 2021-05-18 ENCOUNTER — TELEPHONE (OUTPATIENT)
Dept: FAMILY MEDICINE CLINIC | Facility: CLINIC | Age: 86
End: 2021-05-18

## 2021-05-18 DIAGNOSIS — F43.21 GRIEF REACTION: ICD-10-CM

## 2021-05-18 DIAGNOSIS — F43.21 GRIEF REACTION: Primary | ICD-10-CM

## 2021-05-18 RX ORDER — ALPRAZOLAM 0.25 MG/1
0.25 TABLET ORAL
Qty: 10 TABLET | Refills: 0 | Status: SHIPPED | OUTPATIENT
Start: 2021-05-18 | End: 2021-05-18

## 2021-05-18 RX ORDER — ALPRAZOLAM 0.25 MG/1
0.25 TABLET ORAL 2 TIMES DAILY PRN
Qty: 10 TABLET | Refills: 0 | Status: SHIPPED | OUTPATIENT
Start: 2021-05-18 | End: 2021-05-18 | Stop reason: SDUPTHER

## 2021-05-18 RX ORDER — ALPRAZOLAM 0.25 MG/1
0.25 TABLET ORAL 2 TIMES DAILY PRN
Qty: 10 TABLET | Refills: 0 | Status: SHIPPED | OUTPATIENT
Start: 2021-05-18 | End: 2021-08-02

## 2021-05-18 NOTE — TELEPHONE ENCOUNTER
PT IS ASKING IF A SCRIPT FOR ANXIETY MEDS CAN BE CALLED INTO [facility] IN [place]   HER NIECE JUST PASSED AWAY AND SHE ISN'T DOING WELL WITH THIS    ANY QUESTIONS CALL PT [phone]

## 2021-05-19 DIAGNOSIS — I12.9 BENIGN HYPERTENSION WITH CKD (CHRONIC KIDNEY DISEASE) STAGE III (HCC): ICD-10-CM

## 2021-05-19 DIAGNOSIS — N18.30 BENIGN HYPERTENSION WITH CKD (CHRONIC KIDNEY DISEASE) STAGE III (HCC): ICD-10-CM

## 2021-05-19 DIAGNOSIS — E87.1 HYPONATREMIA: ICD-10-CM

## 2021-05-19 RX ORDER — DICYCLOMINE HYDROCHLORIDE 10 MG/1
10 CAPSULE ORAL 4 TIMES DAILY PRN
Qty: 360 CAPSULE | Refills: 0 | Status: SHIPPED | OUTPATIENT
Start: 2021-05-19 | End: 2021-11-10

## 2021-05-19 RX ORDER — DOXAZOSIN MESYLATE 4 MG/1
4 TABLET ORAL
Qty: 90 TABLET | Refills: 3 | Status: SHIPPED | OUTPATIENT
Start: 2021-05-19 | End: 2021-07-29

## 2021-05-21 ENCOUNTER — HOSPITAL ENCOUNTER (OUTPATIENT)
Dept: RADIOLOGY | Facility: CLINIC | Age: 86
Discharge: HOME | End: 2021-05-21
Attending: ANESTHESIOLOGY
Payer: COMMERCIAL

## 2021-05-21 VITALS
TEMPERATURE: 97.1 F | SYSTOLIC BLOOD PRESSURE: 163 MMHG | HEART RATE: 70 BPM | DIASTOLIC BLOOD PRESSURE: 78 MMHG | OXYGEN SATURATION: 98 % | RESPIRATION RATE: 20 BRPM

## 2021-05-21 DIAGNOSIS — M51.16 INTERVERTEBRAL DISC DISORDER WITH RADICULOPATHY OF LUMBAR REGION: ICD-10-CM

## 2021-05-21 PROCEDURE — 64484 NJX AA&/STRD TFRM EPI L/S EA: CPT | Performed by: ANESTHESIOLOGY

## 2021-05-21 PROCEDURE — 64483 NJX AA&/STRD TFRM EPI L/S 1: CPT | Performed by: ANESTHESIOLOGY

## 2021-05-21 RX ORDER — 0.9 % SODIUM CHLORIDE 0.9 %
10 VIAL (ML) INJECTION ONCE
Status: COMPLETED | OUTPATIENT
Start: 2021-05-21 | End: 2021-05-21

## 2021-05-21 RX ORDER — BUPIVACAINE HCL/PF 2.5 MG/ML
10 VIAL (ML) INJECTION ONCE
Status: COMPLETED | OUTPATIENT
Start: 2021-05-21 | End: 2021-05-21

## 2021-05-21 RX ORDER — METHYLPREDNISOLONE ACETATE 80 MG/ML
80 INJECTION, SUSPENSION INTRA-ARTICULAR; INTRALESIONAL; INTRAMUSCULAR; PARENTERAL; SOFT TISSUE ONCE
Status: COMPLETED | OUTPATIENT
Start: 2021-05-21 | End: 2021-05-21

## 2021-05-21 RX ADMIN — SODIUM CHLORIDE 5 ML: 9 INJECTION, SOLUTION INTRAMUSCULAR; INTRAVENOUS; SUBCUTANEOUS at 11:10

## 2021-05-21 RX ADMIN — Medication 5 ML: at 11:10

## 2021-05-21 RX ADMIN — METHYLPREDNISOLONE ACETATE 80 MG: 80 INJECTION, SUSPENSION INTRA-ARTICULAR; INTRALESIONAL; INTRAMUSCULAR; PARENTERAL; SOFT TISSUE at 11:13

## 2021-05-21 RX ADMIN — IOHEXOL 1 ML: 300 INJECTION, SOLUTION INTRAVENOUS at 11:13

## 2021-05-21 RX ADMIN — BUPIVACAINE HYDROCHLORIDE 2 ML: 2.5 INJECTION, SOLUTION EPIDURAL; INFILTRATION; INTRACAUDAL at 11:13

## 2021-05-21 NOTE — DISCHARGE INSTR - LAB
Epidural Steroid Injection   WHAT YOU NEED TO KNOW:   An epidural steroid injection (TOAN) is a procedure to inject steroid medicine into the epidural space  The epidural space is between your spinal cord and vertebrae  Steroids reduce inflammation and fluid buildup in your spine that may be causing pain  You may be given pain medicine along with the steroids  ACTIVITY  · Do not drive or operate machinery today  · No strenuous activity today - bending, lifting, etc   · You may resume normal activites starting tomorrow - start slowly and as tolerated  · You may shower today, but no tub baths or hot tubs  · You may have numbness for several hours from the local anesthetic  Please use caution and common sense, especially with weight-bearing activities  CARE OF THE INJECTION SITE  · If you have soreness or pain, apply ice to the area today (20 minutes on/20 minutes off)  · Starting tomorrow, you may use warm, moist heat or ice if needed  · You may have an increase or change in your discomfort for 36-48 hours after your treatment  · Apply ice and continue with any pain medication you have been prescribed  · Notify [facility] if you have any of the following: redness, drainage, swelling, headache, stiff neck or fever above 100°F     SPECIAL INSTRUCTIONS  · Our office will contact you in approximately 7 days for a progress report  MEDICATIONS  · Continue to take all routine medications  · Our office may have instructed you to hold some medications  As no general anesthesia was used in today's procedure, you should not experience any side effects related to anesthesia  If you have a problem specifically related to your procedure, please call our office at [phone]  Problems not related to your procedure should be directed to your primary care physician

## 2021-05-21 NOTE — H&P
History of Present Illness: The patient is a 80 y o  female who presents with complaints of left lower back and leg pain secondary to spinal stenosis and is here today for left L4-5 transforaminal epidural steroid injection      Patient Active Problem List   Diagnosis    Diabetic peripheral neuropathy (HCC)    Benign hypertension with CKD (chronic kidney disease) stage III (HCC)    Thyroid enlarged    Type 2 diabetes mellitus without complication, without long-term current use of insulin (HCC)    Chronic constipation    Abdominal bloating    Anxiety    Chronic low back pain without sciatica    Hyperlipidemia    CKD (chronic kidney disease), stage III (HCC)    Bilateral leg edema    PAD (peripheral artery disease) (HCC)    Chronic pain of both shoulders    Shoulder joint stiffness, bilateral    Bilateral adhesive capsulitis of shoulders    Lumbar spondylosis    Degenerative disc disease, lumbar    Secondary hyperparathyroidism of renal origin ([address] )    Pain in both hands       Past Medical History:   Diagnosis Date    Anemia     Anxiety     Arthritis     CKD (chronic kidney disease), stage III ([address] ) 11/29/2018    Diabetes mellitus ([address] )     Diabetic peripheral neuropathy (HCC)     Hyperlipidemia     Hypertension     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 8/23/2017    Thyroid enlarged 2/23/2015    Tinnitus     Vertigo        Past Surgical History:   Procedure Laterality Date    APPENDECTOMY      EAR SURGERY Left     KNEE SURGERY      TONSILLECTOMY           Current Outpatient Medications:     ALPRAZolam (XANAX) 0 25 mg tablet, Take 1 tablet (0 25 mg total) by mouth 2 (two) times a day as needed for anxiety for up to 5 days, Disp: 10 tablet, Rfl: 0    aspirin 81 MG tablet, Take 1 tablet by mouth daily, Disp: , Rfl:     atorvastatin (LIPITOR) 80 mg tablet, , Disp: , Rfl:     Blood Glucose Monitoring Suppl (ONE TOUCH ULTRA 2) w/Device KIT, by Does not apply route daily, Disp: 1 each, Rfl: 0    carvedilol (COREG) 25 mg tablet, Take 1 tablet (25 mg total) by mouth 2 (two) times a day, Disp: 180 tablet, Rfl: 1    cholecalciferol (VITAMIN D3) 1,000 units tablet, Take 2,000 Units by mouth daily, Disp: , Rfl:     Diclofenac Sodium (VOLTAREN) 1 %, 1 gram four times per day as needed for hand swelling, Disp: 100 g, Rfl: 1    dicyclomine (BENTYL) 10 mg capsule, Take 1 capsule (10 mg total) by mouth 4 (four) times a day as needed (abd pain), Disp: 360 capsule, Rfl: 0    diltiazem (CARDIZEM CD) 240 mg 24 hr capsule, Take 1 capsule by mouth once daily, Disp: 90 capsule, Rfl: 1    doxazosin (CARDURA) 4 mg tablet, Take 1 tablet (4 mg total) by mouth daily at bedtime, Disp: 30 tablet, Rfl: 5    doxazosin (CARDURA) 4 mg tablet, Take 1 tablet (4 mg total) by mouth daily at bedtime, Disp: 90 tablet, Rfl: 3    furosemide (LASIX) 20 mg tablet, Take 1 tablet (20 mg total) by mouth 2 (two) times a day, Disp: 60 tablet, Rfl: 5    Glucos-Chondroit-Hyaluron-MSM (GLUCOSAMINE CHONDROITIN JOINT) TABS, Take by mouth, Disp: , Rfl:     glucose blood (ONE TOUCH ULTRA TEST) test strip, Test daily                        DX: E11 9, Disp: 100 each, Rfl: 1    Multiple Vitamin (MULTI-VITAMIN DAILY PO), Take 1 tablet by mouth daily, Disp: , Rfl:     omega-3-acid ethyl esters (LOVAZA) 1 g capsule, Take 2 g by mouth 2 (two) times a day, Disp: , Rfl:     Probiotic Product (PROBIOTIC DAILY PO), Take by mouth, Disp: , Rfl:     Current Facility-Administered Medications:     bupivacaine (PF) (MARCAINE) 0 25 % injection 10 mL, 10 mL, Epidural, Once, Efren Bartholomew MD    iohexol (OMNIPAQUE) 300 mg/mL injection 50 mL, 50 mL, Epidural, Once, Efren Bartholomew MD    lidocaine (PF) (XYLOCAINE-MPF) 2 % injection 5 mL, 5 mL, Infiltration, Once, Efren Bartholomew MD    methylPREDNISolone acetate (DEPO-MEDROL) injection 80 mg, 80 mg, Epidural, Once, Ashley Donald MD    sodium chloride (PF) 0 9 % injection 10 mL, 10 mL, Infiltration, Once, Ashley Donald MD    Allergies   Allergen Reactions    Hydrochlorothiazide      Hyponatremia, severe    Statins        Physical Exam:   Vitals:    05/21/21 1050   BP: (!) 179/78   Pulse: 68   Resp: 20   Temp: (!) 97 1 °F (36 2 °C)   SpO2: 99%     General: Awake, Alert, Oriented x 3, Mood and affect appropriate  Respiratory: Respirations even and unlabored  Cardiovascular: Peripheral pulses intact; no edema  Musculoskeletal Exam:  Left lower back tenderness    ASA Score: 3    Patient/Chart Verification  Patient ID Verified: Verbal  ID Band Applied: No  Consents Confirmed: Procedural, To be obtained in the Pre-Procedure area  H&P( within 30 days) Verified: To be obtained in the Pre-Procedure area  Allergies Reviewed: Yes  Anticoag/NSAID held?: No  Currently on antibiotics?: No    Assessment:   1   Intervertebral disc disorder with radiculopathy of lumbar region        Plan: Left L4-5 TF TOAN

## 2021-05-28 ENCOUNTER — TELEPHONE (OUTPATIENT)
Dept: PAIN MEDICINE | Facility: CLINIC | Age: 86
End: 2021-05-28

## 2021-06-01 NOTE — TELEPHONE ENCOUNTER
Pt reports 10-20% improvement post inj   Pain level 8/10  Pt aware I will call next week for an update

## 2021-06-08 ENCOUNTER — APPOINTMENT (EMERGENCY)
Dept: RADIOLOGY | Facility: HOSPITAL | Age: 86
End: 2021-06-08
Payer: COMMERCIAL

## 2021-06-08 ENCOUNTER — HOSPITAL ENCOUNTER (EMERGENCY)
Facility: HOSPITAL | Age: 86
Discharge: HOME | End: 2021-06-08
Attending: EMERGENCY MEDICINE | Admitting: EMERGENCY MEDICINE
Payer: COMMERCIAL

## 2021-06-08 ENCOUNTER — OFFICE VISIT (OUTPATIENT)
Dept: FAMILY MEDICINE CLINIC | Facility: CLINIC | Age: 86
End: 2021-06-08
Payer: COMMERCIAL

## 2021-06-08 VITALS
SYSTOLIC BLOOD PRESSURE: 110 MMHG | HEIGHT: 62 IN | WEIGHT: 216 LBS | DIASTOLIC BLOOD PRESSURE: 60 MMHG | BODY MASS INDEX: 39.75 KG/M2 | HEART RATE: 112 BPM | RESPIRATION RATE: 20 BRPM

## 2021-06-08 VITALS
OXYGEN SATURATION: 95 % | TEMPERATURE: 98.8 F | WEIGHT: 216 LBS | BODY MASS INDEX: 39.63 KG/M2 | DIASTOLIC BLOOD PRESSURE: 76 MMHG | SYSTOLIC BLOOD PRESSURE: 136 MMHG | HEART RATE: 84 BPM | RESPIRATION RATE: 18 BRPM

## 2021-06-08 DIAGNOSIS — E11.9 TYPE 2 DIABETES MELLITUS WITHOUT COMPLICATION, WITHOUT LONG-TERM CURRENT USE OF INSULIN (HCC): Primary | ICD-10-CM

## 2021-06-08 DIAGNOSIS — N18.30 BENIGN HYPERTENSION WITH CKD (CHRONIC KIDNEY DISEASE) STAGE III (HCC): ICD-10-CM

## 2021-06-08 DIAGNOSIS — I12.9 BENIGN HYPERTENSION WITH CKD (CHRONIC KIDNEY DISEASE) STAGE III (HCC): ICD-10-CM

## 2021-06-08 DIAGNOSIS — I48.91 NEW ONSET A-FIB (HCC): ICD-10-CM

## 2021-06-08 DIAGNOSIS — R73.9 HYPERGLYCEMIA: ICD-10-CM

## 2021-06-08 DIAGNOSIS — I48.91 ATRIAL FIBRILLATION (HCC): Primary | ICD-10-CM

## 2021-06-08 DIAGNOSIS — E87.6 HYPOKALEMIA: ICD-10-CM

## 2021-06-08 LAB
ALBUMIN SERPL BCP-MCNC: 3.4 G/DL (ref 3.5–5)
ALP SERPL-CCNC: 122 U/L (ref 46–116)
ALT SERPL W P-5'-P-CCNC: 47 U/L (ref 12–78)
ANION GAP SERPL CALCULATED.3IONS-SCNC: 6 MMOL/L (ref 4–13)
AST SERPL W P-5'-P-CCNC: 23 U/L (ref 5–45)
ATRIAL RATE: 340 BPM
BASOPHILS # BLD AUTO: 0.05 THOUSANDS/ΜL (ref 0–0.1)
BASOPHILS NFR BLD AUTO: 1 % (ref 0–1)
BILIRUB SERPL-MCNC: 1.04 MG/DL (ref 0.2–1)
BUN SERPL-MCNC: 20 MG/DL (ref 5–25)
CALCIUM ALBUM COR SERPL-MCNC: 9.4 MG/DL (ref 8.3–10.1)
CALCIUM SERPL-MCNC: 8.9 MG/DL (ref 8.3–10.1)
CHLORIDE SERPL-SCNC: 99 MMOL/L (ref 100–108)
CO2 SERPL-SCNC: 29 MMOL/L (ref 21–32)
CREAT SERPL-MCNC: 1.29 MG/DL (ref 0.6–1.3)
EOSINOPHIL # BLD AUTO: 0.07 THOUSAND/ΜL (ref 0–0.61)
EOSINOPHIL NFR BLD AUTO: 1 % (ref 0–6)
ERYTHROCYTE [DISTWIDTH] IN BLOOD BY AUTOMATED COUNT: 13.3 % (ref 11.6–15.1)
GFR SERPL CREATININE-BSD FRML MDRD: 37 ML/MIN/1.73SQ M
GLUCOSE SERPL-MCNC: 256 MG/DL (ref 65–140)
GLUCOSE SERPL-MCNC: 287 MG/DL (ref 65–140)
HCT VFR BLD AUTO: 34.2 % (ref 34.8–46.1)
HGB BLD-MCNC: 11.3 G/DL (ref 11.5–15.4)
IMM GRANULOCYTES # BLD AUTO: 0.06 THOUSAND/UL (ref 0–0.2)
IMM GRANULOCYTES NFR BLD AUTO: 1 % (ref 0–2)
LYMPHOCYTES # BLD AUTO: 1.3 THOUSANDS/ΜL (ref 0.6–4.47)
LYMPHOCYTES NFR BLD AUTO: 14 % (ref 14–44)
MAGNESIUM SERPL-MCNC: 2.2 MG/DL (ref 1.6–2.6)
MCH RBC QN AUTO: 31.2 PG (ref 26.8–34.3)
MCHC RBC AUTO-ENTMCNC: 33 G/DL (ref 31.4–37.4)
MCV RBC AUTO: 95 FL (ref 82–98)
MONOCYTES # BLD AUTO: 0.63 THOUSAND/ΜL (ref 0.17–1.22)
MONOCYTES NFR BLD AUTO: 7 % (ref 4–12)
NEUTROPHILS # BLD AUTO: 6.99 THOUSANDS/ΜL (ref 1.85–7.62)
NEUTS SEG NFR BLD AUTO: 76 % (ref 43–75)
NRBC BLD AUTO-RTO: 0 /100 WBCS
PHOSPHATE SERPL-MCNC: 3.5 MG/DL (ref 2.3–4.1)
PLATELET # BLD AUTO: 128 THOUSANDS/UL (ref 149–390)
PMV BLD AUTO: 11.5 FL (ref 8.9–12.7)
POTASSIUM SERPL-SCNC: 3.4 MMOL/L (ref 3.5–5.3)
PROT SERPL-MCNC: 6.2 G/DL (ref 6.4–8.2)
QRS AXIS: 64 DEGREES
QRSD INTERVAL: 94 MS
QT INTERVAL: 330 MS
QTC INTERVAL: 385 MS
RBC # BLD AUTO: 3.62 MILLION/UL (ref 3.81–5.12)
SODIUM SERPL-SCNC: 134 MMOL/L (ref 136–145)
T WAVE AXIS: 90 DEGREES
TROPONIN I SERPL-MCNC: <0.02 NG/ML
TSH SERPL DL<=0.05 MIU/L-ACNC: 1.1 UIU/ML (ref 0.36–3.74)
VENTRICULAR RATE: 82 BPM
WBC # BLD AUTO: 9.1 THOUSAND/UL (ref 4.31–10.16)

## 2021-06-08 PROCEDURE — 84100 ASSAY OF PHOSPHORUS: CPT | Performed by: EMERGENCY MEDICINE

## 2021-06-08 PROCEDURE — 99215 OFFICE O/P EST HI 40 MIN: CPT | Performed by: NURSE PRACTITIONER

## 2021-06-08 PROCEDURE — 36415 COLL VENOUS BLD VENIPUNCTURE: CPT | Performed by: EMERGENCY MEDICINE

## 2021-06-08 PROCEDURE — 84484 ASSAY OF TROPONIN QUANT: CPT | Performed by: EMERGENCY MEDICINE

## 2021-06-08 PROCEDURE — 93010 ELECTROCARDIOGRAM REPORT: CPT | Performed by: INTERNAL MEDICINE

## 2021-06-08 PROCEDURE — 82948 REAGENT STRIP/BLOOD GLUCOSE: CPT

## 2021-06-08 PROCEDURE — 99284 EMERGENCY DEPT VISIT MOD MDM: CPT

## 2021-06-08 PROCEDURE — 85025 COMPLETE CBC W/AUTO DIFF WBC: CPT | Performed by: EMERGENCY MEDICINE

## 2021-06-08 PROCEDURE — 80053 COMPREHEN METABOLIC PANEL: CPT | Performed by: EMERGENCY MEDICINE

## 2021-06-08 PROCEDURE — 84443 ASSAY THYROID STIM HORMONE: CPT | Performed by: EMERGENCY MEDICINE

## 2021-06-08 PROCEDURE — 83735 ASSAY OF MAGNESIUM: CPT | Performed by: EMERGENCY MEDICINE

## 2021-06-08 PROCEDURE — 99285 EMERGENCY DEPT VISIT HI MDM: CPT | Performed by: EMERGENCY MEDICINE

## 2021-06-08 PROCEDURE — 93005 ELECTROCARDIOGRAM TRACING: CPT

## 2021-06-08 PROCEDURE — 71046 X-RAY EXAM CHEST 2 VIEWS: CPT

## 2021-06-08 PROCEDURE — 93000 ELECTROCARDIOGRAM COMPLETE: CPT | Performed by: NURSE PRACTITIONER

## 2021-06-08 NOTE — ED ATTENDING ATTESTATION
Final Diagnosis:  1  Atrial fibrillation ([address] )    2  Hyperglycemia    3  Hypokalemia      ED Course as of Gareth 10 1459   Tue 2021   1257 WBC: 9 10   1257 Hemoglobin(!): 11 3   1257 Platelet Count(!): 875       I, Slava Moore MD, saw and evaluated the patient  All available labs and X-rays were ordered by me or the resident and have been reviewed by myself  I discussed the patient with the resident / non-physician and agree with the resident's / non-physician practitioner's findings and plan as documented in the resident's / non-physician practicitioner's note, except where noted  At this point, I agree with the current assessment done in the ED  I was present during key portions of all procedures performed unless otherwise stated  Chief Complaint   Patient presents with    Medical Problem     pt states "I just don't feel right sometimes  One of our caregivers  and I think it's stress " denies pain, states she feels normal as of now     This is a 80 y o  female presenting for evaluation of increased stress lately due to multiple deaths  Sister was there, and asked a doctor to evaluate her  She had an irregular heart rate so went to PCP today  PCP then sent her in from PCP's office for the AF  Harsh Danielson feels fine curretnly  No f/ch/n/v/cp/sob  She feels bloated sometimes, but not currently  Had normal BM yesterday  Denies any urinary tract infection symptoms (burning, itching, pain, blood, frequency)  Ambulating doesn't cause any change in symptoms  PMH:   has a past medical history of Anemia, Anxiety, Arthritis, CKD (chronic kidney disease), stage III ([address] ) (2018), Diabetes mellitus ([address] ), Diabetic peripheral neuropathy ([facility] ), Hyperlipidemia, Hypertension, 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 (2017), Thyroid enlarged (2015), Tinnitus, and Vertigo      PSH:   has a past surgical history that includes Knee surgery; EAR SURGERY (Left); Tonsillectomy; and Appendectomy  Social:  Social History     Substance and Sexual Activity   Alcohol Use Not Currently     Social History     Tobacco Use   Smoking Status Never Smoker   Smokeless Tobacco Never Used     Social History     Substance and Sexual Activity   Drug Use No     PE:  Vitals:    06/08/21 1123   BP: 136/76   Pulse: 84   Resp: 18   Temp: 98 8 °F (37 1 °C)   TempSrc: Oral   SpO2: 95%   Weight: 98 kg (216 lb)   General: VSS, NAD, awake, alert  Well-nourished, well-developed  Appears stated age  Head: Normocephalic, atraumatic, nontender  Eyes: PERRL, EOM-I  No diplopia  No hyphema  No subconjunctival hemorrhages  Symmetrical lids  ENTAtraumatic external nose and ears  MMM  No stridor  Normal phonation  No drooling  Base of mouth is soft  No mastoid tenderness  Neck: Symmetric, trachea midline  No JVD  CV: OIrregular HR  Peripheral pulses +2 throughout  No chest wall tenderness  Lungs:   Unlabored   No retractions  No crepitus  No tachypnea  No paradoxical motion  Abd: +BS, soft, NT/ND    MSK:   FROM   No lower extremity edema  Back:   No CVAT  Skin: Dry, intact  Neuro: AAOx3, GCS 15, CN II-XII grossly intact  Motor grossly intact  Psychiatric/Behavioral: Appropriate mood and affect   Exam: deferred  A:  - StresS? AF? P:  - Cardiac  TSH   - Given AF, start AC:   CHADS2 Vasc = 4 points  Stroke risk was 4 8% per year in >90,000 patients (the Health system Atrial Fibrillation Cohort Study) and 6 7% risk of stroke/TIA/systemic embolism  One recommendation suggests a 0 score is low risk and may not require anticoagulation; a 1 score is low-moderate risk and should consider antiplatelet or anticoagulation, and score 2 or greater is moderate-high risk and should otherwise be an anticoagulation candidate  - 13 point ROS was performed and all are normal unless stated in the history above     - Nursing note reviewed  Vitals reviewed  - Orders placed by myself and/or advanced practitioner / resident     - Previous chart was reviewed  - No language barrier    - History obtained from patient  - There are no limitations to the history obtained  - Critical care time: Not applicable for this patient  Code Status: Prior  Advance Directive and Living Will:      Power of :    POLST:      Medications - No data to display  XR chest 2 views   ED Interpretation   No acute cardiopulmonary process  Final Result      No acute cardiopulmonary disease                 Workstation performed: [id number]           Orders Placed This Encounter   Procedures    ED ECG Documentation Only    XR chest 2 views    CBC and differential    Comprehensive metabolic panel    Troponin I    TSH, 3rd generation with Free T4 reflex    Magnesium    Phosphorus    EKG RESULTS    ECG 12 lead    ECG 12 lead     Labs Reviewed   CBC AND DIFFERENTIAL - Abnormal       Result Value Ref Range Status    WBC 9 10  4 31 - 10 16 Thousand/uL Final    RBC 3 62 (*) 3 81 - 5 12 Million/uL Final    Hemoglobin 11 3 (*) 11 5 - 15 4 g/dL Final    Hematocrit 34 2 (*) 34 8 - 46 1 % Final    MCV 95  82 - 98 fL Final    MCH 31 2  26 8 - 34 3 pg Final    MCHC 33 0  31 4 - 37 4 g/dL Final    RDW 13 3  11 6 - 15 1 % Final    MPV 11 5  8 9 - 12 7 fL Final    Platelets 119 (*) 128 - 390 Thousands/uL Final    nRBC 0  /100 WBCs Final    Neutrophils Relative 76 (*) 43 - 75 % Final    Immat GRANS % 1  0 - 2 % Final    Lymphocytes Relative 14  14 - 44 % Final    Monocytes Relative 7  4 - 12 % Final    Eosinophils Relative 1  0 - 6 % Final    Basophils Relative 1  0 - 1 % Final    Neutrophils Absolute 6 99  1 85 - 7 62 Thousands/µL Final    Immature Grans Absolute 0 06  0 00 - 0 20 Thousand/uL Final    Lymphocytes Absolute 1 30  0 60 - 4 47 Thousands/µL Final    Monocytes Absolute 0 63  0 17 - 1 22 Thousand/µL Final    Eosinophils Absolute 0 07  0 00 - 0 61 Thousand/µL Final    Basophils Absolute 0 05  0 00 - 0 10 Thousands/µL Final   COMPREHENSIVE METABOLIC PANEL - Abnormal    Sodium 134 (*) 136 - 145 mmol/L Final    Potassium 3 4 (*) 3 5 - 5 3 mmol/L Final    Chloride 99 (*) 100 - 108 mmol/L Final    CO2 29  21 - 32 mmol/L Final    ANION GAP 6  4 - 13 mmol/L Final    BUN 20  5 - 25 mg/dL Final    Creatinine 1 29  0 60 - 1 30 mg/dL Final    Comment: Standardized to IDMS reference method    Glucose 287 (*) 65 - 140 mg/dL Final    Comment: If the patient is fasting, the ADA then defines impaired fasting glucose as > 100 mg/dL and diabetes as > or equal to 123 mg/dL  Specimen collection should occur prior to Sulfasalazine administration due to the potential for falsely depressed results  Specimen collection should occur prior to Sulfapyridine administration due to the potential for falsely elevated results  Calcium 8 9  8 3 - 10 1 mg/dL Final    Corrected Calcium 9 4  8 3 - 10 1 mg/dL Final    AST 23  5 - 45 U/L Final    Comment: Specimen collection should occur prior to Sulfasalazine administration due to the potential for falsely depressed results  ALT 47  12 - 78 U/L Final    Comment: Specimen collection should occur prior to Sulfasalazine and/or Sulfapyridine administration due to the potential for falsely depressed results  Alkaline Phosphatase 122 (*) 46 - 116 U/L Final    Total Protein 6 2 (*) 6 4 - 8 2 g/dL Final    Albumin 3 4 (*) 3 5 - 5 0 g/dL Final    Total Bilirubin 1 04 (*) 0 20 - 1 00 mg/dL Final    Comment: Use of this assay is not recommended for patients undergoing treatment with eltrombopag due to the potential for falsely elevated results      eGFR 37  ml/min/1 73sq m Final    Narrative:     [place] guidelines for Chronic Kidney Disease (CKD):     Stage 1 with normal or high GFR (GFR > 90 mL/min/1 73 square meters)    Stage 2 Mild CKD (GFR = 60-89 mL/min/1 73 square meters)    Stage 3A Moderate CKD (GFR = 45-59 mL/min/1 73 square meters)    Stage 3B Moderate CKD (GFR = 30-44 mL/min/1 73 square meters)    Stage 4 Severe CKD (GFR = 15-29 mL/min/1 73 square meters)    Stage 5 End Stage CKD (GFR <15 mL/min/1 73 square meters)  Note: GFR calculation is accurate only with a steady state creatinine   POCT GLUCOSE - Abnormal    POC Glucose 256 (*) 65 - 140 mg/dl Final   TROPONIN I - Normal    Troponin I <0 02  <=0 04 ng/mL Final    Comment: Siemens Chemistry analyzer 99% cutoff is > 0 04 ng/mL in network labs     o cTnI 99% cutoff is useful only when applied to patients in the clinical setting of myocardial ischemia   o cTnI 99% cutoff should be interpreted in the context of clinical history, ECG findings and possibly cardiac imaging to establish correct diagnosis  o cTnI 99% cutoff may be suggestive but clearly not indicative of a coronary event without the clinical setting of myocardial ischemia  TSH, 3RD GENERATION WITH FREE T4 REFLEX - Normal    TSH 3RD GENERATON 1 100  0 358 - 3 740 uIU/mL Final    Comment: The recommended reference ranges for TSH during pregnancy are as follows:   First trimester 0 1 to 2 5 uIU/mL   Second trimester  0 2 to 3 0 uIU/mL   Third trimester 0 3 to 3 0 uIU/m    Note: Normal ranges may not apply to patients who are transgender, non-binary, or whose legal sex, sex at birth, and gender identity differ  Narrative:     Patients undergoing fluorescein dye angiography may retain small amounts of fluorescein in the body for 48-72 hours post procedure  Samples containing fluorescein can produce falsely depressed TSH values  If the patient had this procedure,a specimen should be resubmitted post fluorescein clearance       MAGNESIUM - Normal    Magnesium 2 2  1 6 - 2 6 mg/dL Final   PHOSPHORUS - Normal    Phosphorus 3 5  2 3 - 4 1 mg/dL Final     Time reflects when diagnosis was documented in both MDM as applicable and the Disposition within this note     Time User Action Codes Description Comment    6/8/2021 12:46 PM Rolf Escalante Add [I48 91] Atrial fibrillation ([address] )     6/8/2021  1:26 PM Rolf Escalante Add [R73 9] Hyperglycemia     6/8/2021  1:26 PM Rolf Escalante Add [E87 6] Hypokalemia       ED Disposition     ED Disposition Condition Date/Time Comment    Discharge Stable Tue Jun 8, 2021  1:23 PM [address] discharge to home/self care              Follow-up Information     Follow up With Specialties Details Why Contact Info Additional [address] Emergency Department Emergency Medicine Go to  If symptoms worsen [address]  [address] [facility] [address] Emergency Department, [address] [place] [address], [facility]        Discharge Medication List as of 6/8/2021  1:28 PM      START taking these medications    Details   apixaban (ELIQUIS) 5 mg Take 1 tablet (5 mg total) by mouth 2 (two) times a day, Starting Tue 6/8/2021, Until Thu 7/8/2021, Normal         CONTINUE these medications which have NOT CHANGED    Details   atorvastatin (LIPITOR) 80 mg tablet Starting Mon 11/30/2020, Historical Med      Blood Glucose Monitoring Suppl (ONE TOUCH ULTRA 2) w/Device KIT by Does not apply route daily, Starting Tue 9/17/2019, Normal      carvedilol (COREG) 25 mg tablet Take 1 tablet (25 mg total) by mouth 2 (two) times a day, Starting Thu 4/1/2021, Normal      dicyclomine (BENTYL) 10 mg capsule Take 1 capsule (10 mg total) by mouth 4 (four) times a day as needed (abd pain), Starting Wed 5/19/2021, Normal      diltiazem (CARDIZEM CD) 240 mg 24 hr capsule Take 1 capsule by mouth once daily, Normal      !! doxazosin (CARDURA) 4 mg tablet Take 1 tablet (4 mg total) by mouth daily at bedtime, Starting Wed 5/19/2021, Normal      furosemide (LASIX) 20 mg tablet Take 1 tablet (20 mg total) by mouth 2 (two) times a day, Starting Thu 12/3/2020, Normal      glucose blood (ONE TOUCH ULTRA TEST) test strip Test daily                        DX: E11 9, Normal      Multiple Vitamin (MULTI-VITAMIN DAILY PO) Take 1 tablet by mouth daily, Starting 2017, Historical Med      omega-3-acid ethyl esters (LOVAZA) 1 g capsule Take 2 g by mouth 2 (two) times a day, Historical Med      Probiotic Product (PROBIOTIC DAILY PO) Take by mouth, Starting 2017, Historical Med      ALPRAZolam (XANAX) 0 25 mg tablet Take 1 tablet (0 25 mg total) by mouth 2 (two) times a day as needed for anxiety for up to 5 days, Starting Tu2021, Until Sun 2021, Normal      cholecalciferol (VITAMIN D3) 1,000 units tablet Take 2,000 Units by mouth daily, Historical Med      Diclofenac Sodium (VOLTAREN) 1 % 1 gram four times per day as needed for hand swelling, Normal      !! doxazosin (CARDURA) 4 mg tablet Take 1 tablet (4 mg total) by mouth daily at bedtime, Starting 2021, Normal      Glucos-Chondroit-Hyaluron-MSM (GLUCOSAMINE CHONDROITIN JOINT) TABS Take by mouth, Historical Med       !! - Potential duplicate medications found  Please discuss with provider  No discharge procedures on file  Prior to Admission Medications   Prescriptions Last Dose Informant Patient Reported? Taking?    ALPRAZolam (XANAX) 0 25 mg tablet   No No   Sig: Take 1 tablet (0 25 mg total) by mouth 2 (two) times a day as needed for anxiety for up to 5 days   Blood Glucose Monitoring Suppl (ONE TOUCH ULTRA 2) w/Device KIT  Self No No   Sig: by Does not apply route daily   Diclofenac Sodium (VOLTAREN) 1 %  Self No No   Si gram four times per day as needed for hand swelling   Glucos-Chondroit-Hyaluron-MSM (GLUCOSAMINE CHONDROITIN JOINT) TABS  Self Yes No   Sig: Take by mouth   Multiple Vitamin (MULTI-VITAMIN DAILY PO)  Self Yes No   Sig: Take 1 tablet by mouth daily   Probiotic Product (PROBIOTIC DAILY PO)  Self Yes No   Sig: Take by mouth   atorvastatin (LIPITOR) 80 mg tablet  Self Yes No   carvedilol (COREG) 25 mg tablet  Self No No   Sig: Take 1 tablet (25 mg total) by mouth 2 (two) times a day   cholecalciferol (VITAMIN D3) 1,000 units tablet  Self Yes No   Sig: Take 2,000 Units by mouth daily   dicyclomine (BENTYL) 10 mg capsule  Self No No   Sig: Take 1 capsule (10 mg total) by mouth 4 (four) times a day as needed (abd pain)   diltiazem (CARDIZEM CD) 240 mg 24 hr capsule  Self No No   Sig: Take 1 capsule by mouth once daily   doxazosin (CARDURA) 4 mg tablet  Self No No   Sig: Take 1 tablet (4 mg total) by mouth daily at bedtime   doxazosin (CARDURA) 4 mg tablet  Self No No   Sig: Take 1 tablet (4 mg total) by mouth daily at bedtime   furosemide (LASIX) 20 mg tablet  Self No No   Sig: Take 1 tablet (20 mg total) by mouth 2 (two) times a day   glucose blood (ONE TOUCH ULTRA TEST) test strip  Self No No   Sig: Test daily                        DX: E11 9   omega-3-acid ethyl esters (LOVAZA) 1 g capsule  Self Yes No   Sig: Take 2 g by mouth 2 (two) times a day      Facility-Administered Medications: None       Portions of the record may have been created with voice recognition software  Occasional wrong word or "sound a like" substitutions may have occurred due to the inherent limitations of voice recognition software  Read the chart carefully and recognize, using context, where substitutions have occurred      Electronically signed by:  David Manzanares

## 2021-06-08 NOTE — PROGRESS NOTES
Assessment and Plan:    Problem List Items Addressed This Visit        Endocrine    Type 2 diabetes mellitus without complication, without long-term current use of insulin ([facility] ) - Primary       Lab Results   Component Value Date    HGBA1C 7 0 (A) 04/01/2021       Patient hemoglobin A1c is slightly up but still controlled  Cardiovascular and Mediastinum    Benign hypertension with CKD (chronic kidney disease) stage III [facility])     Lab Results   Component Value Date    EGFR 64 08/01/2018    EGFR 80 07/31/2018    EGFR 71 07/30/2018    CREATININE 1 09 (H) 03/23/2021    CREATININE 1 09 (H) 02/05/2021    CREATININE 1 08 (H) 11/09/2020       Patient blood pressure is doing well  She is on cardiazem 240, corge 25 BID, cardura 4 mg daily and lasix 20mg          New onset a-fib [facility])     Patient transferred to ED for new onset Afib  Cardiologist with [facility]  Last EKG with them in march 2021 NSR                      Diagnoses and all orders for this visit:    Type 2 diabetes mellitus without complication, without long-term current use of insulin (HCC)    Benign hypertension with CKD (chronic kidney disease) stage III ([facility] [address] )    New onset a-fib (HCC)              Subjective:      Patient ID: Sena Stroud is a 80 y o  female  CC:    Chief Complaint   Patient presents with    Anxiety     patient had 4 deaths in the family over the past month  She does not feel right  She eats and gets nauseated  Patient says there was a home nurse at her house and she asked her to check her and the nurse told her she has an irregular heart beat  HPI:    Patient presents for not feeling well and fatigued  She reports a nurse from [facility] came to the house and said she had irregular heart rate  She states she has been feeling poorly since her niece passed away suddenly  She can not tell me a specific time  She reports she has been taking aleve when this started         The following portions of the patient's history were reviewed and updated as appropriate: allergies, current medications, past family history, past medical history, past social history, past surgical history and problem list       Review of Systems   Constitutional: Positive for appetite change and fatigue  Negative for chills, diaphoresis and fever  Respiratory: Positive for chest tightness  Negative for cough and shortness of breath  Cardiovascular: Positive for palpitations and leg swelling  Negative for chest pain  Gastrointestinal: Positive for nausea  Neurological: Negative for dizziness, light-headedness and headaches  Data to review:       Objective:    Vitals:    21 0938   BP: 110/60   BP Location: Left arm   Patient Position: Sitting   Cuff Size: Adult   Pulse: (!) 112   Resp: 20   Weight: 98 kg (216 lb)   Height: 5' 1 9" (1 572 m)        Physical Exam  Vitals signs and nursing note reviewed  Constitutional:       General: She is not in acute distress  Appearance: Normal appearance  She is not ill-appearing or diaphoretic  HENT:      Head: Normocephalic and atraumatic  Right Ear: External ear normal       Left Ear: External ear normal    Eyes:      Extraocular Movements: Extraocular movements intact  Conjunctiva/sclera: Conjunctivae normal    Cardiovascular:      Rate and Rhythm: Normal rate  Rhythm irregularly irregular  Pulses:           Carotid pulses are 2+ on the right side and 2+ on the left side  Heart sounds: S1 normal and S2 normal  Heart sounds are distant  No murmur  Pulmonary:      Effort: Pulmonary effort is normal       Breath sounds: Normal breath sounds  Musculoskeletal:      Right lower le+ Edema present  Left lower le+ Edema present  Skin:     Coloration: Skin is not pale  Neurological:      Mental Status: She is alert and oriented to person, place, and time     Psychiatric:         Mood and Affect: Mood normal          Behavior: Behavior normal          Thought Content: Thought content normal          Judgment: Judgment normal

## 2021-06-08 NOTE — ASSESSMENT & PLAN NOTE
Lab Results   Component Value Date    EGFR 64 08/01/2018    EGFR 80 07/31/2018    EGFR 71 07/30/2018    CREATININE 1 09 (H) 03/23/2021    CREATININE 1 09 (H) 02/05/2021    CREATININE 1 08 (H) 11/09/2020       Patient blood pressure is doing well   She is on cardiazem 240, corge 25 BID, cardura 4 mg daily and lasix 20mg

## 2021-06-08 NOTE — ASSESSMENT & PLAN NOTE
Patient transferred to ED for new onset Afib  Cardiologist with [facility]   Last EKG with them in march 2021 NSR

## 2021-06-08 NOTE — DISCHARGE INSTRUCTIONS
Follow-up with your cardiologist regarding starting Eliquis and discontinuing aspirin for your new onset atrial fibrillation

## 2021-06-08 NOTE — ED PROVIDER NOTES
History  Chief Complaint   Patient presents with    Medical Problem     pt states "I just don't feel right sometimes  One of our caregivers  and I think it's stress " denies pain, states she feels normal as of now     72-year-old female with history of diabetes presents to the emergency department from her PCP's office for further evaluation of new onset atrial fibrillation  The patient reports that over the past week she has felt fatigued  Reports that she thought it may be related to anxiety as she has had 4 recent deaths in the family  She reports associated decreased appetite and leg swelling but states that the leg swelling is chronic stent and has not gotten any worse  She denies headache, blurry vision, chest pain, shortness of breath, abdominal pain, fever, chills, nausea, vomiting, diarrhea and feeling of palpitations  No sick contacts and no recent travel  Prior to Admission Medications   Prescriptions Last Dose Informant Patient Reported? Taking?    ALPRAZolam (XANAX) 0 25 mg tablet   No No   Sig: Take 1 tablet (0 25 mg total) by mouth 2 (two) times a day as needed for anxiety for up to 5 days   Blood Glucose Monitoring Suppl (ONE TOUCH ULTRA 2) w/Device KIT  Self No No   Sig: by Does not apply route daily   Diclofenac Sodium (VOLTAREN) 1 %  Self No No   Si gram four times per day as needed for hand swelling   Glucos-Chondroit-Hyaluron-MSM (GLUCOSAMINE CHONDROITIN JOINT) TABS  Self Yes No   Sig: Take by mouth   Multiple Vitamin (MULTI-VITAMIN DAILY PO)  Self Yes No   Sig: Take 1 tablet by mouth daily   Probiotic Product (PROBIOTIC DAILY PO)  Self Yes No   Sig: Take by mouth   atorvastatin (LIPITOR) 80 mg tablet  Self Yes No   carvedilol (COREG) 25 mg tablet  Self No No   Sig: Take 1 tablet (25 mg total) by mouth 2 (two) times a day   cholecalciferol (VITAMIN D3) 1,000 units tablet  Self Yes No   Sig: Take 2,000 Units by mouth daily   dicyclomine (BENTYL) 10 mg capsule  Self No No Sig: Take 1 capsule (10 mg total) by mouth 4 (four) times a day as needed (abd pain)   diltiazem (CARDIZEM CD) 240 mg 24 hr capsule  Self No No   Sig: Take 1 capsule by mouth once daily   doxazosin (CARDURA) 4 mg tablet  Self No No   Sig: Take 1 tablet (4 mg total) by mouth daily at bedtime   doxazosin (CARDURA) 4 mg tablet  Self No No   Sig: Take 1 tablet (4 mg total) by mouth daily at bedtime   furosemide (LASIX) 20 mg tablet  Self No No   Sig: Take 1 tablet (20 mg total) by mouth 2 (two) times a day   glucose blood (ONE TOUCH ULTRA TEST) test strip  Self No No   Sig: Test daily                        DX: E11 9   omega-3-acid ethyl esters (LOVAZA) 1 g capsule  Self Yes No   Sig: Take 2 g by mouth 2 (two) times a day      Facility-Administered Medications: None       Past Medical History:   Diagnosis Date    Anemia     Anxiety     Arthritis     CKD (chronic kidney disease), stage III ([facility] ) 11/29/2018    [facility] )     Diabetic peripheral neuropathy (HCC)     Hyperlipidemia     Hypertension     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 8/23/2017    Thyroid enlarged 2/23/2015    Tinnitus     Vertigo        Past Surgical History:   Procedure Laterality Date    APPENDECTOMY      EAR SURGERY Left     KNEE SURGERY      TONSILLECTOMY         Family History   Problem Relation Age of Onset    Diabetes Mother     Heart disease Mother     Heart disease Father     Stroke Father     Cancer Family     Dementia Sister     Liver disease Other      I have reviewed and agree with the history as documented  E-Cigarette/Vaping     E-Cigarette/Vaping Substances     Social History     Tobacco Use    Smoking status: Never Smoker    Smokeless tobacco: Never Used   Substance Use Topics    Alcohol use: Not Currently    Drug use: No        Review of Systems   Constitutional: Negative for chills and fever  HENT: Negative for ear pain and sore throat  Eyes: Negative for pain and visual disturbance  Respiratory: Negative for cough and shortness of breath  Cardiovascular: Positive for leg swelling (chronic)  Negative for chest pain and palpitations  Gastrointestinal: Negative for abdominal pain, nausea and vomiting  Genitourinary: Negative for dysuria and hematuria  Musculoskeletal: Negative for arthralgias and back pain  Skin: Negative for color change and rash  Neurological: Negative for seizures and syncope  All other systems reviewed and are negative  Physical Exam  ED Triage Vitals [06/08/21 1123]   Temperature Pulse Respirations Blood Pressure SpO2   98 8 °F (37 1 °C) 84 18 136/76 95 %      Temp Source Heart Rate Source Patient Position - Orthostatic VS BP Location FiO2 (%)   Oral -- -- -- --      Pain Score       --             Orthostatic Vital Signs  Vitals:    06/08/21 1123   BP: 136/76   Pulse: 84       Physical Exam  Vitals signs and nursing note reviewed  Constitutional:       General: She is not in acute distress  Appearance: She is well-developed  HENT:      Head: Normocephalic and atraumatic  Eyes:      Conjunctiva/sclera: Conjunctivae normal    Neck:      Musculoskeletal: Neck supple  Cardiovascular:      Rate and Rhythm: Normal rate  Rhythm regularly irregular  Heart sounds: No murmur  Pulmonary:      Effort: Pulmonary effort is normal  No respiratory distress  Breath sounds: Normal breath sounds  Abdominal:      Palpations: Abdomen is soft  Tenderness: There is no abdominal tenderness  Musculoskeletal:      Right lower leg: Edema present  Left lower leg: Edema present  Skin:     General: Skin is warm and dry  Neurological:      Mental Status: She is alert           ED Medications  Medications - No data to display    Diagnostic Studies  Results Reviewed     Procedure Component Value Units Date/Time    TSH, 3rd generation with Free T4 reflex [[id number]]  (Normal) Collected: 06/08/21 1149    Lab Status: Final result Specimen: Blood from Arm, Right Updated: 06/08/21 1239     TSH 3RD GENERATON 1 100 uIU/mL     Narrative:      Patients undergoing fluorescein dye angiography may retain small amounts of fluorescein in the body for 48-72 hours post procedure  Samples containing fluorescein can produce falsely depressed TSH values  If the patient had this procedure,a specimen should be resubmitted post fluorescein clearance        Troponin I [[id number]]  (Normal) Collected: 06/08/21 1149    Lab Status: Final result Specimen: Blood from Arm, Right Updated: 06/08/21 1232     Troponin I <0 02 ng/mL     Comprehensive metabolic panel [[id number]]  (Abnormal) Collected: 06/08/21 1149    Lab Status: Final result Specimen: Blood from Arm, Right Updated: 06/08/21 1232     Sodium 134 mmol/L      Potassium 3 4 mmol/L      Chloride 99 mmol/L      CO2 29 mmol/L      ANION GAP 6 mmol/L      BUN 20 mg/dL      Creatinine 1 29 mg/dL      Glucose 287 mg/dL      Calcium 8 9 mg/dL      Corrected Calcium 9 4 mg/dL      AST 23 U/L      ALT 47 U/L      Alkaline Phosphatase 122 U/L      Total Protein 6 2 g/dL      Albumin 3 4 g/dL      Total Bilirubin 1 04 mg/dL      eGFR 37 ml/min/1 73sq m     Narrative:      Tammy guidelines for Chronic Kidney Disease (CKD):     Stage 1 with normal or high GFR (GFR > 90 mL/min/1 73 square meters)    Stage 2 Mild CKD (GFR = 60-89 mL/min/1 73 square meters)    Stage 3A Moderate CKD (GFR = 45-59 mL/min/1 73 square meters)    Stage 3B Moderate CKD (GFR = 30-44 mL/min/1 73 square meters)    Stage 4 Severe CKD (GFR = 15-29 mL/min/1 73 square meters)    Stage 5 End Stage CKD (GFR <15 mL/min/1 73 square meters)  Note: GFR calculation is accurate only with a steady state creatinine    Magnesium [[id number]]  (Normal) Collected: 06/08/21 1149    Lab Status: Final result Specimen: Blood from Arm, Right Updated: 06/08/21 1232     Magnesium 2 2 mg/dL     Phosphorus [[id number]]  (Normal) Collected: 06/08/21 1149    Lab Status: Final result Specimen: Blood from Arm, Right Updated: 06/08/21 1232     Phosphorus 3 5 mg/dL     CBC and differential [[id number]]  (Abnormal) Collected: 06/08/21 1149    Lab Status: Final result Specimen: Blood from Arm, Right Updated: 06/08/21 1214     WBC 9 10 Thousand/uL      RBC 3 62 Million/uL      Hemoglobin 11 3 g/dL      Hematocrit 34 2 %      MCV 95 fL      MCH 31 2 pg      MCHC 33 0 g/dL      RDW 13 3 %      MPV 11 5 fL      Platelets 513 Thousands/uL      nRBC 0 /100 WBCs      Neutrophils Relative 76 %      Immat GRANS % 1 %      Lymphocytes Relative 14 %      Monocytes Relative 7 %      Eosinophils Relative 1 %      Basophils Relative 1 %      Neutrophils Absolute 6 99 Thousands/µL      Immature Grans Absolute 0 06 Thousand/uL      Lymphocytes Absolute 1 30 Thousands/µL      Monocytes Absolute 0 63 Thousand/µL      Eosinophils Absolute 0 07 Thousand/µL      Basophils Absolute 0 05 Thousands/µL     Fingerstick Glucose (POCT) [[id number]]  (Abnormal) Collected: 06/08/21 1158    Lab Status: Final result Updated: 06/08/21 1202     POC Glucose 256 mg/dl                  XR chest 2 views   ED Interpretation by Lin Ceja MD (06/08 1242)   No acute cardiopulmonary process              Procedures  ECG 12 Lead Documentation Only    Date/Time: 6/8/2021 11:42 AM  Performed by: Lin Ceja MD  Authorized by: Lin Ceja MD     ECG reviewed by me, the ED Provider: yes    Patient location:  ED  Previous ECG:     Previous ECG:  Compared to current    Similarity:  Changes noted    Comparison to cardiac monitor: Yes    Interpretation:     Interpretation: abnormal    Rate:     ECG rate assessment: normal    Rhythm:     Rhythm: atrial fibrillation    Ectopy:     Ectopy: none    QRS:     QRS axis:  Normal  Conduction:     Conduction: normal    ST segments:     ST segments:  Normal  T waves: T waves: non-specific            ED Course               Identification of Seniors at Risk      Most Recent Value   (ISAR) Identification of Seniors at Risk   Before the illness or injury that brought you to the Emergency, did you need someone to help you on a regular basis? 1 Filed at: 06/08/2021 1124   In the last 24 hours, have you needed more help than usual?  0 Filed at: 06/08/2021 1124   Have you been hospitalized for one or more nights during the past 6 months? 0 Filed at: 06/08/2021 1124   In general, do you see well?  0 Filed at: 06/08/2021 1124   In general, do you have serious problems with your memory? 0 Filed at: 06/08/2021 1124   Do you take more than three different medications every day? 1 Filed at: 06/08/2021 1124   ISAR Score  2 Filed at: 06/08/2021 1124                    SBIRT 20yo+      Most Recent Value   SBIRT (25 yo +)   In order to provide better care to our patients, we are screening all of our patients for alcohol and drug use  Would it be okay to ask you these screening questions? No Filed at: 06/08/2021 1125                MDM  Number of Diagnoses or Management Options  Atrial fibrillation ([facility] [address] ): Hyperglycemia:   Hypokalemia:   Diagnosis management comments: 68-year-old female presented to the emergency department for evaluation of new onset atrial fibrillation  On arrival the patient was awake, alert, oriented and in no acute distress  Initial vital signs were stable  Workup done in the emergency department showed the patient had an EKG with rate controlled atrial fibrillation  Troponin was negative and chest x-ray with no acute cardiopulmonary process  All diagnostic studies discussed with the patient in detail  Risks versus benefits of starting Eliquis discussed  Patient acknowledged understanding risks and was agreeable to starting Eliquis  Recommendation made to speak with the patient's cardiologist regarding starting the Eliquis and stopping her daily aspirin    The patient also recommended to follow up with her PCP regarding her hyperglycemia and mildly decreased electrolytes  Return precautions were discussed  Patient agrees with the plan for discharge and feels comfortable to go home with proper f/u  Advised to return for worsening or additional problems  Diagnostic tests were reviewed and questions answered  Diagnosis, care plan and treatment options were discussed  The patient understands instructions and will follow up as directed  Disposition  Final diagnoses:   Atrial fibrillation ([address] )   Hyperglycemia   Hypokalemia     Time reflects when diagnosis was documented in both MDM as applicable and the Disposition within this note     Time User Action Codes Description Comment    6/8/2021 12:46 PM Jenny Macadamia Add [I48 91] Atrial fibrillation ([address] )     6/8/2021  1:26 PM Jenny Macadamia Add [R73 9] Hyperglycemia     6/8/2021  1:26 PM Jenny Macadamia Add [E87 6] Hypokalemia       ED Disposition     ED Disposition Condition Date/Time Comment    Discharge Stable Tue Jun 8, 2021  1:23 PM [address] discharge to home/self care              Follow-up Information     Follow up With Specialties Details Why Contact Info Additional [address] Emergency Department Emergency Medicine Go to  If symptoms worsen [address]  [address] [facility] 64 [facility] Emergency Department, [address]          Discharge Medication List as of 6/8/2021  1:28 PM      START taking these medications    Details   apixaban (ELIQUIS) 5 mg Take 1 tablet (5 mg total) by mouth 2 (two) times a day, Starting Tue 6/8/2021, Until u 7/8/2021, Normal         CONTINUE these medications which have NOT CHANGED    Details   ALPRAZolam (XANAX) 0 25 mg tablet Take 1 tablet (0 25 mg total) by mouth 2 (two) times a day as needed for anxiety for up to 5 days, Starting Tue 5/18/2021, Until Sun 5/23/2021, Normal      atorvastatin (LIPITOR) 80 mg tablet Starting Mon 11/30/2020, Historical Med      Blood Glucose Monitoring Suppl (ONE TOUCH ULTRA 2) w/Device KIT by Does not apply route daily, Starting Tue 9/17/2019, Normal      carvedilol (COREG) 25 mg tablet Take 1 tablet (25 mg total) by mouth 2 (two) times a day, Starting Thu 4/1/2021, Normal      cholecalciferol (VITAMIN D3) 1,000 units tablet Take 2,000 Units by mouth daily, Historical Med      Diclofenac Sodium (VOLTAREN) 1 % 1 gram four times per day as needed for hand swelling, Normal      dicyclomine (BENTYL) 10 mg capsule Take 1 capsule (10 mg total) by mouth 4 (four) times a day as needed (abd pain), Starting Wed 5/19/2021, Normal      diltiazem (CARDIZEM CD) 240 mg 24 hr capsule Take 1 capsule by mouth once daily, Normal      !! doxazosin (CARDURA) 4 mg tablet Take 1 tablet (4 mg total) by mouth daily at bedtime, Starting Mon 2/8/2021, Normal      !! doxazosin (CARDURA) 4 mg tablet Take 1 tablet (4 mg total) by mouth daily at bedtime, Starting Wed 5/19/2021, Normal      furosemide (LASIX) 20 mg tablet Take 1 tablet (20 mg total) by mouth 2 (two) times a day, Starting Thu 12/3/2020, Normal      Glucos-Chondroit-Hyaluron-MSM (GLUCOSAMINE CHONDROITIN JOINT) TABS Take by mouth, Historical Med      glucose blood (ONE TOUCH ULTRA TEST) test strip Test daily                        DX: E11 9, Normal      Multiple Vitamin (MULTI-VITAMIN DAILY PO) Take 1 tablet by mouth daily, Starting Fri 9/29/2017, Historical Med      omega-3-acid ethyl esters (LOVAZA) 1 g capsule Take 2 g by mouth 2 (two) times a day, Historical Med      Probiotic Product (PROBIOTIC DAILY PO) Take by mouth, Starting Fri 9/29/2017, Historical Med       !! - Potential duplicate medications found  Please discuss with provider  No discharge procedures on file      PDMP Review       Value Time User    PDMP Reviewed  Yes 5/18/2021  2:49 Kali Garrett MD ED Provider  Attending physically available and evaluated Orlando Saeed I managed the patient along with the ED Attending      Electronically Signed by         Rosa Berg MD  06/08/21 8948

## 2021-06-08 NOTE — ASSESSMENT & PLAN NOTE
Lab Results   Component Value Date    HGBA1C 7 0 (A) 04/01/2021       Patient hemoglobin A1c is slightly up but still controlled

## 2021-06-11 ENCOUNTER — TELEPHONE (OUTPATIENT)
Dept: FAMILY MEDICINE CLINIC | Facility: CLINIC | Age: 86
End: 2021-06-11

## 2021-06-11 NOTE — TELEPHONE ENCOUNTER
Patient called in stating she has she has swelling of her ankles and legs  Patient also stated she is having shortness of breath  Patient was here on 6/8/2021 and was sent to [facility] ER due to her symptoms  Patient was advised to go to the ER

## 2021-06-14 ENCOUNTER — OFFICE VISIT (OUTPATIENT)
Dept: FAMILY MEDICINE CLINIC | Facility: CLINIC | Age: 86
End: 2021-06-14
Payer: COMMERCIAL

## 2021-06-14 ENCOUNTER — TELEPHONE (OUTPATIENT)
Dept: FAMILY MEDICINE CLINIC | Facility: CLINIC | Age: 86
End: 2021-06-14

## 2021-06-14 VITALS
HEART RATE: 75 BPM | TEMPERATURE: 98.3 F | RESPIRATION RATE: 18 BRPM | SYSTOLIC BLOOD PRESSURE: 130 MMHG | WEIGHT: 213 LBS | HEIGHT: 62 IN | DIASTOLIC BLOOD PRESSURE: 82 MMHG | BODY MASS INDEX: 39.2 KG/M2

## 2021-06-14 DIAGNOSIS — F33.9 DEPRESSION, RECURRENT (HCC): ICD-10-CM

## 2021-06-14 DIAGNOSIS — E87.1 HYPONATREMIA: ICD-10-CM

## 2021-06-14 DIAGNOSIS — I48.91 NEW ONSET A-FIB (HCC): Primary | ICD-10-CM

## 2021-06-14 DIAGNOSIS — E11.9 TYPE 2 DIABETES MELLITUS WITHOUT COMPLICATION, WITHOUT LONG-TERM CURRENT USE OF INSULIN (HCC): ICD-10-CM

## 2021-06-14 DIAGNOSIS — E11.9 TYPE 2 DIABETES MELLITUS WITHOUT COMPLICATION, WITHOUT LONG-TERM CURRENT USE OF INSULIN (HCC): Primary | ICD-10-CM

## 2021-06-14 DIAGNOSIS — M51.16 INTERVERTEBRAL DISC DISORDER WITH RADICULOPATHY OF LUMBAR REGION: ICD-10-CM

## 2021-06-14 DIAGNOSIS — F41.9 ANXIETY: ICD-10-CM

## 2021-06-14 DIAGNOSIS — R60.0 BILATERAL LEG EDEMA: ICD-10-CM

## 2021-06-14 DIAGNOSIS — I48.91 NEW ONSET A-FIB (HCC): ICD-10-CM

## 2021-06-14 LAB — SL AMB POCT GLUCOSE BLD: 285

## 2021-06-14 PROCEDURE — 1160F RVW MEDS BY RX/DR IN RCRD: CPT | Performed by: FAMILY MEDICINE

## 2021-06-14 PROCEDURE — 82948 REAGENT STRIP/BLOOD GLUCOSE: CPT | Performed by: FAMILY MEDICINE

## 2021-06-14 PROCEDURE — 1101F PT FALLS ASSESS-DOCD LE1/YR: CPT | Performed by: FAMILY MEDICINE

## 2021-06-14 PROCEDURE — 99214 OFFICE O/P EST MOD 30 MIN: CPT | Performed by: FAMILY MEDICINE

## 2021-06-14 PROCEDURE — 3725F SCREEN DEPRESSION PERFORMED: CPT | Performed by: FAMILY MEDICINE

## 2021-06-14 PROCEDURE — 3288F FALL RISK ASSESSMENT DOCD: CPT | Performed by: FAMILY MEDICINE

## 2021-06-14 PROCEDURE — 1036F TOBACCO NON-USER: CPT | Performed by: FAMILY MEDICINE

## 2021-06-14 RX ORDER — LINAGLIPTIN 5 MG/1
5 TABLET, FILM COATED ORAL DAILY
Qty: 30 TABLET | Refills: 5 | Status: SHIPPED | OUTPATIENT
Start: 2021-06-14 | End: 2021-07-09 | Stop reason: SDUPTHER

## 2021-06-14 RX ORDER — LINAGLIPTIN 5 MG/1
5 TABLET, FILM COATED ORAL DAILY
Qty: 30 TABLET | Refills: 5 | Status: SHIPPED | OUTPATIENT
Start: 2021-06-14 | End: 2021-06-14 | Stop reason: SDUPTHER

## 2021-06-14 RX ORDER — FUROSEMIDE 20 MG/1
TABLET ORAL
Qty: 90 TABLET | Refills: 5 | Status: SHIPPED | OUTPATIENT
Start: 2021-06-14 | End: 2021-07-09

## 2021-06-14 RX ORDER — FUROSEMIDE 20 MG/1
TABLET ORAL
Qty: 90 TABLET | Refills: 5 | Status: SHIPPED | OUTPATIENT
Start: 2021-06-14 | End: 2021-06-14 | Stop reason: SDUPTHER

## 2021-06-14 NOTE — ASSESSMENT & PLAN NOTE
Lab Results   Component Value Date    HGBA1C 7 0 (A) 04/01/2021   BS up ,possibly due to stress,also got Steroid shot for back

## 2021-06-14 NOTE — TELEPHONE ENCOUNTER
Pt saw Dr Chano Dewey today 6/14 & forgot to ask for her Lasix & Tradjenta to be sent to [facility] instead of [facility]  [facility] is closer to the pt & it is much easier for her to get the medications from there  Please call the pt to let her know when the medications are sent to [facility]  Thank you!      Gagandeep Hernandez  [address]  [phone]

## 2021-06-14 NOTE — TELEPHONE ENCOUNTER
Pt's BS went up to over 300 after steroid shot, pt was not aware that this could happen, sugar on way back down, please take this into consideration when pt comes in for follow up

## 2021-06-14 NOTE — PROGRESS NOTES
Assessment and Plan:    Problem List Items Addressed This Visit        Endocrine    Type 2 diabetes mellitus without complication, without long-term current use of insulin ([facility] [address] ) - Primary       Lab Results   Component Value Date    HGBA1C 7 0 (A) 04/01/2021   BS up ,possibly due to stress,also got Steroid shot for back         Relevant Orders    POCT blood glucose       Cardiovascular and Mediastinum    New onset a-fib ([address] )     Seeing cards this afternoon, on Eliquis for blood thinner            Nervous and Auditory    Intervertebral disc disorder with radiculopathy of lumbar region     Got shot -got elevation of bS            Other    Anxiety     Stress with multiple medical problems, multiple losses         Bilateral leg edema     Increase lasix to 3/day         Relevant Orders    NT-BNP PRO    Depression, recurrent ([facility] [address] )      Other Visit Diagnoses     Hyponatremia        Relevant Medications    furosemide (LASIX) 20 mg tablet    Other Relevant Orders    Comprehensive metabolic panel                 Diagnoses and all orders for this visit:    Type 2 diabetes mellitus without complication, without long-term current use of insulin (HCC)  -     POCT blood glucose    Hyponatremia  -     furosemide (LASIX) 20 mg tablet; 3 po q day  -     Comprehensive metabolic panel; Future    Intervertebral disc disorder with radiculopathy of lumbar region    New onset a-fib (HCC)    Bilateral leg edema  -     NT-BNP PRO; Future    Depression, recurrent (HCC)    Anxiety    Other orders  -     Cholecalciferol 125 MCG (5000 UT) capsule; Take 1 capsule by mouth              Subjective:      Patient ID: Aleksander Lopez is a 80 y o  female      CC:    Chief Complaint   Patient presents with    Follow-up     Er follow up a fib, BS up quite high       HPI:    BS up over 200,  Got steroid shot 2 weeks ago, having a lot of stress about caring for sister, lost both nieces to cancer so no one to help her care for sister      The following portions of the patient's history were reviewed and updated as appropriate: allergies, current medications, past family history, past medical history, past social history, past surgical history and problem list       Review of Systems   Constitutional: Negative for activity change, appetite change and fatigue  Respiratory: Negative for shortness of breath  Cardiovascular: Positive for leg swelling  Negative for chest pain and palpitations  Neurological: Negative for dizziness and headaches  Hematological: Negative for adenopathy  Psychiatric/Behavioral: The patient is nervous/anxious  Data to review:       Objective:    Vitals:    06/14/21 0926   BP: 130/82   BP Location: Left arm   Patient Position: Sitting   Cuff Size: Adult   Pulse: 75   Resp: 18   Temp: 98 3 °F (36 8 °C)   TempSrc: Tympanic   Weight: 96 6 kg (213 lb)   Height: 5' 1 9" (1 572 m)        Physical Exam  Vitals reviewed  Constitutional:       Appearance: Normal appearance  She is obese  Cardiovascular:      Rate and Rhythm: Normal rate  Rhythm irregularly irregular  Pulses: Normal pulses  Heart sounds: Normal heart sounds  Pulmonary:      Effort: Pulmonary effort is normal       Breath sounds: Normal breath sounds  Musculoskeletal:      Right lower leg: Edema present  Left lower leg: Edema present  Comments: 2 plus edema   Lymphadenopathy:      Cervical: No cervical adenopathy  Neurological:      Mental Status: She is alert  Psychiatric:         Mood and Affect: Mood is anxious

## 2021-06-20 RX ORDER — FUROSEMIDE 20 MG/1
TABLET ORAL
Qty: 90 TABLET | Refills: 5 | Status: SHIPPED | OUTPATIENT
Start: 2021-06-20 | End: 2021-07-09 | Stop reason: SDUPTHER

## 2021-06-20 RX ORDER — LINAGLIPTIN 5 MG/1
5 TABLET, FILM COATED ORAL DAILY
Qty: 30 TABLET | Refills: 5 | Status: SHIPPED | OUTPATIENT
Start: 2021-06-20 | End: 2021-07-09 | Stop reason: SDUPTHER

## 2021-06-24 LAB
ALBUMIN SERPL-MCNC: 4 G/DL (ref 3.6–5.1)
ALBUMIN/GLOB SERPL: 1.7 (CALC) (ref 1–2.5)
ALP SERPL-CCNC: 106 U/L (ref 37–153)
ALT SERPL-CCNC: 36 U/L (ref 6–29)
AST SERPL-CCNC: 24 U/L (ref 10–35)
BILIRUB SERPL-MCNC: 0.8 MG/DL (ref 0.2–1.2)
BUN SERPL-MCNC: 22 MG/DL (ref 7–25)
BUN/CREAT SERPL: 17 (CALC) (ref 6–22)
CALCIUM SERPL-MCNC: 9.1 MG/DL (ref 8.6–10.4)
CHLORIDE SERPL-SCNC: 95 MMOL/L (ref 98–110)
CO2 SERPL-SCNC: 32 MMOL/L (ref 20–32)
CREAT SERPL-MCNC: 1.27 MG/DL (ref 0.6–0.88)
GLOBULIN SER CALC-MCNC: 2.4 G/DL (CALC) (ref 1.9–3.7)
GLUCOSE SERPL-MCNC: 220 MG/DL (ref 65–99)
NT-PROBNP SERPL-MCNC: 3506 PG/ML
POTASSIUM SERPL-SCNC: 3.7 MMOL/L (ref 3.5–5.3)
PROT SERPL-MCNC: 6.4 G/DL (ref 6.1–8.1)
SL AMB EGFR AFRICAN AMERICAN: 44 ML/MIN/1.73M2
SL AMB EGFR NON AFRICAN AMERICAN: 38 ML/MIN/1.73M2
SODIUM SERPL-SCNC: 135 MMOL/L (ref 135–146)

## 2021-07-06 ENCOUNTER — RA CDI HCC (OUTPATIENT)
Dept: OTHER | Facility: HOSPITAL | Age: 86
End: 2021-07-06

## 2021-07-06 NOTE — PROGRESS NOTES
[place] [address]  coding opportunities        all dx not used     Chart reviewed, (number of) suggestions sent to provider: 4     Problem listed updated  Provider Accepted, (number of) suggestions accepted: 4            Number of suggestions NOT actually used: 4     Patients insurance company: [address]  (Medicare [employer]vantage and [employer])     Visit status: Patient arrived for their scheduled appointment        [facility]  coding opportunities             Chart reviewed, (number of) suggestions sent to provider: 4     Problem listed updated   Provider Accepted, (number of) suggestions accepted: 4               Patients insurance company: [address]  (Medicare [employer]vant[employer] and [employer])           [place] [address]  coding opportunities        E11 22 Type 2 diabetes with chronic kidney disease     E11 42 T2DM with diabetic polyneuropathy ([place] [address] )     I73 9 PVD ([place] [address] )     E66 01 Morbid, severe, obesity due to excess calories --BMI 39       Chart reviewed, (number of) suggestions sent to provider: 4                  Patients insurance company: [address]  (Medicare [employer]vantage and [employer])

## 2021-07-07 PROBLEM — E66.01 MORBID OBESITY (HCC): Status: ACTIVE | Noted: 2021-07-07

## 2021-07-07 PROBLEM — E11.22 TYPE 2 DIABETES MELLITUS WITH DIABETIC CHRONIC KIDNEY DISEASE (HCC): Status: ACTIVE | Noted: 2021-07-07

## 2021-07-09 ENCOUNTER — OFFICE VISIT (OUTPATIENT)
Dept: FAMILY MEDICINE CLINIC | Facility: CLINIC | Age: 86
End: 2021-07-09
Payer: COMMERCIAL

## 2021-07-09 VITALS
DIASTOLIC BLOOD PRESSURE: 82 MMHG | BODY MASS INDEX: 39.64 KG/M2 | SYSTOLIC BLOOD PRESSURE: 130 MMHG | HEART RATE: 88 BPM | TEMPERATURE: 97.6 F | WEIGHT: 215.4 LBS | HEIGHT: 62 IN

## 2021-07-09 DIAGNOSIS — R60.0 BILATERAL LEG EDEMA: ICD-10-CM

## 2021-07-09 DIAGNOSIS — I48.91 NEW ONSET A-FIB (HCC): ICD-10-CM

## 2021-07-09 DIAGNOSIS — E11.9 TYPE 2 DIABETES MELLITUS WITHOUT COMPLICATION, WITHOUT LONG-TERM CURRENT USE OF INSULIN (HCC): Primary | ICD-10-CM

## 2021-07-09 LAB — SL AMB POCT HEMOGLOBIN AIC: 7.7 (ref ?–6.5)

## 2021-07-09 PROCEDURE — 99214 OFFICE O/P EST MOD 30 MIN: CPT | Performed by: FAMILY MEDICINE

## 2021-07-09 PROCEDURE — 1160F RVW MEDS BY RX/DR IN RCRD: CPT | Performed by: FAMILY MEDICINE

## 2021-07-09 PROCEDURE — 83036 HEMOGLOBIN GLYCOSYLATED A1C: CPT | Performed by: FAMILY MEDICINE

## 2021-07-09 PROCEDURE — 1036F TOBACCO NON-USER: CPT | Performed by: FAMILY MEDICINE

## 2021-07-09 RX ORDER — DILTIAZEM HYDROCHLORIDE 240 MG/1
CAPSULE, EXTENDED RELEASE ORAL
COMMUNITY
Start: 2021-07-01 | End: 2021-10-05

## 2021-07-09 RX ORDER — TORSEMIDE 20 MG/1
TABLET ORAL
Qty: 90 TABLET | Refills: 5 | Status: SHIPPED | OUTPATIENT
Start: 2021-07-09 | End: 2021-07-29

## 2021-07-09 RX ORDER — LINAGLIPTIN 5 MG/1
5 TABLET, FILM COATED ORAL DAILY
Qty: 30 TABLET | Refills: 5 | Status: SHIPPED | OUTPATIENT
Start: 2021-07-09 | End: 2021-08-02 | Stop reason: SDUPTHER

## 2021-07-09 RX ORDER — LACTOBACILLUS RHAMNOSUS GG 10B CELL
CAPSULE ORAL
COMMUNITY
End: 2021-10-05

## 2021-07-09 NOTE — ASSESSMENT & PLAN NOTE
Lab Results   Component Value Date    HGBA1C 7 0 (A) 04/01/2021   debating about epidural steroid since last one increased BS

## 2021-07-09 NOTE — PATIENT INSTRUCTIONS
Switch to [id number]  [address] lab and urine end of July and recheck in office beginning of August

## 2021-07-09 NOTE — PROGRESS NOTES
Assessment and Plan:    Problem List Items Addressed This Visit        Endocrine    Type 2 diabetes mellitus without complication, without long-term current use of insulin ([facility] [address] ) - Primary       Lab Results   Component Value Date    HGBA1C 7 0 (A) 04/01/2021   debating about epidural steroid since last one increased BS         Relevant Medications    linaGLIPtin (Tradjenta) 5 MG TABS    Other Relevant Orders    POCT hemoglobin A1c (Completed)    Basic metabolic panel    Magnesium       Cardiovascular and Mediastinum    New onset a-fib ([address] )     Cards wants to do elective csardioversion         Relevant Medications    Dilt- MG 24 hr capsule       Other    Bilateral leg edema     Cards increased diuretic to 4/day         Relevant Medications    torsemide (DEMADEX) 20 mg tablet    Other Relevant Orders    NT-BNP PRO    UA (URINE) with reflex to Scope                 Diagnoses and all orders for this visit:    Type 2 diabetes mellitus without complication, without long-term current use of insulin (HCC)  -     POCT hemoglobin A1c  -     linaGLIPtin (Tradjenta) 5 MG TABS; Take 5 mg by mouth daily  -     Basic metabolic panel; Future  -     Magnesium; Future    New onset a-fib (HCC)    Bilateral leg edema  -     torsemide (DEMADEX) 20 mg tablet; 3/day  -     NT-BNP PRO; Future  -     UA (URINE) with reflex to Scope; Future    Other orders  -     rivaroxaban (XARELTO) 15 mg tablet; Take 15 mg by mouth daily  -     Lactobacillus Rhamnosus, GG, ([employer]) CHEW; Chew  -     Dilt- MG 24 hr capsule              Subjective:      Patient ID: Geovani Valdes is a 80 y o  female  CC:    Chief Complaint   Patient presents with    Follow-up     review labs     Diabetes    Leg Swelling     c/o bilateral leg/ ankle swelling states this is new, c/o legs feeling heavy          HPI:    Here for f/u diabetes, edema  And HTN, bnp elevated, kidney function stable, no palpitations, short of breath with exertion, really retaining fluid, cards increased to 2  Lasix twice per day and suggested cardioversion for a fib, feels bloated also      The following portions of the patient's history were reviewed and updated as appropriate: allergies, current medications, past family history, past medical history, past social history, past surgical history and problem list         Review of Systems   Constitutional: Negative for activity change, appetite change and fatigue  Respiratory: Negative for shortness of breath  Cardiovascular: Positive for leg swelling  Negative for chest pain and palpitations  Gastrointestinal: Positive for abdominal distention  Neurological: Negative for dizziness and headaches  Data to review:       Objective:    Vitals:    07/09/21 1448   BP: 130/82   BP Location: Left arm   Patient Position: Sitting   Cuff Size: Adult   Pulse: 88   Temp: 97 6 °F (36 4 °C)   TempSrc: Temporal   Weight: 97 7 kg (215 lb 6 4 oz)   Height: 5' 1 9" (1 572 m)        Physical Exam  Vitals reviewed  Constitutional:       Appearance: Normal appearance  She is obese  Neck:      Vascular: No carotid bruit  Cardiovascular:      Rate and Rhythm: Normal rate  Rhythm irregularly irregular  Pulses: Normal pulses  Heart sounds: Normal heart sounds  Pulmonary:      Effort: Pulmonary effort is normal       Breath sounds: Normal breath sounds  Abdominal:      General: Abdomen is flat  There is distension  Palpations: Abdomen is soft  Musculoskeletal:      Right lower leg: Edema present  Left lower leg: Edema present  Comments: 2 plus edema   Lymphadenopathy:      Cervical: No cervical adenopathy  Neurological:      Mental Status: She is alert

## 2021-07-22 ENCOUNTER — TELEPHONE (OUTPATIENT)
Dept: NEPHROLOGY | Facility: CLINIC | Age: 86
End: 2021-07-22

## 2021-07-22 NOTE — TELEPHONE ENCOUNTER
Spoke with patient who states she will go through [facility] for labs/urine studies to be completed prior to follow up on 07/29/2021

## 2021-07-27 ENCOUNTER — APPOINTMENT (OUTPATIENT)
Dept: LAB | Facility: CLINIC | Age: 86
End: 2021-07-27
Payer: COMMERCIAL

## 2021-07-27 ENCOUNTER — RA CDI HCC (OUTPATIENT)
Dept: OTHER | Facility: HOSPITAL | Age: 86
End: 2021-07-27

## 2021-07-27 DIAGNOSIS — R06.02 SHORTNESS OF BREATH: ICD-10-CM

## 2021-07-27 DIAGNOSIS — I12.9 BENIGN HYPERTENSION WITH CKD (CHRONIC KIDNEY DISEASE) STAGE III (HCC): ICD-10-CM

## 2021-07-27 DIAGNOSIS — N18.30 CKD (CHRONIC KIDNEY DISEASE), STAGE III (HCC): ICD-10-CM

## 2021-07-27 DIAGNOSIS — E11.9 TYPE 2 DIABETES MELLITUS WITHOUT COMPLICATION, WITHOUT LONG-TERM CURRENT USE OF INSULIN (HCC): ICD-10-CM

## 2021-07-27 DIAGNOSIS — N18.30 BENIGN HYPERTENSION WITH CKD (CHRONIC KIDNEY DISEASE) STAGE III (HCC): ICD-10-CM

## 2021-07-27 DIAGNOSIS — R60.0 BILATERAL LEG EDEMA: ICD-10-CM

## 2021-07-27 DIAGNOSIS — N18.31 STAGE 3A CHRONIC KIDNEY DISEASE (HCC): ICD-10-CM

## 2021-07-27 DIAGNOSIS — N25.81 SECONDARY HYPERPARATHYROIDISM OF RENAL ORIGIN (HCC): ICD-10-CM

## 2021-07-27 DIAGNOSIS — E87.1 HYPONATREMIA: ICD-10-CM

## 2021-07-27 DIAGNOSIS — E11.42 DIABETIC PERIPHERAL NEUROPATHY (HCC): ICD-10-CM

## 2021-07-27 DIAGNOSIS — E78.5 HYPERLIPIDEMIA, UNSPECIFIED HYPERLIPIDEMIA TYPE: ICD-10-CM

## 2021-07-27 LAB
25(OH)D3 SERPL-MCNC: 35.1 NG/ML (ref 30–100)
ALBUMIN SERPL BCP-MCNC: 3.8 G/DL (ref 3.5–5)
ALP SERPL-CCNC: 107 U/L (ref 46–116)
ALT SERPL W P-5'-P-CCNC: 32 U/L (ref 12–78)
ANION GAP SERPL CALCULATED.3IONS-SCNC: 5 MMOL/L (ref 4–13)
AST SERPL W P-5'-P-CCNC: 25 U/L (ref 5–45)
BILIRUB SERPL-MCNC: 0.98 MG/DL (ref 0.2–1)
BILIRUB UR QL STRIP: NEGATIVE
BILIRUB UR QL STRIP: NEGATIVE
BUN SERPL-MCNC: 26 MG/DL (ref 5–25)
CALCIUM SERPL-MCNC: 9.7 MG/DL (ref 8.3–10.1)
CHLORIDE SERPL-SCNC: 94 MMOL/L (ref 100–108)
CHOLEST SERPL-MCNC: 123 MG/DL (ref 50–200)
CLARITY UR: CLEAR
CLARITY UR: CLEAR
CO2 SERPL-SCNC: 34 MMOL/L (ref 21–32)
COLOR UR: YELLOW
COLOR UR: YELLOW
CREAT SERPL-MCNC: 1.45 MG/DL (ref 0.6–1.3)
CREAT UR-MCNC: 102 MG/DL
ERYTHROCYTE [DISTWIDTH] IN BLOOD BY AUTOMATED COUNT: 13 % (ref 11.6–15.1)
EST. AVERAGE GLUCOSE BLD GHB EST-MCNC: 189 MG/DL
GFR SERPL CREATININE-BSD FRML MDRD: 32 ML/MIN/1.73SQ M
GLUCOSE P FAST SERPL-MCNC: 200 MG/DL (ref 65–99)
GLUCOSE UR STRIP-MCNC: NEGATIVE MG/DL
GLUCOSE UR STRIP-MCNC: NEGATIVE MG/DL
HBA1C MFR BLD: 8.2 %
HCT VFR BLD AUTO: 40.4 % (ref 34.8–46.1)
HDLC SERPL-MCNC: 57 MG/DL
HGB BLD-MCNC: 13.8 G/DL (ref 11.5–15.4)
HGB UR QL STRIP.AUTO: NEGATIVE
HGB UR QL STRIP.AUTO: NEGATIVE
KETONES UR STRIP-MCNC: NEGATIVE MG/DL
KETONES UR STRIP-MCNC: NEGATIVE MG/DL
LDLC SERPL CALC-MCNC: 44 MG/DL (ref 0–100)
LEUKOCYTE ESTERASE UR QL STRIP: NEGATIVE
LEUKOCYTE ESTERASE UR QL STRIP: NEGATIVE
MAGNESIUM SERPL-MCNC: 2.4 MG/DL (ref 1.6–2.6)
MCH RBC QN AUTO: 32.5 PG (ref 26.8–34.3)
MCHC RBC AUTO-ENTMCNC: 34.2 G/DL (ref 31.4–37.4)
MCV RBC AUTO: 95 FL (ref 82–98)
MICROALBUMIN UR-MCNC: 48 MG/L (ref 0–20)
MICROALBUMIN/CREAT 24H UR: 47 MG/G CREATININE (ref 0–30)
NITRITE UR QL STRIP: NEGATIVE
NITRITE UR QL STRIP: NEGATIVE
NONHDLC SERPL-MCNC: 66 MG/DL
NT-PROBNP SERPL-MCNC: 3534 PG/ML
PH UR STRIP.AUTO: 6.5 [PH]
PH UR STRIP.AUTO: 6.5 [PH]
PHOSPHATE SERPL-MCNC: 3.6 MG/DL (ref 2.3–4.1)
PLATELET # BLD AUTO: 180 THOUSANDS/UL (ref 149–390)
PMV BLD AUTO: 12.5 FL (ref 8.9–12.7)
POTASSIUM SERPL-SCNC: 3.1 MMOL/L (ref 3.5–5.3)
PROT SERPL-MCNC: 7.5 G/DL (ref 6.4–8.2)
PROT UR STRIP-MCNC: NEGATIVE MG/DL
PROT UR STRIP-MCNC: NEGATIVE MG/DL
PTH-INTACT SERPL-MCNC: 119.8 PG/ML (ref 18.4–80.1)
RBC # BLD AUTO: 4.25 MILLION/UL (ref 3.81–5.12)
SODIUM SERPL-SCNC: 133 MMOL/L (ref 136–145)
SP GR UR STRIP.AUTO: 1.01 (ref 1–1.03)
SP GR UR STRIP.AUTO: 1.01 (ref 1–1.03)
TRIGL SERPL-MCNC: 110 MG/DL
UROBILINOGEN UR QL STRIP.AUTO: 0.2 E.U./DL
UROBILINOGEN UR QL STRIP.AUTO: 0.2 E.U./DL
WBC # BLD AUTO: 8.13 THOUSAND/UL (ref 4.31–10.16)

## 2021-07-27 PROCEDURE — 82570 ASSAY OF URINE CREATININE: CPT | Performed by: FAMILY MEDICINE

## 2021-07-27 PROCEDURE — 83880 ASSAY OF NATRIURETIC PEPTIDE: CPT

## 2021-07-27 PROCEDURE — 82043 UR ALBUMIN QUANTITATIVE: CPT | Performed by: FAMILY MEDICINE

## 2021-07-27 PROCEDURE — 36415 COLL VENOUS BLD VENIPUNCTURE: CPT

## 2021-07-27 PROCEDURE — 85027 COMPLETE CBC AUTOMATED: CPT

## 2021-07-27 PROCEDURE — 82306 VITAMIN D 25 HYDROXY: CPT

## 2021-07-27 PROCEDURE — 83735 ASSAY OF MAGNESIUM: CPT

## 2021-07-27 PROCEDURE — 80061 LIPID PANEL: CPT

## 2021-07-27 PROCEDURE — 83036 HEMOGLOBIN GLYCOSYLATED A1C: CPT

## 2021-07-27 PROCEDURE — 84100 ASSAY OF PHOSPHORUS: CPT

## 2021-07-27 PROCEDURE — 81003 URINALYSIS AUTO W/O SCOPE: CPT

## 2021-07-27 PROCEDURE — 80053 COMPREHEN METABOLIC PANEL: CPT

## 2021-07-27 PROCEDURE — 83970 ASSAY OF PARATHORMONE: CPT

## 2021-07-27 NOTE — PROGRESS NOTES
[facility]  coding opportunities             Chart reviewed, (number of) suggestions sent to provider: 4     Problem listed updated  Provider Accepted, (number of) suggestions accepted: 4         Number of suggestions actually used: 1      Number of suggestions NOT actually used: 3     Patients insurance company: [employer] (Medic[employer]vantage and [employer])     Visit status: Patient arrived for their scheduled appointment        [facility] [address]  coding opportunities             Chart reviewed, (number of) suggestions sent to provider: 4     Problem listed updated   Provider Accepted, (number of) suggestions accepted: 4               Patients insurance company: [employer] (Medicare Advantage and [employer])           [place] [address]  coding opportunities             Chart reviewed, (number of) suggestions sent to provider: 4   E11 22 Type 2 diabetes with chronic kidney disease     E11 42 T2DM with diabetic polyneuropathy ([place] [address] )     I73 9 PVD ([place] [address] )     E66 01 Morbid, severe, obesity due to excess calories --BMI 39                   Patients insurance company: [employer] (Medicare Advantage and [employer])

## 2021-07-29 ENCOUNTER — OFFICE VISIT (OUTPATIENT)
Dept: NEPHROLOGY | Facility: CLINIC | Age: 86
End: 2021-07-29
Payer: COMMERCIAL

## 2021-07-29 VITALS
HEIGHT: 62 IN | SYSTOLIC BLOOD PRESSURE: 120 MMHG | BODY MASS INDEX: 35.55 KG/M2 | WEIGHT: 193.2 LBS | HEART RATE: 70 BPM | DIASTOLIC BLOOD PRESSURE: 70 MMHG

## 2021-07-29 DIAGNOSIS — I12.9 BENIGN HYPERTENSION WITH CKD (CHRONIC KIDNEY DISEASE) STAGE III (HCC): ICD-10-CM

## 2021-07-29 DIAGNOSIS — E87.1 HYPONATREMIA: ICD-10-CM

## 2021-07-29 DIAGNOSIS — N18.31 STAGE 3A CHRONIC KIDNEY DISEASE (HCC): ICD-10-CM

## 2021-07-29 DIAGNOSIS — N17.9 AKI (ACUTE KIDNEY INJURY) (HCC): Primary | ICD-10-CM

## 2021-07-29 DIAGNOSIS — N25.81 SECONDARY HYPERPARATHYROIDISM OF RENAL ORIGIN (HCC): ICD-10-CM

## 2021-07-29 DIAGNOSIS — E87.6 HYPOKALEMIA: ICD-10-CM

## 2021-07-29 DIAGNOSIS — R60.0 BILATERAL LEG EDEMA: ICD-10-CM

## 2021-07-29 DIAGNOSIS — N18.30 BENIGN HYPERTENSION WITH CKD (CHRONIC KIDNEY DISEASE) STAGE III (HCC): ICD-10-CM

## 2021-07-29 PROCEDURE — 99214 OFFICE O/P EST MOD 30 MIN: CPT | Performed by: INTERNAL MEDICINE

## 2021-07-29 RX ORDER — POTASSIUM CHLORIDE 20 MEQ/1
20 TABLET, EXTENDED RELEASE ORAL DAILY
Qty: 30 TABLET | Refills: 3 | Status: SHIPPED | OUTPATIENT
Start: 2021-07-29 | End: 2021-10-25

## 2021-07-29 RX ORDER — DOXAZOSIN MESYLATE 4 MG/1
2 TABLET ORAL
Qty: 90 TABLET | Refills: 3 | Status: SHIPPED | OUTPATIENT
Start: 2021-07-29 | End: 2021-10-05

## 2021-07-29 RX ORDER — FUROSEMIDE 20 MG/1
20 TABLET ORAL DAILY
COMMUNITY
End: 2021-10-05

## 2021-07-29 NOTE — PATIENT INSTRUCTIONS
START POTASSIUM SUPPLEMENT ONE TABLET DAILY  HOLD WATER PILL (LASIX) FOR NEXT 3 DAYS AND THEN START LOWER DOSE 20 MG ONE TABLET DAILY AFTER WARDS  CONTINUE TO MONOITOR DAILY WEIGHT AND CALL BACK IF HAS WORSENING LEG SWELLING OR WEIGHT GAIN >5 LBS IN 3 DAYS  BLOOD TEST IN 10 DAYS

## 2021-07-29 NOTE — PROGRESS NOTES
NEPHROLOGY OUTPATIENT PROGRESS NOTE   Vandana Blank 80 y o  female MRN: [id number]  DATE: 7/29/2021  Reason for visit:   Chief Complaint   Patient presents with    Follow-up    Hypertension    Chronic Kidney Disease     ASSESSMENT and PLAN:  Mild MINA on CKD stage IIIA, creatinine 1 2 for last two months, prior 0 9 to 1  -last creatinine has further increased up to 1 4  Increase creatinine suspect in the setting of prerenal as patient has lost about 25 lb since last visit, osmotic diuresis in the setting of uncontrolled hyperglycemia, use of Lasix   -advised to hold Lasix for next three days and then start lower dose 20 mg daily afterwards  -repeat BMP in 10 days  UA in July 2021 bland  -CKD likely secondary to long-term hypertension and diabetes  -avoid nephrotoxins or NSAIDs  -urine microalbumin/creatinine ratio 47 mg in July 2021      Hypertension  -Patient does not check BP at home  BP well controlled in the office today   -she is overall very confused about medications  She says she is not taking carvedilol   -doxazosin was increased last visit although she still taking 2 mg daily  Remains on diltiazem, Lasix  - could not tolerate lisinopril due to dry cough   -advised her to check BP on a regular basis and call back  if remains persistently greater than 140/90      Hyponatremia, improved and stable   -initially hyponatremia thought to be secondary to hypovolemia/HCTZ +/- SIADH component  -she remains off HCTZ , last corrected serum sodium 135     -continue to follow mild fluid restriction less than 64 oz per day  -decreasing Lasix as above   -previous workup includes serum osmolality 252, urine sodium 45, urine osmolality 388      Hypokalemia, serum potassium 3 1 below goal  Likely secondary to aggressive diuresis  -will start potassium chloride 20 mEq daily  Decreasing Lasix as above  -repeat BMP in 10 days      Secondary hyperparathyroidism, last  in July 2021    -vitamin-D level 35  Continue vitamin-D 2000 units daily  Will monitor PTH before next visit      Leg edema, overall improved and stable  Also underlying nonpitting component   Elevate legs while in bed, compression stockings as tolerated  -worsening renal function, decrease Lasix as above  She has lost about 25 lb since last visit  Diagnoses and all orders for this visit:    Benign hypertension with CKD (chronic kidney disease) stage III ([facility])  -     doxazosin (CARDURA) 4 mg tablet; Take 0 5 tablets (2 mg total) by mouth daily at bedtime  -     Basic metabolic panel; Future  -     CBC; Future  -     Magnesium; Future  -     Phosphorus; Future  -     PTH, intact; Future  -     Protein / creatinine ratio, urine; Future  -     Vitamin D 25 hydroxy; Future    Stage 3a chronic kidney disease (HCC)  -     Basic metabolic panel  -     Basic metabolic panel; Future  -     CBC; Future  -     Magnesium; Future  -     Phosphorus; Future  -     PTH, intact; Future  -     Protein / creatinine ratio, urine; Future  -     Vitamin D 25 hydroxy; Future    MINA (acute kidney injury) ([facility] [address] )  -     Basic metabolic panel  -     Basic metabolic panel; Future    Bilateral leg edema    Secondary hyperparathyroidism of renal origin ([facility] )  -     Phosphorus; Future  -     PTH, intact; Future  -     Vitamin D 25 hydroxy; Future    Hyponatremia  -     Basic metabolic panel; Future    Hyperkalemia    Hypokalemia  -     potassium chloride (K-DUR,KLOR-CON) 20 mEq tablet; Take 1 tablet (20 mEq total) by mouth daily  -     Basic metabolic panel; Future    Other orders  -     furosemide (LASIX) 20 mg tablet;  Take 20 mg by mouth daily          SUBJECTIVE / HPI:  Patient is a 80-year-old female with significant medical issues of hypertension for many years, prior history of long-term diabetes and now remains off antidiabetic medication, CKD stage IIIA with baseline creatinine 0 9 to 1, chronic back pain issues, history of hyponatremia, presents for regular follow-up of CKD   She has lost about 25 lb since last visit  Her leg edema has overall improved  Denies any worsening dyspnea  Denies any urinary complaint  No recent NSAID use  She is overall confused about her medications  Advised her to bring all her medication bottles during next office visit  Brooke Montoya has chronic back pain issues which is overall stable  Denies any urinary complaint   Denies any chest pain or shortness of breath   No nausea or vomiting  REVIEW OF SYSTEMS:  More than 10 point review of systems were obtained and discussed in length with the patient  Complete review of systems were negative / unremarkable except mentioned above  PHYSICAL EXAM:  Vitals:    07/29/21 1353   BP: 120/70   BP Location: Left arm   Patient Position: Sitting   Cuff Size: Standard   Pulse: 70   Weight: 87 6 kg (193 lb 3 2 oz)   Height: 5' 2" (1 575 m)     Body mass index is 35 34 kg/m²  Physical Exam  Vitals reviewed  Constitutional:       Appearance: She is well-developed  HENT:      Head: Normocephalic and atraumatic  Right Ear: External ear normal       Left Ear: External ear normal    Eyes:      Conjunctiva/sclera: Conjunctivae normal    Cardiovascular:      Comments: S1, S2 present  Pulmonary:      Effort: Pulmonary effort is normal       Breath sounds: Normal breath sounds  No wheezing or rales  Abdominal:      General: Bowel sounds are normal  There is no distension  Palpations: Abdomen is soft  Tenderness: There is no abdominal tenderness  Musculoskeletal:         General: No tenderness  Right lower leg: Edema present  Left lower leg: Edema present  Comments: Overall stable, slightly better, significant nonpitting component   Lymphadenopathy:      Cervical: No cervical adenopathy  Skin:     Findings: No rash  Neurological:      Mental Status: She is alert and oriented to person, place, and time     Psychiatric:         Behavior: Behavior normal          PAST MEDICAL HISTORY:  Past Medical History:   Diagnosis Date    Anemia     Anxiety     Arthritis     CKD (chronic kidney disease), stage III ([facility] ) 11/29/2018    Depression, recurrent ([facility] ) 6/14/2021    Diabetes mellitus ([place] [address] )     Diabetic peripheral neuropathy (HCC)     Hyperlipidemia     Hypertension     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 8/23/2017    Thyroid enlarged 2/23/2015    Tinnitus     Vertigo        PAST SURGICAL HISTORY:  Past Surgical History:   Procedure Laterality Date    APPENDECTOMY      EAR SURGERY Left     KNEE SURGERY      TONSILLECTOMY         SOCIAL HISTORY:  Social History     Substance and Sexual Activity   Alcohol Use Not Currently     Social History     Substance and Sexual Activity   Drug Use No     Social History     Tobacco Use   Smoking Status Never Smoker   Smokeless Tobacco Never Used       FAMILY HISTORY:  Family History   Problem Relation Age of Onset    Diabetes Mother     Heart disease Mother     Heart disease Father     Stroke Father     Cancer Family     Dementia Sister     Liver disease Other        MEDICATIONS:    Current Outpatient Medications:     atorvastatin (LIPITOR) 80 mg tablet, , Disp: , Rfl:     Blood Glucose Monitoring Suppl (ONE TOUCH ULTRA 2) w/Device KIT, by Does not apply route daily, Disp: 1 each, Rfl: 0    cholecalciferol (VITAMIN D3) 1,000 units tablet, Take 2,000 Units by mouth daily, Disp: , Rfl:     dicyclomine (BENTYL) 10 mg capsule, Take 1 capsule (10 mg total) by mouth 4 (four) times a day as needed (abd pain), Disp: 360 capsule, Rfl: 0    diltiazem (CARDIZEM CD) 240 mg 24 hr capsule, Take 1 capsule by mouth once daily, Disp: 90 capsule, Rfl: 1    doxazosin (CARDURA) 4 mg tablet, Take 0 5 tablets (2 mg total) by mouth daily at bedtime, Disp: 90 tablet, Rfl: 3    furosemide (LASIX) 20 mg tablet, Take 20 mg by mouth daily, Disp: , Rfl:    glucose blood (ONE TOUCH ULTRA TEST) test strip, Test daily                        DX: E11 9, Disp: 100 each, Rfl: 1    linaGLIPtin (Tradjenta) 5 MG TABS, Take 5 mg by mouth daily, Disp: 30 tablet, Rfl: 5    Multiple Vitamin (MULTI-VITAMIN DAILY PO), Take 1 tablet by mouth daily, Disp: , Rfl:     omega-3-acid ethyl esters (LOVAZA) 1 g capsule, Take 2 g by mouth 2 (two) times a day, Disp: , Rfl:     Probiotic Product (PROBIOTIC DAILY PO), Take by mouth, Disp: , Rfl:     rivaroxaban (XARELTO) 15 mg tablet, Take 15 mg by mouth daily, Disp: , Rfl:     ALPRAZolam (XANAX) 0 25 mg tablet, Take 1 tablet (0 25 mg total) by mouth 2 (two) times a day as needed for anxiety for up to 5 days (Patient not taking: Reported on 7/29/2021), Disp: 10 tablet, Rfl: 0    apixaban (ELIQUIS) 5 mg, Take 1 tablet (5 mg total) by mouth 2 (two) times a day, Disp: 60 tablet, Rfl: 0    carvedilol (COREG) 25 mg tablet, Take 1 tablet (25 mg total) by mouth 2 (two) times a day (Patient not taking: Reported on 7/29/2021), Disp: 180 tablet, Rfl: 1    Diclofenac Sodium (VOLTAREN) 1 %, 1 gram four times per day as needed for hand swelling (Patient not taking: Reported on 7/29/2021), Disp: 100 g, Rfl: 1    Dilt- MG 24 hr capsule, , Disp: , Rfl:     Glucos-Chondroit-Hyaluron-MSM (GLUCOSAMINE CHONDROITIN JOINT) TABS, Take by mouth (Patient not taking: Reported on 7/29/2021), Disp: , Rfl:     Lactobacillus Rhamnosus, GG, (Rahul Mitchell (Patient not taking: Reported on 7/29/2021), Disp: , Rfl:     potassium chloride (K-DUR,KLOR-CON) 20 mEq tablet, Take 1 tablet (20 mEq total) by mouth daily, Disp: 30 tablet, Rfl: 3    Lab Results:   Results for orders placed or performed in visit on 07/27/21   Vitamin D 25 hydroxy   Result Value Ref Range    Vit D, 25-Hydroxy 35 1 30 0 - 100 0 ng/mL   Comprehensive metabolic panel   Result Value Ref Range    Sodium 133 (L) 136 - 145 mmol/L    Potassium 3 1 (L) 3 5 - 5 3 mmol/L    Chloride 94 (L) 100 - 108 mmol/L    CO2 34 (H) 21 - 32 mmol/L    ANION GAP 5 4 - 13 mmol/L    BUN 26 (H) 5 - 25 mg/dL    Creatinine 1 45 (H) 0 60 - 1 30 mg/dL    Glucose, Fasting 200 (H) 65 - 99 mg/dL    Calcium 9 7 8 3 - 10 1 mg/dL    AST 25 5 - 45 U/L    ALT 32 12 - 78 U/L    Alkaline Phosphatase 107 46 - 116 U/L    Total Protein 7 5 6 4 - 8 2 g/dL    Albumin 3 8 3 5 - 5 0 g/dL    Total Bilirubin 0 98 0 20 - 1 00 mg/dL    eGFR 32 ml/min/1 73sq m   Hemoglobin A1C   Result Value Ref Range    Hemoglobin A1C 8 2 (H) Normal 3 8-5 6%; PreDiabetic 5 7-6 4%;  Diabetic >=6 5%; Glycemic control for adults with diabetes <7 0% %     mg/dl   NT-BNP PRO   Result Value Ref Range    NT-proBNP 3,534 (H) <450 pg/mL   Magnesium   Result Value Ref Range    Magnesium 2 4 1 6 - 2 6 mg/dL   UA (URINE) with reflex to Scope   Result Value Ref Range    Color, UA Yellow     Clarity, UA Clear     Specific [place], UA 1 012 1 003 - 1 030    pH, UA 6 5 4 5, 5 0, 5 5, 6 0, 6 5, 7 0, 7 5, 8 0    Leukocytes, UA Negative Negative    Nitrite, UA Negative Negative    Protein, UA Negative Negative mg/dl    Glucose, UA Negative Negative mg/dl    Ketones, UA Negative Negative mg/dl    Urobilinogen, UA 0 2 0 2, 1 0 E U /dl E U /dl    Bilirubin, UA Negative Negative    Blood, UA Negative Negative   CBC   Result Value Ref Range    WBC 8 13 4 31 - 10 16 Thousand/uL    RBC 4 25 3 81 - 5 12 Million/uL    Hemoglobin 13 8 11 5 - 15 4 g/dL    Hematocrit 40 4 34 8 - 46 1 %    MCV 95 82 - 98 fL    MCH 32 5 26 8 - 34 3 pg    MCHC 34 2 31 4 - 37 4 g/dL    RDW 13 0 11 6 - 15 1 %    Platelets 886 017 - 837 Thousands/uL    MPV 12 5 8 9 - 12 7 fL   Phosphorus   Result Value Ref Range    Phosphorus 3 6 2 3 - 4 1 mg/dL   PTH, intact   Result Value Ref Range     8 (H) 18 4 - 80 1 pg/mL   Lipid panel   Result Value Ref Range    Cholesterol 123 50 - 200 mg/dL    Triglycerides 110 <=150 mg/dL    HDL, Direct 57 >=40 mg/dL    LDL Calculated 44 0 - 100 mg/dL    Non-HDL-Chol (CHOL-HDL) 66 mg/dl   UA w Reflex to Microscopic w Reflex to Culture -Lab Collect    Specimen: Urine   Result Value Ref Range    Color, UA Yellow     Clarity, UA Clear     Specific [place], UA 1 013 1 003 - 1 030    pH, UA 6 5 4 5, 5 0, 5 5, 6 0, 6 5, 7 0, 7 5, 8 0    Leukocytes, UA Negative Negative    Nitrite, UA Negative Negative    Protein, UA Negative Negative mg/dl    Glucose, UA Negative Negative mg/dl    Ketones, UA Negative Negative mg/dl    Urobilinogen, UA 0 2 0 2, 1 0 E U /dl E U /dl    Bilirubin, UA Negative Negative    Blood, UA Negative Negative

## 2021-08-02 ENCOUNTER — OFFICE VISIT (OUTPATIENT)
Dept: FAMILY MEDICINE CLINIC | Facility: CLINIC | Age: 86
End: 2021-08-02
Payer: COMMERCIAL

## 2021-08-02 VITALS
DIASTOLIC BLOOD PRESSURE: 80 MMHG | HEIGHT: 62 IN | BODY MASS INDEX: 36.73 KG/M2 | TEMPERATURE: 97.9 F | RESPIRATION RATE: 16 BRPM | HEART RATE: 83 BPM | SYSTOLIC BLOOD PRESSURE: 120 MMHG | WEIGHT: 199.6 LBS

## 2021-08-02 DIAGNOSIS — I12.9 BENIGN HYPERTENSION WITH CKD (CHRONIC KIDNEY DISEASE) STAGE III (HCC): ICD-10-CM

## 2021-08-02 DIAGNOSIS — N18.30 BENIGN HYPERTENSION WITH CKD (CHRONIC KIDNEY DISEASE) STAGE III (HCC): ICD-10-CM

## 2021-08-02 DIAGNOSIS — N18.32 TYPE 2 DIABETES MELLITUS WITH STAGE 3B CHRONIC KIDNEY DISEASE, WITHOUT LONG-TERM CURRENT USE OF INSULIN (HCC): ICD-10-CM

## 2021-08-02 DIAGNOSIS — E11.9 TYPE 2 DIABETES MELLITUS WITHOUT COMPLICATION, WITHOUT LONG-TERM CURRENT USE OF INSULIN (HCC): Primary | ICD-10-CM

## 2021-08-02 DIAGNOSIS — E11.22 TYPE 2 DIABETES MELLITUS WITH STAGE 3B CHRONIC KIDNEY DISEASE, WITHOUT LONG-TERM CURRENT USE OF INSULIN (HCC): ICD-10-CM

## 2021-08-02 PROBLEM — N17.9 AKI (ACUTE KIDNEY INJURY) (HCC): Status: RESOLVED | Noted: 2021-07-29 | Resolved: 2021-08-02

## 2021-08-02 PROCEDURE — 1160F RVW MEDS BY RX/DR IN RCRD: CPT | Performed by: FAMILY MEDICINE

## 2021-08-02 PROCEDURE — 99214 OFFICE O/P EST MOD 30 MIN: CPT | Performed by: FAMILY MEDICINE

## 2021-08-02 PROCEDURE — 1036F TOBACCO NON-USER: CPT | Performed by: FAMILY MEDICINE

## 2021-08-02 RX ORDER — LINAGLIPTIN 5 MG/1
5 TABLET, FILM COATED ORAL DAILY
Qty: 90 TABLET | Refills: 1 | Status: SHIPPED | OUTPATIENT
Start: 2021-08-02 | End: 2021-08-06

## 2021-08-02 NOTE — PROGRESS NOTES
Assessment and Plan:    Problem List Items Addressed This Visit        Endocrine    Type 2 diabetes mellitus with diabetic chronic kidney disease ([facility] )       Lab Results   Component Value Date    HGBA1C 8 2 (H) 07/27/2021   on Lasix 1 per day per kidney doc         Relevant Medications    linaGLIPtin (Tradjenta) 5 MG TABS    Type 2 diabetes mellitus without complication, without long-term current use of insulin ([facility] ) - Primary       Lab Results   Component Value Date    HGBA1C 8 2 (H) 07/27/2021   sugar stable, never filled tradjenta (which would be best for her due to decreased kidney function)-will send again         Relevant Medications    linaGLIPtin (Tradjenta) 5 MG TABS       Cardiovascular and Mediastinum    Benign hypertension with CKD (chronic kidney disease) stage III (HCC)     Lab Results   Component Value Date    EGFR 32 07/27/2021    EGFR 37 06/08/2021    EGFR 64 08/01/2018    CREATININE 1 45 (H) 07/27/2021    CREATININE 1 27 (H) 06/22/2021    CREATININE 1 29 06/08/2021   BP stable                      Diagnoses and all orders for this visit:    Type 2 diabetes mellitus without complication, without long-term current use of insulin ([facility])  -     linaGLIPtin (Tradjenta) 5 MG TABS; Take 5 mg by mouth daily    Type 2 diabetes mellitus with stage 3b chronic kidney disease, without long-term current use of insulin ([facility])    Benign hypertension with CKD (chronic kidney disease) stage III (HCC)              Subjective:      Patient ID: Kristina Code is a 80 y o  female      CC:    Chief Complaint   Patient presents with    Follow-up     Patient here for follow up       HPI:    Here for f/u of bs-sugar was 200, kidney function low but stable, did hold Lasix for several days and did gain some weight,not retaining fluid, np cp or sob      The following portions of the patient's history were reviewed and updated as appropriate: allergies, current medications, past family history, past medical history, past social history, past surgical history and problem list       Review of Systems   Constitutional: Positive for unexpected weight change  Negative for activity change, appetite change, chills and fatigue  8 lb gain   Respiratory: Negative for cough and shortness of breath  Cardiovascular: Positive for leg swelling  Negative for chest pain and palpitations  Gastrointestinal: Negative for abdominal pain  Genitourinary: Negative for difficulty urinating  Neurological: Negative for dizziness and headaches  Data to review:       Objective:    Vitals:    08/02/21 1411   BP: 120/80   BP Location: Left arm   Patient Position: Sitting   Cuff Size: Adult   Pulse: 83   Resp: 16   Temp: 97 9 °F (36 6 °C)   TempSrc: Temporal   Weight: 90 5 kg (199 lb 9 6 oz)   Height: 5' 2" (1 575 m)        Physical Exam  Vitals reviewed  Constitutional:       Appearance: Normal appearance  She is obese  Neurological:      Mental Status: She is alert     Psychiatric:         Mood and Affect: Mood normal

## 2021-08-02 NOTE — ASSESSMENT & PLAN NOTE
Lab Results   Component Value Date    EGFR 32 07/27/2021    EGFR 37 06/08/2021    EGFR 64 08/01/2018    CREATININE 1 45 (H) 07/27/2021    CREATININE 1 27 (H) 06/22/2021    CREATININE 1 29 06/08/2021   BP stable

## 2021-08-02 NOTE — ASSESSMENT & PLAN NOTE
Lab Results   Component Value Date    HGBA1C 8 2 (H) 07/27/2021   sugar stable, never filled tradjenta (which would be best for her due to decreased kidney function)-will send again

## 2021-08-04 ENCOUNTER — TELEPHONE (OUTPATIENT)
Dept: FAMILY MEDICINE CLINIC | Facility: CLINIC | Age: 86
End: 2021-08-04

## 2021-08-04 NOTE — TELEPHONE ENCOUNTER
Pt saw Dr Dumont Massed and was prescribed Tradjenta  She states it was $141 at the pharmacy when she went to pick it up, which she cannot afford  Is there another option for her? Please advise, [phone]  Thank you!

## 2021-08-05 NOTE — TELEPHONE ENCOUNTER
SAMUEL IS SAME PRICE  GLIPIZIDE 5 mg WOULD COST PATIENT $1 75  IF THERE IS ANOTHER GENERIC YOU WOULD LIKE THEM TO TRY PLEASE LET THEM KNOW   PLEASE CALL [address] [phone]

## 2021-08-06 DIAGNOSIS — E11.22 TYPE 2 DIABETES MELLITUS WITH STAGE 3B CHRONIC KIDNEY DISEASE, WITHOUT LONG-TERM CURRENT USE OF INSULIN (HCC): Primary | ICD-10-CM

## 2021-08-06 DIAGNOSIS — N18.32 TYPE 2 DIABETES MELLITUS WITH STAGE 3B CHRONIC KIDNEY DISEASE, WITHOUT LONG-TERM CURRENT USE OF INSULIN (HCC): Primary | ICD-10-CM

## 2021-08-06 NOTE — TELEPHONE ENCOUNTER
Glipizide not a safe med at her age and doesn't work that well, is Onglyza covered by her insurance?

## 2021-08-06 NOTE — TELEPHONE ENCOUNTER
Per pharmacist, he won't be able to tell if it is covered until you send the rx over      Please send rx

## 2021-08-09 ENCOUNTER — TELEPHONE (OUTPATIENT)
Dept: FAMILY MEDICINE CLINIC | Facility: CLINIC | Age: 86
End: 2021-08-09

## 2021-08-09 NOTE — TELEPHONE ENCOUNTER
Fax rec'd from [facility] stating pt's Velia Corado is not covered  They recommend Januvia or Trajenta  Please advise if you would like to change

## 2021-08-10 NOTE — TELEPHONE ENCOUNTER
MF, As per KW task the 2 other scripts that are covered according to her note are Januvia and Trajenta, Which do you want to choose?

## 2021-08-10 NOTE — TELEPHONE ENCOUNTER
I sent both those rxes to pharmacy previously and they weren't covered, certainly prefer Francenia Sandhoff with decreased kidney function, see previous messages, that is why I requested someone to actually call pharmacy to figure out which med (if any) in that class is covered!!!

## 2021-08-11 NOTE — TELEPHONE ENCOUNTER
SPOKE TO PHARMACY, THEY STATED THAT THEY DO HAVE A SCRIPT AT THE PHARMACY FOR THE TRADJENTA SO THEY WILL FILL THIS

## 2021-09-07 ENCOUNTER — TELEPHONE (OUTPATIENT)
Dept: FAMILY MEDICINE CLINIC | Facility: CLINIC | Age: 86
End: 2021-09-07

## 2021-09-07 NOTE — TELEPHONE ENCOUNTER
Patient only wants to see Dr Rmaone Carmona  She is having swelling in her legs and feet and also needs a form filled out for home care  Dr Marleen Clarke next appt that is opened in not until October

## 2021-09-08 ENCOUNTER — TELEPHONE (OUTPATIENT)
Dept: FAMILY MEDICINE CLINIC | Facility: CLINIC | Age: 86
End: 2021-09-08

## 2021-09-08 NOTE — TELEPHONE ENCOUNTER
Again called pt, left message to call office and schedule appt with any provider for her swollen legs asap and not wait to October

## 2021-09-08 NOTE — TELEPHONE ENCOUNTER
RETURNING NURSE DEPT CALL, LEGS AND FEET ARE SWOLLEN AND SHE HAS A FORM FOR DR Jason Rivers TO FILL OUT FOR HER HOME CARE  SHE WANTS TO MAKE AN APPT WITH DR Lorenza Gilliam BUT HER NEXT APPT IS NOT UNTIL OCT  PLEASE CALL PT BACK [phone], PT IS HAVING TROUBLE WITH HER EARRING AIDS

## 2021-09-10 ENCOUNTER — OFFICE VISIT (OUTPATIENT)
Dept: URGENT CARE | Facility: CLINIC | Age: 86
End: 2021-09-10
Payer: COMMERCIAL

## 2021-09-10 VITALS
HEIGHT: 62 IN | TEMPERATURE: 98.6 F | HEART RATE: 86 BPM | WEIGHT: 216.6 LBS | BODY MASS INDEX: 39.86 KG/M2 | SYSTOLIC BLOOD PRESSURE: 104 MMHG | DIASTOLIC BLOOD PRESSURE: 58 MMHG | OXYGEN SATURATION: 98 % | RESPIRATION RATE: 20 BRPM

## 2021-09-10 DIAGNOSIS — M25.471 ANKLE EDEMA, BILATERAL: ICD-10-CM

## 2021-09-10 DIAGNOSIS — R06.02 SOB (SHORTNESS OF BREATH): Primary | ICD-10-CM

## 2021-09-10 DIAGNOSIS — M25.472 ANKLE EDEMA, BILATERAL: ICD-10-CM

## 2021-09-10 PROCEDURE — 99213 OFFICE O/P EST LOW 20 MIN: CPT | Performed by: PHYSICIAN ASSISTANT

## 2021-09-10 PROCEDURE — S9083 URGENT CARE CENTER GLOBAL: HCPCS | Performed by: PHYSICIAN ASSISTANT

## 2021-09-10 NOTE — PROGRESS NOTES
[address]        NAME: Polly Figueroa is a 80 y o  female  : 1934    MRN: [id number]  DATE: September 10, 2021  TIME: 7:04 PM    Assessment and Plan   SOB (shortness of breath) [R06 02]  1  SOB (shortness of breath)     2  Ankle edema, bilateral           Patient Instructions     Referred to ED for further eval & management  Pt prefers [facility] d/t cardiologist  Spoke w/ PCP and agreed upon ED eval   Follow up with PCP in 3-5 days  Proceed to  ER if symptoms worsen  Chief Complaint     Chief Complaint   Patient presents with    Ankle Edema     B? L ankle edema - 2+; ogoing x ~ 1 mos  Has been following with PCP for but was unable to keep PCP appt today  States AE has gotten worse despite new meds  Has also noticed some SOB, gradually increasoing overpast 2 weeks  Occ end-expiratory wheeze audible  Hx of At Fib, rate currently controlled  History of Present Illness         Patient presents with complaint of bilateral ankle edema which has worsened in the past month  She states that she has been seeing her family doctor for this and states that she has been taking her Lasix as directed  She also reports increased shortness of and wheezing with any activity  She denies chest pain, weakness, lightheadedness, abdominal symptoms, fever, chills, and sweats  She states that she normally wears compression stockings  Patient is unsure of her baseline weight  She states that she was supposed to see her family doctor today but needed to take her sister to the doctor instead  Review of Systems   Review of Systems   Constitutional: Negative for chills and fever  HENT: Negative for ear pain and sore throat  Eyes: Negative for pain and visual disturbance  Respiratory: Positive for chest tightness and shortness of breath  Negative for cough  Cardiovascular: Negative for chest pain and palpitations  Gastrointestinal: Negative for abdominal pain and vomiting     Genitourinary: Negative for dysuria and hematuria  Musculoskeletal: Positive for joint swelling  Negative for arthralgias and back pain  Skin: Negative for color change and rash  Neurological: Negative for seizures and syncope  All other systems reviewed and are negative          Current Medications       Current Outpatient Medications:     Blood Glucose Monitoring Suppl (ONE TOUCH ULTRA 2) w/Device KIT, by Does not apply route daily, Disp: 1 each, Rfl: 0    dicyclomine (BENTYL) 10 mg capsule, Take 1 capsule (10 mg total) by mouth 4 (four) times a day as needed (abd pain), Disp: 360 capsule, Rfl: 0    diltiazem (CARDIZEM CD) 240 mg 24 hr capsule, Take 1 capsule by mouth once daily, Disp: 90 capsule, Rfl: 1    doxazosin (CARDURA) 4 mg tablet, Take 0 5 tablets (2 mg total) by mouth daily at bedtime, Disp: 90 tablet, Rfl: 3    furosemide (LASIX) 20 mg tablet, Take 20 mg by mouth daily, Disp: , Rfl:     potassium chloride (K-DUR,KLOR-CON) 20 mEq tablet, Take 1 tablet (20 mEq total) by mouth daily, Disp: 30 tablet, Rfl: 3    Probiotic Product (PROBIOTIC DAILY PO), Take by mouth, Disp: , Rfl:     rivaroxaban (XARELTO) 15 mg tablet, Take 15 mg by mouth daily, Disp: , Rfl:     apixaban (ELIQUIS) 5 mg, Take 1 tablet (5 mg total) by mouth 2 (two) times a day, Disp: 60 tablet, Rfl: 0    atorvastatin (LIPITOR) 80 mg tablet, , Disp: , Rfl:     cholecalciferol (VITAMIN D3) 1,000 units tablet, Take 2,000 Units by mouth daily, Disp: , Rfl:     Dilt- MG 24 hr capsule, , Disp: , Rfl:     Glucos-Chondroit-Hyaluron-MSM (GLUCOSAMINE CHONDROITIN JOINT) TABS, Take by mouth (Patient not taking: Reported on 7/29/2021), Disp: , Rfl:     glucose blood (ONE TOUCH ULTRA TEST) test strip, Test daily                        DX: E11 9 (Patient not taking: Reported on 9/10/2021), Disp: 100 each, Rfl: 1    Lactobacillus Rhamnosus, GG, (Ruma Mitchell (Patient not taking: Reported on 7/29/2021), Disp: , Rfl:     Multiple Vitamin (MULTI-VITAMIN DAILY PO), Take 1 tablet by mouth daily, Disp: , Rfl:     omega-3-acid ethyl esters (LOVAZA) 1 g capsule, Take 2 g by mouth 2 (two) times a day, Disp: , Rfl:     saxagliptin (Onglyza) 2 5 MG tablet, Take 1 tablet (2 5 mg total) by mouth daily, Disp: 30 tablet, Rfl: 5    Current Allergies     Allergies as of 09/10/2021 - Reviewed 09/10/2021   Allergen Reaction Noted    Hydrochlorothiazide  07/30/2018    Statins  08/08/2014            The following portions of the patient's history were reviewed and updated as appropriate: allergies, current medications, past family history, past medical history, past social history, past surgical history and problem list      Past Medical History:   Diagnosis Date    Anemia     Anxiety     Arthritis     CKD (chronic kidney disease), stage III ([facility] ) 11/29/2018    Depression, recurrent ([facility] ) 6/14/2021    Diabetes mellitus ([facility] )     Diabetic peripheral neuropathy ([facility] )     Hyperlipidemia     Hypertension     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 8/23/2017    Thyroid enlarged 2/23/2015    Tinnitus     Vertigo        Past Surgical History:   Procedure Laterality Date    APPENDECTOMY      EAR SURGERY Left     KNEE SURGERY      TONSILLECTOMY         Family History   Problem Relation Age of Onset    Diabetes Mother     Heart disease Mother     Heart disease Father     Stroke Father     Cancer Family     Dementia Sister     Liver disease Other          Medications have been verified  Objective   /58   Pulse 86   Temp 98 6 °F (37 °C)   Resp 20   Ht 5' 2" (1 575 m)   Wt 98 2 kg (216 lb 9 6 oz)   SpO2 98%   BMI 39 62 kg/m²   No LMP recorded  Patient is postmenopausal        Physical Exam     Physical Exam  Vitals and nursing note reviewed  Constitutional:       General: She is not in acute distress  Appearance: Normal appearance   She is well-developed  She is not ill-appearing or diaphoretic  Comments: Appears slightly winded with speech and ambulation   HENT:      Head: Normocephalic and atraumatic  Eyes:      Conjunctiva/sclera: Conjunctivae normal       Pupils: Pupils are equal, round, and reactive to light  Cardiovascular:      Rate and Rhythm: Normal rate and regular rhythm  Heart sounds: Normal heart sounds  Pulmonary:      Effort: Pulmonary effort is normal  No respiratory distress  Breath sounds: Normal breath sounds  No stridor  No wheezing, rhonchi or rales  Musculoskeletal:      Cervical back: Normal range of motion and neck supple  Lymphadenopathy:      Cervical: No cervical adenopathy  Skin:     General: Skin is warm and dry  Capillary Refill: Capillary refill takes less than 2 seconds  Findings: No bruising, erythema or rash  Comments: Bilateral non-pitting edema of feet and ankles   Neurological:      Mental Status: She is alert and oriented to person, place, and time  Cranial Nerves: No cranial nerve deficit  Sensory: No sensory deficit  Psychiatric:         Behavior: Behavior normal          Thought Content:  Thought content normal

## 2021-09-14 ENCOUNTER — RA CDI HCC (OUTPATIENT)
Dept: OTHER | Facility: HOSPITAL | Age: 86
End: 2021-09-14

## 2021-09-14 NOTE — PROGRESS NOTES
[place] [address]  coding opportunities             Chart Reviewed * (Number of) Inbasket suggestions sent to Provider: 3     Problem listed updated   Provider Accepted, (number of) suggestions accepted: 0     Provider Rejected Suggestions for: E11 42,I73 9,E66 01            Patients insurance company: [facility] [address]  (Medicare Advantage and [employer])           [place] [address]  coding opportunities     E11 42 T2DM with diabetic polyneuropathy ([place] [address] )     I73 9 PVD ([place] [address] )     E66 01 Morbid, severe, obesity due to excess calories --BMI 39     *ask for E11 51 if PVD is used     Chart Reviewed * (Number of) Inbasket suggestions sent to Provider: 3                  Patients insurance company: [address]  (Medicare Advantage and [employer])

## 2021-09-15 ENCOUNTER — OFFICE VISIT (OUTPATIENT)
Dept: FAMILY MEDICINE CLINIC | Facility: CLINIC | Age: 86
End: 2021-09-15
Payer: COMMERCIAL

## 2021-09-15 VITALS
WEIGHT: 200.4 LBS | OXYGEN SATURATION: 98 % | SYSTOLIC BLOOD PRESSURE: 124 MMHG | DIASTOLIC BLOOD PRESSURE: 68 MMHG | HEART RATE: 90 BPM | BODY MASS INDEX: 36.88 KG/M2 | RESPIRATION RATE: 18 BRPM | HEIGHT: 62 IN

## 2021-09-15 DIAGNOSIS — N18.32 TYPE 2 DIABETES MELLITUS WITH STAGE 3B CHRONIC KIDNEY DISEASE, WITHOUT LONG-TERM CURRENT USE OF INSULIN (HCC): ICD-10-CM

## 2021-09-15 DIAGNOSIS — E11.22 TYPE 2 DIABETES MELLITUS WITH STAGE 3B CHRONIC KIDNEY DISEASE, WITHOUT LONG-TERM CURRENT USE OF INSULIN (HCC): ICD-10-CM

## 2021-09-15 DIAGNOSIS — T14.8XXA HEMATOMA: ICD-10-CM

## 2021-09-15 DIAGNOSIS — Z23 NEEDS FLU SHOT: Primary | ICD-10-CM

## 2021-09-15 DIAGNOSIS — R19.5 CHANGE IN STOOL: ICD-10-CM

## 2021-09-15 DIAGNOSIS — I50.9 ACUTE ON CHRONIC CONGESTIVE HEART FAILURE, UNSPECIFIED HEART FAILURE TYPE (HCC): ICD-10-CM

## 2021-09-15 LAB — SL AMB POCT GLUCOSE BLD: 139

## 2021-09-15 PROCEDURE — 82948 REAGENT STRIP/BLOOD GLUCOSE: CPT | Performed by: FAMILY MEDICINE

## 2021-09-15 PROCEDURE — 1036F TOBACCO NON-USER: CPT | Performed by: FAMILY MEDICINE

## 2021-09-15 PROCEDURE — G0008 ADMIN INFLUENZA VIRUS VAC: HCPCS | Performed by: FAMILY MEDICINE

## 2021-09-15 PROCEDURE — 1160F RVW MEDS BY RX/DR IN RCRD: CPT | Performed by: FAMILY MEDICINE

## 2021-09-15 PROCEDURE — 90662 IIV NO PRSV INCREASED AG IM: CPT | Performed by: FAMILY MEDICINE

## 2021-09-15 PROCEDURE — 99214 OFFICE O/P EST MOD 30 MIN: CPT | Performed by: FAMILY MEDICINE

## 2021-09-15 RX ORDER — LINAGLIPTIN 5 MG/1
5 TABLET, FILM COATED ORAL DAILY
COMMUNITY
Start: 2021-09-05 | End: 2021-10-05 | Stop reason: SDUPTHER

## 2021-09-15 RX ORDER — CARVEDILOL 25 MG/1
25 TABLET ORAL 2 TIMES DAILY WITH MEALS
COMMUNITY
End: 2021-10-08 | Stop reason: SDUPTHER

## 2021-09-15 NOTE — LETTER
TO Whom It May Concern:  Ms Victor Hugo Abrams  1934 is under my care for heart failure, diabetes, severe arthritis and chronic kidney disease and is in need of home care to help with activities of daily living such as cooking, cleaning and self care  She is in need of 10 hours per day  Sincerely,  Efraín CHIANG/

## 2021-09-15 NOTE — PROGRESS NOTES
Assessment and Plan:    Problem List Items Addressed This Visit        Endocrine    Type 2 diabetes mellitus with diabetic chronic kidney disease ([facility] )    Relevant Medications    Tradjenta 5 MG TABS    Other Relevant Orders    Comprehensive metabolic panel    POCT blood glucose      Other Visit Diagnoses     Needs flu shot    -  Primary    Relevant Orders    influenza vaccine, high-dose, PF 0 7 mL (FLUZONE HIGH-DOSE) (Completed)    Acute on chronic congestive heart failure, unspecified heart failure type ([facility] )        Relevant Medications    carvedilol (COREG) 25 mg tablet    Change in stool        Relevant Orders    Clostridium difficile toxin by PCR    Hematoma                     Diagnoses and all orders for this visit:    Needs flu shot  -     influenza vaccine, high-dose, PF 0 7 mL (FLUZONE HIGH-DOSE)    Type 2 diabetes mellitus with stage 3b chronic kidney disease, without long-term current use of insulin ([facility] )  -     Comprehensive metabolic panel; Future  -     POCT blood glucose    Acute on chronic congestive heart failure, unspecified heart failure type ([facility])    Change in stool  -     Clostridium difficile toxin by PCR; Future    Hematoma    Other orders  -     carvedilol (COREG) 25 mg tablet; Take 25 mg by mouth 2 (two) times a day with meals  -     Tradjenta 5 MG TABS; Take 5 mg by mouth daily              Subjective:      Patient ID: Michelle Quintana is a 80 y o  female  CC:    Chief Complaint   Patient presents with    Follow-up     Patient present today for a follow up after being seen at [facility] due to shortness of breath  Pt will like to discuss with Dr Guy Lesch about her Bowel Movements and will also like her to look at her right hand since it is purple due to the IV         HPI:    ER follow up-lost 16 lb, urine and BMS are yellow, no foul smell, no cp or sob, sees cards Friday , here with nephew who was asking about lymphedema machine for her edema, I wiuld defer to cards      The following portions of the patient's history were reviewed and updated as appropriate: allergies, current medications, past family history, past medical history, past social history, past surgical history and problem list       Review of Systems   Constitutional: Positive for unexpected weight change  Negative for activity change, appetite change and fatigue  16 lb loss   Respiratory: Positive for shortness of breath  In heat gets worse   Cardiovascular: Positive for leg swelling  Negative for chest pain  Gastrointestinal: Positive for diarrhea  Neurological: Negative for dizziness and headaches  Hematological: Bruises/bleeds easily  Data to review:       Objective:    Vitals:    09/15/21 1539   BP: 124/68   BP Location: Right arm   Patient Position: Sitting   Cuff Size: Large   Pulse: 90   Resp: 18   SpO2: 98%   Weight: 90 9 kg (200 lb 6 4 oz)   Height: 5' 2" (1 575 m)        Physical Exam  Vitals reviewed  Constitutional:       Appearance: Normal appearance  She is obese  Cardiovascular:      Rate and Rhythm: Normal rate and regular rhythm  Pulses: Normal pulses  Heart sounds: Normal heart sounds  Pulmonary:      Effort: Pulmonary effort is normal       Breath sounds: Normal breath sounds  Musculoskeletal:      Right lower leg: Edema present  Left lower leg: Edema present  Comments: 2 plus edema   Skin:     Findings: Bruising present  Comments: r hand bruise   Neurological:      Mental Status: She is alert

## 2021-09-16 ENCOUNTER — APPOINTMENT (OUTPATIENT)
Dept: LAB | Facility: CLINIC | Age: 86
End: 2021-09-16
Payer: COMMERCIAL

## 2021-09-16 DIAGNOSIS — R19.5 CHANGE IN STOOL: ICD-10-CM

## 2021-09-16 PROCEDURE — 87493 C DIFF AMPLIFIED PROBE: CPT

## 2021-09-17 LAB — C DIFF TOX GENS STL QL NAA+PROBE: NEGATIVE

## 2021-09-21 ENCOUNTER — TELEPHONE (OUTPATIENT)
Dept: FAMILY MEDICINE CLINIC | Facility: CLINIC | Age: 86
End: 2021-09-21

## 2021-09-21 NOTE — TELEPHONE ENCOUNTER
PATIENT QUESTIONING WHETHER OR NOT SHE STILL NEEDS TO GO FOR THE DUPLEX ON 10/14/2021? PLEASE SEE THE CHART REGARDING CARDIOLOGY  PATIENT WOULD LIKE TO KNOW IF TEST IS STILL NEEDED?  PLEASE ADVISE

## 2021-09-30 ENCOUNTER — TRANSITIONAL CARE MANAGEMENT (OUTPATIENT)
Dept: FAMILY MEDICINE CLINIC | Facility: CLINIC | Age: 86
End: 2021-09-30

## 2021-10-05 ENCOUNTER — OFFICE VISIT (OUTPATIENT)
Dept: FAMILY MEDICINE CLINIC | Facility: CLINIC | Age: 86
End: 2021-10-05
Payer: COMMERCIAL

## 2021-10-05 VITALS
HEART RATE: 94 BPM | SYSTOLIC BLOOD PRESSURE: 118 MMHG | TEMPERATURE: 98.4 F | DIASTOLIC BLOOD PRESSURE: 70 MMHG | HEIGHT: 62 IN | BODY MASS INDEX: 35.77 KG/M2 | WEIGHT: 194.4 LBS

## 2021-10-05 DIAGNOSIS — I48.91 NEW ONSET A-FIB (HCC): Primary | ICD-10-CM

## 2021-10-05 DIAGNOSIS — R60.0 BILATERAL LEG EDEMA: ICD-10-CM

## 2021-10-05 DIAGNOSIS — E11.22 TYPE 2 DIABETES MELLITUS WITH STAGE 3B CHRONIC KIDNEY DISEASE, WITHOUT LONG-TERM CURRENT USE OF INSULIN (HCC): ICD-10-CM

## 2021-10-05 DIAGNOSIS — I50.9 CHRONIC CONGESTIVE HEART FAILURE, UNSPECIFIED HEART FAILURE TYPE (HCC): ICD-10-CM

## 2021-10-05 DIAGNOSIS — N18.32 TYPE 2 DIABETES MELLITUS WITH STAGE 3B CHRONIC KIDNEY DISEASE, WITHOUT LONG-TERM CURRENT USE OF INSULIN (HCC): ICD-10-CM

## 2021-10-05 PROCEDURE — 99496 TRANSJ CARE MGMT HIGH F2F 7D: CPT | Performed by: FAMILY MEDICINE

## 2021-10-05 RX ORDER — TORSEMIDE 20 MG/1
40 TABLET ORAL 2 TIMES DAILY
COMMUNITY
Start: 2021-09-28 | End: 2021-10-05 | Stop reason: SDUPTHER

## 2021-10-05 RX ORDER — PSEUDOEPHEDRINE HCL 30 MG
100 TABLET ORAL 2 TIMES DAILY
COMMUNITY

## 2021-10-05 RX ORDER — LINAGLIPTIN 5 MG/1
5 TABLET, FILM COATED ORAL DAILY
Qty: 30 TABLET | Refills: 5
Start: 2021-10-05 | End: 2022-03-02 | Stop reason: ALTCHOICE

## 2021-10-05 RX ORDER — DOXAZOSIN 2 MG/1
TABLET ORAL
COMMUNITY
Start: 2021-09-21 | End: 2021-10-05

## 2021-10-05 RX ORDER — TORSEMIDE 20 MG/1
TABLET ORAL
Qty: 120 TABLET | Refills: 11
Start: 2021-10-05 | End: 2021-10-25

## 2021-10-06 ENCOUNTER — TELEPHONE (OUTPATIENT)
Dept: FAMILY MEDICINE CLINIC | Facility: CLINIC | Age: 86
End: 2021-10-06

## 2021-10-07 ENCOUNTER — APPOINTMENT (OUTPATIENT)
Dept: LAB | Facility: CLINIC | Age: 86
End: 2021-10-07
Payer: COMMERCIAL

## 2021-10-07 DIAGNOSIS — E11.22 TYPE 2 DIABETES MELLITUS WITH STAGE 3B CHRONIC KIDNEY DISEASE, WITHOUT LONG-TERM CURRENT USE OF INSULIN (HCC): ICD-10-CM

## 2021-10-07 DIAGNOSIS — I50.9 CHRONIC CONGESTIVE HEART FAILURE, UNSPECIFIED HEART FAILURE TYPE (HCC): ICD-10-CM

## 2021-10-07 DIAGNOSIS — N18.32 TYPE 2 DIABETES MELLITUS WITH STAGE 3B CHRONIC KIDNEY DISEASE, WITHOUT LONG-TERM CURRENT USE OF INSULIN (HCC): ICD-10-CM

## 2021-10-07 LAB
ALBUMIN SERPL BCP-MCNC: 3.7 G/DL (ref 3.5–5)
ALP SERPL-CCNC: 114 U/L (ref 46–116)
ALT SERPL W P-5'-P-CCNC: 34 U/L (ref 12–78)
ANION GAP SERPL CALCULATED.3IONS-SCNC: 5 MMOL/L (ref 4–13)
AST SERPL W P-5'-P-CCNC: 23 U/L (ref 5–45)
BILIRUB SERPL-MCNC: 0.98 MG/DL (ref 0.2–1)
BUN SERPL-MCNC: 26 MG/DL (ref 5–25)
CALCIUM SERPL-MCNC: 9.7 MG/DL (ref 8.3–10.1)
CHLORIDE SERPL-SCNC: 95 MMOL/L (ref 100–108)
CO2 SERPL-SCNC: 32 MMOL/L (ref 21–32)
CREAT SERPL-MCNC: 1.43 MG/DL (ref 0.6–1.3)
GFR SERPL CREATININE-BSD FRML MDRD: 33 ML/MIN/1.73SQ M
GLUCOSE P FAST SERPL-MCNC: 165 MG/DL (ref 65–99)
POTASSIUM SERPL-SCNC: 3.8 MMOL/L (ref 3.5–5.3)
PROT SERPL-MCNC: 7.3 G/DL (ref 6.4–8.2)
SODIUM SERPL-SCNC: 132 MMOL/L (ref 136–145)

## 2021-10-07 PROCEDURE — 80053 COMPREHEN METABOLIC PANEL: CPT

## 2021-10-07 PROCEDURE — 36415 COLL VENOUS BLD VENIPUNCTURE: CPT

## 2021-10-08 DIAGNOSIS — I50.9 CHRONIC CONGESTIVE HEART FAILURE, UNSPECIFIED HEART FAILURE TYPE (HCC): Primary | ICD-10-CM

## 2021-10-08 DIAGNOSIS — I48.91 NEW ONSET A-FIB (HCC): ICD-10-CM

## 2021-10-08 RX ORDER — CARVEDILOL 25 MG/1
25 TABLET ORAL 2 TIMES DAILY WITH MEALS
Qty: 60 TABLET | Refills: 5 | Status: SHIPPED | OUTPATIENT
Start: 2021-10-08

## 2021-10-12 ENCOUNTER — TELEPHONE (OUTPATIENT)
Dept: NEPHROLOGY | Facility: CLINIC | Age: 86
End: 2021-10-12

## 2021-10-12 DIAGNOSIS — E87.1 HYPONATREMIA: Primary | ICD-10-CM

## 2021-10-12 DIAGNOSIS — N18.31 STAGE 3A CHRONIC KIDNEY DISEASE (HCC): ICD-10-CM

## 2021-10-18 ENCOUNTER — TELEPHONE (OUTPATIENT)
Dept: FAMILY MEDICINE CLINIC | Facility: CLINIC | Age: 86
End: 2021-10-18

## 2021-10-19 ENCOUNTER — HOSPITAL ENCOUNTER (OUTPATIENT)
Dept: NON INVASIVE DIAGNOSTICS | Facility: CLINIC | Age: 86
Discharge: HOME | End: 2021-10-19
Payer: COMMERCIAL

## 2021-10-19 DIAGNOSIS — I73.9 PAD (PERIPHERAL ARTERY DISEASE) (HCC): ICD-10-CM

## 2021-10-19 PROCEDURE — 93925 LOWER EXTREMITY STUDY: CPT | Performed by: SURGERY

## 2021-10-19 PROCEDURE — 93923 UPR/LXTR ART STDY 3+ LVLS: CPT

## 2021-10-19 PROCEDURE — 93925 LOWER EXTREMITY STUDY: CPT

## 2021-10-19 PROCEDURE — 93922 UPR/L XTREMITY ART 2 LEVELS: CPT | Performed by: SURGERY

## 2021-10-22 ENCOUNTER — APPOINTMENT (OUTPATIENT)
Dept: LAB | Facility: CLINIC | Age: 86
End: 2021-10-22
Payer: COMMERCIAL

## 2021-10-22 DIAGNOSIS — N18.31 STAGE 3A CHRONIC KIDNEY DISEASE (HCC): ICD-10-CM

## 2021-10-22 DIAGNOSIS — E87.1 HYPONATREMIA: ICD-10-CM

## 2021-10-22 LAB
ANION GAP SERPL CALCULATED.3IONS-SCNC: 2 MMOL/L (ref 4–13)
BUN SERPL-MCNC: 27 MG/DL (ref 5–25)
CALCIUM SERPL-MCNC: 9.5 MG/DL (ref 8.3–10.1)
CHLORIDE SERPL-SCNC: 93 MMOL/L (ref 100–108)
CO2 SERPL-SCNC: 34 MMOL/L (ref 21–32)
CREAT SERPL-MCNC: 1.86 MG/DL (ref 0.6–1.3)
GFR SERPL CREATININE-BSD FRML MDRD: 24 ML/MIN/1.73SQ M
GLUCOSE P FAST SERPL-MCNC: 180 MG/DL (ref 65–99)
POTASSIUM SERPL-SCNC: 3.9 MMOL/L (ref 3.5–5.3)
SODIUM SERPL-SCNC: 129 MMOL/L (ref 136–145)

## 2021-10-22 PROCEDURE — 80048 BASIC METABOLIC PNL TOTAL CA: CPT

## 2021-10-22 PROCEDURE — 36415 COLL VENOUS BLD VENIPUNCTURE: CPT

## 2021-10-23 ENCOUNTER — APPOINTMENT (OUTPATIENT)
Dept: LAB | Facility: CLINIC | Age: 86
End: 2021-10-23
Payer: COMMERCIAL

## 2021-10-23 LAB
OSMOLALITY UR: 114 MMOL/KG
SODIUM 24H UR-SCNC: 16 MOL/L

## 2021-10-23 PROCEDURE — 84300 ASSAY OF URINE SODIUM: CPT | Performed by: INTERNAL MEDICINE

## 2021-10-23 PROCEDURE — 83935 ASSAY OF URINE OSMOLALITY: CPT | Performed by: INTERNAL MEDICINE

## 2021-10-25 ENCOUNTER — TELEPHONE (OUTPATIENT)
Dept: OTHER | Facility: HOSPITAL | Age: 86
End: 2021-10-25

## 2021-10-25 ENCOUNTER — TELEPHONE (OUTPATIENT)
Dept: FAMILY MEDICINE CLINIC | Facility: CLINIC | Age: 86
End: 2021-10-25

## 2021-10-25 RX ORDER — AMIODARONE HYDROCHLORIDE 200 MG/1
200 TABLET ORAL DAILY
COMMUNITY
Start: 2021-10-12

## 2021-10-27 ENCOUNTER — OFFICE VISIT (OUTPATIENT)
Dept: VASCULAR SURGERY | Facility: CLINIC | Age: 86
End: 2021-10-27
Payer: COMMERCIAL

## 2021-10-27 VITALS
HEART RATE: 60 BPM | SYSTOLIC BLOOD PRESSURE: 132 MMHG | DIASTOLIC BLOOD PRESSURE: 64 MMHG | BODY MASS INDEX: 34.04 KG/M2 | WEIGHT: 185 LBS | HEIGHT: 62 IN

## 2021-10-27 DIAGNOSIS — N18.31 STAGE 3A CHRONIC KIDNEY DISEASE (HCC): ICD-10-CM

## 2021-10-27 DIAGNOSIS — I73.9 PAD (PERIPHERAL ARTERY DISEASE) (HCC): Primary | ICD-10-CM

## 2021-10-27 DIAGNOSIS — E11.9 TYPE 2 DIABETES MELLITUS WITHOUT COMPLICATION, WITHOUT LONG-TERM CURRENT USE OF INSULIN (HCC): ICD-10-CM

## 2021-10-27 DIAGNOSIS — E78.5 HYPERLIPIDEMIA, UNSPECIFIED HYPERLIPIDEMIA TYPE: ICD-10-CM

## 2021-10-27 PROCEDURE — 1036F TOBACCO NON-USER: CPT | Performed by: PHYSICIAN ASSISTANT

## 2021-10-27 PROCEDURE — 99214 OFFICE O/P EST MOD 30 MIN: CPT | Performed by: PHYSICIAN ASSISTANT

## 2021-10-27 PROCEDURE — 1160F RVW MEDS BY RX/DR IN RCRD: CPT | Performed by: PHYSICIAN ASSISTANT

## 2021-10-27 RX ORDER — TORSEMIDE 20 MG/1
20 TABLET ORAL DAILY
COMMUNITY
End: 2022-01-04 | Stop reason: SDUPTHER

## 2021-10-29 ENCOUNTER — OFFICE VISIT (OUTPATIENT)
Dept: FAMILY MEDICINE CLINIC | Facility: CLINIC | Age: 86
End: 2021-10-29
Payer: COMMERCIAL

## 2021-10-29 VITALS
HEART RATE: 78 BPM | BODY MASS INDEX: 33.97 KG/M2 | SYSTOLIC BLOOD PRESSURE: 140 MMHG | DIASTOLIC BLOOD PRESSURE: 80 MMHG | WEIGHT: 184.6 LBS | HEIGHT: 62 IN

## 2021-10-29 DIAGNOSIS — M25.512 CHRONIC PAIN OF BOTH SHOULDERS: ICD-10-CM

## 2021-10-29 DIAGNOSIS — E87.1 HYPONATREMIA: ICD-10-CM

## 2021-10-29 DIAGNOSIS — M25.511 CHRONIC PAIN OF BOTH SHOULDERS: ICD-10-CM

## 2021-10-29 DIAGNOSIS — N18.32 STAGE 3B CHRONIC KIDNEY DISEASE (HCC): ICD-10-CM

## 2021-10-29 DIAGNOSIS — M51.36 DEGENERATIVE DISC DISEASE, LUMBAR: Primary | ICD-10-CM

## 2021-10-29 DIAGNOSIS — Z00.00 MEDICARE ANNUAL WELLNESS VISIT, SUBSEQUENT: ICD-10-CM

## 2021-10-29 DIAGNOSIS — M79.642 PAIN IN BOTH HANDS: ICD-10-CM

## 2021-10-29 DIAGNOSIS — G89.29 CHRONIC PAIN OF BOTH SHOULDERS: ICD-10-CM

## 2021-10-29 DIAGNOSIS — E11.9 TYPE 2 DIABETES MELLITUS WITHOUT COMPLICATION, WITHOUT LONG-TERM CURRENT USE OF INSULIN (HCC): ICD-10-CM

## 2021-10-29 DIAGNOSIS — M79.641 PAIN IN BOTH HANDS: ICD-10-CM

## 2021-10-29 PROBLEM — M25.611 SHOULDER JOINT STIFFNESS, BILATERAL: Status: RESOLVED | Noted: 2020-01-31 | Resolved: 2021-10-29

## 2021-10-29 PROBLEM — M25.612 SHOULDER JOINT STIFFNESS, BILATERAL: Status: RESOLVED | Noted: 2020-01-31 | Resolved: 2021-10-29

## 2021-10-29 PROCEDURE — 1160F RVW MEDS BY RX/DR IN RCRD: CPT | Performed by: FAMILY MEDICINE

## 2021-10-29 PROCEDURE — G0439 PPPS, SUBSEQ VISIT: HCPCS | Performed by: FAMILY MEDICINE

## 2021-10-29 PROCEDURE — 99214 OFFICE O/P EST MOD 30 MIN: CPT | Performed by: FAMILY MEDICINE

## 2021-10-29 RX ORDER — BLOOD-GLUCOSE METER
EACH MISCELLANEOUS DAILY
Qty: 1 KIT | Refills: 0 | Status: SHIPPED | OUTPATIENT
Start: 2021-10-29

## 2021-10-30 ENCOUNTER — TELEPHONE (OUTPATIENT)
Dept: FAMILY MEDICINE CLINIC | Facility: CLINIC | Age: 86
End: 2021-10-30

## 2021-10-30 DIAGNOSIS — M51.36 DEGENERATIVE DISC DISEASE, LUMBAR: ICD-10-CM

## 2021-10-30 DIAGNOSIS — G89.29 CHRONIC PAIN OF BOTH SHOULDERS: ICD-10-CM

## 2021-10-30 DIAGNOSIS — M25.511 CHRONIC PAIN OF BOTH SHOULDERS: ICD-10-CM

## 2021-10-30 DIAGNOSIS — M25.512 CHRONIC PAIN OF BOTH SHOULDERS: ICD-10-CM

## 2021-10-30 DIAGNOSIS — R31.9 HEMATURIA, UNSPECIFIED TYPE: Primary | ICD-10-CM

## 2021-11-01 ENCOUNTER — APPOINTMENT (OUTPATIENT)
Dept: LAB | Facility: CLINIC | Age: 86
End: 2021-11-01
Payer: COMMERCIAL

## 2021-11-01 ENCOUNTER — TELEPHONE (OUTPATIENT)
Dept: NEPHROLOGY | Facility: CLINIC | Age: 86
End: 2021-11-01

## 2021-11-01 DIAGNOSIS — N18.31 STAGE 3A CHRONIC KIDNEY DISEASE (HCC): Primary | ICD-10-CM

## 2021-11-01 DIAGNOSIS — E87.1 HYPONATREMIA: ICD-10-CM

## 2021-11-01 DIAGNOSIS — N18.32 STAGE 3B CHRONIC KIDNEY DISEASE (HCC): ICD-10-CM

## 2021-11-01 DIAGNOSIS — E87.6 HYPOKALEMIA: ICD-10-CM

## 2021-11-01 DIAGNOSIS — R31.9 HEMATURIA, UNSPECIFIED TYPE: ICD-10-CM

## 2021-11-01 DIAGNOSIS — E11.9 TYPE 2 DIABETES MELLITUS WITHOUT COMPLICATION, WITHOUT LONG-TERM CURRENT USE OF INSULIN (HCC): ICD-10-CM

## 2021-11-01 LAB
ANION GAP SERPL CALCULATED.3IONS-SCNC: 5 MMOL/L (ref 4–13)
BILIRUB UR QL STRIP: NEGATIVE
BUN SERPL-MCNC: 22 MG/DL (ref 5–25)
CALCIUM SERPL-MCNC: 9.2 MG/DL (ref 8.3–10.1)
CHLORIDE SERPL-SCNC: 98 MMOL/L (ref 100–108)
CLARITY UR: CLEAR
CO2 SERPL-SCNC: 30 MMOL/L (ref 21–32)
COLOR UR: YELLOW
CREAT SERPL-MCNC: 1.43 MG/DL (ref 0.6–1.3)
GFR SERPL CREATININE-BSD FRML MDRD: 33 ML/MIN/1.73SQ M
GLUCOSE P FAST SERPL-MCNC: 137 MG/DL (ref 65–99)
GLUCOSE UR STRIP-MCNC: NEGATIVE MG/DL
HGB UR QL STRIP.AUTO: NEGATIVE
KETONES UR STRIP-MCNC: NEGATIVE MG/DL
LEUKOCYTE ESTERASE UR QL STRIP: NEGATIVE
NITRITE UR QL STRIP: NEGATIVE
PH UR STRIP.AUTO: 7 [PH]
POTASSIUM SERPL-SCNC: 3.9 MMOL/L (ref 3.5–5.3)
PROT UR STRIP-MCNC: NEGATIVE MG/DL
SODIUM SERPL-SCNC: 133 MMOL/L (ref 136–145)
SP GR UR STRIP.AUTO: 1.01 (ref 1–1.03)
UROBILINOGEN UR QL STRIP.AUTO: 0.2 E.U./DL

## 2021-11-01 PROCEDURE — 36415 COLL VENOUS BLD VENIPUNCTURE: CPT

## 2021-11-01 PROCEDURE — 81003 URINALYSIS AUTO W/O SCOPE: CPT

## 2021-11-01 PROCEDURE — 80048 BASIC METABOLIC PNL TOTAL CA: CPT

## 2021-11-08 ENCOUNTER — APPOINTMENT (OUTPATIENT)
Dept: LAB | Facility: CLINIC | Age: 86
End: 2021-11-08
Payer: COMMERCIAL

## 2021-11-08 ENCOUNTER — TELEPHONE (OUTPATIENT)
Dept: NEPHROLOGY | Facility: CLINIC | Age: 86
End: 2021-11-08

## 2021-11-08 DIAGNOSIS — N18.31 STAGE 3A CHRONIC KIDNEY DISEASE (HCC): ICD-10-CM

## 2021-11-08 DIAGNOSIS — E87.1 HYPONATREMIA: ICD-10-CM

## 2021-11-08 DIAGNOSIS — E87.1 HYPONATREMIA: Primary | ICD-10-CM

## 2021-11-08 LAB — OSMOLALITY UR/SERPL-RTO: 281 MMOL/KG (ref 282–298)

## 2021-11-08 PROCEDURE — 83930 ASSAY OF BLOOD OSMOLALITY: CPT

## 2021-11-10 ENCOUNTER — TELEPHONE (OUTPATIENT)
Dept: FAMILY MEDICINE CLINIC | Facility: CLINIC | Age: 86
End: 2021-11-10

## 2021-11-10 DIAGNOSIS — E87.1 HYPONATREMIA: ICD-10-CM

## 2021-11-10 RX ORDER — DICYCLOMINE HYDROCHLORIDE 10 MG/1
CAPSULE ORAL
Qty: 360 CAPSULE | Refills: 0 | Status: SHIPPED | OUTPATIENT
Start: 2021-11-10 | End: 2021-11-16

## 2021-11-15 DIAGNOSIS — R41.3 MEMORY LOSS: Primary | ICD-10-CM

## 2021-11-16 DIAGNOSIS — E87.1 HYPONATREMIA: ICD-10-CM

## 2021-11-16 RX ORDER — DICYCLOMINE HYDROCHLORIDE 10 MG/1
CAPSULE ORAL
Qty: 360 CAPSULE | Refills: 0 | Status: SHIPPED | OUTPATIENT
Start: 2021-11-16

## 2021-11-22 ENCOUNTER — APPOINTMENT (OUTPATIENT)
Dept: LAB | Facility: CLINIC | Age: 86
End: 2021-11-22
Payer: COMMERCIAL

## 2021-11-22 DIAGNOSIS — E87.1 HYPONATREMIA: ICD-10-CM

## 2021-11-22 DIAGNOSIS — N18.31 STAGE 3A CHRONIC KIDNEY DISEASE (HCC): ICD-10-CM

## 2021-11-22 LAB
OSMOLALITY UR: 224 MMOL/KG
SODIUM 24H UR-SCNC: 41 MOL/L

## 2021-11-22 PROCEDURE — 83935 ASSAY OF URINE OSMOLALITY: CPT

## 2021-11-22 PROCEDURE — 84300 ASSAY OF URINE SODIUM: CPT

## 2021-11-30 ENCOUNTER — OFFICE VISIT (OUTPATIENT)
Dept: FAMILY MEDICINE CLINIC | Facility: CLINIC | Age: 86
End: 2021-11-30
Payer: COMMERCIAL

## 2021-11-30 VITALS
SYSTOLIC BLOOD PRESSURE: 152 MMHG | WEIGHT: 193 LBS | HEIGHT: 62 IN | DIASTOLIC BLOOD PRESSURE: 76 MMHG | OXYGEN SATURATION: 98 % | HEART RATE: 58 BPM | BODY MASS INDEX: 35.51 KG/M2

## 2021-11-30 DIAGNOSIS — I48.91 NEW ONSET A-FIB (HCC): ICD-10-CM

## 2021-11-30 DIAGNOSIS — M79.641 PAIN OF RIGHT HAND: ICD-10-CM

## 2021-11-30 DIAGNOSIS — R60.0 BILATERAL LEG EDEMA: ICD-10-CM

## 2021-11-30 DIAGNOSIS — E87.1 HYPONATREMIA: ICD-10-CM

## 2021-11-30 DIAGNOSIS — E11.9 TYPE 2 DIABETES MELLITUS WITHOUT COMPLICATION, WITHOUT LONG-TERM CURRENT USE OF INSULIN (HCC): Primary | ICD-10-CM

## 2021-11-30 DIAGNOSIS — N18.32 STAGE 3B CHRONIC KIDNEY DISEASE (HCC): ICD-10-CM

## 2021-11-30 DIAGNOSIS — I50.9 CHRONIC CONGESTIVE HEART FAILURE, UNSPECIFIED HEART FAILURE TYPE (HCC): ICD-10-CM

## 2021-11-30 LAB — SL AMB POCT HEMOGLOBIN AIC: 6.5 (ref ?–6.5)

## 2021-11-30 PROCEDURE — 83036 HEMOGLOBIN GLYCOSYLATED A1C: CPT | Performed by: FAMILY MEDICINE

## 2021-11-30 PROCEDURE — 99214 OFFICE O/P EST MOD 30 MIN: CPT | Performed by: FAMILY MEDICINE

## 2021-12-01 ENCOUNTER — TELEPHONE (OUTPATIENT)
Dept: NEPHROLOGY | Facility: CLINIC | Age: 86
End: 2021-12-01

## 2021-12-01 ENCOUNTER — TELEPHONE (OUTPATIENT)
Dept: FAMILY MEDICINE CLINIC | Facility: CLINIC | Age: 86
End: 2021-12-01

## 2021-12-01 DIAGNOSIS — M79.641 PAIN OF RIGHT HAND: Primary | ICD-10-CM

## 2021-12-03 ENCOUNTER — TELEPHONE (OUTPATIENT)
Dept: FAMILY MEDICINE CLINIC | Facility: CLINIC | Age: 86
End: 2021-12-03

## 2021-12-06 ENCOUNTER — APPOINTMENT (OUTPATIENT)
Dept: LAB | Facility: CLINIC | Age: 86
End: 2021-12-06
Payer: COMMERCIAL

## 2021-12-06 DIAGNOSIS — R60.0 BILATERAL LEG EDEMA: ICD-10-CM

## 2021-12-06 DIAGNOSIS — N18.32 STAGE 3B CHRONIC KIDNEY DISEASE (HCC): ICD-10-CM

## 2021-12-06 LAB
ANION GAP SERPL CALCULATED.3IONS-SCNC: 8 MMOL/L (ref 4–13)
BUN SERPL-MCNC: 11 MG/DL (ref 5–25)
CALCIUM SERPL-MCNC: 9.6 MG/DL (ref 8.3–10.1)
CHLORIDE SERPL-SCNC: 93 MMOL/L (ref 100–108)
CO2 SERPL-SCNC: 32 MMOL/L (ref 21–32)
CREAT SERPL-MCNC: 1.08 MG/DL (ref 0.6–1.3)
GFR SERPL CREATININE-BSD FRML MDRD: 46 ML/MIN/1.73SQ M
GLUCOSE P FAST SERPL-MCNC: 116 MG/DL (ref 65–99)
POTASSIUM SERPL-SCNC: 3.2 MMOL/L (ref 3.5–5.3)
SODIUM SERPL-SCNC: 133 MMOL/L (ref 136–145)

## 2021-12-06 PROCEDURE — 80048 BASIC METABOLIC PNL TOTAL CA: CPT

## 2021-12-06 PROCEDURE — 36415 COLL VENOUS BLD VENIPUNCTURE: CPT

## 2021-12-07 ENCOUNTER — OFFICE VISIT (OUTPATIENT)
Dept: NEPHROLOGY | Facility: CLINIC | Age: 86
End: 2021-12-07
Payer: COMMERCIAL

## 2021-12-07 VITALS
HEIGHT: 62 IN | SYSTOLIC BLOOD PRESSURE: 136 MMHG | DIASTOLIC BLOOD PRESSURE: 70 MMHG | BODY MASS INDEX: 32.65 KG/M2 | WEIGHT: 177.4 LBS | HEART RATE: 61 BPM

## 2021-12-07 DIAGNOSIS — I12.9 BENIGN HYPERTENSION WITH CKD (CHRONIC KIDNEY DISEASE) STAGE III (HCC): Primary | ICD-10-CM

## 2021-12-07 DIAGNOSIS — E87.6 HYPOKALEMIA: ICD-10-CM

## 2021-12-07 DIAGNOSIS — N18.30 BENIGN HYPERTENSION WITH CKD (CHRONIC KIDNEY DISEASE) STAGE III (HCC): Primary | ICD-10-CM

## 2021-12-07 DIAGNOSIS — E87.1 HYPONATREMIA: ICD-10-CM

## 2021-12-07 DIAGNOSIS — R60.0 BILATERAL LEG EDEMA: ICD-10-CM

## 2021-12-07 DIAGNOSIS — N18.31 STAGE 3A CHRONIC KIDNEY DISEASE (HCC): ICD-10-CM

## 2021-12-07 PROCEDURE — 1036F TOBACCO NON-USER: CPT | Performed by: INTERNAL MEDICINE

## 2021-12-07 PROCEDURE — 99214 OFFICE O/P EST MOD 30 MIN: CPT | Performed by: INTERNAL MEDICINE

## 2021-12-07 PROCEDURE — 1160F RVW MEDS BY RX/DR IN RCRD: CPT | Performed by: INTERNAL MEDICINE

## 2021-12-07 RX ORDER — POTASSIUM CHLORIDE 20 MEQ/1
20 TABLET, EXTENDED RELEASE ORAL DAILY
Qty: 30 TABLET | Refills: 5 | Status: SHIPPED | OUTPATIENT
Start: 2021-12-07 | End: 2022-01-04 | Stop reason: SDUPTHER

## 2021-12-08 ENCOUNTER — EVALUATION (OUTPATIENT)
Dept: PHYSICAL THERAPY | Facility: REHABILITATION | Age: 86
End: 2021-12-08
Payer: COMMERCIAL

## 2021-12-08 DIAGNOSIS — G89.29 CHRONIC HAND PAIN, RIGHT: Primary | ICD-10-CM

## 2021-12-08 DIAGNOSIS — M79.641 CHRONIC HAND PAIN, RIGHT: Primary | ICD-10-CM

## 2021-12-08 PROCEDURE — 97140 MANUAL THERAPY 1/> REGIONS: CPT | Performed by: PHYSICAL THERAPIST

## 2021-12-08 PROCEDURE — 97110 THERAPEUTIC EXERCISES: CPT | Performed by: PHYSICAL THERAPIST

## 2021-12-08 PROCEDURE — 97161 PT EVAL LOW COMPLEX 20 MIN: CPT | Performed by: PHYSICAL THERAPIST

## 2021-12-17 ENCOUNTER — OFFICE VISIT (OUTPATIENT)
Dept: PHYSICAL THERAPY | Facility: REHABILITATION | Age: 86
End: 2021-12-17
Payer: COMMERCIAL

## 2021-12-17 DIAGNOSIS — M79.641 CHRONIC HAND PAIN, RIGHT: Primary | ICD-10-CM

## 2021-12-17 DIAGNOSIS — G89.29 CHRONIC HAND PAIN, RIGHT: Primary | ICD-10-CM

## 2021-12-17 PROCEDURE — 97110 THERAPEUTIC EXERCISES: CPT | Performed by: PHYSICAL THERAPIST

## 2021-12-17 PROCEDURE — 97140 MANUAL THERAPY 1/> REGIONS: CPT | Performed by: PHYSICAL THERAPIST

## 2021-12-20 ENCOUNTER — OFFICE VISIT (OUTPATIENT)
Dept: PHYSICAL THERAPY | Facility: REHABILITATION | Age: 86
End: 2021-12-20
Payer: COMMERCIAL

## 2021-12-20 DIAGNOSIS — G89.29 CHRONIC HAND PAIN, RIGHT: Primary | ICD-10-CM

## 2021-12-20 DIAGNOSIS — M79.641 CHRONIC HAND PAIN, RIGHT: Primary | ICD-10-CM

## 2021-12-20 PROCEDURE — 97140 MANUAL THERAPY 1/> REGIONS: CPT | Performed by: PHYSICAL THERAPIST

## 2021-12-20 PROCEDURE — 97110 THERAPEUTIC EXERCISES: CPT | Performed by: PHYSICAL THERAPIST

## 2021-12-28 ENCOUNTER — OFFICE VISIT (OUTPATIENT)
Dept: PHYSICAL THERAPY | Facility: REHABILITATION | Age: 86
End: 2021-12-28
Payer: COMMERCIAL

## 2021-12-28 DIAGNOSIS — M79.641 CHRONIC HAND PAIN, RIGHT: Primary | ICD-10-CM

## 2021-12-28 DIAGNOSIS — G89.29 CHRONIC HAND PAIN, RIGHT: Primary | ICD-10-CM

## 2021-12-28 PROCEDURE — 97110 THERAPEUTIC EXERCISES: CPT | Performed by: PHYSICAL THERAPIST

## 2021-12-28 PROCEDURE — 97140 MANUAL THERAPY 1/> REGIONS: CPT | Performed by: PHYSICAL THERAPIST

## 2022-01-03 ENCOUNTER — APPOINTMENT (OUTPATIENT)
Dept: LAB | Facility: CLINIC | Age: 87
End: 2022-01-03
Payer: COMMERCIAL

## 2022-01-03 DIAGNOSIS — N18.31 STAGE 3A CHRONIC KIDNEY DISEASE (HCC): ICD-10-CM

## 2022-01-03 DIAGNOSIS — E87.1 HYPONATREMIA: ICD-10-CM

## 2022-01-03 DIAGNOSIS — E87.6 HYPOKALEMIA: ICD-10-CM

## 2022-01-03 LAB
ANION GAP SERPL CALCULATED.3IONS-SCNC: 6 MMOL/L (ref 4–13)
BUN SERPL-MCNC: 23 MG/DL (ref 5–25)
CALCIUM SERPL-MCNC: 9.4 MG/DL (ref 8.3–10.1)
CHLORIDE SERPL-SCNC: 102 MMOL/L (ref 100–108)
CO2 SERPL-SCNC: 30 MMOL/L (ref 21–32)
CREAT SERPL-MCNC: 1.39 MG/DL (ref 0.6–1.3)
GFR SERPL CREATININE-BSD FRML MDRD: 34 ML/MIN/1.73SQ M
GLUCOSE P FAST SERPL-MCNC: 133 MG/DL (ref 65–99)
POTASSIUM SERPL-SCNC: 4.3 MMOL/L (ref 3.5–5.3)
SODIUM SERPL-SCNC: 138 MMOL/L (ref 136–145)

## 2022-01-03 PROCEDURE — 80048 BASIC METABOLIC PNL TOTAL CA: CPT

## 2022-01-03 PROCEDURE — 36415 COLL VENOUS BLD VENIPUNCTURE: CPT

## 2022-01-04 ENCOUNTER — TELEPHONE (OUTPATIENT)
Dept: NEPHROLOGY | Facility: CLINIC | Age: 87
End: 2022-01-04

## 2022-01-04 ENCOUNTER — APPOINTMENT (OUTPATIENT)
Dept: PHYSICAL THERAPY | Facility: REHABILITATION | Age: 87
End: 2022-01-04
Payer: COMMERCIAL

## 2022-01-04 DIAGNOSIS — E87.6 HYPOKALEMIA: ICD-10-CM

## 2022-01-04 DIAGNOSIS — R60.0 BILATERAL LEG EDEMA: Primary | ICD-10-CM

## 2022-01-04 RX ORDER — TORSEMIDE 20 MG/1
20 TABLET ORAL EVERY OTHER DAY
Qty: 30 TABLET | Refills: 3
Start: 2022-01-04

## 2022-01-04 RX ORDER — POTASSIUM CHLORIDE 20 MEQ/1
20 TABLET, EXTENDED RELEASE ORAL EVERY OTHER DAY
Qty: 30 TABLET | Refills: 5
Start: 2022-01-04 | End: 2022-07-13 | Stop reason: DRUGHIGH

## 2022-01-04 NOTE — TELEPHONE ENCOUNTER
Please let her know sodium potassium level have overall much improved within normal range  Creatinine slightly increased to 1 3 and may represent some progression of CKD  Would like her to reduce torsemide to and potassium chloride to every other day dosing  If she has worsened leg edema or weight gain greater than 5 lb in 2 to 3 days, she should call us back

## 2022-01-04 NOTE — TELEPHONE ENCOUNTER
I spoke with patient's nephew, Bairon Reeder and relayed message, Bairon Reeder verbalized understanding with no questions or concerns

## 2022-01-05 ENCOUNTER — TELEPHONE (OUTPATIENT)
Dept: FAMILY MEDICINE CLINIC | Facility: CLINIC | Age: 87
End: 2022-01-05

## 2022-01-05 ENCOUNTER — OFFICE VISIT (OUTPATIENT)
Dept: PHYSICAL THERAPY | Facility: REHABILITATION | Age: 87
End: 2022-01-05
Payer: COMMERCIAL

## 2022-01-05 DIAGNOSIS — M79.641 CHRONIC HAND PAIN, RIGHT: Primary | ICD-10-CM

## 2022-01-05 DIAGNOSIS — G89.29 CHRONIC HAND PAIN, RIGHT: Primary | ICD-10-CM

## 2022-01-05 PROCEDURE — 97110 THERAPEUTIC EXERCISES: CPT | Performed by: PHYSICAL THERAPIST

## 2022-01-05 NOTE — PROGRESS NOTES
Daily Note     Today's date: 2022  Patient name: Brock Bryson  : 1934  MRN: [id number]  Referring provider: Alireza Barry MD  Dx:   Encounter Diagnosis     ICD-10-CM    1  Chronic hand pain, right  M79 641     G89 29                   Subjective: States about 70% improvement in writing  Objective: See treatment diary below    Assessment: Completed exercise with correct technique and without reports of pain  Demonstrated moderate fatigue after exercise  Pt would benefit from continued PT services to reduce pain and increase level of function  Plan: Continue per plan of care        Precautions: Fall risk, hearing impairment, visual impairment      Manuals         Finger mobs MM DS MM MM MM                                               Neuro Re-Ed                                                                                                    Ther Ex         Self PIP mob w/ finger extension HEP            Finger ext stretch against surface HEP            Finger PIP and DIP ext PROM  DS MM MM MM        Putty    Finger extension 5' - HEP          Yellow gel    Finger ext 2x15 Finger ext 2x15        Rubber band    2x8x5"         Cylinder gripping    10x rounds         Cylinder opposition  and transfer     2x rounds         Ther Activity                                       Gait Training                                       Modalities             MHP B hands pre tx  x10' 10'

## 2022-01-05 NOTE — TELEPHONE ENCOUNTER
PATIENT LM ON REFERRAL LINE ASKING FOR US TO SEND POTASSIUM TO THE PHARMACY  PATIENT DID NOT LEAVE ANY OTHER DETAILS AS TO WHICH PHARMACY BUT I DID SEE IN THE CHART THAT ANOTHER PROVIDER SENT POTASSIUM CHLORIDE TO [facility]   NOT SURE IF THIS IS WHAT SHE IS REFERRING TO?

## 2022-01-09 ENCOUNTER — OFFICE VISIT (OUTPATIENT)
Dept: URGENT CARE | Facility: CLINIC | Age: 87
End: 2022-01-09
Payer: COMMERCIAL

## 2022-01-09 VITALS
OXYGEN SATURATION: 100 % | HEART RATE: 64 BPM | TEMPERATURE: 97.5 F | WEIGHT: 175 LBS | RESPIRATION RATE: 18 BRPM | BODY MASS INDEX: 32.01 KG/M2

## 2022-01-09 DIAGNOSIS — R42 VERTIGO: Primary | ICD-10-CM

## 2022-01-09 PROCEDURE — S9083 URGENT CARE CENTER GLOBAL: HCPCS | Performed by: PREVENTIVE MEDICINE

## 2022-01-09 PROCEDURE — 99213 OFFICE O/P EST LOW 20 MIN: CPT | Performed by: PREVENTIVE MEDICINE

## 2022-01-09 RX ORDER — MECLIZINE HCL 12.5 MG/1
12.5 TABLET ORAL 3 TIMES DAILY PRN
Qty: 30 TABLET | Refills: 0 | Status: SHIPPED | OUTPATIENT
Start: 2022-01-09

## 2022-01-09 NOTE — PROGRESS NOTES
[facility]        NAME: Joseph Dunlap is a 80 y o  female  : 1934    MRN: [id number]  DATE: 2022  TIME: 1:14 PM    Assessment and Plan   Vertigo [R42]  1  Vertigo  meclizine (ANTIVERT) 12 5 MG tablet         Patient Instructions       Follow up with PCP in 3-5 days  Proceed to  ER if symptoms worsen  Chief Complaint     Chief Complaint   Patient presents with    Dizziness     "Vertigo - my eyes don't focus right, but if I settle myself and lie down then I'm okay " States when up moving around becomes vertiginous again  Began Friday, the more active she is (using walker) the worse her episodes are  History of Present Illness       On and off brief bouts of vertigo times many months  Currently she is feeling well sitting in the office  Review of Systems   Review of Systems   Neurological: Positive for dizziness           Current Medications       Current Outpatient Medications:     amiodarone 200 mg tablet, Take 200 mg by mouth daily  , Disp: , Rfl:     ASPIRIN 81 PO, Take by mouth, Disp: , Rfl:     atorvastatin (LIPITOR) 80 mg tablet, , Disp: , Rfl:     Blood Glucose Monitoring Suppl (ONE TOUCH ULTRA 2) w/Device KIT, Use daily, Disp: 1 kit, Rfl: 0    carvedilol (COREG) 25 mg tablet, Take 1 tablet (25 mg total) by mouth 2 (two) times a day with meals, Disp: 60 tablet, Rfl: 5    cholecalciferol (VITAMIN D3) 1,000 units tablet, Take 2,000 Units by mouth daily, Disp: , Rfl:     dicyclomine (BENTYL) 10 mg capsule, TAKE 1 CAPSULE BY MOUTH 4 TIMES DAILY AS NEEDED FOR ABDOMINAL PAIN (Patient taking differently: 10 mg 2 (two) times a day  ), Disp: 360 capsule, Rfl: 0    Docusate Sodium (DSS) 100 MG CAPS, Take 100 mg by mouth 2 (two) times a day, Disp: , Rfl:     glucose blood (ONE TOUCH ULTRA TEST) test strip, Test daily                        DX: E11 9, Disp: 100 each, Rfl: 1    Multiple Vitamin (MULTI-VITAMIN DAILY PO), Take 1 tablet by mouth daily, Disp: , Rfl:    potassium chloride (K-DUR,KLOR-CON) 20 mEq tablet, Take 1 tablet (20 mEq total) by mouth every other day, Disp: 30 tablet, Rfl: 5    rivaroxaban (XARELTO) 15 mg tablet, Take 15 mg by mouth daily , Disp: , Rfl:     torsemide (DEMADEX) 20 mg tablet, Take 1 tablet (20 mg total) by mouth every other day, Disp: 30 tablet, Rfl: 3    meclizine (ANTIVERT) 12 5 MG tablet, Take 1 tablet (12 5 mg total) by mouth 3 (three) times a day as needed for dizziness, Disp: 30 tablet, Rfl: 0    Tradjenta 5 MG TABS, Take 5 mg by mouth daily (Patient not taking: Reported on 12/7/2021 ), Disp: 30 tablet, Rfl: 5    Current Allergies     Allergies as of 01/09/2022 - Reviewed 01/09/2022   Allergen Reaction Noted    Hydrochlorothiazide  07/30/2018    Statins  08/08/2014            The following portions of the patient's history were reviewed and updated as appropriate: allergies, current medications, past family history, past medical history, past social history, past surgical history and problem list      Past Medical History:   Diagnosis Date    Anemia     Anxiety     Arthritis     CKD (chronic kidney disease), stage III ([address] ) 11/29/2018    Depression, recurrent [facility] ) 6/14/2021    Diabetes mellitus ([facility] )     Diabetic peripheral neuropathy ([facility] )     Hyperlipidemia     Hypertension     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 8/23/2017    Thyroid enlarged 2/23/2015    Tinnitus     Vertigo        Past Surgical History:   Procedure Laterality Date    APPENDECTOMY      EAR SURGERY Left     KNEE SURGERY      TONSILLECTOMY         Family History   Problem Relation Age of Onset    Diabetes Mother     Heart disease Mother     Heart disease Father     Stroke Father     Cancer Family     Dementia Sister     Liver disease Other          Medications have been verified          Objective   Pulse 64   Temp 97 5 °F (36 4 °C)   Resp 18 Wt 79 4 kg (175 lb)   SpO2 100%   BMI 32 01 kg/m²   No LMP recorded  Patient is postmenopausal        Physical Exam     Physical Exam  Vitals reviewed  Constitutional:       General: She is not in acute distress  Appearance: Normal appearance  She is not ill-appearing, toxic-appearing or diaphoretic  Eyes:      Pupils: Pupils are equal, round, and reactive to light  Comments: No horizontal or vertical nystagmus   Cardiovascular:      Rate and Rhythm: Normal rate and regular rhythm  Neurological:      General: No focal deficit present  Cranial Nerves: No cranial nerve deficit  Motor: No weakness

## 2022-01-11 ENCOUNTER — APPOINTMENT (OUTPATIENT)
Dept: PHYSICAL THERAPY | Facility: REHABILITATION | Age: 87
End: 2022-01-11
Payer: COMMERCIAL

## 2022-01-13 ENCOUNTER — OFFICE VISIT (OUTPATIENT)
Dept: PHYSICAL THERAPY | Facility: REHABILITATION | Age: 87
End: 2022-01-13
Payer: COMMERCIAL

## 2022-01-13 DIAGNOSIS — G89.29 CHRONIC HAND PAIN, RIGHT: Primary | ICD-10-CM

## 2022-01-13 DIAGNOSIS — M79.641 CHRONIC HAND PAIN, RIGHT: Primary | ICD-10-CM

## 2022-01-13 PROCEDURE — 97140 MANUAL THERAPY 1/> REGIONS: CPT | Performed by: PHYSICAL THERAPIST

## 2022-01-13 PROCEDURE — 97110 THERAPEUTIC EXERCISES: CPT | Performed by: PHYSICAL THERAPIST

## 2022-01-13 NOTE — PROGRESS NOTES
Daily Note     Today's date: 2022  Patient name: Denny García  : 1934  MRN: [id number]  Referring provider: Elivra Shaver MD  Dx:   Encounter Diagnosis     ICD-10-CM    1  Chronic hand pain, right  M79 641     G89 29                   Subjective: Reports continued improvement  Objective: See treatment diary below    Assessment: Completed exercise with correct technique and without reports of pain  Demonstrated moderate fatigue after exercise  Pt would benefit from continued PT services to reduce pain and increase level of function  Plan: Continue per plan of care        Precautions: Fall risk, hearing impairment, visual impairment      Manuals         Finger mobs MM DS MM MM MM                                               Neuro Re-Ed                                                                                                    Ther Ex         Self PIP mob w/ finger extension             Therabar Red 2xF ea            Finger PIP and DIP ext PROM MM DS MM MM MM        Putty    Finger extension 5' - HEP          Yellow gel Finger ext 2x15   Finger ext 2x15 Finger ext 2x15        Rubber band    2x8x5"         Cylinder gripping    10x rounds         Cylinder opposition  and transfer 2x rounds    2x rounds         Ther Activity                                       Gait Training                                       Modalities             MHP B hands pre tx  x10' 10'

## 2022-01-18 ENCOUNTER — APPOINTMENT (OUTPATIENT)
Dept: PHYSICAL THERAPY | Facility: REHABILITATION | Age: 87
End: 2022-01-18
Payer: COMMERCIAL

## 2022-01-19 ENCOUNTER — OFFICE VISIT (OUTPATIENT)
Dept: PHYSICAL THERAPY | Facility: REHABILITATION | Age: 87
End: 2022-01-19
Payer: COMMERCIAL

## 2022-01-19 DIAGNOSIS — G89.29 CHRONIC HAND PAIN, RIGHT: Primary | ICD-10-CM

## 2022-01-19 DIAGNOSIS — M79.641 CHRONIC HAND PAIN, RIGHT: Primary | ICD-10-CM

## 2022-01-19 PROCEDURE — 97140 MANUAL THERAPY 1/> REGIONS: CPT | Performed by: PHYSICAL THERAPIST

## 2022-01-19 PROCEDURE — 97110 THERAPEUTIC EXERCISES: CPT | Performed by: PHYSICAL THERAPIST

## 2022-01-19 NOTE — PROGRESS NOTES
Daily Note     Today's date: 2022  Patient name: Robinson Navarro  : 1934  MRN: [id number]  Referring provider: Avel Valera MD  Dx:   Encounter Diagnosis     ICD-10-CM    1  Chronic hand pain, right  M79 641     G89 29        Start Time: 1215  Stop Time: 1300  Total time in clinic (min): 45 minutes    Subjective: Reports improvement with writing but some finger stiffness continues  Objective: See treatment diary below    Assessment: Completed exercise with correct technique and without reports of pain  Demonstrated moderate fatigue after exercise  Pt would benefit from continued PT services to reduce pain and increase level of function  Plan: Continue per plan of care        Precautions: Fall risk, hearing impairment, visual impairment      Manuals         Finger mobs MM MM MM MM MM                                               Neuro Re-Ed                                                                                                    Ther Ex         Self PIP mob w/ finger extension             Therabar Red 2xF ea Red 20x ea           Finger PIP and DIP ext PROM MM MM MM MM MM        Putty    Finger extension 5' - HEP          Yellow gel Finger ext 2x15 Finger ext 2x15  Finger ext 2x15 Finger ext 2x15        Rubber band    2x8x5"         Cylinder gripping    10x rounds         Pin board   20x           Cylinder opposition  and transfer 2x rounds    2x rounds         Ther Activity                                       Gait Training                                       Modalities             MHP B hands pre tx  x10' 10'

## 2022-01-25 ENCOUNTER — APPOINTMENT (OUTPATIENT)
Dept: PHYSICAL THERAPY | Facility: REHABILITATION | Age: 87
End: 2022-01-25
Payer: COMMERCIAL

## 2022-01-25 LAB
LEFT EYE DIABETIC RETINOPATHY: NORMAL
RIGHT EYE DIABETIC RETINOPATHY: NORMAL

## 2022-01-26 ENCOUNTER — OFFICE VISIT (OUTPATIENT)
Dept: PHYSICAL THERAPY | Facility: REHABILITATION | Age: 87
End: 2022-01-26
Payer: COMMERCIAL

## 2022-01-26 DIAGNOSIS — M79.641 CHRONIC HAND PAIN, RIGHT: Primary | ICD-10-CM

## 2022-01-26 DIAGNOSIS — G89.29 CHRONIC HAND PAIN, RIGHT: Primary | ICD-10-CM

## 2022-01-26 PROCEDURE — 97110 THERAPEUTIC EXERCISES: CPT | Performed by: PHYSICAL THERAPIST

## 2022-01-26 PROCEDURE — 97140 MANUAL THERAPY 1/> REGIONS: CPT | Performed by: PHYSICAL THERAPIST

## 2022-01-26 NOTE — PROGRESS NOTES
PT Discharge     Today's date: 2022  Patient name: Abhilash Tan  : 1934  MRN: [id number]  Referring provider: Josefina Leon MD  Dx:   Encounter Diagnosis     ICD-10-CM    1  Chronic hand pain, right  M79 641     G89 29        Start Time: 1215  Stop Time: 1300  Total time in clinic (min): 45 minutes    Subjective: States that writing has improved, her fingers are still stiff but loosen up with stretching  Current pain: 0    Objective: See treatment diary below    Assessment: D/c this visit due to meeting all goals set at IE      STG: Hand ROM improved by 25% in all deficient planes in 2-4 weeks  - MET  STG: Subjectively reports pain at worst decreased by 2 points in 2-4 weeks  - MET  STG: I w/ HEPs 1 week after prescription  - MET  LTG: Signs name w/o difficulty by d/c  - MET  LTG: FOTO >= to goal by d/c   - MET  LTG: I w/ comprehensive HEP by d/c  - MET    Plan: D/c this visit        Precautions: Fall risk, hearing impairment, visual impairment      Manuals         Finger mobs MM MM MM MM MM                                               Neuro Re-Ed                                                                                                    Ther Ex         Self PIP mob w/ finger extension             Therabar Red 2xF ea Red 20x ea Red 20x ea          Finger PIP and DIP ext PROM MM MM MM MM MM        Putty    Finger extension 5' - HEP          Yellow gel Finger ext 2x15 Finger ext 2x15 Finger ext 2x15  Finger ext 2x15 Finger ext 2x15        Rubber band    2x8x5"         Cylinder gripping    10x rounds         Pin board   20x 20x          Cylinder opposition  and transfer 2x rounds    2x rounds         Ther Activity                                       Gait Training                                       Modalities             MHP B hands pre tx  x10' 10'

## 2022-01-31 ENCOUNTER — EPISODE CHANGES (OUTPATIENT)
Dept: CASE MANAGEMENT | Facility: OTHER | Age: 87
End: 2022-01-31

## 2022-01-31 ENCOUNTER — PATIENT OUTREACH (OUTPATIENT)
Dept: FAMILY MEDICINE CLINIC | Facility: CLINIC | Age: 87
End: 2022-01-31

## 2022-01-31 NOTE — PROGRESS NOTES
Chart review completed for the following sections:   Recent Vital Signs   Allergies/Contradictions   Medication Review    History    SDOH    Problem List   Immunizations   Past hospitalizations and major procedures, including surgery   Significant past illnesses and treatment history including:    Relevant past medications related to the patient's condition    OP CM called to pts cell phone and it was actually pts nephew Zoran Marcus  Explained OP CM role and pts nephew stated pt is hounded enough by people in the medical field  Explained that Sharla Flores and I work through [facility] and then nephew stated he will speak to pt about our services and advise I call back tomorrow  Nephew states he is taking the pt to her heart dr in 20 minutes at [facility] [employer]  Will call back tomorrow

## 2022-02-01 ENCOUNTER — PATIENT OUTREACH (OUTPATIENT)
Dept: FAMILY MEDICINE CLINIC | Facility: CLINIC | Age: 87
End: 2022-02-01

## 2022-02-01 NOTE — PROGRESS NOTES
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  Pt states she had 9 brothers and sisters  Pt only has one sister living [facility] and she resides at [facility] and has dementia  Pt states her sister lost both of her children in their 46s to cancer and this was very hard for them  Also explained nurse care manager role and pt does not feel she wants outreach  Pt states her home health aides also help her with medication mgmt  Pt states that she has caregivers from [facility] from 830 to 1130am and then 4pm to 7pm seven days a week  Pt pays privately for this  Pt states they also help with cleaning, cooking, bathing, and dressing  Pt states she uses a roller walker at home  Nephew drives pt to her appts  OP CM gave contact info and advised pt to call with any future concerns

## 2022-02-21 ENCOUNTER — TELEPHONE (OUTPATIENT)
Dept: FAMILY MEDICINE CLINIC | Facility: CLINIC | Age: 87
End: 2022-02-21

## 2022-02-21 NOTE — TELEPHONE ENCOUNTER
PATIENT CALLED IN AND STATED SHE HAS AN UPCOMING OV WITH DR Zach Gaviria ON 3/2 AND SHE WOULD LIKE TO KNOW IF SHE NEEDS TO GET BW DONE BEFORE THIS? IF SHE DOES, PLEASE PLACE ORDERS AND CALL PATIENT WHEN DONE  THANK YOU!

## 2022-03-02 ENCOUNTER — OFFICE VISIT (OUTPATIENT)
Dept: FAMILY MEDICINE CLINIC | Facility: CLINIC | Age: 87
End: 2022-03-02
Payer: COMMERCIAL

## 2022-03-02 ENCOUNTER — TELEPHONE (OUTPATIENT)
Dept: FAMILY MEDICINE CLINIC | Facility: CLINIC | Age: 87
End: 2022-03-02

## 2022-03-02 ENCOUNTER — PATIENT OUTREACH (OUTPATIENT)
Dept: FAMILY MEDICINE CLINIC | Facility: CLINIC | Age: 87
End: 2022-03-02

## 2022-03-02 ENCOUNTER — OFFICE VISIT (OUTPATIENT)
Dept: URGENT CARE | Facility: CLINIC | Age: 87
End: 2022-03-02
Payer: COMMERCIAL

## 2022-03-02 VITALS
BODY MASS INDEX: 30.35 KG/M2 | SYSTOLIC BLOOD PRESSURE: 128 MMHG | DIASTOLIC BLOOD PRESSURE: 62 MMHG | WEIGHT: 182.2 LBS | OXYGEN SATURATION: 98 % | HEIGHT: 65 IN | HEART RATE: 65 BPM

## 2022-03-02 VITALS
OXYGEN SATURATION: 98 % | DIASTOLIC BLOOD PRESSURE: 68 MMHG | TEMPERATURE: 98.1 F | BODY MASS INDEX: 29.95 KG/M2 | WEIGHT: 180 LBS | HEART RATE: 74 BPM | SYSTOLIC BLOOD PRESSURE: 138 MMHG | RESPIRATION RATE: 18 BRPM

## 2022-03-02 DIAGNOSIS — I12.9 BENIGN HYPERTENSION WITH CKD (CHRONIC KIDNEY DISEASE) STAGE III (HCC): ICD-10-CM

## 2022-03-02 DIAGNOSIS — N18.30 BENIGN HYPERTENSION WITH CKD (CHRONIC KIDNEY DISEASE) STAGE III (HCC): ICD-10-CM

## 2022-03-02 DIAGNOSIS — F43.21 GRIEF REACTION: ICD-10-CM

## 2022-03-02 DIAGNOSIS — H92.21 BLOOD IN RIGHT EAR CANAL: Primary | ICD-10-CM

## 2022-03-02 DIAGNOSIS — N18.32 STAGE 3B CHRONIC KIDNEY DISEASE (HCC): ICD-10-CM

## 2022-03-02 DIAGNOSIS — R60.0 BILATERAL LEG EDEMA: ICD-10-CM

## 2022-03-02 DIAGNOSIS — I50.9 CHRONIC CONGESTIVE HEART FAILURE, UNSPECIFIED HEART FAILURE TYPE (HCC): ICD-10-CM

## 2022-03-02 DIAGNOSIS — E11.9 TYPE 2 DIABETES MELLITUS WITHOUT COMPLICATION, WITHOUT LONG-TERM CURRENT USE OF INSULIN (HCC): Primary | ICD-10-CM

## 2022-03-02 PROBLEM — E66.01 MORBID OBESITY (HCC): Status: RESOLVED | Noted: 2021-07-07 | Resolved: 2022-03-02

## 2022-03-02 PROBLEM — E87.1 HYPONATREMIA: Status: RESOLVED | Noted: 2018-07-25 | Resolved: 2022-03-02

## 2022-03-02 PROCEDURE — 99213 OFFICE O/P EST LOW 20 MIN: CPT | Performed by: PHYSICIAN ASSISTANT

## 2022-03-02 PROCEDURE — 1160F RVW MEDS BY RX/DR IN RCRD: CPT | Performed by: FAMILY MEDICINE

## 2022-03-02 PROCEDURE — S9083 URGENT CARE CENTER GLOBAL: HCPCS | Performed by: PHYSICIAN ASSISTANT

## 2022-03-02 PROCEDURE — 99213 OFFICE O/P EST LOW 20 MIN: CPT | Performed by: FAMILY MEDICINE

## 2022-03-02 PROCEDURE — 1036F TOBACCO NON-USER: CPT | Performed by: FAMILY MEDICINE

## 2022-03-02 RX ORDER — LORAZEPAM 0.5 MG/1
0.5 TABLET ORAL 2 TIMES DAILY PRN
Qty: 30 TABLET | Refills: 0 | Status: SHIPPED | OUTPATIENT
Start: 2022-03-02 | End: 2022-07-13

## 2022-03-02 RX ORDER — LORAZEPAM 0.5 MG/1
0.5 TABLET ORAL 2 TIMES DAILY PRN
Qty: 30 TABLET | Refills: 0 | Status: SHIPPED | OUTPATIENT
Start: 2022-03-02 | End: 2022-03-02 | Stop reason: SDUPTHER

## 2022-03-02 NOTE — PROGRESS NOTES
OP CM called to pts son and he states that pts sister passed away today  Offered condolences to pts son  Will call back at another time

## 2022-03-02 NOTE — ASSESSMENT & PLAN NOTE
Lab Results   Component Value Date    EGFR 34 01/03/2022    EGFR 46 12/06/2021    EGFR 47 11/22/2021    CREATININE 1 39 (H) 01/03/2022    CREATININE 1 08 12/06/2021    CREATININE 1 06 11/22/2021   BP stable

## 2022-03-02 NOTE — PROGRESS NOTES
Assessment and Plan:    Problem List Items Addressed This Visit        Endocrine    Type 2 diabetes mellitus without complication, without long-term current use of insulin ([place] [address] ) - Primary    Relevant Orders    Hemoglobin A1C       Cardiovascular and Mediastinum    Benign hypertension with CKD (chronic kidney disease) stage III [facility])     Lab Results   Component Value Date    EGFR 34 01/03/2022    EGFR 46 12/06/2021    EGFR 47 11/22/2021    CREATININE 1 39 (H) 01/03/2022    CREATININE 1 08 12/06/2021    CREATININE 1 06 11/22/2021   BP stable         Chronic congestive heart failure ([facility] )    Relevant Orders    Magnesium       Genitourinary    CKD (chronic kidney disease), stage III [facility])     Lab Results   Component Value Date    EGFR 34 01/03/2022    EGFR 46 12/06/2021    EGFR 47 11/22/2021    CREATININE 1 39 (H) 01/03/2022    CREATININE 1 08 12/06/2021    CREATININE 1 06 11/22/2021   stable         Relevant Orders    Basic metabolic panel       Other    Bilateral leg edema     stable           Other Visit Diagnoses     Grief reaction        Relevant Medications    LORazepam (Ativan) 0 5 mg tablet                 Diagnoses and all orders for this visit:    Type 2 diabetes mellitus without complication, without long-term current use of insulin ([facility])  -     Hemoglobin A1C; Future    Benign hypertension with CKD (chronic kidney disease) stage III ([facility])    Stage 3b chronic kidney disease ([facility])  -     Basic metabolic panel; Future    Chronic congestive heart failure, unspecified heart failure type ([place] [address] )  -     Magnesium; Future    Grief reaction  -     LORazepam (Ativan) 0 5 mg tablet; Take 1 tablet (0 5 mg total) by mouth 2 (two) times a day as needed for anxiety    Bilateral leg edema    Other orders  -     Probiotic Product (PROBIOTIC-10 PO); Take by mouth  -     Omega 3-6-9 Fatty Acids (OMEGA 3-6-9 PO); Take by mouth              Subjective:      Patient ID: Denny García is a 80 y o  female      CC:    Chief Complaint Patient presents with    Follow-up    Diabetes     DM eye done 1/2022 Dr Radha Salguero and DM foot 3/4/2022 with Dr Abe dietz    Hearing Problem     Pt would like her ears looked at and possibly cleaned out  HPI:    Here for routine checkup, older sister just passed away last night, went to audiologist -they told her she needed to have her ears cleaned out-has small amount of wax er ear, due for lab      The following portions of the patient's history were reviewed and updated as appropriate: allergies, current medications, past family history, past medical history, past social history, past surgical history and problem list       Review of Systems   Constitutional: Positive for unexpected weight change  Negative for activity change, appetite change and fatigue  11 lb loss   HENT: Positive for hearing loss  Negative for ear pain  Respiratory: Negative for shortness of breath  Cardiovascular: Positive for leg swelling  Negative for chest pain and palpitations  Neurological: Negative for dizziness and headaches  Psychiatric/Behavioral: The patient is nervous/anxious  Data to review:       Objective:    Vitals:    03/02/22 1202   BP: 128/62   BP Location: Right arm   Patient Position: Sitting   Pulse: 65   SpO2: 98%   Weight: 82 6 kg (182 lb 3 2 oz)   Height: 5' 5" (1 651 m)        Physical Exam  Vitals reviewed  Constitutional:       Appearance: Normal appearance  She is obese  HENT:      Head:      Comments: Slight amount of wax  r ear, removed with curette     Left Ear: Tympanic membrane normal    Cardiovascular:      Rate and Rhythm: Normal rate and regular rhythm  Pulses: Normal pulses  Heart sounds: Normal heart sounds  Pulmonary:      Effort: Pulmonary effort is normal       Breath sounds: Normal breath sounds  Musculoskeletal:      Right lower leg: Edema present  Left lower leg: Edema present        Comments: 1 plus edema   Lymphadenopathy:      Cervical: No cervical adenopathy  Neurological:      Mental Status: She is alert

## 2022-03-02 NOTE — ASSESSMENT & PLAN NOTE
Lab Results   Component Value Date    EGFR 34 01/03/2022    EGFR 46 12/06/2021    EGFR 47 11/22/2021    CREATININE 1 39 (H) 01/03/2022    CREATININE 1 08 12/06/2021    CREATININE 1 06 11/22/2021   stable

## 2022-03-02 NOTE — TELEPHONE ENCOUNTER
RX for Lorazepam went to [facility] and this needed to go to [facility] in [place]  I called [facility] and cancelled her RX and added the RX to correct Pharmacy in this encounter to be signed

## 2022-03-02 NOTE — PROGRESS NOTES
NAME: Levy Sacks is a 80 y o  female  : 1934    MRN: [id number]      Assessment and Plan   Blood in right ear canal [H92 21]  1  Blood in right ear canal         Discussed case with Dr Fernando Hutchinson bleeding has subsided  No visible damage but unable to fully and clearly visualize the TM due to dry blood  No active bleeding visible  She denies any complaints including dizziness or pain  Will have her go home and monitor her symptoms  If anything changes or bleeding begins and is unable to be controlled, she should go tot he ER  Check in with PCP tomorrow  Dr Srinivasa Forbes on board with this plan  Nephew and patient acknowledge  Patient Instructions   Patient Instructions   Monitor - if bleeding restarts go to the ER  F/u with PCP tomorrow     Proceed to ER if symptoms worsen  Chief Complaint     Chief Complaint   Patient presents with   Chrystie Livers     s/p cerumen dis-impaction performed at PCP office today  States after reinserting hearing aid into right ear post-procedure, a mod-large amt of blood had backed up behind device  Have been unable to stop oozing since  PCP sent personal phone # to our provider in reference to incident  Currently no hearing aid in right ear and no bleeding observed  Denies pain  History of Present Illness   Patient with hx of patient with history of hypertension, hyperlipidemia, diabetes and CKD presents with nephew complaining of bleeding from her right ear canal   Was seen at her PCP or earlier today and had earwax removed from that ear  Reports no problems when she left the office but noticed a couple hours later bleeding from the right ear canal   Reports she had placed her hearing aid back in the canal and thinks the blood had built up behind it-when she removed it there was a larger amount of blood  Reports taking Q-tips and cotton in the canal and was getting dark red blood  Reports small amount of bright red blood prior to arriving here but much less  Denies any ear pain, headache, dizziness, lightheadedness, chest pain, palpitations  Denies any tasting blood in her mouth  No nausea, vomiting  She is on eliquis  Review of Systems   Review of Systems   Constitutional: Negative for chills and fever  HENT: Positive for ear discharge (Blood)  Negative for congestion and ear pain  Respiratory: Negative for shortness of breath  Cardiovascular: Negative for chest pain  Neurological: Negative for dizziness, syncope, weakness, light-headedness and headaches           Current Medications       Current Outpatient Medications:     amiodarone 200 mg tablet, Take 200 mg by mouth daily  , Disp: , Rfl:     ASPIRIN 81 PO, Take by mouth, Disp: , Rfl:     atorvastatin (LIPITOR) 80 mg tablet, , Disp: , Rfl:     Blood Glucose Monitoring Suppl (ONE TOUCH ULTRA 2) w/Device KIT, Use daily, Disp: 1 kit, Rfl: 0    carvedilol (COREG) 25 mg tablet, Take 1 tablet (25 mg total) by mouth 2 (two) times a day with meals, Disp: 60 tablet, Rfl: 5    cholecalciferol (VITAMIN D3) 1,000 units tablet, Take 2,000 Units by mouth daily, Disp: , Rfl:     dicyclomine (BENTYL) 10 mg capsule, TAKE 1 CAPSULE BY MOUTH 4 TIMES DAILY AS NEEDED FOR ABDOMINAL PAIN (Patient taking differently: 10 mg 2 (two) times a day  ), Disp: 360 capsule, Rfl: 0    Docusate Sodium (DSS) 100 MG CAPS, Take 100 mg by mouth 2 (two) times a day, Disp: , Rfl:     glucose blood (ONE TOUCH ULTRA TEST) test strip, Test daily                        DX: E11 9, Disp: 100 each, Rfl: 1    LORazepam (Ativan) 0 5 mg tablet, Take 1 tablet (0 5 mg total) by mouth 2 (two) times a day as needed for anxiety, Disp: 30 tablet, Rfl: 0    meclizine (ANTIVERT) 12 5 MG tablet, Take 1 tablet (12 5 mg total) by mouth 3 (three) times a day as needed for dizziness (Patient not taking: Reported on 3/2/2022 ), Disp: 30 tablet, Rfl: 0    Multiple Vitamin (MULTI-VITAMIN DAILY PO), Take 1 tablet by mouth daily, Disp: , Rfl:     Omega 3-6-9 Fatty Acids (OMEGA 3-6-9 PO), Take by mouth, Disp: , Rfl:     potassium chloride (K-DUR,KLOR-CON) 20 mEq tablet, Take 1 tablet (20 mEq total) by mouth every other day, Disp: 30 tablet, Rfl: 5    Probiotic Product (PROBIOTIC-10 PO), Take by mouth, Disp: , Rfl:     rivaroxaban (XARELTO) 15 mg tablet, Take 15 mg by mouth daily , Disp: , Rfl:     torsemide (DEMADEX) 20 mg tablet, Take 1 tablet (20 mg total) by mouth every other day, Disp: 30 tablet, Rfl: 3    Current Allergies     Allergies as of 03/02/2022 - Reviewed 03/02/2022   Allergen Reaction Noted    Hydrochlorothiazide  07/30/2018    Statins  08/08/2014              Past Medical History:   Diagnosis Date    Anemia     Anxiety     Arthritis     CKD (chronic kidney disease), stage III ([facility] [address] ) 11/29/2018    Depression, recurrent ([facility] [address] ) 6/14/2021    Diabetes mellitus ([facility] )     Diabetic peripheral neuropathy (HCC)     Hyperlipidemia     Hypertension     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 8/23/2017    Thyroid enlarged 2/23/2015    Tinnitus     Vertigo        Past Surgical History:   Procedure Laterality Date    APPENDECTOMY      EAR SURGERY Left     KNEE SURGERY      TONSILLECTOMY         Family History   Problem Relation Age of Onset    Diabetes Mother     Heart disease Mother     Heart disease Father     Stroke Father     Cancer Family     Dementia Sister     Liver disease Other          Medications have been verified      The following portions of the patient's history were reviewed and updated as appropriate: allergies, current medications, past family history, past medical history, past social history, past surgical history and problem list     Objective   /68   Pulse 74   Temp 98 1 °F (36 7 °C)   Resp 18   Wt 81 6 kg (180 lb)   SpO2 98%   BMI 29 95 kg/m²      Physical Exam     Physical Exam  Vitals and nursing note reviewed  Constitutional:       General: She is not in acute distress  Appearance: Normal appearance  She is not ill-appearing, toxic-appearing or diaphoretic  HENT:      Head:      Comments: No pain with manipulation of the pinna or the tragus bilaterally  No mastoid tenderness bilaterally  Right TM appears intact but is not totally visible due to dry crusting blood  No visible open wounds or abrasions to the ear canal   Scant amount of bright red blood at the proximal ear canal   No foreign bodies visualized  No visible perforations of the TM  Left TM is clear without any bleeding or discharge  Cardiovascular:      Rate and Rhythm: Normal rate and regular rhythm  Pulmonary:      Effort: Pulmonary effort is normal  No respiratory distress  Musculoskeletal:      Cervical back: Normal range of motion  No rigidity  Neurological:      Mental Status: She is alert

## 2022-05-10 ENCOUNTER — TELEPHONE (OUTPATIENT)
Dept: FAMILY MEDICINE CLINIC | Facility: CLINIC | Age: 87
End: 2022-05-10

## 2022-05-10 NOTE — TELEPHONE ENCOUNTER
Tyson Cornelius- PA Case ID: [id number] - Rx #: [id number] Need help?   Call us at [phone]    Status Sent to Plan today    Drug Dicyclomine HCl 10MG capsules   Form [employer] Electronic PA Form (5373 NCPDP)

## 2022-05-12 ENCOUNTER — TELEPHONE (OUTPATIENT)
Dept: FAMILY MEDICINE CLINIC | Facility: CLINIC | Age: 87
End: 2022-05-12

## 2022-06-03 ENCOUNTER — TELEPHONE (OUTPATIENT)
Dept: FAMILY MEDICINE CLINIC | Facility: CLINIC | Age: 87
End: 2022-06-03

## 2022-06-03 ENCOUNTER — TELEPHONE (OUTPATIENT)
Dept: ADMINISTRATIVE | Facility: OTHER | Age: 87
End: 2022-06-03

## 2022-06-03 ENCOUNTER — OFFICE VISIT (OUTPATIENT)
Dept: FAMILY MEDICINE CLINIC | Facility: CLINIC | Age: 87
End: 2022-06-03
Payer: COMMERCIAL

## 2022-06-03 VITALS
TEMPERATURE: 97.3 F | BODY MASS INDEX: 32.42 KG/M2 | HEIGHT: 62 IN | RESPIRATION RATE: 16 BRPM | DIASTOLIC BLOOD PRESSURE: 70 MMHG | SYSTOLIC BLOOD PRESSURE: 116 MMHG | WEIGHT: 176.2 LBS | HEART RATE: 55 BPM

## 2022-06-03 DIAGNOSIS — M48.061 SPINAL STENOSIS OF LUMBAR REGION WITHOUT NEUROGENIC CLAUDICATION: Primary | ICD-10-CM

## 2022-06-03 DIAGNOSIS — D69.6 PLATELETS DECREASED (HCC): ICD-10-CM

## 2022-06-03 DIAGNOSIS — I48.20 CHRONIC A-FIB (HCC): ICD-10-CM

## 2022-06-03 PROBLEM — I74.3 EMBOLISM AND THROMBOSIS OF ARTERIES OF THE LOWER EXTREMITIES (HCC): Status: ACTIVE | Noted: 2022-06-03

## 2022-06-03 PROCEDURE — 1003F LEVEL OF ACTIVITY ASSESS: CPT | Performed by: PHYSICIAN ASSISTANT

## 2022-06-03 PROCEDURE — 99213 OFFICE O/P EST LOW 20 MIN: CPT | Performed by: PHYSICIAN ASSISTANT

## 2022-06-03 RX ORDER — LIDOCAINE 50 MG/G
1 PATCH TOPICAL DAILY
Qty: 30 PATCH | Refills: 0 | Status: SHIPPED | OUTPATIENT
Start: 2022-06-03

## 2022-06-03 NOTE — PATIENT INSTRUCTIONS
Problem List Items Addressed This Visit          Other    Spinal stenosis of lumbar region without neurogenic claudication - Primary     Patient thinks she is having an exacerbation of shingles pain on her left flank waist area from years ago  Upon review of the chart she most likely is actually having an exacerbation of nerve feelings from her stenosis of her spine and not shingles  She would like the new shingles vaccine and I did explain her that she should get this at the pharmacy as a 2 part series 4 insurance coverage as it would be very expensive to get here in our office  We did talk about possibly using gabapentin for her pain however we will start with a lidocaine patch  She has a follow-up appointment with Dr Tj Felton in 2 weeks and she can discuss if this patches working and if any further evaluation or workup is needed  No rash             Relevant Medications    lidocaine (LIDODERM) 5 %          Other Visit Diagnoses       Chronic a-fib ([address] )        Platelets decreased ([address] )

## 2022-06-03 NOTE — TELEPHONE ENCOUNTER
Shan Hernandez: [id number] - PA Case ID: [id number] - Rx #: [id number] Need help?   Call us at [phone]    Status Sent to Plan today    Drug Lidocaine 5% patches   Form [employer] Electronic PA Form (2017 NCPDP)        Original Claim Info         PRECERT REQ BY MD [id number] REQUIRED MD CALL 3 [phone] REQUIRES PRIOR AUTHORIZATIONTRANSMISSION FEE UP TO $0 28 MAY APPLY

## 2022-06-03 NOTE — TELEPHONE ENCOUNTER
Upon review of the In Basket request and the patient's chart, initial outreach has been made via fax, please see Contacts section for details       Thank you  Jameel Mitchell

## 2022-06-03 NOTE — LETTER
Diabetic Eye Exam Form    Date Requested: 22  Patient: Trevor Armenian  Patient : 1934   Referring Provider: Antonia Pereira MD    DIABETIC Eye Exam Date _______________________________    Type of Exam MUST be documented for Diabetic Eye Exams  Please CHECK ONE  Retinal Exam       Dilated Retinal Exam       OCT       Optomap-Iris Exam      Fundus Photography     Left Eye - Please check Retinopathy AND Type or No Retinopathy      Exam did show retinopathy    Exam did not show retinopathy         Mild     Proliferative           Moderate    Severe            None         Right Eye - Please check Retinopathy AND Type or No Retinopathy     Exam did show retinopathy    Exam did not show retinopathy         Mild     Proliferative        Moderate    Severe        None       Comments __________________________________________________________    Practice Providing Exam ______________________________________________    Exam Performed By (print name) _______________________________________      Provider Signature ___________________________________________________    These reports are needed for  compliance  Please fax this completed form and a copy of the Diabetic Eye Exam report to our office located at [place] [address] as soon as possible via [phone] sia Lozanoude: Phone [phone]  We thank you for your assistance in treating our mutual patient

## 2022-06-03 NOTE — ASSESSMENT & PLAN NOTE
Patient thinks she is having an exacerbation of shingles pain on her left flank waist area from years ago  Upon review of the chart she most likely is actually having an exacerbation of nerve feelings from her stenosis of her spine and not shingles  She would like the new shingles vaccine and I did explain her that she should get this at the pharmacy as a 2 part series 4 insurance coverage as it would be very expensive to get here in our office  We did talk about possibly using gabapentin for her pain however we will start with a lidocaine patch  She has a follow-up appointment with Dr Marleny Bruce in 2 weeks and she can discuss if this patches working and if any further evaluation or workup is needed  No rash

## 2022-06-03 NOTE — TELEPHONE ENCOUNTER
----- Message from Sravan SENIOR sent at 6/3/2022  9:57 AM EDT -----  Regarding: care gap request  06/03/22 9:57 AM    Hello, our patient attached above has had Diabetic Eye Exam completed/performed  Please assist in updating the patient chart by making an External outreach to Devika Umana MD  facility located in [address] [place] [address] [address], [facility], [address]  The date of service is Mar 2022      Thank you,    KERRIE SENIOR   [facility]

## 2022-06-03 NOTE — PROGRESS NOTES
Assessment and Plan:    Problem List Items Addressed This Visit        Other    Spinal stenosis of lumbar region without neurogenic claudication - Primary     Patient thinks she is having an exacerbation of shingles pain on her left flank waist area from years ago  Upon review of the chart she most likely is actually having an exacerbation of nerve feelings from her stenosis of her spine and not shingles  She would like the new shingles vaccine and I did explain her that she should get this at the pharmacy as a 2 part series 4 insurance coverage as it would be very expensive to get here in our office  We did talk about possibly using gabapentin for her pain however we will start with a lidocaine patch  She has a follow-up appointment with Dr Xochilt Mcduffie in 2 weeks and she can discuss if this patches working and if any further evaluation or workup is needed  No rash  Relevant Medications    lidocaine (LIDODERM) 5 %      Other Visit Diagnoses     Chronic a-fib ([address] )        Platelets decreased ([address] )                     Diagnoses and all orders for this visit:    Spinal stenosis of lumbar region without neurogenic claudication  -     lidocaine (LIDODERM) 5 %; Apply 1 patch topically daily Remove & Discard patch within 12 hours or as directed by MD    Chronic a-fib ([facility] [address] )    Platelets decreased ([address] )            Subjective:      Patient ID: Gillian Saleem is a 80 y o  female  CC:    Chief Complaint   Patient presents with    Herpes Zoster     Patient in office for possible shingles  Pt states rash is sensitive on stomach area traveling to back, sxs of pain and pulling       HPI:    Patient here today because she wants shingles vaccine  She states that she had shingles vaccine a long time ago and then she actually that shingles and the pain that she got from that was so bad she needed [facility] pain specialist to get injections    Upon review of the chart patient actually had spinal stenosis and received pain injections from this was most likely not from shingles  She states that she has had leftover sensitivity from when she had shingles in her left waist back area and the sensitivities actually getting worse and she believes that if she gets shingles vaccine that it will make it better  She does have a follow-up with Dr Amanda Flynn in 2 weeks time  No rash  The following portions of the patient's history were reviewed and updated as appropriate: allergies, current medications, past family history, past medical history, past social history, past surgical history and problem list       Review of Systems   Constitutional: Negative  Negative for appetite change, chills, diaphoresis, fatigue and fever  HENT: Negative  Negative for congestion, ear pain, facial swelling, postnasal drip, rhinorrhea, sinus pressure, sinus pain, sore throat, trouble swallowing and voice change  Eyes: Negative  Respiratory: Negative  Negative for cough, chest tightness, shortness of breath and wheezing  Cardiovascular: Negative  Negative for chest pain  Gastrointestinal: Negative  Negative for abdominal pain, diarrhea, nausea and vomiting  Endocrine: Negative  Genitourinary: Negative  Musculoskeletal: Negative  Negative for myalgias  Skin: Positive for rash (possible shingles)  Allergic/Immunologic: Negative  Neurological: Negative  Negative for headaches  Hematological: Negative  Psychiatric/Behavioral: Negative  Data to review:       Objective:    Vitals:    06/03/22 0951   BP: 116/70   BP Location: Right arm   Patient Position: Sitting   Cuff Size: Adult   Pulse: 55   Resp: 16   Temp: (!) 97 3 °F (36 3 °C)   TempSrc: Temporal   Weight: 79 9 kg (176 lb 3 2 oz)   Height: 5' 2" (1 575 m)        Physical Exam  Vitals and nursing note reviewed  Constitutional:       Appearance: Normal appearance  She is well-developed  HENT:      Head: Normocephalic and atraumatic     Eyes:      General: Lids are normal       Conjunctiva/sclera: Conjunctivae normal       Pupils: Pupils are equal, round, and reactive to light  Cardiovascular:      Rate and Rhythm: Normal rate and regular rhythm  Heart sounds: No murmur heard  Pulmonary:      Effort: Pulmonary effort is normal       Breath sounds: Normal breath sounds  Skin:     General: Skin is warm and dry  Neurological:      General: No focal deficit present  Mental Status: She is alert  Coordination: Coordination is intact  Psychiatric:         Mood and Affect: Mood normal          Behavior: Behavior normal  Behavior is cooperative  Thought Content: Thought content normal          Judgment: Judgment normal            BMI Counseling: Body mass index is 32 23 kg/m²  The BMI is above normal  Nutrition recommendations include decreasing portion sizes, encouraging healthy choices of fruits and vegetables, decreasing fast food intake, consuming healthier snacks, limiting drinks that contain sugar, moderation in carbohydrate intake, increasing intake of lean protein, reducing intake of saturated and trans fat and reducing intake of cholesterol  Exercise recommendations include exercising 3-5 times per week  No pharmacotherapy was ordered  Rationale for BMI follow-up plan is due to patient being overweight or obese  Diabetic Foot Exam    Patient's shoes and socks removed

## 2022-06-07 NOTE — TELEPHONE ENCOUNTER
Shan Pinedamirtha: [id number] - PA Case ID: [id number] - Rx #: [id number] help?   Call us at [phone]    Outcome Approved on Lia 3  Your request has been approved    Drug Lidocaine 5% patches   Form [employer] Electronic PA Form (2017 NCPDP)      Original Claim Info    01,415       PRECERT REQ BY MD [phone] REQUIRED MD CALL [phone] REQUIRES PRIOR AUTHORIZATIONTRANSMISSION FEE UP TO $0 28 [address] [place] [address] [facility] notified

## 2022-06-08 NOTE — TELEPHONE ENCOUNTER
Upon review of the In Basket request we were able to locate, review, and update the patient chart as requested for Diabetic Eye Exam     Any additional questions or concerns should be emailed to the Practice Liaisons via [email or web address] email, please do not reply via In Basket      Thank you  Jake Pelayo

## 2022-06-12 ENCOUNTER — NURSE TRIAGE (OUTPATIENT)
Dept: OTHER | Facility: OTHER | Age: 87
End: 2022-06-12

## 2022-06-12 NOTE — TELEPHONE ENCOUNTER
Regarding: possible infection on foot   ----- Message from Justina Lovett sent at 6/12/2022 11:45 AM EDT -----  " She has a possible infection on her bunion on her foot  "

## 2022-06-12 NOTE — TELEPHONE ENCOUNTER
Reason for Disposition   [1] Redness of the skin AND [2] no fever    Answer Assessment - Initial Assessment Questions  1  ONSET: "When did the pain start?"       Started last week  2  LOCATION: "Where is the pain located?"       Left big toe area  "Where my bunion is"  3  PAIN: "How bad is the pain?"    (Scale 1-10; or mild, moderate, severe)   - MILD (1-3): doesn't interfere with normal activities  - MODERATE (4-7): interferes with normal activities (e g , work or school) or awakens from sleep, limping     - SEVERE (8-10): excruciating pain, unable to do any normal activities, unable to walk  No pain  4  WORK OR EXERCISE: "Has there been any recent work or exercise that involved this part of the body?"       Denies  Pt was wearing shoes that rubbed in the   5  CAUSE: "What do you think is causing the foot pain?"      Pt wore shoes that caused a blister at her bunion  6  OTHER SYMPTOMS: "Do you have any other symptoms?" (e g , leg pain, rash, fever, numbness)     Redness in the area but no pain, numbness or tingling  No fevers noted      Protocols used: FOOT PAIN-ADULT-

## 2022-06-13 ENCOUNTER — OFFICE VISIT (OUTPATIENT)
Dept: URGENT CARE | Facility: CLINIC | Age: 87
End: 2022-06-13
Payer: COMMERCIAL

## 2022-06-13 VITALS
TEMPERATURE: 98.1 F | OXYGEN SATURATION: 98 % | RESPIRATION RATE: 18 BRPM | SYSTOLIC BLOOD PRESSURE: 116 MMHG | DIASTOLIC BLOOD PRESSURE: 61 MMHG | HEART RATE: 62 BPM

## 2022-06-13 DIAGNOSIS — E11.621 DIABETIC ULCER OF TOE OF LEFT FOOT ASSOCIATED WITH TYPE 2 DIABETES MELLITUS, LIMITED TO BREAKDOWN OF SKIN (HCC): Primary | ICD-10-CM

## 2022-06-13 DIAGNOSIS — L97.521 DIABETIC ULCER OF TOE OF LEFT FOOT ASSOCIATED WITH TYPE 2 DIABETES MELLITUS, LIMITED TO BREAKDOWN OF SKIN (HCC): Primary | ICD-10-CM

## 2022-06-13 PROCEDURE — 99213 OFFICE O/P EST LOW 20 MIN: CPT

## 2022-06-13 PROCEDURE — S9083 URGENT CARE CENTER GLOBAL: HCPCS

## 2022-06-13 RX ORDER — CEPHALEXIN 500 MG/1
500 CAPSULE ORAL EVERY 12 HOURS SCHEDULED
Qty: 14 CAPSULE | Refills: 0 | Status: SHIPPED | OUTPATIENT
Start: 2022-06-13 | End: 2022-06-20

## 2022-06-13 NOTE — PROGRESS NOTES
[address] [facility]        NAME: Melly Caro is a 80 y o  female  : 1934    MRN: [id number]  DATE: 2022  TIME: 4:15 PM    Assessment and Plan   Diabetic ulcer of toe of left foot associated with type 2 diabetes mellitus, limited to breakdown of skin ([facility] [address] ) [E11 621, L97 521]  1  Diabetic ulcer of toe of left foot associated with type 2 diabetes mellitus, limited to breakdown of skin ([facility])  cephalexin (KEFLEX) 500 mg capsule     Patient presents with care giver with complaints of an ulcer to her left foot  She states she has a bunion and wore a pair of shoes that were too tight  Has been using OTC creams however redness is spreading  No drainage  Bunion and ulcer noted to left lateral great toe, no current drainage  Will treat with Keflex and dress  Advised to f/u with PCP  She has an appt scheduled  Patient Instructions     Take antibiotics as prescribed and keep wound clean and dry  Follow up with PCP   Proceed to  ER if symptoms worsen  Chief Complaint   No chief complaint on file  History of Present Illness       Patient presents with care giver with complaints of an ulcer to her left foot  She states she has a bunion and wore a pair of shoes that were too tight  Has been using OTC creams however redness is spreading  No drainage  Review of Systems   Review of Systems   Constitutional: Negative for chills and fever  HENT: Negative for congestion, ear pain, postnasal drip, sinus pain and sore throat  Eyes: Negative for pain and itching  Respiratory: Negative for cough, shortness of breath and wheezing  Cardiovascular: Negative for chest pain and palpitations  Gastrointestinal: Negative for abdominal pain, constipation, diarrhea, nausea and vomiting  Genitourinary: Negative for difficulty urinating and hematuria  Musculoskeletal: Positive for arthralgias  Negative for myalgias  Skin: Positive for wound  Negative for rash     Neurological: Negative for dizziness, light-headedness and headaches  Psychiatric/Behavioral: Negative for agitation and sleep disturbance  The patient is not nervous/anxious            Current Medications       Current Outpatient Medications:     cephalexin (KEFLEX) 500 mg capsule, Take 1 capsule (500 mg total) by mouth every 12 (twelve) hours for 7 days, Disp: 14 capsule, Rfl: 0    amiodarone 200 mg tablet, Take 200 mg by mouth daily  , Disp: , Rfl:     ASPIRIN 81 PO, Take by mouth, Disp: , Rfl:     atorvastatin (LIPITOR) 80 mg tablet, , Disp: , Rfl:     Blood Glucose Monitoring Suppl (ONE TOUCH ULTRA 2) w/Device KIT, Use daily, Disp: 1 kit, Rfl: 0    carvedilol (COREG) 25 mg tablet, Take 1 tablet (25 mg total) by mouth 2 (two) times a day with meals, Disp: 60 tablet, Rfl: 5    cholecalciferol (VITAMIN D3) 1,000 units tablet, Take 2,000 Units by mouth daily, Disp: , Rfl:     dicyclomine (BENTYL) 10 mg capsule, TAKE 1 CAPSULE BY MOUTH 4 TIMES DAILY AS NEEDED FOR ABDOMINAL PAIN (Patient taking differently: 10 mg 2 (two) times a day), Disp: 360 capsule, Rfl: 0    Docusate Sodium (DSS) 100 MG CAPS, Take 100 mg by mouth 2 (two) times a day, Disp: , Rfl:     glucose blood (ONE TOUCH ULTRA TEST) test strip, Test daily                        DX: E11 9, Disp: 100 each, Rfl: 1    lidocaine (LIDODERM) 5 %, Apply 1 patch topically daily Remove & Discard patch within 12 hours or as directed by MD, Disp: 30 patch, Rfl: 0    LORazepam (Ativan) 0 5 mg tablet, Take 1 tablet (0 5 mg total) by mouth 2 (two) times a day as needed for anxiety, Disp: 30 tablet, Rfl: 0    meclizine (ANTIVERT) 12 5 MG tablet, Take 1 tablet (12 5 mg total) by mouth 3 (three) times a day as needed for dizziness, Disp: 30 tablet, Rfl: 0    Multiple Vitamin (MULTI-VITAMIN DAILY PO), Take 1 tablet by mouth daily, Disp: , Rfl:     Omega 3-6-9 Fatty Acids (OMEGA 3-6-9 PO), Take by mouth, Disp: , Rfl:     potassium chloride (K-DUR,KLOR-CON) 20 mEq tablet, Take 1 tablet (20 mEq total) by mouth every other day, Disp: 30 tablet, Rfl: 5    Probiotic Product (PROBIOTIC-10 PO), Take by mouth, Disp: , Rfl:     rivaroxaban (XARELTO) 15 mg tablet, Take 15 mg by mouth daily , Disp: , Rfl:     torsemide (DEMADEX) 20 mg tablet, Take 1 tablet (20 mg total) by mouth every other day, Disp: 30 tablet, Rfl: 3    Current Allergies     Allergies as of 06/13/2022 - Reviewed 06/03/2022   Allergen Reaction Noted    Hydrochlorothiazide  07/30/2018    Statins  08/08/2014            The following portions of the patient's history were reviewed and updated as appropriate: allergies, current medications, past family history, past medical history, past social history, past surgical history and problem list      Past Medical History:   Diagnosis Date    Anemia     Anxiety     Arthritis     CKD (chronic kidney disease), stage III ([facility] [address] ) 11/29/2018    Depression, recurrent ([facility] [address] ) 6/14/2021    Diabetes mellitus ([facility] [address] )     Diabetic peripheral neuropathy ([facility] [address] )     Hyperlipidemia     Hypertension     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 8/23/2017    Thyroid enlarged 2/23/2015    Tinnitus     Vertigo        Past Surgical History:   Procedure Laterality Date    APPENDECTOMY      EAR SURGERY Left     KNEE SURGERY      TONSILLECTOMY         Family History   Problem Relation Age of Onset    Diabetes Mother     Heart disease Mother     Heart disease Father     Stroke Father     Cancer Family     Dementia Sister     Liver disease Other          Medications have been verified  Objective   There were no vitals taken for this visit  No LMP recorded  Patient is postmenopausal        Physical Exam     Physical Exam  Vitals reviewed  Constitutional:       General: She is not in acute distress  Appearance: Normal appearance  She is not ill-appearing     HENT:      Head: Normocephalic and atraumatic  Eyes:      Extraocular Movements: Extraocular movements intact  Conjunctiva/sclera: Conjunctivae normal    Pulmonary:      Effort: Pulmonary effort is normal    Musculoskeletal:         General: Tenderness present  No swelling  Feet:    Feet:      Comments: Bunion and ulcer noted  Skin:     General: Skin is warm  Comments: Redness and ulcer noted to left lateral great toe   Neurological:      General: No focal deficit present  Mental Status: She is alert     Psychiatric:         Mood and Affect: Mood normal          Behavior: Behavior normal          Judgment: Judgment normal

## 2022-06-20 ENCOUNTER — APPOINTMENT (OUTPATIENT)
Dept: LAB | Facility: CLINIC | Age: 87
End: 2022-06-20
Payer: COMMERCIAL

## 2022-06-20 DIAGNOSIS — I48.91 ATRIAL FIBRILLATION, UNSPECIFIED TYPE (HCC): ICD-10-CM

## 2022-06-20 DIAGNOSIS — N18.30 BENIGN HYPERTENSION WITH CKD (CHRONIC KIDNEY DISEASE) STAGE III (HCC): ICD-10-CM

## 2022-06-20 DIAGNOSIS — I12.9 BENIGN HYPERTENSION WITH CKD (CHRONIC KIDNEY DISEASE) STAGE III (HCC): ICD-10-CM

## 2022-06-20 DIAGNOSIS — R60.9 EDEMA, UNSPECIFIED TYPE: ICD-10-CM

## 2022-06-20 DIAGNOSIS — N18.31 STAGE 3A CHRONIC KIDNEY DISEASE (HCC): ICD-10-CM

## 2022-06-20 DIAGNOSIS — E87.1 HYPONATREMIA: ICD-10-CM

## 2022-06-20 DIAGNOSIS — E87.6 HYPOKALEMIA: ICD-10-CM

## 2022-06-20 DIAGNOSIS — I50.30 DIASTOLIC HEART FAILURE, UNSPECIFIED HF CHRONICITY (HCC): ICD-10-CM

## 2022-06-20 LAB
25(OH)D3 SERPL-MCNC: 52.3 NG/ML (ref 30–100)
ALBUMIN SERPL BCP-MCNC: 3.5 G/DL (ref 3.5–5)
ALP SERPL-CCNC: 132 U/L (ref 46–116)
ALT SERPL W P-5'-P-CCNC: 57 U/L (ref 12–78)
ANION GAP SERPL CALCULATED.3IONS-SCNC: 9 MMOL/L (ref 4–13)
AST SERPL W P-5'-P-CCNC: 38 U/L (ref 5–45)
BILIRUB SERPL-MCNC: 0.53 MG/DL (ref 0.2–1)
BUN SERPL-MCNC: 25 MG/DL (ref 5–25)
CALCIUM SERPL-MCNC: 9.4 MG/DL (ref 8.3–10.1)
CHLORIDE SERPL-SCNC: 98 MMOL/L (ref 100–108)
CO2 SERPL-SCNC: 26 MMOL/L (ref 21–32)
CREAT SERPL-MCNC: 1.4 MG/DL (ref 0.6–1.3)
CREAT UR-MCNC: 65.2 MG/DL
GFR SERPL CREATININE-BSD FRML MDRD: 33 ML/MIN/1.73SQ M
GLUCOSE P FAST SERPL-MCNC: 111 MG/DL (ref 65–99)
MAGNESIUM SERPL-MCNC: 2.2 MG/DL (ref 1.6–2.6)
MICROALBUMIN UR-MCNC: 19.1 MG/L (ref 0–20)
MICROALBUMIN/CREAT 24H UR: 29 MG/G CREATININE (ref 0–30)
PHOSPHATE SERPL-MCNC: 4.2 MG/DL (ref 2.3–4.1)
POTASSIUM SERPL-SCNC: 3.8 MMOL/L (ref 3.5–5.3)
PROT SERPL-MCNC: 6.8 G/DL (ref 6.4–8.2)
SODIUM SERPL-SCNC: 133 MMOL/L (ref 136–145)
TSH SERPL DL<=0.05 MIU/L-ACNC: 0.6 UIU/ML (ref 0.45–4.5)

## 2022-06-20 PROCEDURE — 83735 ASSAY OF MAGNESIUM: CPT

## 2022-06-20 PROCEDURE — 82306 VITAMIN D 25 HYDROXY: CPT

## 2022-06-20 PROCEDURE — 84100 ASSAY OF PHOSPHORUS: CPT

## 2022-06-20 PROCEDURE — 82043 UR ALBUMIN QUANTITATIVE: CPT

## 2022-06-20 PROCEDURE — 36415 COLL VENOUS BLD VENIPUNCTURE: CPT

## 2022-06-20 PROCEDURE — 82570 ASSAY OF URINE CREATININE: CPT

## 2022-06-20 PROCEDURE — 84443 ASSAY THYROID STIM HORMONE: CPT

## 2022-06-20 PROCEDURE — 80053 COMPREHEN METABOLIC PANEL: CPT

## 2022-06-27 DIAGNOSIS — E11.9 TYPE 2 DIABETES MELLITUS WITHOUT COMPLICATION, WITHOUT LONG-TERM CURRENT USE OF INSULIN (HCC): ICD-10-CM

## 2022-06-27 RX ORDER — BLOOD SUGAR DIAGNOSTIC
STRIP MISCELLANEOUS
Qty: 100 STRIP | Refills: 1 | Status: SHIPPED | OUTPATIENT
Start: 2022-06-27

## 2022-06-28 ENCOUNTER — OFFICE VISIT (OUTPATIENT)
Dept: NEPHROLOGY | Facility: CLINIC | Age: 87
End: 2022-06-28
Payer: COMMERCIAL

## 2022-06-28 VITALS
HEIGHT: 62 IN | WEIGHT: 181 LBS | SYSTOLIC BLOOD PRESSURE: 162 MMHG | DIASTOLIC BLOOD PRESSURE: 80 MMHG | BODY MASS INDEX: 33.31 KG/M2

## 2022-06-28 DIAGNOSIS — I12.9 BENIGN HYPERTENSION WITH CKD (CHRONIC KIDNEY DISEASE) STAGE III (HCC): Primary | ICD-10-CM

## 2022-06-28 DIAGNOSIS — N18.30 BENIGN HYPERTENSION WITH CKD (CHRONIC KIDNEY DISEASE) STAGE III (HCC): Primary | ICD-10-CM

## 2022-06-28 DIAGNOSIS — N18.32 STAGE 3B CHRONIC KIDNEY DISEASE (HCC): ICD-10-CM

## 2022-06-28 DIAGNOSIS — E87.1 HYPONATREMIA: ICD-10-CM

## 2022-06-28 DIAGNOSIS — N25.81 SECONDARY HYPERPARATHYROIDISM OF RENAL ORIGIN (HCC): ICD-10-CM

## 2022-06-28 DIAGNOSIS — R60.0 BILATERAL LEG EDEMA: ICD-10-CM

## 2022-06-28 DIAGNOSIS — E87.6 HYPOKALEMIA: ICD-10-CM

## 2022-06-28 PROCEDURE — 99214 OFFICE O/P EST MOD 30 MIN: CPT | Performed by: INTERNAL MEDICINE

## 2022-06-28 PROCEDURE — 1160F RVW MEDS BY RX/DR IN RCRD: CPT | Performed by: INTERNAL MEDICINE

## 2022-06-28 PROCEDURE — 1036F TOBACCO NON-USER: CPT | Performed by: INTERNAL MEDICINE

## 2022-06-28 NOTE — PROGRESS NOTES
NEPHROLOGY OUTPATIENT PROGRESS NOTE   Cathy Jennings 80 y o  female MRN: [id number]  DATE: 6/28/2022  Reason for visit:   Chief Complaint   Patient presents with    Follow-up    Chronic Kidney Disease     ASSESSMENT and PLAN:  CKD stage IIIB,  baseline creatinine fluctuating 1 1 to 1 4 since May 2021, prior 0 9 to 1 1  -creatinine 1 4 in June 2022 slightly increased but closer to baseline  Some progression of CKD noted     - UA bland in November 2021   -CKD likely secondary to long-term hypertension and diabetes  -avoid nephrotoxins or NSAIDs  -urine microalbumin/creatinine ratio nonsignificant in June 2022     Hypertension  -blood pressure above goal in the office today   -she feels more stressed/anxious given her car got locked earlier today  Her BP was much better controlled and different providers office visit/urgent care visit earlier this month   -given better BP control earlier this month, will not change antihypertensive regimen for now    -she has upcoming appointment with PCP in two weeks  Also advised to check BP on daily basis at home and call back remains persistently greater than 140/90  Bring BP machine during next office visit  -currently on carvedilol 25 mg b i d , torsemide 20 mg every other day  Patient can not confirm all her medications and she has not brought her medication list   She will call back after going home to confirm her meds    - could not tolerate lisinopril due to dry cough      Hyponatremia  -serum sodium 133 relatively stable slightly below goal   -initially hyponatremia thought to be secondary to hypovolemia/HCTZ +/- SIADH component  -continue to remain off HCTZ  Continue fluid restriction less than 50 oz per day  -currently on torsemide 20 mg every other day     Continue same    Sodium level somewhat fluctuating based on volume status   -previous workup includes serum osmolality 252, urine sodium 45, urine osmolality 388    -I had detailed discussion with patient regarding further workup including CT scan to further evaluate for malignancy  She does not want to pursue any aggressive workup or intervention given her advanced age      Hypokalemia, resolved, currently on potassium chloride 20 mEq every other day  Continue same     Secondary hyperparathyroidism, last PTH 56 in November 2021  Vitamin-D level 52  continue current vitamin-D supplements       Leg edema, overall improved and stable    -more recently weight seems to be around 177 to 180 lb at home  Recently torsemide was reduced to 20 mg every other day along with potassium chloride 20 mEq every other day  Diagnoses and all orders for this visit:    Benign hypertension with CKD (chronic kidney disease) stage III ([place] [address] )  -     Basic metabolic panel; Future  -     CBC; Future  -     Phosphorus; Future  -     PTH, intact; Future  -     Microalbumin / creatinine urine ratio; Future  -     Vitamin D 25 hydroxy; Future    Hypokalemia    Bilateral leg edema    Hyponatremia  -     Basic metabolic panel; Future    Secondary hyperparathyroidism of renal origin ([place] [address] )  -     Phosphorus; Future  -     PTH, intact; Future    Stage 3b chronic kidney disease ([place] [address] )  -     Basic metabolic panel; Future  -     Basic metabolic panel; Future  -     CBC; Future  -     Phosphorus; Future  -     PTH, intact; Future  -     Microalbumin / creatinine urine ratio; Future  -     Vitamin D 25 hydroxy; Future        SUBJECTIVE / HPI:  Patient is 42-year-old female with significant medical issues of hypertension for many years, prior history of long-term diabetes, CKD stage IIIB with baseline creatinine 1 1 to 1 4 since May 2021: Prior 0 9 to 1, chronic back pain issues, history of hyponatremia, presents for regular follow-up of CKD   Since last visit, torsemide/potassium chloride were reduced due to increased creatinine  Repeat seems to be relatively stable at home as mentioned above  Leg edema seems to be stable    Denies any worsening dyspnea   Denies any urinary complaint   No recent NSAID use  Patient has chronic back pain issues which is overall stable  Denies any chest pain or shortness of breath   No nausea or vomiting  REVIEW OF SYSTEMS:  More than 10 point review of systems were obtained and discussed in length with the patient  Complete review of systems were negative / unremarkable except mentioned above  PHYSICAL EXAM:  Vitals:    06/28/22 1355   BP: 162/80   BP Location: Left arm   Patient Position: Sitting   Cuff Size: Large   Weight: 82 1 kg (181 lb)   Height: 5' 2" (1 575 m)     Body mass index is 33 11 kg/m²  Physical Exam  Vitals reviewed  Constitutional:       Appearance: She is well-developed  HENT:      Head: Normocephalic and atraumatic  Right Ear: External ear normal       Left Ear: External ear normal       Nose:      Comments: External examination of nose unremarkable  Eyes:      Conjunctiva/sclera: Conjunctivae normal    Cardiovascular:      Comments: S1, S2 present  Pulmonary:      Effort: Pulmonary effort is normal       Breath sounds: Normal breath sounds  No wheezing or rales  Abdominal:      General: Bowel sounds are normal  There is no distension  Palpations: Abdomen is soft  Tenderness: There is no abdominal tenderness  Musculoskeletal:         General: No deformity  Right lower leg: Edema present  Left lower leg: Edema present  Comments: Trace, stable in both legs   Lymphadenopathy:      Cervical: No cervical adenopathy  Skin:     Findings: No rash  Neurological:      Mental Status: She is alert and oriented to person, place, and time     Psychiatric:         Behavior: Behavior normal          PAST MEDICAL HISTORY:  Past Medical History:   Diagnosis Date    Anemia     Anxiety     Arthritis     CKD (chronic kidney disease), stage III ([facility] ) 11/29/2018    Depression, recurrent ([facility] ) 6/14/2021    Diabetes mellitus ([place] [address] )     Diabetic peripheral neuropathy ([place] [address] )  Hyperlipidemia     Hypertension     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 8/23/2017    Thyroid enlarged 2/23/2015    Tinnitus     Vertigo        PAST SURGICAL HISTORY:  Past Surgical History:   Procedure Laterality Date    APPENDECTOMY      EAR SURGERY Left     KNEE SURGERY      TONSILLECTOMY         SOCIAL HISTORY:  Social History     Substance and Sexual Activity   Alcohol Use Not Currently     Social History     Substance and Sexual Activity   Drug Use No     Social History     Tobacco Use   Smoking Status Never Smoker   Smokeless Tobacco Never Used       FAMILY HISTORY:  Family History   Problem Relation Age of Onset    Diabetes Mother     Heart disease Mother     Heart disease Father     Stroke Father     Cancer Family     Dementia Sister     Liver disease Other        MEDICATIONS:    Current Outpatient Medications:     amiodarone 200 mg tablet, Take 200 mg by mouth daily  , Disp: , Rfl:     ASPIRIN 81 PO, Take by mouth, Disp: , Rfl:     atorvastatin (LIPITOR) 80 mg tablet, , Disp: , Rfl:     Blood Glucose Monitoring Suppl (ONE TOUCH ULTRA 2) w/Device KIT, Use daily, Disp: 1 kit, Rfl: 0    carvedilol (COREG) 25 mg tablet, Take 1 tablet (25 mg total) by mouth 2 (two) times a day with meals, Disp: 60 tablet, Rfl: 5    cholecalciferol (VITAMIN D3) 1,000 units tablet, Take 2,000 Units by mouth daily, Disp: , Rfl:     dicyclomine (BENTYL) 10 mg capsule, TAKE 1 CAPSULE BY MOUTH 4 TIMES DAILY AS NEEDED FOR ABDOMINAL PAIN (Patient taking differently: 10 mg 2 (two) times a day), Disp: 360 capsule, Rfl: 0    Docusate Sodium (DSS) 100 MG CAPS, Take 100 mg by mouth 2 (two) times a day, Disp: , Rfl:     glucose blood (OneTouch Ultra) test strip, Test daily                        DX: E11 9, Disp: 100 strip, Rfl: 1    lidocaine (LIDODERM) 5 %, Apply 1 patch topically daily Remove & Discard patch within 12 hours or as directed by MD, Disp: 30 patch, Rfl: 0    LORazepam (Ativan) 0 5 mg tablet, Take 1 tablet (0 5 mg total) by mouth 2 (two) times a day as needed for anxiety, Disp: 30 tablet, Rfl: 0    meclizine (ANTIVERT) 12 5 MG tablet, Take 1 tablet (12 5 mg total) by mouth 3 (three) times a day as needed for dizziness, Disp: 30 tablet, Rfl: 0    Multiple Vitamin (MULTI-VITAMIN DAILY PO), Take 1 tablet by mouth daily, Disp: , Rfl:     Omega 3-6-9 Fatty Acids (OMEGA 3-6-9 PO), Take by mouth, Disp: , Rfl:     potassium chloride (K-DUR,KLOR-CON) 20 mEq tablet, Take 1 tablet (20 mEq total) by mouth every other day, Disp: 30 tablet, Rfl: 5    Probiotic Product (PROBIOTIC-10 PO), Take by mouth, Disp: , Rfl:     rivaroxaban (XARELTO) 15 mg tablet, Take 15 mg by mouth daily , Disp: , Rfl:     torsemide (DEMADEX) 20 mg tablet, Take 1 tablet (20 mg total) by mouth every other day, Disp: 30 tablet, Rfl: 3    Lab Results:   Results for orders placed or performed in visit on 06/20/22   Phosphorus   Result Value Ref Range    Phosphorus 4 2 (H) 2 3 - 4 1 mg/dL   Microalbumin / creatinine urine ratio   Result Value Ref Range    Creatinine, Ur 65 2 mg/dL    Microalbum  ,U,Random 19 1 0 0 - 20 0 mg/L    Microalb Creat Ratio 29 0 - 30 mg/g creatinine   Vitamin D 25 hydroxy   Result Value Ref Range    Vit D, 25-Hydroxy 52 3 30 0 - 100 0 ng/mL   Magnesium   Result Value Ref Range    Magnesium 2 2 1 6 - 2 6 mg/dL   Comprehensive metabolic panel   Result Value Ref Range    Sodium 133 (L) 136 - 145 mmol/L    Potassium 3 8 3 5 - 5 3 mmol/L    Chloride 98 (L) 100 - 108 mmol/L    CO2 26 21 - 32 mmol/L    ANION GAP 9 4 - 13 mmol/L    BUN 25 5 - 25 mg/dL    Creatinine 1 40 (H) 0 60 - 1 30 mg/dL    Glucose, Fasting 111 (H) 65 - 99 mg/dL    Calcium 9 4 8 3 - 10 1 mg/dL    AST 38 5 - 45 U/L    ALT 57 12 - 78 U/L    Alkaline Phosphatase 132 (H) 46 - 116 U/L    Total Protein 6 8 6 4 - 8 2 g/dL    Albumin 3 5 3 5 - 5 0 g/dL    Total Bilirubin 0  53 0 20 - 1 00 mg/dL    eGFR 33 ml/min/1 73sq m   TSH, 3rd generation with Free T4 reflex   Result Value Ref Range    TSH 3RD GENERATON 0 602 0 450 - 4 500 uIU/mL

## 2022-07-05 ENCOUNTER — APPOINTMENT (OUTPATIENT)
Dept: LAB | Facility: CLINIC | Age: 87
End: 2022-07-05
Payer: COMMERCIAL

## 2022-07-05 DIAGNOSIS — I50.9 CHRONIC CONGESTIVE HEART FAILURE, UNSPECIFIED HEART FAILURE TYPE (HCC): ICD-10-CM

## 2022-07-05 DIAGNOSIS — E87.1 HYPONATREMIA: ICD-10-CM

## 2022-07-05 DIAGNOSIS — N18.32 STAGE 3B CHRONIC KIDNEY DISEASE (HCC): ICD-10-CM

## 2022-07-05 LAB
ANION GAP SERPL CALCULATED.3IONS-SCNC: 6 MMOL/L (ref 4–13)
BUN SERPL-MCNC: 25 MG/DL (ref 5–25)
CALCIUM SERPL-MCNC: 8.8 MG/DL (ref 8.3–10.1)
CHLORIDE SERPL-SCNC: 98 MMOL/L (ref 100–108)
CO2 SERPL-SCNC: 28 MMOL/L (ref 21–32)
CREAT SERPL-MCNC: 1.34 MG/DL (ref 0.6–1.3)
GFR SERPL CREATININE-BSD FRML MDRD: 35 ML/MIN/1.73SQ M
GLUCOSE SERPL-MCNC: 122 MG/DL (ref 65–140)
MAGNESIUM SERPL-MCNC: 2.3 MG/DL (ref 1.6–2.6)
POTASSIUM SERPL-SCNC: 4.2 MMOL/L (ref 3.5–5.3)
SODIUM SERPL-SCNC: 132 MMOL/L (ref 136–145)

## 2022-07-05 PROCEDURE — 80048 BASIC METABOLIC PNL TOTAL CA: CPT

## 2022-07-05 PROCEDURE — 83735 ASSAY OF MAGNESIUM: CPT

## 2022-07-05 PROCEDURE — 36415 COLL VENOUS BLD VENIPUNCTURE: CPT

## 2022-07-07 ENCOUNTER — RA CDI HCC (OUTPATIENT)
Dept: OTHER | Facility: HOSPITAL | Age: 87
End: 2022-07-07

## 2022-07-07 NOTE — PROGRESS NOTES
[facility]  coding opportunities          Chart Reviewed number of suggestions sent to Provider: 4   E11 42  E11 22  I73 9  I13 0    Patients Insurance     Medicare Insurance: [employer] Advantage

## 2022-07-11 ENCOUNTER — TELEPHONE (OUTPATIENT)
Dept: OTHER | Facility: HOSPITAL | Age: 87
End: 2022-07-11

## 2022-07-11 NOTE — TELEPHONE ENCOUNTER
Creatinine remains stable 1 3  Sodium level slightly lower 132 but relatively stable  Continue fluid restriction as discussed during office visit  She did not remember all her medications during office visit last month  Will need accurate med rec  Supposedly she was going to call us after her office appointment to go over all her medications but not sure whether she has done this  Please ensure that she is taking torsemide 20 mg every other day  She should do repeat serum sodium level in 6 to 8 weeks to ensure sodium level remains stable

## 2022-07-12 NOTE — TELEPHONE ENCOUNTER
Received call from patients ashley To   He is aware and he stated all medications are the same as previous visit and the torsemide is taken every other day

## 2022-07-13 ENCOUNTER — OFFICE VISIT (OUTPATIENT)
Dept: FAMILY MEDICINE CLINIC | Facility: CLINIC | Age: 87
End: 2022-07-13
Payer: COMMERCIAL

## 2022-07-13 VITALS
TEMPERATURE: 96.7 F | SYSTOLIC BLOOD PRESSURE: 140 MMHG | BODY MASS INDEX: 33.95 KG/M2 | HEIGHT: 62 IN | WEIGHT: 184.5 LBS | DIASTOLIC BLOOD PRESSURE: 80 MMHG

## 2022-07-13 DIAGNOSIS — E87.6 HYPOKALEMIA: ICD-10-CM

## 2022-07-13 DIAGNOSIS — E11.621 DIABETIC ULCER OF LEFT MIDFOOT ASSOCIATED WITH TYPE 2 DIABETES MELLITUS, LIMITED TO BREAKDOWN OF SKIN (HCC): ICD-10-CM

## 2022-07-13 DIAGNOSIS — E11.9 TYPE 2 DIABETES MELLITUS WITHOUT COMPLICATION, WITHOUT LONG-TERM CURRENT USE OF INSULIN (HCC): Primary | ICD-10-CM

## 2022-07-13 DIAGNOSIS — L97.421 DIABETIC ULCER OF LEFT MIDFOOT ASSOCIATED WITH TYPE 2 DIABETES MELLITUS, LIMITED TO BREAKDOWN OF SKIN (HCC): ICD-10-CM

## 2022-07-13 LAB — SL AMB POCT HEMOGLOBIN AIC: 5.8 (ref ?–6.5)

## 2022-07-13 PROCEDURE — 1160F RVW MEDS BY RX/DR IN RCRD: CPT | Performed by: FAMILY MEDICINE

## 2022-07-13 PROCEDURE — 3725F SCREEN DEPRESSION PERFORMED: CPT | Performed by: FAMILY MEDICINE

## 2022-07-13 PROCEDURE — 99213 OFFICE O/P EST LOW 20 MIN: CPT | Performed by: FAMILY MEDICINE

## 2022-07-13 PROCEDURE — 83036 HEMOGLOBIN GLYCOSYLATED A1C: CPT | Performed by: FAMILY MEDICINE

## 2022-07-13 RX ORDER — CEPHALEXIN 500 MG/1
500 CAPSULE ORAL EVERY 8 HOURS SCHEDULED
Qty: 30 CAPSULE | Refills: 0 | Status: SHIPPED | OUTPATIENT
Start: 2022-07-13 | End: 2022-07-23

## 2022-07-13 RX ORDER — POTASSIUM CHLORIDE 20 MEQ/1
20 TABLET, EXTENDED RELEASE ORAL EVERY OTHER DAY
Qty: 30 TABLET | Refills: 5 | Status: CANCELLED | OUTPATIENT
Start: 2022-07-13

## 2022-07-13 RX ORDER — POTASSIUM CHLORIDE 750 MG/1
10 CAPSULE, EXTENDED RELEASE ORAL DAILY
Qty: 30 CAPSULE | Refills: 5 | Status: SHIPPED | OUTPATIENT
Start: 2022-07-13 | End: 2022-10-14 | Stop reason: SDUPTHER

## 2022-07-13 NOTE — PATIENT INSTRUCTIONS
Refill Keflex, 10  meq  (1 Tab)  every day and 1/2 of water pill  every day, await podiatry follow up

## 2022-07-13 NOTE — ASSESSMENT & PLAN NOTE
Lab Results   Component Value Date    HGBA1C 6 5 11/30/2021   see Dr Najma Fitch ( podiatry) every  8 weeks, took Keflex  From care now

## 2022-07-13 NOTE — PROGRESS NOTES
Assessment and Plan:    Problem List Items Addressed This Visit        Endocrine    Diabetic ulcer of left midfoot associated with type 2 diabetes mellitus, limited to breakdown of skin ([address] )       Lab Results   Component Value Date    HGBA1C 6 5 11/30/2021   see Dr Brien Evans ( podiatry) every  8 weeks, took Keflex  From care now           Relevant Medications    cephalexin (KEFLEX) 500 mg capsule    Type 2 diabetes mellitus without complication, without long-term current use of insulin ([address] ) - Primary    Relevant Orders    POCT hemoglobin A1c       Other    Hypokalemia    Relevant Medications    potassium chloride (MICRO-K) 10 MEQ CR capsule                 Diagnoses and all orders for this visit:    Type 2 diabetes mellitus without complication, without long-term current use of insulin (HCC)  -     POCT hemoglobin A1c    Hypokalemia  -     potassium chloride (MICRO-K) 10 MEQ CR capsule; Take 1 capsule (10 mEq total) by mouth daily    Diabetic ulcer of left midfoot associated with type 2 diabetes mellitus, limited to breakdown of skin (HCC)  -     cephalexin (KEFLEX) 500 mg capsule; Take 1 capsule (500 mg total) by mouth every 8 (eight) hours for 10 days            Subjective:      Patient ID: Lidia Wild is a 80 y o  female  CC:    Chief Complaint   Patient presents with    Follow-up     For chronic conditions  mgb       HPI:    F/u diabetic ulcer   Took course of Keflex with improvement, still mildly red, foot doc appt in a couple weeks, having episodes of tachycardia occasionally,home monitor reading  high      The following portions of the patient's history were reviewed and updated as appropriate: allergies, current medications, past family history, past medical history, past social history, past surgical history and problem list         Review of Systems   Constitutional: Negative for activity change, appetite change and fatigue  Respiratory: Negative for shortness of breath      Cardiovascular: Negative for chest pain  Skin: Positive for wound  Neurological: Negative for dizziness and headaches  Data to review:       Objective:    Vitals:    07/13/22 1154 07/13/22 1228   BP: 134/66 140/80   BP Location: Left arm Left arm   Patient Position: Sitting Sitting   Cuff Size: Large Large   Temp: (!) 96 7 °F (35 9 °C)    TempSrc: Temporal    Weight: 83 7 kg (184 lb 8 oz)    Height: 5' 2" (1 575 m)         Physical Exam  Vitals reviewed  Constitutional:       Appearance: Normal appearance  She is obese  Cardiovascular:      Rate and Rhythm: Normal rate  Rhythm irregularly irregular  Pulses: Pulses are weak  Dorsalis pedis pulses are 1+ on the right side and 1+ on the left side  Posterior tibial pulses are 1+ on the right side and 1+ on the left side  Heart sounds: Normal heart sounds  Comments: /80 both arms  Musculoskeletal:      Right lower leg: No edema  Left lower leg: No edema  Feet:      Right foot:      Skin integrity: No ulcer, skin breakdown, erythema, warmth, callus or dry skin  Left foot:      Skin integrity: Ulcer and erythema present  No warmth  Skin:     Comments: Bunion with overlying superficial ulcerl 1st toe   Neurological:      Mental Status: She is alert  Patient's shoes and socks removed  Right Foot/Ankle   Right Foot Inspection  Skin Exam: skin normal and skin intact  No dry skin, no warmth, no callus, no erythema, no maceration, no abnormal color, no pre-ulcer, no ulcer and no callus  Ulcer Size (cm): 0 5    Toe Exam: ROM and strength within normal limits  Sensory   Monofilament testing: intact    Vascular  Capillary refills: < 3 seconds  The right DP pulse is 1+  The right PT pulse is 1+  Left Foot/Ankle  Left Foot Inspection  Skin Exam: erythema and ulcer  No warmth  Ulcer Size (cm): 0 5    Toe Exam: ROM and strength within normal limits       Sensory   Monofilament testing: intact    Vascular  Capillary refills: < 3 seconds  The left DP pulse is 1+  The left PT pulse is 1+       Assign Risk Category  Deformity present  No loss of protective sensation  Weak pulses  Risk: 2

## 2022-07-20 ENCOUNTER — TELEPHONE (OUTPATIENT)
Dept: FAMILY MEDICINE CLINIC | Facility: CLINIC | Age: 87
End: 2022-07-20

## 2022-07-20 NOTE — LETTER
07/20/22      Olivia Palafox  1934            Most recent Hospital admissions, as below:    [facility] 9/23/2021 for Congestive Heart Failure    [facility] 9/10/2021 For Congestive Heat Failure    [facility] 6/8/2021 for Atrial Fibrillation , Hyperglycemia and Hypokalemia

## 2022-07-20 NOTE — TELEPHONE ENCOUNTER
Pt's Caregiver Yesy Maier called in asking for a Letter like Dr Sharmin Roberts wrote 9/2021 stating she would benefit from assistance with ADL's and he also asked I type something up with her Admission dates on it for his records  Letters placed and he will come by to pick them up

## 2022-07-20 NOTE — LETTER
07/20/22    To whom it may concern re:    Daryle Kitty  1934    Daryle Kitty is under my care my care for Diabetes, Heart failure, Chronic Kidney disease and severe Arthritis and Degenerative Disc disease and is in need of Home care services as she is having trouble with ADLs,needs help with bathing, getting out of bed, needs someone to cook, may need help to feed herself  Any concerns please contact our office              Patricia Qureshi MD

## 2022-08-01 ENCOUNTER — TELEPHONE (OUTPATIENT)
Dept: NEPHROLOGY | Facility: CLINIC | Age: 87
End: 2022-08-01

## 2022-08-01 DIAGNOSIS — E87.1 HYPONATREMIA: Primary | ICD-10-CM

## 2022-08-01 NOTE — TELEPHONE ENCOUNTER
Blanca Cruz left a message- he called earlier regarding Luis Pilling  They were asking if a sodium was included in the labs that Luis Pilling needs to have done  Blanca Cruz would like the sodium added if it was not ordered  Blanca Cruz is requesting a call back  Please advise

## 2022-08-01 NOTE — TELEPHONE ENCOUNTER
Spoke with Kian Harris  All concerns and questions were addressed  No further questions at this time

## 2022-10-03 DIAGNOSIS — R60.0 BILATERAL LEG EDEMA: ICD-10-CM

## 2022-10-03 RX ORDER — TORSEMIDE 20 MG/1
20 TABLET ORAL EVERY OTHER DAY
Qty: 30 TABLET | Refills: 5 | Status: SHIPPED | OUTPATIENT
Start: 2022-10-03

## 2022-10-10 ENCOUNTER — APPOINTMENT (OUTPATIENT)
Dept: LAB | Facility: CLINIC | Age: 87
End: 2022-10-10
Payer: COMMERCIAL

## 2022-10-10 DIAGNOSIS — N25.81 SECONDARY HYPERPARATHYROIDISM OF RENAL ORIGIN (HCC): ICD-10-CM

## 2022-10-10 DIAGNOSIS — E87.1 HYPONATREMIA: ICD-10-CM

## 2022-10-10 DIAGNOSIS — N18.32 STAGE 3B CHRONIC KIDNEY DISEASE (HCC): ICD-10-CM

## 2022-10-10 DIAGNOSIS — N18.30 BENIGN HYPERTENSION WITH CKD (CHRONIC KIDNEY DISEASE) STAGE III (HCC): ICD-10-CM

## 2022-10-10 DIAGNOSIS — I12.9 BENIGN HYPERTENSION WITH CKD (CHRONIC KIDNEY DISEASE) STAGE III (HCC): ICD-10-CM

## 2022-10-10 LAB
25(OH)D3 SERPL-MCNC: 53 NG/ML (ref 30–100)
ANION GAP SERPL CALCULATED.3IONS-SCNC: 6 MMOL/L (ref 4–13)
BUN SERPL-MCNC: 23 MG/DL (ref 5–25)
CALCIUM SERPL-MCNC: 9 MG/DL (ref 8.3–10.1)
CHLORIDE SERPL-SCNC: 99 MMOL/L (ref 96–108)
CO2 SERPL-SCNC: 27 MMOL/L (ref 21–32)
CREAT SERPL-MCNC: 1.54 MG/DL (ref 0.6–1.3)
CREAT UR-MCNC: 111 MG/DL
ERYTHROCYTE [DISTWIDTH] IN BLOOD BY AUTOMATED COUNT: 13.5 % (ref 11.6–15.1)
GFR SERPL CREATININE-BSD FRML MDRD: 29 ML/MIN/1.73SQ M
GLUCOSE P FAST SERPL-MCNC: 126 MG/DL (ref 65–99)
HCT VFR BLD AUTO: 34.5 % (ref 34.8–46.1)
HGB BLD-MCNC: 11.4 G/DL (ref 11.5–15.4)
MCH RBC QN AUTO: 32.9 PG (ref 26.8–34.3)
MCHC RBC AUTO-ENTMCNC: 33 G/DL (ref 31.4–37.4)
MCV RBC AUTO: 100 FL (ref 82–98)
MICROALBUMIN UR-MCNC: 22 MG/L (ref 0–20)
MICROALBUMIN/CREAT 24H UR: 20 MG/G CREATININE (ref 0–30)
PHOSPHATE SERPL-MCNC: 3.7 MG/DL (ref 2.3–4.1)
PLATELET # BLD AUTO: 154 THOUSANDS/UL (ref 149–390)
PMV BLD AUTO: 11.8 FL (ref 8.9–12.7)
POTASSIUM SERPL-SCNC: 3.8 MMOL/L (ref 3.5–5.3)
PTH-INTACT SERPL-MCNC: 97.9 PG/ML (ref 18.4–80.1)
RBC # BLD AUTO: 3.46 MILLION/UL (ref 3.81–5.12)
SODIUM SERPL-SCNC: 132 MMOL/L (ref 135–147)
WBC # BLD AUTO: 6.09 THOUSAND/UL (ref 4.31–10.16)

## 2022-10-10 PROCEDURE — 82570 ASSAY OF URINE CREATININE: CPT

## 2022-10-10 PROCEDURE — 85027 COMPLETE CBC AUTOMATED: CPT

## 2022-10-10 PROCEDURE — 84100 ASSAY OF PHOSPHORUS: CPT

## 2022-10-10 PROCEDURE — 36415 COLL VENOUS BLD VENIPUNCTURE: CPT

## 2022-10-10 PROCEDURE — 82306 VITAMIN D 25 HYDROXY: CPT

## 2022-10-10 PROCEDURE — 83970 ASSAY OF PARATHORMONE: CPT

## 2022-10-10 PROCEDURE — 80048 BASIC METABOLIC PNL TOTAL CA: CPT

## 2022-10-10 PROCEDURE — 82043 UR ALBUMIN QUANTITATIVE: CPT

## 2022-10-14 ENCOUNTER — OFFICE VISIT (OUTPATIENT)
Dept: FAMILY MEDICINE CLINIC | Facility: CLINIC | Age: 87
End: 2022-10-14
Payer: COMMERCIAL

## 2022-10-14 VITALS
BODY MASS INDEX: 32.39 KG/M2 | WEIGHT: 176 LBS | DIASTOLIC BLOOD PRESSURE: 86 MMHG | HEIGHT: 62 IN | RESPIRATION RATE: 16 BRPM | OXYGEN SATURATION: 96 % | SYSTOLIC BLOOD PRESSURE: 136 MMHG | TEMPERATURE: 98 F | HEART RATE: 64 BPM

## 2022-10-14 DIAGNOSIS — Z23 COVID-19 VACCINE ADMINISTERED: ICD-10-CM

## 2022-10-14 DIAGNOSIS — I74.3 EMBOLISM AND THROMBOSIS OF ARTERIES OF THE LOWER EXTREMITIES (HCC): ICD-10-CM

## 2022-10-14 DIAGNOSIS — E11.22 TYPE 2 DIABETES MELLITUS WITH STAGE 3B CHRONIC KIDNEY DISEASE, WITHOUT LONG-TERM CURRENT USE OF INSULIN (HCC): ICD-10-CM

## 2022-10-14 DIAGNOSIS — N18.32 TYPE 2 DIABETES MELLITUS WITH STAGE 3B CHRONIC KIDNEY DISEASE, WITHOUT LONG-TERM CURRENT USE OF INSULIN (HCC): ICD-10-CM

## 2022-10-14 DIAGNOSIS — Z23 NEEDS FLU SHOT: Primary | ICD-10-CM

## 2022-10-14 DIAGNOSIS — I50.9 CHRONIC CONGESTIVE HEART FAILURE, UNSPECIFIED HEART FAILURE TYPE (HCC): ICD-10-CM

## 2022-10-14 DIAGNOSIS — E87.1 HYPONATREMIA: ICD-10-CM

## 2022-10-14 DIAGNOSIS — I12.9 BENIGN HYPERTENSION WITH CKD (CHRONIC KIDNEY DISEASE) STAGE III (HCC): ICD-10-CM

## 2022-10-14 DIAGNOSIS — F33.9 DEPRESSION, RECURRENT (HCC): ICD-10-CM

## 2022-10-14 DIAGNOSIS — I48.91 NEW ONSET A-FIB (HCC): ICD-10-CM

## 2022-10-14 DIAGNOSIS — E87.6 HYPOKALEMIA: ICD-10-CM

## 2022-10-14 DIAGNOSIS — E11.9 TYPE 2 DIABETES MELLITUS WITHOUT COMPLICATION, WITHOUT LONG-TERM CURRENT USE OF INSULIN (HCC): ICD-10-CM

## 2022-10-14 DIAGNOSIS — N18.32 STAGE 3B CHRONIC KIDNEY DISEASE (HCC): ICD-10-CM

## 2022-10-14 DIAGNOSIS — R60.0 BILATERAL LEG EDEMA: ICD-10-CM

## 2022-10-14 DIAGNOSIS — N18.30 BENIGN HYPERTENSION WITH CKD (CHRONIC KIDNEY DISEASE) STAGE III (HCC): ICD-10-CM

## 2022-10-14 DIAGNOSIS — E11.42 DIABETIC PERIPHERAL NEUROPATHY (HCC): ICD-10-CM

## 2022-10-14 DIAGNOSIS — I73.9 PAD (PERIPHERAL ARTERY DISEASE) (HCC): ICD-10-CM

## 2022-10-14 PROBLEM — E11.621 DIABETIC ULCER OF LEFT MIDFOOT ASSOCIATED WITH TYPE 2 DIABETES MELLITUS, LIMITED TO BREAKDOWN OF SKIN (HCC): Status: RESOLVED | Noted: 2022-07-13 | Resolved: 2022-10-14

## 2022-10-14 PROBLEM — L97.421 DIABETIC ULCER OF LEFT MIDFOOT ASSOCIATED WITH TYPE 2 DIABETES MELLITUS, LIMITED TO BREAKDOWN OF SKIN (HCC): Status: RESOLVED | Noted: 2022-07-13 | Resolved: 2022-10-14

## 2022-10-14 PROCEDURE — 90662 IIV NO PRSV INCREASED AG IM: CPT

## 2022-10-14 PROCEDURE — G0008 ADMIN INFLUENZA VIRUS VAC: HCPCS

## 2022-10-14 PROCEDURE — 99214 OFFICE O/P EST MOD 30 MIN: CPT

## 2022-10-14 PROCEDURE — 91312 SARSCOV2 VAC BVL 30MCG/0.3ML: CPT

## 2022-10-14 PROCEDURE — 0124A ADM SARSCV2 BVL 30MCG/.3ML B: CPT

## 2022-10-14 PROCEDURE — 90471 IMMUNIZATION ADMIN: CPT

## 2022-10-14 RX ORDER — POTASSIUM CHLORIDE 750 MG/1
10 CAPSULE, EXTENDED RELEASE ORAL DAILY
Qty: 90 CAPSULE | Refills: 1 | Status: SHIPPED | OUTPATIENT
Start: 2022-10-14

## 2022-10-14 RX ORDER — DICYCLOMINE HYDROCHLORIDE 10 MG/1
10 CAPSULE ORAL 2 TIMES DAILY
Qty: 180 CAPSULE | Refills: 1 | Status: SHIPPED | OUTPATIENT
Start: 2022-10-14

## 2022-10-14 RX ORDER — CARVEDILOL 25 MG/1
25 TABLET ORAL 2 TIMES DAILY WITH MEALS
Qty: 180 TABLET | Refills: 1 | Status: SHIPPED | OUTPATIENT
Start: 2022-10-14

## 2022-10-14 NOTE — ASSESSMENT & PLAN NOTE
Lab Results   Component Value Date    EGFR 29 10/10/2022    EGFR 35 07/05/2022    EGFR 33 06/20/2022    CREATININE 1 54 (H) 10/10/2022    CREATININE 1 34 (H) 07/05/2022    CREATININE 1 40 (H) 06/20/2022   sees cards

## 2022-10-14 NOTE — ASSESSMENT & PLAN NOTE
Wt Readings from Last 3 Encounters:   10/14/22 79 8 kg (176 lb)   07/13/22 83 7 kg (184 lb 8 oz)   06/28/22 82 1 kg (181 lb)         Sees cards

## 2022-10-14 NOTE — ASSESSMENT & PLAN NOTE
Lab Results   Component Value Date    EGFR 29 10/10/2022    EGFR 35 07/05/2022    EGFR 33 06/20/2022    CREATININE 1 54 (H) 10/10/2022    CREATININE 1 34 (H) 07/05/2022    CREATININE 1 40 (H) 06/20/2022   stable

## 2022-10-14 NOTE — PROGRESS NOTES
Name: Sena Stroud      : 1934      MRN: [id number]  Encounter Provider: Felisha Roe MD  Encounter Date: 10/14/2022   Encounter department: [facility] PRIMARY CARE    Assessment & Plan     1  Needs flu shot  -     influenza vaccine, high-dose, PF 0 7 mL (FLUZONE HIGH-DOSE)    2  Hypokalemia  -     potassium chloride (MICRO-K) 10 MEQ CR capsule; Take 1 capsule (10 mEq total) by mouth daily    3  Hyponatremia  Assessment & Plan:  stable    Orders:  -     dicyclomine (BENTYL) 10 mg capsule; Take 1 capsule (10 mg total) by mouth 2 (two) times a day    4  Type 2 diabetes mellitus without complication, without long-term current use of insulin [facility])  Assessment & Plan:    Lab Results   Component Value Date    HGBA1C 5 8 2022   BS stable      5  Type 2 diabetes mellitus with stage 3b chronic kidney disease, without long-term current use of insulin [facility])  Assessment & Plan:    Lab Results   Component Value Date    HGBA1C 5 8 2022   bs  stable      6  Diabetic peripheral neuropathy [facility])  Assessment & Plan:    Lab Results   Component Value Date    HGBA1C 5 8 2022   bs stable      7  Benign hypertension with CKD (chronic kidney disease) stage III [facility])  Assessment & Plan:  Lab Results   Component Value Date    EGFR 29 10/10/2022    EGFR 35 2022    EGFR 33 2022    CREATININE 1 54 (H) 10/10/2022    CREATININE 1 34 (H) 2022    CREATININE 1 40 (H) 2022   sees cards      8  Chronic congestive heart failure, unspecified heart failure type [facility])  Assessment & Plan:  Wt Readings from Last 3 Encounters:   10/14/22 79 8 kg (176 lb)   22 83 7 kg (184 lb 8 oz)   22 82 1 kg (181 lb)         Sees cards    Orders:  -     carvedilol (COREG) 25 mg tablet; Take 1 tablet (25 mg total) by mouth 2 (two) times a day with meals    9  New onset a-fib [facility])  Assessment & Plan:  Sees cards    Orders:  -     carvedilol (COREG) 25 mg tablet;  Take 1 tablet (25 mg total) by mouth 2 (two) times a day with meals    10  Stage 3b chronic kidney disease [facility])  Assessment & Plan:  Lab Results   Component Value Date    EGFR 29 10/10/2022    EGFR 35 07/05/2022    EGFR 33 06/20/2022    CREATININE 1 54 (H) 10/10/2022    CREATININE 1 34 (H) 07/05/2022    CREATININE 1 40 (H) 06/20/2022   stable    Orders:  -     CBC and differential; Future; Expected date: 11/04/2022  -     Iron Panel (Includes Ferritin, Iron Sat%, Iron, and TIBC); Future; Expected date: 11/04/2022  -     Basic metabolic panel; Future; Expected date: 11/04/2022    11  Bilateral leg edema  Assessment & Plan:  stable      12  Depression, recurrent ([facility] )  Assessment & Plan:  stable      13  Embolism and thrombosis of arteries of the lower extremities (HCC)  Assessment & Plan:  Await  Vascular  study      14  PAD (peripheral artery disease) ([facility] )  Assessment & Plan:  Await vascular study             Subjective      Here for  F/u diabetes, sees kidney doc  Next month, gfr  Went from 35  To 29, sodium level stable, last vascular test showed improvement on r , worse on l-scheduled for f/u 10/27, no cp,  Nos ob, edema  Is stable    Review of Systems   Constitutional: Negative for activity change, appetite change and fatigue  Respiratory: Negative for shortness of breath  Cardiovascular: Positive for leg swelling  Negative for chest pain  Neurological: Negative for dizziness and headaches  Psychiatric/Behavioral: The patient is not nervous/anxious          Current Outpatient Medications on File Prior to Visit   Medication Sig   • amiodarone 200 mg tablet Take 200 mg by mouth daily     • ASPIRIN 81 PO Take by mouth   • atorvastatin (LIPITOR) 80 mg tablet    • Blood Glucose Monitoring Suppl (ONE TOUCH ULTRA 2) w/Device KIT Use daily   • cholecalciferol (VITAMIN D3) 1,000 units tablet Take 2,000 Units by mouth daily   • Docusate Sodium (DSS) 100 MG CAPS Take 100 mg by mouth 2 (two) times a day   • glucose blood (OneTouch Ultra) test strip Test daily                        DX: E11 9   • lidocaine (LIDODERM) 5 % Apply 1 patch topically daily Remove & Discard patch within 12 hours or as directed by MD   • Multiple Vitamin (MULTI-VITAMIN DAILY PO) Take 1 tablet by mouth daily   • Omega 3-6-9 Fatty Acids (OMEGA 3-6-9 PO) Take by mouth   • Probiotic Product (PROBIOTIC-10 PO) Take by mouth   • rivaroxaban (XARELTO) 15 mg tablet Take 15 mg by mouth daily    • torsemide (DEMADEX) 20 mg tablet Take 1 tablet (20 mg total) by mouth every other day   • [DISCONTINUED] carvedilol (COREG) 25 mg tablet Take 1 tablet (25 mg total) by mouth 2 (two) times a day with meals   • [DISCONTINUED] dicyclomine (BENTYL) 10 mg capsule TAKE 1 CAPSULE BY MOUTH 4 TIMES DAILY AS NEEDED FOR ABDOMINAL PAIN (Patient taking differently: 10 mg 2 (two) times a day)   • [DISCONTINUED] potassium chloride (MICRO-K) 10 MEQ CR capsule Take 1 capsule (10 mEq total) by mouth daily   • [DISCONTINUED] meclizine (ANTIVERT) 12 5 MG tablet Take 1 tablet (12 5 mg total) by mouth 3 (three) times a day as needed for dizziness (Patient not taking: No sig reported)       Objective     /86 (BP Location: Left arm, Patient Position: Sitting, Cuff Size: Standard)   Pulse 64   Temp 98 °F (36 7 °C) (Tympanic)   Resp 16   Ht 5' 2" (1 575 m)   Wt 79 8 kg (176 lb)   SpO2 96%   BMI 32 19 kg/m²     Physical Exam  Vitals reviewed  Constitutional:       Appearance: Normal appearance  She is obese  She is not ill-appearing  Neck:      Vascular: No carotid bruit  Cardiovascular:      Rate and Rhythm: Normal rate  Rhythm irregularly irregular  Pulses:           Popliteal pulses are 1+ on the right side and 1+ on the left side  Dorsalis pedis pulses are 1+ on the right side and 1+ on the left side  Posterior tibial pulses are 1+ on the right side and 1+ on the left side  Heart sounds: Normal heart sounds     Pulmonary:      Effort: Pulmonary effort is normal       Breath sounds: Normal breath sounds  Musculoskeletal:      Right lower le+ Edema present  Left lower le+ Edema present  Lymphadenopathy:      Cervical: No cervical adenopathy  Neurological:      Mental Status: She is alert     Psychiatric:         Mood and Affect: Mood normal        Beverly Ramos MD

## 2022-10-17 DIAGNOSIS — E11.9 TYPE 2 DIABETES MELLITUS WITHOUT COMPLICATION, WITHOUT LONG-TERM CURRENT USE OF INSULIN (HCC): ICD-10-CM

## 2022-10-17 RX ORDER — BLOOD SUGAR DIAGNOSTIC
STRIP MISCELLANEOUS
Qty: 100 STRIP | Refills: 1 | Status: SHIPPED | OUTPATIENT
Start: 2022-10-17

## 2022-10-17 NOTE — TELEPHONE ENCOUNTER
Medication: ONE TOUCH ULTRA TEST STRIPS  Day supply: 100  Pharmacy: [facility]     Last office visit: 10/14/2022    Upcoming office visit: 1/18/2023

## 2022-10-27 ENCOUNTER — HOSPITAL ENCOUNTER (OUTPATIENT)
Dept: NON INVASIVE DIAGNOSTICS | Facility: CLINIC | Age: 87
Discharge: HOME | End: 2022-10-27
Payer: COMMERCIAL

## 2022-10-27 DIAGNOSIS — I73.9 PAD (PERIPHERAL ARTERY DISEASE) (HCC): ICD-10-CM

## 2022-10-27 PROCEDURE — 93925 LOWER EXTREMITY STUDY: CPT

## 2022-10-27 PROCEDURE — 93923 UPR/LXTR ART STDY 3+ LVLS: CPT

## 2022-10-28 ENCOUNTER — TELEPHONE (OUTPATIENT)
Dept: FAMILY MEDICINE CLINIC | Facility: CLINIC | Age: 87
End: 2022-10-28

## 2022-10-28 NOTE — TELEPHONE ENCOUNTER
Charu Reddy called regarding Mynor Aorraulises  She had her [facility] limb duplex done yesterday  She doesn't see Dr Etienne Burton until 12/12/22 and they would like Dr Alden Wade to look at it and give them a call

## 2022-10-31 ENCOUNTER — PROCEDURE VISIT (OUTPATIENT)
Dept: FAMILY MEDICINE CLINIC | Facility: CLINIC | Age: 87
End: 2022-10-31

## 2022-10-31 VITALS
RESPIRATION RATE: 16 BRPM | WEIGHT: 176 LBS | SYSTOLIC BLOOD PRESSURE: 130 MMHG | HEIGHT: 62 IN | HEART RATE: 68 BPM | BODY MASS INDEX: 32.39 KG/M2 | DIASTOLIC BLOOD PRESSURE: 72 MMHG

## 2022-10-31 DIAGNOSIS — N18.32 TYPE 2 DIABETES MELLITUS WITH STAGE 3B CHRONIC KIDNEY DISEASE, WITHOUT LONG-TERM CURRENT USE OF INSULIN (HCC): Primary | ICD-10-CM

## 2022-10-31 DIAGNOSIS — H61.23 BILATERAL IMPACTED CERUMEN: ICD-10-CM

## 2022-10-31 DIAGNOSIS — Z00.00 MEDICARE ANNUAL WELLNESS VISIT, SUBSEQUENT: ICD-10-CM

## 2022-10-31 DIAGNOSIS — E11.22 TYPE 2 DIABETES MELLITUS WITH STAGE 3B CHRONIC KIDNEY DISEASE, WITHOUT LONG-TERM CURRENT USE OF INSULIN (HCC): Primary | ICD-10-CM

## 2022-10-31 LAB — SL AMB POCT HEMOGLOBIN AIC: 6.1 (ref ?–6.5)

## 2022-10-31 NOTE — ASSESSMENT & PLAN NOTE
PT has ACP documents scanned into her chart  Pneumonia, shingles, flu and covid vaccine up to date  Labs in general up to date

## 2022-10-31 NOTE — PATIENT INSTRUCTIONS
1  Type 2 diabetes mellitus with stage 3b chronic kidney disease, without long-term current use of insulin (HCC)  -     POCT hemoglobin A1c

## 2022-10-31 NOTE — PROGRESS NOTES
Assessment and Plan:     Problem List Items Addressed This Visit        Endocrine    Type 2 diabetes mellitus with diabetic chronic kidney disease ([facility] ) - Primary    Relevant Orders    POCT hemoglobin A1c (Completed)       Nervous and Auditory    Bilateral impacted cerumen    Relevant Orders    Ear cerumen removal (Completed)       Other    Medicare annual wellness visit, subsequent     PT has ACP documents scanned into her chart  Pneumonia, shingles, flu and covid vaccine up to date  Labs in general up to date  Preventive health issues were discussed with patient, and age appropriate screening tests were ordered as noted in patient's After Visit Summary  Personalized health advice and appropriate referrals for health education or preventive services given if needed, as noted in patient's After Visit Summary  History of Present Illness:     Patient presents for a Medicare Wellness Visit    Pt going for hearing aide check and was told wax in ears needs to be removed  Pt also due for AWV  PT has a constant humming in her R ear  Patient Care Team:  Beni Hernandez MD as PCP - General (Family Medicine)  Rosaura Henriquez MD     Review of Systems:     Review of Systems   Constitutional: Negative  HENT: Negative  Wax in ears   Eyes: Negative  Respiratory: Negative  Cardiovascular: Negative  Gastrointestinal: Negative  Endocrine: Negative  Genitourinary: Negative  Musculoskeletal: Negative  Skin: Negative  Allergic/Immunologic: Negative  Neurological: Negative  Hematological: Negative  Psychiatric/Behavioral: Negative           Problem List:     Patient Active Problem List   Diagnosis   • Diabetic peripheral neuropathy (HCC)   • Benign hypertension with CKD (chronic kidney disease) stage III (HCC)   • Type 2 diabetes mellitus without complication, without long-term current use of insulin (HCC)   • Chronic constipation   • Abdominal bloating   • Hyponatremia   • Anxiety   • Chronic low back pain without sciatica   • Hyperlipidemia   • CKD (chronic kidney disease), stage III ([facility])   • Bilateral leg edema   • PAD (peripheral artery disease) ([facility])   • Chronic pain of both shoulders   • Bilateral adhesive capsulitis of shoulders   • Lumbar spondylosis   • Degenerative disc disease, lumbar   • Secondary hyperparathyroidism of renal origin ([facility] [address] )   • Pain in both hands   • New onset a-fib ([facility])   • Depression, recurrent ([facility])   • Type 2 diabetes mellitus with diabetic chronic kidney disease ([facility])   • Hypokalemia   • Chronic congestive heart failure ([facility])   • Spinal stenosis of lumbar region without neurogenic claudication   • Embolism and thrombosis of arteries of the lower extremities ([facility] [address] )   • Medicare annual wellness visit, subsequent   • Bilateral impacted cerumen      Past Medical and Surgical History:     Past Medical History:   Diagnosis Date   • Anemia    • Anxiety    • Arthritis    • CKD (chronic kidney disease), stage III ([facility] [address] ) 11/29/2018   • Depression, recurrent ([facility] ) 6/14/2021   • Diabetes mellitus ([facility] [address] )    • Diabetic peripheral neuropathy ([facility])    • Hyperlipidemia    • Hypertension    • 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 8/23/2017   • Thyroid enlarged 2/23/2015   • Tinnitus    • Vertigo      Past Surgical History:   Procedure Laterality Date   • APPENDECTOMY     • EAR SURGERY Left    • KNEE SURGERY     • TONSILLECTOMY        Family History:     Family History   Problem Relation Age of Onset   • Diabetes Mother    • Heart disease Mother    • Heart disease Father    • Stroke Father    • Cancer Family    • Dementia Sister    • Liver disease Other       Social History:     Social History     Socioeconomic History   • Marital status: Single     Spouse name: None   • Number of children: None   • Years of education: None   • Highest education level: None   Occupational History • None   Tobacco Use   • Smoking status: Never Smoker   • Smokeless tobacco: Never Used   Substance and Sexual Activity   • Alcohol use: Not Currently   • Drug use: No   • Sexual activity: Never     Partners: Male     Birth control/protection: None   Other Topics Concern   • None   Social History Narrative    Active advance directive    Always uses seatbelt    Caffeine use    Exercise-walking    Lives with family, sister    Graeme Goltz catholic    Supportive and safe     Social Determinants of Health     Financial Resource Strain: Low Risk    • Difficulty of Paying Living Expenses: Not hard at all   Food Insecurity: Not on file   Transportation Needs: No Transportation Needs   • Lack of Transportation (Medical): No   • Lack of Transportation (Non-Medical):  No   Physical Activity: Not on file   Stress: Not on file   Social Connections: Not on file   Intimate Partner Violence: Not on file   Housing Stability: Not on file      Medications and Allergies:     Current Outpatient Medications   Medication Sig Dispense Refill   • amiodarone 200 mg tablet Take 200 mg by mouth daily       • ASPIRIN 81 PO Take by mouth     • atorvastatin (LIPITOR) 80 mg tablet      • Blood Glucose Monitoring Suppl (ONE TOUCH ULTRA 2) w/Device KIT Use daily 1 kit 0   • carvedilol (COREG) 25 mg tablet Take 1 tablet (25 mg total) by mouth 2 (two) times a day with meals 180 tablet 1   • cholecalciferol (VITAMIN D3) 1,000 units tablet Take 2,000 Units by mouth daily     • dicyclomine (BENTYL) 10 mg capsule Take 1 capsule (10 mg total) by mouth 2 (two) times a day 180 capsule 1   • Docusate Sodium (DSS) 100 MG CAPS Take 100 mg by mouth 2 (two) times a day     • glucose blood (OneTouch Ultra) test strip Test daily                        DX: E11 9 100 strip 1   • lidocaine (LIDODERM) 5 % Apply 1 patch topically daily Remove & Discard patch within 12 hours or as directed by MD 30 patch 0   • Multiple Vitamin (MULTI-VITAMIN DAILY PO) Take 1 tablet by mouth daily     • Omega 3-6-9 Fatty Acids (OMEGA 3-6-9 PO) Take by mouth     • potassium chloride (MICRO-K) 10 MEQ CR capsule Take 1 capsule (10 mEq total) by mouth daily 90 capsule 1   • Probiotic Product (PROBIOTIC-10 PO) Take by mouth     • rivaroxaban (XARELTO) 15 mg tablet Take 15 mg by mouth daily      • torsemide (DEMADEX) 20 mg tablet Take 1 tablet (20 mg total) by mouth every other day 30 tablet 5     No current facility-administered medications for this visit  Allergies   Allergen Reactions   • Hydrochlorothiazide      Hyponatremia, severe   • Statins       Immunizations:     Immunization History   Administered Date(s) Administered   • COVID-19 PFIZER VACCINE 0 3 ML IM 02/14/2021, 03/05/2021, 11/30/2021   • COVID-19 Pfizer Vac BIVALENT Scott-sucrose 12 Yr+ IM (BOOSTER ONLY) 10/14/2022, 10/14/2022   • INFLUENZA 09/01/2016, 10/05/2016, 09/28/2017, 09/17/2020, 10/14/2022   • Influenza Split High Dose Preservative Free IM 11/18/2014, 11/30/2015, 09/01/2016, 09/28/2017   • Influenza, high dose seasonal 0 7 mL 09/24/2018, 09/17/2020, 09/15/2021, 10/14/2022   • Influenza, seasonal, injectable 10/01/2011, 10/01/2012, 11/11/2013   • Pneumococcal Conjugate 13-Valent 05/06/2019   • Pneumococcal Polysaccharide PPV23 10/01/1997, 11/30/2015   • Tdap 09/27/2019   • Zoster 10/07/2016   • influenza, trivalent, adjuvanted 09/27/2019      Health Maintenance: There are no preventive care reminders to display for this patient  Topic Date Due   • COVID-19 Vaccine (4 - Booster for Pfizer series) 03/30/2022      Medicare Screening Tests and Risk Assessments:     Olivia Mcdowell is here for her Subsequent Wellness visit  Health Risk Assessment:   Patient rates overall health as good  Patient feels that their physical health rating is same  Patient is satisfied with their life  Eyesight was rated as slightly worse  Hearing was rated as slightly worse   Patient feels that their emotional and mental health rating is same  Patients states they are sometimes angry  Patient states they are never, rarely unusually tired/fatigued  Pain experienced in the last 7 days has been none  Patient states that she has experienced no weight loss or gain in last 6 months  Depression Screening:   PHQ-9 Score: 0      Fall Risk Screening: In the past year, patient has experienced: history of falling in past year    Number of falls: 1  Injured during fall?: No    Feels unsteady when standing or walking?: Yes    Worried about falling?: Yes      Urinary Incontinence Screening:   Patient has not leaked urine accidently in the last six months  Home Safety:  Patient has trouble with stairs inside or outside of their home  Patient has working smoke alarms and has working carbon monoxide detector  Home safety hazards include: none  Nutrition:   Current diet is Regular, Low Carb and Low Cholesterol  Medications:   Patient is currently taking over-the-counter supplements  OTC medications include: see medication list  Patient is able to manage medications  Activities of Daily Living (ADLs)/Instrumental Activities of Daily Living (IADLs):   Walk and transfer into and out of bed and chair?: Yes  Dress and groom yourself?: No    Bathe or shower yourself?: Yes    Feed yourself? Yes  Do your laundry/housekeeping?: No  Manage your money, pay your bills and track your expenses?: No  Make your own meals?: No    Do your own shopping?: No    Previous Hospitalizations:   Any hospitalizations or ED visits within the last 12 months?: Yes    How many hospitalizations have you had in the last year?: 1-2    Advance Care Planning:   Living will: Yes    Durable POA for healthcare:  Yes    Advanced directive: Yes    Advanced directive counseling given: Yes      Cognitive Screening:   Provider or family/friend/caregiver concerned regarding cognition?: No    PREVENTIVE SCREENINGS      Cardiovascular Screening:    General: Screening Not Indicated and History Lipid Disorder      Diabetes Screening:     General: Screening Not Indicated and History Diabetes      Colorectal Cancer Screening:     General: Screening Not Indicated      Breast Cancer Screening:     General: Screening Not Indicated      Cervical Cancer Screening:    General: Screening Not Indicated      Abdominal Aortic Aneurysm (AAA) Screening:        General: Patient Declines      Lung Cancer Screening:     General: Screening Not Indicated      Hepatitis C Screening:    General: Risks and Benefits Discussed    Screening, Brief Intervention, and Referral to Treatment (SBIRT)    Screening  Typical number of drinks in a day: 0  Typical number of drinks in a week: 0  Interpretation: Low risk drinking behavior  AUDIT-C Screenin) How often did you have a drink containing alcohol in the past year? never  2) How many drinks did you have on a typical day when you were drinking in the past year? 0  3) How often did you have 6 or more drinks on one occasion in the past year? never    AUDIT-C Score: 0  Interpretation: Score 0-2 (female): Negative screen for alcohol misuse    Single Item Drug Screening:  How often have you used an illegal drug (including marijuana) or a prescription medication for non-medical reasons in the past year? never    Single Item Drug Screen Score: 0  Interpretation: Negative screen for possible drug use disorder    No exam data present     Physical Exam:     /72 (BP Location: Left arm, Patient Position: Sitting, Cuff Size: Large)   Pulse 68   Resp 16   Ht 5' 2" (1 575 m)   Wt 79 8 kg (176 lb)   BMI 32 19 kg/m²     Physical Exam  Vitals and nursing note reviewed  Constitutional:       Appearance: Normal appearance  She is well-developed  HENT:      Head: Normocephalic and atraumatic  Right Ear: Decreased hearing noted  There is impacted cerumen  Left Ear: Decreased hearing noted  There is impacted cerumen        Ears:      Comments: Large amount of hard impacted wax removed balaji ears  Eyes:      General: Lids are normal       Conjunctiva/sclera: Conjunctivae normal       Pupils: Pupils are equal, round, and reactive to light  Cardiovascular:      Rate and Rhythm: Normal rate and regular rhythm  Heart sounds: No murmur heard  Pulmonary:      Effort: Pulmonary effort is normal       Breath sounds: Normal breath sounds  Skin:     General: Skin is warm and dry  Neurological:      General: No focal deficit present  Mental Status: She is alert  Coordination: Coordination is intact  Psychiatric:         Mood and Affect: Mood normal          Behavior: Behavior normal  Behavior is cooperative  Thought Content: Thought content normal          Judgment: Judgment normal       Ear cerumen removal    Date/Time: 10/31/2022 2:23 PM  Performed by: Xavier Pina PA-C  Authorized by: Xavier Pina PA-C   Universal Protocol:  Consent: Verbal consent obtained  Consent given by: patient  Patient understanding: patient states understanding of the procedure being performed  Patient identity confirmed: verbally with patient      Patient location:  Clinic  Procedure details:     Local anesthetic:  None    Location:  L ear and R ear    Procedure type: curette      Approach:  Natural orifice  Post-procedure details:     Complication:  None    Hearing quality:  Improved    Patient tolerance of procedure: Tolerated well, no immediate complications  Comments:      PT with large amount of hard wax able to be removed using three instruments to extract  No flushable due to hardness but able to remove in one stuck together lump bilateraly with minimal irritation             Lili Fiath PA-C

## 2022-11-14 ENCOUNTER — TRANSCRIBE ORDERS (OUTPATIENT)
Dept: LAB | Facility: CLINIC | Age: 87
End: 2022-11-14

## 2022-11-14 ENCOUNTER — APPOINTMENT (OUTPATIENT)
Dept: LAB | Facility: CLINIC | Age: 87
End: 2022-11-14

## 2022-11-14 DIAGNOSIS — I12.0 PARENCHYMAL RENAL HYPERTENSION, STAGE 5 CHRONIC KIDNEY DISEASE OR END STAGE RENAL DISEASE (HCC): ICD-10-CM

## 2022-11-14 DIAGNOSIS — I12.0 PARENCHYMAL RENAL HYPERTENSION, STAGE 5 CHRONIC KIDNEY DISEASE OR END STAGE RENAL DISEASE (HCC): Primary | ICD-10-CM

## 2022-11-14 DIAGNOSIS — N25.81 SECONDARY HYPERPARATHYROIDISM OF RENAL ORIGIN (HCC): ICD-10-CM

## 2022-11-14 DIAGNOSIS — N18.30 STAGE 3 CHRONIC KIDNEY DISEASE, UNSPECIFIED WHETHER STAGE 3A OR 3B CKD (HCC): ICD-10-CM

## 2022-11-14 DIAGNOSIS — N18.32 STAGE 3B CHRONIC KIDNEY DISEASE (HCC): ICD-10-CM

## 2022-11-14 DIAGNOSIS — N18.32 CHRONIC KIDNEY DISEASE (CKD) STAGE G3B/A1, MODERATELY DECREASED GLOMERULAR FILTRATION RATE (GFR) BETWEEN 30-44 ML/MIN/1.73 SQUARE METER AND ALBUMINURIA CREATININE RATIO LESS THAN 30 MG/G (HCC): ICD-10-CM

## 2022-11-14 LAB
25(OH)D3 SERPL-MCNC: 53.9 NG/ML (ref 30–100)
ANION GAP SERPL CALCULATED.3IONS-SCNC: 7 MMOL/L (ref 4–13)
BASOPHILS # BLD AUTO: 0.03 THOUSANDS/ÂΜL (ref 0–0.1)
BASOPHILS NFR BLD AUTO: 1 % (ref 0–1)
BUN SERPL-MCNC: 23 MG/DL (ref 5–25)
CALCIUM SERPL-MCNC: 9.1 MG/DL (ref 8.3–10.1)
CHLORIDE SERPL-SCNC: 96 MMOL/L (ref 96–108)
CO2 SERPL-SCNC: 28 MMOL/L (ref 21–32)
CREAT SERPL-MCNC: 1.47 MG/DL (ref 0.6–1.3)
CREAT UR-MCNC: 84.8 MG/DL
EOSINOPHIL # BLD AUTO: 0.1 THOUSAND/ÂΜL (ref 0–0.61)
EOSINOPHIL NFR BLD AUTO: 2 % (ref 0–6)
ERYTHROCYTE [DISTWIDTH] IN BLOOD BY AUTOMATED COUNT: 14 % (ref 11.6–15.1)
FERRITIN SERPL-MCNC: 72 NG/ML (ref 8–388)
GFR SERPL CREATININE-BSD FRML MDRD: 31 ML/MIN/1.73SQ M
GLUCOSE P FAST SERPL-MCNC: 123 MG/DL (ref 65–99)
HCT VFR BLD AUTO: 33.6 % (ref 34.8–46.1)
HGB BLD-MCNC: 11.3 G/DL (ref 11.5–15.4)
IMM GRANULOCYTES # BLD AUTO: 0.02 THOUSAND/UL (ref 0–0.2)
IMM GRANULOCYTES NFR BLD AUTO: 0 % (ref 0–2)
IRON SATN MFR SERPL: 24 % (ref 15–50)
IRON SERPL-MCNC: 67 UG/DL (ref 50–170)
LYMPHOCYTES # BLD AUTO: 1.57 THOUSANDS/ÂΜL (ref 0.6–4.47)
LYMPHOCYTES NFR BLD AUTO: 29 % (ref 14–44)
MCH RBC QN AUTO: 33.1 PG (ref 26.8–34.3)
MCHC RBC AUTO-ENTMCNC: 33.6 G/DL (ref 31.4–37.4)
MCV RBC AUTO: 99 FL (ref 82–98)
MICROALBUMIN UR-MCNC: 12.4 MG/L (ref 0–20)
MICROALBUMIN/CREAT 24H UR: 15 MG/G CREATININE (ref 0–30)
MONOCYTES # BLD AUTO: 0.49 THOUSAND/ÂΜL (ref 0.17–1.22)
MONOCYTES NFR BLD AUTO: 9 % (ref 4–12)
NEUTROPHILS # BLD AUTO: 3.24 THOUSANDS/ÂΜL (ref 1.85–7.62)
NEUTS SEG NFR BLD AUTO: 59 % (ref 43–75)
NRBC BLD AUTO-RTO: 0 /100 WBCS
PHOSPHATE SERPL-MCNC: 3.5 MG/DL (ref 2.3–4.1)
PLATELET # BLD AUTO: 158 THOUSANDS/UL (ref 149–390)
PMV BLD AUTO: 11.7 FL (ref 8.9–12.7)
POTASSIUM SERPL-SCNC: 3.8 MMOL/L (ref 3.5–5.3)
PTH-INTACT SERPL-MCNC: 76.2 PG/ML (ref 18.4–80.1)
RBC # BLD AUTO: 3.41 MILLION/UL (ref 3.81–5.12)
SODIUM SERPL-SCNC: 131 MMOL/L (ref 135–147)
TIBC SERPL-MCNC: 283 UG/DL (ref 250–450)
WBC # BLD AUTO: 5.45 THOUSAND/UL (ref 4.31–10.16)

## 2022-11-22 ENCOUNTER — OFFICE VISIT (OUTPATIENT)
Dept: NEPHROLOGY | Facility: CLINIC | Age: 87
End: 2022-11-22

## 2022-11-22 VITALS
DIASTOLIC BLOOD PRESSURE: 72 MMHG | BODY MASS INDEX: 33.13 KG/M2 | HEIGHT: 62 IN | SYSTOLIC BLOOD PRESSURE: 140 MMHG | WEIGHT: 180 LBS

## 2022-11-22 DIAGNOSIS — E87.6 HYPOKALEMIA: ICD-10-CM

## 2022-11-22 DIAGNOSIS — E87.1 HYPONATREMIA: ICD-10-CM

## 2022-11-22 DIAGNOSIS — I12.9 BENIGN HYPERTENSION WITH CKD (CHRONIC KIDNEY DISEASE) STAGE III (HCC): Primary | ICD-10-CM

## 2022-11-22 DIAGNOSIS — N18.32 STAGE 3B CHRONIC KIDNEY DISEASE (HCC): ICD-10-CM

## 2022-11-22 DIAGNOSIS — N25.81 SECONDARY HYPERPARATHYROIDISM OF RENAL ORIGIN (HCC): ICD-10-CM

## 2022-11-22 DIAGNOSIS — N18.30 BENIGN HYPERTENSION WITH CKD (CHRONIC KIDNEY DISEASE) STAGE III (HCC): Primary | ICD-10-CM

## 2022-11-22 DIAGNOSIS — R60.0 BILATERAL LEG EDEMA: ICD-10-CM

## 2022-11-22 RX ORDER — TORSEMIDE 20 MG/1
20 TABLET ORAL DAILY
Qty: 30 TABLET | Refills: 5 | Status: SHIPPED | OUTPATIENT
Start: 2022-11-22 | End: 2022-12-02 | Stop reason: SDUPTHER

## 2022-11-22 NOTE — PROGRESS NOTES
NEPHROLOGY OUTPATIENT PROGRESS NOTE   Juan M Montesinos 80 y o  female MRN: [id number]  DATE: 11/22/2022  Reason for visit:   Chief Complaint   Patient presents with   • Follow-up   • Chronic Kidney Disease     ASSESSMENT and PLAN:  CKD stage IIIB,  baseline creatinine 1 3 to 1 5 since early 2022, 1 1 to 1 4 since May 2021, prior 0 9 to 1 1  -creatinine 1 4 in November 2022 overall stable at recent baseline  Some progression of CKD noted  - UA bland in November 2021   -CKD likely secondary to long-term hypertension and diabetes  -avoid nephrotoxins or NSAIDs  -urine microalbumin/creatinine ratio nonsignificant in November 2022     Hypertension  -blood pressure  slightly above goal in the office today but acceptable for age   -she gets her BP checked with caregiver daily  Advised to call back with daily BP readings over next two weeks  Not change antihypertensive regimen for now  Would like to evaluate daily BP readings from home  She also lately feels tired and feels off balance at times  Currently uses walker  Avoid relative hypotension   -currently on carvedilol 25 mg b i d , torsemide 20 mg daily  - could not tolerate lisinopril due to dry cough      Hyponatremia  -serum sodium slightly dropped 131 in November 22   -initially hyponatremia thought to be secondary to hypovolemia/HCTZ +/- SIADH component  -continue to remain off HCTZ   Continue fluid restriction less than 50 oz per day  -currently on torsemide 20 mg  daily   Continue same    -recommend to follow salt liberal diet  Repeat serum sodium in 10 days  If no further improvement, may consider salt tablets  -previous workup includes serum osmolality 252, urine sodium 45, urine osmolality 388    -as per prior discussion, she did not want to pursue any further workup or intervention including CT scan evaluate for malignancy given her advanced age      Hypokalemia, resolved, currently on potassium chloride 20 mEq daily   Continue same     Anemia in CKD, closely monitor hemoglobin, relatively stable  Iron saturation 24% in November 2022      Secondary hyperparathyroidism, last PTH 76   Vitamin-D level 53  9  continue current vitamin-D supplements       Leg edema, overall improved and stable    -more recently weight seems to be around 177 to 180 lb at home  currently remains on torsemide 20 mg daily along with potassium chloride 20 mEq daily  Diagnoses and all orders for this visit:    Benign hypertension with CKD (chronic kidney disease) stage III ([place] [address] )  -     Basic metabolic panel; Future  -     CBC; Future  -     Magnesium; Future  -     Phosphorus; Future  -     PTH, intact; Future  -     Microalbumin / creatinine urine ratio; Future  -     Vitamin D 25 hydroxy; Future    Hyponatremia  -     Sodium; Future  -     Basic metabolic panel; Future    Hypokalemia    Secondary hyperparathyroidism of renal origin ([place] [address] )  -     Phosphorus; Future  -     PTH, intact; Future  -     Vitamin D 25 hydroxy; Future    Stage 3b chronic kidney disease ([place] [address] )  -     Basic metabolic panel; Future  -     CBC; Future  -     Magnesium; Future  -     Phosphorus; Future  -     PTH, intact; Future  -     Microalbumin / creatinine urine ratio; Future  -     Vitamin D 25 hydroxy; Future    Bilateral leg edema  -     torsemide (DEMADEX) 20 mg tablet; Take 1 tablet (20 mg total) by mouth daily          SUBJECTIVE / HPI:  Patient is 80-year-old female with significant medical issues of hypertension for many years, prior history of long-term diabetes, CKD stage IIIB with baseline creatinine 1 3 to 1 5 since early 2022, 1 1 to 1 4 since May 2021: Prior 0 9 to 1, chronic back pain issues, history of hyponatremia, presents for regular follow-up of CKD   She gets her BP checked with caregiver daily although cannot give me more details about her BP readings  She is accompanied by her nephew  Remains on daily torsemide, potassium chloride  Serum sodium is slightly dropped 131    She does feel tired and feels occasionally off balance at times  Leg edema seems to be stable  Denies any worsening dyspnea   Denies any urinary complaint   No recent NSAID use  Patient has chronic back pain issues which is overall stable  Denies any chest pain or shortness of breath   No nausea or vomiting  REVIEW OF SYSTEMS:  More than 10 point review of systems were obtained and discussed in length with the patient  Complete review of systems were negative / unremarkable except mentioned above  PHYSICAL EXAM:  Vitals:    11/22/22 1425 11/22/22 1444   BP: 130/68 140/72   BP Location: Right arm    Patient Position: Sitting    Cuff Size: Large    Weight: 81 6 kg (180 lb)    Height: 5' 2" (1 575 m)      Body mass index is 32 92 kg/m²  Physical Exam  Vitals reviewed  Constitutional:       Appearance: She is well-developed  HENT:      Head: Normocephalic and atraumatic  Right Ear: External ear normal       Left Ear: External ear normal    Eyes:      Comments: Mild conjunctival pallor present   Cardiovascular:      Comments: S1, S2 present  Pulmonary:      Effort: Pulmonary effort is normal       Breath sounds: Normal breath sounds  No wheezing or rales  Abdominal:      General: Bowel sounds are normal  There is no distension  Palpations: Abdomen is soft  Tenderness: There is no abdominal tenderness  Musculoskeletal:         General: No deformity  Lymphadenopathy:      Cervical: No cervical adenopathy  Skin:     Findings: No rash  Neurological:      Mental Status: She is alert and oriented to person, place, and time     Psychiatric:         Behavior: Behavior normal          PAST MEDICAL HISTORY:  Past Medical History:   Diagnosis Date   • Anemia    • Anxiety    • Arthritis    • CKD (chronic kidney disease), stage III ([facility] ) 11/29/2018   • Depression, recurrent ([place] [address] ) 6/14/2021   • Diabetes mellitus ([place] [address] )    • Diabetic peripheral neuropathy ([place] [address] )    • Hyperlipidemia    • Hypertension    • 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 8/23/2017   • Thyroid enlarged 2/23/2015   • Tinnitus    • Vertigo        PAST SURGICAL HISTORY:  Past Surgical History:   Procedure Laterality Date   • APPENDECTOMY     • EAR SURGERY Left    • KNEE SURGERY     • TONSILLECTOMY         SOCIAL HISTORY:  Social History     Substance and Sexual Activity   Alcohol Use Not Currently     Social History     Substance and Sexual Activity   Drug Use No     Social History     Tobacco Use   Smoking Status Never   Smokeless Tobacco Never       FAMILY HISTORY:  Family History   Problem Relation Age of Onset   • Diabetes Mother    • Heart disease Mother    • Heart disease Father    • Stroke Father    • Cancer Family    • Dementia Sister    • Liver disease Other        MEDICATIONS:    Current Outpatient Medications:   •  amiodarone 200 mg tablet, Take 200 mg by mouth daily  , Disp: , Rfl:   •  ASPIRIN 81 PO, Take by mouth, Disp: , Rfl:   •  atorvastatin (LIPITOR) 80 mg tablet, , Disp: , Rfl:   •  carvedilol (COREG) 25 mg tablet, Take 1 tablet (25 mg total) by mouth 2 (two) times a day with meals, Disp: 180 tablet, Rfl: 1  •  cholecalciferol (VITAMIN D3) 1,000 units tablet, Take 2,000 Units by mouth daily, Disp: , Rfl:   •  dicyclomine (BENTYL) 10 mg capsule, Take 1 capsule (10 mg total) by mouth 2 (two) times a day, Disp: 180 capsule, Rfl: 1  •  Docusate Sodium (DSS) 100 MG CAPS, Take 100 mg by mouth 2 (two) times a day, Disp: , Rfl:   •  Multiple Vitamin (MULTI-VITAMIN DAILY PO), Take 1 tablet by mouth daily, Disp: , Rfl:   •  Omega 3-6-9 Fatty Acids (OMEGA 3-6-9 PO), Take by mouth, Disp: , Rfl:   •  potassium chloride (MICRO-K) 10 MEQ CR capsule, Take 1 capsule (10 mEq total) by mouth daily, Disp: 90 capsule, Rfl: 1  •  Probiotic Product (PROBIOTIC-10 PO), Take by mouth, Disp: , Rfl:   •  rivaroxaban (XARELTO) 15 mg tablet, Take 15 mg by mouth daily , Disp: , Rfl:   •  torsemide (DEMADEX) 20 mg tablet, Take 1 tablet (20 mg total) by mouth daily, Disp: 30 tablet, Rfl: 5  •  Blood Glucose Monitoring Suppl (ONE TOUCH ULTRA 2) w/Device KIT, Use daily, Disp: 1 kit, Rfl: 0  •  glucose blood (OneTouch Ultra) test strip, Test daily                        DX: E11 9, Disp: 100 strip, Rfl: 1  •  lidocaine (LIDODERM) 5 %, Apply 1 patch topically daily Remove & Discard patch within 12 hours or as directed by MD (Patient not taking: Reported on 11/22/2022), Disp: 30 patch, Rfl: 0    Lab Results:   Results for orders placed or performed in visit on 11/14/22   CBC and differential   Result Value Ref Range    WBC 5 45 4 31 - 10 16 Thousand/uL    RBC 3 41 (L) 3 81 - 5 12 Million/uL    Hemoglobin 11 3 (L) 11 5 - 15 4 g/dL    Hematocrit 33 6 (L) 34 8 - 46 1 %    MCV 99 (H) 82 - 98 fL    MCH 33 1 26 8 - 34 3 pg    MCHC 33 6 31 4 - 37 4 g/dL    RDW 14 0 11 6 - 15 1 %    MPV 11 7 8 9 - 12 7 fL    Platelets 961 186 - 327 Thousands/uL    nRBC 0 /100 WBCs    Neutrophils Relative 59 43 - 75 %    Immat GRANS % 0 0 - 2 %    Lymphocytes Relative 29 14 - 44 %    Monocytes Relative 9 4 - 12 %    Eosinophils Relative 2 0 - 6 %    Basophils Relative 1 0 - 1 %    Neutrophils Absolute 3 24 1 85 - 7 62 Thousands/µL    Immature Grans Absolute 0 02 0 00 - 0 20 Thousand/uL    Lymphocytes Absolute 1 57 0 60 - 4 47 Thousands/µL    Monocytes Absolute 0 49 0 17 - 1 22 Thousand/µL    Eosinophils Absolute 0 10 0 00 - 0 61 Thousand/µL    Basophils Absolute 0 03 0 00 - 0 10 Thousands/µL   Basic metabolic panel   Result Value Ref Range    Sodium 131 (L) 135 - 147 mmol/L    Potassium 3 8 3 5 - 5 3 mmol/L    Chloride 96 96 - 108 mmol/L    CO2 28 21 - 32 mmol/L    ANION GAP 7 4 - 13 mmol/L    BUN 23 5 - 25 mg/dL    Creatinine 1 47 (H) 0 60 - 1 30 mg/dL    Glucose, Fasting 123 (H) 65 - 99 mg/dL    Calcium 9 1 8 3 - 10 1 mg/dL    eGFR 31 ml/min/1 73sq m   Phosphorus   Result Value Ref Range    Phosphorus 3 5 2 3 - 4 1 mg/dL   PTH, intact   Result Value Ref Range    PTH 76 2 18 4 - 80 1 pg/mL   Vitamin D 25 hydroxy   Result Value Ref Range    Vit D, 25-Hydroxy 53 9 30 0 - 100 0 ng/mL   Iron Saturation %   Result Value Ref Range    Iron Saturation 24 15 - 50 %    TIBC 283 250 - 450 ug/dL    Iron 67 50 - 170 ug/dL   Ferritin   Result Value Ref Range    Ferritin 72 8 - 388 ng/mL

## 2022-11-23 ENCOUNTER — TELEPHONE (OUTPATIENT)
Dept: NEPHROLOGY | Facility: CLINIC | Age: 87
End: 2022-11-23

## 2022-11-23 DIAGNOSIS — N18.30 BENIGN HYPERTENSION WITH CKD (CHRONIC KIDNEY DISEASE) STAGE III (HCC): Primary | ICD-10-CM

## 2022-11-23 DIAGNOSIS — I12.9 BENIGN HYPERTENSION WITH CKD (CHRONIC KIDNEY DISEASE) STAGE III (HCC): Primary | ICD-10-CM

## 2022-11-23 RX ORDER — AMLODIPINE BESYLATE 2.5 MG/1
2.5 TABLET ORAL DAILY
Qty: 30 TABLET | Refills: 5 | Status: SHIPPED | OUTPATIENT
Start: 2022-11-23

## 2022-11-23 RX ORDER — AMLODIPINE BESYLATE 2.5 MG/1
2.5 TABLET ORAL DAILY
Qty: 30 TABLET | Refills: 5 | Status: SHIPPED | OUTPATIENT
Start: 2022-11-23 | End: 2022-11-23 | Stop reason: SDUPTHER

## 2022-11-23 NOTE — TELEPHONE ENCOUNTER
Patient's Rajan Monterroso called the office and gave BP reading  11/15- 146/72, 11/16- 156/75, 11/17- 169/80, 11/18- 155/72, 11/19- 144/79, 11/20- 148/72, 11//70   Average: 154/74

## 2022-11-23 NOTE — TELEPHONE ENCOUNTER
Her blood pressure at home seems to be above goal   Would like her to start amlodipine small dose 2 5 mg daily  I have prescribed to the pharmacy  Continue to monitor BP after starting this and call us back in 10 days with daily BP readings

## 2022-12-02 ENCOUNTER — TELEPHONE (OUTPATIENT)
Dept: NEPHROLOGY | Facility: CLINIC | Age: 87
End: 2022-12-02

## 2022-12-02 DIAGNOSIS — R60.0 BILATERAL LEG EDEMA: ICD-10-CM

## 2022-12-02 RX ORDER — TORSEMIDE 20 MG/1
20 TABLET ORAL DAILY
Qty: 30 TABLET | Refills: 5 | Status: SHIPPED | OUTPATIENT
Start: 2022-12-02

## 2022-12-02 NOTE — TELEPHONE ENCOUNTER
Reiceved a call from patients caregiver Monserrat Lu stating Mireille Del Valle was never called into the pharmacy  Patient uses [address] [facility] phone number [phone]  Please call patient once its called in so Monserrat Lu can go pick it up

## 2022-12-25 ENCOUNTER — APPOINTMENT (EMERGENCY)
Dept: RADIOLOGY | Facility: HOSPITAL | Age: 87
End: 2022-12-25

## 2022-12-25 ENCOUNTER — HOSPITAL ENCOUNTER (INPATIENT)
Facility: HOSPITAL | Age: 87
LOS: 3 days | Discharge: NURSING, REHAB OR LONG-TERM CARE | End: 2022-12-29
Attending: SURGERY | Admitting: SURGERY

## 2022-12-25 DIAGNOSIS — S01.511A LIP LACERATION: ICD-10-CM

## 2022-12-25 DIAGNOSIS — S02.2XXA NASAL BONES, CLOSED FRACTURE: ICD-10-CM

## 2022-12-25 DIAGNOSIS — S01.112A EYEBROW LACERATION, LEFT, INITIAL ENCOUNTER: ICD-10-CM

## 2022-12-25 DIAGNOSIS — S61.412A LACERATION OF LEFT PALM: ICD-10-CM

## 2022-12-25 DIAGNOSIS — S06.5XAA SDH (SUBDURAL HEMATOMA): Primary | ICD-10-CM

## 2022-12-25 RX ORDER — ONDANSETRON 2 MG/ML
4 INJECTION INTRAMUSCULAR; INTRAVENOUS ONCE AS NEEDED
Status: COMPLETED | OUTPATIENT
Start: 2022-12-25 | End: 2022-12-26

## 2022-12-26 ENCOUNTER — APPOINTMENT (INPATIENT)
Dept: RADIOLOGY | Facility: HOSPITAL | Age: 87
End: 2022-12-26

## 2022-12-26 ENCOUNTER — APPOINTMENT (EMERGENCY)
Dept: RADIOLOGY | Facility: HOSPITAL | Age: 87
End: 2022-12-26

## 2022-12-26 PROBLEM — E08.22 DIABETES MELLITUS DUE TO UNDERLYING CONDITION WITH STAGE 3B CHRONIC KIDNEY DISEASE, WITHOUT LONG-TERM CURRENT USE OF INSULIN (HCC): Status: ACTIVE | Noted: 2022-12-26

## 2022-12-26 PROBLEM — I50.9 CHRONIC CONGESTIVE HEART FAILURE (HCC): Status: ACTIVE | Noted: 2022-12-26

## 2022-12-26 PROBLEM — I73.9 PAD (PERIPHERAL ARTERY DISEASE) (HCC): Status: ACTIVE | Noted: 2022-12-26

## 2022-12-26 PROBLEM — S06.5XAA SDH (SUBDURAL HEMATOMA): Status: ACTIVE | Noted: 2022-12-26

## 2022-12-26 PROBLEM — N25.81 SECONDARY HYPERPARATHYROIDISM OF RENAL ORIGIN (HCC): Status: ACTIVE | Noted: 2022-12-26

## 2022-12-26 PROBLEM — I10 PRIMARY HYPERTENSION: Status: ACTIVE | Noted: 2022-12-26

## 2022-12-26 PROBLEM — I82.5Y9 CHRONIC DEEP VEIN THROMBOSIS (DVT) OF PROXIMAL VEIN OF LOWER EXTREMITY (HCC): Status: ACTIVE | Noted: 2022-12-26

## 2022-12-26 PROBLEM — N18.32 DIABETES MELLITUS DUE TO UNDERLYING CONDITION WITH STAGE 3B CHRONIC KIDNEY DISEASE, WITHOUT LONG-TERM CURRENT USE OF INSULIN (HCC): Status: ACTIVE | Noted: 2022-12-26

## 2022-12-26 PROBLEM — E78.49 OTHER HYPERLIPIDEMIA: Status: ACTIVE | Noted: 2022-12-26

## 2022-12-26 LAB
2HR DELTA HS TROPONIN: -3 NG/L
4HR DELTA HS TROPONIN: -9 NG/L
ABO GROUP BLD: NORMAL
ABO GROUP BLD: NORMAL
ANION GAP SERPL CALCULATED.3IONS-SCNC: 6 MMOL/L (ref 4–13)
ANION GAP SERPL CALCULATED.3IONS-SCNC: 7 MMOL/L (ref 4–13)
ATRIAL RATE: 113 BPM
BASE EXCESS BLDA CALC-SCNC: 3 MMOL/L (ref -2–3)
BASOPHILS # BLD AUTO: 0.06 THOUSANDS/ÂΜL (ref 0–0.1)
BASOPHILS NFR BLD AUTO: 1 % (ref 0–1)
BLD GP AB SCN SERPL QL: NEGATIVE
BLD GP AB SCN SERPL QL: NEGATIVE
BUN SERPL-MCNC: 27 MG/DL (ref 5–25)
BUN SERPL-MCNC: 29 MG/DL (ref 5–25)
CA-I BLD-SCNC: 1 MMOL/L (ref 1.12–1.32)
CA-I BLD-SCNC: 1.09 MMOL/L (ref 1.12–1.32)
CALCIUM SERPL-MCNC: 8.6 MG/DL (ref 8.3–10.1)
CALCIUM SERPL-MCNC: 8.8 MG/DL (ref 8.3–10.1)
CARDIAC TROPONIN I PNL SERPL HS: 22 NG/L
CARDIAC TROPONIN I PNL SERPL HS: 28 NG/L
CARDIAC TROPONIN I PNL SERPL HS: 31 NG/L
CFFMA (FUNCTIONAL FIBRINOGEN MAX AMPLITUDE): 21.9 MM (ref 15–32)
CHLORIDE SERPL-SCNC: 103 MMOL/L (ref 96–108)
CHLORIDE SERPL-SCNC: 104 MMOL/L (ref 96–108)
CKLY30: 0.6 % (ref 0–2.6)
CKR(REACTION TIME): 6.9 MIN (ref 4.6–9.1)
CO2 SERPL-SCNC: 25 MMOL/L (ref 21–32)
CO2 SERPL-SCNC: 28 MMOL/L (ref 21–32)
CREAT SERPL-MCNC: 1.3 MG/DL (ref 0.6–1.3)
CREAT SERPL-MCNC: 1.39 MG/DL (ref 0.6–1.3)
CRTMA(RAPIDTEG MAX AMPLITUDE): 64.6 MM (ref 52–70)
EOSINOPHIL # BLD AUTO: 0.13 THOUSAND/ÂΜL (ref 0–0.61)
EOSINOPHIL NFR BLD AUTO: 2 % (ref 0–6)
ERYTHROCYTE [DISTWIDTH] IN BLOOD BY AUTOMATED COUNT: 13.6 % (ref 11.6–15.1)
ERYTHROCYTE [DISTWIDTH] IN BLOOD BY AUTOMATED COUNT: 13.8 % (ref 11.6–15.1)
GFR SERPL CREATININE-BSD FRML MDRD: 33 ML/MIN/1.73SQ M
GFR SERPL CREATININE-BSD FRML MDRD: 36 ML/MIN/1.73SQ M
GLUCOSE SERPL-MCNC: 152 MG/DL (ref 65–140)
GLUCOSE SERPL-MCNC: 159 MG/DL (ref 65–140)
GLUCOSE SERPL-MCNC: 221 MG/DL (ref 65–140)
HCO3 BLDA-SCNC: 25.6 MMOL/L (ref 24–30)
HCT VFR BLD AUTO: 32.5 % (ref 34.8–46.1)
HCT VFR BLD AUTO: 32.7 % (ref 34.8–46.1)
HCT VFR BLD CALC: 36 % (ref 34.8–46.1)
HGB BLD-MCNC: 10.5 G/DL (ref 11.5–15.4)
HGB BLD-MCNC: 10.7 G/DL (ref 11.5–15.4)
HGB BLDA-MCNC: 12.2 G/DL (ref 11.5–15.4)
IMM GRANULOCYTES # BLD AUTO: 0.01 THOUSAND/UL (ref 0–0.2)
IMM GRANULOCYTES NFR BLD AUTO: 0 % (ref 0–2)
INR PPP: 1.22 (ref 0.84–1.19)
LYMPHOCYTES # BLD AUTO: 2.32 THOUSANDS/ÂΜL (ref 0.6–4.47)
LYMPHOCYTES NFR BLD AUTO: 33 % (ref 14–44)
MCH RBC QN AUTO: 30.5 PG (ref 26.8–34.3)
MCH RBC QN AUTO: 30.7 PG (ref 26.8–34.3)
MCHC RBC AUTO-ENTMCNC: 32.1 G/DL (ref 31.4–37.4)
MCHC RBC AUTO-ENTMCNC: 32.9 G/DL (ref 31.4–37.4)
MCV RBC AUTO: 93 FL (ref 82–98)
MCV RBC AUTO: 95 FL (ref 82–98)
MONOCYTES # BLD AUTO: 0.67 THOUSAND/ÂΜL (ref 0.17–1.22)
MONOCYTES NFR BLD AUTO: 10 % (ref 4–12)
NEUTROPHILS # BLD AUTO: 3.89 THOUSANDS/ÂΜL (ref 1.85–7.62)
NEUTS SEG NFR BLD AUTO: 54 % (ref 43–75)
NRBC BLD AUTO-RTO: 0 /100 WBCS
P AXIS: 53 DEGREES
PCO2 BLD: 27 MMOL/L (ref 21–32)
PCO2 BLD: 31.5 MM HG (ref 42–50)
PH BLD: 7.52 [PH] (ref 7.3–7.4)
PLATELET # BLD AUTO: 124 THOUSANDS/UL (ref 149–390)
PLATELET # BLD AUTO: 183 THOUSANDS/UL (ref 149–390)
PMV BLD AUTO: 11.4 FL (ref 8.9–12.7)
PMV BLD AUTO: 12.5 FL (ref 8.9–12.7)
PO2 BLD: 29 MM HG (ref 35–45)
POTASSIUM BLD-SCNC: 5 MMOL/L (ref 3.5–5.3)
POTASSIUM SERPL-SCNC: 3.9 MMOL/L (ref 3.5–5.3)
POTASSIUM SERPL-SCNC: 4.1 MMOL/L (ref 3.5–5.3)
PROTHROMBIN TIME: 15.6 SECONDS (ref 11.6–14.5)
QRS AXIS: 65 DEGREES
QRSD INTERVAL: 98 MS
QT INTERVAL: 428 MS
QTC INTERVAL: 430 MS
RBC # BLD AUTO: 3.44 MILLION/UL (ref 3.81–5.12)
RBC # BLD AUTO: 3.49 MILLION/UL (ref 3.81–5.12)
RH BLD: POSITIVE
RH BLD: POSITIVE
SAO2 % BLD FROM PO2: 64 % (ref 60–85)
SODIUM BLD-SCNC: 134 MMOL/L (ref 136–145)
SODIUM SERPL-SCNC: 136 MMOL/L (ref 135–147)
SODIUM SERPL-SCNC: 137 MMOL/L (ref 135–147)
SPECIMEN EXPIRATION DATE: NORMAL
SPECIMEN EXPIRATION DATE: NORMAL
SPECIMEN SOURCE: ABNORMAL
T WAVE AXIS: 31 DEGREES
VENTRICULAR RATE: 61 BPM
WBC # BLD AUTO: 11.12 THOUSAND/UL (ref 4.31–10.16)
WBC # BLD AUTO: 7.08 THOUSAND/UL (ref 4.31–10.16)

## 2022-12-26 PROCEDURE — 0HQGXZZ REPAIR LEFT HAND SKIN, EXTERNAL APPROACH: ICD-10-PCS | Performed by: EMERGENCY MEDICINE

## 2022-12-26 PROCEDURE — 0HQ1XZZ REPAIR FACE SKIN, EXTERNAL APPROACH: ICD-10-PCS | Performed by: EMERGENCY MEDICINE

## 2022-12-26 RX ORDER — TRANEXAMIC ACID 100 MG/ML
1000 INJECTION, SOLUTION INTRAVENOUS ONCE
Status: COMPLETED | OUTPATIENT
Start: 2022-12-26 | End: 2022-12-26

## 2022-12-26 RX ORDER — SENNOSIDES 8.6 MG
2 TABLET ORAL DAILY PRN
Status: DISCONTINUED | OUTPATIENT
Start: 2022-12-26 | End: 2022-12-29 | Stop reason: HOSPADM

## 2022-12-26 RX ORDER — AMIODARONE HYDROCHLORIDE 200 MG/1
200 TABLET ORAL DAILY
Status: ON HOLD | COMMUNITY

## 2022-12-26 RX ORDER — OMEGA-3-ACID ETHYL ESTERS 1 G/1
2 CAPSULE, LIQUID FILLED ORAL 2 TIMES DAILY
Status: ON HOLD | COMMUNITY

## 2022-12-26 RX ORDER — TORSEMIDE 20 MG/1
20 TABLET ORAL DAILY
Status: ON HOLD | COMMUNITY

## 2022-12-26 RX ORDER — AMLODIPINE BESYLATE 2.5 MG/1
2.5 TABLET ORAL DAILY
Status: DISCONTINUED | OUTPATIENT
Start: 2022-12-26 | End: 2022-12-29 | Stop reason: HOSPADM

## 2022-12-26 RX ORDER — LIDOCAINE 50 MG/G
1 PATCH TOPICAL DAILY
Status: ON HOLD | COMMUNITY

## 2022-12-26 RX ORDER — CARVEDILOL 25 MG/1
25 TABLET ORAL 2 TIMES DAILY WITH MEALS
Status: DISCONTINUED | OUTPATIENT
Start: 2022-12-26 | End: 2022-12-29 | Stop reason: HOSPADM

## 2022-12-26 RX ORDER — TORSEMIDE 20 MG/1
20 TABLET ORAL EVERY OTHER DAY
Status: DISCONTINUED | OUTPATIENT
Start: 2022-12-26 | End: 2022-12-29 | Stop reason: HOSPADM

## 2022-12-26 RX ORDER — AMLODIPINE BESYLATE 2.5 MG/1
2.5 TABLET ORAL DAILY
Status: ON HOLD | COMMUNITY

## 2022-12-26 RX ORDER — DOCUSATE SODIUM 100 MG/1
100 CAPSULE, LIQUID FILLED ORAL 2 TIMES DAILY
Status: ON HOLD | COMMUNITY

## 2022-12-26 RX ORDER — SACCHAROMYCES BOULARDII 250 MG
250 CAPSULE ORAL 2 TIMES DAILY
Status: ON HOLD | COMMUNITY

## 2022-12-26 RX ORDER — POLYETHYLENE GLYCOL 3350 17 G/17G
17 POWDER, FOR SOLUTION ORAL DAILY
Status: DISCONTINUED | OUTPATIENT
Start: 2022-12-26 | End: 2022-12-29 | Stop reason: HOSPADM

## 2022-12-26 RX ORDER — CALCIUM GLUCONATE 20 MG/ML
2 INJECTION, SOLUTION INTRAVENOUS ONCE
Status: COMPLETED | OUTPATIENT
Start: 2022-12-26 | End: 2022-12-26

## 2022-12-26 RX ORDER — POTASSIUM CHLORIDE 750 MG/1
10 TABLET, EXTENDED RELEASE ORAL DAILY
Status: ON HOLD | COMMUNITY

## 2022-12-26 RX ORDER — MULTIVIT-MIN/IRON FUM/FOLIC AC 7.5 MG-4
1 TABLET ORAL DAILY
Status: ON HOLD | COMMUNITY

## 2022-12-26 RX ORDER — CARVEDILOL 25 MG/1
25 TABLET ORAL 2 TIMES DAILY WITH MEALS
Status: ON HOLD | COMMUNITY

## 2022-12-26 RX ORDER — OXYMETAZOLINE HYDROCHLORIDE 0.05 G/100ML
2 SPRAY NASAL EVERY 12 HOURS PRN
Status: DISCONTINUED | OUTPATIENT
Start: 2022-12-26 | End: 2022-12-29 | Stop reason: HOSPADM

## 2022-12-26 RX ORDER — OMEGA-3S/DHA/EPA/FISH OIL/D3 300MG-1000
400 CAPSULE ORAL DAILY
Status: DISCONTINUED | OUTPATIENT
Start: 2022-12-26 | End: 2022-12-29 | Stop reason: HOSPADM

## 2022-12-26 RX ORDER — ASPIRIN 81 MG/1
81 TABLET, CHEWABLE ORAL DAILY
COMMUNITY
End: 2022-12-29

## 2022-12-26 RX ORDER — OXYMETAZOLINE HYDROCHLORIDE 0.05 G/100ML
2 SPRAY NASAL ONCE
Status: COMPLETED | OUTPATIENT
Start: 2022-12-26 | End: 2022-12-26

## 2022-12-26 RX ORDER — DICYCLOMINE HYDROCHLORIDE 10 MG/1
10 CAPSULE ORAL 2 TIMES DAILY PRN
Status: ON HOLD | COMMUNITY

## 2022-12-26 RX ORDER — ATORVASTATIN CALCIUM 80 MG/1
80 TABLET, FILM COATED ORAL DAILY
Status: ON HOLD | COMMUNITY

## 2022-12-26 RX ORDER — ENOXAPARIN SODIUM 100 MG/ML
30 INJECTION SUBCUTANEOUS
Status: DISCONTINUED | OUTPATIENT
Start: 2022-12-26 | End: 2022-12-29 | Stop reason: HOSPADM

## 2022-12-26 RX ORDER — ATORVASTATIN CALCIUM 80 MG/1
80 TABLET, FILM COATED ORAL
Status: DISCONTINUED | OUTPATIENT
Start: 2022-12-26 | End: 2022-12-29 | Stop reason: HOSPADM

## 2022-12-26 RX ORDER — ONDANSETRON 2 MG/ML
4 INJECTION INTRAMUSCULAR; INTRAVENOUS EVERY 4 HOURS PRN
Status: DISCONTINUED | OUTPATIENT
Start: 2022-12-26 | End: 2022-12-29 | Stop reason: HOSPADM

## 2022-12-26 RX ORDER — ACETAMINOPHEN 325 MG/1
975 TABLET ORAL EVERY 8 HOURS SCHEDULED
Status: DISCONTINUED | OUTPATIENT
Start: 2022-12-26 | End: 2022-12-29 | Stop reason: HOSPADM

## 2022-12-26 RX ORDER — AMIODARONE HYDROCHLORIDE 200 MG/1
100 TABLET ORAL
Status: DISCONTINUED | OUTPATIENT
Start: 2022-12-26 | End: 2022-12-29 | Stop reason: HOSPADM

## 2022-12-26 RX ADMIN — ENOXAPARIN SODIUM 30 MG: 30 INJECTION SUBCUTANEOUS at 13:22

## 2022-12-26 RX ADMIN — TETANUS TOXOID, REDUCED DIPHTHERIA TOXOID AND ACELLULAR PERTUSSIS VACCINE, ADSORBED 0.5 ML: 5; 2.5; 8; 8; 2.5 SUSPENSION INTRAMUSCULAR at 00:05

## 2022-12-26 RX ADMIN — DESMOPRESSIN ACETATE 24.4 MCG: 4 SOLUTION INTRAVENOUS at 04:33

## 2022-12-26 RX ADMIN — SODIUM CHLORIDE 3 G: 9 INJECTION, SOLUTION INTRAVENOUS at 15:18

## 2022-12-26 RX ADMIN — LEVETIRACETAM 500 MG: 100 INJECTION, SOLUTION INTRAVENOUS at 17:18

## 2022-12-26 RX ADMIN — SODIUM CHLORIDE 3 G: 9 INJECTION, SOLUTION INTRAVENOUS at 05:57

## 2022-12-26 RX ADMIN — LEVETIRACETAM 500 MG: 100 INJECTION, SOLUTION INTRAVENOUS at 05:56

## 2022-12-26 RX ADMIN — Medication 2000 UNITS: at 04:34

## 2022-12-26 RX ADMIN — OXYMETAZOLINE HYDROCHLORIDE 2 SPRAY: 0.05 SPRAY NASAL at 00:23

## 2022-12-26 RX ADMIN — IOHEXOL 100 ML: 350 INJECTION, SOLUTION INTRAVENOUS at 00:09

## 2022-12-26 RX ADMIN — TRANEXAMIC ACID 1000 MG: 100 INJECTION, SOLUTION INTRAVENOUS at 00:23

## 2022-12-26 RX ADMIN — CALCIUM GLUCONATE 2 G: 20 INJECTION, SOLUTION INTRAVENOUS at 06:20

## 2022-12-26 RX ADMIN — ONDANSETRON 4 MG: 2 INJECTION INTRAMUSCULAR; INTRAVENOUS at 00:00

## 2022-12-26 NOTE — CONSULTS
[address]leg[address] 1934, 80 y o  female MRN: [id number]  Unit/Bed#: ED 05 Encounter: [id number]  Primary Care Provider: Marco A Benson MD   Date and time admitted to hospital: 12/25/2022 11:44 PM    Inpatient consult to Neurosurgery  Consult performed by: SHERICE Mcgarry  Consult ordered by: Jessica Alexander MD          SDH (subdural hematoma)  Assessment & Plan  4 mm anterior falx SDH  · S/p syncopal event on 12/25  · On aspirin and Xarelto for chronic DVT and PAD - reversed with DDAVP and Kcentra  · Multiple nasal fractures  · Current exam is non-focal  GCS 15     Imaging:  · CT head wo, 12/26/2022: read pending, per my review, stable appearance of anterior falx SDH  · CT head wo, 12/25/2022: There is a subdural hemorrhage along the anterior falx measuring up to 4 mm  No mass effect or midline shift  Left forehead/frontal subcutaneous contusion  Plan:  · Continue to monitor neuro exam closely  · STAT CT head with decline in GCS > 2 pts in 1 hour  · Risk vs benefit discussion of ongoing antiplatelet/anticoagulant medication in this patient with frequent falls  · Keppra per trauma team   · Mobilize with PT/OT  · DVT ppx: Joon Bennett Si for pharmacologic DVT ppx  Neurosurgery will sign off  Plan for outpatient follow up in 2 weeks with repeat CT head  Call with questions  History of Present Illness   HPI: Lakeshia Mata is a 80 y o  female with PMH including chronic DVT, hypertension, peripheral arterial disease, CHF, hyperlipidemia, diabetes, who presented to [facility] emergency department on 12/25 after she syncopized and fell to the floor from standing  She was found unconscious by family members  She has a history of frequent falls but not syncope  She is on aspirin and Xarelto for chronic DVT  These were reversed with Kcentra and DDAVP    She additionally sustained multiple facial fractures and appears to have fallen face forward unprotected  Currently she is complaining of a throbbing headache  She denies any numbness or weakness  She denies any visual disturbance  Review of Systems   Constitutional: Negative  HENT: Positive for facial swelling and nosebleeds  Respiratory: Negative  Cardiovascular: Negative  Gastrointestinal: Negative  Neurological: Positive for syncope and headaches  Negative for dizziness, tremors, seizures, facial asymmetry, speech difficulty, weakness, light-headedness and numbness  Historical Information   No past medical history on file  No past surgical history on file  Social History     Substance and Sexual Activity   Alcohol Use Not on file     Social History     Substance and Sexual Activity   Drug Use Not on file     Social History     Tobacco Use   Smoking Status Not on file   Smokeless Tobacco Not on file     No family history on file      Meds/Allergies   all current active meds have been reviewed and current meds:   Current Facility-Administered Medications   Medication Dose Route Frequency   • amiodarone tablet 100 mg  100 mg Oral Daily With Breakfast   • amLODIPine (NORVASC) tablet 2 5 mg  2 5 mg Oral Daily   • ampicillin-sulbactam (UNASYN) 3 g in sodium chloride 0 9 % 100 mL IVPB  3 g Intravenous Q12H   • atorvastatin (LIPITOR) tablet 80 mg  80 mg Oral Daily With Dinner   • carvedilol (COREG) tablet 25 mg  25 mg Oral BID With Meals   • cholecalciferol (VITAMIN D3) tablet 400 Units  400 Units Oral Daily   • levETIRAcetam (KEPPRA) 500 mg in sodium chloride 0 9 % 100 mL IVPB  500 mg Intravenous Q12H LINCOLN   • multivitamin-minerals (CENTRUM) tablet 1 tablet  1 tablet Oral Daily   • ondansetron (ZOFRAN) injection 4 mg  4 mg Intravenous Q4H PRN   • polyethylene glycol (MIRALAX) packet 17 g  17 g Oral Daily   • senna (SENOKOT) tablet 17 2 mg  2 tablet Oral Daily PRN   • torsemide (DEMADEX) tablet 20 mg  20 mg Oral Every Other Day     Not on File    Objective   I/O       12/24 0701  12/25 0700 12/25 0701 12/26 0700 12/26 0701 12/27 0700    IV Piggyback  50 300    Total Intake(mL/kg)  50 (0 6) 300 (3 7)    Net  +50 +300                 Physical Exam  Constitutional:       Appearance: She is well-developed  HENT:      Head:      Comments: Multiple areas of ecchymosis and edema to face including nose and periorbital areas  Eyes:      Extraocular Movements: EOM normal       Pupils: Pupils are equal, round, and reactive to light  Pulmonary:      Effort: Pulmonary effort is normal    Abdominal:      Palpations: Abdomen is soft  Musculoskeletal:         General: Normal range of motion  Cervical back: Normal range of motion and neck supple  Skin:     General: Skin is warm and dry  Neurological:      Mental Status: She is alert and oriented to person, place, and time  Cranial Nerves: Cranial nerves 2-12 are intact  No cranial nerve deficit  Sensory: No sensory deficit  Motor: No weakness  Coordination: Coordination normal  Finger-Nose-Finger Test normal    Psychiatric:         Speech: Speech normal        Neurologic Exam     Mental Status   Oriented to person, place, and time  Oriented to person  Oriented to place  Oriented to time  Oriented to year, month and date  Registration: recalls 3 of 3 objects  Attention: normal  Concentration: normal    Speech: speech is normal   Level of consciousness: alert  Knowledge: good and consistent with education  Able to name object  Cranial Nerves   Cranial nerves II through XII intact  CN III, IV, VI   Pupils are equal, round, and reactive to light  Extraocular motions are normal    Right pupil: Size: 3 mm  Shape: regular  Reactivity: brisk  Consensual response: intact  Accommodation: intact  Left pupil: Size: 3 mm  Shape: regular  Reactivity: brisk  Consensual response: intact  Accommodation: intact     Nystagmus: none   Diplopia: none  Conjugate gaze: present    CN V   Right facial sensation deficit: none  Left facial sensation deficit: none    CN VII   Facial expression full, symmetric  CN VIII   Hearing: intact    CN IX, X   Palate: symmetric    CN XI   Right sternocleidomastoid strength: normal  Left sternocleidomastoid strength: normal  Right trapezius strength: normal  Left trapezius strength: normal    CN XII   Tongue: not atrophic  Fasciculations: absent  Tongue deviation: none    Motor Exam   Muscle bulk: normal  Overall muscle tone: normal  Right arm pronator drift: absent  Left arm pronator drift: absent    Strength   Right deltoid: 5/5  Left deltoid: 5/5  Right biceps: 5/5  Left biceps: 5/5  Right triceps: 5/5  Left triceps: 5/5  Right quadriceps: 5/5  Left quadriceps: 5/5  Right hamstrin/5  Left hamstrin/5  Right anterior tibial: 5/5  Left anterior tibial: 5/5  Right posterior tibial: 5/5  Left posterior tibial: 5/5  Right peroneal: 5/5  Left peroneal: 5/5  Right gastroc: 5/5  Left gastroc: 5/5    Sensory Exam   Light touch normal    Proprioception normal      Gait, Coordination, and Reflexes     Coordination   Finger to nose coordination: normal    Tremor   Resting tremor: absent  Intention tremor: absent  Action tremor: absent      Vitals:Blood pressure 167/68, pulse 62, temperature 98 2 °F (36 8 °C), temperature source Tympanic, resp  rate 17, height 5' 1" (1 549 m), weight 81 6 kg (180 lb), SpO2 96 %  ,Body mass index is 34 01 kg/m²       Lab Results:   Results from last 7 days   Lab Units 22  2359   WBC Thousand/uL 11 12* 7 08  --    HEMOGLOBIN g/dL 10 5* 10 7*  --    I STAT HEMOGLOBIN g/dl  --   --  12 2   HEMATOCRIT % 32 7* 32 5*  --    HEMATOCRIT, ISTAT %  --   --  36   PLATELETS Thousands/uL 124* 183  --    NEUTROS PCT %  --  54  --    MONOS PCT %  --  10  --      Results from last 7 days   Lab Units 22  2359   POTASSIUM mmol/L 4 1 3 9  --    CHLORIDE mmol/L 104 103  --    CO2 mmol/L 25 28  --    CO2, I-STAT mmol/L  --   --  27   BUN mg/dL 29* 27*  --    CREATININE mg/dL 1 30 1 39*  --    CALCIUM mg/dL 8 6 8 8  --    GLUCOSE, ISTAT mg/dl  --   --  152*             Results from last 7 days   Lab Units 12/26/22  0017   INR  1 22*     No results found for: TROPONINT  ABG:No results found for: PHART, CTN7TIR, PO2ART, SCB0VTG, Y9BDROMW, BEART, SOURCE    Imaging Studies: I have personally reviewed pertinent reports  and I have personally reviewed pertinent films in PACS    TRAUMA - CT head wo contrast    Result Date: 12/26/2022  Impression: There is a subdural hemorrhage along the anterior falx measuring up to 4 mm  No mass effect or midline shift  Left forehead/frontal subcutaneous contusion    Please see the concurrently performed and reported CT of the facial bones  I personally discussed this study with Dr Marnie Shankar on 12/26/2022 at 1:26 AM  Workstation performed: [id number]     TRAUMA - CT facial bones wo contrast    Result Date: 12/26/2022  Impression: Bilateral nasal fractures are noted  The nasal septum is serpiginous indicating multiple fractures  Irregularity of the inferior nasal turbinates bilaterally suggest fractures  Debris is seen in the nasal cavity bilaterally  Mild multifocal sinus opacities  Debris is seen in the nasal cavities bilaterally and air-fluid levels are seen in the maxillary sinuses bilaterally likely from the above-described fractures  I personally discussed this study with Dr Marnie Shankar on 12/26/2022 at 1:26 AM  Workstation performed: [id number]     TRAUMA - CT spine cervical wo contrast    Result Date: 12/26/2022  Impression: No cervical spine fracture or traumatic malalignment   I personally discussed this study with Dr Marnie Shankar on 12/26/2022 at 1:26 AM  Workstation performed: [id number]     CT chest abdomen pelvis w contrast    Result Date: 12/26/2022  Impression: 2 1 cm left renal cystic lesion which does not meet CT criteria for simple cyst on this examination; recommend elective ultrasound or MRI abdomen with contrast, renal protocol, for further evaluation  No acute pathology visualized on CT of the chest, abdomen and pelvis with IV without oral contrast   I personally discussed this study with Dr Sol Pham on 12/26/2022 at 1:26 AM  Workstation performed: [id number]     XR Trauma multiple ([facility]/RA trauma bay ONLY)    Result Date: 12/26/2022  Impression: No acute cardiopulmonary disease within limitations of supine imaging  Workstation performed: [id number]       EKG, Pathology, and Other Studies: I have personally reviewed pertinent reports  and I have personally reviewed pertinent films in PACS    VTE Prophylaxis: Sequential compression device Josy Smith)     Code Status: Level 3 - DNAR and DNI  Advance Directive and Living Will:      Power of :    POLST:      Counseling / Coordination of Care  I spent 20 minutes with the patient

## 2022-12-26 NOTE — DISCHARGE INSTR - AVS FIRST PAGE
Neurosurgery discharge instructions following traumatic head bleed:     Do not take any blood thinning medications (ie  No Advil  No motrin  No ibuprofen  No Aleve  No Aspirin  No fishoil  No heparin  No antiplatelet / no anticoagulation medication)  Refrain from activity that increases chance of trauma to head or falls  Recommend you take fall precaution  No strenuous activity or sports  Return to hospital Emergency Room if you experience worsening / new headache, nausea/vomiting, speech/vision change, seizure, confusion / mental status change, weakness, or other neurological changes  Follow-up as scheduled with a repeat CT head without contrast to be completed 2-3 days prior to visit  Prescription has been entered electronically  Please call  to schedule  Maintain sinus precautions: do not blow your nose, drink through straws or lay on your face  Cough and sneeze with mouth opened  Take pain medications as prescribed  No working or driving until cleared by facial fractures  Take antibiotics for one week as prescribed

## 2022-12-26 NOTE — H&P
H&P - Trauma   Kimberly Mena 80 y o  female MRN: [id number]  Unit/Bed#: ED 05 Encounter: [id number]    Trauma Alert: Level B   Model of Arrival: Ambulance    Trauma Team: Attending Giselle Paz  Consultants:     Orthopedics: routine consult; Epic consult order placed; Neurosurgery: routine consult; Epic consult order placed;      ENT: routine consult; Epic consult order placed; Assessment/Plan   Active Problems / Assessment:   - epiastaxis  - SDH  - syncope  - multiple facial lacerations  - b/l nasal fractures     Plan:   - Compression, Afrin, lidocaine with epinephrine and TXA for epistaxis  - repeat [address]  - neurosurgery consult for SDH  - DDAVP and Kaycentra for aspirin and Xarelto reversal  - Unasyn for open fracture  - ENT consult for nasal bone fractures  - q1hr neuro checks  - SD2/HOT      History of Present Illness     Chief Complaint: fall, nose bleed, syncope  Mechanism:Fall     HPI:    Kimberly Mena is a 80 y o  female who presents with epistaxis and multiple lacerations presenting after a syncopal episode  She does not remember what happened  Nephews were at her residence, were about to leave, when they heard her hit the floor  They found her unconscious and called EMS  She falls frequently, but does not typically pass out  She does not have a defibrillator or pacemaker  She takes aspirin and Xarelto  She was in her normal state of health earlier today prior to the syncopal episode  Review of Systems   All other systems reviewed and are negative  12-point, complete review of systems was reviewed and negative except as stated above  Historical Information     PMH: diabetes, afib, CHF, CKD  No surgical history  Immunization History   Administered Date(s) Administered   • Tdap 12/26/2022     Last Tetanus: today  Family History: Non-contributory    1  Before the illness or injury that brought you to the Emergency, did you need someone to help you on a regular basis? 0=No   2   Since the illness or injury that brought you to the Emergency, have you needed more help than usual to take care of yourself? 1=Yes   3  Have you been hospitalized for one or more nights during the past 6 months (excluding a stay in the Emergency Department)? 0=No   4  In general, do you see well? 0=Yes   5  In general, do you have serious problems with your memory? 0=No   6  Do you take more than three different medications everyday? 1=Yes   TOTAL   2     Did you order a geriatric consult if the score was 2 or greater?: yes     Meds/Allergies   all current active meds have been reviewed  Allergies have not been reviewed; Not on File    Objective   Initial Vitals:   Temperature: 98 2 °F (36 8 °C) (12/25/22 2349)  Pulse: 62 (12/25/22 2349)  Respirations: 18 (12/25/22 2349)  Blood Pressure: 135/91 (12/25/22 2349)    Primary Survey:   Airway:        Status: patent;        Pre-hospital Interventions: none        Intubation status: Suction  Breathing:        Pre-hospital Interventions: none       Effort: normal       Right breath sounds: normal       Left breath sounds: normal  Circulation:        Rhythm: regular       Rate: regular   Right Pulses Left Pulses    R radial: 2+    R pedal: 2+     L radial: 2+    L pedal: 2+       Disability:        GCS: Eye: 4; Verbal: 5 Motor: 6 Total: 15       Right Pupil: 3 mm;  round;  reactive         Left Pupil:  3 mm;  round;  reactive      R Motor Strength L Motor Strength    R : 5/5  R dorsiflex: 5/5  R plantarflex: 5/5 L : 5/5  L dorsiflex: 5/5  L plantarflex: 5/5        Sensory:  No sensory deficit  Exposure:       Completed: Yes      Secondary Survey:  Physical Exam  Vitals and nursing note reviewed  Constitutional:       General: She is not in acute distress  Appearance: She is ill-appearing  She is not toxic-appearing  HENT:      Head: Normocephalic and atraumatic        Right Ear: External ear normal       Left Ear: External ear normal       Nose:      Comments: Blood in bilateral nares, actively bleeding  Mouth/Throat:      Mouth: Mucous membranes are moist       Comments: 1 cm laceration on right lower lip, violating Vermillion border, actively bleeding  Eyes:      Extraocular Movements: Extraocular movements intact  Conjunctiva/sclera: Conjunctivae normal       Pupils: Pupils are equal, round, and reactive to light  Comments: Left periorbital ecchymosis and swelling  Cardiovascular:      Rate and Rhythm: Normal rate and regular rhythm  Pulses: Normal pulses  Pulmonary:      Effort: Pulmonary effort is normal       Breath sounds: Normal breath sounds  Abdominal:      General: There is no distension  Palpations: Abdomen is soft  Tenderness: There is no guarding  Comments: LUQ tenderness  Musculoskeletal:         General: No deformity  Comments: Moving all extremities equally  No tenderness in extremities  T-spine tenderness  Skin:     General: Skin is warm and dry  Comments: 3 cm laceration to palmar surface of left hand  0 5 cm laceration to left eyebrow  Multiple bruises on b/l lower extremities, appear to be healing  Neurological:      General: No focal deficit present  Mental Status: She is alert and oriented to person, place, and time  Comments: Strength and sensation intact in all 4 extremities  Invasive Devices     Peripheral Intravenous Line  Duration           Peripheral IV 12/25/22 Dorsal (posterior); Right Forearm <1 day    Peripheral IV 12/25/22 Left Antecubital <1 day              Lab Results: Results: I have personally reviewed all pertinent laboratory/tests results    Imaging Results: I have personally reviewed pertinent reports      Chest Xray(s): negative for acute findings   FAST exam(s): negative for acute findings   CT Scan(s): positive for acute findings: SDH, b/l nasal bone fractures   Additional Xray(s): negative for acute findings       Code Status: Level 3 - DNAR and DNI  Advance Directive and Living Will:      Power of :    POLST:    I have spent 19 minutes with Patient and family today in which greater than 50% of this time was spent in counseling/coordination of care regarding Diagnostic results, Prognosis, Patient and family education and Impressions

## 2022-12-26 NOTE — QUICK NOTE
Repeat head CT stable at this time; appreciate neurosurgery consultation  Will initiate DVT prophylaxis with Lovenox 30 mg once daily based on creatinine clearance being less than 30

## 2022-12-26 NOTE — PROGRESS NOTES
Laceration repair    Date/Time: 12/26/2022 3:09 AM  Performed by: Ivet Schroeder MD  Authorized by: Ivet Schroeder MD   Consent: Verbal consent obtained  Risks and benefits: risks, benefits and alternatives were discussed  Consent given by: patient  Patient understanding: patient states understanding of the procedure being performed  Patient consent: the patient's understanding of the procedure matches consent given  Procedure consent: procedure consent matches procedure scheduled  Relevant documents: relevant documents present and verified  Test results: test results available and properly labeled  Site marked: the operative site was marked  Radiology Images displayed and confirmed  If images not available, report reviewed: imaging studies available  Required items: required blood products, implants, devices, and special equipment available  Patient identity confirmed: verbally with patient  Body area: head/neck  Location details: left eyebrow  Laceration length: 0 5 cm  Foreign bodies: no foreign bodies  Anesthesia: local infiltration    Anesthesia:  Local Anesthetic: lidocaine 1% with epinephrine  Anesthetic total: 2 mL      Procedure Details:  Preparation: Patient was prepped and draped in the usual sterile fashion  Irrigation solution: saline  Irrigation method: syringe  Amount of cleaning: standard  Debridement: none  Degree of undermining: none  Skin closure: 6-0 Prolene  Number of sutures: 3  Technique: simple  Approximation: close  Approximation difficulty: simple    Laceration repair    Date/Time: 12/26/2022 3:10 AM  Performed by: Ivet Schroeder MD  Authorized by: Ivet Schroeder MD   Consent: Verbal consent obtained    Risks and benefits: risks, benefits and alternatives were discussed  Consent given by: patient  Patient understanding: patient states understanding of the procedure being performed  Patient consent: the patient's understanding of the procedure matches consent given  Procedure consent: procedure consent matches procedure scheduled  Relevant documents: relevant documents present and verified  Test results: test results available and properly labeled  Site marked: the operative site was marked  Radiology Images displayed and confirmed  If images not available, report reviewed: imaging studies available  Required items: required blood products, implants, devices, and special equipment available  Body area: upper extremity  Location details: left hand  Laceration length: 3 cm  Tendon involvement: none  Nerve involvement: none  Vascular damage: no  Anesthesia: local infiltration    Anesthesia:  Local Anesthetic: lidocaine 1% with epinephrine  Anesthetic total: 4 mL      Procedure Details:  Preparation: Patient was prepped and draped in the usual sterile fashion  Irrigation solution: saline  Irrigation method: syringe  Amount of cleaning: standard  Debridement: none  Degree of undermining: none  Skin closure: 6-0 nylon  Number of sutures: 9  Technique: simple  Approximation: loose  Approximation difficulty: simple  Dressing: gauze roll (xeroform gauze)    Laceration repair    Date/Time: 12/26/2022 3:12 AM  Performed by: Mira Bass MD  Authorized by: Mira Bass MD   Consent: Verbal consent obtained  Consent given by: patient  Patient understanding: patient states understanding of the procedure being performed  Patient consent: the patient's understanding of the procedure matches consent given  Procedure consent: procedure consent matches procedure scheduled  Relevant documents: relevant documents present and verified  Test results: test results available and properly labeled  Site marked: the operative site was marked  Radiology Images displayed and confirmed   If images not available, report reviewed: imaging studies available  Required items: required blood products, implants, devices, and special equipment available  Patient identity confirmed: verbally with patient  Body area: head/neck  Location details: lower lip  Vermilion border involved: yes  Laceration length: 1 cm  Foreign bodies: no foreign bodies  Tendon involvement: none  Nerve involvement: none  Vascular damage: no  Anesthesia: local infiltration    Anesthesia:  Local Anesthetic: lidocaine 1% with epinephrine    Sedation:  Patient sedated: no        Procedure Details:  Preparation: Patient was prepped and draped in the usual sterile fashion  Irrigation solution: saline  Irrigation method: syringe  Amount of cleaning: standard  Debridement: none  Degree of undermining: none  Skin closure: 6-0 nylon  Number of sutures: 4  Technique: simple  Approximation: loose  Approximation difficulty: simple  Lip approximation: vermillion border well aligned  Comments: Vermillion border alignment technically difficult due to continued bleeding, lip swelling from trauma, and urgency of repair given acute epistaxis and facial laceration with potential for airway compromise

## 2022-12-26 NOTE — ED PROCEDURE NOTE
Procedure  Epistaxis management    Date/Time: 12/26/2022 3:15 AM  Performed by: Starla Case MD  Authorized by: Starla Case MD   [facility] Protocol:  Procedure performed by:  Consent: Verbal consent obtained  Risks and benefits: risks, benefits and alternatives were discussed  Consent given by: patient  Patient understanding: patient states understanding of the procedure being performed  Patient consent: the patient's understanding of the procedure matches consent given  Procedure consent: procedure consent matches procedure scheduled  Relevant documents: relevant documents present and verified  Test results: test results available and properly labeled  Site marked: the operative site was marked  Radiology Images displayed and confirmed  If images not available, report reviewed: imaging studies available  Required items: required blood products, implants, devices, and special equipment available  Patient identity confirmed: verbally with patient      Patient location:  ED  Anesthesia (see MAR for exact dosages): Anesthesia method:  None  Procedure details:     Treatment site:  L anterior and R anterior    Approach:  External    Treatment complexity:  Limited    Treatment episode: initial    Post-procedure details:     Assessment:  Bleeding stopped    Patient tolerance of procedure: Tolerated well, no immediate complications  Comments:      Atomized TXA, lidocaine with epinephrine, and Afrin                        Strala Case MD  12/26/22 8905

## 2022-12-26 NOTE — CONSULTS
Oral and Maxillofacial Surgery Consult    Pt seen 12/26/22 9:39 AM    Assessment  80 y o  female who presents to ED s/p facial trauma sustaining b/l nasal bone fracture and multiple facial laceration  On exam, mild deviation of nasal dorsum to R is noted  CT facial bones reveals similar finindgs  Plan:  No OMFS intervention at this time, will monitor as outpatient and re-evaluate need for surgery  - Analgesia as per primary team  - Rx abx x7d total course (unasyn 3g IV q6h then transition to augmentin 875-125mg BID PO when able, clindamycin 300mg IV q6h then transition to clindamycin 300mg PO q6h when able)  - Encourage good oral hygiene  - Farhad Horn suction at bedside  - Ice to face: 20min on, 20min off for 2-3 days  - Sinus precautions: no nose blowing, no heavy lifting, avoid pressure to the area, head of bed elevated, decongestants as needed   - follow up as needed at outpatient [address] [place] [address] clinic -- patient can call [phone]    D/w OMFS attg on call    Inpatient consult to Oral and Maxillofacial Surgery  Consult performed by: Demi Guerrero DMD  Consult ordered by: Faustino Mayo PA-C           HPI: 80 y o  female w PMH diabetes, afib, CHF, CKD  Pt presents to ED s/p unwitnessed syncopal fall, sustaining facial trauma  Pt reports that she was in her normal state of health at home when she passed out  Pt currently reports mild pain around nose  Denies headache, change in occlusion/open bite, tooth pain/trauma, rhinorrhea, otorrhea, vision changes, blurry/double vision  Pt denies fever, chills, nausea, shortness of breath, neck pain  PMH:   No past medical history on file       Allergies:   Not on File    Meds:     Current Facility-Administered Medications:   •  amiodarone tablet 100 mg, 100 mg, Oral, Daily With Breakfast, Kimberly Taveras MD  •  amLODIPine (NORVASC) tablet 2 5 mg, 2 5 mg, Oral, Daily, Kimberly Taveras MD  •  ampicillin-sulbactam (UNASYN) 3 g in sodium chloride 0 9 % 100 mL IVPB, 3 g, Intravenous, Q12H, Luz Elena Barahona , Stopped at 12/26/22 6684  •  atorvastatin (LIPITOR) tablet 80 mg, 80 mg, Oral, Daily With Gabriella Johansen MD  •  carvedilol (COREG) tablet 25 mg, 25 mg, Oral, BID With Meals, Ivet Schroeder MD  •  cholecalciferol (VITAMIN D3) tablet 400 Units, 400 Units, Oral, Daily, Ivet Schroeder MD  •  levETIRAcetam (KEPPRA) 500 mg in sodium chloride 0 9 % 100 mL IVPB, 500 mg, Intravenous, Q12H [facility], Breonna Samuels DO, Stopped at 12/26/22 3327  •  multivitamin-minerals (CENTRUM) tablet 1 tablet, 1 tablet, Oral, Daily, Ivet Schroeder MD  •  ondansetron [facility]) injection 4 mg, 4 mg, Intravenous, Q4H PRN, Ivet Schroeder MD  •  polyethylene glycol (MIRALAX) packet 17 g, 17 g, Oral, Daily, Ivet Schroeder MD  •  senna (SENOKOT) tablet 17 2 mg, 2 tablet, Oral, Daily PRN, Ivet Schroeder MD  •  torsemide [facility]) tablet 20 mg, 20 mg, Oral, Every Other Day, Ivet Schroeder MD    Current Outpatient Medications:   •  amiodarone 200 mg tablet, Take 200 mg by mouth daily, Disp: , Rfl:   •  amLODIPine (NORVASC) 2 5 mg tablet, Take 2 5 mg by mouth daily, Disp: , Rfl:   •  aspirin 81 mg chewable tablet, Chew 81 mg daily, Disp: , Rfl:   •  atorvastatin (LIPITOR) 80 mg tablet, Take 80 mg by mouth daily, Disp: , Rfl:   •  carvedilol (COREG) 25 mg tablet, Take 25 mg by mouth 2 (two) times a day with meals, Disp: , Rfl:   •  Cholecalciferol 25 MCG (1000 UT) capsule, Take 1,000 Units by mouth daily, Disp: , Rfl:   •  dicyclomine (BENTYL) 10 mg capsule, Take 10 mg by mouth 2 (two) times a day as needed (cramping), Disp: , Rfl:   •  docusate sodium (COLACE) 100 mg capsule, Take 100 mg by mouth 2 (two) times a day, Disp: , Rfl:   •  lidocaine (LIDODERM) 5 %, Apply 1 patch topically daily Remove & Discard patch within 12 hours or as directed by MD, Disp: , Rfl:   •  Multiple Vitamins-Minerals (multivitamin with minerals) tablet, Take 1 tablet by mouth daily, Disp: , Rfl:   •  omega-3-acid ethyl esters (LOVAZA) 1 g capsule, Take 2 g by mouth 2 (two) times a day, Disp: , Rfl:   •  potassium chloride (K-DUR,KLOR-CON) 10 mEq tablet, Take 10 mEq by mouth daily, Disp: , Rfl:   •  rivaroxaban (Xarelto) 15 mg tablet, Take 15 mg by mouth daily with breakfast, Disp: , Rfl:   •  saccharomyces boulardii (FLORASTOR) 250 mg capsule, Take 250 mg by mouth 2 (two) times a day, Disp: , Rfl:   •  torsemide (DEMADEX) 20 mg tablet, Take 20 mg by mouth daily, Disp: , Rfl:     PSH:   No past surgical history on file  No family history on file  Review of Systems   Constitutional: Negative  HENT: Positive for facial swelling and nosebleeds  Skin: Positive for wound (multiple facial lacerations)  All other systems reviewed and are negative  Temp:  [98 2 °F (36 8 °C)] 98 2 °F (36 8 °C)  HR:  [56-68] 64  Resp:  [0-48] 18  BP: (135-194)/(67-91) 149/67  SpO2:  [92 %-99 %] 94 %       Intake/Output Summary (Last 24 hours) at 12/26/2022 0939  Last data filed at 12/26/2022 0732  Gross per 24 hour   Intake 350 ml   Output --   Net 350 ml        Physical Exam:  Gen: AAOx3  NAD  CVS: HDS   Resp: CTA B/L, unlabored on RA  Neuro: bilateral CN V2-V3, CN VII grossly intact  HEENT:   Head: mild bilateral facial swelling/ecchymosis consistent with injury  No bony step-off palpated  Mild tenderness to palpation  Multiple facial laceration (left forehead, nasal dorsum,  R lower lip) s/p repair  Eye: EOM intact  PEERL  Mild L subconjunctival hemorrhage  L periorbital ecchymosis/edema  No exophthalmos, enophthalmos, chemosis  Visual acuity grossly intact  Nose:  right nasal dorsum deviation  No septal hematoma  B/ dried blood in nares  Intraoral: ABHINAV ~40mm  Near complete edentulism  Mobile tooth #23  Occlusion unable to assess due to lack of teeth  No deviation on opening  No segmental mobility or gingival laceration  FOM soft, non-elevated, non-tender  Uvula midline  Lab Results: I have personally reviewed pertinent lab results      Imaging: I have personally reviewed pertinent films in PACS  EKG, Pathology, and Other Studies: I have personally reviewed pertinent films in PACS    Counseling / Coordination of Care  Total floor / unit time spent today 30 minutes  Greater than 50% of total time was spent with the patient and / or family counseling and / or coordination of care  A description of the counseling / coordination of care: description of injury with proposed plan of care  Discussed risks, benefits, and alternatives to treatment plan  All questions were answered  Patient in agreement with plan  Anika Mcneill

## 2022-12-26 NOTE — ED PROVIDER NOTES
Emergency Department Airway Evaluation and Management Form    History  Obtained from: EMS and patient  Patient has no allergy information on record  Chief Complaint   Patient presents with   • Trauma     Fall on thinners, (-) LOC, nose bleeding      HPI   80 y o  female presenting as a Level B trauma  Per EMS, patient's family heard a crash and found patient down on the floor  Patient is awake alert and oriented, does not know what transpired and lead up to the fall  Patient has a hematoma to face, epistaxis and mouth bleeding  Patient is on Xarelto  No past medical history on file  No past surgical history on file  No family history on file  I have reviewed and agree with the history as documented  Review of Systems    Physical Exam  /70   Pulse 60   Temp 98 2 °F (36 8 °C) (Tympanic)   Resp 18   Ht 5' 1" (1 549 m)   Wt 81 6 kg (180 lb)   SpO2 97%   BMI 34 01 kg/m²     Physical Exam    ED Medications    Intubation  Procedures    Notes  Patient is following all commands, GCS 15  She is protecting own airway  She is not requiring supplemental O2  No indication for airway intervention      Final Diagnosis  Final diagnoses:   SDH (subdural hematoma)   Lip laceration   Eyebrow laceration, left, initial encounter   Laceration of left palm   Nasal bones, closed fracture       ED Provider  Electronically Signed by     Sally Ferreira MD  12/25/22 1549       Sally Ferreira MD  12/26/22 0019

## 2022-12-26 NOTE — ASSESSMENT & PLAN NOTE
4 mm anterior falx SDH  · S/p syncopal event on 12/25  · On aspirin and Xarelto for chronic DVT and PAD - reversed with DDAVP and Kcentra  · Multiple nasal fractures  · Current exam is non-focal  GCS 15     Imaging:  · CT head wo, 12/26/2022: read pending, per my review, stable appearance of anterior falx SDH  · CT head wo, 12/25/2022: There is a subdural hemorrhage along the anterior falx measuring up to 4 mm  No mass effect or midline shift  Left forehead/frontal subcutaneous contusion  Plan:  · Continue to monitor neuro exam closely  · STAT CT head with decline in GCS > 2 pts in 1 hour  · Risk vs benefit discussion of ongoing antiplatelet/anticoagulant medication in this patient with frequent falls  · Keppra per trauma team   · Mobilize with PT/OT  · DVT ppx: Joon Shi for pharmacologic DVT ppx  Neurosurgery will sign off  Plan for outpatient follow up in 2 weeks with repeat CT head  Call with questions

## 2022-12-27 ENCOUNTER — APPOINTMENT (INPATIENT)
Dept: RADIOLOGY | Facility: HOSPITAL | Age: 87
End: 2022-12-27

## 2022-12-27 ENCOUNTER — APPOINTMENT (INPATIENT)
Dept: NON INVASIVE DIAGNOSTICS | Facility: HOSPITAL | Age: 87
End: 2022-12-27

## 2022-12-27 ENCOUNTER — TELEPHONE (OUTPATIENT)
Dept: FAMILY MEDICINE CLINIC | Facility: CLINIC | Age: 87
End: 2022-12-27

## 2022-12-27 PROBLEM — Z86.79 HISTORY OF ATRIAL FIBRILLATION: Status: ACTIVE | Noted: 2022-12-27

## 2022-12-27 PROBLEM — S02.2XXA CLOSED FRACTURE OF NASAL BONE: Status: ACTIVE | Noted: 2022-12-27

## 2022-12-27 LAB
ANION GAP SERPL CALCULATED.3IONS-SCNC: 4 MMOL/L (ref 4–13)
AORTIC ROOT: 3.2 CM
AORTIC VALVE MEAN VELOCITY: 16.9 M/S
APICAL FOUR CHAMBER EJECTION FRACTION: 63 %
ASCENDING AORTA: 2.8 CM
AV AREA BY CONTINUOUS VTI: 2.3 CM2
AV AREA PEAK VELOCITY: 2 CM2
AV LVOT MEAN GRADIENT: 6 MMHG
AV LVOT PEAK GRADIENT: 9 MMHG
AV MEAN GRADIENT: 13 MMHG
AV PEAK GRADIENT: 19 MMHG
AV VALVE AREA: 2.38 CM2
AV VELOCITY RATIO: 0.71
BASOPHILS # BLD AUTO: 0.04 THOUSANDS/ÂΜL (ref 0–0.1)
BASOPHILS NFR BLD AUTO: 1 % (ref 0–1)
BUN SERPL-MCNC: 27 MG/DL (ref 5–25)
CALCIUM SERPL-MCNC: 8.4 MG/DL (ref 8.3–10.1)
CHLORIDE SERPL-SCNC: 108 MMOL/L (ref 96–108)
CO2 SERPL-SCNC: 28 MMOL/L (ref 21–32)
CREAT SERPL-MCNC: 1.22 MG/DL (ref 0.6–1.3)
DOP CALC AO PEAK VEL: 2.1 M/S
DOP CALC AO VTI: 54 CM
DOP CALC LVOT AREA: 3.14 CM2
DOP CALC LVOT DIAMETER: 2 CM
DOP CALC LVOT PEAK VEL VTI: 41 CM
DOP CALC LVOT PEAK VEL: 1.5 M/S
DOP CALC LVOT STROKE INDEX: 72.4 ML/M2
DOP CALC LVOT STROKE VOLUME: 128.74 CM3
E WAVE DECELERATION TIME: 137 MS
EOSINOPHIL # BLD AUTO: 0.12 THOUSAND/ÂΜL (ref 0–0.61)
EOSINOPHIL NFR BLD AUTO: 2 % (ref 0–6)
ERYTHROCYTE [DISTWIDTH] IN BLOOD BY AUTOMATED COUNT: 14.2 % (ref 11.6–15.1)
FRACTIONAL SHORTENING: 28 % (ref 28–44)
GFR SERPL CREATININE-BSD FRML MDRD: 39 ML/MIN/1.73SQ M
GLUCOSE SERPL-MCNC: 156 MG/DL (ref 65–140)
GLUCOSE SERPL-MCNC: 160 MG/DL (ref 65–140)
GLUCOSE SERPL-MCNC: 166 MG/DL (ref 65–140)
GLUCOSE SERPL-MCNC: 214 MG/DL (ref 65–140)
HCT VFR BLD AUTO: 26.4 % (ref 34.8–46.1)
HCT VFR BLD AUTO: 28.5 % (ref 34.8–46.1)
HGB BLD-MCNC: 8.4 G/DL (ref 11.5–15.4)
HGB BLD-MCNC: 8.7 G/DL (ref 11.5–15.4)
IMM GRANULOCYTES # BLD AUTO: 0.03 THOUSAND/UL (ref 0–0.2)
IMM GRANULOCYTES NFR BLD AUTO: 0 % (ref 0–2)
INTERVENTRICULAR SEPTUM IN DIASTOLE (PARASTERNAL SHORT AXIS VIEW): 1.3 CM
INTERVENTRICULAR SEPTUM: 1.3 CM (ref 0.6–1.1)
LAAS-AP2: 22.1 CM2
LAAS-AP4: 26.3 CM2
LEFT ATRIUM SIZE: 4.2 CM
LEFT INTERNAL DIMENSION IN SYSTOLE: 3.4 CM (ref 2.1–4)
LEFT VENTRICULAR INTERNAL DIMENSION IN DIASTOLE: 4.7 CM (ref 3.5–6)
LEFT VENTRICULAR POSTERIOR WALL IN END DIASTOLE: 1.2 CM
LEFT VENTRICULAR STROKE VOLUME: 53 ML
LVSV (TEICH): 53 ML
LYMPHOCYTES # BLD AUTO: 1.67 THOUSANDS/ÂΜL (ref 0.6–4.47)
LYMPHOCYTES NFR BLD AUTO: 24 % (ref 14–44)
MAGNESIUM SERPL-MCNC: 2.4 MG/DL (ref 1.6–2.6)
MCH RBC QN AUTO: 30.9 PG (ref 26.8–34.3)
MCHC RBC AUTO-ENTMCNC: 31.8 G/DL (ref 31.4–37.4)
MCV RBC AUTO: 97 FL (ref 82–98)
MONOCYTES # BLD AUTO: 0.76 THOUSAND/ÂΜL (ref 0.17–1.22)
MONOCYTES NFR BLD AUTO: 11 % (ref 4–12)
MV E'TISSUE VEL-SEP: 7 CM/S
MV PEAK A VEL: 0.37 M/S
MV PEAK E VEL: 100 CM/S
MV STENOSIS PRESSURE HALF TIME: 40 MS
MV VALVE AREA P 1/2 METHOD: 5.5 CM2
NEUTROPHILS # BLD AUTO: 4.48 THOUSANDS/ÂΜL (ref 1.85–7.62)
NEUTS SEG NFR BLD AUTO: 62 % (ref 43–75)
NRBC BLD AUTO-RTO: 0 /100 WBCS
PHOSPHATE SERPL-MCNC: 3.3 MG/DL (ref 2.3–4.1)
PLATELET # BLD AUTO: 128 THOUSANDS/UL (ref 149–390)
PMV BLD AUTO: 11.1 FL (ref 8.9–12.7)
POTASSIUM SERPL-SCNC: 3.8 MMOL/L (ref 3.5–5.3)
RA PRESSURE ESTIMATED: 15 MMHG
RBC # BLD AUTO: 2.72 MILLION/UL (ref 3.81–5.12)
RIGHT ATRIUM AREA SYSTOLE A4C: 25.3 CM2
RIGHT VENTRICLE ID DIMENSION: 3.9 CM
RV PSP: 70 MMHG
SL CV LEFT ATRIUM LENGTH A2C: 6.1 CM
SL CV LV EF: 65
SL CV PED ECHO LEFT VENTRICLE DIASTOLIC VOLUME (MOD BIPLANE) 2D: 101 ML
SL CV PED ECHO LEFT VENTRICLE SYSTOLIC VOLUME (MOD BIPLANE) 2D: 48 ML
SODIUM SERPL-SCNC: 140 MMOL/L (ref 135–147)
TR MAX PG: 55 MMHG
TR PEAK VELOCITY: 3.7 M/S
TRICUSPID VALVE PEAK REGURGITATION VELOCITY: 3.7 M/S
WBC # BLD AUTO: 7.1 THOUSAND/UL (ref 4.31–10.16)

## 2022-12-27 RX ORDER — INSULIN LISPRO 100 [IU]/ML
1-6 INJECTION, SOLUTION INTRAVENOUS; SUBCUTANEOUS
Status: DISCONTINUED | OUTPATIENT
Start: 2022-12-27 | End: 2022-12-29 | Stop reason: HOSPADM

## 2022-12-27 RX ORDER — LEVETIRACETAM 500 MG/1
500 TABLET ORAL EVERY 12 HOURS SCHEDULED
Status: DISCONTINUED | OUTPATIENT
Start: 2022-12-27 | End: 2022-12-29 | Stop reason: HOSPADM

## 2022-12-27 RX ADMIN — CARVEDILOL 25 MG: 25 TABLET, FILM COATED ORAL at 18:08

## 2022-12-27 RX ADMIN — CARVEDILOL 25 MG: 25 TABLET, FILM COATED ORAL at 12:32

## 2022-12-27 RX ADMIN — ACETAMINOPHEN 975 MG: 325 TABLET, FILM COATED ORAL at 13:29

## 2022-12-27 RX ADMIN — AMIODARONE HYDROCHLORIDE 100 MG: 200 TABLET ORAL at 09:05

## 2022-12-27 RX ADMIN — POLYETHYLENE GLYCOL 3350 17 G: 17 POWDER, FOR SOLUTION ORAL at 09:05

## 2022-12-27 RX ADMIN — AMLODIPINE BESYLATE 2.5 MG: 2.5 TABLET ORAL at 09:05

## 2022-12-27 RX ADMIN — Medication 1 TABLET: at 09:04

## 2022-12-27 RX ADMIN — INSULIN LISPRO 2 UNITS: 100 INJECTION, SOLUTION INTRAVENOUS; SUBCUTANEOUS at 12:31

## 2022-12-27 RX ADMIN — ATORVASTATIN CALCIUM 80 MG: 80 TABLET, FILM COATED ORAL at 18:08

## 2022-12-27 RX ADMIN — LEVETIRACETAM 500 MG: 500 TABLET, FILM COATED ORAL at 18:08

## 2022-12-27 RX ADMIN — ENOXAPARIN SODIUM 30 MG: 30 INJECTION SUBCUTANEOUS at 09:04

## 2022-12-27 RX ADMIN — SODIUM CHLORIDE 3 G: 9 INJECTION, SOLUTION INTRAVENOUS at 13:29

## 2022-12-27 RX ADMIN — INSULIN LISPRO 1 UNITS: 100 INJECTION, SOLUTION INTRAVENOUS; SUBCUTANEOUS at 18:13

## 2022-12-27 RX ADMIN — LEVETIRACETAM 500 MG: 100 INJECTION, SOLUTION INTRAVENOUS at 05:17

## 2022-12-27 RX ADMIN — CHOLECALCIFEROL TAB 10 MCG (400 UNIT) 400 UNITS: 10 TAB at 09:04

## 2022-12-27 RX ADMIN — ACETAMINOPHEN 975 MG: 325 TABLET, FILM COATED ORAL at 21:27

## 2022-12-27 RX ADMIN — SODIUM CHLORIDE 3 G: 9 INJECTION, SOLUTION INTRAVENOUS at 01:17

## 2022-12-27 NOTE — NUTRITION
12/27/22 1114   Biochemical Data,Medical Tests, and Procedures   Biochemical Data/Medical Tests/Procedures Lab values reviewed; Meds reviewed   Nutrition-Focused Physical Exam   Nutrition-Focused Physical Exam Findings RN skin assessment reviewed;Edema; Other (Comment)  (bruises on face)   Medical-Related Concerns closed fracture of nasal bone, diabetes, stage 3 CKD, HTN, CHF, HLD   Current PO Intake   Current Diet Order Regular diet   Current Meal Intake 50-75%  (50% per I/O's)   Estimated calorie intake compared to estimated need Nutrition needs likely not met at this time  PES Statement   Energy Balance (1) Predicted suboptimal energy intake NI-1 4   Related to Other (Comment)  (decreased PO intake)   As evidenced by: I/O's   Recommendations/Interventions   Summary Presents s/p fall in setting of chronic anticoagulation use, pt found down on the floor by family  Three lacerations repaired 12/26  Unable to obtain nutrition history at this time  Chart utilized for assessment  No significant weight changes per chart review, recommend obtaining updated weight via standing scale as able  Ordered for regular diet with thin liquids  50% meal completion per I/O's  12/27 LBM  RD to monitor PO intakes and provide nutrition supplementation PRN  Interventions/Recommendations Continue current diet order;Monitor I & O's   Education Assessment   Education Education not indicated at this time   Patient Nutrition Goals   Goal Adequate hydration; Adequate intake Consent: The patient's consent was obtained including but not limited to risks of crusting, scabbing, blistering, scarring, darker or lighter pigmentary change, recurrence, incomplete removal and infection. Render Post-Care Instructions In Note?: no Detail Level: Detailed Number Of Freeze-Thaw Cycles: 1 freeze-thaw cycle Post-Care Instructions: I reviewed with the patient in detail post-care instructions. Patient is to wear sunprotection, and avoid picking at any of the treated lesions. Pt may apply Vaseline to crusted or scabbing areas. Duration Of Freeze Thaw-Cycle (Seconds): 5

## 2022-12-27 NOTE — PHYSICAL THERAPY NOTE
Physical Therapy Evaluation     Patient's Name: Melba Mcdaniel    Admitting Diagnosis  Nasal bones, closed fracture [S02  2XXA]  SDH (subdural hematoma) [S06  5XAA]  Lip laceration [S01 511A]  Eyebrow laceration, left, initial encounter [S01 112A]  Laceration of left palm [S61 412A]  Unspecified multiple injuries, initial encounter [T07  XXXA]    Problem List  Patient Active Problem List   Diagnosis    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, without long-term current use of insulin ([facility] )    Secondary hyperparathyroidism of renal origin ([facility] [address] )    Primary hypertension    PAD (peripheral artery disease) (HCC)    Chronic congestive heart failure (HCC)    Chronic deep vein thrombosis (DVT) of proximal vein of lower extremity (HCC)    Other hyperlipidemia    SDH (subdural hematoma)    Closed fracture of nasal bone       Past Medical History  Past Medical History:   Diagnosis Date    Anxiety     HLD (hyperlipidemia)        Past Surgical History  Past Surgical History:   Procedure Laterality Date    APPENDECTOMY            12/27/22 0824   PT Last Visit   PT Visit Date 12/27/22   Note Type   Note type Evaluation   Pain Assessment   Pain Assessment Tool 0-10   Pain Score No Pain   Restrictions/Precautions   Weight Bearing Precautions Per Order No   Braces or Orthoses Other (Comment)  (denies)   Other Precautions Cognitive; Chair Alarm;Telemetry; Fall Risk;Hard of hearing;Multiple lines  (chair alarm activated at end of session; sinus precautions)   Home Living   Type of [address] [place] [address] One level;Stairs to enter with rails  (2STE)   [address] [facility]   Prior Function   Level of [place] Independent with functional mobility  (amb  w/ RW)   Lives With (S)  Alone   Receives Help From Personal care attendant  (3hr in AM and 3hrs in PM)   IADLs Independent with medication management; Family/Friend/Other provides transportation; Family/Friend/Other provides meals   Falls in the last 6 months 1 to 4  (2)   Vocational Retired   Comments pt reports caregivers come in AM and PM to [facility], provide house keeping and drive/go shopping for her   General   Additional Pertinent History cleared for assessment by victoriano   Family/Caregiver Present No   Cognition   Overall Cognitive Status WFL   Arousal/Participation Alert   Orientation Level Oriented to person;Oriented to place; Disoriented to time;Oriented to situation   Memory Decreased recall of precautions   Following Commands Follows one step commands with increased time or repetition   Subjective   Subjective pt alert in bed; agreed to ambulate and try stairs   RUE Assessment   RUE Assessment WFL  (AROM; Except shoulder)   LUE Assessment   LUE Assessment WFL  (AROM; Except shoulder)   RLE Assessment   RLE Assessment WFL  (AROM)   Strength RLE   RLE Overall Strength   (4/5 functionally)   LLE Assessment   LLE Assessment WFL  (AROM)   Strength LLE   LLE Overall Strength   (4/5 functionally)   Bed Mobility   Supine to Sit 5  Supervision   Additional items HOB elevated; Increased time required;Verbal cues   Sit to Supine Unable to assess  (pt seated in chair at end of session)   Transfers   Sit to Stand 4  Minimal assistance   Additional items Increased time required; Impulsive;Verbal cues   Stand to Sit 4  Minimal assistance   Additional items Increased time required; Impulsive;Verbal cues   Additional Comments w/ RW   Ambulation/Elevation   Gait pattern Inconsistent wagner; Foward flexed; Short stride; Excessively slow  (VCs to optimize posture while amb )   Gait Assistance 4  Minimal assist   Additional items Assist x 1;Verbal cues; Tactile cues   Assistive Device Rolling walker   Distance 75ft+ 75ft with stair negotiaion inbetween   Stair Management Assistance 4  Minimal assist   Additional items Assist x 1;Verbal cues; Tactile cues; Increased time required   Stair Management Technique Two rails; Step to pattern; Foreward   Number of Stairs 3   Balance Static Sitting Fair   Dynamic Sitting Fair   Static Standing Fair -   Dynamic Standing Poor +   Ambulatory Poor +   Activity Tolerance   Activity Tolerance Patient tolerated treatment well   Medical Staff Made Aware Co-eval performed with OT due to patients complexity   Nurse Made Aware RN cleared   Assessment   Prognosis Good   Problem List Decreased strength;Decreased endurance; Impaired balance;Decreased mobility; Impaired judgement;Decreased safety awareness; Impaired hearing;Obesity   Assessment The pt is a 80yof female admitted to [facility] s/p fall w/o LOC  The pt was dx with SDH (subdural hematoma), bilateral closed nasal bone fractures and facial lacerations  Pts comorbidities affecting the POC include anxeity, hard of hearing, and atrial fibrillation and personal factors of ANISA, advanced age, lives alone, and ? level of assistance at home at all times  Pt clinical presentation is unstable/unpredictable due to ongoing monitoring of telemetry, abnormal lab values, ongoing medical management for primary dx, decreased activity tolerance compared to baseline, and fall risk  Pt presents with strength, endurance, balance, and gait deficits demonstrating (S) with bed mobility and min (A) x 1 with transfers, ambulation with RW and stair negotiation  Pt is recommended for continued skilled therapy while in the hospital to increase functional independence and rehab is recommended upon D/C when medically cleared and pending progress; PT will follow  Barriers to Discharge Decreased caregiver support   Goals   Patient Goals to feel better   [facility] Expiration Date 01/06/23   Short Term Goal #1 7-10 days: The pt will be able to perform bed mobility Mod (I) to return to premorbid level and have functional independence in her home  Pt will be able to perform transfers Mod (I) to increase her functional independence in the home   Pt will be able to ambulate 300ft with Mod (I) and RW in order to navigate within her home and community  The pt will be able to navigate 2 steps with handrail and (S) to get in and out of the house  The pt will be independent with HEP  PT Treatment Day 0   Plan   Treatment/Interventions Functional transfer training;LE strengthening/ROM; Elevations; Therapeutic exercise; Endurance training;Bed mobility;Gait training;Equipment eval/education;Spoke to nursing;OT   PT Frequency Other (Comment)  (2-4x/wk)   Recommendation   PT Discharge Recommendation Post acute rehabilitation services   Equipment Recommended Doyne Gosselin  (pt reports having)   Miky Willis Recommended Wheeled walker   [address]   Turning in Bed Without Bedrails 3   Lying on Back to Sitting on Edge of Flat Bed 3   Moving Bed to Chair 3   Standing Up From Chair 3   Walk in Room 3   Climb 3-5 Stairs 3   Basic Mobility Inpatient Raw Score 18   Basic Mobility Standardized Score 41 05   Highest Level Of Mobility   JH-HLM Goal 6: Walk 10 steps or more   JH-HLM Achieved 7: Walk 25 feet or more   Modified Alberto Scale   Modified Posey Scale 3   End of Consult   Patient Position at End of Consult Bedside chair;Bed/Chair alarm activated; All needs within reach         [facility], SPT

## 2022-12-27 NOTE — ASSESSMENT & PLAN NOTE
-OMS consulted, appreciate input  -No antibiotics at this time  -Further work-up otherwise  -Nonoperative management  -Patient follow-up

## 2022-12-27 NOTE — ASSESSMENT & PLAN NOTE
No results found for: HGBA1C    Recent Labs     12/27/22  1118 12/27/22  1649   POCGLU 214* 156*       Blood Sugar Average: Last 72 hrs:  (P) 185     - Continue current medication regimen   - Outpatient follow-up with PCP

## 2022-12-27 NOTE — ASSESSMENT & PLAN NOTE
- Follows outpatient with vascular surgery  - recent duplex completed on 10/22   - will need outpatient follow-up with vascular surgery  - holding aspirin and Xarelto in the setting of traumatic brain injury

## 2022-12-27 NOTE — OCCUPATIONAL THERAPY NOTE
Occupational Therapy Evaluation     Patient Name: Clarita Hunter  RLZIP'G Date: 12/27/2022  Problem List  Principal Problem:    Closed fracture of nasal bone  Active Problems:    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, without long-term current use of insulin (HCC)    Secondary hyperparathyroidism of renal origin ([facility] )    Primary hypertension    PAD (peripheral artery disease) (HCC)    Chronic congestive heart failure (HCC)    Chronic deep vein thrombosis (DVT) of proximal vein of lower extremity (HCC)    Other hyperlipidemia    SDH (subdural hematoma)    Past Medical History  Past Medical History:   Diagnosis Date    Anxiety     HLD (hyperlipidemia)      Past Surgical History  Past Surgical History:   Procedure Laterality Date    APPENDECTOMY           12/27/22 0811   OT Last Visit   OT Visit Date 12/27/22   Note Type   Note type Evaluation   Pain Assessment   Pain Assessment Tool 0-10   Pain Score No Pain   Restrictions/Precautions   Weight Bearing Precautions Per Order No   Other Precautions Cognitive; Chair Alarm; Bed Alarm;Multiple lines; Fall Risk;Pain   Home Living   Type of [address] [place] [address] One level;Stairs to enter with rails  (2 ANISA)   Bathroom Shower/Tub Tub/shower unit   Bathroom Toilet Standard   Bathroom Accessibility Accessible   Home Equipment Walker   Additional Comments + USE OF RW AT BASELINE   Prior Function   Level of [place] Independent with ADLs; Needs assistance with IADLS   Lives With (S)  Alone   Receives Help From Personal care attendant   IADLs Independent with medication management; Family/Friend/Other provides transportation; Family/Friend/Other provides meals   Falls in the last 6 months 1 to 4  (2)   Vocational Retired   Lifestyle   Autonomy PT REPORTS BEING I WITH ADLS AND HAS ASSIST WITH IADLS FROM CAREGIVER   Reciprocal Relationships PT HAS CAREGIVERS COME IN 9-12 AND 4-7 WHO ASSIST WITH MEALS AND HEAVY CLEANING   PT REPORTS BEING INDEPENDENT WITH MEDICATION MANAGEMENT   Service to Others RETIRED   Intrinsic Gratification ENJOYS WATCHING TV   ADL   Eating Assistance 5  Supervision/Setup   Grooming Assistance 4  Minimal Assistance   UB Bathing Assistance 4  Minimal Assistance   LB Bathing Assistance 3  Moderate Assistance   UB Dressing Assistance 4  Minimal Assistance    [facility] 3  Moderate [address]  3  Moderate Assistance   Functional Assistance 3  Moderate Assistance   Bed Mobility   Supine to Sit 5  Supervision   Additional items Increased time required   Sit to Supine Unable to assess  (PT LEFT OOB WITH ALARM ON AND ALL NEEDS IN REACH)   Transfers   Sit to Stand 4  Minimal assistance   Additional items Assist x 1; Increased time required;Verbal cues   Stand to Sit 4  Minimal assistance   Additional items Assist x 1; Increased time required;Verbal cues   Functional Mobility   Functional Mobility 4  Minimal assistance   Additional items Rolling walker   Balance   Static Sitting Fair +   Static Standing Fair -   Ambulatory Poor +   Activity Tolerance   Activity Tolerance Patient tolerated treatment well;Patient limited by fatigue   Medical Staff Made Aware PT SEEN FOR CO-SESSION WITH SKILLED PHYSICAL THERAPIST 2' CLINICALLY UNSTABLE PRESENTATION, POLY-TRAUMATIC INJURIES, NEW PRECAUTIONS/LIMITATIONS, LIMITED ACTIVITY TOLERANCE AND PRESENT IMPAIRMENTS WHICH ARE A REGRESSION FROM THE PT'S BASELINE AND IMPACTING OVERALL OCCUPATIONAL PERFORMANCE  Nurse Made Aware APPROPRIATE TO SEE   RUE Assessment   RUE Assessment   (SIGNIFICANTLY LIMITED SHOULDER ROM AT BASELINE)   LUE Assessment   LUE Assessment   (SIGNIFICANTLY LIMITED SHOULDER ROM AT BASELINE)   Cognition   Overall Cognitive Status [facility]   Arousal/Participation Alert; Cooperative   Attention Attends with cues to redirect   Orientation Level Oriented to person;Oriented to place;Oriented to situation   Memory Decreased recall of precautions;Decreased recall of recent events;Decreased short term memory   Following Commands Follows one step commands with increased time or repetition   Comments PT IS PLEASANT AND COOPERATIVE  LIMITED INSIGHT INTO DEFICITS  ALARM ON FOR SAFETY  Assessment   Limitation Decreased ADL status; Decreased cognition;Decreased Safe judgement during ADL;Decreased endurance;Decreased self-care trans;Decreased high-level ADLs   Prognosis Good   Assessment 87 YO Female SEEN FOR INITIAL OCCUPATIONAL THERAPY EVALUATION FOLLOWING ADMISSION TO [facility] S/P FALL RESULTING IN B/L NASAL BONE FX, FACIAL LACERATIONS AND SDH  PROBLEMS LIST INCLUDES ANXIETY, HLD, VERY [place], A-FIB, DM, PAD, CHF AND HTN  PT IS FROM HOME ALONE WITH DAILY CAREGIVERS WHERE SHE REPORTS BEING INDEPENDENT WITH ADLS/LT IADLS AND HAS ASSIST WITH HEAVY IADLS INCLUDING MEALS AND CLEANING PTA  PT CURRENTLY REQUIRES OVERALL MIN-MOD A WITH ADLS, AND MIN A WITH TRANSFERS /FUNCTIONAL MOBILITY WITH USE OF RW  PT IS LIMITED 2' PAIN, FATIGUE, IMPAIRED BALANCE, FALL RISK , LIMITED SAFETY AWARENESS/INSIGHT/JUDGEMENT, OVERALL WEAKNESS/DECONDITIONING , LIMITED FAMILY/FRIEND SUPPORT  and OVERALL LIMITED ACTIVITY TOLERANCE  PT EDUCATED ON DEEP BREATHING TECHNIQUES T/O ACTIVITY, SLOWING OF PACE, ENERGY CONSERVATION TECHNIQUES FOR CARRY OVER UPON D/C, INCREASED SUPPORT and CONTINUE PARTICIPATION IN SELF-CARE/MOBILITY WITH STAFF [facility]   The patient's raw score on the AM-PAC Daily Activity inpatient short form is 15, standardized score is 34 69, less than 39 4  Patients at this level are likely to benefit from discharge to post-acute rehabilitation services  Please refer to the recommendation of the Occupational Therapist for safe discharge planning  FROM AN OCCUPATIONAL THERAPY PERSPECTIVE, PT WOULD BENEFIT FROM ADDITIONAL OT SERVICES IN AN INPT REHAB SETTING UPON D/C  WILL CONT TO FOLLOW TO ADDRESS THE BELOW DESCRIBED GOALS     Goals   Patient Goals TO GET BETTER   LTG Time Frame 10-14   Long Term Goal #1 SEE BELOW   Plan   Treatment Interventions ADL retraining;Functional transfer training; Endurance training;Cognitive reorientation;Patient/family training;Equipment evaluation/education; Compensatory technique education; Activityengagement; Energy conservation   Goal Expiration Date 01/10/23   OT Frequency   (2-4X/WK)   Recommendation   OT Discharge Recommendation Post acute rehabilitation services   AM-PAC Daily Activity Inpatient   Lower Body Dressing 2   Bathing 2   Toileting 2   Upper Body Dressing 3   Grooming 3   Eating 3   Daily Activity Raw Score 15   Daily Activity Standardized Score (Calc for Raw Score >=11) 34 69   AM-PAC Applied Cognition Inpatient   Following a Speech/Presentation 3   Understanding Ordinary Conversation 4   Taking Medications 3   Remembering Where Things Are Placed or Put Away 3   Remembering List of 4-5 Errands 3   Taking Care of Complicated Tasks 3   Applied Cognition Raw Score 19   Applied Cognition Standardized Score 39 77   Modified [place] Scale   Modified [place] Scale 4       OCCUPATIONAL THERAPY GOALS TO BE MET WITHIN 10-14 DAYS:    -Pt will complete additional cognitive assessment with 100% attention to task in order to assist with safe d/c plan  -Pt will follow 100% simple 2-step commands and be A&O x4 consistently with environmental cues to increase participation in functional activities  -Pt will increase bed mobility to MOD I to participate in functional activities with G tolerance and balance  -Pt will improve functional mobility and transfers to MOD I on/off all surfaces w/ G balance/safety including toileting   -Pt will participate in lt grooming task with MOD I after set-up standing at sink ~3-5 minutes with G safety and balance     -Pt will increase independence in all ADLS to MOD I with G balance sitting upright in chair   -Pt will improve activity tolerance to G for 30 min txment sessions w/ G carry over of learned energy conservation techniques   -Pt will improve independence in lt homemaking activities to MOD I without requiring cues for safety   -Pt will demonstrate G carryover of learned safety techniques and proper body mechanics in functional and leisure activities with use of DME        Documentation completed by MARYSOL Ron, OTR/L  [facility] Certified ID# [id number]

## 2022-12-27 NOTE — TELEPHONE ENCOUNTER
pc  From JOSHUA Garcia  Deisi Saeid, looking at rehab due to fall, due to subdural  neurosurg  Tem wants to hold Xarelto and aspirin for  2  Weeks, okay to do so

## 2022-12-27 NOTE — CASE MANAGEMENT
Case Management Assessment & Discharge Planning Note    Patient name Lakeshia Mata  Location [facility] 610/[facility] 527-25 MRN [id number]  : 1934 Date 2022       Current Admission Date: 2022  Current Admission Diagnosis:Closed fracture of nasal bone   Patient Active Problem List    Diagnosis Date Noted   • Closed fracture of nasal bone 2022   • 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, without long-term current use of insulin ([facility] ) 2022   • Secondary hyperparathyroidism of renal origin ([facility] ) 2022   • Primary hypertension 2022   • PAD (peripheral artery disease) ([facility] ) 2022   • Chronic congestive heart failure ([facility] ) 2022   • Chronic deep vein thrombosis (DVT) of proximal vein of lower extremity ([facility] ) 2022   • Other hyperlipidemia 2022   • SDH (subdural hematoma) 2022      LOS (days): 1  Geometric Mean LOS (GMLOS) (days):   Days to GMLOS:     OBJECTIVE:    Risk of Unplanned Readmission Score: 16 65         Current admission status: Inpatient       Preferred Pharmacy:   [address] [place] [address] [id number] [address]  Phone: [phone] Fax: [phone]    Primary Care Provider:  Marco A Benson MD    Primary Insurance: [facility]  Secondary Insurance:     ASSESSMENT:  [address] [facility]   Primary Phone: [phone] (Mobile)               Advance Directives  Does patient have a Health Care POA?: Yes  Does patient have Advance Directives?: Yes  Advance Directives: Living will, Power of  for health care  Primary Contact: José Crowe    Patient Information  Admitted from[de-identified] Home  Mental Status: Alert  During Assessment patient was accompanied by: Not accompanied during assessment  Assessment information provided by[de-identified] Patient  Primary Caregiver: Self  Support Systems: Private Caregivers, Family members  [place] of Residence: [address] [facility] do you live in?: [address] entry access options  Select all that apply : Stairs  Number of steps to enter home  : 1  Do the steps have railings?: Yes  Type of Current Residence: [place]  In the last 12 months, was there a time when you were not able to pay the mortgage or rent on time?: No  In the last 12 months, how many places have you lived?: 1  In the last 12 months, was there a time when you did not have a steady place to sleep or slept in a shelter (including now)?: No  Homeless/housing insecurity resource given?: N/A  Living Arrangements: Lives Alone  Is patient a ?: No    Activities of Daily Living Prior to Admission  Functional Status: Independent  Completes ADLs independently?: Yes  Ambulates independently?: Yes  Does patient currently own DME?: Yes  What DME does the patient currently own?: Carlos Manuel Rosen  Does patient have a history of Outpatient Therapy (PT/OT)?: No  Does the patient have a history of Short-Term Rehab?: Yes ([facility])  Does patient have a history of HHC?: Yes  Does patient currently have [address]?: Yes    Current Home Health Care  Type of Current Home Care Services: Home health aide, [address][de-identified] Other (please enter name in comment) ([address] [place] [address])  [address] Provider[de-identified] PCP    Patient Information Continued  Income Source: Pension/[employer]  Does patient have prescription coverage?: Yes  Within the past 12 months, you worried that your food would run out before you got the money to buy more : Never true  Within the past 12 months, the food you bought just didn't last and you didn't have money to get more : Never true  Food insecurity resource given?: N/A  Does patient receive dialysis treatments?: No  Does patient have a history of substance abuse?: No  Does patient have a history of Mental Health Diagnosis?: No    Means of Transportation  Means of Transport to \[facility] [place] [facility]\""[de-identified] Family transport  In the past 12 months, has lack of transportation kept you from medical appointments or from getting medications?: No  In the past 12 months, has lack of transportation kept you from meetings, work, or from getting things needed for daily living?: No  Was application for public transport provided?: N/A        DISCHARGE DETAILS:    Discharge planning discussed with[de-identified] Patient  Freedom of Choice: Yes  Comments - Freedom of Choice: Discussed FOC  CM contacted family/caregiver?: Yes  Were Treatment Team discharge recommendations reviewed with patient/caregiver?: Yes  Did patient/caregiver verbalize understanding of patient care needs?: Yes  Were patient/caregiver advised of the risks associated with not following Treatment Team discharge recommendations?: Yes    Contacts  Patient Contacts: Brett Riedel  Relationship to Patient[de-identified] Family  Contact Method: Phone  Reason/Outcome: Continuity of Care, Emergency Contact, Discharge Planning     Other Referral/Resources/Interventions Provided:  Interventions: Short Term Rehab, SNF  Referral Comments: Different levels of rehab explained to patient and nephew, Abdoul Cevallos, requesting referral to [address] [place] [address], referral entered in [address] reviewed d/c planning process including the following: identifying help at home, patient preference for d/c planning needs, Discharge Lounge, [facility] Meds to Bed program, availability of treatment team to discuss questions or concerns patient and/or family may have regarding understanding medications and recognizing signs and symptoms once discharged  CM also encouraged patient to follow up with all recommended appointments after discharge  Patient advised of importance for patient and family to participate in managing patient’s medical well being  Information obtained from patient and nephew, Abdoul Cevallos, who is patient's POA  Patient lives in a 55+ community, has 1 ANISA home, all rooms are on 1 level    Has a walker at home, which she uses at all times  Fully vaccinated for covid, with 2 boosters noted  Has caregivers through [facility] 7 days weekly from [phone] and [phone]

## 2022-12-27 NOTE — PLAN OF CARE
Problem: PHYSICAL THERAPY ADULT  Goal: Performs mobility at highest level of function for planned discharge setting  See evaluation for individualized goals  Description: Treatment/Interventions: Functional transfer training, LE strengthening/ROM, Elevations, Therapeutic exercise, Endurance training, Bed mobility, Gait training, Equipment eval/education, Spoke to nursing, OT  Equipment Recommended: José Ryan (pt reports having)       See flowsheet documentation for full assessment, interventions and recommendations  Note: Prognosis: Good  Problem List: Decreased strength, Decreased endurance, Impaired balance, Decreased mobility, Impaired judgement, Decreased safety awareness, Impaired hearing, Obesity  Assessment: The pt is a 80yof female admitted to [facility] s/p fall w/o LOC  The pt was dx with SDH (subdural hematoma), bilateral closed nasal bone fractures and facial lacerations  Pts comorbidities affecting the POC include anxeity, hard of hearing, and atrial fibrillation and personal factors of ANISA, advanced age, lives alone, and ? level of assistance at home at all times  Pt clinical presentation is unstable/unpredictable due to ongoing monitoring of telemetry, abnormal lab values, ongoing medical management for primary dx, decreased activity tolerance compared to baseline, and fall risk  Pt presents with strength, endurance, balance, and gait deficits demonstrating (S) with bed mobility and min (A) x 1 with transfers, ambulation with RW and stair negotiation  Pt is recommended for continued skilled therapy while in the hospital to increase functional independence and rehab is recommended upon D/C when medically cleared and pending progress; PT will follow  Barriers to Discharge: Decreased caregiver support     PT Discharge Recommendation: Post acute rehabilitation services    See flowsheet documentation for full assessment

## 2022-12-27 NOTE — CONSULTS
Consultation - Geriatric Medicine   Nereida Steven 80 y o  female MRN: [id number]  Unit/Bed#: [facility] 610-01 Encounter: [id number]      Assessment/Plan     Syncope and collapse  -admitted to Trauma  -In setting of chronic systemic anticoagulation with Xarelto  -CTH on initial presentation reports left-sided anterior subdural hematoma  -No notable electrolyte derangements to account for etiology  -EKG sinus rhythm with first-degree AV block  -Monitoring on telemetry  -Echocardiogram pending  -Consider checking orthostatics     Subdural hematoma  -S/p fall as above  -In setting of chronic systemic anticoagulation with Xarelto and aspirin -reversed with DDAVP and tranexamic acid on admission, remain on hold  -CTH on initial presentation reported subdural hematoma along anterior falx approximately 4 mm, no mass-effect or midline shift  -Repeat CTH reports stable SDH  -Systemic AC/AP remain on hold, DVT prophylaxis with Lovenox resumed yesterday  -Neurochecks per protocol  -Nsx on consult    Bilateral nasal bone fractures  -CT facial bones on admit reports bilateral nasal fractures  -OMFS on consult, no inpatient surgical intervention anticipated, recommend 7-day course abx  -Continue acute pain control and sinus precautions    Facial lacerations  -Continue local wound cares as per trauma     Acute blood loss anemia  -evidenced by >2g drop in Hb from 10 7 to 8 4  -Continue to monitor for a ongoing cute blood loss  -PRBC/IVF as indicated and appropriate    Acute pain due to trauma   -Currently well controlled with Tylenol  -encourage addition of non-pharmacologic pain treatment including ice and frequent repositioning as appropriate  -recommend  bowel regimen to prevent and treat constipation due to increased risk with acute pain and opiate pain medications    Cognitive screening  -Alert and oriented, denies memory or cognitive concerns  -Requires assistance with some ADLs due to physical debilities, some private home health assistance daily at baseline  -No prior cognitive testing on record for review  -Increased risk of cognitive/dementia due to dementia and first degree relative (sister)  -CTH obtained at admission personally reviewed, in addition to acute findings at least moderate chronic traumatic changes also appreciated  -No recent TSH or B12, will check with next routine labs  -Encourage use of sensory assistive devices such as corrective lenses and hearing aids at all appropriate time to reduce risk sensory impairment contributing to isolation, confusion, encephalopathy and what precipitous cognitive decline  -Encourage patiently remain physically, socially, and cognitively active and engaged to maintain cognitive acuity    Atrial fibrillation  -Home regimen includes rate control with Coreg and amiodarone, anticoagulated with Xarelto  -Xarelto on hold due to subdural hematoma as above  -Continue close outpatient follow-up with Cardiology    Impaired Vision  -recommend use of corrective lenses at all appropriate times  -encourage adequate lighting and encourage use of assistance with ambulation  -keep personal belongings close to person to avoid reaching  -encourage appropriate footwear at all times  -Consider large font for printed materials provided to patient     Impaired Hearing  -Encourage use of hearing aids at all appropriate times  -encourage providers and caregivers to speak slowly and clearly directly to patient  -minimize background noise to encourage patient engagement  -consider use of hearing amplifier to reduce risk of straining to hear if hearing aids are insufficient  -encourage use of teach back method to ensure clear communication    Impaired mastication  -Requires use of dentures-encourage use at all appropriate times, recommend clearance by OMFS prior to resumption due to facial fractures   -ensure meal consistency appropriate for abilities  -continue aspiration precautions    Delirium precautions  -Patient is high risk of delirium due to age, fall, subdural hematoma, traumatic injury, acute pain, hospitalization  -Initiate delirium precautions  -maintain normal sleep/wake cycle  -minimize overnight interruptions, group overnight vitals/labs/nursing checks as possible  -dim lights, close blinds and turn off tv to minimize stimulation and encourage sleep environment in evenings  -ensure that pain is well controlled   -monitor for fecal and urinary retention which may precipitate delirium  -encourage early mobilization and ambulation with assistance once cleared to safely do so  -provide frequent reorientation and redirection as indicated and appropriate  -Minimize use of medications which may precipitate or worsen delirium such as tramadol, benzodiazepine, anticholinergics, and benadryl whenever possible  -encourage hydration and nutrition   -redirect unwanted behaviors as first line    Deconditioning/debility/frailty  -Clinical frailty scale stage V, moderately frail  -Multifactorial due to age, ambulatory dysfunction with recurrent falls, atrial fibrillation and multiple additional chronic medical comorbidities now with syncope and collapse superimposed in elderly elderly individual with limited physiologic and metabolic reserve  -Encourage well-balanced nutrition, consider nutritional supplements between meals if oral intake is poor  -Continue to optimize chronic conditions and address acute metabolic derangements as arise  -Monitor for and treat any anxiety/mood/depression symptoms as may impact patient's response to therapies as well as overall sense of wellbeing and quality of life  -Continue psychosocial supports  -Ensure treatment and interventions align with patient's wishes and goals of care     Home medication review  [facility] DR SHARRON MORE [phone]:    KCl 10 M EQ daily  Amiodarone 200 mg daily  Amlodipine 2 5 mg daily  Aspirin 81 mg daily  Lipitor 80 mg daily  Coreg 25 mg twice daily  Bentyl 10 mg twice daily as needed  Xarelto 15 mg daily  Torsemide 20 mg daily    Care condition: rounded with Sania Lyon (RN)    History of Present Illness   Physician Requesting Consult: Enoch Blanc,*  Reason for Consult / Principal Problem: Fall  Hx and PE limited by: N/A  Additional history obtained from: Chart review and patient evaluation    HPI: Yumiko Roche is a 80y o  year old female with CHF, chronic DVT, PAD, hyperlipidemia, hypertension, secondary hyperparathyroidism, atrial fibrillation, and type 2 diabetes who is admitted to the trauma service with syncope and collapse found to have subdural hematoma and nasal fractures on image imaging, she is being seen in consultation by Geriatrics for high risk developing delirium during hospitalization  Jose Paul is seen and examined at the bedside where she is lying resting, she reports some soreness in her left face but otherwise feels in her usual state health  She explained that she is admitted following a syncopal episode on 12/25/22 which occurred suddenly without prodromal symptoms, she denied recent illness and states she was in her usual state of health, denies history any similar previous events  She was with family at the time of the event however they were in the other room, they heard the noise of her falling and found her down on the ground, they were able to get emergency services to get assistance  Prior to admission she was riding home alone independently since the passing of her sister this past March whom she was the primary caregiver  Since her sister passed she has been living home alone with private in-home caregivers for few hours every morning and evening, they assist with meal prep and other things around the home  She reports being otherwise independent with ADLs and IADLs, she denies memory or cognitive concerns    She reports use of walker for ambulation at baseline and endorses at least one additional fall within the past year at which time she thinks she tripped as she was getting up to use restroom in the dark  She requires use of glasses, she is incredibly hard of hearing requiring use of hearing aids with only minimal improvement, she hears better on left, she does require use of dentures which she states not accompany her to hospital on admission  Inpatient consult to Gerontology  Consult performed by: Marcos Rhodes DO  Consult ordered by: Tu Horne MD        Review of Systems   Constitutional: Negative  Negative for chills and fever  HENT: Positive for dental problem (Wears upper and lower dentures) and hearing loss (Markedly impaired, mild improvement with hearing aid, hears better on left)  Negative for trouble swallowing  Eyes: Positive for visual disturbance (Wears glasses)  Respiratory: Negative  Negative for shortness of breath  Cardiovascular: Negative  Negative for chest pain  Gastrointestinal: Negative  Genitourinary: Negative  Musculoskeletal: Positive for gait problem (Utilizes walker at baseline)  Skin: Negative  Neurological: Negative for dizziness, weakness, light-headedness, numbness and headaches  Hematological: Bruises/bleeds easily  Psychiatric/Behavioral: Negative  Negative for confusion  All other systems reviewed and are negative      Historical Information   Past Medical History:   Diagnosis Date   • Anxiety    • HLD (hyperlipidemia)      Past Surgical History:   Procedure Laterality Date   • APPENDECTOMY       Social History   Social History     Substance and Sexual Activity   Alcohol Use Not Currently     Social History     Substance and Sexual Activity   Drug Use Never     Social History     Tobacco Use   Smoking Status Never   Smokeless Tobacco Never     Family History:   Family History   Problem Relation Age of Onset   • Heart disease Mother      Meds/Allergies   all current active meds have been reviewed    Objective     Intake/Output Summary (Last 24 hours) at 12/27/2022 6064  Last data filed at 12/27/2022 0503  Gross per 24 hour   Intake 100 ml   Output 900 ml   Net -800 ml     Invasive Devices     Peripheral Intravenous Line  Duration           Peripheral IV 12/25/22 Dorsal (posterior); Right Forearm 1 day              Physical Exam  Vitals and nursing note reviewed  Constitutional:       General: She is not in acute distress  Appearance: She is obese  She is not toxic-appearing  HENT:      Head: Normocephalic  Comments: Extensive scattered facial ecchymoses with some left-sided swelling of the face/lip     Ears:      Comments: L hearing aid in place at time of evaluation, no hearing aid on right     Nose: Nose normal       Mouth/Throat:      Mouth: Mucous membranes are dry  Comments: Edentulous, dentures not in place at time evaluation  Eyes:      Comments: Conjunctive injected bilaterally    Left eyelid somewhat swollen   Neck:      Comments: Phonation normal  Cardiovascular:      Rate and Rhythm: Normal rate  Pulses: Normal pulses  Pulmonary:      Effort: Pulmonary effort is normal  No respiratory distress  Abdominal:      Palpations: Abdomen is soft  Tenderness: There is no abdominal tenderness  Musculoskeletal:      Cervical back: Neck supple  Right lower leg: No edema  Left lower leg: No edema  Skin:     General: Skin is warm and dry  Coloration: Skin is pale  Comments: Scattered ecchymoses of face and upper extremities   Neurological:      Mental Status: She is alert  Comments: Awake and alert, oriented, answers questions appropriately, speech clear fluent   Psychiatric:         Mood and Affect: Mood normal          Behavior: Behavior normal       Comments: Pleasant cooperative       Lab Results:     I have personally reviewed pertinent lab results      I have personally reviewed the following imaging study reports in PACS:    12/25/22-chest x-ray  12/26/22-CT head without contrast, CT C-spine without contrast, CT facial bones without contrast, CT chest abdomen pelvis with contrast, CT head without contrast    Therapies:   PT: Pending  OT: Pending    VTE Prophylaxis: Enoxaparin (Lovenox)    Code Status: Level 3 - DNAR and DNI  Advance Directive and Living Will:      Power of :    POLST:      Family and Social Support:   Living Arrangements: Lives Alone  Support Systems: Private Caregivers  Type of Current Residence: Private residence  [address]: Yes  Type of Current Home Care Services:  Attendant    Goals of Care: determine etiology of syncope

## 2022-12-27 NOTE — UTILIZATION REVIEW
Initial Clinical Review    Admission: Date/Time/Statement:   Admission Orders (From admission, onward)     Ordered        12/26/22 0139  Inpatient Admission  Once                      Orders Placed This Encounter   Procedures   • Inpatient Admission     Standing Status:   Standing     Number of Occurrences:   1     Order Specific Question:   Level of Care     Answer:   Level 2 Stepdown / HOT [14]     Order Specific Question:   Estimated length of stay     Answer:   More than 2 Midnights     Order Specific Question:   Certification     Answer:   I certify that inpatient services are medically necessary for this patient for a duration of greater than two midnights  See H&P and MD Progress Notes for additional information about the patient's course of treatment  ED Arrival Information     Expected   -    Arrival   12/25/2022 23:44    Acuity   Emergent            Means of arrival   Ambulance    Escorted by   [email or web address]    Service   Trauma    Admission type   Emergency            Arrival complaint   Fall           Chief Complaint   Patient presents with   • Trauma     Fall on thinners, (-) LOC, nose bleeding        Initial Presentation: 80 y o  female who presented by EMS to [address] [place] [address] ED  Inpatient admission for evaluation and treatment of SDH, closed fracture of nasal bone after syncopal episode  PMHx: T2DM, afib (on Xarelto), CHF, CKD, HLD, anxiety  Presented after syncopal episode w/ epistaxis and multiple lacerations  On exam, GCS 15, blood in b/l nares actively bleeding, 1 cm lac on R lower lip, L periorbital ecchymosis and swelling, LUQ tenderness, 3 cm lac to avilez surface of L hand, 0 5 cm lac to L eyebrow, multiple bruises on b/l LE in stages of healing, neurovascularly intact  CTH showed SDH and b/l nasal bone fractures  Plan: compression, afrine, TXA for epistaxis, repeat CTH, q1h neuro checks, DDAVP and KCentra, IV ABX   OMFS & Neurosurgery consulted  Neurosurgery Consult: Pt w/ 4 mm anterior falx SDH  Plan: close monitoring of neuro status, keppra, mobilize PT/OT, risk vs benefit discussion for AC/AP since pt prone to frequent falls  OMFS Consult: Pt w/ generalized facila edema, no significant nasal airway obstruction  No indication for surgical intervention at this time  IV ABX, analgesics, suction at bedside, ice to face, sinus precautions  Date: 12/27/22   Day 2: On exam, GCS 15, generalized bruising, limited ROM in extremities  Repeat CTH stable, start DVT ppx w/ Lovenox  Continue close monitoring of neuro status  Supportive care, IV ABX, continue PTA meds  HOB 30 degrees or higher  Sinus precautions  Trend labs, replete electrolytes as needed  q4h neuro checks       ED Triage Vitals   Temperature Pulse Respirations Blood Pressure SpO2   12/25/22 2349 12/25/22 2349 12/25/22 2345 12/25/22 2349 12/25/22 2349   98 2 °F (36 8 °C) 62 (!) 0 135/91 98 %      Temp Source Heart Rate Source Patient Position - Orthostatic VS BP Location FiO2 (%)   12/25/22 2349 12/25/22 2349 12/26/22 0030 12/26/22 0800 --   Tympanic Monitor Lying Right arm       Pain Score       12/26/22 2000       No Pain          Wt Readings from Last 1 Encounters:   12/27/22 81 6 kg (180 lb)     Additional Vital Signs:   Date/Time Temp Pulse Resp BP MAP (mmHg) SpO2 O2 Device   12/27/22 14:44:52 98 4 °F (36 9 °C) 60 16 142/52 82 95 % --   12/27/22 10:27:55 97 4 °F (36 3 °C)  60 16 164/72 103 97 % --   12/27/22 0945 -- 56 -- 128/55 -- -- --   12/27/22 0855 -- -- -- -- -- 95 % None (Room air)   12/27/22 07:44:19 97 9 °F (36 6 °C) 56 16 128/55 79 95 % --   12/27/22 02:30:53 98 3 °F (36 8 °C) 55 18 127/56 80 96 % --   12/26/22 22:32:38 98 8 °F (37 1 °C) 58 -- 126/56 79 95 % --   12/26/22 19:32:47 98 7 °F (37 1 °C) 62 18 164/64 97 96 % --   12/26/22 1610 -- 60 18 159/70 100 97 % --   12/26/22 1230 -- 62 18 189/74 Abnormal  107 97 % --   12/26/22 1130 -- 62 17 167/68 98 96 % -- 12/26/22 1030 -- 62 16 166/72 104 96 % --   12/26/22 0930 -- 64 19 143/67 96 95 % --   12/26/22 0900 -- 64 17 133/62 -- 95 % --   12/26/22 0800 -- 64 18 149/67 -- 94 % None (Room air)   12/26/22 0700 -- 66 19 -- -- 92 % None (Room air)   12/26/22 0630 -- 64 19 167/74 106 98 % --   12/26/22 0600 -- 68 20 171/73 Abnormal  -- 93 % None (Room air)   12/26/22 0500 -- 66 20 -- -- 96 % None (Room air)   12/26/22 0415 -- 66 20 -- -- 96 % --   12/26/22 0410 -- 64 25 Abnormal  -- -- 98 % --   12/26/22 0405 -- 62 15 -- -- 96 % --   12/26/22 0400 -- 64 22 188/76 Abnormal  -- 97 % None (Room air)   12/26/22 0355 -- 64 18 -- -- 95 % --   12/26/22 0350 -- 64 18 -- -- 95 % --   12/26/22 0345 -- 62 15 -- -- 97 % --   12/26/22 0340 -- 62 18 -- -- 96 % --   12/26/22 0335 -- 64 18 -- -- 95 % --   12/26/22 0330 -- 62 19 187/74 Abnormal  -- 97 % None (Room air)   12/26/22 0325 -- 60 17 -- -- 96 % --   12/26/22 0320 -- 60 19 -- -- 95 % --   12/26/22 0315 -- 62 24 Abnormal  -- -- 96 % --   12/26/22 0310 -- 62 24 Abnormal  -- -- 97 % --   12/26/22 0305 -- 62 28 Abnormal  -- -- 97 % --   12/26/22 0300 -- 60 20 -- -- 97 % None (Room air)   12/26/22 0255 -- 60 34 Abnormal  -- -- 97 % --   12/26/22 0250 -- 60 21 -- -- 95 % --   12/26/22 0245 -- 60 17 -- -- 97 % None (Room air)   12/26/22 0240 -- 60 36 Abnormal  -- -- 96 % --   12/26/22 0235 -- 60 16 -- -- 97 % --   12/26/22 0230 -- 60 20 -- -- 98 % None (Room air)   12/26/22 0225 -- 60 23 Abnormal  -- -- 98 % --   12/26/22 0220 -- 58 18 -- -- 97 % --   12/26/22 0215 -- 58 22 188/77 Abnormal  -- 95 % None (Room air)   12/26/22 0210 -- 58 30 Abnormal  -- -- 96 % --   12/26/22 02:07:33 -- -- -- 194/80 Abnormal  -- -- --   12/26/22 0206 -- -- -- -- -- 94 % --   12/26/22 0205 -- 68 40 Abnormal  -- -- -- --   12/26/22 0200 -- 58 20 184/70 Abnormal  -- 94 % None (Room air)   12/26/22 0155 -- 60 21 -- -- 96 % --   12/26/22 0150 -- 60 32 Abnormal  -- -- 95 % --   12/26/22 0145 -- 60 26 Abnormal  -- -- 97 % --   12/26/22 0140 -- 60 29 Abnormal  -- -- 97 % --   12/26/22 0135 -- 60 31 Abnormal  -- -- 98 % --   12/26/22 0130 -- 58 48 Abnormal  -- -- 96 % --   12/26/22 0125 -- 58 23 Abnormal  -- -- 96 % --   12/26/22 0120 -- 58 32 Abnormal  -- -- 96 % --   12/26/22 0115 -- 58 32 Abnormal  -- -- 98 % --   12/26/22 0110 -- 56 38 Abnormal  -- -- 97 % --   12/26/22 0105 -- 56 31 Abnormal  -- -- 99 % --   12/26/22 0100 -- 58 37 Abnormal  -- -- 97 % --   12/26/22 0055 -- 56 33 Abnormal  -- -- 96 % --   12/26/22 0050 -- 58 32 Abnormal  -- -- 96 % --   12/26/22 0045 -- 58 20 -- -- 98 % None (Room air)   12/26/22 0040 -- 58 37 Abnormal  -- -- 97 % --   12/26/22 0035 -- 60 32 Abnormal  -- -- 96 % --   12/26/22 0030 -- 60 20 179/72 Abnormal  -- 97 % None (Room air)   12/26/22 0025 -- 58 41 Abnormal  -- -- 98 % --   12/26/22 0015 -- 57 18 164/74 -- 94 % None (Room air)   12/26/22 0000 -- 58 0 Abnormal  -- -- 98 % --   12/25/22 23:58:20 -- -- -- 186/80 Abnormal  -- -- --   12/25/22 23:56:09 -- 59 18 191/81 Abnormal  -- 97 % None (Room air)   12/25/22 2355 -- 60 0 Abnormal  -- -- 97 % --   12/25/22 23:54:20 -- -- -- 191/89 Abnormal  -- -- --   12/25/22 23:50:23 -- -- -- 135/91 -- -- --     Pertinent Labs/Diagnostic Test Results:   CT head wo contrast   Final Result by Roland Perla MD (12/26 1217)      Stable thin subdural hemorrhage along the falx and right frontal convexity, as described above  Small amount of hemorrhage is now noted layering within the left occipital horn  The study was marked in [facility] for immediate notification  Workstation performed: [id number]         TRAUMA - CT head wo contrast   Final Result by Bart Sanders MD (12/26 0127)      There is a subdural hemorrhage along the anterior falx measuring up to 4 mm  No mass effect or midline shift  Left forehead/frontal subcutaneous contusion         Please see the concurrently performed and reported CT of the facial bones        I personally discussed this study with Dr Jose Carlos Ness on 12/26/2022 at 1:26 AM       Workstation performed: [id number]         TRAUMA - CT spine cervical wo contrast   Final Result by Liu Fregoso MD (12/26 0127)      No cervical spine fracture or traumatic malalignment  I personally discussed this study with Dr Jose Carlos Ness on 12/26/2022 at 1:26 AM       Workstation performed: [id number]         TRAUMA - CT facial bones wo contrast   Final Result by Liu Fregoso MD (12/26 0126)      Bilateral nasal fractures are noted  The nasal septum is serpiginous indicating multiple fractures  Irregularity of the inferior nasal turbinates bilaterally suggest fractures  Debris is seen in the nasal cavity bilaterally  Mild multifocal sinus opacities  Debris is seen in the nasal cavities bilaterally and air-fluid levels are seen in the maxillary sinuses bilaterally likely from the above-described fractures  I personally discussed this study with Dr Jose Carlos Ness on 12/26/2022 at 1:26 AM       Workstation performed: [id number]         CT chest abdomen pelvis w contrast   Final Result by Liu Fregoso MD (12/26 0126)      2 1 cm left renal cystic lesion which does not meet CT criteria for simple cyst on this examination; recommend elective ultrasound or MRI abdomen with contrast, renal protocol, for further evaluation  No acute pathology visualized on CT of the chest, abdomen and pelvis with IV without oral contrast              I personally discussed this study with Dr Jose Carlos Ness on 12/26/2022 at 1:26 AM                Workstation performed: [id number]         XR Trauma multiple (SLB/SLRA trauma bay ONLY)   Final Result by Lasha Mendez MD (12/26 7426)      No acute cardiopulmonary disease within limitations of supine imaging  Workstation performed: [id number]         XR chest 1 view   Final Result by Lasha Mendez MD (12/27 5515)      No acute cardiopulmonary disease within limitations of supine imaging  Workstation performed: [id number]               Results from last 7 days   Lab Units 12/27/22  1225 12/27/22  0503 12/26/22  0429 12/26/22 0017 12/25/22  2359   WBC Thousand/uL  --  7 10 11 12* 7 08  --    HEMOGLOBIN g/dL 8 7* 8 4* 10 5* 10 7*  --    I STAT HEMOGLOBIN g/dl  --   --   --   --  12 2   HEMATOCRIT % 28 5* 26 4* 32 7* 32 5*  --    HEMATOCRIT, ISTAT %  --   --   --   --  36   PLATELETS Thousands/uL  --  128* 124* 183  --    NEUTROS ABS Thousands/µL  --  4 48  --  3 89  --          Results from last 7 days   Lab Units 12/27/22  0503 12/26/22 0429 12/26/22 0017 12/25/22  2359   SODIUM mmol/L 140 136 137  --    POTASSIUM mmol/L 3 8 4 1 3 9  --    CHLORIDE mmol/L 108 104 103  --    CO2 mmol/L 28 25 28  --    CO2, I-STAT mmol/L  --   --   --  27   ANION GAP mmol/L 4 7 6  --    BUN mg/dL 27* 29* 27*  --    CREATININE mg/dL 1 22 1 30 1 39*  --    EGFR ml/min/1 73sq m 39 36 33  --    CALCIUM mg/dL 8 4 8 6 8 8  --    CALCIUM, IONIZED mmol/L  --  1 09*  --   --    CALCIUM, IONIZED, ISTAT mmol/L  --   --   --  1 00*   MAGNESIUM mg/dL 2 4  --   --   --    PHOSPHORUS mg/dL 3 3  --   --   --          Results from last 7 days   Lab Units 12/27/22  1118   POC GLUCOSE mg/dl 214*     Results from last 7 days   Lab Units 12/27/22  0503 12/26/22  0429 12/26/22  0017   GLUCOSE RANDOM mg/dL 166* 221* 159*     Results from last 7 days   Lab Units 12/25/22  2359   PH, MAYI I-STAT  7 518*   PCO2, MAYI ISTAT mm HG 31 5*   PO2, MAYI ISTAT mm HG 29 0*   HCO3, MAYI ISTAT mmol/L 25 6   I STAT BASE EXC mmol/L 3   I STAT O2 SAT % 64         Results from last 7 days   Lab Units 12/26/22  0601 12/26/22  0429 12/26/22  0017   HS TNI 0HR ng/L  --   --  31   HS TNI 2HR ng/L  --  28  --    HSTNI D2 ng/L  --  -3  --    HS TNI 4HR ng/L 22  --   --    HSTNI D4 ng/L -9  --   --          Results from last 7 days   Lab Units 12/26/22  0017   PROTIME seconds 15 6*   INR  1 22*         ED Treatment:   Medication Administration from 12/25/2022 2343 to 12/26/2022 1927       Date/Time Order Dose Route Action     12/26/2022 0000 EST ondansetron (ZOFRAN) injection 4 mg 4 mg Intravenous Given by Other     12/26/2022 0005 EST tetanus-diphtheria-acellular pertussis (BOOSTRIX) IM injection 0 5 mL 0 5 mL Intramuscular Given     12/26/2022 0023 EST tranexamic acid 100mg/mL (for epistaxis) 1,000 mg 1,000 mg Nasal Given     12/26/2022 0023 EST oxymetazoline (AFRIN) 0 05 % nasal spray 2 spray 2 spray Each Nare Given     12/26/2022 0009 EST iohexol (OMNIPAQUE) 350 MG/ML injection (SINGLE-DOSE) 100 mL 100 mL Intravenous Given     12/26/2022 0433 EST desmopressin (DDAVP) 24 4 mcg in sodium chloride 0 9 % 50 mL IVPB 24 4 mcg Intravenous New Bag     12/26/2022 0434 EST prothrombin complex concentrate (human) (Kcentra) 2,000 Units 2,000 Units Intravenous Given     12/26/2022 1518 EST ampicillin-sulbactam (UNASYN) 3 g in sodium chloride 0 9 % 100 mL IVPB 3 g Intravenous New Bag     12/26/2022 0557 EST ampicillin-sulbactam (UNASYN) 3 g in sodium chloride 0 9 % 100 mL IVPB 3 g Intravenous New Bag     12/26/2022 0620 EST calcium gluconate 2 g in sodium chloride 0 9% 100 mL (premix) 2 g Intravenous New Bag     12/26/2022 1718 EST levETIRAcetam (KEPPRA) 500 mg in sodium chloride 0 9 % 100 mL IVPB 500 mg Intravenous New Bag     12/26/2022 0556 EST levETIRAcetam (KEPPRA) 500 mg in sodium chloride 0 9 % 100 mL IVPB 500 mg Intravenous New Bag     12/26/2022 1322 EST enoxaparin (LOVENOX) subcutaneous injection 30 mg 30 mg Subcutaneous Given        Past Medical History:   Diagnosis Date   • Anxiety    • HLD (hyperlipidemia)        Admitting Diagnosis: Nasal bones, closed fracture [S02  2XXA]  SDH (subdural hematoma) [S06  5XAA]  Lip laceration [S01 511A]  Eyebrow laceration, left, initial encounter [S01 112A]  Laceration of left palm [S61 412A]  Unspecified multiple injuries, initial encounter [T07  XXXA]  Age/Sex: 80 y o  female  Admission Orders:  Regular Diet  SCDs    accuchecks ACHS   Sinus precautions  Scheduled Medications:  acetaminophen, 975 mg, Oral, Q8H [facility] & [facility]  amiodarone, 100 mg, Oral, Daily With Breakfast  amLODIPine, 2 5 mg, Oral, Daily  ampicillin-sulbactam, 3 g, Intravenous, Q12H  atorvastatin, 80 mg, Oral, Daily With Dinner  carvedilol, 25 mg, Oral, BID With Meals  cholecalciferol, 400 Units, Oral, Daily  enoxaparin, 30 mg, Subcutaneous, Q24H [place]  insulin lispro, 1-6 Units, Subcutaneous, TID AC  levETIRAcetam, 500 mg, Intravenous, Q12H [place]  multivitamin-minerals, 1 tablet, Oral, Daily  polyethylene glycol, 17 g, Oral, Daily  torsemide, 20 mg, Oral, Every Other Day    Continuous IV Infusions: none    PRN Meds:  ondansetron, 4 mg, Intravenous, Q4H PRN  oxymetazoline, 2 spray, Each Nare, Q12H PRN  senna, 2 tablet, Oral, Daily PRN        IP CONSULT TO NEUROSURGERY  IP CONSULT TO CASE MANAGEMENT  IP CONSULT TO GERONTOLOGY  IP CONSULT TO ORAL AND MAXILLOFACIAL SURGERY    Network Utilization Review Department  ATTENTION: Please call with any questions or concerns to [phone] and carefully listen to the prompts so that you are directed to the right person  All voicemails are confidential   [facility] [place] all requests for admission clinical reviews, approved or denied determinations and any other requests to dedicated fax number below belonging to the campus where the patient is receiving treatment   List of dedicated fax numbers for the Facilities:  [address] DENIALS (Administrative/Medical Necessity) [phone]   [address] (Maternity/NICU/Pediatrics) [phone]   [address] [phone]   [address] 2000 [facility] 1924 [facility] 310 [facility] 134 897 [facility] [phone]

## 2022-12-27 NOTE — PLAN OF CARE
Problem: OCCUPATIONAL THERAPY ADULT  Goal: Performs self-care activities at highest level of function for planned discharge setting  See evaluation for individualized goals  Description: Treatment Interventions: ADL retraining, Functional transfer training, Endurance training, Cognitive reorientation, Patient/family training, Equipment evaluation/education, Compensatory technique education, Activityengagement, Energy conservation          See flowsheet documentation for full assessment, interventions and recommendations  Note: Limitation: Decreased ADL status, Decreased cognition, Decreased Safe judgement during ADL, Decreased endurance, Decreased self-care trans, Decreased high-level ADLs  Prognosis: Good  Assessment: 81 YO Female SEEN FOR INITIAL OCCUPATIONAL THERAPY EVALUATION FOLLOWING ADMISSION TO [facility] S/P FALL RESULTING IN B/L NASAL BONE FX, FACIAL LACERATIONS AND SDH  PROBLEMS LIST INCLUDES ANXIETY, HLD, VERY [place], A-FIB, DM, PAD, CHF AND HTN  PT IS FROM HOME ALONE WITH DAILY CAREGIVERS WHERE SHE REPORTS BEING INDEPENDENT WITH ADLS/LT IADLS AND HAS ASSIST WITH HEAVY IADLS INCLUDING MEALS AND CLEANING PTA  PT CURRENTLY REQUIRES OVERALL MIN-MOD A WITH ADLS, AND MIN A WITH TRANSFERS /FUNCTIONAL MOBILITY WITH USE OF RW  PT IS LIMITED 2' PAIN, FATIGUE, IMPAIRED BALANCE, FALL RISK , LIMITED SAFETY AWARENESS/INSIGHT/JUDGEMENT, OVERALL WEAKNESS/DECONDITIONING , LIMITED FAMILY/FRIEND SUPPORT  and OVERALL LIMITED ACTIVITY TOLERANCE  PT EDUCATED ON DEEP BREATHING TECHNIQUES T/O ACTIVITY, SLOWING OF PACE, ENERGY CONSERVATION TECHNIQUES FOR CARRY OVER UPON D/C, INCREASED SUPPORT and CONTINUE PARTICIPATION IN SELF-CARE/MOBILITY WITH STAFF Anaya W Marshall Ferguson   The patient's raw score on the AM-PAC Daily Activity inpatient short form is 15, standardized score is 34 69, less than 39 4  Patients at this level are likely to benefit from discharge to post-acute rehabilitation services   Please refer to the recommendation of the Occupational Therapist for safe discharge planning  FROM AN OCCUPATIONAL THERAPY PERSPECTIVE, PT WOULD BENEFIT FROM ADDITIONAL OT SERVICES IN AN INPT REHAB SETTING UPON D/C  WILL CONT TO FOLLOW TO ADDRESS THE BELOW DESCRIBED GOALS       OT Discharge Recommendation: Post acute rehabilitation services

## 2022-12-27 NOTE — ASSESSMENT & PLAN NOTE
Wt Readings from Last 3 Encounters:   12/27/22 81 6 kg (180 lb)       - Continue current medication regimen   - Outpatient follow-up with PCP    - monitor for volume overload  - Follow-up ECHO

## 2022-12-27 NOTE — ASSESSMENT & PLAN NOTE
- Traumatic brain injury with SDH, present on admission   - Admitted as level 2 step-down with HOT protocol  The patient may require ICU level of care during hospital encounter if the patient has clinical decompensation or worsening of traumatic brain injury   - Neurosurgery evaluation and recommendations appreciated  - Hold anti-platelet and anticoagulant medications for least 2 weeks and/or until cleared by Neurosurgery  - Continue Keppra for 7 days for seizure prophylaxis  - Monitor neurologic exam   - Continue symptomatic management with analgesia as needed  - Tentative plan for repeat CT scan of the head in 12-24 hours or sooner if patient has clinical decline or drop in GCS > or equal to 2   - PT and OT evaluation and treatment as indicated  - Outpatient Neurosurgery follow-up in 2 weeks with repeat CT scan of the head prior to follow-up appointment

## 2022-12-28 ENCOUNTER — TELEPHONE (OUTPATIENT)
Dept: NEUROSURGERY | Facility: CLINIC | Age: 87
End: 2022-12-28

## 2022-12-28 PROBLEM — S01.81XA FACIAL LACERATION: Status: ACTIVE | Noted: 2022-12-28

## 2022-12-28 LAB
ANION GAP SERPL CALCULATED.3IONS-SCNC: 6 MMOL/L (ref 4–13)
BASOPHILS # BLD AUTO: 0.04 THOUSANDS/ÂΜL (ref 0–0.1)
BASOPHILS NFR BLD AUTO: 1 % (ref 0–1)
BUN SERPL-MCNC: 31 MG/DL (ref 5–25)
CALCIUM SERPL-MCNC: 8.1 MG/DL (ref 8.3–10.1)
CHLORIDE SERPL-SCNC: 106 MMOL/L (ref 96–108)
CO2 SERPL-SCNC: 26 MMOL/L (ref 21–32)
CREAT SERPL-MCNC: 1.48 MG/DL (ref 0.6–1.3)
EOSINOPHIL # BLD AUTO: 0.11 THOUSAND/ÂΜL (ref 0–0.61)
EOSINOPHIL NFR BLD AUTO: 3 % (ref 0–6)
ERYTHROCYTE [DISTWIDTH] IN BLOOD BY AUTOMATED COUNT: 14 % (ref 11.6–15.1)
GFR SERPL CREATININE-BSD FRML MDRD: 31 ML/MIN/1.73SQ M
GLUCOSE SERPL-MCNC: 120 MG/DL (ref 65–140)
GLUCOSE SERPL-MCNC: 149 MG/DL (ref 65–140)
GLUCOSE SERPL-MCNC: 164 MG/DL (ref 65–140)
GLUCOSE SERPL-MCNC: 212 MG/DL (ref 65–140)
HCT VFR BLD AUTO: 23.7 % (ref 34.8–46.1)
HGB BLD-MCNC: 7.4 G/DL (ref 11.5–15.4)
HGB BLD-MCNC: 9.7 G/DL (ref 11.5–15.4)
IMM GRANULOCYTES # BLD AUTO: 0.01 THOUSAND/UL (ref 0–0.2)
IMM GRANULOCYTES NFR BLD AUTO: 0 % (ref 0–2)
LYMPHOCYTES # BLD AUTO: 1.26 THOUSANDS/ÂΜL (ref 0.6–4.47)
LYMPHOCYTES NFR BLD AUTO: 32 % (ref 14–44)
MAGNESIUM SERPL-MCNC: 2.6 MG/DL (ref 1.6–2.6)
MCH RBC QN AUTO: 30.6 PG (ref 26.8–34.3)
MCHC RBC AUTO-ENTMCNC: 31.2 G/DL (ref 31.4–37.4)
MCV RBC AUTO: 98 FL (ref 82–98)
MONOCYTES # BLD AUTO: 0.44 THOUSAND/ÂΜL (ref 0.17–1.22)
MONOCYTES NFR BLD AUTO: 11 % (ref 4–12)
NEUTROPHILS # BLD AUTO: 2.04 THOUSANDS/ÂΜL (ref 1.85–7.62)
NEUTS SEG NFR BLD AUTO: 53 % (ref 43–75)
NRBC BLD AUTO-RTO: 0 /100 WBCS
PHOSPHATE SERPL-MCNC: 3.6 MG/DL (ref 2.3–4.1)
PLATELET # BLD AUTO: 114 THOUSANDS/UL (ref 149–390)
PMV BLD AUTO: 11.1 FL (ref 8.9–12.7)
POTASSIUM SERPL-SCNC: 4.1 MMOL/L (ref 3.5–5.3)
RBC # BLD AUTO: 2.42 MILLION/UL (ref 3.81–5.12)
SODIUM SERPL-SCNC: 138 MMOL/L (ref 135–147)
TSH SERPL DL<=0.05 MIU/L-ACNC: 0.48 UIU/ML (ref 0.45–4.5)
VIT B12 SERPL-MCNC: 1412 PG/ML (ref 100–900)
WBC # BLD AUTO: 3.9 THOUSAND/UL (ref 4.31–10.16)

## 2022-12-28 RX ORDER — AMOXICILLIN AND CLAVULANATE POTASSIUM 500; 125 MG/1; MG/1
1 TABLET, FILM COATED ORAL EVERY 12 HOURS SCHEDULED
Status: DISCONTINUED | OUTPATIENT
Start: 2022-12-28 | End: 2022-12-28

## 2022-12-28 RX ORDER — AMOXICILLIN AND CLAVULANATE POTASSIUM 500; 125 MG/1; MG/1
1 TABLET, FILM COATED ORAL EVERY 12 HOURS SCHEDULED
Status: DISCONTINUED | OUTPATIENT
Start: 2022-12-28 | End: 2022-12-29 | Stop reason: HOSPADM

## 2022-12-28 RX ADMIN — CARVEDILOL 25 MG: 25 TABLET, FILM COATED ORAL at 08:35

## 2022-12-28 RX ADMIN — Medication 1 TABLET: at 08:33

## 2022-12-28 RX ADMIN — ACETAMINOPHEN 975 MG: 325 TABLET, FILM COATED ORAL at 21:40

## 2022-12-28 RX ADMIN — ACETAMINOPHEN 975 MG: 325 TABLET, FILM COATED ORAL at 14:30

## 2022-12-28 RX ADMIN — ENOXAPARIN SODIUM 30 MG: 30 INJECTION SUBCUTANEOUS at 08:33

## 2022-12-28 RX ADMIN — AMLODIPINE BESYLATE 2.5 MG: 2.5 TABLET ORAL at 08:33

## 2022-12-28 RX ADMIN — ATORVASTATIN CALCIUM 80 MG: 80 TABLET, FILM COATED ORAL at 17:17

## 2022-12-28 RX ADMIN — TORSEMIDE 20 MG: 20 TABLET ORAL at 08:33

## 2022-12-28 RX ADMIN — AMOXICILLIN AND CLAVULANATE POTASSIUM 1 TABLET: 500; 125 TABLET, FILM COATED ORAL at 21:41

## 2022-12-28 RX ADMIN — AMIODARONE HYDROCHLORIDE 100 MG: 200 TABLET ORAL at 08:33

## 2022-12-28 RX ADMIN — POLYETHYLENE GLYCOL 3350 17 G: 17 POWDER, FOR SOLUTION ORAL at 08:33

## 2022-12-28 RX ADMIN — CHOLECALCIFEROL TAB 10 MCG (400 UNIT) 400 UNITS: 10 TAB at 08:33

## 2022-12-28 RX ADMIN — AMOXICILLIN AND CLAVULANATE POTASSIUM 1 TABLET: 500; 125 TABLET, FILM COATED ORAL at 14:30

## 2022-12-28 RX ADMIN — LEVETIRACETAM 500 MG: 500 TABLET, FILM COATED ORAL at 08:33

## 2022-12-28 RX ADMIN — LEVETIRACETAM 500 MG: 500 TABLET, FILM COATED ORAL at 21:40

## 2022-12-28 RX ADMIN — SODIUM CHLORIDE 3 G: 9 INJECTION, SOLUTION INTRAVENOUS at 02:26

## 2022-12-28 RX ADMIN — ACETAMINOPHEN 975 MG: 325 TABLET, FILM COATED ORAL at 05:40

## 2022-12-28 RX ADMIN — CARVEDILOL 25 MG: 25 TABLET, FILM COATED ORAL at 17:17

## 2022-12-28 RX ADMIN — INSULIN LISPRO 2 UNITS: 100 INJECTION, SOLUTION INTRAVENOUS; SUBCUTANEOUS at 11:56

## 2022-12-28 NOTE — PROGRESS NOTES
-- Patient:  -- MRN: [id number]  -- Aidin Request ID: [id number]  -- Level of care reserved: Juanjose Farrar  -- Partner Reserved: [facility], [facility], [address] [place] [address] [phone]  -- Clinical needs requested:  -- Geography searched: [place] miles around [address]  -- Start of Service:  -- Request sent: 11:28am EST on 12/27/2022 by Evelyne Le  -- Partner reserved: 10:24am EST on 12/28/2022 by Evelyne Le  -- Choice list shared:

## 2022-12-28 NOTE — ASSESSMENT & PLAN NOTE
Wt Readings from Last 3 Encounters:   12/27/22 81 6 kg (180 lb)       - Continue current medication regimen   - Outpatient follow-up with PCP    - monitor for volume overload  - Follow-up ECHO   - bicupid aortic valve, mild stenosis   - mild tricuspid and mitral valve regurg

## 2022-12-28 NOTE — ASSESSMENT & PLAN NOTE
-OMS consulted, appreciate input  -transitioned to augmentin from unasyn  -Further work-up otherwise  -Nonoperative management  -Patient follow-up

## 2022-12-28 NOTE — RESTORATIVE TECHNICIAN NOTE
Restorative Technician Note      Patient Name: Deangelo Solomon     Restorative Tech Visit Date: 12/28/22  Note Type: Mobility  Patient Position Upon Consult: Other (Comment) (in the br)  Activity Performed: Ambulated  Assistive Device: Roller walker  Patient Position at End of Consult: Bedside chair; Bed/Chair alarm activated;  All needs within reach    Carlyle Velazquez Restorative Technician

## 2022-12-28 NOTE — ASSESSMENT & PLAN NOTE
No results found for: HGBA1C    Recent Labs     12/27/22  1118 12/27/22  1649 12/27/22  2109 12/28/22  0744   POCGLU 214* 156* 160* 149*       Blood Sugar Average: Last 72 hrs:  (P) 169 75     - Continue current medication regimen   - Outpatient follow-up with PCP

## 2022-12-28 NOTE — RESTORATIVE TECHNICIAN NOTE
Restorative Technician Note      Patient Name: Derek Flores     Restorative Tech Visit Date: 12/28/22  Note Type: Mobility  Patient Position Upon Consult: Supine  Activity Performed: Transferred  Assistive Device: Roller walker  Patient Position at End of Consult: Bedside chair;  All needs within reach; Bed/Chair alarm activated    Calista Hull, Restorative Technician

## 2022-12-28 NOTE — PROGRESS NOTES
[address] [facility]  Progress Note - Anup Baez 1934, 80 y o  female MRN: [id number]  Unit/Bed#: [facility] 610-01 Encounter: [id number]  Primary Care Provider: No primary care provider on file  Date and time admitted to hospital: 12/25/2022 11:44 PM    History of atrial fibrillation  Assessment & Plan  - Recently amiodarone cut in half to 100 mg daily  -Torsemide now every other day at 20 mg    SDH (subdural hematoma)  Assessment & Plan  - Traumatic brain injury with SDH, present on admission   - Admitted as level 2 step-down with HOT protocol  The patient may require ICU level of care during hospital encounter if the patient has clinical decompensation or worsening of traumatic brain injury   - Neurosurgery evaluation and recommendations appreciated  - Hold anti-platelet and anticoagulant medications for least 2 weeks and/or until cleared by Neurosurgery  - Continue Keppra for 7 days for seizure prophylaxis  - Monitor neurologic exam   - Continue symptomatic management with analgesia as needed  - Tentative plan for repeat CT scan of the head in 12-24 hours or sooner if patient has clinical decline or drop in GCS > or equal to 2   - PT and OT evaluation and treatment as indicated  - Outpatient Neurosurgery follow-up in 2 weeks with repeat CT scan of the head prior to follow-up appointment  Other hyperlipidemia  Assessment & Plan  - Continue current medication regimen   - Outpatient follow-up with PCP  Chronic congestive heart failure (HCC)  Assessment & Plan  Wt Readings from Last 3 Encounters:   12/27/22 81 6 kg (180 lb)       - Continue current medication regimen   - Outpatient follow-up with PCP    - monitor for volume overload  - Follow-up ECHO      PAD (peripheral artery disease) ([facility] [address] )  Assessment & Plan  - Follows outpatient with vascular surgery  - recent duplex completed on 10/22   - will need outpatient follow-up with vascular surgery  - holding aspirin and Xarelto in the setting of traumatic brain injury      Primary hypertension  Assessment & Plan  - Continue current medication regimen   - Outpatient follow-up with PCP  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, without long-term current use of insulin ([facility] [address] )  Assessment & Plan  No results found for: HGBA1C    Recent Labs     12/27/22  1118 12/27/22  1649   POCGLU 214* 156*       Blood Sugar Average: Last 72 hrs:  (P) 185     - Continue current medication regimen   - Outpatient follow-up with PCP  * Closed fracture of nasal bone  Assessment & Plan  -OMS consulted, appreciate input  -No antibiotics at this time  -Further work-up otherwise  -Nonoperative management  -Patient follow-up    DVT prophylaxis: SCDs and Lovenox  PT and OT: eval and treat    Disposition: Continue level of care at this time  Repeat head CT stable  Family updated at bedside  Home medications reviewed  Will need to resume Xarelto and aspirin in the setting of peripheral vascular disease  Family doctor contacted on 12/27/2022 regarding hospital stay  They will follow-up in the peripherally once patient is discharged  They can provide home visit for patient if she is discharged home  TRAUMA TERTIARY SURVEY NOTE    Code status:  Level 3 - DNAR and DNI    Consultants: IP CONSULT TO NEUROSURGERY  IP CONSULT TO CASE MANAGEMENT  IP CONSULT TO GERONTOLOGY  IP CONSULT TO ORAL AND MAXILLOFACIAL SURGERY    Subjective   Transfer from: not a transfer    Mechanism of Injury:Fall     Chief Complaint: "No new complaints "    HPI/Last 24 hour events: Patient offering no new complaints and on presentation  Currently resting in bed  States that her pain is well controlled  Objective   Vitals:   Temp:  [97 4 °F (36 3 °C)-98 8 °F (37 1 °C)] 98 4 °F (36 9 °C)  HR:  [55-62] 60  Resp:  [16-18] 16  BP: (126-164)/(52-72) 142/52    I/O       12/26 0701  12/27 0700 12/27 0701  12/28 0700    P  O   720    IV Piggyback 400     Total Intake(mL/kg) 400 (4 9) 720 (8 8)    Urine (mL/kg/hr) 900 (0 5)     Stool 0     Total Output 900     Net -500 +720          Unmeasured Stool Occurrence 1 x            Physical Exam:   GENERAL APPEARANCE: No acute distress  NEURO: GCS of 15  HEENT: Facial ecchymosis appreciated on evaluation  No obvious nasal deformity  CV: Regular rate and rhythm  LUNGS: CTA bilaterally  GI: Nontender, nondistended  : No Koenig  MSK: +2 pulses on extremities, extremities are warm, dry, intact; capillary refill appropriate; no appreciable wounds on lower extremities  SKIN: Warm, dry, intact    Invasive Devices     Peripheral Intravenous Line  Duration           Peripheral IV 12/25/22 Dorsal (posterior); Right Forearm 1 day                   1  Before the illness or injury that brought you to the Emergency, did you need someone to help you on a regular basis? 1=Yes   2  Since the illness or injury that brought you to the Emergency, have you needed more help than usual to take care of yourself? 1=Yes   3  Have you been hospitalized for one or more nights during the past 6 months (excluding a stay in the Emergency Department)? 0=No   4  In general, do you see well? 0=Yes   5  In general, do you have serious problems with your memory? 0=No   6  Do you take more than three different medications everyday?  1=Yes   TOTAL   3     Did you order a geriatric consult if the score was 2 or greater?: yes         Lab Results:   Results: I have personally reviewed all pertinent laboratory/tests results, BMP/CMP:   Lab Results   Component Value Date    SODIUM 140 12/27/2022    K 3 8 12/27/2022     12/27/2022    CO2 28 12/27/2022    BUN 27 (H) 12/27/2022    CREATININE 1 22 12/27/2022    CALCIUM 8 4 12/27/2022    EGFR 39 12/27/2022    and CBC:   Lab Results   Component Value Date    WBC 7 10 12/27/2022    HGB 8 7 (L) 12/27/2022    HCT 28 5 (L) 12/27/2022    MCV 97 12/27/2022     (L) 12/27/2022    MCH 30 9 12/27/2022 MCHC 31 8 12/27/2022    RDW 14 2 12/27/2022    MPV 11 1 12/27/2022    NRBC 0 12/27/2022       Imaging Results: I have personally reviewed pertinent reports      Chest Xray(s): pending   FAST exam(s): N/A   CT Scan(s): pending   Additional Xray(s): negative for acute findings     Other Studies: No other studies

## 2022-12-28 NOTE — PROGRESS NOTES
[address] [facility]  Progress Note - Melba Common 1934, 80 y o  female MRN: [id number]  Unit/Bed#: [facility] 610-01 Encounter: [id number]  Primary Care Provider: No primary care provider on file  Date and time admitted to hospital: 12/25/2022 11:44 PM    Facial laceration  Assessment & Plan  - sutures on 12/26  - suture removal on 12/31-1/2    History of atrial fibrillation  Assessment & Plan  - Recently amiodarone cut in half to 100 mg daily  -Torsemide now every other day at 20 mg    SDH (subdural hematoma)  Assessment & Plan  - Traumatic brain injury with SDH, present on admission   - Admitted as level 2 step-down with HOT protocol  The patient may require ICU level of care during hospital encounter if the patient has clinical decompensation or worsening of traumatic brain injury   - Neurosurgery evaluation and recommendations appreciated  - Hold anti-platelet and anticoagulant medications for least 2 weeks and/or until cleared by Neurosurgery  - Continue Keppra for 7 days for seizure prophylaxis  - Monitor neurologic exam   - Continue symptomatic management with analgesia as needed  - Tentative plan for repeat CT scan of the head in 12-24 hours or sooner if patient has clinical decline or drop in GCS > or equal to 2   - PT and OT evaluation and treatment as indicated  - Outpatient Neurosurgery follow-up in 2 weeks with repeat CT scan of the head prior to follow-up appointment  Other hyperlipidemia  Assessment & Plan  - Continue current medication regimen   - Outpatient follow-up with PCP  Chronic deep vein thrombosis (DVT) of proximal vein of lower extremity (HCC)  Assessment & Plan  - holding Xarelto and aspirin x2 weeks, can restarted after that    Chronic congestive heart failure (HCC)  Assessment & Plan  Wt Readings from Last 3 Encounters:   12/27/22 81 6 kg (180 lb)       - Continue current medication regimen   - Outpatient follow-up with PCP    - monitor for volume overload  - Follow-up ECHO   - bicupid aortic valve, mild stenosis   - mild tricuspid and mitral valve regurg        PAD (peripheral artery disease) ([address] )  Assessment & Plan  - Follows outpatient with vascular surgery  - recent duplex completed on 10/22   - will need outpatient follow-up with vascular surgery  - holding aspirin and Xarelto in the setting of traumatic brain injury x2 angeline      Primary hypertension  Assessment & Plan  - Continue current medication regimen   - Outpatient follow-up with PCP  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, without long-term current use of insulin [facility])  Assessment & Plan  No results found for: HGBA1C    Recent Labs     12/27/22  1118 12/27/22  1649 12/27/22  2109 12/28/22  0744   POCGLU 214* 156* 160* 149*       Blood Sugar Average: Last 72 hrs:  (P) 169 75     - Continue current medication regimen   - Outpatient follow-up with PCP  * Closed fracture of nasal bone  Assessment & Plan  -OMS consulted, appreciate input  -transitioned to augmentin from unasyn  -Further work-up otherwise  -Nonoperative management  -Patient follow-up      Bowel Regimen: miralax  VTE Prophylaxis:Enoxaparin (Lovenox)     Disposition: rehab    Subjective   Chief Complaint: none     Subjective: Feels decent this morning, no complaints  Objective   Vitals:   Temp:  [97 2 °F (36 2 °C)-98 4 °F (36 9 °C)] 97 2 °F (36 2 °C)  HR:  [48-77] 77  Resp:  [16-21] 18  BP: (112-171)/(42-98) 171/98    I/O       12/26 0701  12/27 0700 12/27 0701  12/28 0700 12/28 0701  12/29 0700    P  O   720     IV Piggyback 400      Total Intake(mL/kg) 400 (4 9) 720 (8 8)     Urine (mL/kg/hr) 900 (0 5)      Stool 0      Total Output 900      Net -500 +720            Unmeasured Stool Occurrence 1 x             Physical Exam:   GENERAL APPEARANCE: well appearing overall, no acute distress  NEURO: no focal deficit, alert and oriented  HEENT: scattered bruising across face, sutures in place left eyebrow and right mouth  CV: RRR  LUNGS: no respiratory distress, breathing comfortably  GI: nondistended  MSK: extremities intact  SKIN: warm and well perfused    Invasive Devices     Peripheral Intravenous Line  Duration           Peripheral IV 12/25/22 Dorsal (posterior); Right Forearm 2 days                      Lab Results: Results: I have personally reviewed all pertinent laboratory/tests results  Imaging: I have personally reviewed pertinent reports

## 2022-12-28 NOTE — ASSESSMENT & PLAN NOTE
- Follows outpatient with vascular surgery  - recent duplex completed on 10/22   - will need outpatient follow-up with vascular surgery  - holding aspirin and Xarelto in the setting of traumatic brain injury cate marcus

## 2022-12-28 NOTE — PLAN OF CARE
Problem: Potential for Falls  Goal: Patient will remain free of falls  Description: INTERVENTIONS:  - Educate patient/family on patient safety including physical limitations  - Instruct patient to call for assistance with activity   - Consult OT/PT to assist with strengthening/mobility   - Keep Call bell within reach  - Keep bed low and locked with side rails adjusted as appropriate  - Keep care items and personal belongings within reach  - Initiate and maintain comfort rounds  - Make Fall Risk Sign visible to staff  - Offer Toileting every 2 Hours, in advance of need  - Initiate/Maintain alarm  - Obtain necessary fall risk management equipment:   - Apply yellow socks and bracelet for high fall risk patients  - Consider moving patient to room near nurses station  Outcome: Progressing     Problem: MOBILITY - ADULT  Goal: Maintain or return to baseline ADL function  Description: INTERVENTIONS:  -  Assess patient's ability to carry out ADLs; assess patient's baseline for ADL function and identify physical deficits which impact ability to perform ADLs (bathing, care of mouth/teeth, toileting, grooming, dressing, etc )  - Assess/evaluate cause of self-care deficits   - Assess range of motion  - Assess patient's mobility; develop plan if impaired  - Assess patient's need for assistive devices and provide as appropriate  - Encourage maximum independence but intervene and supervise when necessary  - Involve family in performance of ADLs  - Assess for home care needs following discharge   - Consider OT consult to assist with ADL evaluation and planning for discharge  - Provide patient education as appropriate  Outcome: Progressing  Goal: Maintains/Returns to pre admission functional level  Description: INTERVENTIONS:  - Perform BMAT or MOVE assessment daily    - Set and communicate daily mobility goal to care team and patient/family/caregiver     - Collaborate with rehabilitation services on mobility goals if consulted  - Perform Range of Motion 3 times a day  - Reposition patient every 2 hours  - Dangle patient 3 times a day  - Stand patient 3 times a day  - Ambulate patient 3 times a day  - Out of bed to chair 3 times a day   - Out of bed for meals 3 times a day  - Out of bed for toileting  - Record patient progress and toleration of activity level   Outcome: Progressing     Problem: Nutrition/Hydration-ADULT  Goal: Nutrient/Hydration intake appropriate for improving, restoring or maintaining nutritional needs  Description: Monitor and assess patient's nutrition/hydration status for malnutrition  Collaborate with interdisciplinary team and initiate plan and interventions as ordered  Monitor patient's weight and dietary intake as ordered or per policy  Utilize nutrition screening tool and intervene as necessary  Determine patient's food preferences and provide high-protein, high-caloric foods as appropriate       INTERVENTIONS:  - Monitor oral intake, urinary output, labs, and treatment plans  - Assess nutrition and hydration status and recommend course of action  - Evaluate amount of meals eaten  - Assist patient with eating if necessary   - Allow adequate time for meals  - Recommend/ encourage appropriate diets, oral nutritional supplements, and vitamin/mineral supplements  - Order, calculate, and assess calorie counts as needed  - Recommend, monitor, and adjust tube feedings and TPN/PPN based on assessed needs  - Assess need for intravenous fluids  - Provide specific nutrition/hydration education as appropriate  - Include patient/family/caregiver in decisions related to nutrition  Outcome: Progressing     Problem: Prexisting or High Potential for Compromised Skin Integrity  Goal: Skin integrity is maintained or improved  Description: INTERVENTIONS:  - Identify patients at risk for skin breakdown  - Assess and monitor skin integrity  - Assess and monitor nutrition and hydration status  - Monitor labs   - Assess for incontinence - Turn and reposition patient  - Assist with mobility/ambulation  - Relieve pressure over bony prominences  - Avoid friction and shearing  - Provide appropriate hygiene as needed including keeping skin clean and dry  - Evaluate need for skin moisturizer/barrier cream  - Collaborate with interdisciplinary team   - Patient/family teaching  - Consider wound care consult   Outcome: Progressing

## 2022-12-28 NOTE — PLAN OF CARE
Problem: Potential for Falls  Goal: Patient will remain free of falls  Description: INTERVENTIONS:  - Educate patient/family on patient safety including physical limitations  - Instruct patient to call for assistance with activity   - Consult OT/PT to assist with strengthening/mobility   - Keep Call bell within reach  - Keep bed low and locked with side rails adjusted as appropriate  - Keep care items and personal belongings within reach  - Initiate and maintain comfort rounds  - Make Fall Risk Sign visible to staff  - Offer Toileting every 2 Hours, in advance of need  - Initiate/Maintain bedalarm  - Obtain necessary fall risk management equipment:   - Apply yellow socks and bracelet for high fall risk patients  - Consider moving patient to room near nurses station  Outcome: Progressing     Problem: MOBILITY - ADULT  Goal: Maintain or return to baseline ADL function  Description: INTERVENTIONS:  -  Assess patient's ability to carry out ADLs; assess patient's baseline for ADL function and identify physical deficits which impact ability to perform ADLs (bathing, care of mouth/teeth, toileting, grooming, dressing, etc )  - Assess/evaluate cause of self-care deficits   - Assess range of motion  - Assess patient's mobility; develop plan if impaired  - Assess patient's need for assistive devices and provide as appropriate  - Encourage maximum independence but intervene and supervise when necessary  - Involve family in performance of ADLs  - Assess for home care needs following discharge   - Consider OT consult to assist with ADL evaluation and planning for discharge  - Provide patient education as appropriate  Outcome: Progressing  Goal: Maintains/Returns to pre admission functional level  Description: INTERVENTIONS:  - Perform BMAT or MOVE assessment daily    - Set and communicate daily mobility goal to care team and patient/family/caregiver     - Collaborate with rehabilitation services on mobility goals if consulted  - Perform Range of Motion 3 times a day  - Reposition patient every 2 hours  - Dangle patient 3 times a day  - Stand patient 3 times a day  - Ambulate patient 3 times a day  - Out of bed to chair 3 times a day   - Out of bed for meals 3 times a day  - Out of bed for toileting  - Record patient progress and toleration of activity level   Outcome: Progressing     Problem: Nutrition/Hydration-ADULT  Goal: Nutrient/Hydration intake appropriate for improving, restoring or maintaining nutritional needs  Description: Monitor and assess patient's nutrition/hydration status for malnutrition  Collaborate with interdisciplinary team and initiate plan and interventions as ordered  Monitor patient's weight and dietary intake as ordered or per policy  Utilize nutrition screening tool and intervene as necessary  Determine patient's food preferences and provide high-protein, high-caloric foods as appropriate       INTERVENTIONS:  - Monitor oral intake, urinary output, labs, and treatment plans  - Assess nutrition and hydration status and recommend course of action  - Evaluate amount of meals eaten  - Assist patient with eating if necessary   - Allow adequate time for meals  - Recommend/ encourage appropriate diets, oral nutritional supplements, and vitamin/mineral supplements  - Order, calculate, and assess calorie counts as needed  - Recommend, monitor, and adjust tube feedings and TPN/PPN based on assessed needs  - Assess need for intravenous fluids  - Provide specific nutrition/hydration education as appropriate  - Include patient/family/caregiver in decisions related to nutrition  Outcome: Progressing

## 2022-12-28 NOTE — PROGRESS NOTES
Progress Note - Geriatric Medicine   Joselyn Kong 80 y o  female MRN: [id number]  Unit/Bed#: [facility] 610-01 Encounter: [id number]      Assessment/Plan:    Syncope and collapse   -injuries as below   -monitoring on telemetry  -EF 65%   -encourage good body mechanics to reduce risk orthostatic hypotension     Subdural hematoma  -s/p fall as above  -home ASA/Xarelto reversed with DDAVP and tranexaminic acid on admit  -reportedly stable on f/u [facility]  -systemic AC/AP on hold, currently on Lovenox for VTE ppx  -Nsx on consult     Bilateral nasal bone fractures  -noted on CT facial bones on admit  -OMFS rec abx and o/p f/u  -acute pain control and sinus precautions    Acute blood loss anemia  -evidenced by >2g drop in Hb from 10 7 to 7 4   -now stable at 9 7 and vitals stable   -cont to cali for ongoing acute blood loss     Acute pain due to trauma   -remains well controlled on Tylenol     Atrial fibrillation  -Rate remains well controlled on current regimen  -Anticoagulation on hold due to SDH as above    Impaired vision  -Continue to encourage use of corrective lenses at appropriate times  -Consider large font for printed materials    Impaired hearing  -Requires use of hearing aid, left-sided hearing aid in place at time of admission with extreme benefit, continue use at all appropriate times  -Encourage use of teach back method to ensure clear communication    High risk developing delirium  -Due to age, comorbidities, acute pain, hospitalization  -Ensure pain well controlled   -Maintain normal circadian rhythm  -Reorient frequently    Frailty in geriatric patient   -Continue to encourage well balanced nutrition  -Optimize chronic conditions  -Address acute derangements as arise     Subjective:     Nalini Dorman is seen and examined at bedside where she is sitting with family at her side, she reports pain is well controlled and she slept well but appetite remains somewhat poor   She is feeling well otherwise and has been up ambulating and feels much improved since doing so  She offers no additional acute complaints  Review of Systems   Constitutional: Negative for chills and fever  HENT: Positive for hearing loss (uses hearing aids )  Eyes: Negative for visual disturbance  Respiratory: Negative for shortness of breath  Cardiovascular: Negative  Gastrointestinal: Negative  Genitourinary: Negative  Musculoskeletal: Positive for gait problem  Skin: Negative  Neurological: Negative for dizziness and light-headedness  Hematological: Bruises/bleeds easily  Psychiatric/Behavioral: Negative for sleep disturbance  All other systems reviewed and are negative  Objective:     Vitals: Blood pressure 117/54, pulse 74, temperature (!) 97 4 °F (36 3 °C), resp  rate 16, height 5' 1" (1 549 m), weight 81 6 kg (180 lb), SpO2 98 %  ,Body mass index is 34 01 kg/m²  Intake/Output Summary (Last 24 hours) at 12/28/2022 1515  Last data filed at 12/28/2022 0900  Gross per 24 hour   Intake 400 ml   Output --   Net 400 ml     Current Medications: Reviewed    Physical Exam:   Physical Exam  Vitals and nursing note reviewed  Constitutional:       Appearance: She is obese  HENT:      Head: Normocephalic  Comments: Extensive facial ecchymosis     Nose: Nose normal       Mouth/Throat:      Mouth: Mucous membranes are moist       Comments: Dentition mostly in tact   Eyes:      General: No scleral icterus  Right eye: No discharge  Left eye: No discharge  Conjunctiva/sclera: Conjunctivae normal    Cardiovascular:      Rate and Rhythm: Normal rate  Pulmonary:      Effort: No respiratory distress  Breath sounds: No wheezing  Abdominal:      Palpations: Abdomen is soft  Tenderness: There is no abdominal tenderness  Musculoskeletal:      Cervical back: Neck supple  Comments: LUE localized edema of forearm, non-tender, IV site appears poss infiltrated    Skin:     General: Skin is dry  Coloration: Skin is pale  Comments: Extensive facial ecchymosis    Neurological:      Mental Status: She is alert  Comments: Awake and alert, oriented, and answering questions appropriately   Psychiatric:      Comments: Pleasant cooperative        Invasive Devices     Peripheral Intravenous Line  Duration           Peripheral IV 12/25/22 Dorsal (posterior); Right Forearm 2 days              Lab, Imaging and other studies: I have personally reviewed pertinent reports

## 2022-12-28 NOTE — TELEPHONE ENCOUNTER
12/28/2022-PT Sascha [address] 01/11/2023 APT W/CT HEAD    12/26/2022-SHERICE Sanchez   This lady needs 2 week hospital follow up with AP  I've ordered CT head

## 2022-12-29 VITALS
HEIGHT: 61 IN | RESPIRATION RATE: 16 BRPM | DIASTOLIC BLOOD PRESSURE: 57 MMHG | TEMPERATURE: 97.1 F | OXYGEN SATURATION: 96 % | SYSTOLIC BLOOD PRESSURE: 147 MMHG | HEART RATE: 58 BPM | WEIGHT: 180 LBS | BODY MASS INDEX: 33.99 KG/M2

## 2022-12-29 LAB
ANION GAP SERPL CALCULATED.3IONS-SCNC: 3 MMOL/L (ref 4–13)
BUN SERPL-MCNC: 25 MG/DL (ref 5–25)
CALCIUM SERPL-MCNC: 8.3 MG/DL (ref 8.3–10.1)
CHLORIDE SERPL-SCNC: 104 MMOL/L (ref 96–108)
CO2 SERPL-SCNC: 29 MMOL/L (ref 21–32)
CREAT SERPL-MCNC: 1.46 MG/DL (ref 0.6–1.3)
ERYTHROCYTE [DISTWIDTH] IN BLOOD BY AUTOMATED COUNT: 13.6 % (ref 11.6–15.1)
FLUAV RNA RESP QL NAA+PROBE: NEGATIVE
FLUBV RNA RESP QL NAA+PROBE: NEGATIVE
GFR SERPL CREATININE-BSD FRML MDRD: 31 ML/MIN/1.73SQ M
GLUCOSE SERPL-MCNC: 131 MG/DL (ref 65–140)
GLUCOSE SERPL-MCNC: 141 MG/DL (ref 65–140)
GLUCOSE SERPL-MCNC: 197 MG/DL (ref 65–140)
HCT VFR BLD AUTO: 26.5 % (ref 34.8–46.1)
HGB BLD-MCNC: 8.6 G/DL (ref 11.5–15.4)
MCH RBC QN AUTO: 31.3 PG (ref 26.8–34.3)
MCHC RBC AUTO-ENTMCNC: 32.5 G/DL (ref 31.4–37.4)
MCV RBC AUTO: 96 FL (ref 82–98)
PLATELET # BLD AUTO: 143 THOUSANDS/UL (ref 149–390)
PMV BLD AUTO: 11.7 FL (ref 8.9–12.7)
POTASSIUM SERPL-SCNC: 3.8 MMOL/L (ref 3.5–5.3)
RBC # BLD AUTO: 2.75 MILLION/UL (ref 3.81–5.12)
RSV RNA RESP QL NAA+PROBE: NEGATIVE
SARS-COV-2 RNA RESP QL NAA+PROBE: NEGATIVE
SODIUM SERPL-SCNC: 136 MMOL/L (ref 135–147)
WBC # BLD AUTO: 5.06 THOUSAND/UL (ref 4.31–10.16)

## 2022-12-29 RX ORDER — SENNOSIDES 8.6 MG
17.2 TABLET ORAL DAILY PRN
Refills: 0 | Status: ON HOLD
Start: 2022-12-29

## 2022-12-29 RX ORDER — POLYETHYLENE GLYCOL 3350 17 G/17G
17 POWDER, FOR SOLUTION ORAL DAILY
Refills: 0 | Status: ON HOLD
Start: 2022-12-30

## 2022-12-29 RX ORDER — LEVETIRACETAM 500 MG/1
500 TABLET ORAL EVERY 12 HOURS SCHEDULED
Refills: 0 | Status: ON HOLD
Start: 2022-12-29

## 2022-12-29 RX ORDER — ACETAMINOPHEN 325 MG/1
975 TABLET ORAL EVERY 8 HOURS SCHEDULED
Refills: 0 | Status: ON HOLD
Start: 2022-12-29

## 2022-12-29 RX ORDER — INSULIN LISPRO 100 [IU]/ML
1-6 INJECTION, SOLUTION INTRAVENOUS; SUBCUTANEOUS
Refills: 0 | Status: ON HOLD
Start: 2022-12-29

## 2022-12-29 RX ORDER — AMOXICILLIN AND CLAVULANATE POTASSIUM 500; 125 MG/1; MG/1
1 TABLET, FILM COATED ORAL EVERY 12 HOURS SCHEDULED
Qty: 6 TABLET | Refills: 0 | Status: ON HOLD | OUTPATIENT
Start: 2022-12-29 | End: 2023-01-01

## 2022-12-29 RX ORDER — ENOXAPARIN SODIUM 100 MG/ML
30 INJECTION SUBCUTANEOUS
Refills: 0 | Status: ON HOLD
Start: 2022-12-30

## 2022-12-29 RX ADMIN — INSULIN LISPRO 2 UNITS: 100 INJECTION, SOLUTION INTRAVENOUS; SUBCUTANEOUS at 11:59

## 2022-12-29 RX ADMIN — Medication 1 TABLET: at 09:17

## 2022-12-29 RX ADMIN — AMIODARONE HYDROCHLORIDE 100 MG: 200 TABLET ORAL at 09:17

## 2022-12-29 RX ADMIN — AMLODIPINE BESYLATE 2.5 MG: 2.5 TABLET ORAL at 09:17

## 2022-12-29 RX ADMIN — CARVEDILOL 25 MG: 25 TABLET, FILM COATED ORAL at 09:17

## 2022-12-29 RX ADMIN — ENOXAPARIN SODIUM 30 MG: 30 INJECTION SUBCUTANEOUS at 09:17

## 2022-12-29 RX ADMIN — LEVETIRACETAM 500 MG: 500 TABLET, FILM COATED ORAL at 09:18

## 2022-12-29 RX ADMIN — AMOXICILLIN AND CLAVULANATE POTASSIUM 1 TABLET: 500; 125 TABLET, FILM COATED ORAL at 09:19

## 2022-12-29 RX ADMIN — POLYETHYLENE GLYCOL 3350 17 G: 17 POWDER, FOR SOLUTION ORAL at 09:17

## 2022-12-29 RX ADMIN — CHOLECALCIFEROL TAB 10 MCG (400 UNIT) 400 UNITS: 10 TAB at 09:17

## 2022-12-29 NOTE — ASSESSMENT & PLAN NOTE
No results found for: HGBA1C    Recent Labs     12/28/22  1115 12/28/22  1707 12/29/22  0820 12/29/22  1125   POCGLU 212* 120 141* 197*       Blood Sugar Average: Last 72 hrs:  (P) 168 625     - Continue current medication regimen   - Outpatient follow-up with PCP

## 2022-12-29 NOTE — CASE MANAGEMENT
Case Management Discharge Planning Note    Patient name Kimberly Jordanhand  [facility] MRN [id number]  : 1934 Date 2022       Current Admission Date: 2022  Current Admission Diagnosis:Closed fracture of nasal bone   Patient Active Problem List    Diagnosis Date Noted   • Facial laceration 2022   • Closed fracture of nasal bone 2022   • History of atrial fibrillation 2022   • 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, without long-term current use of insulin ([facility] ) 2022   • Secondary hyperparathyroidism of renal origin ([place] [address] ) 2022   • Primary hypertension 2022   • PAD (peripheral artery disease) ([place] [address] ) 2022   • Chronic congestive heart failure ([place] [address] ) 2022   • Chronic deep vein thrombosis (DVT) of proximal vein of lower extremity ([place] [address] ) 2022   • Other hyperlipidemia 2022   • SDH (subdural hematoma) 2022      LOS (days): 3  Geometric Mean LOS (GMLOS) (days):   Days to GMLOS:     OBJECTIVE:  Risk of Unplanned Readmission Score: 14 13         Current admission status: Inpatient   Preferred Pharmacy:   [address] [place] [address]  Phone: [phone] Fax: [phone]    Primary Care Provider: No primary care provider on file  Primary Insurance: Glorine Jodie REP  Secondary Insurance:     DISCHARGE DETAILS:    Other Referral/Resources/Interventions Provided:  Referral Comments: [facility] able to accept patient, no prior auth required from insurance    Transport at Discharge : Wheelchair van  Dispatcher Contacted: Yes  Number/Name of Dispatcher: Tory Hernandez by Noris and Unit #):  [employer]  ETA of Transport (Date): 22  ETA of Transport (Time): Yamile Melo Name, Jesse 41 : [address]  Receiving Facility/Agency Phone Number: [phone]  Facility/Agency Fax Number: [phone] Patient and nephew, Santos Brown, made aware of DCP and in agreement

## 2022-12-29 NOTE — PROGRESS NOTES
Progress Note - Geriatric Medicine   Yumiko Roche 80 y o  female MRN: [id number]  Unit/Bed#: [facility] 610-01 Encounter: [id number]      Assessment/Plan:    Subdural hematoma  -S/p fall  -Home ASA/Xarelto reversed with DDAVP and tranexamic acid on admission  -Noted on CTH pn admission, reportedly stable on follow-up  -Currently on Lovenox for DVT prophylaxis, systemic AC/AP on hold  -Nsx on consult    Bilateral nasal bone fracture  -noted on CT facial bones on admit  -OMFS rec acute pain control and o/p f/u  -sinus precautions, acute pain control     Acute pain due to trauma   -well controlled with Tylenol   -do not anticipate opiates will be necessary on d/c    Afib  -AC on hold due to SDH  -rate controlled on current regimen   -cont close o/p f/u with Cards     Impaired hearing  -continue use hearing aid at all appropriate times  -Encourage use of teach back method to ensure clear communication    Impaired vision  -Continue to encourage use corrective lenses at all appropriate times  -Encourage appropriate footwear and adequate lighting at all times when out of bed    Frailty in geriatric patient  -Due to age and comorbidities now with fall and acute traumatic injury superimposed  -Optimize chronic conditions and address acute derangements as arise    High risk of developing delirium  -Ensure pain control, maintain normal circadian rhythm and reorient frequently  -Continue to remain active and engaged to maintain cognitive acuity    Care condition: rounded with Kt Hernandez (RN)    Subjective:     Jose Paul is seen and examined at bedside where she is sitting resting comfortably, she states that she slept well and denies pain, dyspnea or other acute complaints  No acute events overnight  Review of Systems   Constitutional: Negative  Negative for appetite change, chills and fever  HENT: Negative  Respiratory: Negative  Cardiovascular: Negative  Gastrointestinal: Negative  Genitourinary: Negative  Musculoskeletal: Negative  Skin: Negative  Neurological: Negative for numbness  Psychiatric/Behavioral: Negative  All other systems reviewed and are negative  Objective:     Vitals: Blood pressure 142/55, pulse 56, temperature (!) 97 1 °F (36 2 °C), resp  rate 16, height 5' 1" (1 549 m), weight 81 6 kg (180 lb), SpO2 97 %  ,Body mass index is 34 01 kg/m²  Intake/Output Summary (Last 24 hours) at 12/29/2022 1022  Last data filed at 12/29/2022 5653  Gross per 24 hour   Intake 440 ml   Output --   Net 440 ml     Current Medications: Reviewed    Physical Exam:   Physical Exam  Vitals and nursing note reviewed  HENT:      Head: Normocephalic  Comments: Extensive facial ecchymosis     Nose: Nose normal       Mouth/Throat:      Mouth: Mucous membranes are moist    Eyes:      General:         Right eye: No discharge  Left eye: No discharge  Neck:      Comments: Phonation normal  Cardiovascular:      Rate and Rhythm: Normal rate  Pulmonary:      Effort: No respiratory distress  Breath sounds: No wheezing  Abdominal:      Palpations: Abdomen is soft  Tenderness: There is no abdominal tenderness  Musculoskeletal:      Cervical back: Neck supple  Comments: Obese body habitus, mildly reduced overall muscle mass   Skin:     General: Skin is warm and dry  Neurological:      Mental Status: She is alert  Comments: Awake and alert, answers questions appropriate, follows commands   Psychiatric:      Comments: Pleasant and cooperative        Invasive Devices     Peripheral Intravenous Line  Duration           Peripheral IV 12/28/22 Right;Ventral (anterior) Forearm <1 day              Lab, Imaging and other studies: I have personally reviewed pertinent reports

## 2022-12-29 NOTE — ASSESSMENT & PLAN NOTE
- holding Xarelto and aspirin for minimum of 2 weeks due to [address]  - resume once cleared by neurosurgery

## 2022-12-29 NOTE — ASSESSMENT & PLAN NOTE
- Recently amiodarone reduced to 100 mg daily  -Torsemide now every other day at 20 mg  - continue current home regimen, holding Xarelto

## 2022-12-29 NOTE — CASE MANAGEMENT
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, without long-term current use of insulin ([address] ) 2022   • Secondary hyperparathyroidism of renal origin ([facility] ) 2022   • Primary hypertension 2022   • PAD (peripheral artery disease) ([facility] ) 2022   • Chronic congestive heart failure ([facility] ) 2022   • Chronic deep vein thrombosis (DVT) of proximal vein of lower extremity ([place] [address] ) 2022   • Other hyperlipidemia 2022   • SDH (subdural hematoma) 2022      LOS (days): 3  Geometric Mean LOS (GMLOS) (days):   Days to GMLOS:     OBJECTIVE:  Risk of Unplanned Readmission Score: 14 09         Current admission status: Inpatient   Preferred Pharmacy:   [address] [place] [address]  Phone: [phone] Fax: [phone]    Primary Care Provider: No primary care provider on file  Primary Insurance: [address] [place] [address],[address] REP  Secondary Insurance:     DISCHARGE DETAILS:    Discharge planning discussed with[de-identified] Patient  Freedom of Choice: Yes  Comments - Freedom of Choice: Discussed FOC  CM contacted family/caregiver?: Yes       Other Referral/Resources/Interventions Provided:  Referral Comments: [facility] able to accept patient, auth tasked to CM DC support    IMM Given (Date):: 22  IMM Given to[de-identified] Patient   IMM reviewed with patient, patient agrees with discharge determination      Liam Edwards in agreement with DCP

## 2022-12-29 NOTE — ASSESSMENT & PLAN NOTE
Wt Readings from Last 3 Encounters:   12/27/22 81 6 kg (180 lb)   - Continue current medication regimen   - Outpatient follow-up with PCP    - no evidence of acute exacerbation at this time  - monitor for volume overload  - ECHO on 12/27 shows bicupid aortic valve, mild stenosis and mild tricuspid and mitral valve regurg

## 2022-12-29 NOTE — DISCHARGE SUMMARY
[facility]  Discharge- Joselyn Kong 1934, 80 y o  female MRN: [id number]  Unit/Bed#: [facility] 610-01 Encounter: [id number]  Primary Care Provider: No primary care provider on file  Date and time admitted to hospital: 12/25/2022 11:44 PM    SDH (subdural hematoma)  Assessment & Plan  - Traumatic brain injury with SDH, present on admission   - Neurosurgery evaluation and recommendations appreciated  - Hold anti-platelet and anticoagulant medications for least 2 weeks and/or until cleared by Neurosurgery  - GCS 15,non-focal  - Continue Keppra for 7 days for seizure prophylaxis  - Monitor neurologic exam  Repeat CT head on 12/26 shows stability    - Continue symptomatic management with analgesia as needed  - PT and OT evaluation and treatment as indicated  - Outpatient Neurosurgery follow-up in 2 weeks with repeat CT scan of the head prior to follow-up appointment  Facial laceration  Assessment & Plan  - sutures placed on 12/26  - suture removal on 12/31-1/2  - local wound care    History of atrial fibrillation  Assessment & Plan  - Recently amiodarone reduced to 100 mg daily  -Torsemide now every other day at 20 mg  - continue current home regimen, holding Xarelto    Other hyperlipidemia  Assessment & Plan  - Continue current medication regimen   - Outpatient follow-up with PCP  Chronic deep vein thrombosis (DVT) of proximal vein of lower extremity (HCC)  Assessment & Plan  - holding Xarelto and aspirin for minimum of 2 weeks due to [address]  - resume once cleared by neurosurgery    Chronic congestive heart failure ([address] )  Assessment & Plan  Wt Readings from Last 3 Encounters:   12/27/22 81 6 kg (180 lb)   - Continue current medication regimen   - Outpatient follow-up with PCP    - no evidence of acute exacerbation at this time  - monitor for volume overload  - ECHO on 12/27 shows bicupid aortic valve, mild stenosis and mild tricuspid and mitral valve regurg        PAD (peripheral artery disease) ([facility] )  Assessment & Plan  - Follows outpatient with vascular surgery  - recent duplex completed on 10/22   - will need outpatient follow-up with vascular surgery  - holding aspirin and Xarelto in the setting of traumatic brain injury for 2 weeks    Primary hypertension  Assessment & Plan  - Continue current medication regimen   - Outpatient follow-up with PCP  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, without long-term current use of insulin [facility])  Assessment & Plan  No results found for: HGBA1C    Recent Labs     12/28/22  1115 12/28/22  1707 12/29/22  0820 12/29/22  1125   POCGLU 212* 120 141* 197*       Blood Sugar Average: Last 72 hrs:  (P) 168 625     - Continue current medication regimen   - Outpatient follow-up with PCP  * Closed fracture of nasal bone  Assessment & Plan  -OMS consulted, appreciate input  -transitioned to augmentin from unasyn  -Nonoperative management  -Sinus precautions  - f/u with OMFS in 1 week            Medical Problems     Resolved Problems  Date Reviewed: 12/29/2022   None         Admission Date:   Admission Orders (From admission, onward)     Ordered        12/26/22 0139  Inpatient Admission  Once                        Admitting Diagnosis: Nasal bones, closed fracture [S02  2XXA]  SDH (subdural hematoma) [S06  5XAA]  Lip laceration [S01 511A]  Eyebrow laceration, left, initial encounter [S01 112A]  Laceration of left palm [S61 412A]  Unspecified multiple injuries, initial encounter [T07  XXXA]    HPI: As documented by Dr Isa Spencer who evaluated the patient on admission, "Melba Mcdaniel is a 80 y o  female who presents with epistaxis and multiple lacerations presenting after a syncopal episode  She does not remember what happened  Nephews were at her residence, were about to leave, when they heard her hit the floor  They found her unconscious and called EMS  She falls frequently, but does not typically pass out  She does not have a defibrillator or pacemaker  She takes aspirin and Xarelto  She was in her normal state of health earlier today prior to the syncopal episode  "    Procedures Performed:   Orders Placed This Encounter   Procedures   • Epistaxis management   • Laceration repair   • Laceration repair   • Laceration repair       Summary of Hospital Course: Patient was placed on the trauma service s/p fall when she sustained facial fractures, a SDH and multiple lacerations  She had L hand laceration repair, lip laceration repair and left eyebrow laceration repair  She was seen by neurosurgery and recommended for conservative management  Her ASA and Xarelto were held and she was given DDAVP and [facility] for reversal  She had a f/u CT head which showed stability  She remained a GCS 15 and non-focal  She was seen by OMFS who recommended non operative management  She is to maintain sinus precautions and complete a one week course Augmentin  She was seen by PT/OT and recommended for rehab  She will d/c to [address] today  She is to follow up with neurosurgery in 2 weeks with repeat CT head  She is to hold her Xarelto and ASA until cleared by their team to resume  She is to follow up with OMFS in 1-2 weeks and f/u with trauma for suture removal      Today she is feeling well  She denies headache or facial pain  No vision changes  She is tolerating a diet and motivated to go to rehab  Exam:  Vitals:    12/29/22 1124   BP: 147/57   Pulse: 58   Resp: 16   Temp:    SpO2: 96%     GEN: NAD  HEENT: + facial ecchymosis ; EOMI bilaterally; PEERL  NEURO: GCS 15,non-focal  CV: RRR, no MGR  PULM: CTA bialterally  GI: soft,non-tender,non-distended  : voiding  MSK: no edema, contusion or deformity; + L hand laceration with sutures in place, C/D/I  SKIN: pink,w arm, dry      Significant Findings, Care, Treatment and Services Provided:   XR knee 1 or 2 vw left    Result Date: 12/28/2022  Impression: No acute osseous abnormality  Workstation performed: [id number]     XR knee 1 or 2 vw right    Result Date: 12/28/2022  Impression: No acute osseous abnormality  Workstation performed: [id number]     CT head wo contrast    Result Date: 12/26/2022  Impression: Stable thin subdural hemorrhage along the falx and right frontal convexity, as described above  Small amount of hemorrhage is now noted layering within the left occipital horn  The study was marked in [facility] for immediate notification  Workstation performed: [id number]     TRAUMA - CT head wo contrast    Result Date: 12/26/2022  Impression: There is a subdural hemorrhage along the anterior falx measuring up to 4 mm  No mass effect or midline shift  Left forehead/frontal subcutaneous contusion    Please see the concurrently performed and reported CT of the facial bones  I personally discussed this study with Dr Mykel Drew on 12/26/2022 at 1:26 AM  Workstation performed: [id number]     TRAUMA - CT facial bones wo contrast    Result Date: 12/26/2022  Impression: Bilateral nasal fractures are noted  The nasal septum is serpiginous indicating multiple fractures  Irregularity of the inferior nasal turbinates bilaterally suggest fractures  Debris is seen in the nasal cavity bilaterally  Mild multifocal sinus opacities  Debris is seen in the nasal cavities bilaterally and air-fluid levels are seen in the maxillary sinuses bilaterally likely from the above-described fractures  I personally discussed this study with Dr Mykel Drew on 12/26/2022 at 1:26 AM  Workstation performed: [id number]     TRAUMA - CT spine cervical wo contrast    Result Date: 12/26/2022  Impression: No cervical spine fracture or traumatic malalignment  I personally discussed this study with Dr Mykel Drew on 12/26/2022 at 1:26 AM  Workstation performed: [id number]     XR chest 1 view    Result Date: 12/27/2022  Impression: No acute cardiopulmonary disease within limitations of supine imaging   Workstation performed: [id number]     CT chest abdomen pelvis w contrast    Result Date: 12/26/2022  Impression: 2 1 cm left renal cystic lesion which does not meet CT criteria for simple cyst on this examination; recommend elective ultrasound or MRI abdomen with contrast, renal protocol, for further evaluation  No acute pathology visualized on CT of the chest, abdomen and pelvis with IV without oral contrast   I personally discussed this study with Dr Amalia Liao on 12/26/2022 at 1:26 AM  Workstation performed: [id number]     XR Trauma multiple (SLB/SLRA trauma bay ONLY)    Result Date: 12/26/2022  Impression: No acute cardiopulmonary disease within limitations of supine imaging  Workstation performed: [id number]       Complications: none    Condition at Discharge: good         Discharge instructions/Information to patient and family:   See after visit summary for information provided to patient and family  Provisions for Follow-Up Care:  See after visit summary for information related to follow-up care and any pertinent home health orders  PCP: No primary care provider on file  Disposition: Short-term rehab at [address] Readmission: No    Discharge Statement   I spent 25 minutes discharging the patient  This time was spent on the day of discharge  I had direct contact with the patient on the day of discharge  Additional documentation is required if more than 30 minutes were spent on discharge  Discharge Medications:  See after visit summary for reconciled discharge medications provided to patient and family

## 2022-12-29 NOTE — PROGRESS NOTES
Sutures removed from right upper lip and left eyebrow without difficulty  Wounds are well healed   L hand sutures remain in place and appt made with trauma in 2 weeks for removal

## 2022-12-29 NOTE — PROGRESS NOTES
Pastoral Care Progress Note    2022  Patient: Juno Whitney : 1934  Admission Date & Time: 20224  MRN: [id number] CSN: [id number]         22 1500   Clinical Encounter Type   Visited With Patient   Mosque Encounters   Mosque Needs Prayer     Cassius Butt visited with the patient and provided prayers and blessings  No further needs were expressed at this time  Chaplains still remain available

## 2022-12-29 NOTE — INCIDENTAL FINDINGS
The following findings require follow up:  Radiographic finding   Finding: : 2 1 cm left renal cystic lesion which does not meet CT criteria for simple cyst on this examination; recommend elective ultrasound or MRI abdomen with contrast, renal protocol, for further evaluation      Incidental discovery of one or more thyroid nodule(s) measuring more than 1 5 cm and without suspicious features is noted in this patient who is above 28years old; according to guidelines published in the   February 2015 white paper on incidental thyroid nodules in the Journal of the [employer] of Radiology Herchel Mews), further characterization with thyroid ultrasound is recommended  Liver is diffusely decreased in density consistent with fatty change     Follow up required: family doctor, MRI abdomen with contrast, renal protocol, thyroid ultrasound   Follow up should be done within 2 week(s)    Patient was informed of incidental finding and recommended follow up due to concern for malignancy  She verbalized understanding

## 2022-12-29 NOTE — ASSESSMENT & PLAN NOTE
- Traumatic brain injury with SDH, present on admission   - Neurosurgery evaluation and recommendations appreciated  - Hold anti-platelet and anticoagulant medications for least 2 weeks and/or until cleared by Neurosurgery  - GCS 15,non-focal  - Continue Keppra for 7 days for seizure prophylaxis  - Monitor neurologic exam  Repeat CT head on 12/26 shows stability    - Continue symptomatic management with analgesia as needed  - PT and OT evaluation and treatment as indicated  - Outpatient Neurosurgery follow-up in 2 weeks with repeat CT scan of the head prior to follow-up appointment

## 2022-12-29 NOTE — PLAN OF CARE
Problem: PHYSICAL THERAPY ADULT  Goal: Performs mobility at highest level of function for planned discharge setting  See evaluation for individualized goals  Description: Treatment/Interventions: Functional transfer training, LE strengthening/ROM, Elevations, Therapeutic exercise, Endurance training, Bed mobility, Gait training, Equipment eval/education, Spoke to nursing, OT  Equipment Recommended: Iftikhar Herrera (pt reports having)       See flowsheet documentation for full assessment, interventions and recommendations  Outcome: Progressing  Note: Prognosis: Good  Problem List: Decreased strength, Decreased endurance, Impaired balance, Decreased mobility, Decreased safety awareness, Impaired hearing  Assessment: Patient resting out of bed in chair at time PT treatment session  Patient denies any pain today and is willing and motivated to participate with PT  Patient was able to perform all transfers with min a x1 which is approximately the same level of assistance required compared to previous session  Cueing is still needed for proper hand placement during sit to stand transfers  Patient was able to tolerate ambulation with min a x1 in the use of rolling walker which is also approximately the same level assistance compared to previous session  During gait training patient was noted to fatigue and required several standing rest breaks  Patient was noted to have degradation in posture as patient fatigued with gait training  Stair negotiation was attempted and patient was able to ascend and descend 3 steps with min a x1 with use of bilateral hand rails  Slight cueing was required for proper lower extremity sequencing and safety on stairs  Additionally, patient was able to tolerate and perform all lower extremity TherEX seated out of bed in chair without any increase in complaints  Slight cueing was needed for proper form and pacing    At conclusion of PT treatment session patient was assisted back into chair with all needs within reach  D/C recommendation when medically cleared is rehab  Barriers to Discharge: Decreased caregiver support     PT Discharge Recommendation: Post acute rehabilitation services    See flowsheet documentation for full assessment

## 2022-12-29 NOTE — ASSESSMENT & PLAN NOTE
- Follows outpatient with vascular surgery  - recent duplex completed on 10/22   - will need outpatient follow-up with vascular surgery  - holding aspirin and Xarelto in the setting of traumatic brain injury for 2 weeks

## 2022-12-29 NOTE — PHYSICAL THERAPY NOTE
PHYSICAL THERAPY NOTE          Patient Name: Derek Flores  [id number] Date: 12/29/2022 12/29/22 1109   Note Type   Note Type Treatment   Pain Assessment   Pain Assessment Tool 0-10   Pain Score No Pain   Restrictions/Precautions   Weight Bearing Precautions Per Order No   Other Precautions Chair Alarm; Bed Alarm;Multiple lines; Fall Risk   General   Chart Reviewed Yes   Response to Previous Treatment Patient with no complaints from previous session  Family/Caregiver Present No   Cognition   Overall Cognitive Status WFL   Arousal/Participation Alert   Attention Attends with cues to redirect   Orientation Level Oriented X4   Memory Decreased recall of precautions   Following Commands Follows one step commands without difficulty   Subjective   Subjective Pt willing to participate in PT treatment session   Bed Mobility   Additional Comments NA, Pt seated OOB in chair at time of PT treatment session   Transfers   Sit to Stand 4  Minimal assistance   Additional items Assist x 1; Increased time required;Verbal cues   Stand to Sit 4  Minimal assistance   Additional items Assist x 1; Increased time required;Verbal cues   Ambulation/Elevation   Gait pattern Excessively slow; Short stride; Foward flexed; Inconsistent wagner   Gait Assistance 4  Minimal assist   Additional items Assist x 1   Assistive Device Rolling walker   Distance 65ft x 2  (several standing rest breaks required)   Stair Management Assistance 4  Minimal assist   Additional items Assist x 1   Stair Management Technique Two rails   Number of Stairs 3   Balance   Static Sitting Fair   Static Standing Fair -   Ambulatory Poor +   Endurance Deficit   Endurance Deficit Yes   Endurance Deficit Description fatigue   Activity Tolerance   Activity Tolerance Patient limited by fatigue   Nurse Made Aware Pt appropriate to be seen and mobilize per nsg   Exercises   Hip Abduction Sitting;15 reps;AROM; Bilateral  (x 2 sets)   Knee AROM Long Arc Quad Sitting;15 reps;AROM; Bilateral  (x 2 sets)   Marching Sitting;15 reps;AROM; Bilateral  (x 2 sets)   Assessment   Prognosis Good   Problem List Decreased strength;Decreased endurance; Impaired balance;Decreased mobility; Decreased safety awareness; Impaired hearing   Assessment Patient resting out of bed in chair at time PT treatment session  Patient denies any pain today and is willing and motivated to participate with PT  Patient was able to perform all transfers with min a x1 which is approximately the same level of assistance required compared to previous session  Cueing is still needed for proper hand placement during sit to stand transfers  Patient was able to tolerate ambulation with min a x1 in the use of rolling walker which is also approximately the same level assistance compared to previous session  During gait training patient was noted to fatigue and required several standing rest breaks  Patient was noted to have degradation in posture as patient fatigued with gait training  Stair negotiation was attempted and patient was able to ascend and descend 3 steps with min a x1 with use of bilateral hand rails  Slight cueing was required for proper lower extremity sequencing and safety on stairs  Additionally, patient was able to tolerate and perform all lower extremity TherEX seated out of bed in chair without any increase in complaints  Slight cueing was needed for proper form and pacing  At conclusion of PT treatment session patient was assisted back into chair with all needs within reach  D/C recommendation when medically cleared is rehab   Barriers to Discharge Decreased caregiver support   Goals   Patient Goals " to get better"   [facility] Expiration Date 01/06/23   PT Treatment Day 1   Plan   Treatment/Interventions Functional transfer training;LE strengthening/ROM; Therapeutic exercise; Endurance training;Patient/family training;Equipment eval/education; Bed mobility;Gait training;Spoke to nursing;OT   Progress Progressing toward goals   PT Frequency Other (Comment)  (2-4x a week)   Recommendation   PT Discharge Recommendation Post acute rehabilitation services   Equipment Recommended [facility] Recommended Wheeled walker   AM-PAC Basic Mobility Inpatient   Turning in Bed Without Bedrails 4   Lying on Back to Sitting on Edge of Flat Bed 3   Moving Bed to Chair 3   Standing Up From Chair 3   Walk in Room 3   Climb 3-5 Stairs 3   Basic Mobility Inpatient Raw Score 19   Basic Mobility Standardized Score 42 48   Highest Level Of Mobility   JH-HLM Goal 6: Walk 10 steps or more   JH-HLM Achieved 7: Walk 25 feet or more   Portions of the documentation may have been created using voice recognition software  Occasional wrong word or sound alike substitutions may have occurred due to the inherent limitations of the voice recognition software  Read the chart carefully and recognize, using context, where substitutions have occurred      Reji Rodriguez, PT, DPT

## 2022-12-29 NOTE — ASSESSMENT & PLAN NOTE
-OMS consulted, appreciate input  -transitioned to augmentin from unasyn  -Nonoperative management  -Sinus precautions  - f/u with OMFS in 1 week

## 2022-12-29 NOTE — PLAN OF CARE
Problem: Potential for Falls  Goal: Patient will remain free of falls  Description: INTERVENTIONS:  - Educate patient/family on patient safety including physical limitations  - Instruct patient to call for assistance with activity   - Consult OT/PT to assist with strengthening/mobility   - Keep Call bell within reach  - Keep bed low and locked with side rails adjusted as appropriate  - Keep care items and personal belongings within reach  - Initiate and maintain comfort rounds  - Make Fall Risk Sign visible to staff  - Offer Toileting every  Hours, in advance of need  - Initiate/Maintain alarm  - Obtain necessary fall risk management equipment:   - Apply yellow socks and bracelet for high fall risk patients  - Consider moving patient to room near nurses station  12/29/2022 1534 by Halley Gutiérrez RN  Outcome: Adequate for Discharge  12/29/2022 1534 by Halley Gutiérrez RN  Outcome: Progressing     Problem: MOBILITY - ADULT  Goal: Maintain or return to baseline ADL function  Description: INTERVENTIONS:  -  Assess patient's ability to carry out ADLs; assess patient's baseline for ADL function and identify physical deficits which impact ability to perform ADLs (bathing, care of mouth/teeth, toileting, grooming, dressing, etc )  - Assess/evaluate cause of self-care deficits   - Assess range of motion  - Assess patient's mobility; develop plan if impaired  - Assess patient's need for assistive devices and provide as appropriate  - Encourage maximum independence but intervene and supervise when necessary  - Involve family in performance of ADLs  - Assess for home care needs following discharge   - Consider OT consult to assist with ADL evaluation and planning for discharge  - Provide patient education as appropriate  12/29/2022 1534 by Halley Gutiérrez RN  Outcome: Adequate for Discharge  12/29/2022 1534 by Halley Gutiérrez RN  Outcome: Progressing  Goal: Maintains/Returns to pre admission functional level  Description: INTERVENTIONS:  - Perform BMAT or MOVE assessment daily    - Set and communicate daily mobility goal to care team and patient/family/caregiver  - Collaborate with rehabilitation services on mobility goals if consulted  - Perform Range of Motion  times a day  - Reposition patient every  hours  - Dangle patient  times a day  - Stand patient  times a day  - Ambulate patient  times a day  - Out of bed to chair  times a day   - Out of bed for meal times a day  - Out of bed for toileting  - Record patient progress and toleration of activity level   12/29/2022 1534 by Ignacio Jameson RN  Outcome: Adequate for Discharge  12/29/2022 1534 by Ignacio Jameson RN  Outcome: Progressing     Problem: Nutrition/Hydration-ADULT  Goal: Nutrient/Hydration intake appropriate for improving, restoring or maintaining nutritional needs  Description: Monitor and assess patient's nutrition/hydration status for malnutrition  Collaborate with interdisciplinary team and initiate plan and interventions as ordered  Monitor patient's weight and dietary intake as ordered or per policy  Utilize nutrition screening tool and intervene as necessary  Determine patient's food preferences and provide high-protein, high-caloric foods as appropriate       INTERVENTIONS:  - Monitor oral intake, urinary output, labs, and treatment plans  - Assess nutrition and hydration status and recommend course of action  - Evaluate amount of meals eaten  - Assist patient with eating if necessary   - Allow adequate time for meals  - Recommend/ encourage appropriate diets, oral nutritional supplements, and vitamin/mineral supplements  - Order, calculate, and assess calorie counts as needed  - Recommend, monitor, and adjust tube feedings and TPN/PPN based on assessed needs  - Assess need for intravenous fluids  - Provide specific nutrition/hydration education as appropriate  - Include patient/family/caregiver in decisions related to nutrition  12/29/2022 1534 by Caity Snell RN  Outcome: Adequate for Discharge  12/29/2022 1534 by Caity Snell RN  Outcome: Progressing     Problem: Prexisting or High Potential for Compromised Skin Integrity  Goal: Skin integrity is maintained or improved  Description: INTERVENTIONS:  - Identify patients at risk for skin breakdown  - Assess and monitor skin integrity  - Assess and monitor nutrition and hydration status  - Monitor labs   - Assess for incontinence   - Turn and reposition patient  - Assist with mobility/ambulation  - Relieve pressure over bony prominences  - Avoid friction and shearing  - Provide appropriate hygiene as needed including keeping skin clean and dry  - Evaluate need for skin moisturizer/barrier cream  - Collaborate with interdisciplinary team   - Patient/family teaching  - Consider wound care consult   12/29/2022 1534 by Caity Snell RN  Outcome: Adequate for Discharge  12/29/2022 1534 by Caity Snell RN  Outcome: Progressing

## 2022-12-30 ENCOUNTER — NURSING HOME VISIT (OUTPATIENT)
Dept: GERIATRICS | Facility: OTHER | Age: 87
End: 2022-12-30

## 2022-12-30 VITALS
OXYGEN SATURATION: 99 % | TEMPERATURE: 97.6 F | DIASTOLIC BLOOD PRESSURE: 72 MMHG | HEART RATE: 77 BPM | RESPIRATION RATE: 16 BRPM | SYSTOLIC BLOOD PRESSURE: 152 MMHG | BODY MASS INDEX: 37.03 KG/M2 | WEIGHT: 196 LBS

## 2022-12-30 DIAGNOSIS — N18.32 STAGE 3B CHRONIC KIDNEY DISEASE (HCC): ICD-10-CM

## 2022-12-30 DIAGNOSIS — E66.09 CLASS 2 OBESITY DUE TO EXCESS CALORIES WITHOUT SERIOUS COMORBIDITY WITH BODY MASS INDEX (BMI) OF 37.0 TO 37.9 IN ADULT: ICD-10-CM

## 2022-12-30 DIAGNOSIS — I50.9 CHRONIC CONGESTIVE HEART FAILURE, UNSPECIFIED HEART FAILURE TYPE (HCC): ICD-10-CM

## 2022-12-30 DIAGNOSIS — E87.1 HYPONATREMIA: ICD-10-CM

## 2022-12-30 DIAGNOSIS — I82.5Y9 CHRONIC DEEP VEIN THROMBOSIS (DVT) OF PROXIMAL VEIN OF LOWER EXTREMITY, UNSPECIFIED LATERALITY (HCC): ICD-10-CM

## 2022-12-30 DIAGNOSIS — I10 PRIMARY HYPERTENSION: ICD-10-CM

## 2022-12-30 DIAGNOSIS — S02.2XXD CLOSED FRACTURE OF NASAL BONE WITH ROUTINE HEALING, SUBSEQUENT ENCOUNTER: ICD-10-CM

## 2022-12-30 DIAGNOSIS — E11.65 TYPE 2 DIABETES MELLITUS WITH HYPERGLYCEMIA, WITH LONG-TERM CURRENT USE OF INSULIN (HCC): ICD-10-CM

## 2022-12-30 DIAGNOSIS — Z79.4 TYPE 2 DIABETES MELLITUS WITH HYPERGLYCEMIA, WITH LONG-TERM CURRENT USE OF INSULIN (HCC): ICD-10-CM

## 2022-12-30 DIAGNOSIS — S06.5XAA SDH (SUBDURAL HEMATOMA): Primary | ICD-10-CM

## 2022-12-30 PROBLEM — N18.30 STAGE 3 CHRONIC KIDNEY DISEASE (HCC): Status: ACTIVE | Noted: 2022-12-30

## 2022-12-30 PROBLEM — H61.23 BILATERAL IMPACTED CERUMEN: Status: RESOLVED | Noted: 2022-10-31 | Resolved: 2022-12-30

## 2022-12-30 PROBLEM — Z00.00 MEDICARE ANNUAL WELLNESS VISIT, SUBSEQUENT: Status: RESOLVED | Noted: 2022-10-31 | Resolved: 2022-12-30

## 2022-12-30 NOTE — TELEPHONE ENCOUNTER
12/30/22- PT DISCHARGED TO [address] [facility] [phone]  SPOKE TO [address] [place] [address] TO "[employer] [facility] " LMOM TO CONFIRM 1/11/23 F/U AND COMPLETE CTH 2-3 DAYS PRIOR   ASKED FOR CB TO CONFIRM

## 2022-12-30 NOTE — ASSESSMENT & PLAN NOTE
-transitioned to augmentin from unasyn  -Nonoperative management  -Sinus precautions  - f/u with OMFS in 1 week

## 2022-12-30 NOTE — ASSESSMENT & PLAN NOTE
Lab Results   Component Value Date    EGFR 31 12/29/2022    EGFR 31 12/28/2022    EGFR 39 12/27/2022    CREATININE 1 46 (H) 12/29/2022    CREATININE 1 48 (H) 12/28/2022    CREATININE 1 22 12/27/2022     Creatinine stable  Will avoid nephrotoxic medication  Encourage po hydration  Will monitor BMP

## 2022-12-30 NOTE — ASSESSMENT & PLAN NOTE
- Traumatic brain injury with SDH, present on admission   - Neurosurgery evaluation and recommendations appreciated  - Hold anti-platelet and anticoagulant medications for least 2 weeks and/or until cleared by Neurosurgery  - Continue Keppra for 7 days for seizure prophylaxis  - Monitor neurologic exam  Repeat CT head on 12/26 shows stability    - Continue symptomatic management with analgesia as needed  Currently patient is taking Tylenol scheduled  - PT and OT evaluation and treatment  -Encourage out of bed as tolerated  - Outpatient Neurosurgery follow-up in 2 weeks with repeat CT scan of the head prior to follow-up appointment

## 2022-12-30 NOTE — ASSESSMENT & PLAN NOTE
- holding Xarelto and aspirin for minimum of 2 weeks due to [address]  - resume once cleared by neurosurgery  -Continue Lovenox

## 2022-12-30 NOTE — ASSESSMENT & PLAN NOTE
Currently at goal  Will discontinue amlodipine due to lower extremity edema  Start lisinopril 5 mg daily  Continue carvedilol and torsemide  Monitor closely and adjust regimen as needed

## 2022-12-30 NOTE — UTILIZATION REVIEW
NOTIFICATION OF ADMISSION DISCHARGE   This is a Notification of Discharge from [address]  Please be advised that this patient has been discharge from our facility  Below you will find the admission and discharge date and time including the patient’s disposition  UTILIZATION REVIEW CONTACT:  Lulu Emmanuel  Utilization   Network Utilization Review Department  Phone: [phone] x carefully listen to the prompts  All voicemails are confidential   Email: [email or web address]     ADMISSION INFORMATION  PRESENTATION DATE: 12/25/2022 11:44 PM  OBERVATION ADMISSION DATE:   INPATIENT ADMISSION DATE: 12/26/22  1:39 AM   DISCHARGE DATE: 12/29/2022  3:07 PM   DISPOSITION:Released to SNF/TCU/SNU Facility    IMPORTANT INFORMATION:  Send all requests for admission clinical reviews, approved or denied determinations and any other requests to dedicated fax number below belonging to the campus where the patient is receiving treatment   List of dedicated fax numbers:  [address] DENIALS (Administrative/Medical Necessity) [phone]   [address] (Maternity/NICU/Pediatrics) [phone]   [facility] [phone]   [place] [address] [phone]   [address] [phone]   [address] [facility] [phone] [facility] [phone]   [address] [place] [address] 119 [phone]   [facility]  [phone]   [address] [facility] [phone] [address] [phone]

## 2022-12-30 NOTE — PROGRESS NOTES
[facility]    History and Physical  POS: 31    Records Reviewed include: Hospital records      Chief Complaint/ Reason for Admission:   SDH Fracture of nasal bone    History of Present Illness:       Ms Parvin Sherman is an 80-year-old female who was recently admitted to [address] [place] [address] status post fall, was found to have subdural hematoma and nasal bone fracture with facial laceration  Currently she is admitted to [facility] transitional care unit for short-term rehab  At the time of encounter she denied chest pain shortness of breath cough  No headache dizziness or lightheadedness  Denies fever chills  States that her appetite is good and denies difficulty sleeping  No trouble swallowing nausea or vomiting  Denies abdominal pain diarrhea or constipation  No dysuria or hematuria  Patient lives at an assisted living, she is independent with ADLs and some of the IADLs, uses walker for ambulation  We will continue physical therapy and goal is to return to [facility]  Allergies    Allergies   Allergen Reactions   • Hydrochlorothiazide Other (See Comments)       • Statins Other (See Comments)              Past Medical History  Past Medical History:   Diagnosis Date   • Anxiety    • HLD (hyperlipidemia)         Past Surgical History:   Procedure Laterality Date   • APPENDECTOMY         Family History  Family History   Problem Relation Age of Onset   • Heart disease Mother        Social History  Social History     Tobacco Use   Smoking Status Never   Smokeless Tobacco Never      Social History     Substance and Sexual Activity   Alcohol Use Not Currently      Social History     Substance and Sexual Activity   Drug Use Never        Lives: Home,  Social Support: family  Fall in the past 12 months: yes  Use of assistance Device: Walker    Physical Exam    Vital Signs    Vitals:    12/30/22 1322   BP: 152/72   Pulse: 77   Resp: 16   Temp: 97 6 °F (36 4 °C)   SpO2: 99% Physical Exam  Vitals and nursing note reviewed  Constitutional:       General: She is not in acute distress  Appearance: She is obese  She is not diaphoretic  HENT:      Head: Normocephalic  Comments: bruise     Ears:      Comments: [place]     Mouth/Throat:      Mouth: Mucous membranes are dry  Eyes:      General:         Right eye: No discharge  Left eye: No discharge  Pupils: Pupils are equal, round, and reactive to light  Cardiovascular:      Rate and Rhythm: Normal rate and regular rhythm  Heart sounds: Normal heart sounds  No murmur heard  Pulmonary:      Effort: Pulmonary effort is normal  No respiratory distress  Breath sounds: Normal breath sounds  No wheezing  Abdominal:      Palpations: Abdomen is soft  Tenderness: There is no abdominal tenderness  There is no guarding or rebound  Musculoskeletal:         General: Swelling (L knee) and tenderness present  Cervical back: Normal range of motion and neck supple  Right lower leg: Edema present  Left lower leg: Edema present  Skin:     General: Skin is warm and dry  Findings: Bruising present  Neurological:      Mental Status: She is alert and oriented to person, place, and time  Mental status is at baseline  Psychiatric:         Behavior: Behavior normal          Review of Systems:  Review of Systems   Constitutional: Negative for chills and fever  HENT: Negative for congestion and rhinorrhea  Respiratory: Negative for cough, shortness of breath and wheezing  Cardiovascular: Positive for leg swelling  Negative for chest pain and palpitations  Gastrointestinal: Negative for abdominal pain and constipation  Endocrine: Negative for cold intolerance  Genitourinary: Negative for difficulty urinating, dysuria and hematuria  Musculoskeletal: Positive for gait problem  Skin: Negative for wound  Allergic/Immunologic: Negative for environmental allergies  Neurological: Negative for dizziness and seizures  Hematological: Bruises/bleeds easily  Psychiatric/Behavioral: Negative for behavioral problems and sleep disturbance  List of Current Medications:    Medication reviewed  All orders signed  Complete list is in the paper chart  Allergies    Allergies   Allergen Reactions   • Hydrochlorothiazide Other (See Comments)       • Statins Other (See Comments)       Labs/Diagnostics (reviewed by this provider): I personally reviewed lab results and imaging studies  Full reports are in the paper chart  Assessment/Plan:    SDH (subdural hematoma)  - Traumatic brain injury with SDH, present on admission   - Neurosurgery evaluation and recommendations appreciated  - Hold anti-platelet and anticoagulant medications for least 2 weeks and/or until cleared by Neurosurgery  - Continue Keppra for 7 days for seizure prophylaxis  - Monitor neurologic exam  Repeat CT head on 12/26 shows stability    - Continue symptomatic management with analgesia as needed  Currently patient is taking Tylenol scheduled  - PT and OT evaluation and treatment  -Encourage out of bed as tolerated  - Outpatient Neurosurgery follow-up in 2 weeks with repeat CT scan of the head prior to follow-up appointment      Closed fracture of nasal bone  -transitioned to augmentin from unasyn  -Nonoperative management  -Sinus precautions  - f/u with OMFS in 1 week    Chronic deep vein thrombosis (DVT) of proximal vein of lower extremity (HCC)  - holding Xarelto and aspirin for minimum of 2 weeks due to [address]  - resume once cleared by neurosurgery  -Continue Lovenox    Chronic congestive heart failure ([address] )  Wt Readings from Last 3 Encounters:   12/30/22 88 9 kg (196 lb)   12/27/22 81 6 kg (180 lb)       Currently stable  Monitor daily weight  Continue torsemide and potassium supplement  Monitor electrolytes      Primary hypertension  Currently at goal  Will discontinue amlodipine due to lower extremity edema  Start lisinopril 5 mg daily  Continue carvedilol and torsemide  Monitor closely and adjust regimen as needed    Stage 3 chronic kidney disease (HCC)  Lab Results   Component Value Date    EGFR 31 12/29/2022    EGFR 31 12/28/2022    EGFR 39 12/27/2022    CREATININE 1 46 (H) 12/29/2022    CREATININE 1 48 (H) 12/28/2022    CREATININE 1 22 12/27/2022     Creatinine stable  Will avoid nephrotoxic medication  Encourage po hydration  Will monitor BMP      Type 2 diabetes mellitus with hyperglycemia, with long-term current use of insulin (HCC)  We will check hemoglobin A1c  Continue insulin sliding scale  Avoid hypoglycemia    Class 2 obesity due to excess calories 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 mass index (BMI) of 37 0 to 37 9 in adult  Advice lifestyle modification  Dietitian to follow  Continue to monitor weights    Hyponatremia  Sodium today 129  We will repeat CMP tomorrow  Order sodium studies as well  Placed on fluid restriction  Note patient is on torsemide  Consider nephrology consult       Pain: yes  Rehab Potential:Good  Patient Informed of Medical Condition: yes   Patient is Capable of Understanding Their Right: yes   Discharge Plan: snf  Vaccination:   Immunization History   Administered Date(s) Administered   • Tdap 12/26/2022     Advanced Directives: yes: Yes   Code status:DNR (Per discussion with resident or POA)      Jania Steel MD  Geriatric Medicine  12/30/20221:45 PM

## 2022-12-30 NOTE — ASSESSMENT & PLAN NOTE
Wt Readings from Last 3 Encounters:   12/30/22 88 9 kg (196 lb)   12/27/22 81 6 kg (180 lb)       Currently stable  Monitor daily weight  Continue torsemide and potassium supplement  Monitor electrolytes

## 2022-12-30 NOTE — ASSESSMENT & PLAN NOTE
Sodium today 129  We will repeat CMP tomorrow  Order sodium studies as well  Placed on fluid restriction  Note patient is on torsemide  Consider nephrology consult

## 2023-01-01 NOTE — TELEPHONE ENCOUNTER
Patient remain stable on room air.  Decreased to 24 kcal. Emesis x 3.  Bottled x 2.  Voiding and stooling with suppository.  Hydrocortisone discontinued.  Linen changed.  Massage given. Continue to monitor and notify physician of any changes.                            Patient will call pharmacy and find out if it is more affordable if not she will ask them which meds are under her formulary

## 2023-01-03 ENCOUNTER — NURSING HOME VISIT (OUTPATIENT)
Dept: GERIATRICS | Facility: OTHER | Age: 88
End: 2023-01-03

## 2023-01-03 DIAGNOSIS — S02.2XXD CLOSED FRACTURE OF NASAL BONE WITH ROUTINE HEALING, SUBSEQUENT ENCOUNTER: ICD-10-CM

## 2023-01-03 DIAGNOSIS — E87.1 HYPONATREMIA: ICD-10-CM

## 2023-01-03 DIAGNOSIS — N18.32 STAGE 3B CHRONIC KIDNEY DISEASE (HCC): ICD-10-CM

## 2023-01-03 DIAGNOSIS — S06.5XAA SDH (SUBDURAL HEMATOMA): Primary | ICD-10-CM

## 2023-01-03 NOTE — PROGRESS NOTES
[address]  [phone]  [facility] 79 of Service: nursing home place of service: POS 31 Skilled Care-Part A Coverage      NAME: Phyllis Torres  AGE: 80 y o  SEX: female [phone]    DATE OF ENCOUNTER: 1/3/2023    Assessment and Plan     Problem List Items Addressed This Visit        Nervous and Auditory    SDH (subdural hematoma) - Primary     - Traumatic brain injury with SDH, present on admission   - Neurosurgery evaluation and recommendations appreciated  - Hold anti-platelet and anticoagulant medications for least 2 weeks and/or until cleared by Neurosurgery  - Completed Keppra  7 days for seizure prophylaxis  - Monitor neurologic exam  Repeat CT head on 12/26 shows stability    - Continue symptomatic management with analgesia as needed  Currently patient is taking Tylenol scheduled  - PT and OT   -Encourage out of bed as tolerated  - Outpatient Neurosurgery follow-up in 2 weeks with repeat CT scan of the head prior to follow-up appointment  - continue lovenox for DVT ppx            Musculoskeletal and Integument    Closed fracture of nasal bone     -Nonoperative management  -Sinus precautions  - f/u with OMFS in 1 week  - completed augmentin course            Genitourinary    Stage 3 chronic kidney disease ([address] )     Attention improved today at 1 15  Continue to avoid nephrotoxic medication encourage p o  hydration  Continue to monitor CMP            Other    Hyponatremia     Sodium today 135  Monitor CMP periodically                Chief Complaint     Follow up     History of Present Illness     Phyllis Torres is a 80 y o  female who was seen today for follow up  She is seen and examined at bedside, she feels well  Denies any pain at the time of encounter  No fever or chills chest pain shortness of breath cough  Denies difficulty sleeping or changes in appetite  No abdominal pain nausea vomiting diarrhea constipation    Dysuria or hematuria  The following portions of the patient's history were reviewed and updated as appropriate: allergies, current medications, past family history, past medical history, past social history, past surgical history and problem list     Review of Systems     Review of Systems   Constitutional: Negative for chills and fever  HENT: Positive for hearing loss  Negative for congestion and rhinorrhea  Respiratory: Negative for cough, shortness of breath and wheezing  Cardiovascular: Positive for leg swelling  Negative for chest pain and palpitations  Gastrointestinal: Negative for abdominal pain and constipation  Endocrine: Negative for cold intolerance  Genitourinary: Negative for difficulty urinating, dysuria and hematuria  Musculoskeletal: Positive for gait problem  Skin: Negative for wound  Allergic/Immunologic: Negative for environmental allergies  Neurological: Negative for dizziness and seizures  Hematological: Does not bruise/bleed easily  Psychiatric/Behavioral: Negative for behavioral problems and sleep disturbance         Active Problem List     Patient Active Problem List   Diagnosis   • 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, without long-term current use of insulin ([facility])   • Secondary hyperparathyroidism of renal origin ([facility] [address] )   • Primary hypertension   • PAD (peripheral artery disease) ([facility])   • Chronic congestive heart failure ([facility])   • Chronic deep vein thrombosis (DVT) of proximal vein of lower extremity ([facility])   • Other hyperlipidemia   • SDH (subdural hematoma)   • Closed fracture of nasal bone   • History of atrial fibrillation   • Facial laceration   • Stage 3 chronic kidney disease ([facility])   • Type 2 diabetes mellitus with hyperglycemia, with long-term current use of insulin ([facility])   • Class 2 obesity due to excess calories 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 mass index (BMI) of 37 0 to 37 9 in adult   • Hyponatremia Objective     Vital Signs:     Blood pressure 135/77 Heart Rate: 54 Respiratory Rate 18   Temperature 97 6 Oxygen Saturation 97% Weight 181 7lbs    Physical Exam  Vitals and nursing note reviewed  Constitutional:       General: She is not in acute distress  Appearance: She is not diaphoretic  HENT:      Head: Normocephalic  Comments: bruises     Ears:      Comments: [place]  Eyes:      General:         Right eye: No discharge  Left eye: No discharge  Pupils: Pupils are equal, round, and reactive to light  Cardiovascular:      Rate and Rhythm: Normal rate and regular rhythm  Heart sounds: Murmur heard  Pulmonary:      Effort: Pulmonary effort is normal  No respiratory distress  Breath sounds: Normal breath sounds  No wheezing  Abdominal:      Palpations: Abdomen is soft  Tenderness: There is no abdominal tenderness  There is no guarding or rebound  Musculoskeletal:         General: No tenderness  Cervical back: Normal range of motion and neck supple  Right lower leg: Edema present  Left lower leg: Edema present  Skin:     General: Skin is warm and dry  Findings: Bruising present  Neurological:      Mental Status: She is alert and oriented to person, place, and time  Mental status is at baseline  Psychiatric:         Behavior: Behavior normal          Pertinent Laboratory/Diagnostic Studies:  Laboratory and Imaging studies reviewed  Full report in the paper chart  Current Medications   Medications reviewed and updated in facility chart      Name: Vivian Rabago  : 1934  MRN: [id number]        Joanie Cuellar MD  Geriatric Medicine  1/3/2023 2:31 PM

## 2023-01-03 NOTE — ASSESSMENT & PLAN NOTE
-Nonoperative management  -Sinus precautions  - f/u with OMFS in 1 week  - completed augmentin course Indication: Atrial Fibrillation (CHADS-2 = 2 - HTN, DM);   Goal INR: 2.0-3.0  Duration: long-term  Order Expires:  4/15/22  Order MD:  GARY Ortiz    Assessment  (-) missed doses: none  (-) extra doses: none  (-) significant medication changes (RX, OTC, Herbal): none  (+) Vitamin K / dietary changes (Vitamin K goal: 3 cups/week): pt states he had more salad  (-) bleeding / bruising: none  (-) alcohol intake: none  (-) falls / injury: none  (-) acute illness: none  (-) procedures / hospitalization / ER visits: none    Anticoagulation Summary  As of 2021    INR goal:  2.0-3.0   TTR:  89.7 % (2.2 y)   INR used for dosin.8 (2021)   Full warfarin instructions:  : 10 mg; Otherwise 7.5 mg every M, , ; 5 mg all other days   Next INR check:  2021    Indications    Chronic atrial fibrillation (CMS/HCC) [I48.20]  Long term (current) use of anticoagulants [Z79.01]             Anticoagulation Episode Summary     Send INR reminders to:  [facility]      Anticoagulation Care Providers     Provider Role Specialty Phone number    Isaiah ARIS Ortiz DO Referring Internal Medicine [phone]        Plan (5 mg tablets)    The INR result for today is slightly subtherapeutic based on the INR goal 2.0-3.0.  Etiology: increased vit K  Recommended Dose: take 10 mg warfarin today, then continue 7.5 Mon/Wed/Fri and 5 mg ROW (42.5 mg/week)  Follow-Up: pt requesting 4 weeks with home meter- has been testing every 4 weeks. 21  Comments:    Counseling  Counseled about signs/symptoms of thrombosis and/or stroke and when to seek emergent care  Stressed importance of compliance with consistent vitamin K intake  Instructed to notify clinic about medication changes or health status changes  Instructed to notify clinic about scheduled procedures    Provided patient/caregiver with verbal dosing instructions, patient/caregiver verbalized understanding.

## 2023-01-03 NOTE — ASSESSMENT & PLAN NOTE
- Traumatic brain injury with SDH, present on admission   - Neurosurgery evaluation and recommendations appreciated  - Hold anti-platelet and anticoagulant medications for least 2 weeks and/or until cleared by Neurosurgery  - Completed Keppra  7 days for seizure prophylaxis  - Monitor neurologic exam  Repeat CT head on 12/26 shows stability    - Continue symptomatic management with analgesia as needed  Currently patient is taking Tylenol scheduled  - PT and OT   -Encourage out of bed as tolerated  - Outpatient Neurosurgery follow-up in 2 weeks with repeat CT scan of the head prior to follow-up appointment    - continue lovenox for DVT ppx

## 2023-01-03 NOTE — ASSESSMENT & PLAN NOTE
Attention improved today at 1 15  Continue to avoid nephrotoxic medication encourage p o  hydration  Continue to monitor CMP

## 2023-01-05 ENCOUNTER — NURSING HOME VISIT (OUTPATIENT)
Dept: GERIATRICS | Facility: OTHER | Age: 88
End: 2023-01-05

## 2023-01-05 DIAGNOSIS — S02.2XXD CLOSED FRACTURE OF NASAL BONE WITH ROUTINE HEALING, SUBSEQUENT ENCOUNTER: ICD-10-CM

## 2023-01-05 DIAGNOSIS — I50.9 CHRONIC CONGESTIVE HEART FAILURE, UNSPECIFIED HEART FAILURE TYPE (HCC): ICD-10-CM

## 2023-01-05 DIAGNOSIS — D64.9 ANEMIA, UNSPECIFIED TYPE: ICD-10-CM

## 2023-01-05 DIAGNOSIS — Z86.79 HISTORY OF ATRIAL FIBRILLATION: ICD-10-CM

## 2023-01-05 DIAGNOSIS — N18.32 STAGE 3B CHRONIC KIDNEY DISEASE (HCC): ICD-10-CM

## 2023-01-05 DIAGNOSIS — S06.5XAA SDH (SUBDURAL HEMATOMA): Primary | ICD-10-CM

## 2023-01-05 NOTE — ASSESSMENT & PLAN NOTE
- Traumatic brain injury with SDH, present on admission to the hospital  - Hold anti-platelet and anticoagulant medications for least 2 weeks and/or until cleared by Neurosurgery  - Completed Keppra  7 days for seizure prophylaxis  - Monitor neurologic exam  Repeat CT head on 12/26 showed stability    - Continue symptomatic management with analgesia as needed   Currently patient is taking Tylenol scheduled  - PT and OT   -Encourage out of bed as tolerated  - Outpatient Neurosurgery follow-up on 1/10   - will  repeat CT scan of the head prior to follow-up appointment    - continue lovenox for DVT ppx  -stable neurologically, continue to monitor for red flags

## 2023-01-05 NOTE — ASSESSMENT & PLAN NOTE
Creatinine back to baseline 1 15 last checked  Will avoid nephrotoxic medication  Encourage po hydration  Will monitor BMP

## 2023-01-05 NOTE — PROGRESS NOTES
[address]  [phone]  [facility] 79 of Service: nursing home place of service: POS 31 Skilled Care-Part A Coverage      NAME: Mickey Woodson  AGE: 80 y o  SEX: female [id number]    DATE OF ENCOUNTER: 1/5/2023    Assessment and Plan     Problem List Items Addressed This Visit        Cardiovascular and Mediastinum    Chronic congestive heart failure ([facility] [address] )     Wt Readings from Last 3 Encounters:   12/30/22 88 9 kg (196 lb)   12/27/22 81 6 kg (180 lb)       Stable continue torsemide and potassium supplement  Monitor weights  Monitor CMP periodically              Nervous and Auditory    SDH (subdural hematoma) - Primary     - Traumatic brain injury with SDH, present on admission to the hospital  - Hold anti-platelet and anticoagulant medications for least 2 weeks and/or until cleared by Neurosurgery  - Completed Keppra  7 days for seizure prophylaxis  - Monitor neurologic exam  Repeat CT head on 12/26 showed stability    - Continue symptomatic management with analgesia as needed   Currently patient is taking Tylenol scheduled  - PT and OT   -Encourage out of bed as tolerated  - Outpatient Neurosurgery follow-up on 1/10   - will  repeat CT scan of the head prior to follow-up appointment  - continue lovenox for DVT ppx  -stable neurologically, continue to monitor for red flags            Musculoskeletal and Integument    Closed fracture of nasal bone     -Nonoperative management  -Sinus precautions  - f/u with OMFS   - completed augmentin course            Genitourinary    Stage 3 chronic kidney disease ([facility] [address] )     Creatinine back to baseline 1 15 last checked  Will avoid nephrotoxic medication  Encourage po hydration  Will monitor BMP  Other    History of atrial fibrillation     Continue amiodarone carvedilol    Rate controlled  Currently not on anticoagulation due to SDH         Anemia     Hemoglobin 9 4  Denies any symptoms  Continue to monitor CBC, transfuse if hemoglobin less than 7 0                Chief Complaint     Follow up     History of Present Illness     Mya Will is a 80 y o  female who was seen today for follow up  Patient seen and examined at bedside states she feels well denies any acute complaints at the time of encounter  No dizziness or lightheadedness, no new focal deficits  Appetite is preserved and she denies difficulty sleeping  No abdominal pain nausea vomiting diarrhea or constipation  Denies chest pain shortness of breath cough  The following portions of the patient's history were reviewed and updated as appropriate: allergies, current medications, past family history, past medical history, past social history, past surgical history and problem list     Review of Systems     Review of Systems   Constitutional: Negative for chills and fever  HENT: Positive for hearing loss  Negative for congestion and rhinorrhea  Respiratory: Negative for cough, shortness of breath and wheezing  Cardiovascular: Positive for leg swelling  Negative for chest pain and palpitations  Gastrointestinal: Negative for abdominal pain and constipation  Endocrine: Negative for cold intolerance  Genitourinary: Negative for difficulty urinating, dysuria and hematuria  Musculoskeletal: Positive for gait problem  Skin: Negative for wound  Allergic/Immunologic: Negative for environmental allergies  Neurological: Negative for dizziness and seizures  Hematological: Does not bruise/bleed easily  Psychiatric/Behavioral: Negative for behavioral problems and sleep disturbance         Active Problem List     Patient Active Problem List   Diagnosis   • 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, without long-term current use of insulin ([facility])   • Secondary hyperparathyroidism of renal origin [facility])   • Primary hypertension   • PAD (peripheral artery disease) ([facility])   • Chronic congestive heart failure (HCC)   • Chronic deep vein thrombosis (DVT) of proximal vein of lower extremity (HCC)   • Other hyperlipidemia   • SDH (subdural hematoma)   • Closed fracture of nasal bone   • History of atrial fibrillation   • Facial laceration   • Stage 3 chronic kidney disease (HCC)   • Type 2 diabetes mellitus with hyperglycemia, with long-term current use of insulin (HCC)   • Class 2 obesity due to excess calories 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 mass index (BMI) of 37 0 to 37 9 in adult   • Hyponatremia   • Anemia       Objective     Vital Signs:     Blood pressure 112/57 Heart Rate: 65 Respiratory Rate 18   Temperature 97 5 Oxygen Saturation 96% Weight 180 3lbs    Physical Exam  Vitals and nursing note reviewed  Constitutional:       General: She is not in acute distress  Appearance: She is obese  She is not diaphoretic  HENT:      Head: Normocephalic  Comments: Bruises     Ears:      Comments: Hard of hearing     Mouth/Throat:      Mouth: Mucous membranes are moist    Eyes:      General:         Right eye: No discharge  Left eye: No discharge  Pupils: Pupils are equal, round, and reactive to light  Cardiovascular:      Rate and Rhythm: Normal rate and regular rhythm  Heart sounds: Murmur heard  Pulmonary:      Effort: Pulmonary effort is normal  No respiratory distress  Breath sounds: Normal breath sounds  No wheezing  Abdominal:      Palpations: Abdomen is soft  Tenderness: There is no abdominal tenderness  There is no guarding or rebound  Musculoskeletal:         General: No tenderness  Cervical back: Normal range of motion and neck supple  Right lower leg: Edema present  Left lower leg: Edema present  Skin:     General: Skin is warm and dry  Neurological:      Mental Status: She is alert and oriented to person, place, and time  Mental status is at baseline     Psychiatric:         Behavior: Behavior normal          Pertinent Laboratory/Diagnostic Studies:  Laboratory and Imaging studies reviewed  Full report in the paper chart  Current Medications   Medications reviewed and updated in facility chart      Name: Susan Juan  : 1934  MRN: [id number]        Evonne Park MD  Geriatric Medicine  2023 1:47 PM

## 2023-01-05 NOTE — ASSESSMENT & PLAN NOTE
Wt Readings from Last 3 Encounters:   12/30/22 88 9 kg (196 lb)   12/27/22 81 6 kg (180 lb)       Stable continue torsemide and potassium supplement  Monitor weights  Monitor CMP periodically

## 2023-01-09 ENCOUNTER — APPOINTMENT (OUTPATIENT)
Dept: CT IMAGING | Facility: HOSPITAL | Age: 88
End: 2023-01-09

## 2023-01-09 ENCOUNTER — NURSING HOME VISIT (OUTPATIENT)
Dept: GERIATRICS | Facility: OTHER | Age: 88
End: 2023-01-09

## 2023-01-09 DIAGNOSIS — S06.5XAA SDH (SUBDURAL HEMATOMA): Primary | ICD-10-CM

## 2023-01-09 DIAGNOSIS — N18.32 STAGE 3B CHRONIC KIDNEY DISEASE (HCC): ICD-10-CM

## 2023-01-09 DIAGNOSIS — D64.9 ANEMIA, UNSPECIFIED TYPE: ICD-10-CM

## 2023-01-09 DIAGNOSIS — N18.32 DIABETES MELLITUS DUE TO UNDERLYING CONDITION WITH STAGE 3B CHRONIC KIDNEY DISEASE, WITHOUT LONG-TERM CURRENT USE OF INSULIN (HCC): ICD-10-CM

## 2023-01-09 DIAGNOSIS — S02.2XXD CLOSED FRACTURE OF NASAL BONE WITH ROUTINE HEALING, SUBSEQUENT ENCOUNTER: ICD-10-CM

## 2023-01-09 DIAGNOSIS — E66.09 CLASS 2 OBESITY DUE TO EXCESS CALORIES WITHOUT SERIOUS COMORBIDITY WITH BODY MASS INDEX (BMI) OF 37.0 TO 37.9 IN ADULT: ICD-10-CM

## 2023-01-09 DIAGNOSIS — E08.22 DIABETES MELLITUS DUE TO UNDERLYING CONDITION WITH STAGE 3B CHRONIC KIDNEY DISEASE, WITHOUT LONG-TERM CURRENT USE OF INSULIN (HCC): ICD-10-CM

## 2023-01-09 DIAGNOSIS — Z86.79 HISTORY OF ATRIAL FIBRILLATION: ICD-10-CM

## 2023-01-09 DIAGNOSIS — I50.9 CHRONIC CONGESTIVE HEART FAILURE, UNSPECIFIED HEART FAILURE TYPE (HCC): ICD-10-CM

## 2023-01-09 NOTE — ASSESSMENT & PLAN NOTE
Hemoglobin stable, 9 2 last checked  Continue to monitor CBC as outpatient  Transfuse if hemoglobin less than 7 0  Follow-up with PCP

## 2023-01-09 NOTE — PROGRESS NOTES
[address] [place] [address] Drive: [facility]  POS: 31: SNF/Short Term Rehab    NAME: Faith Schumacher  AGE: 80 y o  SEX: female  DATE OF ADMISSION: 12/29/22   DATE OF DISCHARGE:1/9/23   DISCHARGE DISPOSITION: home  Today's Visit: 1/9/202312:15 PM    Reason for admission: Patient was admitted from [address] [place] [address] for rehabilitation after hospitalization for subdural hematoma and nasal fracture  Course of stay: Patient was admitted to  [address] [place] [address] for rehabilitation due to  physical deconditioning and dural hematoma and nasal fracture status post fall  No Significant events during the stay  The patient participated in PT/OT  Assessment/Plan:    SDH (subdural hematoma)    - Traumatic brain injury with SDH, present on admission to the hospital  - Hold anti-platelet and anticoagulant medications for least 2 weeks and/or until cleared by Neurosurgery  - Completed Keppra  7 days for seizure prophylaxis  - Monitor neurologic exam  Repeat CT head on 12/26 showed stability    - Continue symptomatic management with analgesia as needed   Currently patient is taking Tylenol scheduled  - PT and OT   -Encourage out of bed as tolerated  - Outpatient Neurosurgery follow-up on 1/12  - continue lovenox for DVT ppx  -stable neurologically, continue to monitor for red flags  CT head done today showed   0 4 cm thick anterior parafalcine subacute subdural hematoma, decreased in density and size      Resolved thin right frontal cerebral convexity subdural hematoma    Resolved trace intraventricular hemorrhage    No new acute intracranial abnormality  Closed fracture of nasal bone  -Nonoperative management  -Sinus precautions  - OMFS signed off  - completed augmentin course    Stage 3 chronic kidney disease (HCC)  Creatinine seems to be at baseline 1 27 on January 6  Continue to monitor as outpatient  Encourage p o  hydration  Avoid nephrotoxic medication  Follow-up with PCP    History of atrial fibrillation  Heart rate in the lower side averaging in the low 50s  Her amiodarone and carvedilol were held most of today's during her stay in the facility  Will need follow-up with cardiology in the following week patient is aware  Continue to hold amiodarone and carvedilol on discharge  Currently not on oral anticoagulation due to subdural hematoma  Continue Lovenox for DVT prophylaxis    Anemia  Hemoglobin stable, 9 2 last checked  Continue to monitor CBC as outpatient  Transfuse if hemoglobin less than 7 0  Follow-up with PCP    Chronic congestive heart failure (HCC)  Wt Readings from Last 3 Encounters:   12/30/22 88 9 kg (196 lb)   12/27/22 81 6 kg (180 lb)       Clinically stable  Continue torsemide and potassium supplements  Monitor CMP periodically  Follow-up with cardiology      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, without long-term current use of insulin ([facility])    Hemoglobin A1c 6 4  Courage diabetic diet  Follow-up with PCP    Class 2 obesity due to excess calories 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 mass index (BMI) of 37 0 to 37 9 in adult  Encourage lifestyle modification  Follow-up with PCP  Monitor weights         Discharge Medications: See discharge medication list which was reviewed and signed  Status at time of discharge: Stable    Subjective: Patient seen and examined at bedside states she feels well, looking forward to return home  Denies any acute complaints at the time of encounter  Is able to make progress with physical therapy  Review of Systems   Constitutional: Negative for chills and fever  HENT: Negative for congestion and rhinorrhea  Respiratory: Negative for cough, shortness of breath and wheezing  Cardiovascular: Positive for leg swelling  Negative for chest pain and palpitations     Gastrointestinal: Negative for abdominal pain and constipation  Endocrine: Negative for cold intolerance  Genitourinary: Negative for difficulty urinating, dysuria and hematuria  Musculoskeletal: Positive for gait problem  Skin: Negative for wound  Allergic/Immunologic: Negative for environmental allergies  Neurological: Negative for dizziness and seizures  Hematological: Bruises/bleeds easily  Psychiatric/Behavioral: Negative for behavioral problems and sleep disturbance  Vital Signs:     Blood pressure 148/82 Heart Rate: 58 Respiratory Rate 18   Temperature 97 4 Oxygen Saturation 99% Weight 174 3lbs    Exam:     Physical Exam  Vitals and nursing note reviewed  Constitutional:       General: She is not in acute distress  Appearance: She is obese  She is not diaphoretic  HENT:      Head: Normocephalic  Comments: bruising     Ears:      Comments: [place]     Mouth/Throat:      Mouth: Mucous membranes are dry  Eyes:      General:         Right eye: No discharge  Left eye: No discharge  Pupils: Pupils are equal, round, and reactive to light  Cardiovascular:      Rate and Rhythm: Normal rate and regular rhythm  Heart sounds: Murmur heard  Pulmonary:      Effort: Pulmonary effort is normal  No respiratory distress  Breath sounds: Normal breath sounds  No wheezing  Abdominal:      Palpations: Abdomen is soft  Tenderness: There is no abdominal tenderness  There is no guarding or rebound  Musculoskeletal:         General: No tenderness  Cervical back: Normal range of motion and neck supple  Right lower leg: Edema (trace) present  Left lower leg: Edema (trace) present  Skin:     General: Skin is warm and dry  Neurological:      Mental Status: She is alert and oriented to person, place, and time  Mental status is at baseline     Psychiatric:         Behavior: Behavior normal          Discussion with patient/family and further instructions:  -Fall precautions  -Aspiration precautions  -Bleeding precautions  -Monitor for signs/symptoms of infection  -Medication list was reviewed and signed  -DME form was completed    Follow-up Recommendations: Please follow-up with your primary care physician within 7-10 days of discharge to review medication changes and current status       Problem List Follow-up Recommendations:  Continue home PT/OT  Follow up with neurosurgery and cardiology    Daniela Spivey MD  Geriatric Medicine  9/0/057838:27 PM

## 2023-01-09 NOTE — ASSESSMENT & PLAN NOTE
Wt Readings from Last 3 Encounters:   12/30/22 88 9 kg (196 lb)   12/27/22 81 6 kg (180 lb)       Clinically stable  Continue torsemide and potassium supplements  Monitor CMP periodically  Follow-up with cardiology

## 2023-01-09 NOTE — ASSESSMENT & PLAN NOTE
- Traumatic brain injury with SDH, present on admission to the hospital  - Hold anti-platelet and anticoagulant medications for least 2 weeks and/or until cleared by Neurosurgery  - Completed Keppra  7 days for seizure prophylaxis  - Monitor neurologic exam  Repeat CT head on 12/26 showed stability    - Continue symptomatic management with analgesia as needed   Currently patient is taking Tylenol scheduled  - PT and OT   -Encourage out of bed as tolerated  - Outpatient Neurosurgery follow-up on 1/12  - continue lovenox for DVT ppx  -stable neurologically, continue to monitor for red flags  CT head done today showed   0 4 cm thick anterior parafalcine subacute subdural hematoma, decreased in density and size      Resolved thin right frontal cerebral convexity subdural hematoma    Resolved trace intraventricular hemorrhage    No new acute intracranial abnormality

## 2023-01-09 NOTE — ASSESSMENT & PLAN NOTE
Heart rate in the lower side averaging in the low 50s  Her amiodarone and carvedilol were held most of today's during her stay in the facility    Will need follow-up with cardiology in the following week patient is aware  Continue to hold amiodarone and carvedilol on discharge  Currently not on oral anticoagulation due to subdural hematoma  Continue Lovenox for DVT prophylaxis

## 2023-01-09 NOTE — ASSESSMENT & PLAN NOTE
Creatinine seems to be at baseline 1 27 on January 6  Continue to monitor as outpatient  Encourage p o  hydration  Avoid nephrotoxic medication  Follow-up with PCP

## 2023-01-13 ENCOUNTER — TELEPHONE (OUTPATIENT)
Dept: FAMILY MEDICINE CLINIC | Facility: CLINIC | Age: 88
End: 2023-01-13

## 2023-01-13 DIAGNOSIS — I50.9 CHRONIC CONGESTIVE HEART FAILURE, UNSPECIFIED HEART FAILURE TYPE (HCC): Primary | ICD-10-CM

## 2023-01-13 DIAGNOSIS — S06.5XAA SDH (SUBDURAL HEMATOMA): ICD-10-CM

## 2023-01-13 DIAGNOSIS — S02.2XXD CLOSED FRACTURE OF NASAL BONE WITH ROUTINE HEALING, SUBSEQUENT ENCOUNTER: ICD-10-CM

## 2023-01-13 NOTE — TELEPHONE ENCOUNTER
Irena Garcia from [address] [place] [address] called requesting a verbal order and faxed order for Nursing PT and OT  Patient was d/c from [facility] 1/9 for a Fall and Nasal Fracture  Call Irena Garcia back with verbal [phone] and also  Fax order to [phone]  Thank you  (Note: there are 2 charts on her and in process of merge   You can see hospital records in second chart )

## 2023-01-16 DIAGNOSIS — I12.9 BENIGN HYPERTENSION WITH CKD (CHRONIC KIDNEY DISEASE) STAGE III (HCC): Primary | ICD-10-CM

## 2023-01-16 DIAGNOSIS — N18.30 BENIGN HYPERTENSION WITH CKD (CHRONIC KIDNEY DISEASE) STAGE III (HCC): Primary | ICD-10-CM

## 2023-01-16 RX ORDER — LISINOPRIL 5 MG/1
5 TABLET ORAL DAILY
Qty: 7 TABLET | Refills: 0 | Status: SHIPPED | OUTPATIENT
Start: 2023-01-16 | End: 2023-01-23 | Stop reason: SDUPTHER

## 2023-01-16 NOTE — TELEPHONE ENCOUNTER
Marta Montoya called Rx line requesting med refill for patient lisinopril 5 mg a 7 day supply to [facility] in [place], [place]  Marta Montoya state patient was discharged from the [address] [place] [address] on 1/8/23  Patient has two chart that is being merged notes are in other chart

## 2023-01-19 ENCOUNTER — OFFICE VISIT (OUTPATIENT)
Dept: NEUROSURGERY | Facility: CLINIC | Age: 88
End: 2023-01-19

## 2023-01-19 ENCOUNTER — RA CDI HCC (OUTPATIENT)
Dept: OTHER | Facility: HOSPITAL | Age: 88
End: 2023-01-19

## 2023-01-19 VITALS
DIASTOLIC BLOOD PRESSURE: 56 MMHG | WEIGHT: 172 LBS | BODY MASS INDEX: 32.47 KG/M2 | SYSTOLIC BLOOD PRESSURE: 130 MMHG | HEIGHT: 61 IN | TEMPERATURE: 98.1 F | HEART RATE: 79 BPM

## 2023-01-19 DIAGNOSIS — S06.5XAA SDH (SUBDURAL HEMATOMA): Primary | ICD-10-CM

## 2023-01-19 NOTE — PATIENT INSTRUCTIONS
Okay to resume aspirin at this time  Continue to hold Xarelto until cleared to resume by neurosurgery  Return to the office in 2 weeks time with repeat CT head with out contrast   Return to the office sooner should he develop any worsening symptoms or red flag signs

## 2023-01-19 NOTE — PROGRESS NOTES
Neurosurgery Office Note  Nam Roy 80 y o  female MRN: [id number]      Assessment/Plan     SDH (subdural hematoma)  Patient presents for about 2 week hospital follow up regarding SDH / IVH  · S/p syncopal episode 12/25  · Was previously maintained on asa and xarelto for history of DVT and PAD, on hold since this incident  · Has been on lovenox shots at home since leaving rehab    Imaging:  · CT head wo contrast 1/9/2023:  0 4 cm thick anterior parafalcine subacute subdural hematoma, decreased in density and size  Resolved thin right frontal cerebral convexity subdural hematoma  Resolved trace intraventricular hemorrhage  No new acute intracranial abnormality  Plan:  • Imaging reviewed with patient and family member, overall no new or worsening hemorrhage noted, falx SDH is decreasing in size and thankfully right frontal SDH and IVH have resolved completed  • At this time, will allow patient to resume her [facility] / [facility] therapy in a step wise fashion  She can resume her asa at this time but should continue to hold xarelto  • Plan for CT head in 2 weeks for ongoing monitoring  If imaging stable at that time, plan to resume xarelto at her next visit and likely see back PRN  • Return to the office sooner if experiencing worsening symptoms or red flag signs         Diagnoses and all orders for this visit:    SDH (subdural hematoma)  -     CT head wo contrast; Future          I spent 15 minutes with the patient today in which >50% of the time was spent counseling/coordination of care regarding diagnosis, imaging review, symptoms and treatment plan       CHIEF COMPLAINT    Chief Complaint   Patient presents with   • Follow-up       HISTORY    History of Present Illness     80y o  year old female with past medical history significant for PAD previously on aspirin, atrial fibrillation previously on Xarelto, anxiety and depression, CKD, diabetes with subsequent neuropathy, hyperlipidemia, hypertension who presents as a 2-week hospital follow-up status post fall with subdural hematoma and intraventricular hemorrhage  Patient is here with her family member  She states overall she has been doing okay  Was sent to a transitional care unit after hospitalization and just recently came home  She just started home health physical therapy yesterday with plans for occupational therapy today  She has some poor balance and difficulty ambulating but again she will be working on that with physical therapy  She continues to use a walker for ambulation  No new headaches or dizziness, stroke or seizure-like activity, numbness/tingling, etc   She reports having Lovenox shots since leaving rehab and was told to finish out the supply she had at home  She has remained off her aspirin and Xarelto as instructed  See Discussion    REVIEW OF SYSTEMS    Review of Systems   HENT: Positive for hearing loss and tinnitus (right ear hums all the time )  Eyes: Negative for visual disturbance  Musculoskeletal: Positive for gait problem (uses a walker, very poor balance, doing PT , no falls since discharge)  Negative for back pain and neck pain  Neurological: Positive for weakness (legs are giving her trouble but doing activities during therapy)  Negative for dizziness, tremors, seizures, speech difficulty, numbness and headaches           Meds/Allergies     Current Outpatient Medications   Medication Sig Dispense Refill   • amiodarone 200 mg tablet Take 200 mg by mouth daily       • atorvastatin (LIPITOR) 80 mg tablet Take 80 mg by mouth daily     • Cholecalciferol 125 MCG (5000 UT) capsule Take 1,000 Units by mouth daily     • dicyclomine (BENTYL) 10 mg capsule Take 1 capsule (10 mg total) by mouth 2 (two) times a day 180 capsule 1   • docusate sodium (COLACE) 100 mg capsule Take 100 mg by mouth 2 (two) times a day     • enoxaparin (LOVENOX) 30 mg/0 3 mL Inject 0 3 mL (30 mg total) under the skin every 24 hours Do not start before December 30, 2022   0   • glucose blood (OneTouch Ultra) test strip Test daily                        DX: E11 9 100 strip 1   • lisinopril (ZESTRIL) 5 mg tablet Take 1 tablet (5 mg total) by mouth daily 7 tablet 0   • Multiple Vitamin (MULTI-VITAMIN DAILY PO) Take 1 tablet by mouth daily     • Multiple Vitamins-Minerals (multivitamin with minerals) tablet Take 1 tablet by mouth daily     • omega-3-acid ethyl esters (LOVAZA) 1 g capsule Take 2 g by mouth 2 (two) times a day     • potassium chloride (K-DUR,KLOR-CON) 10 mEq tablet Take 10 mEq by mouth 2 (two) times a day     • Probiotic Product (PROBIOTIC-10 PO) Take by mouth     • torsemide (DEMADEX) 20 mg tablet Take 1 tablet (20 mg total) by mouth daily 30 tablet 5   • acetaminophen (TYLENOL) 325 mg tablet Take 3 tablets (975 mg total) by mouth every 8 (eight) hours  0   • amiodarone 200 mg tablet Take 200 mg by mouth daily     • amLODIPine (NORVASC) 2 5 mg tablet Take 1 tablet (2 5 mg total) by mouth daily 30 tablet 5   • amLODIPine (NORVASC) 2 5 mg tablet Take 2 5 mg by mouth daily     • ASPIRIN 81 PO Take by mouth     • atorvastatin (LIPITOR) 80 mg tablet      • Blood Glucose Monitoring Suppl (ONE TOUCH ULTRA 2) w/Device KIT Use daily 1 kit 0   • carvedilol (COREG) 25 mg tablet Take 1 tablet (25 mg total) by mouth 2 (two) times a day with meals 180 tablet 1   • carvedilol (COREG) 25 mg tablet Take 25 mg by mouth 2 (two) times a day with meals     • cholecalciferol (VITAMIN D3) 1,000 units tablet Take 2,000 Units by mouth daily     • dicyclomine (BENTYL) 10 mg capsule Take 10 mg by mouth 2 (two) times a day as needed (cramping)     • Docusate Sodium (DSS) 100 MG CAPS Take 100 mg by mouth 2 (two) times a day     • lidocaine (LIDODERM) 5 % Apply 1 patch topically daily Remove & Discard patch within 12 hours or as directed by MD (Patient not taking: Reported on 11/22/2022) 30 patch 0   • lidocaine (LIDODERM) 5 % Apply 1 patch topically daily Remove & Discard patch within 12 hours or as directed by MD     • Omega 3-6-9 Fatty Acids (OMEGA 3-6-9 PO) Take by mouth     • polyethylene glycol (MIRALAX) 17 g packet Take 17 g by mouth daily Do not start before December 30, 2022   0   • potassium chloride (MICRO-K) 10 MEQ CR capsule Take 1 capsule (10 mEq total) by mouth daily 90 capsule 1   • rivaroxaban (XARELTO) 15 mg tablet Take 15 mg by mouth daily      • saccharomyces boulardii (FLORASTOR) 250 mg capsule Take 250 mg by mouth 2 (two) times a day     • senna (SENOKOT) 8 6 mg Take 2 tablets (17 2 mg total) by mouth daily as needed for constipation  0   • torsemide (DEMADEX) 20 mg tablet Take 20 mg by mouth daily       No current facility-administered medications for this visit  Allergies   Allergen Reactions   • Hydrochlorothiazide      Hyponatremia, severe   • Hydrochlorothiazide Other (See Comments)       • Statins Other (See Comments)          • Statins        PAST HISTORY    Past Medical History:   Diagnosis Date   • Anemia    • Anxiety    • Arthritis    • CKD (chronic kidney disease), stage III ([facility] ) 11/29/2018   • Depression, recurrent ([facility] ) 6/14/2021   • Diabetes mellitus ([facility] )    • Diabetic peripheral neuropathy (HCC)    • HLD (hyperlipidemia)    • Hyperlipidemia    • Hypertension    • 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 8/23/2017   • Thyroid enlarged 2/23/2015   • Tinnitus    • Vertigo        Past Surgical History:   Procedure Laterality Date   • APPENDECTOMY     • EAR SURGERY Left    • KNEE SURGERY     • TONSILLECTOMY         Social History     Tobacco Use   • Smoking status: Never   • Smokeless tobacco: Never   Substance Use Topics   • Alcohol use: Not Currently   • Drug use: Never       Family History   Problem Relation Age of Onset   • Heart disease Mother    • Diabetes Mother    • Heart disease Father    • Stroke Father    • Cancer Family    • Dementia Sister    • Liver disease Other          Above history personally reviewed  EXAM    Vitals:Blood pressure 130/56, pulse 79, temperature 98 1 °F (36 7 °C), temperature source Temporal, height 5' 1" (1 549 m), weight 78 kg (172 lb), not currently breastfeeding  ,Body mass index is 32 5 kg/m²  Physical Exam  Constitutional:       General: She is awake  Appearance: Normal appearance  HENT:      Head: Normocephalic and atraumatic  Eyes:      Extraocular Movements: Extraocular movements intact and EOM normal       Conjunctiva/sclera: Conjunctivae normal    Cardiovascular:      Rate and Rhythm: Normal rate  Pulmonary:      Effort: Pulmonary effort is normal  No respiratory distress  Skin:     General: Skin is warm and dry  Neurological:      Mental Status: She is alert and oriented to person, place, and time  Motor: Motor strength is normal       Coordination: Finger-Nose-Finger Test normal    Psychiatric:         Attention and Perception: Attention and perception normal          Mood and Affect: Mood and affect normal          Speech: Speech normal          Behavior: Behavior normal  Behavior is cooperative  Thought Content: Thought content normal          Cognition and Memory: Cognition and memory normal          Judgment: Judgment normal          Neurologic Exam     Mental Status   Oriented to person, place, and time  Follows 1 step commands  Attention: normal  Concentration: normal    Speech: speech is normal   Level of consciousness: alert  Knowledge: good  Normal comprehension       Cranial Nerves     CN III, IV, VI   Extraocular motions are normal    CN III: no CN III palsy  CN VI: no CN VI palsy  Nystagmus: none   Diplopia: none  Ophthalmoparesis: none  Upgaze: normal  Downgaze: normal  Conjugate gaze: present    CN V   Right facial sensation deficit: none  Left facial sensation deficit: none    CN VII   Right facial weakness: none  Left facial weakness: none    CN VIII   Hearing: impaired ([place] at baseline)    CN IX, X   CN IX normal    CN X normal      CN XI   Right trapezius strength: normal  Left trapezius strength: normal    CN XII   CN XII normal      Motor Exam   Muscle bulk: normal  Overall muscle tone: normal  Right arm pronator drift: absent  Left arm pronator drift: absent    Strength   Strength 5/5 throughout  Sensory Exam   Light touch normal      Gait, Coordination, and Reflexes     Gait  Gait: (using walker for stability)    Coordination   Finger to nose coordination: normal    Tremor   Resting tremor: absent  Intention tremor: absent  Action tremor: absent    Reflexes   Right : 2+  Left : 2+        MEDICAL DECISION MAKING    Imaging Studies:     XR knee 1 or 2 vw left    Result Date: 12/28/2022  Narrative: LEFT KNEE INDICATION:   tenderness to palpation  COMPARISON:  None VIEWS:  XR KNEE 1 OR 2 VW LEFT FINDINGS: There is no acute fracture or dislocation  There is no joint effusion  Total knee arthroplasty is identified  No lytic or blastic osseous lesion  Soft tissues are unremarkable  Impression: No acute osseous abnormality  Workstation performed: [id number]     XR knee 1 or 2 vw right    Result Date: 12/28/2022  Narrative: RIGHT KNEE INDICATION:   tenderness to palpation  COMPARISON:  None VIEWS:  XR KNEE 1 OR 2 VW RIGHT FINDINGS: There is no acute fracture or dislocation  There is no joint effusion  Total knee arthroplasty is identified  No lytic or blastic osseous lesion  Soft tissues are unremarkable  Impression: No acute osseous abnormality  Workstation performed: [id number]     CT head wo contrast    Result Date: 1/9/2023  Narrative: CT BRAIN - WITHOUT CONTRAST INDICATION:   follow up for SDH on 12/26  COMPARISON:  CT head, CT facial bones, and CT cervical spine without contrast December 26, 2022  TECHNIQUE:  CT examination of the brain was performed    In addition to axial images, sagittal and coronal 2D reformatted images were created and submitted for interpretation  Radiation dose length product (DLP) for this visit:  726 mGy-cm   This examination, like all CT scans performed in the [facility], was performed utilizing techniques to minimize radiation dose exposure, including the use of iterative reconstruction and automated exposure control  IMAGE QUALITY:  Diagnostic  FINDINGS: PARENCHYMA AND EXTRA-AXIAL SPACES: 0 4 cm thick anterior parafalcine subacute subdural hematoma, decreased in density and size  Resolved thin right frontal cerebral convexity subdural hematoma  Decreased attenuation is noted in periventricular and subcortical white matter demonstrating an appearance that is statistically most likely to represent mild microangiopathic change  Chronic lacunar infarct in left caudate  No CT signs of acute infarction  No intracranial mass, mass effect or midline shift  No acute parenchymal hemorrhage  Arterial calcifications of carotid siphons, basilar artery, and bilateral intradural vertebral arteries  VENTRICLES:  Normal for the patient's age  Resolved trace intraventricular hemorrhage  VISUALIZED ORBITS AND PARANASAL SINUSES: Sequela of bilateral cataract surgery  Minimal mucosal thickening in left posterior ethmoid sinus  CALVARIUM AND EXTRACRANIAL SOFT TISSUES:  No acute calvarial abnormality  Small scalp hematoma in left frontal region, decreased from prior exam   Nearly resolved left lateral periorbital soft tissue contusion  OTHER: Healing bilateral nasal bone fracture deformity  Healing nasal septal fracture deformity with left nasal septal deviation  Impression: 0 4 cm thick anterior parafalcine subacute subdural hematoma, decreased in density and size  Resolved thin right frontal cerebral convexity subdural hematoma  Resolved trace intraventricular hemorrhage  No new acute intracranial abnormality   Workstation performed: [id number]     CT head wo contrast    Result Date: 12/26/2022  Narrative: CT BRAIN - WITHOUT CONTRAST INDICATION:   Subdural hemorrhage SDH f/u  COMPARISON:  CT of the head from earlier the same day  TECHNIQUE:  CT examination of the brain was performed  In addition to axial images, sagittal and coronal 2D reformatted images were created and submitted for interpretation  Radiation dose length product (DLP) for this visit:  820 19 mGy-cm   This examination, like all CT scans performed in the [facility], was performed utilizing techniques to minimize radiation dose exposure, including the use of iterative  reconstruction and automated exposure control  IMAGE QUALITY:  Diagnostic  FINDINGS: PARENCHYMA:  There is stable subdural hemorrhage along the anterior falx measuring up to 4 mm  There is also thin subdural hemorrhage along the inferior right frontal convexity, similar measuring up to 2 mm   There are no definite new areas of acute intraparenchymal hemorrhage  There is no midline shift  The basilar cisterns are patent  There is age-related involutional change  There is scattered chronic microvascular ischemic change  There is a chronic left caudate lacunar infarct  VENTRICLES AND EXTRA-AXIAL SPACES:  Small amount of hemorrhage layering within the occipital horn appears new  There is possible trace blood within the right occipital horn  VISUALIZED ORBITS AND PARANASAL SINUSES: Hemorrhagic fluid within the left maxillary sinus  CALVARIUM AND EXTRACRANIAL SOFT TISSUES:  Left frontal scalp contusion  Correlate with separate CT maxillofacial bones for the remainder the findings  Impression: Stable thin subdural hemorrhage along the falx and right frontal convexity, as described above  Small amount of hemorrhage is now noted layering within the left occipital horn  The study was marked in [facility] for immediate notification  Workstation performed: [id number]     TRAUMA - CT head wo contrast    Result Date: 12/26/2022  Narrative: CT BRAIN - WITHOUT CONTRAST INDICATION:   TRAUMA  COMPARISON:  None  TECHNIQUE:  CT examination of the brain was performed  In addition to axial images, sagittal and coronal 2D reformatted images were created and submitted for interpretation  Radiation dose length product (DLP) for this visit:  847 85 mGy-cm   This examination, like all CT scans performed in the [facility], was performed utilizing techniques to minimize radiation dose exposure, including the use of iterative  reconstruction and automated exposure control  IMAGE QUALITY:  Diagnostic  FINDINGS: PARENCHYMA:  No intracranial mass, mass effect or midline shift  No CT signs of acute infarction  No acute parenchymal hemorrhage  VENTRICLES AND EXTRA-AXIAL SPACES:  There is a subdural hemorrhage along the anterior falx measuring up to 4 mm  VISUALIZED ORBITS AND PARANASAL SINUSES: Please see the concurrently performed and reported CT of the facial bones  CALVARIUM AND EXTRACRANIAL SOFT TISSUES:  Left forehead/frontal subcutaneous contusion        Impression: There is a subdural hemorrhage along the anterior falx measuring up to 4 mm  No mass effect or midline shift  Left forehead/frontal subcutaneous contusion    Please see the concurrently performed and reported CT of the facial bones  I personally discussed this study with Dr Cherylene Colonel on 12/26/2022 at 1:26 AM  Workstation performed: [id number]     TRAUMA - CT facial bones wo contrast    Result Date: 12/26/2022  Narrative: CT FACIAL BONES WITHOUT INTRAVENOUS CONTRAST INDICATION:   TRAUMA  COMPARISON: None  TECHNIQUE:  Axial CT images were obtained through the facial bones with additional sagittal and coronal reconstructions  Radiation dose length product (DLP) for this visit:  303 57 mGy-cm   This examination, like all CT scans performed in the [facility], was performed utilizing techniques to minimize radiation dose exposure, including the use of iterative  reconstruction and automated exposure control    IMAGE QUALITY:  Diagnostic  FINDINGS: FACIAL BONES:  Bilateral nasal fractures are noted  The nasal septum is serpiginous indicating multiple fractures  Irregularity of the inferior nasal turbinates bilaterally suggest fractures  ORBITS:  Orbital globes, optic nerves, and extraocular muscles appear symmetric and normal  There is no evidence of retrobulbar mass, abscess, or hematoma  SINUSES:  Mild multifocal sinus opacities  Debris is seen in the nasal cavities bilaterally and air-fluid levels are seen in the maxillary sinuses bilaterally likely from the above-described fractures  SOFT TISSUES:  Normal      Impression: Bilateral nasal fractures are noted  The nasal septum is serpiginous indicating multiple fractures  Irregularity of the inferior nasal turbinates bilaterally suggest fractures  Debris is seen in the nasal cavity bilaterally  Mild multifocal sinus opacities  Debris is seen in the nasal cavities bilaterally and air-fluid levels are seen in the maxillary sinuses bilaterally likely from the above-described fractures  I personally discussed this study with Dr Julian Bird on 12/26/2022 at 1:26 AM  Workstation performed: [id number]     TRAUMA - CT spine cervical wo contrast    Result Date: 12/26/2022  Narrative: CT CERVICAL SPINE - WITHOUT CONTRAST INDICATION:   TRAUMA  COMPARISON:  None  TECHNIQUE:  CT examination of the cervical spine was performed without intravenous contrast   Contiguous axial images were obtained  Sagittal and coronal reconstructions were performed  Radiation dose length product (DLP) for this visit:  380 42 mGy-cm   This examination, like all CT scans performed in the [facility], was performed utilizing techniques to minimize radiation dose exposure, including the use of iterative  reconstruction and automated exposure control  IMAGE QUALITY:  Diagnostic  FINDINGS: ALIGNMENT:  There is straightening of normal cervical lordosis  No subluxation or compression deformity  VERTEBRAL BODIES:  No fracture  DEGENERATIVE CHANGES:  Moderate multilevel cervical degenerative changes are noted  No critical central canal stenosis  PREVERTEBRAL AND PARASPINAL SOFT TISSUES: Incidental discovery of one or more thyroid nodule(s) measuring more than 1 5 cm and without suspicious features is noted in this patient who is above 28years old; according to guidelines published in the February 2015 white paper on incidental thyroid nodules in the Journal of the [employer] of [employer]iology Nesha Cuellar), further characterization with thyroid ultrasound is recommended  THORACIC INLET:  Normal      Impression: No cervical spine fracture or traumatic malalignment  I personally discussed this study with Dr Srinivasan Carmona on 12/26/2022 at 1:26 AM  Workstation performed: [id number]     XR chest 1 view    Result Date: 12/27/2022  Narrative: TRAUMA SERIES INDICATION:  TRAUMA  Subdural hematoma COMPARISON:  None VIEWS:  XR TRAUMA MULTIPLE  1 image  FINDINGS: CHEST: Supine frontal view of the chest is obtained  The heart appears enlarged  Lungs are clear  No layering pleural effusions detected  No pneumothorax is seen on this supine film  Upright images are more sensitive to detect anterior pneumothoraces if relevant  Arthritic changes in both shoulders  Impression: No acute cardiopulmonary disease within limitations of supine imaging  Workstation performed: [id number]     CT chest abdomen pelvis w contrast    Result Date: 12/26/2022  Narrative: CT CHEST, ABDOMEN AND PELVIS WITH IV CONTRAST INDICATION:   TRAUMA  COMPARISON:  None  TECHNIQUE: CT examination of the chest, abdomen and pelvis was performed  Axial, sagittal, and coronal 2D reformatted images were created from the source data and submitted for interpretation  Radiation dose length product (DLP) for this visit:  1118 68 mGy-cm     This examination, like all CT scans performed in the [facility], was performed utilizing techniques to minimize radiation dose exposure, including the use of iterative reconstruction and automated exposure control  IV Contrast:  100 mL of iohexol (OMNIPAQUE) Enteric Contrast: Enteric contrast was administered  FINDINGS: CHEST LUNGS:  Dependent parenchymal changes are noted in the lung bases bilaterally  There are subcentimeter calcified granulomas  Lungs are otherwise clear     There is no tracheal or endobronchial lesion  PLEURA:  Unremarkable  HEART/GREAT VESSELS: Cardiomegaly  No thoracic aortic aneurysm  Prominent pulmonary arteries suggest pulmonary artery hypertension  Recommend echocardiography in the appropriate clinical setting  MEDIASTINUM AND SRAVANTHI:  Unremarkable  CHEST WALL AND LOWER NECK:  Unremarkable  ABDOMEN LIVER/BILIARY TREE:  Liver is diffusely decreased in density consistent with fatty change  No CT evidence of suspicious hepatic mass  Normal hepatic contours  No biliary dilatation  GALLBLADDER:  No calcified gallstones  No pericholecystic inflammatory change  SPLEEN:  Calcified granulomata are noted in the spleen  No suspicious splenic mass  PANCREAS:  Unremarkable  ADRENAL GLANDS:  Unremarkable  KIDNEYS/URETERS:  2 1 cm left renal cystic lesion which does not meet CT criteria for simple cyst on this examination; recommend elective ultrasound or MRI abdomen with contrast, renal protocol, for further evaluation  No hydronephrosis  STOMACH AND BOWEL:  Large bilateral stool seen within the colon with borderline rectal wall thickening raising the possibility of stercoral colitis  APPENDIX:  A normal appendix was visualized  ABDOMINOPELVIC CAVITY:  No ascites  No pneumoperitoneum  No lymphadenopathy  VESSELS:  Unremarkable for patient's age  PELVIS REPRODUCTIVE ORGANS:  Unremarkable for patient's age  URINARY BLADDER:  Unremarkable  ABDOMINAL WALL/INGUINAL REGIONS:  Unremarkable  OSSEOUS STRUCTURES:  No acute fracture or destructive osseous lesion   Severe bilateral shoulder arthropathy with possible loose bodies  Healing lateral left seventh rib fracture  Impression: 2 1 cm left renal cystic lesion which does not meet CT criteria for simple cyst on this examination; recommend elective ultrasound or MRI abdomen with contrast, renal protocol, for further evaluation  No acute pathology visualized on CT of the chest, abdomen and pelvis with IV without oral contrast   I personally discussed this study with Dr Jazmyn Bella on 12/26/2022 at 1:26 AM  Workstation performed: [id number]     XR Trauma multiple ([facility] trauma bay ONLY)    Result Date: 12/26/2022  Narrative: TRAUMA SERIES INDICATION:  TRAUMA  Subdural hematoma COMPARISON:  None VIEWS:  XR TRAUMA MULTIPLE  1 image  FINDINGS: CHEST: Supine frontal view of the chest is obtained  The heart appears enlarged  Lungs are clear  No layering pleural effusions detected  No pneumothorax is seen on this supine film  Upright images are more sensitive to detect anterior pneumothoraces if relevant  Arthritic changes in both shoulders  Impression: No acute cardiopulmonary disease within limitations of supine imaging  Workstation performed: [id number]     [address] [facility] bedside procedure    Result Date: 12/28/2022  Narrative: 1 2 [id number]  0 [id number]  1 [id number]    Echo complete w/ contrast if indicated    Result Date: 12/27/2022  Narrative: •  Left Ventricle: Left ventricular cavity size is normal  Wall thickness is mildly increased  There is mild concentric hypertrophy  The left ventricular ejection fraction is 65%  Systolic function is normal  Wall motion is normal  Diastolic function is indeterminate  E/a is increased but tissue Doppler is normal  •  Right Ventricle: Right ventricular cavity size is normal  Systolic function is normal  •  Left Atrium: The atrium is moderately dilated (42-48 mL/m2)  •  Right Atrium: The atrium is dilated  •  Atrial Septum: The septum bows into the right atrium, suggesting increased left atrial pressure  •  Aortic Valve:  The aortic valve is functionally bicuspid with commissural fusion of the left-noncoronary cusp  The leaflets are mildly calcified  There is mildly reduced mobility  There is mild stenosis  The aortic valve peak velocity is 2 1 m/s  The aortic valve mean gradient is 13 mmHg  •  Mitral Valve: There is mild annular calcification  There is mild regurgitation  •  Tricuspid Valve: There is moderate regurgitation  Blunted (decreased) hepatic vein systolic flow  The right ventricular systolic pressure is severely elevated  The estimated right ventricular systolic pressure is 70 mmHg  •  Pulmonic Valve: There is mild regurgitation  I have personally reviewed pertinent reports     and I have personally reviewed pertinent films in PACS

## 2023-01-19 NOTE — PROGRESS NOTES
[facility]  coding opportunities     E11 42,I73 9 and I13 0     Chart Reviewed number of suggestions sent to Provider: 3     Patients Insurance     Medicare Insurance: [address] [place] [id number]

## 2023-01-19 NOTE — ASSESSMENT & PLAN NOTE
Patient presents for about 2 week hospital follow up regarding SDH / IVH  · S/p syncopal episode 12/25  · Was previously maintained on asa and xarelto for history of DVT and PAD, on hold since this incident  · Has been on lovenox shots at home since leaving rehab    Imaging:  · CT head wo contrast 1/9/2023:  0 4 cm thick anterior parafalcine subacute subdural hematoma, decreased in density and size  Resolved thin right frontal cerebral convexity subdural hematoma  Resolved trace intraventricular hemorrhage  No new acute intracranial abnormality  Plan:  • Imaging reviewed with patient and family member, overall no new or worsening hemorrhage noted, falx SDH is decreasing in size and thankfully right frontal SDH and IVH have resolved completed  • At this time, will allow patient to resume her [facility] /  therapy in a step wise fashion  She can resume her asa at this time but should continue to hold xarelto  • Plan for CT head in 2 weeks for ongoing monitoring    If imaging stable at that time, plan to resume xarelto at her next visit and likely see back PRN  • Return to the office sooner if experiencing worsening symptoms or red flag signs

## 2023-01-23 ENCOUNTER — TELEPHONE (OUTPATIENT)
Dept: FAMILY MEDICINE CLINIC | Facility: CLINIC | Age: 88
End: 2023-01-23

## 2023-01-23 DIAGNOSIS — N18.30 BENIGN HYPERTENSION WITH CKD (CHRONIC KIDNEY DISEASE) STAGE III (HCC): ICD-10-CM

## 2023-01-23 DIAGNOSIS — I12.9 BENIGN HYPERTENSION WITH CKD (CHRONIC KIDNEY DISEASE) STAGE III (HCC): ICD-10-CM

## 2023-01-23 RX ORDER — LISINOPRIL 5 MG/1
5 TABLET ORAL DAILY
Qty: 30 TABLET | Refills: 1 | Status: SHIPPED | OUTPATIENT
Start: 2023-01-23

## 2023-01-23 NOTE — TELEPHONE ENCOUNTER
Patient's caregiver [facility] called  Patient has an appointment tomorrow with Dr Tammy Chand  But when patient was in the hospital she was prescribed Lisinopril and is now out of medication  [facility] is asking if Dr Tammy Chand will [facility] this [facility] [address]  [facility]  I did let her know Dr Tammy Chand may need to see patient first tomorrow before re-ordering but we will contact [facility] to let her know Dr Subhash Awan decision

## 2023-01-24 ENCOUNTER — OFFICE VISIT (OUTPATIENT)
Dept: FAMILY MEDICINE CLINIC | Facility: CLINIC | Age: 88
End: 2023-01-24

## 2023-01-24 VITALS
HEIGHT: 61 IN | WEIGHT: 170 LBS | DIASTOLIC BLOOD PRESSURE: 72 MMHG | TEMPERATURE: 98.3 F | HEART RATE: 78 BPM | BODY MASS INDEX: 32.1 KG/M2 | SYSTOLIC BLOOD PRESSURE: 130 MMHG

## 2023-01-24 DIAGNOSIS — N18.31 TYPE 2 DIABETES MELLITUS WITH STAGE 3A CHRONIC KIDNEY DISEASE, WITHOUT LONG-TERM CURRENT USE OF INSULIN (HCC): ICD-10-CM

## 2023-01-24 DIAGNOSIS — S06.5X0A TRAUMATIC SUBDURAL HEMORRHAGE WITHOUT LOSS OF CONSCIOUSNESS, INITIAL ENCOUNTER (HCC): ICD-10-CM

## 2023-01-24 DIAGNOSIS — I50.9 CHRONIC HEART FAILURE, UNSPECIFIED HEART FAILURE TYPE (HCC): ICD-10-CM

## 2023-01-24 DIAGNOSIS — I73.9 PAD (PERIPHERAL ARTERY DISEASE) (HCC): ICD-10-CM

## 2023-01-24 DIAGNOSIS — I50.9 CHRONIC CONGESTIVE HEART FAILURE, UNSPECIFIED HEART FAILURE TYPE (HCC): ICD-10-CM

## 2023-01-24 DIAGNOSIS — E11.42 DIABETIC PERIPHERAL NEUROPATHY (HCC): ICD-10-CM

## 2023-01-24 DIAGNOSIS — E11.22 TYPE 2 DIABETES MELLITUS WITH STAGE 3A CHRONIC KIDNEY DISEASE, WITHOUT LONG-TERM CURRENT USE OF INSULIN (HCC): ICD-10-CM

## 2023-01-24 DIAGNOSIS — N18.32 CHRONIC KIDNEY DISEASE, STAGE 3B (HCC): ICD-10-CM

## 2023-01-24 DIAGNOSIS — N25.81 SECONDARY HYPERPARATHYROIDISM OF RENAL ORIGIN (HCC): ICD-10-CM

## 2023-01-24 DIAGNOSIS — F33.9 DEPRESSION, RECURRENT (HCC): ICD-10-CM

## 2023-01-24 DIAGNOSIS — I48.91 ATRIAL FIBRILLATION, UNSPECIFIED TYPE (HCC): ICD-10-CM

## 2023-01-24 DIAGNOSIS — K13.0 LIP LESION: Primary | ICD-10-CM

## 2023-01-24 DIAGNOSIS — D69.6 PLATELETS DECREASED (HCC): ICD-10-CM

## 2023-01-24 DIAGNOSIS — I74.3 EMBOLISM AND THROMBOSIS OF ARTERIES OF THE LOWER EXTREMITIES (HCC): ICD-10-CM

## 2023-01-24 PROBLEM — E11.65 TYPE 2 DIABETES MELLITUS WITH HYPERGLYCEMIA, WITH LONG-TERM CURRENT USE OF INSULIN (HCC): Status: RESOLVED | Noted: 2022-12-30 | Resolved: 2023-01-24

## 2023-01-24 PROBLEM — Z86.79 HISTORY OF ATRIAL FIBRILLATION: Status: RESOLVED | Noted: 2022-12-27 | Resolved: 2023-01-24

## 2023-01-24 PROBLEM — N18.30 CKD (CHRONIC KIDNEY DISEASE), STAGE III (HCC): Status: RESOLVED | Noted: 2018-11-29 | Resolved: 2023-01-24

## 2023-01-24 PROBLEM — I82.5Y9 CHRONIC DEEP VEIN THROMBOSIS (DVT) OF PROXIMAL VEIN OF LOWER EXTREMITY (HCC): Status: RESOLVED | Noted: 2022-12-26 | Resolved: 2023-01-24

## 2023-01-24 PROBLEM — D64.9 ANEMIA: Status: RESOLVED | Noted: 2023-01-05 | Resolved: 2023-01-24

## 2023-01-24 PROBLEM — S01.81XA FACIAL LACERATION: Status: RESOLVED | Noted: 2022-12-28 | Resolved: 2023-01-24

## 2023-01-24 PROBLEM — Z79.4 TYPE 2 DIABETES MELLITUS WITH HYPERGLYCEMIA, WITH LONG-TERM CURRENT USE OF INSULIN (HCC): Status: RESOLVED | Noted: 2022-12-30 | Resolved: 2023-01-24

## 2023-01-24 PROBLEM — E87.1 HYPONATREMIA: Status: RESOLVED | Noted: 2018-07-25 | Resolved: 2023-01-24

## 2023-01-24 PROBLEM — E08.22 DIABETES MELLITUS DUE TO UNDERLYING CONDITION WITH STAGE 3B CHRONIC KIDNEY DISEASE, WITHOUT LONG-TERM CURRENT USE OF INSULIN (HCC): Status: RESOLVED | Noted: 2022-12-26 | Resolved: 2023-01-24

## 2023-01-24 NOTE — ASSESSMENT & PLAN NOTE
Wt Readings from Last 3 Encounters:   01/24/23 77 1 kg (170 lb)   01/19/23 78 kg (172 lb)   12/30/22 88 9 kg (196 lb)         Sees cards

## 2023-01-24 NOTE — ASSESSMENT & PLAN NOTE
Lab Results   Component Value Date    EGFR 37 01/06/2023    EGFR 43 01/05/2023    EGFR 42 01/03/2023    CREATININE 1 27 (H) 01/06/2023    CREATININE 1 14 01/05/2023    CREATININE 1 15 01/03/2023   stable

## 2023-01-24 NOTE — PROGRESS NOTES
Name: Red Millan      : 1934      MRN: [id number]  Encounter Provider: Ben Guerin MD  Encounter Date: 2023   Encounter department: [facility] PRIMARY CARE    Assessment & Plan     1  Lip lesion  -     Ambulatory Referral to Otolaryngology; Future    2  Traumatic subdural hemorrhage without loss of consciousness, initial encounter [facility])  Assessment & Plan:  Sees  neuro      3  Embolism and thrombosis of arteries of the lower extremities (HCC)  Assessment & Plan:  Has  Seen vascular in past, f/u  1yr      4  Chronic heart failure, unspecified heart failure type ([place] [address] )    5  Depression, recurrent ([place] [address] )    6  Type 2 diabetes mellitus with stage 3a chronic kidney disease, without long-term current use of insulin ([facility])  Assessment & Plan:    Lab Results   Component Value Date    HGBA1C 6 4 (H) 2022   bs  stable      7  Atrial fibrillation, unspecified type [facility])  Assessment & Plan:  Sees  cards      8  Secondary hyperparathyroidism of renal origin ([place] [address] )    9  Chronic kidney disease, stage 3b [facility])  Assessment & Plan:  Lab Results   Component Value Date    EGFR 37 2023    EGFR 43 2023    EGFR 42 2023    CREATININE 1 27 (H) 2023    CREATININE 1 14 2023    CREATININE 1 15 2023   stable      10  Platelets decreased ([place] [address] )  Assessment & Plan:  Await lab      11  Diabetic peripheral neuropathy [facility])  Assessment & Plan:    Lab Results   Component Value Date    HGBA1C 6 4 (H) 2022   stable      12  PAD (peripheral artery disease) ([facility] )  Assessment & Plan:  Has seen vascular, study is  stable      13   Chronic congestive heart failure, unspecified heart failure type [facility])  Assessment & Plan:  Wt Readings from Last 3 Encounters:   23 77 1 kg (170 lb)   23 78 kg (172 lb)   22 88 9 kg (196 lb)         Sees cards             Subjective      Had  Syncopal episode and  Fell on face, nasal fracture, lesion r  Upper lip, subdural   Hemorrhage  Which is resolving, had Zio patch which was monitoring heart  Rhythm, went  To rehab and getting home pT, finishing up Lovenox, feels well, no cp, no sob, no headache    Review of Systems   Constitutional: Negative for activity change, appetite change and fatigue  Respiratory: Negative for cough and shortness of breath  Cardiovascular: Negative for chest pain, palpitations and leg swelling  Gastrointestinal: Negative for constipation and diarrhea  Musculoskeletal: Positive for gait problem  Skin:        Lip lesion r upper lip   Neurological: Negative for dizziness and headaches         Current Outpatient Medications on File Prior to Visit   Medication Sig   • acetaminophen (TYLENOL) 325 mg tablet Take 3 tablets (975 mg total) by mouth every 8 (eight) hours   • amiodarone 200 mg tablet Take 200 mg by mouth daily     • amLODIPine (NORVASC) 2 5 mg tablet Take 1 tablet (2 5 mg total) by mouth daily   • ASPIRIN 81 PO Take by mouth   • atorvastatin (LIPITOR) 80 mg tablet Take 80 mg by mouth daily   • Blood Glucose Monitoring Suppl (ONE TOUCH ULTRA 2) w/Device KIT Use daily   • carvedilol (COREG) 25 mg tablet Take 1 tablet (25 mg total) by mouth 2 (two) times a day with meals   • Cholecalciferol 125 MCG (5000 UT) capsule Take 1,000 Units by mouth daily   • dicyclomine (BENTYL) 10 mg capsule Take 10 mg by mouth 2 (two) times a day as needed (cramping)   • docusate sodium (COLACE) 100 mg capsule Take 100 mg by mouth 2 (two) times a day   • glucose blood (OneTouch Ultra) test strip Test daily                        DX: E11 9   • lidocaine (LIDODERM) 5 % Apply 1 patch topically daily Remove & Discard patch within 12 hours or as directed by MD   • lisinopril (ZESTRIL) 5 mg tablet Take 1 tablet (5 mg total) by mouth daily   • Multiple Vitamins-Minerals (multivitamin with minerals) tablet Take 1 tablet by mouth daily   • Omega 3-6-9 Fatty Acids (OMEGA 3-6-9 PO) Take by mouth   • polyethylene glycol (MIRALAX) 17 g packet Take 17 g by mouth daily Do not start before December 30, 2022  • potassium chloride (MICRO-K) 10 MEQ CR capsule Take 1 capsule (10 mEq total) by mouth daily   • saccharomyces boulardii (FLORASTOR) 250 mg capsule Take 250 mg by mouth 2 (two) times a day   • senna (SENOKOT) 8 6 mg Take 2 tablets (17 2 mg total) by mouth daily as needed for constipation   • torsemide (DEMADEX) 20 mg tablet Take 1 tablet (20 mg total) by mouth daily   • [DISCONTINUED] amiodarone 200 mg tablet Take 200 mg by mouth daily   • [DISCONTINUED] amLODIPine (NORVASC) 2 5 mg tablet Take 2 5 mg by mouth daily   • [DISCONTINUED] atorvastatin (LIPITOR) 80 mg tablet    • [DISCONTINUED] carvedilol (COREG) 25 mg tablet Take 25 mg by mouth 2 (two) times a day with meals   • [DISCONTINUED] cholecalciferol (VITAMIN D3) 1,000 units tablet Take 2,000 Units by mouth daily   • [DISCONTINUED] dicyclomine (BENTYL) 10 mg capsule Take 1 capsule (10 mg total) by mouth 2 (two) times a day   • [DISCONTINUED] Docusate Sodium (DSS) 100 MG CAPS Take 100 mg by mouth 2 (two) times a day   • [DISCONTINUED] enoxaparin (LOVENOX) 30 mg/0 3 mL Inject 0 3 mL (30 mg total) under the skin every 24 hours Do not start before December 30, 2022     • [DISCONTINUED] lidocaine (LIDODERM) 5 % Apply 1 patch topically daily Remove & Discard patch within 12 hours or as directed by MD (Patient not taking: Reported on 11/22/2022)   • [DISCONTINUED] Multiple Vitamin (MULTI-VITAMIN DAILY PO) Take 1 tablet by mouth daily   • [DISCONTINUED] omega-3-acid ethyl esters (LOVAZA) 1 g capsule Take 2 g by mouth 2 (two) times a day   • [DISCONTINUED] potassium chloride (K-DUR,KLOR-CON) 10 mEq tablet Take 10 mEq by mouth 2 (two) times a day   • [DISCONTINUED] Probiotic Product (PROBIOTIC-10 PO) Take by mouth   • [DISCONTINUED] rivaroxaban (XARELTO) 15 mg tablet Take 15 mg by mouth daily    • [DISCONTINUED] torsemide (DEMADEX) 20 mg tablet Take 20 mg by mouth daily       Objective     /72 (BP Location: Left arm, Patient Position: Sitting, Cuff Size: Adult)   Pulse 78   Temp 98 3 °F (36 8 °C) (Temporal)   Ht 5' 1" (1 549 m) Comment: on file  Wt 77 1 kg (170 lb)   BMI 32 12 kg/m²     Physical Exam  Vitals reviewed  Constitutional:       Appearance: Normal appearance  She is obese  HENT:      Nose: No congestion or rhinorrhea  Cardiovascular:      Rate and Rhythm: Normal rate  Rhythm irregularly irregular  Pulses: Normal pulses  Heart sounds: Normal heart sounds  Pulmonary:      Effort: Pulmonary effort is normal       Breath sounds: Normal breath sounds  Musculoskeletal:      Right lower leg: Edema present  Left lower leg: Edema present  Comments: 1 plus edema   Lymphadenopathy:      Cervical: No cervical adenopathy  Skin:     Findings: Lesion present  Comments: r  Upper  Lip with whitish lesion   Neurological:      Mental Status: She is alert         Ingrid Fraser MD

## 2023-02-03 ENCOUNTER — HOSPITAL ENCOUNTER (OUTPATIENT)
Dept: CT IMAGING | Facility: HOSPITAL | Age: 88
Discharge: HOME | End: 2023-02-03

## 2023-02-03 DIAGNOSIS — S06.5XAA SDH (SUBDURAL HEMATOMA): ICD-10-CM

## 2023-02-06 ENCOUNTER — TELEPHONE (OUTPATIENT)
Dept: FAMILY MEDICINE CLINIC | Facility: CLINIC | Age: 88
End: 2023-02-06

## 2023-02-06 NOTE — TELEPHONE ENCOUNTER
Korey Monk from [address] [place] [address] wanted to made patient PCP aware of higher bp, She saw the patient earlier today  Blood pressure higher than normal 150/78  She review sx of HTN patient denied experiencing any sx  Patient was educated by [employer] on a low sodium diet

## 2023-02-10 ENCOUNTER — OFFICE VISIT (OUTPATIENT)
Dept: NEUROSURGERY | Facility: CLINIC | Age: 88
End: 2023-02-10

## 2023-02-10 VITALS
RESPIRATION RATE: 16 BRPM | DIASTOLIC BLOOD PRESSURE: 54 MMHG | HEART RATE: 67 BPM | TEMPERATURE: 97.7 F | SYSTOLIC BLOOD PRESSURE: 98 MMHG | HEIGHT: 61 IN | BODY MASS INDEX: 32.12 KG/M2

## 2023-02-10 DIAGNOSIS — S06.5XAA SDH (SUBDURAL HEMATOMA): Primary | ICD-10-CM

## 2023-02-10 RX ORDER — ENOXAPARIN SODIUM 100 MG/ML
INJECTION SUBCUTANEOUS
COMMUNITY

## 2023-02-10 NOTE — PROGRESS NOTES
Neurosurgery Office Note  Jelly Hunt 80 y o  female MRN: [id number]      Assessment/Plan     SDH (subdural hematoma)  Patient presents for continued hospital follow up regarding SDH / IVH  · S/p syncopal episode 12/25  · Was previously maintained on asa and xarelto for history of DVT and PAD  · Has been on lovenox shots at home since leaving rehab    Imaging:  · CT head 2/3/2023: resolved parafalcine SDH  No new abnormality  Plan:  • Imaging reviewed with patient and nephew in room  o Resolution of hemorrhage  • Patient restarted her ASA at the last visit  AC therapy was still on hold   o Cleared to resume at this time given resolution of previous hemorrhage  • Return to the office sooner if experiencing worsening symptoms or red flag signs  No further follow up required at this time  Diagnoses and all orders for this visit:    SDH (subdural hematoma)    Other orders  -     enoxaparin (LOVENOX) 30 mg/0 3 mL; Inject under the skin In the AM  -     Bacillus Coagulans-Inulin (PROBIOTIC FORMULA PO); Take by mouth 97mg two times a day for stomach health supplement  -     NON FORMULARY; Lung support 200mg once daily at nighttime  -     NON FORMULARY; Fosal Point 400mg daily at bedtime          I spent 20 minutes with the patient today in which >50% of the time was spent counseling/coordination of care regarding diagnosis, imaging review, symptoms and treatment plan  CHIEF COMPLAINT    Chief Complaint   Patient presents with   • Follow-up       HISTORY    History of Present Illness     80y o  year old female with past medical history significant for PAD previously on aspirin, atrial fibrillation previously on Xarelto, anxiety and depression, CKD, diabetes with subsequent neuropathy, hyperlipidemia, hypertension who presents for ongoing monitoring status post fall with subdural hematoma and intraventricular hemorrhage  Patient is here with her Nephew  She states overall she is doing well    She reports nothing but improvement since her prior appointment  She has no headaches, change in vision, trouble speech, facial weakness, facial numbness, weakness numbness and tingling in the arms or legs  She presents today in a wheelchair as she is still having some mild ambulatory dysfunction but also does not walk far distances  Ambulates at home with a rolling walker      See Discussion    REVIEW OF SYSTEMS    Review of Systems   HENT: Positive for hearing loss and tinnitus (right ear hums all the time )  Eyes: Negative for visual disturbance  Musculoskeletal: Positive for gait problem (ambulates today in wheelchair///uses a walker, very poor balance, doing PT , no falls since discharge)  Negative for back pain and neck pain  2 week hospital follow up - [facility]   1/9/23-CT    12/25-12/29/22 hospital stay   Neurological: Positive for weakness (legs are giving her trouble but doing activities during therapy-legs are shaky )  Negative for dizziness, tremors, seizures, speech difficulty, numbness and headaches  ROS obtained by MA  Reviewed  See HPI       Meds/Allergies     Current Outpatient Medications   Medication Sig Dispense Refill   • amiodarone 200 mg tablet Take 200 mg by mouth daily       • ASPIRIN 81 PO Take by mouth     • atorvastatin (LIPITOR) 80 mg tablet Take 80 mg by mouth daily     • Bacillus Coagulans-Inulin (PROBIOTIC FORMULA PO) Take by mouth 97mg two times a day for stomach health supplement     • Blood Glucose Monitoring Suppl (ONE TOUCH ULTRA 2) w/Device KIT Use daily 1 kit 0   • Cholecalciferol 125 MCG (5000 UT) capsule Take 1,000 Units by mouth daily     • dicyclomine (BENTYL) 10 mg capsule Take 10 mg by mouth 2 (two) times a day as needed (cramping)     • enoxaparin (LOVENOX) 30 mg/0 3 mL Inject under the skin In the AM     • glucose blood (OneTouch Ultra) test strip Test daily                        DX: E11 9 100 strip 1   • lidocaine (LIDODERM) 5 % Apply 1 patch topically daily Remove & Discard patch within 12 hours or as directed by MD     • lisinopril (ZESTRIL) 5 mg tablet Take 1 tablet (5 mg total) by mouth daily 30 tablet 1   • Multiple Vitamins-Minerals (multivitamin with minerals) tablet Take 1 tablet by mouth daily     • NON FORMULARY Lung support 200mg once daily at nighttime     • NON FORMULARY Fosal Point 400mg daily at bedtime     • Omega 3-6-9 Fatty Acids (OMEGA 3-6-9 PO) Take by mouth     • potassium chloride (MICRO-K) 10 MEQ CR capsule Take 1 capsule (10 mEq total) by mouth daily 90 capsule 1   • torsemide (DEMADEX) 20 mg tablet Take 1 tablet (20 mg total) by mouth daily 30 tablet 5   • acetaminophen (TYLENOL) 325 mg tablet Take 3 tablets (975 mg total) by mouth every 8 (eight) hours (Patient not taking: Reported on 2/10/2023)  0   • amLODIPine (NORVASC) 2 5 mg tablet Take 1 tablet (2 5 mg total) by mouth daily (Patient not taking: Reported on 2/10/2023) 30 tablet 5   • carvedilol (COREG) 25 mg tablet Take 1 tablet (25 mg total) by mouth 2 (two) times a day with meals (Patient not taking: Reported on 2/10/2023) 180 tablet 1   • docusate sodium (COLACE) 100 mg capsule Take 100 mg by mouth 2 (two) times a day (Patient not taking: Reported on 2/10/2023)     • polyethylene glycol (MIRALAX) 17 g packet Take 17 g by mouth daily Do not start before December 30, 2022  (Patient not taking: Reported on 2/10/2023)  0   • saccharomyces boulardii (FLORASTOR) 250 mg capsule Take 250 mg by mouth 2 (two) times a day (Patient not taking: Reported on 2/10/2023)     • senna (SENOKOT) 8 6 mg Take 2 tablets (17 2 mg total) by mouth daily as needed for constipation (Patient not taking: Reported on 2/10/2023)  0     No current facility-administered medications for this visit  Allergies   Allergen Reactions   • Hydrochlorothiazide      Hyponatremia, severe   • Hydrochlorothiazide Other (See Comments)       • Statins Other (See Comments)          • Statins        PAST HISTORY    Past Medical History:   Diagnosis Date   • Anemia    • Anxiety    • Arthritis    • CKD (chronic kidney disease), stage III ([facility] ) 11/29/2018   • Depression, recurrent ([facility] ) 6/14/2021   • Diabetes mellitus ([place] [id number] )    • Diabetic peripheral neuropathy (HCC)    • HLD (hyperlipidemia)    • Hyperlipidemia    • Hypertension    • 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 8/23/2017   • Thyroid enlarged 2/23/2015   • Tinnitus    • Vertigo        Past Surgical History:   Procedure Laterality Date   • APPENDECTOMY     • EAR SURGERY Left    • KNEE SURGERY     • TONSILLECTOMY         Social History     Tobacco Use   • Smoking status: Never   • Smokeless tobacco: Never   Substance Use Topics   • Alcohol use: Not Currently   • Drug use: Never       Family History   Problem Relation Age of Onset   • Heart disease Mother    • Diabetes Mother    • Heart disease Father    • Stroke Father    • Cancer Family    • Dementia Sister    • Liver disease Other          Above history personally reviewed  EXAM    Vitals:Blood pressure 98/54, pulse 67, temperature 97 7 °F (36 5 °C), temperature source Temporal, resp  rate 16, height 5' 1" (1 549 m), not currently breastfeeding  ,Body mass index is 32 12 kg/m²  Physical Exam  Constitutional:       Appearance: Normal appearance  She is well-developed  HENT:      Head: Normocephalic and atraumatic  Eyes:      Extraocular Movements: Extraocular movements intact and EOM normal       Pupils: Pupils are equal, round, and reactive to light  Neck:      Vascular: No JVD  Trachea: No tracheal deviation  Cardiovascular:      Rate and Rhythm: Normal rate  Pulmonary:      Effort: Pulmonary effort is normal  No respiratory distress  Musculoskeletal:         General: No deformity  Normal range of motion  Cervical back: Normal range of motion and neck supple  Skin:     General: Skin is warm and dry  Neurological:      Mental Status: She is alert and oriented to person, place, and time  Cranial Nerves: No cranial nerve deficit  Sensory: No sensory deficit  Motor: No weakness  Gait: Gait is intact  Deep Tendon Reflexes: Reflexes are normal and symmetric  Reflex Scores:       Bicep reflexes are 2+ on the right side and 2+ on the left side  Brachioradialis reflexes are 2+ on the right side and 2+ on the left side  Patellar reflexes are 2+ on the right side and 2+ on the left side  Psychiatric:         Speech: Speech normal          Behavior: Behavior normal          Thought Content: Thought content normal          Neurologic Exam     Mental Status   Oriented to person, place, and time  Attention: normal    Speech: speech is normal   Level of consciousness: alert  Knowledge: good  Normal comprehension  Cranial Nerves     CN III, IV, VI   Pupils are equal, round, and reactive to light  Extraocular motions are normal    Upgaze: normal  Downgaze: normal    CN VII   Facial expression full, symmetric       CN VIII   CN VIII normal    Hearing: intact    Motor Exam   Muscle bulk: normal  Right arm tone: normal  Left arm tone: normal  Right leg tone: normal  Left leg tone: normal    Strength   Right deltoid: 5/5  Left deltoid: 5/5  Right biceps: 5/5  Left biceps: 5/5  Right triceps: 5/5  Left triceps: 5/5  Right wrist flexion: 5/5  Left wrist flexion: 5/5  Right wrist extension: 5/5  Left wrist extension: 5/5  Right iliopsoas: 5/5  Left iliopsoas: 5/5  Right quadriceps: 5/5  Left quadriceps: 5/5  Right hamstrin/5  Left hamstrin/5  Right anterior tibial: 5/5  Left anterior tibial: 5/5  Right gastroc: 5/5  Left gastroc: 5/5    Sensory Exam   Light touch normal      Gait, Coordination, and Reflexes     Gait  Gait: normal    Tremor   Resting tremor: absent  Action tremor: absent    Reflexes   Right brachioradialis: 2+  Left brachioradialis: 2+  Right biceps: 2+  Left biceps: 2+  Right patellar: 2+  Left patellar: 2+  Right Polanco: absent  Left Polanco: absent  Right ankle clonus: absent  Left ankle clonus: absent        MEDICAL DECISION MAKING    Imaging Studies:     CT head wo contrast    Result Date: 2/7/2023  Narrative: CT BRAIN - WITHOUT CONTRAST INDICATION:   S06  5XAA: Traumatic subdural hemorrhage with loss of consciousness status unknown, initial encounter  follow up SDH, resumed asa COMPARISON:  CT head without contrast 1/9/2023, 12/26/2022, 7/21/2019  MRI brain IAC with and without contrast November 22, 2017  TECHNIQUE:  CT examination of the brain was performed  In addition to axial images, sagittal and coronal 2D reformatted images were created and submitted for interpretation  Radiation dose length product (DLP) for this visit:  805 mGy-cm   This examination, like all CT scans performed in the [facility], was performed utilizing techniques to minimize radiation dose exposure, including the use of iterative reconstruction and automated exposure control  IMAGE QUALITY:  Diagnostic  FINDINGS: PARENCHYMA AND EXTRA-AXIAL SPACES: Decreased attenuation is noted in periventricular and subcortical white matter demonstrating an appearance that is statistically most likely to represent mild microangiopathic change  Unchanged chronic lacunar infarct in left caudate  No CT signs of acute infarction  No intracranial mass, mass effect or midline shift  No acute parenchymal hemorrhage  Resolved anterior parafalcine subdural hematoma  No new acute extra-axial hemorrhage  Arterial calcifications of carotid siphons and bilateral intradural vertebral arteries  VENTRICLES:  Normal for the patient's age  VISUALIZED ORBITS: Sequela of bilateral cataract surgery  0 8 cm oval-shaped lesion in left postero-inferior intraconal fat, 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, unchanged since 11/22/2017   PARANASAL SINUSES: Mild mucosal thickening in left posterior ethmoid and left maxillary sinuses  CALVARIUM AND EXTRACRANIAL SOFT TISSUES:  No acute calvarial abnormality  Unchanged postsurgical changes of left canal wall up mastoidectomy with soft tissue density in the left mastoidectomy bowl  Unchanged subcutaneous scarring in left frontal/supraorbital scalp  OTHER: Healing bilateral nasal bone fracture deformity  Healing nasal septal fracture deformity with left nasal septal deviation  Partially imaged dental hardware  Impression: No acute intracranial abnormality  Resolved anterior parafalcine subdural hematoma  Workstation performed: [id number]       I have personally reviewed pertinent reports     and I have personally reviewed pertinent films in PACS

## 2023-02-10 NOTE — ASSESSMENT & PLAN NOTE
Patient presents for continued hospital follow up regarding SDH / IVH  · S/p syncopal episode 12/25  · Was previously maintained on asa and xarelto for history of DVT and PAD  · Has been on lovenox shots at home since leaving rehab    Imaging:  · CT head 2/3/2023: resolved parafalcine SDH  No new abnormality  Plan:  • Imaging reviewed with patient and nephew in room  o Resolution of hemorrhage  • Patient restarted her ASA at the last visit  AC therapy was still on hold   o Cleared to resume at this time given resolution of previous hemorrhage  • Return to the office sooner if experiencing worsening symptoms or red flag signs  No further follow up required at this time

## 2023-04-25 ENCOUNTER — OFFICE VISIT (OUTPATIENT)
Dept: FAMILY MEDICINE CLINIC | Facility: CLINIC | Age: 88
End: 2023-04-25

## 2023-04-25 VITALS
DIASTOLIC BLOOD PRESSURE: 60 MMHG | WEIGHT: 172 LBS | BODY MASS INDEX: 32.47 KG/M2 | HEART RATE: 76 BPM | SYSTOLIC BLOOD PRESSURE: 136 MMHG | HEIGHT: 61 IN

## 2023-04-25 DIAGNOSIS — N18.30 BENIGN HYPERTENSION WITH CKD (CHRONIC KIDNEY DISEASE) STAGE III (HCC): ICD-10-CM

## 2023-04-25 DIAGNOSIS — I48.91 ATRIAL FIBRILLATION, UNSPECIFIED TYPE (HCC): Primary | ICD-10-CM

## 2023-04-25 DIAGNOSIS — I12.9 BENIGN HYPERTENSION WITH CKD (CHRONIC KIDNEY DISEASE) STAGE III (HCC): ICD-10-CM

## 2023-04-25 DIAGNOSIS — E11.22 TYPE 2 DIABETES MELLITUS WITH STAGE 3A CHRONIC KIDNEY DISEASE, WITHOUT LONG-TERM CURRENT USE OF INSULIN (HCC): ICD-10-CM

## 2023-04-25 DIAGNOSIS — E78.5 HYPERLIPIDEMIA, UNSPECIFIED HYPERLIPIDEMIA TYPE: ICD-10-CM

## 2023-04-25 DIAGNOSIS — E87.6 HYPOKALEMIA: ICD-10-CM

## 2023-04-25 DIAGNOSIS — N18.32 CHRONIC KIDNEY DISEASE, STAGE 3B (HCC): ICD-10-CM

## 2023-04-25 DIAGNOSIS — I10 PRIMARY HYPERTENSION: ICD-10-CM

## 2023-04-25 DIAGNOSIS — N18.31 TYPE 2 DIABETES MELLITUS WITH STAGE 3A CHRONIC KIDNEY DISEASE, WITHOUT LONG-TERM CURRENT USE OF INSULIN (HCC): ICD-10-CM

## 2023-04-25 PROBLEM — S06.5X0A TRAUMATIC SUBDURAL HEMORRHAGE WITHOUT LOSS OF CONSCIOUSNESS, INITIAL ENCOUNTER (HCC): Status: RESOLVED | Noted: 2023-01-24 | Resolved: 2023-04-25

## 2023-04-25 PROBLEM — E78.49 OTHER HYPERLIPIDEMIA: Status: RESOLVED | Noted: 2022-12-26 | Resolved: 2023-04-25

## 2023-04-25 PROBLEM — S06.5XAA SDH (SUBDURAL HEMATOMA) (HCC): Status: RESOLVED | Noted: 2022-12-26 | Resolved: 2023-04-25

## 2023-04-25 PROBLEM — E87.1 HYPONATREMIA: Status: RESOLVED | Noted: 2022-12-30 | Resolved: 2023-04-25

## 2023-04-25 RX ORDER — AMIODARONE HYDROCHLORIDE 100 MG/1
100 TABLET ORAL DAILY
Qty: 90 TABLET | Refills: 1
Start: 2023-04-25

## 2023-04-25 RX ORDER — LISINOPRIL 10 MG/1
10 TABLET ORAL DAILY
COMMUNITY
Start: 2023-04-12

## 2023-04-25 RX ORDER — POTASSIUM CHLORIDE 750 MG/1
10 CAPSULE, EXTENDED RELEASE ORAL DAILY
Qty: 90 CAPSULE | Refills: 0
Start: 2023-04-25 | End: 2023-05-06

## 2023-04-25 RX ORDER — AMIODARONE HYDROCHLORIDE 100 MG/1
100 TABLET ORAL DAILY
COMMUNITY
Start: 2023-04-19 | End: 2023-04-25

## 2023-04-25 RX ORDER — RIVAROXABAN 15 MG/1
15 TABLET, FILM COATED ORAL DAILY
COMMUNITY
Start: 2023-04-12 | End: 2023-04-25

## 2023-04-25 NOTE — PROGRESS NOTES
Name: Jonathan Mathis      : 1934      MRN: [id number]  Encounter Provider: Hank Purcell MD  Encounter Date: 2023   Encounter department: [facility]    Assessment & Plan     1  Atrial fibrillation, unspecified type [facility])  Assessment & Plan:  stable    Orders:  -     amiodarone 100 mg tablet; Take 1 tablet (100 mg total) by mouth daily    2  Hypokalemia  -     potassium chloride (MICRO-K) 10 MEQ CR capsule; Take 1 capsule (10 mEq total) by mouth daily  -     Comprehensive metabolic panel; Future    3  Chronic kidney disease, stage 3b [facility])  Assessment & Plan:  Lab Results   Component Value Date    EGFR 37 2023    EGFR 43 2023    EGFR 42 2023    CREATININE 1 27 (H) 2023    CREATININE 1 14 2023    CREATININE 1 15 2023   stable    Orders:  -     Comprehensive metabolic panel; Future    4  Type 2 diabetes mellitus with stage 3a chronic kidney disease, without long-term current use of insulin ([facility])  Assessment & Plan:    Lab Results   Component Value Date    HGBA1C 6 4 (H) 2022   await lab    Orders:  -     Comprehensive metabolic panel; Future  -     Hemoglobin A1C; Future  -     TSH, 3rd generation; Future  -     Lipid panel; Future    5  Hyperlipidemia, unspecified hyperlipidemia type  -     Comprehensive metabolic panel; Future  -     TSH, 3rd generation; Future  -     Lipid panel; Future    6  Benign hypertension with CKD (chronic kidney disease) stage III (HCC)  -     CBC and differential; Future    7  Primary hypertension  Assessment & Plan:  BP stable           Subjective      Here for  F/u  Multiple chronic  Conditions, just saw cards-Xarelto was stopped, Amiodarone  Decreased to 100, potassium should be  Once daily, no cp, no sob, mild edema, bowels stable    Review of Systems   Constitutional: Negative for activity change, appetite change, fatigue and unexpected weight change  Respiratory: Negative for shortness of breath  Cardiovascular: Negative for chest pain  Musculoskeletal: Positive for arthralgias and gait problem  Neurological: Negative for dizziness and headaches  Psychiatric/Behavioral: The patient is not nervous/anxious          Current Outpatient Medications on File Prior to Visit   Medication Sig   • acetaminophen (TYLENOL) 325 mg tablet Take 3 tablets (975 mg total) by mouth every 8 (eight) hours   • amLODIPine (NORVASC) 2 5 mg tablet Take 1 tablet (2 5 mg total) by mouth daily   • ASPIRIN 81 PO Take by mouth   • atorvastatin (LIPITOR) 80 mg tablet Take 80 mg by mouth daily   • Bacillus Coagulans-Inulin (PROBIOTIC FORMULA PO) Take by mouth 97mg two times a day for stomach health supplement   • Blood Glucose Monitoring Suppl (ONE TOUCH ULTRA 2) w/Device KIT Use daily   • carvedilol (COREG) 25 mg tablet Take 1 tablet (25 mg total) by mouth 2 (two) times a day with meals   • Cholecalciferol 125 MCG (5000 UT) capsule Take 1,000 Units by mouth daily   • dicyclomine (BENTYL) 10 mg capsule Take 10 mg by mouth 2 (two) times a day as needed (cramping)   • docusate sodium (COLACE) 100 mg capsule Take 100 mg by mouth 2 (two) times a day   • glucose blood (OneTouch Ultra) test strip Test daily                        DX: E11 9   • lidocaine (LIDODERM) 5 % Apply 1 patch topically daily Remove & Discard patch within 12 hours or as directed by MD   • lisinopril (ZESTRIL) 10 mg tablet Take 10 mg by mouth daily   • Multiple Vitamins-Minerals (multivitamin with minerals) tablet Take 1 tablet by mouth daily   • NON FORMULARY Lung support 200mg once daily at nighttime   • NON FORMULARY Fosal Point 400mg daily at bedtime   • Omega 3-6-9 Fatty Acids (OMEGA 3-6-9 PO) Take by mouth   • saccharomyces boulardii (FLORASTOR) 250 mg capsule Take 250 mg by mouth 2 (two) times a day   • senna (SENOKOT) 8 6 mg Take 2 tablets (17 2 mg total) by mouth daily as needed for constipation   • torsemide (DEMADEX) 20 mg tablet Take 1 tablet (20 mg total) "by mouth daily   • [DISCONTINUED] amiodarone 200 mg tablet Take 200 mg by mouth daily     • [DISCONTINUED] enoxaparin (LOVENOX) 30 mg/0 3 mL Inject under the skin In the AM   • [DISCONTINUED] polyethylene glycol (MIRALAX) 17 g packet Take 17 g by mouth daily Do not start before December 30, 2022  • [DISCONTINUED] potassium chloride (MICRO-K) 10 MEQ CR capsule Take 1 capsule by mouth once daily   • [DISCONTINUED] Xarelto 15 MG tablet Take 15 mg by mouth daily   • [DISCONTINUED] lisinopril (ZESTRIL) 5 mg tablet Take 1 tablet by mouth once daily   • [DISCONTINUED] Pacerone 100 MG tablet Take 100 mg by mouth daily (Patient not taking: Reported on 4/25/2023)       Objective     /60   Pulse 76   Ht 5' 1\" (1 549 m)   Wt 78 kg (172 lb)   BMI 32 50 kg/m²     Physical Exam  Vitals reviewed  Constitutional:       Appearance: Normal appearance  She is obese  Neck:      Vascular: No carotid bruit  Cardiovascular:      Rate and Rhythm: Normal rate and regular rhythm  Pulses: Normal pulses  Heart sounds: Normal heart sounds  Pulmonary:      Effort: Pulmonary effort is normal       Breath sounds: Normal breath sounds  Musculoskeletal:      Right lower leg: Edema present  Left lower leg: Edema present  Comments:  1  Plus edema   Lymphadenopathy:      Cervical: No cervical adenopathy  Neurological:      Mental Status: She is alert     Psychiatric:         Mood and Affect: Mood normal        Clint Gilbert MD  "

## 2023-05-01 ENCOUNTER — APPOINTMENT (OUTPATIENT)
Dept: LAB | Facility: CLINIC | Age: 88
End: 2023-05-01

## 2023-05-01 DIAGNOSIS — E11.22 TYPE 2 DIABETES MELLITUS WITH STAGE 3A CHRONIC KIDNEY DISEASE, WITHOUT LONG-TERM CURRENT USE OF INSULIN (HCC): ICD-10-CM

## 2023-05-01 DIAGNOSIS — I12.9 BENIGN HYPERTENSION WITH CKD (CHRONIC KIDNEY DISEASE) STAGE III (HCC): ICD-10-CM

## 2023-05-01 DIAGNOSIS — S06.5XAA SDH (SUBDURAL HEMATOMA) (HCC): ICD-10-CM

## 2023-05-01 DIAGNOSIS — N18.31 TYPE 2 DIABETES MELLITUS WITH STAGE 3A CHRONIC KIDNEY DISEASE, WITHOUT LONG-TERM CURRENT USE OF INSULIN (HCC): ICD-10-CM

## 2023-05-01 DIAGNOSIS — I48.91 NEW ONSET A-FIB (HCC): ICD-10-CM

## 2023-05-01 DIAGNOSIS — N18.32 STAGE 3B CHRONIC KIDNEY DISEASE (HCC): ICD-10-CM

## 2023-05-01 DIAGNOSIS — N18.30 BENIGN HYPERTENSION WITH CKD (CHRONIC KIDNEY DISEASE) STAGE III (HCC): ICD-10-CM

## 2023-05-01 DIAGNOSIS — M51.36 DEGENERATIVE DISC DISEASE, LUMBAR: Primary | ICD-10-CM

## 2023-05-01 DIAGNOSIS — E87.1 HYPONATREMIA: ICD-10-CM

## 2023-05-01 DIAGNOSIS — E78.5 HYPERLIPIDEMIA, UNSPECIFIED HYPERLIPIDEMIA TYPE: ICD-10-CM

## 2023-05-01 DIAGNOSIS — N18.32 CHRONIC KIDNEY DISEASE, STAGE 3B (HCC): ICD-10-CM

## 2023-05-01 DIAGNOSIS — E87.6 HYPOKALEMIA: ICD-10-CM

## 2023-05-01 DIAGNOSIS — I50.9 CHRONIC CONGESTIVE HEART FAILURE, UNSPECIFIED HEART FAILURE TYPE (HCC): ICD-10-CM

## 2023-05-01 DIAGNOSIS — N25.81 SECONDARY HYPERPARATHYROIDISM OF RENAL ORIGIN (HCC): ICD-10-CM

## 2023-05-01 LAB
25(OH)D3 SERPL-MCNC: 51 NG/ML (ref 30–100)
ALBUMIN SERPL BCP-MCNC: 3.5 G/DL (ref 3.5–5)
ALP SERPL-CCNC: 151 U/L (ref 46–116)
ALT SERPL W P-5'-P-CCNC: 53 U/L (ref 12–78)
ANION GAP SERPL CALCULATED.3IONS-SCNC: 7 MMOL/L (ref 4–13)
AST SERPL W P-5'-P-CCNC: 53 U/L (ref 5–45)
BASOPHILS # BLD AUTO: 0.07 THOUSANDS/ÂΜL (ref 0–0.1)
BASOPHILS NFR BLD AUTO: 1 % (ref 0–1)
BILIRUB SERPL-MCNC: 0.95 MG/DL (ref 0.2–1)
BUN SERPL-MCNC: 15 MG/DL (ref 5–25)
CALCIUM SERPL-MCNC: 9 MG/DL (ref 8.3–10.1)
CHLORIDE SERPL-SCNC: 89 MMOL/L (ref 96–108)
CHOLEST SERPL-MCNC: 148 MG/DL
CO2 SERPL-SCNC: 28 MMOL/L (ref 21–32)
CREAT SERPL-MCNC: 1.3 MG/DL (ref 0.6–1.3)
CREAT UR-MCNC: 58.8 MG/DL
EOSINOPHIL # BLD AUTO: 0.2 THOUSAND/ÂΜL (ref 0–0.61)
EOSINOPHIL NFR BLD AUTO: 4 % (ref 0–6)
ERYTHROCYTE [DISTWIDTH] IN BLOOD BY AUTOMATED COUNT: 13.5 % (ref 11.6–15.1)
GFR SERPL CREATININE-BSD FRML MDRD: 36 ML/MIN/1.73SQ M
GLUCOSE P FAST SERPL-MCNC: 123 MG/DL (ref 65–99)
HCT VFR BLD AUTO: 32.9 % (ref 34.8–46.1)
HDLC SERPL-MCNC: 86 MG/DL
HGB BLD-MCNC: 11.3 G/DL (ref 11.5–15.4)
IMM GRANULOCYTES # BLD AUTO: 0.02 THOUSAND/UL (ref 0–0.2)
IMM GRANULOCYTES NFR BLD AUTO: 0 % (ref 0–2)
LDLC SERPL CALC-MCNC: 50 MG/DL (ref 0–100)
LYMPHOCYTES # BLD AUTO: 1.34 THOUSANDS/ÂΜL (ref 0.6–4.47)
LYMPHOCYTES NFR BLD AUTO: 24 % (ref 14–44)
MAGNESIUM SERPL-MCNC: 2 MG/DL (ref 1.6–2.6)
MCH RBC QN AUTO: 31.7 PG (ref 26.8–34.3)
MCHC RBC AUTO-ENTMCNC: 34.3 G/DL (ref 31.4–37.4)
MCV RBC AUTO: 92 FL (ref 82–98)
MICROALBUMIN UR-MCNC: 11.8 MG/L (ref 0–20)
MICROALBUMIN/CREAT 24H UR: 20 MG/G CREATININE (ref 0–30)
MONOCYTES # BLD AUTO: 0.58 THOUSAND/ÂΜL (ref 0.17–1.22)
MONOCYTES NFR BLD AUTO: 10 % (ref 4–12)
NEUTROPHILS # BLD AUTO: 3.41 THOUSANDS/ÂΜL (ref 1.85–7.62)
NEUTS SEG NFR BLD AUTO: 61 % (ref 43–75)
NONHDLC SERPL-MCNC: 62 MG/DL
NRBC BLD AUTO-RTO: 0 /100 WBCS
PHOSPHATE SERPL-MCNC: 3.1 MG/DL (ref 2.3–4.1)
PLATELET # BLD AUTO: 184 THOUSANDS/UL (ref 149–390)
PMV BLD AUTO: 11.4 FL (ref 8.9–12.7)
POTASSIUM SERPL-SCNC: 4 MMOL/L (ref 3.5–5.3)
PROT SERPL-MCNC: 6.5 G/DL (ref 6.4–8.4)
PTH-INTACT SERPL-MCNC: 68.9 PG/ML (ref 18.4–80.1)
RBC # BLD AUTO: 3.56 MILLION/UL (ref 3.81–5.12)
SODIUM SERPL-SCNC: 124 MMOL/L (ref 135–147)
TRIGL SERPL-MCNC: 59 MG/DL
TSH SERPL DL<=0.05 MIU/L-ACNC: 0.7 UIU/ML (ref 0.45–4.5)
WBC # BLD AUTO: 5.62 THOUSAND/UL (ref 4.31–10.16)

## 2023-05-01 RX ORDER — LIDOCAINE 50 MG/G
1 PATCH TOPICAL DAILY
Qty: 10 PATCH | Refills: 0 | Status: SHIPPED | OUTPATIENT
Start: 2023-05-01

## 2023-05-01 RX ORDER — CARVEDILOL 25 MG/1
25 TABLET ORAL 2 TIMES DAILY WITH MEALS
Qty: 180 TABLET | Refills: 1 | Status: SHIPPED | OUTPATIENT
Start: 2023-05-01

## 2023-05-02 ENCOUNTER — HOSPITAL ENCOUNTER (INPATIENT)
Facility: HOSPITAL | Age: 88
LOS: 4 days | Discharge: HOME | End: 2023-05-06
Attending: EMERGENCY MEDICINE | Admitting: INTERNAL MEDICINE

## 2023-05-02 ENCOUNTER — TELEPHONE (OUTPATIENT)
Dept: NEPHROLOGY | Facility: CLINIC | Age: 88
End: 2023-05-02

## 2023-05-02 ENCOUNTER — APPOINTMENT (INPATIENT)
Dept: RADIOLOGY | Facility: HOSPITAL | Age: 88
End: 2023-05-02

## 2023-05-02 DIAGNOSIS — E87.1 HYPONATREMIA: Primary | ICD-10-CM

## 2023-05-02 DIAGNOSIS — K31.89 GASTRIC DISTENTION: ICD-10-CM

## 2023-05-02 DIAGNOSIS — R11.0 NAUSEA: ICD-10-CM

## 2023-05-02 DIAGNOSIS — R93.5 ABNORMAL CT OF THE ABDOMEN: ICD-10-CM

## 2023-05-02 DIAGNOSIS — E87.6 HYPOKALEMIA: ICD-10-CM

## 2023-05-02 LAB
ALBUMIN SERPL BCP-MCNC: 3.4 G/DL (ref 3.5–5)
ALP SERPL-CCNC: 194 U/L (ref 46–116)
ALT SERPL W P-5'-P-CCNC: 56 U/L (ref 12–78)
ANION GAP SERPL CALCULATED.3IONS-SCNC: 5 MMOL/L (ref 4–13)
ANION GAP SERPL CALCULATED.3IONS-SCNC: 6 MMOL/L (ref 4–13)
AST SERPL W P-5'-P-CCNC: 60 U/L (ref 5–45)
BASOPHILS # BLD AUTO: 0.06 THOUSANDS/ÂΜL (ref 0–0.1)
BASOPHILS NFR BLD AUTO: 1 % (ref 0–1)
BILIRUB SERPL-MCNC: 0.76 MG/DL (ref 0.2–1)
BUN SERPL-MCNC: 14 MG/DL (ref 5–25)
BUN SERPL-MCNC: 15 MG/DL (ref 5–25)
CALCIUM ALBUM COR SERPL-MCNC: 9.1 MG/DL (ref 8.3–10.1)
CALCIUM SERPL-MCNC: 8.4 MG/DL (ref 8.3–10.1)
CALCIUM SERPL-MCNC: 8.6 MG/DL (ref 8.3–10.1)
CHLORIDE SERPL-SCNC: 90 MMOL/L (ref 96–108)
CHLORIDE SERPL-SCNC: 95 MMOL/L (ref 96–108)
CHLORIDE UR-SCNC: 41 MMOL/L
CO2 SERPL-SCNC: 27 MMOL/L (ref 21–32)
CO2 SERPL-SCNC: 27 MMOL/L (ref 21–32)
CORTIS AM PEAK SERPL-MCNC: 13 UG/DL (ref 4.2–22.4)
CREAT SERPL-MCNC: 1.23 MG/DL (ref 0.6–1.3)
CREAT SERPL-MCNC: 1.3 MG/DL (ref 0.6–1.3)
EOSINOPHIL # BLD AUTO: 0.23 THOUSAND/ÂΜL (ref 0–0.61)
EOSINOPHIL NFR BLD AUTO: 5 % (ref 0–6)
ERYTHROCYTE [DISTWIDTH] IN BLOOD BY AUTOMATED COUNT: 13.3 % (ref 11.6–15.1)
EST. AVERAGE GLUCOSE BLD GHB EST-MCNC: 123 MG/DL
GFR SERPL CREATININE-BSD FRML MDRD: 36 ML/MIN/1.73SQ M
GFR SERPL CREATININE-BSD FRML MDRD: 38 ML/MIN/1.73SQ M
GLUCOSE SERPL-MCNC: 161 MG/DL (ref 65–140)
GLUCOSE SERPL-MCNC: 95 MG/DL (ref 65–140)
HBA1C MFR BLD: 5.9 %
HCT VFR BLD AUTO: 31.5 % (ref 34.8–46.1)
HGB BLD-MCNC: 11.2 G/DL (ref 11.5–15.4)
IMM GRANULOCYTES # BLD AUTO: 0.03 THOUSAND/UL (ref 0–0.2)
IMM GRANULOCYTES NFR BLD AUTO: 1 % (ref 0–2)
LYMPHOCYTES # BLD AUTO: 1.2 THOUSANDS/ÂΜL (ref 0.6–4.47)
LYMPHOCYTES NFR BLD AUTO: 25 % (ref 14–44)
MCH RBC QN AUTO: 30.9 PG (ref 26.8–34.3)
MCHC RBC AUTO-ENTMCNC: 35.6 G/DL (ref 31.4–37.4)
MCV RBC AUTO: 87 FL (ref 82–98)
MONOCYTES # BLD AUTO: 0.69 THOUSAND/ÂΜL (ref 0.17–1.22)
MONOCYTES NFR BLD AUTO: 14 % (ref 4–12)
NEUTROPHILS # BLD AUTO: 2.68 THOUSANDS/ÂΜL (ref 1.85–7.62)
NEUTS SEG NFR BLD AUTO: 54 % (ref 43–75)
NRBC BLD AUTO-RTO: 0 /100 WBCS
OSMOLALITY UR/SERPL-RTO: 264 MMOL/KG (ref 282–298)
OSMOLALITY UR: 130 MMOL/KG
PLATELET # BLD AUTO: 178 THOUSANDS/UL (ref 149–390)
PMV BLD AUTO: 10.5 FL (ref 8.9–12.7)
POTASSIUM SERPL-SCNC: 3 MMOL/L (ref 3.5–5.3)
POTASSIUM SERPL-SCNC: 3.7 MMOL/L (ref 3.5–5.3)
PROT SERPL-MCNC: 6.6 G/DL (ref 6.4–8.4)
RBC # BLD AUTO: 3.62 MILLION/UL (ref 3.81–5.12)
SODIUM 24H UR-SCNC: 41 MOL/L
SODIUM SERPL-SCNC: 123 MMOL/L (ref 135–147)
SODIUM SERPL-SCNC: 127 MMOL/L (ref 135–147)
TSH SERPL DL<=0.05 MIU/L-ACNC: 0.84 UIU/ML (ref 0.45–4.5)
URATE SERPL-MCNC: 3.3 MG/DL (ref 2–7.5)
WBC # BLD AUTO: 4.89 THOUSAND/UL (ref 4.31–10.16)

## 2023-05-02 RX ORDER — ONDANSETRON 2 MG/ML
4 INJECTION INTRAMUSCULAR; INTRAVENOUS ONCE
Status: DISCONTINUED | OUTPATIENT
Start: 2023-05-02 | End: 2023-05-03

## 2023-05-02 RX ORDER — HEPARIN SODIUM 5000 [USP'U]/ML
5000 INJECTION, SOLUTION INTRAVENOUS; SUBCUTANEOUS EVERY 8 HOURS SCHEDULED
Status: DISCONTINUED | OUTPATIENT
Start: 2023-05-02 | End: 2023-05-06 | Stop reason: HOSPADM

## 2023-05-02 RX ORDER — POTASSIUM CHLORIDE 20 MEQ/1
40 TABLET, EXTENDED RELEASE ORAL ONCE
Status: COMPLETED | OUTPATIENT
Start: 2023-05-02 | End: 2023-05-02

## 2023-05-02 RX ORDER — HYDRALAZINE HYDROCHLORIDE 20 MG/ML
10 INJECTION INTRAMUSCULAR; INTRAVENOUS EVERY 6 HOURS PRN
Status: DISCONTINUED | OUTPATIENT
Start: 2023-05-02 | End: 2023-05-06 | Stop reason: HOSPADM

## 2023-05-02 RX ORDER — AMIODARONE HYDROCHLORIDE 200 MG/1
100 TABLET ORAL DAILY
Status: DISCONTINUED | OUTPATIENT
Start: 2023-05-03 | End: 2023-05-06 | Stop reason: HOSPADM

## 2023-05-02 RX ORDER — CARVEDILOL 25 MG/1
25 TABLET ORAL 2 TIMES DAILY WITH MEALS
Status: DISCONTINUED | OUTPATIENT
Start: 2023-05-02 | End: 2023-05-06 | Stop reason: HOSPADM

## 2023-05-02 RX ORDER — ATORVASTATIN CALCIUM 80 MG/1
80 TABLET, FILM COATED ORAL DAILY
Status: DISCONTINUED | OUTPATIENT
Start: 2023-05-03 | End: 2023-05-06 | Stop reason: HOSPADM

## 2023-05-02 RX ORDER — AMLODIPINE BESYLATE 2.5 MG/1
2.5 TABLET ORAL DAILY
Status: DISCONTINUED | OUTPATIENT
Start: 2023-05-03 | End: 2023-05-06 | Stop reason: HOSPADM

## 2023-05-02 RX ADMIN — POTASSIUM CHLORIDE 40 MEQ: 1500 TABLET, EXTENDED RELEASE ORAL at 19:08

## 2023-05-02 RX ADMIN — HEPARIN SODIUM 5000 UNITS: 5000 INJECTION INTRAVENOUS; SUBCUTANEOUS at 21:09

## 2023-05-02 RX ADMIN — POTASSIUM CHLORIDE 40 MEQ: 1500 TABLET, EXTENDED RELEASE ORAL at 16:32

## 2023-05-02 RX ADMIN — SODIUM CHLORIDE 250 ML: 0.9 INJECTION, SOLUTION INTRAVENOUS at 17:43

## 2023-05-02 RX ADMIN — CARVEDILOL 25 MG: 25 TABLET, FILM COATED ORAL at 18:30

## 2023-05-02 NOTE — ED ATTENDING ATTESTATION
5/2/2023  IJesus DO, saw and evaluated the patient  I have discussed the patient with the resident/non-physician practitioner and agree with the resident's/non-physician practitioner's findings, Plan of Care, and MDM as documented in the resident's/non-physician practitioner's note, except where noted  All available labs and Radiology studies were reviewed  I was present for key portions of any procedure(s) performed by the resident/non-physician practitioner and I was immediately available to provide assistance  At this point I agree with the current assessment done in the Emergency Department  I have conducted an independent evaluation of this patient a history and physical is as follows: Vinny Scanlon Medical Decision Making  Is an 68-year-old female who presents emergency department for noted evaluation of hyponatremia the patient was having outpatient laboratory studies and work done and sent in by nephrology team   On outpatient review of notes the patient had a most recent sodium of 124 as of yesterday  She has been having significant nausea intermittently throughout the day no active vomiting  Patient has no abdominal tenderness on examination the rest of her vital signs are unremarkable  The patient is awake alert unremarkable examination at this point time  Discussion with the patient agreed to admit to the hospital lab work performed here on this emergency department visit shows noted hypokalemia will replace with oral potassium  Differential diagnosis includes nonspecific hyponatremia, delusional hyponatremia, medication induced, also possibility of gastritis, viral syndrome, food intolerance, will admit for further management evaluation  Amount and/or Complexity of Data Reviewed  Labs: ordered  Risk  Prescription drug management  Decision regarding hospitalization            All labs reviewed and interpreted by myself   All radiology studies independently viewed by me and interpreted by myself     No orders to display       Labs Reviewed   CBC AND DIFFERENTIAL - Abnormal       Result Value Ref Range Status    WBC 4 89  4 31 - 10 16 Thousand/uL Final    RBC 3 62 (*) 3 81 - 5 12 Million/uL Final    Hemoglobin 11 2 (*) 11 5 - 15 4 g/dL Final    Hematocrit 31 5 (*) 34 8 - 46 1 % Final    MCV 87  82 - 98 fL Final    MCH 30 9  26 8 - 34 3 pg Final    MCHC 35 6  31 4 - 37 4 g/dL Final    RDW 13 3  11 6 - 15 1 % Final    MPV 10 5  8 9 - 12 7 fL Final    Platelets 294  609 - 390 Thousands/uL Final    nRBC 0  /100 WBCs Final    Neutrophils Relative 54  43 - 75 % Final    Immat GRANS % 1  0 - 2 % Final    Lymphocytes Relative 25  14 - 44 % Final    Monocytes Relative 14 (*) 4 - 12 % Final    Eosinophils Relative 5  0 - 6 % Final    Basophils Relative 1  0 - 1 % Final    Neutrophils Absolute 2 68  1 85 - 7 62 Thousands/µL Final    Immature Grans Absolute 0 03  0 00 - 0 20 Thousand/uL Final    Lymphocytes Absolute 1 20  0 60 - 4 47 Thousands/µL Final    Monocytes Absolute 0 69  0 17 - 1 22 Thousand/µL Final    Eosinophils Absolute 0 23  0 00 - 0 61 Thousand/µL Final    Basophils Absolute 0 06  0 00 - 0 10 Thousands/µL Final   COMPREHENSIVE METABOLIC PANEL - Abnormal    Sodium 123 (*) 135 - 147 mmol/L Final    Potassium 3 0 (*) 3 5 - 5 3 mmol/L Final    Chloride 90 (*) 96 - 108 mmol/L Final    CO2 27  21 - 32 mmol/L Final    ANION GAP 6  4 - 13 mmol/L Final    BUN 14  5 - 25 mg/dL Final    Creatinine 1 23  0 60 - 1 30 mg/dL Final    Comment: Standardized to IDMS reference method    Glucose 95  65 - 140 mg/dL Final    Comment: If the patient is fasting, the ADA then defines impaired fasting glucose as > 100 mg/dL and diabetes as > or equal to 123 mg/dL  Specimen collection should occur prior to Sulfasalazine administration due to the potential for falsely depressed results  Specimen collection should occur prior to Sulfapyridine administration due to the potential for falsely elevated results  Calcium 8 6  8 3 - 10 1 mg/dL Final    Corrected Calcium 9 1  8 3 - 10 1 mg/dL Final    AST 60 (*) 5 - 45 U/L Final    Comment: Specimen collection should occur prior to Sulfasalazine administration due to the potential for falsely depressed results  ALT 56  12 - 78 U/L Final    Comment: Specimen collection should occur prior to Sulfasalazine and/or Sulfapyridine administration due to the potential for falsely depressed results  Alkaline Phosphatase 194 (*) 46 - 116 U/L Final    Total Protein 6 6  6 4 - 8 4 g/dL Final    Albumin 3 4 (*) 3 5 - 5 0 g/dL Final    Total Bilirubin 0 76  0 20 - 1 00 mg/dL Final    Comment: Use of this assay is not recommended for patients undergoing treatment with eltrombopag due to the potential for falsely elevated results  eGFR 38  ml/min/1 73sq m Final    Narrative:     National Kidney Disease Foundation guidelines for Chronic Kidney Disease (CKD):   •  Stage 1 with normal or high GFR (GFR > 90 mL/min/1 73 square meters)  •  Stage 2 Mild CKD (GFR = 60-89 mL/min/1 73 square meters)  •  Stage 3A Moderate CKD (GFR = 45-59 mL/min/1 73 square meters)  •  Stage 3B Moderate CKD (GFR = 30-44 mL/min/1 73 square meters)  •  Stage 4 Severe CKD (GFR = 15-29 mL/min/1 73 square meters)  •  Stage 5 End Stage CKD (GFR <15 mL/min/1 73 square meters)  Note: GFR calculation is accurate only with a steady state creatinine   TSH, 3RD GENERATION WITH FREE T4 REFLEX - Normal    TSH 3RD GENERATON 0 841  0 450 - 4 500 uIU/mL Final    Comment: The recommended reference ranges for TSH during pregnancy are as follows:   First trimester 0 1 to 2 5 uIU/mL   Second trimester  0 2 to 3 0 uIU/mL   Third trimester 0 3 to 3 0 uIU/m    Note: Normal ranges may not apply to patients who are transgender, non-binary, or whose legal sex, sex at birth, and gender identity differ  Adult TSH (3rd generation) reference range follows the recommended guidelines of the American Thyroid Association, January, 2020  Narrative:     Patients undergoing fluorescein dye angiography may retain small amounts of fluorescein in the body for 48-72 hours post procedure  Samples containing fluorescein can produce falsely depressed TSH values  If the patient had this procedure,a specimen should be resubmitted post fluorescein clearance  URIC ACID - Normal    Uric Acid 3 3  2 0 - 7 5 mg/dL Final    Comment: Specimen collection should occur prior to Metamizole administration due to the potential for falsely depressed results  CORTISOL LEVEL AM SPECIMEN - Normal    Cortisol - AM 13 0  4 2 - 22 4 ug/dL Final    Narrative:     Reference ranges established for specimens drawn between 7 and 9 am  Results may be inaccurate if timing is not correct  SODIUM, URINE, RANDOM   CHLORIDE, URINE, RANDOM   OSMOLALITY   OSMOLALITY, URINE       Clinical Impression:    Final diagnoses:   Hyponatremia   Hypokalemia           ED Course  ED Course as of 05/02/23 1603   Tue May 02, 2023   1600 Hypokalemia noted on labs with a potassium of 3 0   40 mill equivalents of potassium ordered at this point time  We will admit for hyponatremia           Critical Care Time  CriticalCare Time    Date/Time: 5/2/2023 4:02 PM  Performed by: Emili Hannah DO  Authorized by: Emili Hannah DO     Critical care provider statement:     Critical care time (minutes):  40    Critical care time was exclusive of:  Separately billable procedures and treating other patients and teaching time    Critical care was necessary to treat or prevent imminent or life-threatening deterioration of the following conditions:  Metabolic crisis and dehydration    Critical care was time spent personally by me on the following activities:  Blood draw for specimens, obtaining history from patient or surrogate, development of treatment plan with patient or surrogate, evaluation of patient's response to treatment, examination of patient, ordering and performing treatments and interventions, ordering and review of laboratory studies, ordering and review of radiographic studies, re-evaluation of patient's condition and review of old charts  Comments:      Upon my evaluation, this patient has a high probability of imminent or life-threatening deterioration due to hyponatremia and hypokalemia requiring continued inpatient management evaluation which required my direct attention, intervention, and personal management      I have personally provided 40 minutes of critical care time, exclusive of procedures, teaching, and any prior time recorded by providers other than myself  Time includes review of laboratory data, radiology results, discussion with consultants, and monitoring for potential decompensation

## 2023-05-02 NOTE — ASSESSMENT & PLAN NOTE
With history of primary hypertension, managed on amlodipine 2 5 mg daily, carvedilol 25 mg twice daily  With elevated blood pressure in the ER we will start on IV hydralazine 10 mg as needed for systolic blood pressure over 160

## 2023-05-02 NOTE — ASSESSMENT & PLAN NOTE
Lab Results   Component Value Date    HGBA1C 5 9 (H) 05/01/2023       No results for input(s): POCGLU in the last 72 hours      Blood Sugar Average: Last 72 hrs:  Continue sliding scale

## 2023-05-02 NOTE — ASSESSMENT & PLAN NOTE
Lab Results   Component Value Date    EGFR 38 05/02/2023    EGFR 36 05/01/2023    EGFR 37 01/06/2023    CREATININE 1 23 05/02/2023    CREATININE 1 30 05/01/2023    CREATININE 1 27 (H) 01/06/2023   She with history of CKD, her creatinine baseline appears to be around 1 1-1 3  her creatinine is 1 23, within her baseline

## 2023-05-02 NOTE — ASSESSMENT & PLAN NOTE
Patient reports nausea early satiety and poor appetite for the past several weeks  She was found to have severe hyponatremia at 124 on outpatient laboratory work, her nephrologist suggested she come to the ER for further evaluation  I suspect the etiology of her hyponatremia is due to decreased oral intake in the setting of poor appetite, patient states she still drinks the same out of water per day  Her urinary osm is above 100 but quite low at 130, I suspect patient has had low solute intake which is causing her hyponatremia, further accentuated by her torsemide and lisinopril  We will trial 250 cc bolus of normal saline if patient improves in regards to her sodium levels, it will support her being hypervolemic, or having low solute intake, rather than siadh  If her sodium worsens with normal saline bolus, would fluid restrict and start on salt tabs as this would be more likely consistent with SIADH  Other than poor appetite and low oral intake patient does report recent worsening of her chronic back pain  She has been taking Tylenol, denies NSAID use     Hold patient's lisinopril and torsemide for now  Recheck BMP at 9 PM [facility]  Nephrology consult pending

## 2023-05-02 NOTE — ASSESSMENT & PLAN NOTE
Patient has a history of atrial fibrillation she is not on anticoagulation after sustaining a fall with subdural hematoma  Continue carvedilol 25 mg twice daily

## 2023-05-02 NOTE — ASSESSMENT & PLAN NOTE
Wt Readings from Last 3 Encounters:   04/25/23 78 kg (172 lb)   01/24/23 77 1 kg (170 lb)   01/19/23 78 kg (172 lb)       Patient with history of chronic congestive heart failure  Currently on exam appears euvolemic  We will hold her home torsemide in the setting of hyponatremia, pending further work-up

## 2023-05-02 NOTE — H&P
[facility]  H&P  Name: Nathaniel Whelan 80 y o  female I MRN: [id number]  Unit/Bed#: ED 01 I Date of Admission: 5/2/2023   Date of Service: 5/2/2023 I Hospital Day: 0      Assessment/Plan   * Hyponatremia  Assessment & Plan  Patient reports nausea early satiety and poor appetite for the past several weeks  She was found to have severe hyponatremia at 124 on outpatient laboratory work, her nephrologist suggested she come to the ER for further evaluation  I suspect the etiology of her hyponatremia is due to decreased oral intake in the setting of poor appetite, patient states she still drinks the same out of water per day  Her urinary osm is above 100 but quite low at 130, I suspect patient has had low solute intake which is causing her hyponatremia, further accentuated by her torsemide and lisinopril  We will trial 250 cc bolus of normal saline if patient improves in regards to her sodium levels, it will support her being hypervolemic, or having low solute intake, rather than siadh  If her sodium worsens with normal saline bolus, would fluid restrict and start on salt tabs as this would be more likely consistent with SIADH  Other than poor appetite and low oral intake patient does report recent worsening of her chronic back pain  She has been taking Tylenol, denies NSAID use     Hold patient's lisinopril and torsemide for now  Recheck BMP at 9 PM tonight  Nephrology consult pending    Chronic kidney disease, stage 3b [facility])  Assessment & Plan  Lab Results   Component Value Date    EGFR 38 05/02/2023    EGFR 36 05/01/2023    EGFR 37 01/06/2023    CREATININE 1 23 05/02/2023    CREATININE 1 30 05/01/2023    CREATININE 1 27 (H) 01/06/2023   She with history of CKD, her creatinine baseline appears to be around 1 1-1 3  her creatinine is 1 23, within her baseline      Primary hypertension  Assessment & Plan  With history of primary hypertension, managed on amlodipine 2 5 mg daily, carvedilol 25 mg twice daily  With elevated blood pressure in the ER we will start on IV hydralazine 10 mg as needed for systolic blood pressure over 160    Chronic congestive heart failure (HCC)  Assessment & Plan  Wt Readings from Last 3 Encounters:   04/25/23 78 kg (172 lb)   01/24/23 77 1 kg (170 lb)   01/19/23 78 kg (172 lb)       Patient with history of chronic congestive heart failure  Currently on exam appears euvolemic  We will hold her home torsemide in the setting of hyponatremia, pending further work-up      Type 2 diabetes mellitus with diabetic chronic kidney disease ([facility] [address] )  Assessment & Plan  Lab Results   Component Value Date    HGBA1C 5 9 (H) 05/01/2023       No results for input(s): POCGLU in the last 72 hours  Blood Sugar Average: Last 72 hrs:  Continue sliding scale    Atrial fibrillation [facility])  Assessment & Plan  Patient has a history of atrial fibrillation she is not on anticoagulation after sustaining a fall with subdural hematoma  Continue carvedilol 25 mg twice daily            VTE Pharmacologic Prophylaxis:   High Risk (Score >/= 5) - Pharmacological DVT Prophylaxis Ordered: heparin  Sequential Compression Devices Ordered  Code Status: Prior level 1  Discussion with family: Updated  (nephew) at bedside  Anticipated Length of Stay: Patient will be admitted on an inpatient basis with an anticipated length of stay of greater than 2 midnights secondary to Hyponatremia  Total Time Spent on Date of Encounter in care of patient: 40 minutes This time was spent on one or more of the following: performing physical exam; counseling and coordination of care; obtaining or reviewing history; documenting in the medical record; reviewing/ordering tests, medications or procedures; communicating with other healthcare professionals and discussing with patient's family/caregivers      Chief Complaint: Hyponatremia, nausea    History of Present Illness:  Ritchie Murry is a 80 y o  female with a PMH of CKD, , diabetes, hyperlipidemia, hypertension who presents for evaluation of hyponatremia as suggested by her nephrologist   Patient was found to have sodium of 124 on outpatient lab work  Patient reports experiencing nausea, early satiety and poor appetite over the past several weeks  While she has been eating less food, she reports she is still drinking the same out of water about 4 to 5 cups a day  She does report some chronic back pain, which she takes Tylenol for  She denies taking NSAIDs  Patient lives at home with caregivers who check on her throughout the day as well as her nephew  Patient denies any abdominal discomfort, change in her bowel habits or urination  Review of Systems:  Review of Systems   Constitutional: Positive for appetite change and fatigue  Negative for chills and fever  HENT: Negative for ear pain and sore throat  Eyes: Negative for pain and visual disturbance  Respiratory: Negative for cough and shortness of breath  Cardiovascular: Negative for chest pain and palpitations  Gastrointestinal: Positive for nausea  Negative for abdominal distention, abdominal pain, diarrhea and vomiting  Genitourinary: Negative for dysuria and hematuria  Musculoskeletal: Negative for arthralgias and back pain  Skin: Negative for color change and rash  Neurological: Positive for weakness  Negative for dizziness, seizures and syncope  All other systems reviewed and are negative        Past Medical and Surgical History:   Past Medical History:   Diagnosis Date   • Anemia    • Anxiety    • Arthritis    • CKD (chronic kidney disease), stage III ([address] ) 11/29/2018   • Depression, recurrent ([place] [id number] ) 6/14/2021   • Diabetes mellitus ([place] [id number] )    • Diabetic peripheral neuropathy (HCC)    • HLD (hyperlipidemia)    • Hyperlipidemia    • Hypertension    • 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 8/23/2017   • Thyroid enlarged 2/23/2015   • Tinnitus    • Vertigo        Past Surgical History:   Procedure Laterality Date   • APPENDECTOMY     • EAR SURGERY Left    • KNEE SURGERY     • TONSILLECTOMY         Meds/Allergies:  Prior to Admission medications    Medication Sig Start Date End Date Taking?  Authorizing Provider   acetaminophen (TYLENOL) 325 mg tablet Take 3 tablets (975 mg total) by mouth every 8 (eight) hours 12/29/22   Kiya Saucedo PA-C   amiodarone 100 mg tablet Take 1 tablet (100 mg total) by mouth daily 4/25/23   Solis Gilbert MD   amLODIPine (NORVASC) 2 5 mg tablet Take 1 tablet (2 5 mg total) by mouth daily 11/23/22   Aliyah Corbett MD   ASPIRIN 81 PO Take by mouth    Historical Provider, MD   atorvastatin (LIPITOR) 80 mg tablet Take 80 mg by mouth daily    Historical Provider, MD   Bacillus Coagulans-Inulin (PROBIOTIC FORMULA PO) Take by mouth 97mg two times a day for stomach health supplement    Historical Provider, MD   Blood Glucose Monitoring Suppl (ONE TOUCH ULTRA 2) w/Device KIT Use daily 10/29/21   Solis Gilbert MD   carvedilol (COREG) 25 mg tablet Take 1 tablet (25 mg total) by mouth 2 (two) times a day with meals 5/1/23   Solis Gilbert MD   Cholecalciferol 125 MCG (5000 UT) capsule Take 1,000 Units by mouth daily    Historical Provider, MD   dicyclomine (BENTYL) 10 mg capsule Take 10 mg by mouth 2 (two) times a day as needed (cramping)    Historical Provider, MD   docusate sodium (COLACE) 100 mg capsule Take 100 mg by mouth 2 (two) times a day    Historical Provider, MD   glucose blood (OneTouch Ultra) test strip Test daily                        DX: E11 9 10/17/22   Solis Gilbert MD   lidocaine (LIDODERM) 5 % Apply 1 patch topically over 12 hours daily Remove & Discard patch within 12 hours or as directed by MD 5/1/23   Solis Gilbert MD   lisinopril (ZESTRIL) 10 mg tablet Take 10 mg by mouth daily 4/12/23   Historical Provider, MD   Multiple Vitamins-Minerals (multivitamin with minerals) tablet Take 1 tablet by mouth daily    Historical Provider, MD   NON FORMULARY Lung support 200mg once daily at nighttime    Historical Provider, MD   NON FORMULARY [place] 400mg daily at bedtime    Historical Provider, MD   Omega 3-6-9 Fatty Acids (OMEGA 3-6-9 PO) Take by mouth    Historical Provider, MD   potassium chloride (MICRO-K) 10 MEQ CR capsule Take 1 capsule (10 mEq total) by mouth daily 4/25/23   Nancy Conley MD   saccharomyces boulardii (FLORASTOR) 250 mg capsule Take 250 mg by mouth 2 (two) times a day    Historical Provider, MD   senna (SENOKOT) 8 6 mg Take 2 tablets (17 2 mg total) by mouth daily as needed for constipation 12/29/22   Kiya Lopez PA-C   torsemide [facility]) 20 mg tablet Take 1 tablet (20 mg total) by mouth daily 12/2/22   Afsaneh Hoep MD     I have reviewed home medications with patient family member  Allergies: Allergies   Allergen Reactions   • Hydrochlorothiazide      Hyponatremia, severe   • Hydrochlorothiazide Other (See Comments)       • Statins Other (See Comments)          • Statins        Social History:  Marital Status: Single   Occupation:   Patient Pre-hospital Living Situation: Home  Patient Pre-hospital Level of Mobility: walks  Patient Pre-hospital Diet Restrictions:   Substance Use History:   Social History     Substance and Sexual Activity   Alcohol Use Not Currently     Social History     Tobacco Use   Smoking Status Never   Smokeless Tobacco Never     Social History     Substance and Sexual Activity   Drug Use Never       Family History:  Family History   Problem Relation Age of Onset   • Heart disease Mother    • Diabetes Mother    • Heart disease Father    • Stroke Father    • Cancer Family    • Dementia Sister    • Liver disease Other        Physical Exam:     Vitals:   Blood Pressure: (!) 178/70 (05/02/23 1751)  Pulse: 66 (05/02/23 1751)  Temperature: 98 7 °F (37 1 °C) (05/02/23 1423)  Temp Source: Oral (05/02/23 1423)  Respirations: 16 (05/02/23 1751)  SpO2: 98 % (05/02/23 1751)    Physical Exam  Vitals and nursing note reviewed  Constitutional:       General: She is not in acute distress  Appearance: She is well-developed  She is not ill-appearing, toxic-appearing or diaphoretic  HENT:      Head: Normocephalic and atraumatic  Eyes:      Conjunctiva/sclera: Conjunctivae normal    Cardiovascular:      Rate and Rhythm: Normal rate and regular rhythm  Heart sounds: No murmur heard  No friction rub  No gallop  Pulmonary:      Effort: Pulmonary effort is normal  No respiratory distress  Breath sounds: Normal breath sounds  No stridor  No wheezing, rhonchi or rales  Chest:      Chest wall: No tenderness  Abdominal:      General: There is no distension  Palpations: Abdomen is soft  There is no mass  Tenderness: There is no abdominal tenderness  There is no guarding or rebound  Hernia: No hernia is present  Musculoskeletal:         General: No swelling or tenderness  Cervical back: Neck supple  Skin:     General: Skin is warm and dry  Capillary Refill: Capillary refill takes less than 2 seconds  Neurological:      Mental Status: She is alert and oriented to person, place, and time     Psychiatric:         Mood and Affect: Mood normal           Additional Data:     Lab Results:  Results from last 7 days   Lab Units 05/02/23  1511   WBC Thousand/uL 4 89   HEMOGLOBIN g/dL 11 2*   HEMATOCRIT % 31 5*   PLATELETS Thousands/uL 178   NEUTROS PCT % 54   LYMPHS PCT % 25   MONOS PCT % 14*   EOS PCT % 5     Results from last 7 days   Lab Units 05/02/23  1511   SODIUM mmol/L 123*   POTASSIUM mmol/L 3 0*   CHLORIDE mmol/L 90*   CO2 mmol/L 27   BUN mg/dL 14   CREATININE mg/dL 1 23   ANION GAP mmol/L 6   CALCIUM mg/dL 8 6   ALBUMIN g/dL 3 4*   TOTAL BILIRUBIN mg/dL 0 76   ALK PHOS U/L 194*   ALT U/L 56   AST U/L 60*   GLUCOSE RANDOM mg/dL 95             Results from last 7 days   Lab Units 05/01/23  0950   HEMOGLOBIN A1C % 5 9*           Lines/Drains:  Invasive Devices     Peripheral Intravenous Line  Duration           Peripheral IV 05/02/23 Right Antecubital <1 day                    Imaging: No pertinent imaging reviewed  CT chest abdomen pelvis wo contrast    (Results Pending)       EKG and Other Studies Reviewed on Admission:   · EKG: No EKG obtained  ** Please Note: This note has been constructed using a voice recognition system   **

## 2023-05-02 NOTE — TELEPHONE ENCOUNTER
Spoke to patient regarding her most recent lab results which shows serum sodium significantly dropped 124  Patient has been having significant nausea for last 1 week but denies any obvious vomiting  Denies any headache  She is active, alert, oriented x3  Denies any recent episodes of fall  Given sudden drop in sodium level along with symptoms of nausea, concern for symptomatic hyponatremia  Recommended her to go to ER for further evaluation, for possible IV hydration and monitoring sodium level to see if that improves  She is agreeable to go to [facility] ER      I have personally called ER and notified them about her arrival

## 2023-05-03 PROBLEM — N28.1 RENAL CYST, LEFT: Status: ACTIVE | Noted: 2023-05-03

## 2023-05-03 PROBLEM — N32.89 BLADDER WALL THICKENING: Status: ACTIVE | Noted: 2023-05-03

## 2023-05-03 PROBLEM — R11.0 NAUSEA: Status: ACTIVE | Noted: 2023-05-03

## 2023-05-03 LAB
ALBUMIN SERPL BCP-MCNC: 2.8 G/DL (ref 3.5–5)
ALP SERPL-CCNC: 142 U/L (ref 46–116)
ALT SERPL W P-5'-P-CCNC: 50 U/L (ref 12–78)
ANION GAP SERPL CALCULATED.3IONS-SCNC: 5 MMOL/L (ref 4–13)
AST SERPL W P-5'-P-CCNC: 49 U/L (ref 5–45)
BASOPHILS # BLD AUTO: 0.06 THOUSANDS/ÂΜL (ref 0–0.1)
BASOPHILS NFR BLD AUTO: 1 % (ref 0–1)
BILIRUB SERPL-MCNC: 0.41 MG/DL (ref 0.2–1)
BILIRUB UR QL STRIP: NEGATIVE
BUN SERPL-MCNC: 19 MG/DL (ref 5–25)
CALCIUM ALBUM COR SERPL-MCNC: 9.7 MG/DL (ref 8.3–10.1)
CALCIUM SERPL-MCNC: 8.7 MG/DL (ref 8.3–10.1)
CHLORIDE SERPL-SCNC: 99 MMOL/L (ref 96–108)
CLARITY UR: CLEAR
CO2 SERPL-SCNC: 26 MMOL/L (ref 21–32)
COLOR UR: NORMAL
CREAT SERPL-MCNC: 1.19 MG/DL (ref 0.6–1.3)
EOSINOPHIL # BLD AUTO: 0.27 THOUSAND/ÂΜL (ref 0–0.61)
EOSINOPHIL NFR BLD AUTO: 5 % (ref 0–6)
ERYTHROCYTE [DISTWIDTH] IN BLOOD BY AUTOMATED COUNT: 13.8 % (ref 11.6–15.1)
GFR SERPL CREATININE-BSD FRML MDRD: 40 ML/MIN/1.73SQ M
GLUCOSE SERPL-MCNC: 107 MG/DL (ref 65–140)
GLUCOSE SERPL-MCNC: 124 MG/DL (ref 65–140)
GLUCOSE SERPL-MCNC: 124 MG/DL (ref 65–140)
GLUCOSE SERPL-MCNC: 139 MG/DL (ref 65–140)
GLUCOSE SERPL-MCNC: 97 MG/DL (ref 65–140)
GLUCOSE UR STRIP-MCNC: NEGATIVE MG/DL
HCT VFR BLD AUTO: 29.5 % (ref 34.8–46.1)
HGB BLD-MCNC: 10.1 G/DL (ref 11.5–15.4)
HGB UR QL STRIP.AUTO: NEGATIVE
IMM GRANULOCYTES # BLD AUTO: 0.01 THOUSAND/UL (ref 0–0.2)
IMM GRANULOCYTES NFR BLD AUTO: 0 % (ref 0–2)
KETONES UR STRIP-MCNC: NEGATIVE MG/DL
LEUKOCYTE ESTERASE UR QL STRIP: NEGATIVE
LYMPHOCYTES # BLD AUTO: 1.62 THOUSANDS/ÂΜL (ref 0.6–4.47)
LYMPHOCYTES NFR BLD AUTO: 33 % (ref 14–44)
MCH RBC QN AUTO: 30.7 PG (ref 26.8–34.3)
MCHC RBC AUTO-ENTMCNC: 34.2 G/DL (ref 31.4–37.4)
MCV RBC AUTO: 90 FL (ref 82–98)
MONOCYTES # BLD AUTO: 0.73 THOUSAND/ÂΜL (ref 0.17–1.22)
MONOCYTES NFR BLD AUTO: 15 % (ref 4–12)
NEUTROPHILS # BLD AUTO: 2.3 THOUSANDS/ÂΜL (ref 1.85–7.62)
NEUTS SEG NFR BLD AUTO: 46 % (ref 43–75)
NITRITE UR QL STRIP: NEGATIVE
NRBC BLD AUTO-RTO: 0 /100 WBCS
PH UR STRIP.AUTO: 7 [PH]
PLATELET # BLD AUTO: 151 THOUSANDS/UL (ref 149–390)
PMV BLD AUTO: 11.1 FL (ref 8.9–12.7)
POTASSIUM SERPL-SCNC: 4.1 MMOL/L (ref 3.5–5.3)
PROT SERPL-MCNC: 5.4 G/DL (ref 6.4–8.4)
PROT UR STRIP-MCNC: NEGATIVE MG/DL
RBC # BLD AUTO: 3.29 MILLION/UL (ref 3.81–5.12)
SODIUM SERPL-SCNC: 130 MMOL/L (ref 135–147)
SP GR UR STRIP.AUTO: 1.01 (ref 1–1.03)
UROBILINOGEN UR STRIP-ACNC: <2 MG/DL
WBC # BLD AUTO: 4.99 THOUSAND/UL (ref 4.31–10.16)

## 2023-05-03 RX ORDER — LIDOCAINE 50 MG/G
1 PATCH TOPICAL DAILY
Status: DISCONTINUED | OUTPATIENT
Start: 2023-05-03 | End: 2023-05-06 | Stop reason: HOSPADM

## 2023-05-03 RX ORDER — LISINOPRIL 10 MG/1
10 TABLET ORAL DAILY
Status: DISCONTINUED | OUTPATIENT
Start: 2023-05-03 | End: 2023-05-06 | Stop reason: HOSPADM

## 2023-05-03 RX ORDER — INSULIN LISPRO 100 [IU]/ML
1-6 INJECTION, SOLUTION INTRAVENOUS; SUBCUTANEOUS
Status: DISCONTINUED | OUTPATIENT
Start: 2023-05-03 | End: 2023-05-06 | Stop reason: HOSPADM

## 2023-05-03 RX ORDER — POLYETHYLENE GLYCOL 3350 17 G/17G
17 POWDER, FOR SOLUTION ORAL DAILY
Status: DISCONTINUED | OUTPATIENT
Start: 2023-05-03 | End: 2023-05-06 | Stop reason: HOSPADM

## 2023-05-03 RX ORDER — METOCLOPRAMIDE HYDROCHLORIDE 5 MG/ML
10 INJECTION INTRAMUSCULAR; INTRAVENOUS EVERY 6 HOURS SCHEDULED
Status: DISCONTINUED | OUTPATIENT
Start: 2023-05-03 | End: 2023-05-04

## 2023-05-03 RX ADMIN — LIDOCAINE 5% 1 PATCH: 700 PATCH TOPICAL at 20:55

## 2023-05-03 RX ADMIN — HEPARIN SODIUM 5000 UNITS: 5000 INJECTION INTRAVENOUS; SUBCUTANEOUS at 05:32

## 2023-05-03 RX ADMIN — METOCLOPRAMIDE HYDROCHLORIDE 10 MG: 5 INJECTION INTRAMUSCULAR; INTRAVENOUS at 23:17

## 2023-05-03 RX ADMIN — HEPARIN SODIUM 5000 UNITS: 5000 INJECTION INTRAVENOUS; SUBCUTANEOUS at 14:31

## 2023-05-03 RX ADMIN — HEPARIN SODIUM 5000 UNITS: 5000 INJECTION INTRAVENOUS; SUBCUTANEOUS at 21:38

## 2023-05-03 RX ADMIN — CARVEDILOL 25 MG: 25 TABLET, FILM COATED ORAL at 08:00

## 2023-05-03 RX ADMIN — METOCLOPRAMIDE HYDROCHLORIDE 10 MG: 5 INJECTION INTRAMUSCULAR; INTRAVENOUS at 12:00

## 2023-05-03 RX ADMIN — POLYETHYLENE GLYCOL 3350 17 G: 17 POWDER, FOR SOLUTION ORAL at 12:00

## 2023-05-03 RX ADMIN — METOCLOPRAMIDE HYDROCHLORIDE 10 MG: 5 INJECTION INTRAMUSCULAR; INTRAVENOUS at 17:32

## 2023-05-03 RX ADMIN — AMLODIPINE BESYLATE 2.5 MG: 2.5 TABLET ORAL at 09:43

## 2023-05-03 RX ADMIN — ATORVASTATIN CALCIUM 80 MG: 80 TABLET, FILM COATED ORAL at 09:43

## 2023-05-03 RX ADMIN — AMIODARONE HYDROCHLORIDE 100 MG: 200 TABLET ORAL at 09:43

## 2023-05-03 RX ADMIN — LISINOPRIL 10 MG: 10 TABLET ORAL at 11:06

## 2023-05-03 NOTE — OCCUPATIONAL THERAPY NOTE
Occupational Therapy Evaluation     Patient Name: Eladio Shaikh  NCHEE'J Date: 5/3/2023  Problem List  Principal Problem:    Nausea  Active Problems:    Atrial fibrillation ([facility] )    Type 2 diabetes mellitus with diabetic chronic kidney disease ([facility] )    Chronic congestive heart failure (HCC)    Primary hypertension    Chronic kidney disease, stage 3b (HCC)    Hyponatremia    Bladder wall thickening    Renal cyst, left    Past Medical History  Past Medical History:   Diagnosis Date    Anemia     Anxiety     Arthritis     CKD (chronic kidney disease), stage III ([facility] ) 11/29/2018    Depression, recurrent ([facility] ) 6/14/2021    Diabetes mellitus ([place] [address] )     Diabetic peripheral neuropathy (HCC)     HLD (hyperlipidemia)     Hyperlipidemia     Hypertension     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 8/23/2017    Thyroid enlarged 2/23/2015    Tinnitus     Vertigo      Past Surgical History  Past Surgical History:   Procedure Laterality Date    APPENDECTOMY      EAR SURGERY Left     KNEE SURGERY      TONSILLECTOMY           05/03/23 0815   OT Last Visit   OT Visit Date 05/03/23   Note Type   Note type Evaluation   Pain Assessment   Pain Assessment Tool 0-10   Pain Score No Pain   Hospital Pain Intervention(s) Repositioned; Ambulation/increased activity; Emotional support   Restrictions/Precautions   Weight Bearing Precautions Per Order No   Other Precautions Fall Risk;Hard of hearing   Home Living   Type of [address] One level;Stairs to enter with rails  (1 MARTELL)   Bathroom Shower/Tub Walk-in shower   Bathroom Toilet Raised   Bathroom Equipment Shower chair   [address] Walker;Cane   Additional Comments + USE OF SC AND RW     Prior Function   Level of [place] Independent with ADLs   Lives With Alone   Receives Help From Personal care attendant   IADLs Family/Friend/Other provides "meals   Falls in the last 6 months 1 to 4   Vocational Retired    [address]   Reciprocal Relationships PT LIVES ALONE  HAS CAREGIVER COME IN FROM 9-12 AND 4-7 TO ASSIST WITH MEALS AND HEAVY IADLS   Service to Others RETIRED; ADHESIVE FOR STAMPS   Intrinsic Gratification ENJOYS \"TAKING CARE OF MYSELF\"   ADL   Eating Assistance 7  Independent   Grooming Assistance 7  Independent   UB Bathing Assistance 5  Supervision/Setup   LB Bathing Assistance 4  Minimal Assistance   UB Dressing Assistance 5  Supervision/Setup   LB Dressing Assistance 4  Minimal Assistance   Toileting Assistance  5  Supervision/Setup   Functional Assistance 5  Supervision/Setup   Bed Mobility   Supine to Sit 5  Supervision   Additional items Increased time required   Sit to Supine Unable to assess  (PT LEFT OOB WITH ALL NEEDS IN REACH + ALARM ON)   Transfers   Sit to Stand 5  Supervision   Additional items Increased time required   Stand to Sit 5  Supervision   Additional items Increased time required   Functional Mobility   Functional Mobility 5  Supervision   Additional items Rolling walker   Balance   Static Sitting Good   Static Standing Fair   Ambulatory Fair -   Activity Tolerance   Activity Tolerance Patient tolerated treatment well   Medical Staff Made Aware PT SEEN FOR CO-SESSION WITH SKILLED PHYSICAL THERAPIST 2' CLINICALLY UNSTABLE PRESENTATION,  NEW PRECAUTIONS/LIMITATIONS, LIMITED ACTIVITY TOLERANCE AND PRESENT IMPAIRMENTS WHICH ARE A REGRESSION FROM THE PT'S BASELINE AND IMPACTING OVERALL OCCUPATIONAL PERFORMANCE  Nurse Made Aware APPROPRIATE TO SEE   RUE Assessment   RUE Assessment WFL   LUE Assessment   LUE Assessment WFL   Cognition   Overall Cognitive Status WFL   Arousal/Participation Alert; Cooperative   Attention Within functional limits   Orientation Level Oriented X4   Memory Within functional limits   Following Commands Follows multistep commands without difficulty   Comments PT IS " PLEASANT AND COOPERATIVE  ALARM ON FOR SAFETY   Assessment   Assessment 79 YO Female SEEN FOR INITIAL OCCUPATIONAL THERAPY EVALUATION FOLLOWING ADMISSION TO [facility] WITH HYPONATREMIA  PROBLEMS LIST/PMH INCLUDES Anemia, Anxiety, Arthritis, CKD (chronic kidney disease), stage III ([facility] ), Depression, recurrent ([facility] ), Diabetes mellitus ([facility] ), Diabetic peripheral neuropathy ([facility] ), HLD (hyperlipidemia), Hyperlipidemia, Hypertension, Thyroid enlarged, Tinnitus, and Vertigo  PT IS FROM HOME ALONE  WITH DAILY CAREGIVER SUPPORT WHERE SHE REPORTS BEING OVERALL INDEPENDENT WITH ADLS/ LT IADLS AND HAS ASSIST WITH HEAVY IADLS/DRIVING PTA  PT CURRENTLY REQUIRES SUPERVISION-MIN A WITH ADLS AND SUPERVISION WITH TRANSFERS /FUNCTIONAL MOBILITY WITH USE OF RW  PT IS LIMITED 2' FATIGUE, IMPAIRED BALANCE, OVERALL WEAKNESS/DECONDITIONING  and OVERALL LIMITED ACTIVITY TOLERANCE  PT EDUCATED ON DEEP BREATHING TECHNIQUES T/O ACTIVITY, SLOWING OF PACE, ENERGY CONSERVATION TECHNIQUES FOR CARRY OVER UPON D/C, INCREASED CAREGIVER SUPPORT WITH ADLS and CONTINUE PARTICIPATION IN SELF-CARE/MOBILITY WITH STAFF WHILE IN THE HOSPITAL   The patient's raw score on the AM-PAC Daily Activity Inpatient Short Form is 20  A raw score of greater than or equal to 19 suggests the patient may benefit from discharge to home  Please refer to the recommendation of the Occupational Therapist for safe discharge planning  FROM AN OCCUPATIONAL THERAPY PERSPECTIVE, PT CAN RETURN HOME WITH INCREASED SUPPORT WHEN MEDICALLY CLEARED  ALL QUESTIONS/CONCERNS ADDRESSED  NO ADDITIONAL ACUTE CARE OT NEEDS  D/C OT     Goals   Patient Goals TO RETURN HOME   Recommendation   OT Discharge Recommendation No rehabilitation needs  (INCREASED SUPPORT)   AM-[facility] Daily Activity Inpatient   Lower Body Dressing 3   Bathing 3   Toileting 3   Upper Body Dressing 3   Grooming 4   Eating 4   Daily Activity Raw Score 20   Daily Activity Standardized Score (Calc for Raw Score >=11) 42 03 AM-PAC Applied Cognition Inpatient   Following a Speech/Presentation 4   Understanding Ordinary Conversation 4   Taking Medications 4   Remembering Where Things Are Placed or Put Away 4   Remembering List of 4-5 Errands 4   Taking Care of Complicated Tasks 3   Applied Cognition Raw Score 23   Applied Cognition Standardized Score 53 08   Modified Chano Scale   Modified Richmond Scale 3     Documentation completed by BEL Kim, OTR/L  [facility] OF THE [facility] Certified ID# [id number]

## 2023-05-03 NOTE — ASSESSMENT & PLAN NOTE
Patient presents with nausea, early satiety and poor appetite for the past several weeks  Patient also reports significant weight loss but unable to quantify exactly how much over how long  Was found to have severe hyponatremia at 124 on outpatient labs and her nephrologist sent her to the ED for further evaluation  · CT abdomen and pelvis shows gastric distention with food debris  Gastric ileus or outlet obstruction is not excluded given asymmetric distention which is also visible on prior CT in December  · Has been tolerating oral diet without any vomiting  · ? Gastroparesis  · We will consult GI for further input, may need endoscopy/gastric emptying study  · PT/OT evaluations pending, lives at home alone but has caregivers 6 hours per day     · Rest of plan as below

## 2023-05-03 NOTE — ASSESSMENT & PLAN NOTE
Intermittent urgency noted  Continue Norvasc and coreg     · Can resume lisinopril   · PRN Hydralazine  · Monitor

## 2023-05-03 NOTE — CONSULTS
Consultation - [facility]  Cheli Carpenter 80 y o  female MRN: [id number]  Unit/Bed#: [facility] 603-01 Encounter: [id number]              Inpatient consult to gastroenterology     Performed by  Faviola Landrum DO     Authorized by CALVIN Santos              Reason for Consult / Principal Problem:     Nausea, abnormal CT scan      ASSESSMENT AND PLAN:      59-year-old female with past medical history of hypertension, congestive heart failure, CKD stage IIIb, diabetes mellitus type 2 who presented to the hospital with nausea, hyponatremia  GI is consulted for nausea as well as abnormal CT scan showing gastric distention  1  Nausea  2  Early satiety  3  Weight loss  4  Gastric distention noted on CT  Unclear etiology but may be related to undiagnosed gastroparesis in the setting of diabetes  Diabetes is currently well controlled  Has never had an endoscopy  Weight loss is also concerning for undiagnosed malignancy  Colonoscopy was done many years ago but patient reports it was normal   · Plan for EGD tomorrow  · Clear liquid diet, n p o  at midnight  · Scheduled Reglan 10 mg every 6 given high likelihood that she has gastroparesis to empty stomach prior to procedure  QTC is 430  · If endoscopy is unremarkable will require outpatient gastric emptying study  · Further recommendations based on findings  5  Anemia  Hemoglobin currently 10 1, baseline 9-11, iron panel in 2022 shows normal iron saturation  B12 also normal   Likely anemia of chronic disease  No overt bleeding  · Plan for EGD as described above  · Will likely need colonoscopy as an outpatient to evaluate anemia and weight loss  Can defer to office follow-up to decide if it is indicated  6  Constipation  Chronic issue, last bowel movement 2 days ago  · Schedule MiraLAX daily  · Monitor bowel movements  Rest of care per primary team   Thank you for this consultation  ______________________________________________________________________    HPI:  20-year-old female with past medical history of hypertension, congestive heart failure, CKD stage IIIb, diabetes mellitus type 2 who presented to the hospital with nausea, hyponatremia  GI is consulted for nausea as well as abnormal CT scan showing gastric distention  Patient states that over the past few months she has had difficulty with persistent nausea and early satiety  She only takes a few bites of food before feeling full  She denies any specific regurgitation or vomiting but does feel like food does come back up before going back down  States she has had weight loss but unclear of the amount  Admits to chronic constipation  Last bowel movement 2 days ago  Denies any blood in stool  She denies any hematemesis, coffee-ground emesis  Has never had an endoscopy before  Had a colonoscopy many years ago which she reports is normal       REVIEW OF SYSTEMS:    Review of Systems   Constitutional: Negative for chills and fever  HENT: Negative for congestion and sinus pressure  Respiratory: Negative for cough and shortness of breath  Cardiovascular: Negative for chest pain, palpitations and leg swelling  Gastrointestinal: Positive for constipation and nausea  Negative for abdominal pain, diarrhea and vomiting  Genitourinary: Negative for dysuria and hematuria  Musculoskeletal: Negative for arthralgias and back pain  Skin: Negative for color change and rash  Neurological: Negative for dizziness and headaches  Psychiatric/Behavioral: Negative for agitation and confusion  All other systems reviewed and are negative           Historical Information   Past Medical History:   Diagnosis Date   • Anemia    • Anxiety    • Arthritis    • CKD (chronic kidney disease), stage III ([place] [address] ) 11/29/2018   • Depression, recurrent ([place] [address] ) 6/14/2021   • Diabetes mellitus ([place] [address] )    • Diabetic peripheral neuropathy (HCC)    • HLD (hyperlipidemia)    • Hyperlipidemia    • Hypertension    • 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 8/23/2017   • Thyroid enlarged 2/23/2015   • Tinnitus    • Vertigo      Past Surgical History:   Procedure Laterality Date   • APPENDECTOMY     • EAR SURGERY Left    • KNEE SURGERY     • TONSILLECTOMY       Social History   Social History     Substance and Sexual Activity   Alcohol Use Not Currently     Social History     Substance and Sexual Activity   Drug Use Never     Social History     Tobacco Use   Smoking Status Never   Smokeless Tobacco Never     Family History   Problem Relation Age of Onset   • Heart disease Mother    • Diabetes Mother    • Heart disease Father    • Stroke Father    • Cancer Family    • Dementia Sister    • Liver disease Other        Meds/Allergies     Medications Prior to Admission   Medication   • acetaminophen (TYLENOL) 325 mg tablet   • amiodarone 100 mg tablet   • amLODIPine (NORVASC) 2 5 mg tablet   • ASPIRIN 81 PO   • atorvastatin (LIPITOR) 80 mg tablet   • Bacillus Coagulans-Inulin (PROBIOTIC FORMULA PO)   • Blood Glucose Monitoring Suppl (ONE TOUCH ULTRA 2) w/Device KIT   • carvedilol (COREG) 25 mg tablet   • Cholecalciferol 125 MCG (5000 UT) capsule   • dicyclomine (BENTYL) 10 mg capsule   • docusate sodium (COLACE) 100 mg capsule   • glucose blood (OneTouch Ultra) test strip   • lidocaine (LIDODERM) 5 %   • lisinopril (ZESTRIL) 10 mg tablet   • Multiple Vitamins-Minerals (multivitamin with minerals) tablet   • NON FORMULARY   • NON FORMULARY   • Omega 3-6-9 Fatty Acids (OMEGA 3-6-9 PO)   • potassium chloride (MICRO-K) 10 MEQ CR capsule   • saccharomyces boulardii (FLORASTOR) 250 mg capsule   • senna (SENOKOT) 8 6 mg   • torsemide (DEMADEX) 20 mg tablet     Current Facility-Administered Medications   Medication Dose Route Frequency   • amiodarone tablet 100 mg  100 mg Oral Daily   • "amLODIPine (NORVASC) tablet 2 5 mg  2 5 mg Oral Daily   • atorvastatin (LIPITOR) tablet 80 mg  80 mg Oral Daily   • carvedilol (COREG) tablet 25 mg  25 mg Oral BID With Meals   • heparin (porcine) subcutaneous injection 5,000 Units  5,000 Units Subcutaneous Q8H [address]   • hydrALAZINE (APRESOLINE) injection 10 mg  10 mg Intravenous Q6H PRN   • insulin lispro (HumaLOG) 100 units/mL subcutaneous injection 1-6 Units  1-6 Units Subcutaneous TID AC   • lisinopril (ZESTRIL) tablet 10 mg  10 mg Oral Daily   • ondansetron (ZOFRAN) injection 4 mg  4 mg Intravenous Once       Allergies   Allergen Reactions   • Hydrochlorothiazide      Hyponatremia, severe   • Hydrochlorothiazide Other (See Comments)       • Statins Other (See Comments)       • Statins            Objective     Blood pressure 160/64, pulse 58, temperature 97 6 °F (36 4 °C), resp  rate 16, height 5' 0 75\" (1 543 m), weight 77 kg (169 lb 12 1 oz), SpO2 95 %, not currently breastfeeding  Body mass index is 32 34 kg/m²  Intake/Output Summary (Last 24 hours) at 5/3/2023 1050  Last data filed at 5/2/2023 1920  Gross per 24 hour   Intake --   Output 500 ml   Net -500 ml         PHYSICAL EXAM:      Physical Exam  Vitals and nursing note reviewed  Constitutional:       General: She is not in acute distress  Appearance: Normal appearance  She is obese  She is not ill-appearing  HENT:      Head: Normocephalic and atraumatic  Mouth/Throat:      Mouth: Mucous membranes are moist    Eyes:      Extraocular Movements: Extraocular movements intact  Conjunctiva/sclera: Conjunctivae normal    Cardiovascular:      Pulses: Normal pulses  Pulmonary:      Effort: Pulmonary effort is normal    Abdominal:      General: Abdomen is flat  Bowel sounds are normal  There is no distension  Palpations: Abdomen is soft  Tenderness: There is no abdominal tenderness  There is no guarding  Skin:     General: Skin is warm and dry     Neurological:      General: " No focal deficit present  Mental Status: She is alert and oriented to person, place, and time     Psychiatric:         Mood and Affect: Mood normal          Behavior: Behavior normal           Lab Results:   Admission on 05/02/2023   Component Date Value   • WBC 05/02/2023 4 89    • RBC 05/02/2023 3 62 (L)    • Hemoglobin 05/02/2023 11 2 (L)    • Hematocrit 05/02/2023 31 5 (L)    • MCV 05/02/2023 87    • MCH 05/02/2023 30 9    • MCHC 05/02/2023 35 6    • RDW 05/02/2023 13 3    • MPV 05/02/2023 10 5    • Platelets 68/49/8980 178    • nRBC 05/02/2023 0    • Neutrophils Relative 05/02/2023 54    • Immat GRANS % 05/02/2023 1    • Lymphocytes Relative 05/02/2023 25    • Monocytes Relative 05/02/2023 14 (H)    • Eosinophils Relative 05/02/2023 5    • Basophils Relative 05/02/2023 1    • Neutrophils Absolute 05/02/2023 2 68    • Immature Grans Absolute 05/02/2023 0 03    • Lymphocytes Absolute 05/02/2023 1 20    • Monocytes Absolute 05/02/2023 0 69    • Eosinophils Absolute 05/02/2023 0 23    • Basophils Absolute 05/02/2023 0 06    • Sodium 05/02/2023 123 (L)    • Potassium 05/02/2023 3 0 (L)    • Chloride 05/02/2023 90 (L)    • CO2 05/02/2023 27    • ANION GAP 05/02/2023 6    • BUN 05/02/2023 14    • Creatinine 05/02/2023 1 23    • Glucose 05/02/2023 95    • Calcium 05/02/2023 8 6    • Corrected Calcium 05/02/2023 9 1    • AST 05/02/2023 60 (H)    • ALT 05/02/2023 56    • Alkaline Phosphatase 05/02/2023 194 (H)    • Total Protein 05/02/2023 6 6    • Albumin 05/02/2023 3 4 (L)    • Total Bilirubin 05/02/2023 0 76    • eGFR 05/02/2023 38    • TSH 3RD GENERATON 05/02/2023 0 841    • Uric Acid 05/02/2023 3 3    • Sodium, Ur 05/02/2023 41    • Chloride, Ur 05/02/2023 41    • Osmolality Serum 05/02/2023 264 (L)    • Osmolality, Ur 05/02/2023 130 (L)    • Cortisol - AM 05/02/2023 13 0    • Sodium 05/02/2023 127 (L)    • Potassium 05/02/2023 3 7    • Chloride 05/02/2023 95 (L)    • CO2 05/02/2023 27    • ANION GAP 05/02/2023 5    • BUN 05/02/2023 15    • Creatinine 05/02/2023 1 30    • Glucose 05/02/2023 161 (H)    • Calcium 05/02/2023 8 4    • eGFR 05/02/2023 36    • WBC 05/03/2023 4 99    • RBC 05/03/2023 3 29 (L)    • Hemoglobin 05/03/2023 10 1 (L)    • Hematocrit 05/03/2023 29 5 (L)    • MCV 05/03/2023 90    • MCH 05/03/2023 30 7    • MCHC 05/03/2023 34 2    • RDW 05/03/2023 13 8    • MPV 05/03/2023 11 1    • Platelets 72/57/8684 151    • nRBC 05/03/2023 0    • Neutrophils Relative 05/03/2023 46    • Immat GRANS % 05/03/2023 0    • Lymphocytes Relative 05/03/2023 33    • Monocytes Relative 05/03/2023 15 (H)    • Eosinophils Relative 05/03/2023 5    • Basophils Relative 05/03/2023 1    • Neutrophils Absolute 05/03/2023 2 30    • Immature Grans Absolute 05/03/2023 0 01    • Lymphocytes Absolute 05/03/2023 1 62    • Monocytes Absolute 05/03/2023 0 73    • Eosinophils Absolute 05/03/2023 0 27    • Basophils Absolute 05/03/2023 0 06    • Sodium 05/03/2023 130 (L)    • Potassium 05/03/2023 4 1    • Chloride 05/03/2023 99    • CO2 05/03/2023 26    • ANION GAP 05/03/2023 5    • BUN 05/03/2023 19    • Creatinine 05/03/2023 1 19    • Glucose 05/03/2023 97    • Calcium 05/03/2023 8 7    • Corrected Calcium 05/03/2023 9 7    • AST 05/03/2023 49 (H)    • ALT 05/03/2023 50    • Alkaline Phosphatase 05/03/2023 142 (H)    • Total Protein 05/03/2023 5 4 (L)    • Albumin 05/03/2023 2 8 (L)    • Total Bilirubin 05/03/2023 0 41    • eGFR 05/03/2023 40    • POC Glucose 05/03/2023 107        Imaging Studies: I have personally reviewed pertinent imaging studies  [facility]    Gastroenterology Fellow  PGY-4  Available via [employer]OTU  5/3/2023 10:50 AM

## 2023-05-03 NOTE — ASSESSMENT & PLAN NOTE
Lab Results   Component Value Date    HGBA1C 5 9 (H) 05/01/2023       Recent Labs     05/03/23  0738   POCGLU 107       · Not on RX baseline  · Continue sliding scale  · Monitor accuchecks

## 2023-05-03 NOTE — CASE MANAGEMENT
Case Management Assessment & Discharge Planning Note    Patient name Judi New  Location [facility] 053-12 MRN [id number]  : 1934 Date 5/3/2023       Current Admission Date: 2023  Current Admission Diagnosis:Nausea   Patient Active Problem List    Diagnosis Date Noted   • Nausea 2023   • Bladder wall thickening 2023   • Renal cyst, left 2023   • Chronic kidney disease, stage 3b ([facility] [address] ) 2022   • Class 2 obesity due to excess calories 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 mass index (BMI) of 37 0 to 37 9 in adult 2022   • Hyponatremia 2022   • Closed fracture of nasal bone 2022   • Primary hypertension 2022   • Spinal stenosis of lumbar region without neurogenic claudication 2022   • Embolism and thrombosis of arteries of the lower extremities ([facility] [address] ) 2022   • Chronic congestive heart failure ([facility] [address] ) 10/05/2021   • Hypokalemia 2021   • Type 2 diabetes mellitus with diabetic chronic kidney disease ([address] ) 2021   • Atrial fibrillation ([facility] [address] ) 2021   • Pain in both hands 2021   • Secondary hyperparathyroidism of renal origin ([facility] [address] ) 2021   • Degenerative disc disease, lumbar 2020   • Chronic pain of both shoulders 2020   • Bilateral adhesive capsulitis of shoulders 2020   • Lumbar spondylosis 2020   • PAD (peripheral artery disease) ([facility] [address] ) 2019   • Bilateral leg edema 2018   • Platelets decreased ([facility] [address] )    • Chronic constipation 2018   • Abdominal bloating 09/15/2017   • Chronic low back pain without sciatica 2016   • Diabetic peripheral neuropathy ([facility] [address] ) 2014   • Hyperlipidemia 2014      LOS (days): 1  Geometric Mean LOS (GMLOS) (days): 2 60  Days to GMLOS:1 8     OBJECTIVE:    Risk of Unplanned Readmission Score: 19 62         Current admission status: Inpatient       Preferred Pharmacy:   [facility] [address] [facility], [place] - [address]  [address]  [address] [facility] [address]  Phone: [phone] Fax: [facility] [id number] [place] [address] [address]  Phone: [phone] Fax: [phone]    Primary Care Provider: John Price MD    Primary Insurance: [facility]  Secondary Insurance:     ASSESSMENT:  [address], [address]   Primary Phone: [phone] (Mobile)               Patient Information  Admitted from[de-identified] Home  Mental Status: Alert  During Assessment patient was accompanied by: Not accompanied during assessment  Assessment information provided by[de-identified] Patient  Primary Caregiver: Self  Support Systems: Self, Private Caregivers, Family members  [place] of Residence: [facility] [employer] do you live in?: [address] entry access options   Select all that apply : Stairs  Number of steps to enter home : 1  Type of Current Residence: Ranch  In the last 12 months, was there a time when you were not able to pay the mortgage or rent on time?: No  In the last 12 months, how many places have you lived?: 1  In the last 12 months, was there a time when you did not have a steady place to sleep or slept in a shelter (including now)?: No  Homeless/housing insecurity resource given?: N/A  Living Arrangements: Lives Alone  Is patient a ?: No    Activities of Daily Living Prior to Admission  Functional Status: Assistance  Completes ADLs independently?: No  Level of ADL dependence: Assistance  Ambulates independently?: Yes  Does patient currently own DME?: Yes  What DME does the patient currently own?: Duane Surendra  Does patient have a history of Outpatient Therapy (PT/OT)?: No  Does the patient have a history of Short-Term Rehab?: Yes ([place])  Does patient have a history of HHC?: No  Does patient currently have [facility]?: No    Current Home Health Care  Type of Current Home Care Services: Home health aide    Patient Information Continued  Income Source: Pension/care home  Does patient have prescription coverage?: Yes  Within the past 12 months, you worried that your food would run out before you got the money to buy more : Never true  Within the past 12 months, the food you bought just didn't last and you didn't have money to get more : Never true  Food insecurity resource given?: N/A  Does patient receive dialysis treatments?: No  Does patient have a history of substance abuse?: No  Does patient have a history of Mental Health Diagnosis?: No    Means of Transportation  Means of Transport to Appts[de-identified] Family transport  In the past 12 months, has lack of transportation kept you from medical appointments or from getting medications?: No  In the past 12 months, has lack of transportation kept you from meetings, work, or from getting things needed for daily living?: No  Was application for public transport provided?: N/A        DISCHARGE DETAILS:    Discharge planning discussed with[de-identified] Patient  Freedom of Choice: Yes  Comments - Freedom of Choice: Discussed FOC  CM contacted family/caregiver?: No- see comments (Declined)     CM reviewed d/c planning process including the following: identifying help at home, patient preference for d/c planning needs, Discharge Lounge, [facility] Meds to Bed program, availability of treatment team to discuss questions or concerns patient and/or family may have regarding understanding medications and recognizing signs and symptoms once discharged  CM also encouraged patient to follow up with all recommended appointments after discharge  Patient advised of importance for patient and family to participate in managing patient’s medical well being  Information obtained from patient, lives alone in [address] [place] [address], [facility], ranch style home  Has caregivers from 9-12 and 4-7 daily to assist with bathing, cooking, cleaning  Caregivers are through [employer]    Has had 3 covid vaccines, does not want booster

## 2023-05-03 NOTE — ASSESSMENT & PLAN NOTE
Lab Results   Component Value Date    EGFR 40 05/03/2023    EGFR 36 05/02/2023    EGFR 38 05/02/2023    CREATININE 1 19 05/03/2023    CREATININE 1 30 05/02/2023    CREATININE 1 23 05/02/2023   · Baseline appears to be around 1 1-1 3, currently stable     · Avoid nephrotoxins and hypotension   · Monitor

## 2023-05-03 NOTE — ASSESSMENT & PLAN NOTE
Wt Readings from Last 3 Encounters:   05/03/23 77 kg (169 lb 12 1 oz)   04/25/23 78 kg (172 lb)   01/24/23 77 1 kg (170 lb)       Patient with history of chronic diastolic congestive heart failure  · Appears euvolemic  · Holding Torsemide for now     · Continue BB  · I/O, daily weights, FR

## 2023-05-03 NOTE — PROGRESS NOTES
[facility]  Progress Note  Name: Tim Wallace  MRN: [id number]  [facility] Date of Admission: 5/2/2023   Date of Service: 5/3/2023 I Hospital Day: 1    Assessment/Plan   * Nausea  Assessment & Plan  Patient presents with nausea, early satiety and poor appetite for the past several weeks  Patient also reports significant weight loss but unable to quantify exactly how much over how long  Was found to have severe hyponatremia at 124 on outpatient labs and her nephrologist sent her to the ED for further evaluation  · CT abdomen and pelvis shows gastric distention with food debris  Gastric ileus or outlet obstruction is not excluded given asymmetric distention which is also visible on prior CT in December  · Has been tolerating oral diet without any vomiting  · ? Gastroparesis  · We will consult GI for further input, may need endoscopy/gastric emptying study  · PT/OT evaluations pending, lives at home alone but has caregivers 6 hours per day  · Rest of plan as below    Hyponatremia  Assessment & Plan  Improved with IVF bolus, 123-->130 today  · Continue to hold Torsemide  · Can hold off on nephrology consult for now but did discuss with Dr Zena Rodrigez  · FR advised per nephrology of 1500  · BMP in AM    Bladder wall thickening  Assessment & Plan  Mild diffuse bladder wall thickening on CT A/P    · No urinary complaints  Will check UA  · Otherwise, can be followed outpatient  Atrial fibrillation [facility])  Assessment & Plan  Patient has a history of atrial fibrillation she is not on anticoagulation after sustaining a fall with subdural hematoma  · Continue amiodarone and carvedilol     Primary hypertension  Assessment & Plan  Intermittent urgency noted  Continue Norvasc and coreg     · Can resume lisinopril   · PRN Hydralazine  · Monitor     Chronic congestive heart failure (HCC)  Assessment & Plan  Wt Readings from Last 3 Encounters:   05/03/23 77 kg (169 lb 12 1 oz)   04/25/23 78 kg (172 lb)   01/24/23 77 1 kg (170 lb)       Patient with history of chronic diastolic congestive heart failure  · Appears euvolemic  · Holding Torsemide for now  · Continue BB  · I/O, daily weights, FR      Chronic kidney disease, stage 3b [facility])  Assessment & Plan  Lab Results   Component Value Date    EGFR 40 05/03/2023    EGFR 36 05/02/2023    EGFR 38 05/02/2023    CREATININE 1 19 05/03/2023    CREATININE 1 30 05/02/2023    CREATININE 1 23 05/02/2023   · Baseline appears to be around 1 1-1 3, currently stable  · Avoid nephrotoxins and hypotension   · Monitor       Type 2 diabetes mellitus with diabetic chronic kidney disease ([facility] [address] )  Assessment & Plan  Lab Results   Component Value Date    HGBA1C 5 9 (H) 05/01/2023       Recent Labs     05/03/23  0738   POCGLU 107       · Not on RX baseline  · Continue sliding scale  · Monitor accuchecks     Renal cyst, left  Assessment & Plan  Noted on CT A/P-Left renal lesion may represent cyst and slightly smaller than prior study where it was more complicated  · Can f/u with PCP, Outpatient US recommended         VTE Pharmacologic Prophylaxis:   Moderate Risk (Score 3-4) - Pharmacological DVT Prophylaxis Ordered: heparin  Patient Centered Rounds: I performed bedside rounds with nursing staff today  Discussions with Specialists or Other Care Team Provider: nursing, case management, nephology     Education and Discussions with Family / Patient: Updated  (nephew) via phone  Total Time Spent on Date of Encounter in care of patient: 35 minutes This time was spent on one or more of the following: performing physical exam; counseling and coordination of care; obtaining or reviewing history; documenting in the medical record; reviewing/ordering tests, medications or procedures; communicating with other healthcare professionals and discussing with patient's family/caregivers      Current Length of Stay: 1 day(s)    Current Patient Status: Inpatient     Certification Statement: The patient will continue to require additional inpatient hospital stay due to monitor NA levels, GI consult, monitor symptoms     Discharge Plan: Anticipate discharge in 24-48 hrs to discharge location to be determined pending rehab evaluations  Code Status: Level 1 - Full Code    Subjective:   Feels well  Ate breakfast without any nausea or vomiting, no abdominal pain  States that she has been having intermittent nausea at home, without vomiting  Also reports early satiety and weight loss although she is unable to quantify how much over how long  She does report that this is mostly unintentional however she also reports diet modification over the past year 2/2 her diabetes which is now diet controlled  She denies any urinary symptoms  Objective:     Vitals:   Temp (24hrs), Av 1 °F (36 7 °C), Min:97 6 °F (36 4 °C), Max:98 7 °F (37 1 °C)    Temp:  [97 6 °F (36 4 °C)-98 7 °F (37 1 °C)] 97 6 °F (36 4 °C)  HR:  [51-97] 58  Resp:  [16-18] 16  BP: ()/() 160/64  SpO2:  [95 %-100 %] 95 %  Body mass index is 32 34 kg/m²  Input and Output Summary (last 24 hours): Intake/Output Summary (Last 24 hours) at 5/3/2023 1043  Last data filed at 2023 1920  Gross per 24 hour   Intake --   Output 500 ml   Net -500 ml       Physical Exam:   Physical Exam  Vitals and nursing note reviewed  Constitutional:       General: She is not in acute distress  Cardiovascular:      Rate and Rhythm: Normal rate  Heart sounds: Murmur heard  Pulmonary:      Breath sounds: Normal breath sounds  Abdominal:      General: Bowel sounds are normal  There is no distension  Palpations: Abdomen is soft  Tenderness: There is no abdominal tenderness  Musculoskeletal:         General: No swelling  Skin:     General: Skin is warm  Coloration: Skin is pale  Neurological:      Mental Status: She is alert and oriented to person, place, and time   Mental status is at baseline  Comments: Slightly hard of hearing   Psychiatric:         Mood and Affect: Mood normal           Additional Data:     Labs:  Results from last 7 days   Lab Units 05/03/23  0503   WBC Thousand/uL 4 99   HEMOGLOBIN g/dL 10 1*   HEMATOCRIT % 29 5*   PLATELETS Thousands/uL 151   NEUTROS PCT % 46   LYMPHS PCT % 33   MONOS PCT % 15*   EOS PCT % 5     Results from last 7 days   Lab Units 05/03/23  0503   SODIUM mmol/L 130*   POTASSIUM mmol/L 4 1   CHLORIDE mmol/L 99   CO2 mmol/L 26   BUN mg/dL 19   CREATININE mg/dL 1 19   ANION GAP mmol/L 5   CALCIUM mg/dL 8 7   ALBUMIN g/dL 2 8*   TOTAL BILIRUBIN mg/dL 0 41   ALK PHOS U/L 142*   ALT U/L 50   AST U/L 49*   GLUCOSE RANDOM mg/dL 97         Results from last 7 days   Lab Units 05/03/23  0738   POC GLUCOSE mg/dl 107     Results from last 7 days   Lab Units 05/01/23  0950   HEMOGLOBIN A1C % 5 9*           Lines/Drains:  Invasive Devices     Peripheral Intravenous Line  Duration           Peripheral IV 05/02/23 Dorsal (posterior); Right Hand <1 day    Peripheral IV 05/02/23 Right Antecubital <1 day                      Imaging: Reviewed radiology reports from this admission including: abdominal/pelvic CT    Recent Cultures (last 7 days):         Last 24 Hours Medication List:   Current Facility-Administered Medications   Medication Dose Route Frequency Provider Last Rate   • amiodarone  100 mg Oral Daily Randolph Banai, DO     • amLODIPine  2 5 mg Oral Daily Randolph Banai, DO     • atorvastatin  80 mg Oral Daily Randolph Banai, DO     • carvedilol  25 mg Oral BID With Meals Randolph Banai, DO     • heparin (porcine)  5,000 Units Subcutaneous Q8H [facility] Randolph Banai, DO     • hydrALAZINE  10 mg Intravenous Q6H PRN Randolph Bellaai, DO     • insulin lispro  1-6 Units Subcutaneous TID AC Eden Rubi PA-C     • lisinopril  10 mg Oral Daily CALVIN Maurice     • ondansetron  4 mg Intravenous Once Alan Hansen DO          Today, Patient Was Seen By: Evorn Mohs CALVIN Jarrett    **Please Note: This note may have been constructed using a voice recognition system  **

## 2023-05-03 NOTE — ASSESSMENT & PLAN NOTE
Noted on CT A/P-Left renal lesion may represent cyst and slightly smaller than prior study where it was more complicated     · Can f/u with PCP, Outpatient US recommended

## 2023-05-03 NOTE — UTILIZATION REVIEW
Initial Clinical Review    Admission: Date/Time/Statement:   Admission Orders (From admission, onward)     Ordered        05/02/23 1602  INPATIENT ADMISSION  Once                      Orders Placed This Encounter   Procedures   • INPATIENT ADMISSION     Standing Status:   Standing     Number of Occurrences:   1     Order Specific Question:   Level of Care     Answer:   Med Surg [16]     Order Specific Question:   Estimated length of stay     Answer:   More than 2 Midnights     Order Specific Question:   Certification     Answer:   I certify that inpatient services are medically necessary for this patient for a duration of greater than two midnights  See H&P and MD Progress Notes for additional information about the patient's course of treatment  ED Arrival Information     Expected   5/2/2023     Arrival   5/2/2023 14:18    Acuity   Emergent            Means of arrival   Walk-In    Escorted by   [facility]    Service   Hospitalist    Admission type   Emergency            Arrival complaint   hyponatremia           Chief Complaint   Patient presents with   • Abnormal Lab     Pt sent over from doctor's office d/t hyponatremia  Pt sodium level in lab work was 126  Pt c/o nausea and decreased appetite        Initial Presentation: 80 y o  female to ED presents for hyponatremia suggested by her Nephrologist  Pt found with Na 124 on outpt lab work  + Nausea and early satiety and poor appetite over the past several weeks  While she has been eating less food, reports she is still drinking the same out of water approx  4 to 5 cups/day  Pt also c/o chronic back pain, takes Tylenol  PMH for Chronic CHF, CKD, T2DM, HLD, HTN  And Atrial Fib  Admit Inpatient level of care for Hyponatremia  Trial 250 ml bolus of NS  Hold lisinopril and torsemide for now  Recheck BMP at 9 pm tonight  Nephrology consult     If pt improves in regards to her sodium levels, it will support her being hypervolemic, or having low solute intake, rather than SIADH   If her sodium worsens with NS bolus, would fluid restrict and start on salt tabs as this would be more likely consistent with SIADH  Date: 5/3   Day 2:    Progress notes; Nausea  Bladder wall thickening  ? Gastroparesis  GI consult  Na improved with IVF bolus 123-->130 today  Continue to hold Torsemide  Can hold off on Nephrology consult for now  BMP in am 5/4  CT abd/pelvis shows gastric distention with food debris  Gastric ileus or outlet obstruction is not excluded given asymmetric distention which is also visible on prior CT in December  GI cons; Nausea, Early satiety, Wt loss and Gastric distention noted on CT  Wt loss is also concerning for undiagnosed malignancy  Pt states she had wt loss but unclear of the amount  Admits to chronic constipation, last BM oin 2 days ago  Hgb 10 1, baseline 9-11  Date: 5/4    Day 3: Has surpassed a 2nd midnight with active treatments and services for Hyponatremia and continued treatment        ED Triage Vitals   Temperature Pulse Respirations Blood Pressure SpO2   05/02/23 1423 05/02/23 1423 05/02/23 1423 05/02/23 1423 05/02/23 1423   98 7 °F (37 1 °C) 74 18 154/81 99 %      Temp Source Heart Rate Source Patient Position - Orthostatic VS BP Location FiO2 (%)   05/02/23 1423 05/02/23 1707 05/02/23 1707 05/02/23 1707 --   Oral Monitor Sitting Left arm       Pain Score       05/02/23 1423       No Pain          Wt Readings from Last 1 Encounters:   05/03/23 77 kg (169 lb 12 1 oz)     Additional Vital Signs:   05/03/23 11:05:46 -- 56 -- 150/58 89 98 % -- --   05/03/23 07:36:34 97 6 °F (36 4 °C) 58 16 160/64 96 95 % -- --   05/02/23 23:47:50 97 9 °F (36 6 °C) 57 18 131/56 81 98 % None (Room air) Lying   05/02/23 23:07:54 -- 51   Abnormal  18 128/53 78 96 % -- --   05/02/23 2200 -- 52   Abnormal  16 91/41   Abnormal  59   Abnormal  95 % None (Room air) Lyi     Pertinent Labs/Diagnostic Test Results:   CT chest abdomen pelvis wo contrast   Final Result by Trinidad Ambrosio Manuel Stephenson MD (05/03 1648)      Gastric distention with food debris  Gastric ileus or outlet obstruction is not excluded given the asymmetric distention which is also visible on the prior CT of December  This suggests a persistent finding rather than transient distention  Large and    small bowel are unremarkable  Further GI work-up should be considered  Mild diffuse bladder wall thickening should be correlated with urinalysis for any evidence of urinary tract infection  The lungs demonstrate a few scattered nodules which are stable  Left renal lesion may represent cyst and slightly smaller than prior study where it was more complicated  A confirmatory nonemergent ultrasound is suggested  The study was marked in [facility] for immediate notification                    Workstation performed: [id number]               Results from last 7 days   Lab Units 05/03/23  0503 05/02/23  1511 05/01/23  0950   WBC Thousand/uL 4 99 4 89 5 62   HEMOGLOBIN g/dL 10 1* 11 2* 11 3*   HEMATOCRIT % 29 5* 31 5* 32 9*   PLATELETS Thousands/uL 151 178 184   NEUTROS ABS Thousands/µL 2 30 2 68 3 41         Results from last 7 days   Lab Units 05/03/23  0503 05/02/23  2108 05/02/23  1511 05/01/23  0950   SODIUM mmol/L 130* 127* 123* 124*   POTASSIUM mmol/L 4 1 3 7 3 0* 4 0   CHLORIDE mmol/L 99 95* 90* 89*   CO2 mmol/L 26 27 27 28   ANION GAP mmol/L 5 5 6 7   BUN mg/dL 19 15 14 15   CREATININE mg/dL 1 19 1 30 1 23 1 30   EGFR ml/min/1 73sq m 40 36 38 36   CALCIUM mg/dL 8 7 8 4 8 6 9 0   MAGNESIUM mg/dL  --   --   --  2 0   PHOSPHORUS mg/dL  --   --   --  3 1     Results from last 7 days   Lab Units 05/03/23  0503 05/02/23  1511 05/01/23  0950   AST U/L 49* 60* 53*   ALT U/L 50 56 53   ALK PHOS U/L 142* 194* 151*   TOTAL PROTEIN g/dL 5 4* 6 6 6 5   ALBUMIN g/dL 2 8* 3 4* 3 5   TOTAL BILIRUBIN mg/dL 0 41 0 76 0 95     Results from last 7 days   Lab Units 05/03/23  1132 05/03/23  0738   POC GLUCOSE mg/dl 139 107     Results from last 7 days   Lab Units 05/03/23  0503 05/02/23  2108 05/02/23  1511   GLUCOSE RANDOM mg/dL 97 161* 95     Results from last 7 days   Lab Units 05/02/23  1511   OSMOLALITY, SERUM mmol/*     Results from last 7 days   Lab Units 05/01/23  0950   HEMOGLOBIN A1C % 5 9*   EAG mg/dl 123       Results from last 7 days   Lab Units 05/02/23  1511 05/01/23  0950   TSH 3RD GENERATON uIU/mL 0 841 0 702         Results from last 7 days   Lab Units 05/02/23  1553 05/02/23  1511   OSMOLALITY, SERUM mmol/KG  --  264*   OSMO UR mmol/*  --      Results from last 7 days   Lab Units 05/03/23  1040 05/02/23  1553 05/01/23  0950   CLARITY UA  Clear  --   --    COLOR UA  Light Yellow  --   --    SPEC GRAV UA  1 009  --   --    PH UA  7 0  --   --    GLUCOSE UA mg/dl Negative  --   --    KETONES UA mg/dl Negative  --   --    BLOOD UA  Negative  --   --    PROTEIN UA mg/dl Negative  --   --    NITRITE UA  Negative  --   --    BILIRUBIN UA  Negative  --   --    UROBILINOGEN UA (BE) mg/dl <2 0  --   --    LEUKOCYTES UA  Negative  --   --    SODIUM UR   --  41  --    CREATININE UR mg/dL  --   --  58 8       ED Treatment:   Medication Administration from 05/02/2023 1252 to 05/02/2023 2238       Date/Time Order Dose Route Action     05/02/2023 1632 EDT potassium chloride (K-DUR,KLOR-CON) CR tablet 40 mEq 40 mEq Oral Given     05/02/2023 2229 EDT sodium chloride 0 9 % bolus 250 mL -- Intravenous Restarted     05/02/2023 1743 EDT sodium chloride 0 9 % bolus 250 mL 250 mL Intravenous New Bag     05/02/2023 1830 EDT carvedilol (COREG) tablet 25 mg 25 mg Oral Given     05/02/2023 2109 EDT heparin (porcine) subcutaneous injection 5,000 Units 5,000 Units Subcutaneous Given     05/02/2023 1908 EDT potassium chloride (K-DUR,KLOR-CON) CR tablet 40 mEq 40 mEq Oral Given        Past Medical History:   Diagnosis Date   • Anemia    • Anxiety    • Arthritis    • CKD (chronic kidney disease), stage III ([facility] [address] ) 11/29/2018   • Depression, recurrent (HCC) 6/14/2021   • Diabetes mellitus ([facility] [address] )    • Diabetic peripheral neuropathy (HCC)    • HLD (hyperlipidemia)    • Hyperlipidemia    • Hypertension    • 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 8/23/2017   • Thyroid enlarged 2/23/2015   • Tinnitus    • Vertigo      Present on Admission:  • Chronic kidney disease, stage 3b ([facility] [address] )  • Primary hypertension  • Chronic congestive heart failure (HCC)  • Type 2 diabetes mellitus with diabetic chronic kidney disease (HCC)  • Atrial fibrillation (HCC)  • Hyponatremia      Admitting Diagnosis: Hypokalemia [E87 6]  Hyponatremia [E87 1]  Abnormal laboratory test [R89 9]  Age/Sex: 80 y o  female     Admission Orders:  Scheduled Medications:  amiodarone, 100 mg, Oral, Daily  amLODIPine, 2 5 mg, Oral, Daily  atorvastatin, 80 mg, Oral, Daily  carvedilol, 25 mg, Oral, BID With Meals  heparin (porcine), 5,000 Units, Subcutaneous, Q8H KARIN  insulin lispro, 1-6 Units, Subcutaneous, TID AC  lisinopril, 10 mg, Oral, Daily  metoclopramide, 10 mg, Intravenous, Q6H KARIN  polyethylene glycol, 17 g, Oral, Daily      Continuous IV Infusions: None     PRN Meds:  hydrALAZINE, 10 mg, Intravenous, Q6H PRN        IP CONSULT TO CASE MANAGEMENT  IP CONSULT TO GASTROENTEROLOGY    Network Utilization Review Department  ATTENTION: Please call with any questions or concerns to [phone] and carefully listen to the prompts so that you are directed to the right person  All voicemails are confidential   [place] all requests for admission clinical reviews, approved or denied determinations and any other requests to dedicated fax number below belonging to the campus where the patient is receiving treatment   List of dedicated fax numbers for the Facilities:  FACILITY NAME UR FAX NUMBER   ADMISSION DENIALS (Administrative/Medical Necessity) [address] [facility] (Maternity/NICU/Pediatrics) [phone]   [facility] 6[address] [facility] [phone]   [facility] [place] [address] [phone] [address] [place] [id number] [place] [address] [place] [address] [phone] [address] [facility] [address] 815 [facility] [phone]

## 2023-05-03 NOTE — ASSESSMENT & PLAN NOTE
Improved with IVF bolus, 123-->130 today     · Continue to hold Torsemide  · Can hold off on nephrology consult for now but did discuss with Dr Dann Calvin  · FR advised per nephrology of 1500  · BMP in AM

## 2023-05-03 NOTE — PLAN OF CARE
Problem: Prexisting or High Potential for Compromised Skin Integrity  Goal: Skin integrity is maintained or improved  Description: INTERVENTIONS:  - Identify patients at risk for skin breakdown  - Assess and monitor skin integrity  - Assess and monitor nutrition and hydration status  - Monitor labs   - Assess for incontinence   - Turn and reposition patient  - Assist with mobility/ambulation  - Relieve pressure over bony prominences  - Avoid friction and shearing  - Provide appropriate hygiene as needed including keeping skin clean and dry  - Evaluate need for skin moisturizer/barrier cream  - Collaborate with interdisciplinary team   - Patient/family teaching  - Consider wound care consult   Outcome: Progressing     Problem: PAIN - ADULT  Goal: Verbalizes/displays adequate comfort level or baseline comfort level  Description: Interventions:  - Encourage patient to monitor pain and request assistance  - Assess pain using appropriate pain scale  - Administer analgesics based on type and severity of pain and evaluate response  - Implement non-pharmacological measures as appropriate and evaluate response  - Consider cultural and social influences on pain and pain management  - Notify physician/advanced practitioner if interventions unsuccessful or patient reports new pain  Outcome: Progressing     Problem: INFECTION - ADULT  Goal: Absence or prevention of progression during hospitalization  Description: INTERVENTIONS:  - Assess and monitor for signs and symptoms of infection  - Monitor lab/diagnostic results  - Monitor all insertion sites, i e  indwelling lines, tubes, and drains  - Monitor endotracheal if appropriate and nasal secretions for changes in amount and color  - [place] appropriate cooling/warming therapies per order  - Administer medications as ordered  - Instruct and encourage patient and family to use good hand hygiene technique  - Identify and instruct in appropriate isolation precautions for identified infection/condition  Outcome: Progressing  Goal: Absence of fever/infection during neutropenic period  Description: INTERVENTIONS:  - Monitor WBC    Outcome: Progressing     Problem: SAFETY ADULT  Goal: Patient will remain free of falls  Description: INTERVENTIONS:  - Educate patient/family on patient safety including physical limitations  - Instruct patient to call for assistance with activity   - Consult OT/PT to assist with strengthening/mobility   - Keep Call bell within reach  - Keep bed low and locked with side rails adjusted as appropriate  - Keep care items and personal belongings within reach  - Initiate and maintain comfort rounds  - Make Fall Risk Sign visible to staff  - Offer Toileting every    Hours, in advance of need  - Initiate/Maintain   alarm  - Obtain necessary fall risk management equipment:     - Apply yellow socks and bracelet for high fall risk patients  - Consider moving patient to room near nurses station  Outcome: Progressing  Goal: Maintain or return to baseline ADL function  Description: INTERVENTIONS:  -  Assess patient's ability to carry out ADLs; assess patient's baseline for ADL function and identify physical deficits which impact ability to perform ADLs (bathing, care of mouth/teeth, toileting, grooming, dressing, etc )  - Assess/evaluate cause of self-care deficits   - Assess range of motion  - Assess patient's mobility; develop plan if impaired  - Assess patient's need for assistive devices and provide as appropriate  - Encourage maximum independence but intervene and supervise when necessary  - Involve family in performance of ADLs  - Assess for home care needs following discharge   - Consider OT consult to assist with ADL evaluation and planning for discharge  - Provide patient education as appropriate  Outcome: Progressing  Goal: Maintains/Returns to pre admission functional level  Description: INTERVENTIONS:  - Perform BMAT or MOVE assessment daily    - Set and communicate daily mobility goal to care team and patient/family/caregiver  - Collaborate with rehabilitation services on mobility goals if consulted  - Perform Range of Motion    times a day  - Reposition patient every    hours  - Dangle patient    times a day  - Stand patient    times a day  - Ambulate patient    times a day  - Out of bed to chair      times a day   - Out of bed for meals  times a day  - Out of bed for toileting  - Record patient progress and toleration of activity level   Outcome: Progressing     Problem: DISCHARGE PLANNING  Goal: Discharge to home or other facility with appropriate resources  Description: INTERVENTIONS:  - Identify barriers to discharge w/patient and caregiver  - Arrange for needed discharge resources and transportation as appropriate  - Identify discharge learning needs (meds, wound care, etc )  - Arrange for interpretive services to assist at discharge as needed  - Refer to Case Management Department for coordinating discharge planning if the patient needs post-hospital services based on physician/advanced practitioner order or complex needs related to functional status, cognitive ability, or social support system  Outcome: Progressing     Problem: Knowledge Deficit  Goal: Patient/family/caregiver demonstrates understanding of disease process, treatment plan, medications, and discharge instructions  Description: Complete learning assessment and assess knowledge base    Interventions:  - Provide teaching at level of understanding  - Provide teaching via preferred learning methods  Outcome: Progressing

## 2023-05-03 NOTE — ASSESSMENT & PLAN NOTE
Patient has a history of atrial fibrillation she is not on anticoagulation after sustaining a fall with subdural hematoma  · Continue amiodarone and carvedilol

## 2023-05-03 NOTE — ASSESSMENT & PLAN NOTE
Mild diffuse bladder wall thickening on CT A/P    · No urinary complaints  Will check UA  · Otherwise, can be followed outpatient

## 2023-05-03 NOTE — PHYSICAL THERAPY NOTE
PHYSICAL THERAPY EVALUATION          Patient Name: Cheli Carpenter  [id number] Date: 5/3/2023       05/03/23 5143   PT Last Visit   PT Visit Date 05/03/23   Note Type   Note type Evaluation   Pain Assessment   Pain Assessment Tool 0-10   Pain Score No Pain   Hospital Pain Intervention(s) Repositioned; Ambulation/increased activity   Restrictions/Precautions   Weight Bearing Precautions Per Order No   Other Precautions Fall Risk;Hard of hearing   Home Living   Type of [address] One level;Stairs to enter with rails  (1 MARTELL)   Bathroom Shower/Tub Walk-in shower   Bathroom Toilet Raised   [address] [place] chair   [address] Walker;Cane   Additional Comments Pt reports living alone in a one level house with 1 MARTELL through garage  Pt states she receives assistance from caregivers for 6 hours per day   Prior Function   Level of [place] Needs assistance with IADLS; Independent with ADLs; Independent with functional mobility   Lives With (S)  Alone   Receives Help From Personal care attendant   IADLs Family/Friend/Other provides transportation; Family/Friend/Other provides meals; Independent with medication management   Falls in the last 6 months 1 to 4  (1)   Vocational Retired   Comments Pt reports use of RW for [employer] PTA   General   Family/Caregiver Present No   Cognition   Overall Cognitive Status WFL   Arousal/Participation Alert   Attention Within functional limits   Orientation Level Oriented X4   Memory Within functional limits   Following Commands Follows multistep commands without difficulty   Comments Pt pleasant and cooperative throughout session   Agreeable to mobility   RLE Assessment   RLE Assessment WFL   Strength RLE   RLE Overall Strength 4/5  (functionally assessed)   LLE Assessment   LLE Assessment WFL   Strength LLE   LLE Overall Strength 4/5  (functionally assessed) Bed Mobility   Supine to Sit 5  Supervision   Additional items Increased time required   Additional Comments Pt found supine at start of eval, left in bedside chair with all needs within reach   Transfers   Sit to Stand 5  Supervision   Additional items Increased time required   Stand to Sit 5  Supervision   Additional items Increased time required   Additional Comments with RW   Ambulation/Elevation   Gait pattern Decreased foot clearance; Forward Flexion; Step through pattern   Gait Assistance 5  Supervision   Additional items Verbal cues   Assistive Device Rolling walker   Distance 100'x2   Ambulation/Elevation Additional Comments with RW, VC for hand placement   Balance   Static Sitting Good   Dynamic Sitting Fair +   Static Standing Fair   Dynamic Standing Fair -   Ambulatory Fair -   Endurance Deficit   Endurance Deficit No   Activity Tolerance   Activity Tolerance Patient tolerated treatment well   Medical Staff Made Aware Deon, OT present for co-evaluation secondary to pt presentation, d/c planning  Clair Salcedo, BRIANA present for student observation   Nurse Made Aware clearance per RN   Assessment   Prognosis Good   Problem List Decreased strength; Impaired balance;Decreased mobility   Assessment Pt presented to [facility] s/p hyponatremia  PMH includes CKD, HTN, CHF, T2DM, A fib, anxiety, arthritis, depression, HLD  Pt being seen for moderate complexity PT evaluation due to ongoing medical management for primary diagnosis, decreased activity tolerance compared to baseline, trending lab values, and utilization of assistive device  Pt reports living alone in a 1 SH with 1 MARTELL  Pt reports receiving assistance with IADLs from personal caregiver for BID for 6 hours per day  Pt independent with ADLs and functional mobility with use of RW  Pt performs bed mobility with supervision, transfers with supervision, ambulation with supervision and use of RW    At conclusion of PT evaluation, pt was seated in chair with all needs within reach and chair alarm engaged  Pt presented at PT evaluation with some loss of BLE strength, static and dynamic balance, and functional mobility capabilities in comparison to baseline; however, due to the pt's functional mobility status, social support, and accessible home environment, PT d/c recommendation when medically cleared is home with no further rehabilitation needs  Recommending pt receive greater assistance from personal caregivers with dressing/bathing as needed  D/c skilled acute care PT services  Barriers to Discharge None   Barriers to Discharge Comments Pt denies any concerns or questions regarding d/c to home   Goals   Patient Goals To go home   Recommendation   PT Discharge Recommendation No rehabilitation needs   Equipment Recommended Walker   Additional Comments Per pt already owns   AM-PAC Basic Mobility Inpatient   Turning in Flat Bed Without Bedrails 3   Lying on Back to Sitting on Edge of Flat Bed Without Bedrails 3   Moving Bed to Chair 3   Standing Up From Chair Using Arms 3   Walk in Room 3   Climb 3-5 Stairs With Railing 3   Basic Mobility Inpatient Raw Score 18   Basic Mobility Standardized Score 41 05   Highest Level Of Mobility   -HL Goal 6: Walk 10 steps or more   JH-HLM Achieved 7: Walk 25 feet or more   Modified [place] Scale   Modified [place] Scale 3   Barthel Index   Feeding 10   Bathing 0   Grooming Score 5   Dressing Score 5   Bladder Score 10   Bowels Score 10   Toilet Use Score 5   Transfers (Bed/Chair) Score 10   Mobility (Level Surface) Score 10   Stairs Score 5   Barthel Index Score 70   End of Consult   Patient Position at End of Consult Bedside chair;Bed/Chair alarm activated; All needs within reach     [facility], [facility]  05/03/23

## 2023-05-04 ENCOUNTER — TELEPHONE (OUTPATIENT)
Dept: GASTROENTEROLOGY | Facility: CLINIC | Age: 88
End: 2023-05-04

## 2023-05-04 ENCOUNTER — ANESTHESIA (INPATIENT)
Dept: GASTROENTEROLOGY | Facility: HOSPITAL | Age: 88
End: 2023-05-04

## 2023-05-04 ENCOUNTER — ANESTHESIA EVENT (INPATIENT)
Dept: GASTROENTEROLOGY | Facility: HOSPITAL | Age: 88
End: 2023-05-04

## 2023-05-04 ENCOUNTER — APPOINTMENT (INPATIENT)
Dept: GASTROENTEROLOGY | Facility: HOSPITAL | Age: 88
End: 2023-05-04

## 2023-05-04 LAB
ANION GAP SERPL CALCULATED.3IONS-SCNC: 3 MMOL/L (ref 4–13)
ANION GAP SERPL CALCULATED.3IONS-SCNC: 7 MMOL/L (ref 4–13)
BASOPHILS # BLD AUTO: 0.06 THOUSANDS/ÂΜL (ref 0–0.1)
BASOPHILS NFR BLD AUTO: 1 % (ref 0–1)
BUN SERPL-MCNC: 15 MG/DL (ref 5–25)
BUN SERPL-MCNC: 15 MG/DL (ref 5–25)
CALCIUM SERPL-MCNC: 8.2 MG/DL (ref 8.3–10.1)
CALCIUM SERPL-MCNC: 8.6 MG/DL (ref 8.3–10.1)
CHLORIDE SERPL-SCNC: 95 MMOL/L (ref 96–108)
CHLORIDE SERPL-SCNC: 95 MMOL/L (ref 96–108)
CO2 SERPL-SCNC: 25 MMOL/L (ref 21–32)
CO2 SERPL-SCNC: 28 MMOL/L (ref 21–32)
CREAT SERPL-MCNC: 1.01 MG/DL (ref 0.6–1.3)
CREAT SERPL-MCNC: 1.1 MG/DL (ref 0.6–1.3)
EOSINOPHIL # BLD AUTO: 0.3 THOUSAND/ÂΜL (ref 0–0.61)
EOSINOPHIL NFR BLD AUTO: 6 % (ref 0–6)
ERYTHROCYTE [DISTWIDTH] IN BLOOD BY AUTOMATED COUNT: 13.5 % (ref 11.6–15.1)
FERRITIN SERPL-MCNC: 54 NG/ML (ref 8–388)
GFR SERPL CREATININE-BSD FRML MDRD: 44 ML/MIN/1.73SQ M
GFR SERPL CREATININE-BSD FRML MDRD: 49 ML/MIN/1.73SQ M
GLUCOSE SERPL-MCNC: 109 MG/DL (ref 65–140)
GLUCOSE SERPL-MCNC: 114 MG/DL (ref 65–140)
GLUCOSE SERPL-MCNC: 116 MG/DL (ref 65–140)
GLUCOSE SERPL-MCNC: 121 MG/DL (ref 65–140)
GLUCOSE SERPL-MCNC: 128 MG/DL (ref 65–140)
GLUCOSE SERPL-MCNC: 172 MG/DL (ref 65–140)
HCT VFR BLD AUTO: 30 % (ref 34.8–46.1)
HGB BLD-MCNC: 10 G/DL (ref 11.5–15.4)
IMM GRANULOCYTES # BLD AUTO: 0.01 THOUSAND/UL (ref 0–0.2)
IMM GRANULOCYTES NFR BLD AUTO: 0 % (ref 0–2)
IRON SATN MFR SERPL: 22 % (ref 15–50)
IRON SERPL-MCNC: 69 UG/DL (ref 50–170)
LYMPHOCYTES # BLD AUTO: 1.71 THOUSANDS/ÂΜL (ref 0.6–4.47)
LYMPHOCYTES NFR BLD AUTO: 33 % (ref 14–44)
MCH RBC QN AUTO: 30.4 PG (ref 26.8–34.3)
MCHC RBC AUTO-ENTMCNC: 33.3 G/DL (ref 31.4–37.4)
MCV RBC AUTO: 91 FL (ref 82–98)
MONOCYTES # BLD AUTO: 0.63 THOUSAND/ÂΜL (ref 0.17–1.22)
MONOCYTES NFR BLD AUTO: 12 % (ref 4–12)
NEUTROPHILS # BLD AUTO: 2.42 THOUSANDS/ÂΜL (ref 1.85–7.62)
NEUTS SEG NFR BLD AUTO: 48 % (ref 43–75)
NRBC BLD AUTO-RTO: 0 /100 WBCS
PLATELET # BLD AUTO: 160 THOUSANDS/UL (ref 149–390)
PMV BLD AUTO: 10.8 FL (ref 8.9–12.7)
POTASSIUM SERPL-SCNC: 4 MMOL/L (ref 3.5–5.3)
POTASSIUM SERPL-SCNC: 4.2 MMOL/L (ref 3.5–5.3)
RBC # BLD AUTO: 3.29 MILLION/UL (ref 3.81–5.12)
SODIUM SERPL-SCNC: 126 MMOL/L (ref 135–147)
SODIUM SERPL-SCNC: 127 MMOL/L (ref 135–147)
TIBC SERPL-MCNC: 311 UG/DL (ref 250–450)
WBC # BLD AUTO: 5.13 THOUSAND/UL (ref 4.31–10.16)

## 2023-05-04 PROCEDURE — 0DB98ZX EXCISION OF DUODENUM, VIA NATURAL OR ARTIFICIAL OPENING ENDOSCOPIC, DIAGNOSTIC: ICD-10-PCS | Performed by: INTERNAL MEDICINE

## 2023-05-04 PROCEDURE — 0DB68ZX EXCISION OF STOMACH, VIA NATURAL OR ARTIFICIAL OPENING ENDOSCOPIC, DIAGNOSTIC: ICD-10-PCS | Performed by: INTERNAL MEDICINE

## 2023-05-04 RX ORDER — SODIUM CHLORIDE, SODIUM LACTATE, POTASSIUM CHLORIDE, CALCIUM CHLORIDE 600; 310; 30; 20 MG/100ML; MG/100ML; MG/100ML; MG/100ML
INJECTION, SOLUTION INTRAVENOUS CONTINUOUS PRN
Status: DISCONTINUED | OUTPATIENT
Start: 2023-05-04 | End: 2023-05-04

## 2023-05-04 RX ORDER — LANOLIN ALCOHOL/MO/W.PET/CERES
3 CREAM (GRAM) TOPICAL
Status: DISCONTINUED | OUTPATIENT
Start: 2023-05-04 | End: 2023-05-06 | Stop reason: HOSPADM

## 2023-05-04 RX ORDER — LIDOCAINE HYDROCHLORIDE 10 MG/ML
INJECTION, SOLUTION EPIDURAL; INFILTRATION; INTRACAUDAL; PERINEURAL AS NEEDED
Status: DISCONTINUED | OUTPATIENT
Start: 2023-05-04 | End: 2023-05-04

## 2023-05-04 RX ORDER — METOCLOPRAMIDE 10 MG/1
10 TABLET ORAL 2 TIMES DAILY PRN
Status: DISCONTINUED | OUTPATIENT
Start: 2023-05-04 | End: 2023-05-06 | Stop reason: HOSPADM

## 2023-05-04 RX ORDER — PROPOFOL 10 MG/ML
INJECTION, EMULSION INTRAVENOUS AS NEEDED
Status: DISCONTINUED | OUTPATIENT
Start: 2023-05-04 | End: 2023-05-04

## 2023-05-04 RX ORDER — PROPOFOL 10 MG/ML
INJECTION, EMULSION INTRAVENOUS CONTINUOUS PRN
Status: DISCONTINUED | OUTPATIENT
Start: 2023-05-04 | End: 2023-05-04

## 2023-05-04 RX ORDER — METHOCARBAMOL 500 MG/1
500 TABLET, FILM COATED ORAL EVERY 6 HOURS PRN
Status: DISCONTINUED | OUTPATIENT
Start: 2023-05-04 | End: 2023-05-06 | Stop reason: HOSPADM

## 2023-05-04 RX ADMIN — SODIUM CHLORIDE 250 ML: 0.9 INJECTION, SOLUTION INTRAVENOUS at 11:08

## 2023-05-04 RX ADMIN — HEPARIN SODIUM 5000 UNITS: 5000 INJECTION INTRAVENOUS; SUBCUTANEOUS at 16:02

## 2023-05-04 RX ADMIN — CARVEDILOL 25 MG: 25 TABLET, FILM COATED ORAL at 16:02

## 2023-05-04 RX ADMIN — MELATONIN 3 MG: at 21:41

## 2023-05-04 RX ADMIN — LIDOCAINE HYDROCHLORIDE 50 MG: 10 INJECTION, SOLUTION EPIDURAL; INFILTRATION; INTRACAUDAL; PERINEURAL at 14:16

## 2023-05-04 RX ADMIN — METHOCARBAMOL 500 MG: 500 TABLET ORAL at 21:41

## 2023-05-04 RX ADMIN — CARVEDILOL 25 MG: 25 TABLET, FILM COATED ORAL at 08:28

## 2023-05-04 RX ADMIN — MELATONIN 3 MG: at 01:29

## 2023-05-04 RX ADMIN — ATORVASTATIN CALCIUM 80 MG: 80 TABLET, FILM COATED ORAL at 08:28

## 2023-05-04 RX ADMIN — LISINOPRIL 10 MG: 10 TABLET ORAL at 08:28

## 2023-05-04 RX ADMIN — METOCLOPRAMIDE HYDROCHLORIDE 10 MG: 5 INJECTION INTRAMUSCULAR; INTRAVENOUS at 11:08

## 2023-05-04 RX ADMIN — AMIODARONE HYDROCHLORIDE 100 MG: 200 TABLET ORAL at 08:29

## 2023-05-04 RX ADMIN — HEPARIN SODIUM 5000 UNITS: 5000 INJECTION INTRAVENOUS; SUBCUTANEOUS at 23:40

## 2023-05-04 RX ADMIN — METOCLOPRAMIDE HYDROCHLORIDE 10 MG: 5 INJECTION INTRAMUSCULAR; INTRAVENOUS at 06:28

## 2023-05-04 RX ADMIN — SODIUM CHLORIDE, SODIUM LACTATE, POTASSIUM CHLORIDE, AND CALCIUM CHLORIDE: .6; .31; .03; .02 INJECTION, SOLUTION INTRAVENOUS at 14:13

## 2023-05-04 RX ADMIN — PROPOFOL 50 MG: 10 INJECTION, EMULSION INTRAVENOUS at 14:16

## 2023-05-04 RX ADMIN — AMLODIPINE BESYLATE 2.5 MG: 2.5 TABLET ORAL at 08:29

## 2023-05-04 RX ADMIN — PROPOFOL 150 MCG/KG/MIN: 10 INJECTION, EMULSION INTRAVENOUS at 14:16

## 2023-05-04 RX ADMIN — HEPARIN SODIUM 5000 UNITS: 5000 INJECTION INTRAVENOUS; SUBCUTANEOUS at 06:28

## 2023-05-04 NOTE — ASSESSMENT & PLAN NOTE
Intermittent urgency noted  Continue Norvasc and coreg     · lisinopril resumed  · PRN Hydralazine  · Monitor

## 2023-05-04 NOTE — ASSESSMENT & PLAN NOTE
Lab Results   Component Value Date    HGBA1C 5 9 (H) 05/01/2023       Recent Labs     05/03/23  0738 05/03/23  1132 05/03/23  1614 05/03/23  2111   POCGLU 107 139 124 124       · Not on RX baseline  · Continue sliding scale  · Monitor accuchecks

## 2023-05-04 NOTE — ASSESSMENT & PLAN NOTE
Mild diffuse bladder wall thickening on CT A/P    · No urinary complaints  · UA negative   · follow up outpatient

## 2023-05-04 NOTE — PROGRESS NOTES
Pastoral Care Progress Note    2023  Patient: Lidia Gant : 1934  Admission Date & Time: 2023 1449  MRN: [id number] CSN: [id number]         23 0900   Clinical Encounter Type   Visited With Patient   Tenriism Encounters   Tenriism Needs Prayer   Sacramental Encounters   Communion Given Indicator Yes     Kurtis Hughes met with the pt and offered prayers, blessings, and the Sacrament of [employer]  No further needs were expressed at this time  Chaplains still remain available

## 2023-05-04 NOTE — ANESTHESIA PREPROCEDURE EVALUATION
Procedure:  EGD    Relevant Problems   CARDIO   (+) Atrial fibrillation (HCC)   (+) Chronic congestive heart failure (HCC)   (+) Hyperlipidemia   (+) Primary hypertension      ENDO   (+) Secondary hyperparathyroidism of renal origin (HCC)   (+) Type 2 diabetes mellitus with diabetic chronic kidney disease (HCC)      /RENAL   (+) Chronic kidney disease, stage 3b (HCC)   (+) Renal cyst, left      MUSCULOSKELETAL   (+) Bilateral adhesive capsulitis of shoulders   (+) Chronic low back pain without sciatica   (+) Degenerative disc disease, lumbar   (+) Lumbar spondylosis      NEURO/PSYCH   (+) Chronic low back pain without sciatica   (+) Chronic pain of both shoulders   (+) Diabetic peripheral neuropathy (HCC)        Physical Exam    Airway    Mallampati score: I  TM Distance: >3 FB  Neck ROM: full     Dental   Comment: Multiple missing,     Cardiovascular      Pulmonary      Other Findings        Anesthesia Plan  ASA Score- 3     Anesthesia Type- IV sedation with anesthesia with ASA Monitors  Additional Monitors:   Airway Plan:           Plan Factors-    Chart reviewed  Existing labs reviewed  Patient summary reviewed  Induction- intravenous  Postoperative Plan-     Informed Consent- Anesthetic plan and risks discussed with patient  I personally reviewed this patient with the CRNA  Discussed and agreed on the Anesthesia Plan with the JOZEF Almaraz

## 2023-05-04 NOTE — CASE MANAGEMENT
Case Management Discharge Planning Note    Patient name Eladio Shaikh  Location [facility]03/[facility] 347-18 MRN [id number]  : 1934 Date 2023       Current Admission Date: 2023  Current Admission Diagnosis:Nausea   Patient Active Problem List    Diagnosis Date Noted   • Nausea 2023   • Bladder wall thickening 2023   • Renal cyst, left 2023   • Chronic kidney disease, stage 3b ([address] ) 2022   • Class 2 obesity due to excess calories 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 mass index (BMI) of 37 0 to 37 9 in adult 2022   • Hyponatremia 2022   • Closed fracture of nasal bone 2022   • Primary hypertension 2022   • Spinal stenosis of lumbar region without neurogenic claudication 2022   • Embolism and thrombosis of arteries of the lower extremities ([address] ) 2022   • Chronic congestive heart failure ([address] ) 10/05/2021   • Hypokalemia 2021   • Type 2 diabetes mellitus with diabetic chronic kidney disease ([address] ) 2021   • Atrial fibrillation ([address] ) 2021   • Pain in both hands 2021   • Secondary hyperparathyroidism of renal origin ([address] ) 2021   • Degenerative disc disease, lumbar 2020   • Chronic pain of both shoulders 2020   • Bilateral adhesive capsulitis of shoulders 2020   • Lumbar spondylosis 2020   • PAD (peripheral artery disease) ([address] ) 2019   • Bilateral leg edema 2018   • Platelets decreased ([address] )    • Chronic constipation 2018   • Abdominal bloating 09/15/2017   • Chronic low back pain without sciatica 2016   • Diabetic peripheral neuropathy ([address] ) 2014   • Hyperlipidemia 2014      LOS (days): 2  Geometric Mean LOS (GMLOS) (days): 2 60  Days to GMLOS:0 8     OBJECTIVE:  Risk of Unplanned Readmission Score: 23 57         Current admission status: Inpatient   Preferred Pharmacy:   [facility] [address]HRAM Curtisda  [facility], [place] - [address]  [address]    [place] [address]  Phone: [phone] Fax: [facility] #[id number] - [address] [address] [address] [address]  Phone: [phone] Fax: [phone]    Primary Care Provider: Doug Sousa MD    Primary Insurance: [facility]  Secondary Insurance:     DISCHARGE DETAILS:    IMM Given (Date):: 05/04/23  IMM Given to[de-identified] Patient     IMM reviewed with patient, patient agrees with discharge determination

## 2023-05-04 NOTE — ANESTHESIA POSTPROCEDURE EVALUATION
Post-Op Assessment Note    CV Status:  Stable  Pain Score: 0    Pain management: adequate     Mental Status:  Alert and awake   Hydration Status:  Euvolemic   PONV Controlled:  Controlled   Airway Patency:  Patent      Post Op Vitals Reviewed: Yes      Staff: CRNA         No notable events documented      /53 (05/04/23 1431)    Temp 97 7 °F (36 5 °C) (05/04/23 1431)    Pulse (!) 49 (05/04/23 1431)   Resp 16 (05/04/23 1431)    SpO2 100 % (05/04/23 1431)

## 2023-05-04 NOTE — TELEPHONE ENCOUNTER
----- Message from Soco Montoya DO sent at 5/4/2023  2:26 PM EDT -----  Regarding: Follow-up  Please schedule patient for outpatient follow-up in the next 2 to 4 weeks to discuss management of possible gastroparesis

## 2023-05-04 NOTE — RESTORATIVE TECHNICIAN NOTE
Restorative Technician Note      Patient Name: Lorenzo Bryson     Restorative Tech Visit Date: 05/04/23  Note Type: Mobility  Patient Position Upon Consult: Supine  Activity Performed: Ambulated  Assistive Device: Roller walker  Patient Position at End of Consult: Bedside chair;  All needs within reach      Francis Macario Restorative Technician

## 2023-05-04 NOTE — ED PROVIDER NOTES
History  Chief Complaint   Patient presents with   • Abnormal Lab     Pt sent over from doctor's office d/t hyponatremia  Pt sodium level in lab work was 126  Pt c/o nausea and decreased appetite      Patient is an 60-year-old female, past medical history including anemia, anxiety, CKD, depression, diabetes, hyperlipidemia, and hypertension, who presents to the emergency department for hyponatremia  Patient states she has been having nausea for the past week  She had outpatient labs drawn yesterday  Her sodium came back low at 124  She was subsequently referred to the emergency department for further evaluation  Currently, patient continues to endorse generalized nausea  Nausea is worse with eating  No other modifying factors  No other associated symptoms  Denies any fevers or chills  No chest pain or shortness of breath  No other complaints or concerns at this time  Labs reviewed  Sodium on 1/6/2023 was 136  Sodium yesterday was 124  Latest Reference Range & Units 01/03/23 12:30 01/05/23 06:59 01/06/23 06:59 05/01/23 09:50   Sodium 135 - 147 mmol/L 135 135 136 124 (L)   (L): Data is abnormally low  Prior to Admission Medications   Prescriptions Last Dose Informant Patient Reported? Taking?    ASPIRIN 81 PO Unknown  Yes No   Sig: Take by mouth   Bacillus Coagulans-Inulin (PROBIOTIC FORMULA PO) Unknown  Yes No   Sig: Take by mouth 97mg two times a day for stomach health supplement   Blood Glucose Monitoring Suppl (ONE TOUCH ULTRA 2) w/Device KIT Unknown  No No   Sig: Use daily   Cholecalciferol 125 MCG (5000 UT) capsule Unknown  Yes No   Sig: Take 1,000 Units by mouth daily   Multiple Vitamins-Minerals (multivitamin with minerals) tablet Unknown  Yes No   Sig: Take 1 tablet by mouth daily   NON FORMULARY Unknown  Yes No   Sig: Lung support 200mg once daily at nighttime   NON FORMULARY Unknown  Yes No   Sig: Fosal Point 400mg daily at bedtime   Omega 3-6-9 Fatty Acids (OMEGA 3-6-9 PO) Unknown Yes No   Sig: Take by mouth   acetaminophen (TYLENOL) 325 mg tablet Unknown  No No   Sig: Take 3 tablets (975 mg total) by mouth every 8 (eight) hours   amLODIPine (NORVASC) 2 5 mg tablet Unknown  No No   Sig: Take 1 tablet (2 5 mg total) by mouth daily   amiodarone 100 mg tablet Unknown  No No   Sig: Take 1 tablet (100 mg total) by mouth daily   atorvastatin (LIPITOR) 80 mg tablet Unknown  Yes No   Sig: Take 80 mg by mouth daily   carvedilol (COREG) 25 mg tablet Unknown  No No   Sig: Take 1 tablet (25 mg total) by mouth 2 (two) times a day with meals   dicyclomine (BENTYL) 10 mg capsule Unknown  Yes No   Sig: Take 10 mg by mouth 2 (two) times a day as needed (cramping)   docusate sodium (COLACE) 100 mg capsule Unknown  Yes No   Sig: Take 100 mg by mouth 2 (two) times a day   glucose blood (OneTouch Ultra) test strip Unknown  No No   Sig: Test daily                        DX: E11 9   lidocaine (LIDODERM) 5 % Unknown  No No   Sig: Apply 1 patch topically over 12 hours daily Remove & Discard patch within 12 hours or as directed by MD   lisinopril (ZESTRIL) 10 mg tablet Unknown  Yes No   Sig: Take 10 mg by mouth daily   potassium chloride (MICRO-K) 10 MEQ CR capsule Unknown  No No   Sig: Take 1 capsule (10 mEq total) by mouth daily   saccharomyces boulardii (FLORASTOR) 250 mg capsule Unknown  Yes No   Sig: Take 250 mg by mouth 2 (two) times a day   senna (SENOKOT) 8 6 mg Unknown  No No   Sig: Take 2 tablets (17 2 mg total) by mouth daily as needed for constipation   torsemide (DEMADEX) 20 mg tablet Unknown  No No   Sig: Take 1 tablet (20 mg total) by mouth daily      Facility-Administered Medications: None       Past Medical History:   Diagnosis Date   • Anemia    • Anxiety    • Arthritis    • CKD (chronic kidney disease), stage III ([place] [address] ) 11/29/2018   • Depression, recurrent ([place] [address] ) 6/14/2021   • Diabetes mellitus ([place] [address] )    • Diabetic peripheral neuropathy (HCC)    • HLD (hyperlipidemia)    • Hyperlipidemia    • Hypertension    • 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 8/23/2017   • Thyroid enlarged 2/23/2015   • Tinnitus    • Vertigo        Past Surgical History:   Procedure Laterality Date   • APPENDECTOMY     • EAR SURGERY Left    • KNEE SURGERY     • TONSILLECTOMY         Family History   Problem Relation Age of Onset   • Heart disease Mother    • Diabetes Mother    • Heart disease Father    • Stroke Father    • Cancer Family    • Dementia Sister    • Liver disease Other      I have reviewed and agree with the history as documented  E-Cigarette/Vaping   • E-Cigarette Use Never User      E-Cigarette/Vaping Substances   • Nicotine No    • THC No    • CBD No    • Flavoring No    • Other No    • Unknown No      Social History     Tobacco Use   • Smoking status: Never   • Smokeless tobacco: Never   Vaping Use   • Vaping Use: Never used   Substance Use Topics   • Alcohol use: Not Currently   • Drug use: Never        Review of Systems   Constitutional: Negative for chills and fever  Respiratory: Negative for shortness of breath  Cardiovascular: Negative for chest pain  Gastrointestinal: Positive for nausea  Negative for diarrhea and vomiting  All other systems reviewed and are negative        Physical Exam  ED Triage Vitals   Temperature Pulse Respirations Blood Pressure SpO2   05/02/23 1423 05/02/23 1423 05/02/23 1423 05/02/23 1423 05/02/23 1423   98 7 °F (37 1 °C) 74 18 154/81 99 %      Temp Source Heart Rate Source Patient Position - Orthostatic VS BP Location FiO2 (%)   05/02/23 1423 05/02/23 1707 05/02/23 1707 05/02/23 1707 --   Oral Monitor Sitting Left arm       Pain Score       05/02/23 1423       No Pain             Orthostatic Vital Signs  Vitals:    05/03/23 1105 05/03/23 1437 05/03/23 1729 05/03/23 1815   BP: 150/58 94/60 146/59 154/63   Pulse: 56 (!) 49 (!) 50 56   Patient Position - Orthostatic VS: Physical Exam  Vitals and nursing note reviewed  Constitutional:       General: She is not in acute distress  Appearance: She is well-developed  She is not diaphoretic  HENT:      Head: Normocephalic and atraumatic  Right Ear: External ear normal       Left Ear: External ear normal       Nose: Nose normal    Eyes:      General: Lids are normal  No scleral icterus  Cardiovascular:      Rate and Rhythm: Normal rate and regular rhythm  Heart sounds: Normal heart sounds  No murmur heard  No friction rub  No gallop  Pulmonary:      Effort: Pulmonary effort is normal  No respiratory distress  Breath sounds: Normal breath sounds  No wheezing or rales  Abdominal:      Palpations: Abdomen is soft  Tenderness: There is no abdominal tenderness  There is no guarding or rebound  Musculoskeletal:         General: No deformity  Normal range of motion  Cervical back: Normal range of motion and neck supple  Skin:     General: Skin is warm and dry  Neurological:      General: No focal deficit present  Mental Status: She is alert     Psychiatric:         Mood and Affect: Mood normal          Behavior: Behavior normal          ED Medications  Medications   amiodarone tablet 100 mg (100 mg Oral Given 5/3/23 0943)   amLODIPine (NORVASC) tablet 2 5 mg (2 5 mg Oral Given 5/3/23 0943)   atorvastatin (LIPITOR) tablet 80 mg (80 mg Oral Given 5/3/23 0943)   carvedilol (COREG) tablet 25 mg (25 mg Oral Not Given 5/3/23 1730)   hydrALAZINE (APRESOLINE) injection 10 mg (0 mg Intravenous Hold 5/2/23 1755)   heparin (porcine) subcutaneous injection 5,000 Units (5,000 Units Subcutaneous Given 5/3/23 2138)   insulin lispro (HumaLOG) 100 units/mL subcutaneous injection 1-6 Units (1 Units Subcutaneous Not Given 5/3/23 1630)   lisinopril (ZESTRIL) tablet 10 mg (10 mg Oral Given 5/3/23 1106)   metoclopramide (REGLAN) injection 10 mg (10 mg Intravenous Given 5/3/23 1732)   polyethylene glycol (MIRALAX) packet 17 g (17 g Oral Given 5/3/23 1200)   lidocaine (LIDODERM) 5 % patch 1 patch (1 patch Topical Medication Applied 5/3/23 2055)   potassium chloride (K-DUR,KLOR-CON) CR tablet 40 mEq (40 mEq Oral Given 5/2/23 1632)   sodium chloride 0 9 % bolus 250 mL ( Intravenous Restarted 5/2/23 2229)   potassium chloride (K-DUR,KLOR-CON) CR tablet 40 mEq (40 mEq Oral Given 5/2/23 1908)       Diagnostic Studies  Results Reviewed     Procedure Component Value Units Date/Time    Comprehensive metabolic panel [[id number]]  (Abnormal) Collected: 05/03/23 0503    Lab Status: Final result Specimen: Blood from Hand, Right Updated: 05/03/23 0659     Sodium 130 mmol/L      Potassium 4 1 mmol/L      Chloride 99 mmol/L      CO2 26 mmol/L      ANION GAP 5 mmol/L      BUN 19 mg/dL      Creatinine 1 19 mg/dL      Glucose 97 mg/dL      Calcium 8 7 mg/dL      Corrected Calcium 9 7 mg/dL      AST 49 U/L      ALT 50 U/L      Alkaline Phosphatase 142 U/L      Total Protein 5 4 g/dL      Albumin 2 8 g/dL      Total Bilirubin 0 41 mg/dL      eGFR 40 ml/min/1 73sq m     Narrative:      [place] guidelines for Chronic Kidney Disease (CKD):   •  Stage 1 with normal or high GFR (GFR > 90 mL/min/1 73 square meters)  •  Stage 2 Mild CKD (GFR = 60-89 mL/min/1 73 square meters)  •  Stage 3A Moderate CKD (GFR = 45-59 mL/min/1 73 square meters)  •  Stage 3B Moderate CKD (GFR = 30-44 mL/min/1 73 square meters)  •  Stage 4 Severe CKD (GFR = 15-29 mL/min/1 73 square meters)  •  Stage 5 End Stage CKD (GFR <15 mL/min/1 73 square meters)  Note: GFR calculation is accurate only with a steady state creatinine    CBC and differential [[id number]]  (Abnormal) Collected: 05/03/23 0503    Lab Status: Final result Specimen: Blood from Hand, Right Updated: 05/03/23 0624     WBC 4 99 Thousand/uL      RBC 3 29 Million/uL      Hemoglobin 10 1 g/dL      Hematocrit 29 5 %      MCV 90 fL      MCH 30 7 pg      MCHC 34 2 g/dL      RDW 13 8 % MPV 11 1 fL      Platelets 171 Thousands/uL      nRBC 0 /100 WBCs      Neutrophils Relative 46 %      Immat GRANS % 0 %      Lymphocytes Relative 33 %      Monocytes Relative 15 %      Eosinophils Relative 5 %      Basophils Relative 1 %      Neutrophils Absolute 2 30 Thousands/µL      Immature Grans Absolute 0 01 Thousand/uL      Lymphocytes Absolute 1 62 Thousands/µL      Monocytes Absolute 0 73 Thousand/µL      Eosinophils Absolute 0 27 Thousand/µL      Basophils Absolute 0 06 Thousands/µL     Basic metabolic panel [[id number]]  (Abnormal) Collected: 05/02/23 2108    Lab Status: Final result Specimen: Blood from Arm, Right Updated: 05/02/23 2148     Sodium 127 mmol/L      Potassium 3 7 mmol/L      Chloride 95 mmol/L      CO2 27 mmol/L      ANION GAP 5 mmol/L      BUN 15 mg/dL      Creatinine 1 30 mg/dL      Glucose 161 mg/dL      Calcium 8 4 mg/dL      eGFR 36 ml/min/1 73sq m     Narrative:      [place] guidelines for Chronic Kidney Disease (CKD):   •  Stage 1 with normal or high GFR (GFR > 90 mL/min/1 73 square meters)  •  Stage 2 Mild CKD (GFR = 60-89 mL/min/1 73 square meters)  •  Stage 3A Moderate CKD (GFR = 45-59 mL/min/1 73 square meters)  •  Stage 3B Moderate CKD (GFR = 30-44 mL/min/1 73 square meters)  •  Stage 4 Severe CKD (GFR = 15-29 mL/min/1 73 square meters)  •  Stage 5 End Stage CKD (GFR <15 mL/min/1 73 square meters)  Note: GFR calculation is accurate only with a steady state creatinine    Osmolality, urine [[id number]]  (Abnormal) Collected: 05/02/23 1553    Lab Status: Final result Specimen: Urine, Other Updated: 05/02/23 1724     Osmolality, Ur 130 mmol/KG     Sodium, urine, random [[id number]] Collected: 05/02/23 1553    Lab Status: Final result Specimen: Urine, Other Updated: 05/02/23 1715     Sodium, Ur 41    Chloride, urine, random [[id number]] Collected: 05/02/23 1553    Lab Status: Final result Specimen: Urine, Other Updated: 05/02/23 1715     Chloride, Ur 41 "mmol/L     Osmolality-\"If this is regarding a toxic alcohol, please STOP and consult medical  for further guidance  \" [[id number]]  (Abnormal) Collected: 05/02/23 1511    Lab Status: Final result Specimen: Blood from Arm, Right Updated: 05/02/23 1613     Osmolality Serum 264 mmol/KG     TSH, 3rd generation with Free T4 reflex [[id number]]  (Normal) Collected: 05/02/23 1511    Lab Status: Final result Specimen: Blood from Arm, Right Updated: 05/02/23 1555     TSH 3RD GENERATON 0 841 uIU/mL     Narrative:      Patients undergoing fluorescein dye angiography may retain small amounts of fluorescein in the body for 48-72 hours post procedure  Samples containing fluorescein can produce falsely depressed TSH values  If the patient had this procedure,a specimen should be resubmitted post fluorescein clearance  Cortisol Level, AM Specimen [[id number]]  (Normal) Collected: 05/02/23 1511    Lab Status: Final result Specimen: Arm, Right Updated: 05/02/23 1553     Cortisol - AM 13 0 ug/dL     Narrative:      Reference ranges established for specimens drawn between 7 and 9 am  Results may be inaccurate if timing is not correct      Comprehensive metabolic panel [[id number]]  (Abnormal) Collected: 05/02/23 1511    Lab Status: Final result Specimen: Blood from Arm, Right Updated: 05/02/23 1548     Sodium 123 mmol/L      Potassium 3 0 mmol/L      Chloride 90 mmol/L      CO2 27 mmol/L      ANION GAP 6 mmol/L      BUN 14 mg/dL      Creatinine 1 23 mg/dL      Glucose 95 mg/dL      Calcium 8 6 mg/dL      Corrected Calcium 9 1 mg/dL      AST 60 U/L      ALT 56 U/L      Alkaline Phosphatase 194 U/L      Total Protein 6 6 g/dL      Albumin 3 4 g/dL      Total Bilirubin 0 76 mg/dL      eGFR 38 ml/min/1 73sq m     Narrative:      [place] guidelines for Chronic Kidney Disease (CKD):   •  Stage 1 with normal or high GFR (GFR > 90 mL/min/1 73 square meters)  •  Stage 2 Mild CKD (GFR = 60-89 mL/min/1 73 " square meters)  •  Stage 3A Moderate CKD (GFR = 45-59 mL/min/1 73 square meters)  •  Stage 3B Moderate CKD (GFR = 30-44 mL/min/1 73 square meters)  •  Stage 4 Severe CKD (GFR = 15-29 mL/min/1 73 square meters)  •  Stage 5 End Stage CKD (GFR <15 mL/min/1 73 square meters)  Note: GFR calculation is accurate only with a steady state creatinine    Uric acid [[id number]]  (Normal) Collected: 05/02/23 1511    Lab Status: Final result Specimen: Blood from Arm, Right Updated: 05/02/23 1548     Uric Acid 3 3 mg/dL     CBC and differential [[id number]]  (Abnormal) Collected: 05/02/23 1511    Lab Status: Final result Specimen: Blood from Arm, Right Updated: 05/02/23 1526     WBC 4 89 Thousand/uL      RBC 3 62 Million/uL      Hemoglobin 11 2 g/dL      Hematocrit 31 5 %      MCV 87 fL      MCH 30 9 pg      MCHC 35 6 g/dL      RDW 13 3 %      MPV 10 5 fL      Platelets 761 Thousands/uL      nRBC 0 /100 WBCs      Neutrophils Relative 54 %      Immat GRANS % 1 %      Lymphocytes Relative 25 %      Monocytes Relative 14 %      Eosinophils Relative 5 %      Basophils Relative 1 %      Neutrophils Absolute 2 68 Thousands/µL      Immature Grans Absolute 0 03 Thousand/uL      Lymphocytes Absolute 1 20 Thousands/µL      Monocytes Absolute 0 69 Thousand/µL      Eosinophils Absolute 0 23 Thousand/µL      Basophils Absolute 0 06 Thousands/µL                  CT chest abdomen pelvis wo contrast   Final Result by Char Rinne, MD (05/03 4002)      Gastric distention with food debris  Gastric ileus or outlet obstruction is not excluded given the asymmetric distention which is also visible on the prior CT of December  This suggests a persistent finding rather than transient distention  Large and    small bowel are unremarkable  Further GI work-up should be considered  Mild diffuse bladder wall thickening should be correlated with urinalysis for any evidence of urinary tract infection        The lungs demonstrate a few scattered nodules "which are stable  Left renal lesion may represent cyst and slightly smaller than prior study where it was more complicated  A confirmatory nonemergent ultrasound is suggested  The study was [facility] in [facility] for immediate notification  Workstation performed: [id number]               Procedures  Procedures      ED Course  ED Course as of 05/03/23 2313   Tue May 02, 2023   1526 WBC: 4 89   1526 Hemoglobin(!): 11 2   1552 Sodium(!): 123   1556 TT sent to [facility] for admission   1602 Discussed with [facility]  Excepts admission  Medical Decision Making  Patient is a 80 y o  female who presents to the ED for persistent nausea and hyponatremia  Patient is nontoxic, well-appearing  Vitals are stable  Physical exam is unremarkable  Unclear etiology of hyponatremia  Differential includes malnutrition/decreased p o  intake from her nausea, SIADH  Plan: Labs, admission                 Portions of the record may have been created with voice recognition software  Occasional wrong word or \"sound a like\" substitutions may have occurred due to the inherent limitations of voice recognition software  Read the chart carefully and recognize, using context, where substitutions have occurred  Hypokalemia: acute illness or injury  Hyponatremia: acute illness or injury  Amount and/or Complexity of Data Reviewed  Labs: ordered  Decision-making details documented in ED Course  Risk  Prescription drug management  Decision regarding hospitalization              Disposition  Final diagnoses:   Hyponatremia   Hypokalemia     Time reflects when diagnosis was documented in both MDM as applicable and the Disposition within this note     Time User Action Codes Description Comment    5/2/2023  3:53 PM Deisi Pearce Add [E87 1] Hyponatremia     5/2/2023  4:02 PM Deisi Pearce Add [E87 6] Hypokalemia     5/3/2023 10:13 AM Madison Cardoza Balloon Add [R93 5] Abnormal CT of the " abdomen     5/3/2023 10:13 AM Zac Wilcox Add [K31 89] Gastric distention       ED Disposition     ED Disposition   Admit    Condition   Stable    Date/Time   Tue May 2, 2023  3:53 PM    Comment   Case was discussed with  and the patient's admission status was agreed to be Admission Status: inpatient status to the service of Dr Irene Walker    None         Current Discharge Medication List      CONTINUE these medications which have NOT CHANGED    Details   acetaminophen (TYLENOL) 325 mg tablet Take 3 tablets (975 mg total) by mouth every 8 (eight) hours  Refills: 0    Associated Diagnoses: SDH (subdural hematoma) ([facility])      amiodarone 100 mg tablet Take 1 tablet (100 mg total) by mouth daily  Qty: 90 tablet, Refills: 1    Associated Diagnoses: Atrial fibrillation, unspecified type ([facility])      amLODIPine (NORVASC) 2 5 mg tablet Take 1 tablet (2 5 mg total) by mouth daily  Qty: 30 tablet, Refills: 5    Associated Diagnoses: Benign hypertension with CKD (chronic kidney disease) stage III ([facility])      ASPIRIN 81 PO Take by mouth      atorvastatin (LIPITOR) 80 mg tablet Take 80 mg by mouth daily      Bacillus Coagulans-Inulin (PROBIOTIC FORMULA PO) Take by mouth 97mg two times a day for stomach health supplement      Blood Glucose Monitoring Suppl (ONE TOUCH ULTRA 2) w/Device KIT Use daily  Qty: 1 kit, Refills: 0    Associated Diagnoses: Type 2 diabetes mellitus without complication, without long-term current use of insulin ([facility])      carvedilol (COREG) 25 mg tablet Take 1 tablet (25 mg total) by mouth 2 (two) times a day with meals  Qty: 180 tablet, Refills: 1    Associated Diagnoses: New onset a-fib ([address] );  Chronic congestive heart failure, unspecified heart failure type ([facility])      Cholecalciferol 125 MCG (5000 UT) capsule Take 1,000 Units by mouth daily      dicyclomine (BENTYL) 10 mg capsule Take 10 mg by mouth 2 (two) times a day as needed (cramping)      docusate sodium (COLACE) 100 mg capsule Take 100 mg by mouth 2 (two) times a day      glucose blood (OneTouch Ultra) test strip Test daily                        DX: E11 9  Qty: 100 strip, Refills: 1    Associated Diagnoses: Type 2 diabetes mellitus without complication, without long-term current use of insulin (HCC)      lidocaine (LIDODERM) 5 % Apply 1 patch topically over 12 hours daily Remove & Discard patch within 12 hours or as directed by MD  Qty: 10 patch, Refills: 0    Associated Diagnoses: Degenerative disc disease, lumbar      lisinopril (ZESTRIL) 10 mg tablet Take 10 mg by mouth daily      Multiple Vitamins-Minerals (multivitamin with minerals) tablet Take 1 tablet by mouth daily      !! NON FORMULARY Lung support 200mg once daily at nighttime      !! NON FORMULARY Fosal Point 400mg daily at bedtime      Omega 3-6-9 Fatty Acids (OMEGA 3-6-9 PO) Take by mouth      potassium chloride (MICRO-K) 10 MEQ CR capsule Take 1 capsule (10 mEq total) by mouth daily  Qty: 90 capsule, Refills: 0    Associated Diagnoses: Hypokalemia      saccharomyces boulardii (FLORASTOR) 250 mg capsule Take 250 mg by mouth 2 (two) times a day      senna (SENOKOT) 8 6 mg Take 2 tablets (17 2 mg total) by mouth daily as needed for constipation  Refills: 0    Associated Diagnoses: SDH (subdural hematoma) (HCC)      torsemide (DEMADEX) 20 mg tablet Take 1 tablet (20 mg total) by mouth daily  Qty: 30 tablet, Refills: 5    Associated Diagnoses: Bilateral leg edema       !! - Potential duplicate medications found  Please discuss with provider  No discharge procedures on file  PDMP Review       Value Time User    PDMP Reviewed  Yes 12/29/2022  1:13 PM Rosana Thomas PA-C           ED Provider  Attending physically available and evaluated Lorenzo Bryson  I managed the patient along with the ED Attending      Electronically Signed by         Lilly Rueda DO  05/03/23 2564

## 2023-05-04 NOTE — ASSESSMENT & PLAN NOTE
Wt Readings from Last 3 Encounters:   05/04/23 78 5 kg (173 lb 1 oz)   04/25/23 78 kg (172 lb)   01/24/23 77 1 kg (170 lb)     Patient with history of chronic diastolic congestive heart failure  · Appears euvolemic  · Holding Torsemide for now     · Continue BB  · I/O, daily weights, FR

## 2023-05-04 NOTE — ASSESSMENT & PLAN NOTE
Improved with IVF bolus, 123-->130 but now 126  today     · Continue to hold Torsemide  · Consult nephrology given drop in sodium today   · FR was started prior to being NPO   · BMP in AM

## 2023-05-04 NOTE — PROGRESS NOTES
[address] [facility]  Progress Note  Name: Jayden Bernabe  MRN: [id number]  [facility] I Date of Admission: 5/2/2023   Date of Service: 5/4/2023 I Hospital Day: 2    Assessment/Plan   * Nausea  Assessment & Plan  Patient presents with nausea, early satiety and poor appetite for the past several weeks  Patient also reports significant weight loss but unable to quantify exactly how much over how long  Was found to have severe hyponatremia at 124 on outpatient labs and her nephrologist sent her to the ED for further evaluation  · CT abdomen and pelvis shows gastric distention with food debris  Gastric ileus or outlet obstruction is not excluded given asymmetric distention which is also visible on prior CT in December  · Has been tolerating oral diet without any vomiting  · GI consult appreciated- pending EGD    · PT/OT- no rehab needs, lives at home alone but has caregivers 6 hours per day  · Rest of plan as below    Hyponatremia  Assessment & Plan  Improved with IVF bolus, 123-->130 but now 126  today  · Continue to hold Torsemide  · Consult nephrology given drop in sodium today   · FR was started prior to being NPO   · BMP in AM    Renal cyst, left  Assessment & Plan  Noted on CT A/P-Left renal lesion may represent cyst and slightly smaller than prior study where it was more complicated  · Can f/u with PCP, Outpatient US recommended    Bladder wall thickening  Assessment & Plan  Mild diffuse bladder wall thickening on CT A/P    · No urinary complaints  · UA negative   · follow up outpatient  Chronic kidney disease, stage 3b [facility])  Assessment & Plan  Lab Results   Component Value Date    EGFR 44 05/04/2023    EGFR 40 05/03/2023    EGFR 36 05/02/2023    CREATININE 1 10 05/04/2023    CREATININE 1 19 05/03/2023    CREATININE 1 30 05/02/2023   · Baseline appears to be around 1 1-1 3, currently stable     · Avoid nephrotoxins and hypotension   · Monitor Primary hypertension  Assessment & Plan  Intermittent urgency noted  Continue Norvasc and coreg  · lisinopril resumed  · PRN Hydralazine  · Monitor     Chronic congestive heart failure [facility])  Assessment & Plan  Wt Readings from Last 3 Encounters:   05/04/23 78 5 kg (173 lb 1 oz)   04/25/23 78 kg (172 lb)   01/24/23 77 1 kg (170 lb)     Patient with history of chronic diastolic congestive heart failure  · Appears euvolemic  · Holding Torsemide for now  · Continue BB  · I/O, daily weights, FR      Type 2 diabetes mellitus with diabetic chronic kidney disease [facility])  Assessment & Plan  Lab Results   Component Value Date    HGBA1C 5 9 (H) 05/01/2023       Recent Labs     05/03/23  0738 05/03/23  1132 05/03/23  1614 05/03/23  2111   POCGLU 107 139 124 124       · Not on RX baseline  · Continue sliding scale  · Monitor accuchecks     Atrial fibrillation (HCC)  Assessment & Plan  Patient has a history of atrial fibrillation she is not on anticoagulation after sustaining a fall with subdural hematoma  · Continue amiodarone and carvedilol              VTE Pharmacologic Prophylaxis: VTE Score: 3 Moderate Risk (Score 3-4) - Pharmacological DVT Prophylaxis Ordered: heparin  Patient Centered Rounds: I performed bedside rounds with nursing staff today  Discussions with Specialists or Other Care Team Provider: d/w RN d/w CM d/w neprhology     Education and Discussions with Family / Patient: Updated  (nephew) via phone  Total Time Spent on Date of Encounter in care of patient: 35 minutes This time was spent on one or more of the following: performing physical exam; counseling and coordination of care; obtaining or reviewing history; documenting in the medical record; reviewing/ordering tests, medications or procedures; communicating with other healthcare professionals and discussing with patient's family/caregivers      Current Length of Stay: 2 day(s)  Current Patient Status: Inpatient   Certification Statement: The patient will continue to require additional inpatient hospital stay due to pending EGD and hyponatremia  Discharge Plan: Anticipate discharge in 24-48 hrs to home with home services  Code Status: Level 1 - Full Code    Subjective:   Pt sitting up in the chair  Denies any nausea at this time or abdominal pain  Reports she had a BM yesterday but it was small  Pt is waiting for her upcoming EGD at 1315 today  Denies any other complaints  No overnight events noted by nursing     Objective:     Vitals:   Temp (24hrs), Av 5 °F (36 4 °C), Min:97 3 °F (36 3 °C), Max:97 8 °F (36 6 °C)    Temp:  [97 3 °F (36 3 °C)-97 8 °F (36 6 °C)] 97 4 °F (36 3 °C)  HR:  [49-57] 56  Resp:  [16-19] 17  BP: ()/() 150/67  SpO2:  [96 %-99 %] 98 %  Body mass index is 32 97 kg/m²  Input and Output Summary (last 24 hours): Intake/Output Summary (Last 24 hours) at 2023 0921  Last data filed at 5/3/2023 1950  Gross per 24 hour   Intake 660 ml   Output 975 ml   Net -315 ml       Physical Exam:   Physical Exam  Vitals and nursing note reviewed  Constitutional:       General: She is not in acute distress  Appearance: She is obese  She is not ill-appearing  Cardiovascular:      Rate and Rhythm: Normal rate and regular rhythm  Heart sounds: Murmur heard  Pulmonary:      Effort: Pulmonary effort is normal  No respiratory distress  Breath sounds: Normal breath sounds  No wheezing or rales  Abdominal:      General: Bowel sounds are normal  There is no distension  Palpations: Abdomen is soft  Tenderness: There is no abdominal tenderness  Musculoskeletal:         General: Swelling (trace b/l LE swelling noted ) present  Skin:     General: Skin is warm and dry  Neurological:      Mental Status: She is alert  Mental status is at baseline     Psychiatric:         Mood and Affect: Mood normal           Additional Data:     Labs:  Results from last 7 days   Lab Units 23  6675 WBC Thousand/uL 5 13   HEMOGLOBIN g/dL 10 0*   HEMATOCRIT % 30 0*   PLATELETS Thousands/uL 160   NEUTROS PCT % 48   LYMPHS PCT % 33   MONOS PCT % 12   EOS PCT % 6     Results from last 7 days   Lab Units 05/04/23  0638 05/03/23  0503   SODIUM mmol/L 126* 130*   POTASSIUM mmol/L 4 2 4 1   CHLORIDE mmol/L 95* 99   CO2 mmol/L 28 26   BUN mg/dL 15 19   CREATININE mg/dL 1 10 1 19   ANION GAP mmol/L 3* 5   CALCIUM mg/dL 8 2* 8 7   ALBUMIN g/dL  --  2 8*   TOTAL BILIRUBIN mg/dL  --  0 41   ALK PHOS U/L  --  142*   ALT U/L  --  50   AST U/L  --  49*   GLUCOSE RANDOM mg/dL 114 97         Results from last 7 days   Lab Units 05/03/23  2111 05/03/23  1614 05/03/23  1132 05/03/23  0738   POC GLUCOSE mg/dl 124 124 139 107     Results from last 7 days   Lab Units 05/01/23  0950   HEMOGLOBIN A1C % 5 9*           Lines/Drains:  Invasive Devices     Peripheral Intravenous Line  Duration           Peripheral IV 05/02/23 Dorsal (posterior); Right Hand 1 day    Peripheral IV 05/02/23 Right Antecubital 1 day                      Imaging: Reviewed radiology reports from this admission including: chest CT scan and abdominal/pelvic CT    Recent Cultures (last 7 days):         Last 24 Hours Medication List:   Current Facility-Administered Medications   Medication Dose Route Frequency Provider Last Rate   • amiodarone  100 mg Oral Daily Randolph Banai, DO     • amLODIPine  2 5 mg Oral Daily Randolph Banai, DO     • atorvastatin  80 mg Oral Daily Randolph Banai, DO     • carvedilol  25 mg Oral BID With Meals Randolph Banai, DO     • heparin (porcine)  5,000 Units Subcutaneous Q8H [address] Randolph Banai, DO     • hydrALAZINE  10 mg Intravenous Q6H PRN Randolph Banai, DO     • insulin lispro  1-6 Units Subcutaneous TID MUNIR Zayas PA-C     • lidocaine  1 patch Topical Daily CALVIN Clayton     • lisinopril  10 mg Oral Daily Asha CALVIN Macias     • melatonin  3 mg Oral HS CALVIN Clayton     • metoclopramide  10 mg Intravenous Q6H [address] Laurent Lujan DO     • polyethylene glycol  17 g Oral Daily Chago Plan, DO          Today, Patient Was Seen By: CALVIN Rdz    **Please Note: This note may have been constructed using a voice recognition system  **

## 2023-05-04 NOTE — ASSESSMENT & PLAN NOTE
Patient presents with nausea, early satiety and poor appetite for the past several weeks  Patient also reports significant weight loss but unable to quantify exactly how much over how long  Was found to have severe hyponatremia at 124 on outpatient labs and her nephrologist sent her to the ED for further evaluation  · CT abdomen and pelvis shows gastric distention with food debris  Gastric ileus or outlet obstruction is not excluded given asymmetric distention which is also visible on prior CT in December  · Has been tolerating oral diet without any vomiting  · GI consult appreciated- pending EGD    · PT/OT- no rehab needs, lives at home alone but has caregivers 6 hours per day     · Rest of plan as below

## 2023-05-04 NOTE — ASSESSMENT & PLAN NOTE
Lab Results   Component Value Date    EGFR 44 05/04/2023    EGFR 40 05/03/2023    EGFR 36 05/02/2023    CREATININE 1 10 05/04/2023    CREATININE 1 19 05/03/2023    CREATININE 1 30 05/02/2023   · Baseline appears to be around 1 1-1 3, currently stable     · Avoid nephrotoxins and hypotension   · Monitor

## 2023-05-04 NOTE — CONSULTS
Consultation - Nephrology   Luiz Brush 80 y o  female MRN: [id number]  Unit/Bed#: [facility] 603-01 Encounter: [id number]    ASSESSMENT/PLAN:   1  Hyponatremia: Suspect poor solute intake + nausea  · Sodium 123 on admission and improved to 130 yesterday with 250 cc saline bolus  · Sodium today down to 126  · Work up: serum osm 264, urine sodium 41, urine osm 130, am cortisol 13, TSH normal, uric acid 3 3  · Currently NPO for EGD  · Continue to hold torsemide (was on for edema, no hx CHF per outpatient cardiology notes)   · Start ensure with meals   · Will give another 250cc saline bolus   · Check BMP 2pm   2  CKD IIIB: baseline creatinine 1 3-1 5 and follows with Dr Santos Okeefe  CKD due to hypertension and diabetes  Creatinine slightly below baseline at 1 1 today   3  Hypertension: Blood pressure very labile  · Continue amlodipine 2 5mg daily, coreg 25mg bid, lisinopril 10mg daily   4  Lower extremity edema: On torsemide at home  Currently no edema on exam and torsemide is on hold    HISTORY OF PRESENT ILLNESS:  Requesting Physician: Dio Leo DO  Reason for Consult: Hyponatremia     Luiz Brush is a 80y o  year old female who was admitted to [place] [address] with hyponatremia  Patient has history of hyponatremia and CKD and follows in our office with Dr Santos Okeefe  It was noted on her outpatient labs that her sodium had dropped significantly to 124 and she was complaining of nausea and poor appetite so she was sent to the ER  She was initially given IV fluids and sodium improved to 130 but today worsened to 126 and nephrology was consulted  Patient states that she is feeling a little better than when she first came in with improving nausea  She thinks that she eats okay at home but only eats breakfast and dinner  Denies any recent vomiting or diarrhea  Denies chest pain or shortness of breath  She is on torsemide at home but denies any issues with CHF        PAST MEDICAL HISTORY:  Past Medical History:   Diagnosis Date   • Anemia    • Anxiety    • Arthritis    • CKD (chronic kidney disease), stage III ([facility] ) 11/29/2018   • Depression, recurrent ([place] [address] ) 6/14/2021   • Diabetes mellitus ([place] [address] )    • Diabetic peripheral neuropathy (HCC)    • HLD (hyperlipidemia)    • Hyperlipidemia    • Hypertension    • 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 8/23/2017   • Thyroid enlarged 2/23/2015   • Tinnitus    • Vertigo        PAST SURGICAL HISTORY:  Past Surgical History:   Procedure Laterality Date   • APPENDECTOMY     • EAR SURGERY Left    • KNEE SURGERY     • TONSILLECTOMY         ALLERGIES:  Allergies   Allergen Reactions   • Hydrochlorothiazide      Hyponatremia, severe   • Hydrochlorothiazide Other (See Comments)       • Statins Other (See Comments)          • Statins        SOCIAL HISTORY:  Social History     Substance and Sexual Activity   Alcohol Use Not Currently     Social History     Substance and Sexual Activity   Drug Use Never     Social History     Tobacco Use   Smoking Status Never   Smokeless Tobacco Never       FAMILY HISTORY:  Family History   Problem Relation Age of Onset   • Heart disease Mother    • Diabetes Mother    • Heart disease Father    • Stroke Father    • Cancer Family    • Dementia Sister    • Liver disease Other        MEDICATIONS:  Scheduled Meds:  Current Facility-Administered Medications   Medication Dose Route Frequency Provider Last Rate   • amiodarone  100 mg Oral Daily Randolph Jenaai, DO     • amLODIPine  2 5 mg Oral Daily Randolph Jenaai, DO     • atorvastatin  80 mg Oral Daily Randolph Jenaai, DO     • carvedilol  25 mg Oral BID With Meals Randolph Bellaai, DO     • heparin (porcine)  5,000 Units Subcutaneous Q8H [facility] Randolphlia Bellaai, DO     • hydrALAZINE  10 mg Intravenous Q6H PRN Randolph Scott, DO     • insulin lispro  1-6 Units Subcutaneous TID MUNIR Zee PA-C     • lidocaine  1 patch Topical Daily CALVIN Up     • lisinopril  10 mg Oral Daily CALVIN Maurice     • melatonin  3 mg Oral HS CALVIN Up     • metoclopramide  10 mg Intravenous Q6H [facility] & half-way Laurent G Jazcolten,      • polyethylene glycol  17 g Oral Daily Laurent G Jazcolten, DO         PRN Meds: •  hydrALAZINE      REVIEW OF SYSTEMS:  A complete review of systems was done  Pertinent positives and negatives noted in the HPI but otherwise the review of systems is negative      PHYSICAL EXAM:  Current Weight: Weight - Scale: 78 5 kg (173 lb 1 oz)  First Weight: Weight - Scale: 77 6 kg (171 lb 1 2 oz)  Vitals:    05/04/23 0816   BP: 150/67   Pulse: 56   Resp: 17   Temp: (!) 97 4 °F (36 3 °C)   SpO2: 98%       Intake/Output Summary (Last 24 hours) at 5/4/2023 0942  Last data filed at 5/3/2023 1950  Gross per 24 hour   Intake 420 ml   Output 975 ml   Net -555 ml     General:  appears comfortable and in no acute distress   Skin:  No rash  Eyes:  Sclerae anicteric, no periorbital edema   ENT:  Moist mucous membranes  Neck:  Trachea midline, symmetric   Chest:  Clear to auscultation bilaterally with no wheezes, rales or rhonchi  CVS:  Regular rate and rhythm  Abdomen:  Soft, nontender, nondistended  Neuro:  Awake and alert  Psych:  Appropriate affect  Extremities: no lower extremity edema    Lab Results:   Results from last 7 days   Lab Units 05/04/23  0638 05/03/23  0503 05/02/23  2108 05/02/23  1511 05/01/23  0950   WBC Thousand/uL 5 13 4 99  --  4 89 5 62   HEMOGLOBIN g/dL 10 0* 10 1*  --  11 2* 11 3*   HEMATOCRIT % 30 0* 29 5*  --  31 5* 32 9*   PLATELETS Thousands/uL 160 151  --  178 184   SODIUM mmol/L 126* 130* 127* 123* 124*   POTASSIUM mmol/L 4 2 4 1 3 7 3 0* 4 0   CHLORIDE mmol/L 95* 99 95* 90* 89*   CO2 mmol/L 28 26 27 27 28   BUN mg/dL 15 19 15 14 15   CREATININE mg/dL 1 10 1 19 1 30 1 23 1 30   CALCIUM mg/dL 8 2* 8 7 8 4 8 6 9 0   MAGNESIUM mg/dL  --   --   --   --  2 0   PHOSPHORUS mg/dL  --   --   --   --  3 1 Radiology Results:   CT chest abdomen pelvis wo contrast   Final Result by Maggy Nicholson MD (05/03 6857)      Gastric distention with food debris  Gastric ileus or outlet obstruction is not excluded given the asymmetric distention which is also visible on the prior CT of December  This suggests a persistent finding rather than transient distention  Large and    small bowel are unremarkable  Further GI work-up should be considered  Mild diffuse bladder wall thickening should be correlated with urinalysis for any evidence of urinary tract infection  The lungs demonstrate a few scattered nodules which are stable  Left renal lesion may represent cyst and slightly smaller than prior study where it was more complicated  A confirmatory nonemergent ultrasound is suggested  The study was marked in [facility] for immediate notification                    Workstation performed: [id number]

## 2023-05-04 NOTE — PLAN OF CARE
Problem: Prexisting or High Potential for Compromised Skin Integrity  Goal: Skin integrity is maintained or improved  Description: INTERVENTIONS:  - Identify patients at risk for skin breakdown  - Assess and monitor skin integrity  - Assess and monitor nutrition and hydration status  - Monitor labs   - Assess for incontinence   - Turn and reposition patient  - Assist with mobility/ambulation  - Relieve pressure over bony prominences  - Avoid friction and shearing  - Provide appropriate hygiene as needed including keeping skin clean and dry  - Evaluate need for skin moisturizer/barrier cream  - Collaborate with interdisciplinary team   - Patient/family teaching  - Consider wound care consult   Outcome: Progressing     Problem: PAIN - ADULT  Goal: Verbalizes/displays adequate comfort level or baseline comfort level  Description: Interventions:  - Encourage patient to monitor pain and request assistance  - Assess pain using appropriate pain scale  - Administer analgesics based on type and severity of pain and evaluate response  - Implement non-pharmacological measures as appropriate and evaluate response  - Consider cultural and social influences on pain and pain management  - Notify physician/advanced practitioner if interventions unsuccessful or patient reports new pain  Outcome: Progressing     Problem: INFECTION - ADULT  Goal: Absence or prevention of progression during hospitalization  Description: INTERVENTIONS:  - Assess and monitor for signs and symptoms of infection  - Monitor lab/diagnostic results  - Monitor all insertion sites, i e  indwelling lines, tubes, and drains  - Monitor endotracheal if appropriate and nasal secretions for changes in amount and color  - [place] appropriate cooling/warming therapies per order  - Administer medications as ordered  - Instruct and encourage patient and family to use good hand hygiene technique  - Identify and instruct in appropriate isolation precautions for identified infection/condition  Outcome: Progressing  Goal: Absence of fever/infection during neutropenic period  Description: INTERVENTIONS:  - Monitor WBC    Outcome: Progressing     Problem: SAFETY ADULT  Goal: Patient will remain free of falls  Description: INTERVENTIONS:  - Educate patient/family on patient safety including physical limitations  - Instruct patient to call for assistance with activity   - Consult OT/PT to assist with strengthening/mobility   - Keep Call bell within reach  - Keep bed low and locked with side rails adjusted as appropriate  - Keep care items and personal belongings within reach  - Initiate and maintain comfort rounds  - Make Fall Risk Sign visible to staff  - Offer Toileting every  Hours, in advance of need  - Initiate/Maintain alarm  - Obtain necessary fall risk management equipment:   - Apply yellow socks and bracelet for high fall risk patients  - Consider moving patient to room near nurses station  Outcome: Progressing  Goal: Maintain or return to baseline ADL function  Description: INTERVENTIONS:  -  Assess patient's ability to carry out ADLs; assess patient's baseline for ADL function and identify physical deficits which impact ability to perform ADLs (bathing, care of mouth/teeth, toileting, grooming, dressing, etc )  - Assess/evaluate cause of self-care deficits   - Assess range of motion  - Assess patient's mobility; develop plan if impaired  - Assess patient's need for assistive devices and provide as appropriate  - Encourage maximum independence but intervene and supervise when necessary  - Involve family in performance of ADLs  - Assess for home care needs following discharge   - Consider OT consult to assist with ADL evaluation and planning for discharge  - Provide patient education as appropriate  Outcome: Progressing  Goal: Maintains/Returns to pre admission functional level  Description: INTERVENTIONS:  - Perform BMAT or MOVE assessment daily    - Set and communicate daily mobility goal to care team and patient/family/caregiver  - Collaborate with rehabilitation services on mobility goals if consulted  - Perform Range of Motion  times a day  - Reposition patient every  hours  - Dangle patient times a day  - Stand patient  times a day  - Ambulate patient  times a day  - Out of bed to chair  times a day   - Out of bed for meals  times a day  - Out of bed for toileting  - Record patient progress and toleration of activity level   Outcome: Progressing     Problem: DISCHARGE PLANNING  Goal: Discharge to home or other facility with appropriate resources  Description: INTERVENTIONS:  - Identify barriers to discharge w/patient and caregiver  - Arrange for needed discharge resources and transportation as appropriate  - Identify discharge learning needs (meds, wound care, etc )  - Arrange for interpretive services to assist at discharge as needed  - Refer to Case Management Department for coordinating discharge planning if the patient needs post-hospital services based on physician/advanced practitioner order or complex needs related to functional status, cognitive ability, or social support system  Outcome: Progressing     Problem: Knowledge Deficit  Goal: Patient/family/caregiver demonstrates understanding of disease process, treatment plan, medications, and discharge instructions  Description: Complete learning assessment and assess knowledge base  Interventions:  - Provide teaching at level of understanding  - Provide teaching via preferred learning methods  Outcome: Progressing     Problem: Nutrition/Hydration-ADULT  Goal: Nutrient/Hydration intake appropriate for improving, restoring or maintaining nutritional needs  Description: Monitor and assess patient's nutrition/hydration status for malnutrition  Collaborate with interdisciplinary team and initiate plan and interventions as ordered  Monitor patient's weight and dietary intake as ordered or per policy   Utilize nutrition screening tool and intervene as necessary  Determine patient's food preferences and provide high-protein, high-caloric foods as appropriate       INTERVENTIONS:  - Monitor oral intake, urinary output, labs, and treatment plans  - Assess nutrition and hydration status and recommend course of action  - Evaluate amount of meals eaten  - Assist patient with eating if necessary   - Allow adequate time for meals  - Recommend/ encourage appropriate diets, oral nutritional supplements, and vitamin/mineral supplements  - Order, calculate, and assess calorie counts as needed  - Recommend, monitor, and adjust tube feedings and TPN/PPN based on assessed needs  - Assess need for intravenous fluids  - Provide specific nutrition/hydration education as appropriate  - Include patient/family/caregiver in decisions related to nutrition  Outcome: Progressing

## 2023-05-05 LAB
ANION GAP SERPL CALCULATED.3IONS-SCNC: 4 MMOL/L (ref 4–13)
BUN SERPL-MCNC: 16 MG/DL (ref 5–25)
CALCIUM SERPL-MCNC: 8.4 MG/DL (ref 8.3–10.1)
CHLORIDE SERPL-SCNC: 96 MMOL/L (ref 96–108)
CO2 SERPL-SCNC: 26 MMOL/L (ref 21–32)
CREAT SERPL-MCNC: 1.02 MG/DL (ref 0.6–1.3)
ERYTHROCYTE [DISTWIDTH] IN BLOOD BY AUTOMATED COUNT: 13.3 % (ref 11.6–15.1)
GFR SERPL CREATININE-BSD FRML MDRD: 48 ML/MIN/1.73SQ M
GLUCOSE SERPL-MCNC: 117 MG/DL (ref 65–140)
GLUCOSE SERPL-MCNC: 122 MG/DL (ref 65–140)
GLUCOSE SERPL-MCNC: 145 MG/DL (ref 65–140)
GLUCOSE SERPL-MCNC: 183 MG/DL (ref 65–140)
GLUCOSE SERPL-MCNC: 227 MG/DL (ref 65–140)
HCT VFR BLD AUTO: 28.7 % (ref 34.8–46.1)
HGB BLD-MCNC: 9.6 G/DL (ref 11.5–15.4)
MCH RBC QN AUTO: 30.1 PG (ref 26.8–34.3)
MCHC RBC AUTO-ENTMCNC: 33.4 G/DL (ref 31.4–37.4)
MCV RBC AUTO: 90 FL (ref 82–98)
PLATELET # BLD AUTO: 161 THOUSANDS/UL (ref 149–390)
PMV BLD AUTO: 10.3 FL (ref 8.9–12.7)
POTASSIUM SERPL-SCNC: 4.1 MMOL/L (ref 3.5–5.3)
RBC # BLD AUTO: 3.19 MILLION/UL (ref 3.81–5.12)
SODIUM SERPL-SCNC: 126 MMOL/L (ref 135–147)
WBC # BLD AUTO: 8 THOUSAND/UL (ref 4.31–10.16)

## 2023-05-05 RX ORDER — SODIUM CHLORIDE 1 G/1
1 TABLET ORAL
Status: DISCONTINUED | OUTPATIENT
Start: 2023-05-05 | End: 2023-05-06 | Stop reason: HOSPADM

## 2023-05-05 RX ADMIN — METHOCARBAMOL 500 MG: 500 TABLET ORAL at 04:39

## 2023-05-05 RX ADMIN — MELATONIN 3 MG: at 21:07

## 2023-05-05 RX ADMIN — HEPARIN SODIUM 5000 UNITS: 5000 INJECTION INTRAVENOUS; SUBCUTANEOUS at 06:36

## 2023-05-05 RX ADMIN — AMLODIPINE BESYLATE 2.5 MG: 2.5 TABLET ORAL at 08:40

## 2023-05-05 RX ADMIN — ATORVASTATIN CALCIUM 80 MG: 80 TABLET, FILM COATED ORAL at 08:40

## 2023-05-05 RX ADMIN — SODIUM CHLORIDE 1 G: 1 TABLET ORAL at 17:03

## 2023-05-05 RX ADMIN — METHOCARBAMOL 500 MG: 500 TABLET ORAL at 17:03

## 2023-05-05 RX ADMIN — HEPARIN SODIUM 5000 UNITS: 5000 INJECTION INTRAVENOUS; SUBCUTANEOUS at 21:07

## 2023-05-05 RX ADMIN — LISINOPRIL 10 MG: 10 TABLET ORAL at 08:40

## 2023-05-05 RX ADMIN — POLYETHYLENE GLYCOL 3350 17 G: 17 POWDER, FOR SOLUTION ORAL at 08:41

## 2023-05-05 RX ADMIN — INSULIN LISPRO 1 UNITS: 100 INJECTION, SOLUTION INTRAVENOUS; SUBCUTANEOUS at 13:24

## 2023-05-05 RX ADMIN — AMIODARONE HYDROCHLORIDE 100 MG: 200 TABLET ORAL at 08:40

## 2023-05-05 RX ADMIN — HEPARIN SODIUM 5000 UNITS: 5000 INJECTION INTRAVENOUS; SUBCUTANEOUS at 13:24

## 2023-05-05 RX ADMIN — CARVEDILOL 25 MG: 25 TABLET, FILM COATED ORAL at 06:36

## 2023-05-05 RX ADMIN — CARVEDILOL 25 MG: 25 TABLET, FILM COATED ORAL at 17:03

## 2023-05-05 RX ADMIN — SODIUM CHLORIDE 1 G: 1 TABLET ORAL at 13:24

## 2023-05-05 NOTE — ASSESSMENT & PLAN NOTE
Wt Readings from Last 3 Encounters:   05/05/23 75 2 kg (165 lb 12 6 oz)   04/25/23 78 kg (172 lb)   01/24/23 77 1 kg (170 lb)     Patient with history of chronic diastolic congestive heart failure  · Appears euvolemic  · Holding Torsemide for now     · Continue BB  · I/O, daily weights, FR

## 2023-05-05 NOTE — PROGRESS NOTES
Pastoral Care Progress Note    2023  Patient: Rashid [place] : 1934  Admission Date & Time: 2023 1449  MRN: [id number] CSN: [id number]         23 1500   Clinical Encounter Type   Visited With Patient   Nondenominational Encounters   Nondenominational Needs Prayer   Sacramental Encounters   Communion Given Indicator Yes     Parth Abrams met with the pt and provided prayers, blessings, and the Sacrament of [employer]  No further needs were expressed at this time  Chaplains still remain available

## 2023-05-05 NOTE — PROGRESS NOTES
NEPHROLOGY PROGRESS NOTE   Natividad Dowell 80 y o  female MRN: [id number]  Unit/Bed#: [facility] 603-01 Encounter: [id number]      ASSESSMENT/PLAN:  1  Hyponatremia: Suspect poor solute intake + nausea  · Sodium 123 on admission and initially improved to 130 with saline bolus  · Sodium dropped to 126 yesterday and received another saline bolus but no significant improvement in sodium today stable at 126  · We will start sodium chloride 1 g 3 times daily and 1 5 L/day oral fluid restriction  · Continue to hold torsemide (was on for edema, no hx CHF per outpatient cardiology notes)   · Work up: serum osm 264, urine sodium 41, urine osm 130, am cortisol 13, TSH normal, uric acid 3 3  2  CKD IIIB: baseline creatinine 1 3-1 5 and follows with Dr Bee Choi  CKD due to hypertension and diabetes  Creatinine slightly below baseline at 1 02  3  Hypertension: Blood pressure was labile but better today  · Continue amlodipine 2 5mg daily, coreg 25mg bid, lisinopril 10mg daily   4  Lower extremity edema: On torsemide at home  Currently no edema on exam and torsemide is on hold    Plan Summary:   • Sodium chloride 1 g p o  3 times daily and 1500 cc/day oral fluid restriction  • If sodium close to 130 tomorrow okay to discharge from nephrology standpoint  • Discussed plan with primary team who agree with above plan    SUBJECTIVE:  Patient upset that she is still in the hospital and wants to go home  States that her nausea is better and she is now eating well      OBJECTIVE:  Current Weight: Weight - Scale: 75 2 kg (165 lb 12 6 oz)  Vitals:    05/05/23 0839   BP: 143/82   Pulse: 55   Resp:    Temp:    SpO2: 98%       Intake/Output Summary (Last 24 hours) at 5/5/2023 1106  Last data filed at 5/5/2023 0458  Gross per 24 hour   Intake --   Output 450 ml   Net -450 ml       General:  appears comfortable and in no acute distress   Skin:  No rash  Eyes:  Sclerae anicteric, no periorbital edema   ENT:  Moist mucous membranes  Neck:  Trachea midline, symmetric   Chest:  Clear to auscultation bilaterally with no wheezes, rales or rhonchi  CVS:  Regular rate and rhythm  Abdomen:  Soft, nontender, nondistended  Neuro:  Awake and alert  Psych:  Appropriate affect  Extremities: no lower extremity edema       Medications:  Scheduled Meds:  Current Facility-Administered Medications   Medication Dose Route Frequency Provider Last Rate   • amiodarone  100 mg Oral Daily Randolph Banai, DO     • amLODIPine  2 5 mg Oral Daily Randolph Banai, DO     • atorvastatin  80 mg Oral Daily Randolph Banai, DO     • carvedilol  25 mg Oral BID With Meals Randolph Banai, DO     • heparin (porcine)  5,000 Units Subcutaneous Q8H [address] Randolph Banai, DO     • hydrALAZINE  10 mg Intravenous Q6H PRN Randolph Banai, DO     • insulin lispro  1-6 Units Subcutaneous TID MUNIR Negron PA-C     • lidocaine  1 patch Topical Daily CALVIN Vásquez     • lisinopril  10 mg Oral Daily CALVIN Maurice     • melatonin  3 mg Oral HS CALVIN Vásquez     • methocarbamol  500 mg Oral Q6H PRN Laurent G Chirayath, DO     • metoclopramide  10 mg Oral BID PRN Laurent G Chirayath, DO     • polyethylene glycol  17 g Oral Daily Laurent G Chirayath, DO         PRN Meds: •  hydrALAZINE  •  methocarbamol  •  metoclopramide    Laboratory Results:  Results from last 7 days   Lab Units 05/05/23  0440 05/04/23  1652 05/04/23  1538 05/03/23  0503 05/02/23  2108 05/02/23  1511 05/01/23  0950   WBC Thousand/uL 8 00  --  5 13 4 99  --  4 89 5 62   HEMOGLOBIN g/dL 9 6*  --  10 0* 10 1*  --  11 2* 11 3*   HEMATOCRIT % 28 7*  --  30 0* 29 5*  --  31 5* 32 9*   PLATELETS Thousands/uL 161  --  160 151  --  178 184   SODIUM mmol/L 126* 127* 126* 130* 127* 123* 124*   POTASSIUM mmol/L 4 1 4 0 4 2 4 1 3 7 3 0* 4 0   CHLORIDE mmol/L 96 95* 95* 99 95* 90* 89*   CO2 mmol/L 26 25 28 26 27 27 28   BUN mg/dL 16 15 15 19 15 14 15   CREATININE mg/dL 1 02 1 01 1 10 1 19 1 30 1 23 1 30   CALCIUM mg/dL 8 4 8 6 8 2* 8 7 8 4 8 6 9 0 MAGNESIUM mg/dL  --   --   --   --   --   --  2 0   PHOSPHORUS mg/dL  --   --   --   --   --   --  3 1

## 2023-05-05 NOTE — ASSESSMENT & PLAN NOTE
Improved with IVF bolus, 123-->130 but worsened again  · Continue to hold Torsemide  · Nephrology following  No improvement with additional IVF  Appreciate recommendations     · BMP in AM

## 2023-05-05 NOTE — ASSESSMENT & PLAN NOTE
Lab Results   Component Value Date    EGFR 48 05/05/2023    EGFR 49 05/04/2023    EGFR 44 05/04/2023    CREATININE 1 02 05/05/2023    CREATININE 1 01 05/04/2023    CREATININE 1 10 05/04/2023   · Baseline appears to be around 1 1-1 3, currently stable     · Avoid nephrotoxins and hypotension   · Monitor

## 2023-05-05 NOTE — PLAN OF CARE
Problem: Prexisting or High Potential for Compromised Skin Integrity  Goal: Skin integrity is maintained or improved  Description: INTERVENTIONS:  - Identify patients at risk for skin breakdown  - Assess and monitor skin integrity  - Assess and monitor nutrition and hydration status  - Monitor labs   - Assess for incontinence   - Turn and reposition patient  - Assist with mobility/ambulation  - Relieve pressure over bony prominences  - Avoid friction and shearing  - Provide appropriate hygiene as needed including keeping skin clean and dry  - Evaluate need for skin moisturizer/barrier cream  - Collaborate with interdisciplinary team   - Patient/family teaching  - Consider wound care consult   Outcome: Progressing     Problem: PAIN - ADULT  Goal: Verbalizes/displays adequate comfort level or baseline comfort level  Description: Interventions:  - Encourage patient to monitor pain and request assistance  - Assess pain using appropriate pain scale  - Administer analgesics based on type and severity of pain and evaluate response  - Implement non-pharmacological measures as appropriate and evaluate response  - Consider cultural and social influences on pain and pain management  - Notify physician/advanced practitioner if interventions unsuccessful or patient reports new pain  Outcome: Progressing     Problem: INFECTION - ADULT  Goal: Absence or prevention of progression during hospitalization  Description: INTERVENTIONS:  - Assess and monitor for signs and symptoms of infection  - Monitor lab/diagnostic results  - Monitor all insertion sites, i e  indwelling lines, tubes, and drains  - Monitor endotracheal if appropriate and nasal secretions for changes in amount and color  - [place] appropriate cooling/warming therapies per order  - Administer medications as ordered  - Instruct and encourage patient and family to use good hand hygiene technique  - Identify and instruct in appropriate isolation precautions for identified infection/condition  Outcome: Progressing  Goal: Absence of fever/infection during neutropenic period  Description: INTERVENTIONS:  - Monitor WBC    Outcome: Progressing     Problem: SAFETY ADULT  Goal: Patient will remain free of falls  Description: INTERVENTIONS:  - Educate patient/family on patient safety including physical limitations  - Instruct patient to call for assistance with activity   - Consult OT/PT to assist with strengthening/mobility   - Keep Call bell within reach  - Keep bed low and locked with side rails adjusted as appropriate  - Keep care items and personal belongings within reach  - Initiate and maintain comfort rounds  - Make Fall Risk Sign visible to staff  - Offer Toileting every 2 Hours, in advance of need  - Initiate/Maintain alarm  - Obtain necessary fall risk management equipment:   - Apply yellow socks and bracelet for high fall risk patients  - Consider moving patient to room near nurses station  Outcome: Progressing  Goal: Maintain or return to baseline ADL function  Description: INTERVENTIONS:  -  Assess patient's ability to carry out ADLs; assess patient's baseline for ADL function and identify physical deficits which impact ability to perform ADLs (bathing, care of mouth/teeth, toileting, grooming, dressing, etc )  - Assess/evaluate cause of self-care deficits   - Assess range of motion  - Assess patient's mobility; develop plan if impaired  - Assess patient's need for assistive devices and provide as appropriate  - Encourage maximum independence but intervene and supervise when necessary  - Involve family in performance of ADLs  - Assess for home care needs following discharge   - Consider OT consult to assist with ADL evaluation and planning for discharge  - Provide patient education as appropriate  Outcome: Progressing  Goal: Maintains/Returns to pre admission functional level  Description: INTERVENTIONS:  - Perform BMAT or MOVE assessment daily    - Set and communicate daily mobility goal to care team and patient/family/caregiver  - Collaborate with rehabilitation services on mobility goals if consulted  - Perform Range of Motion 3 times a day  - Reposition patient every 2 hours  - Dangle patient 3 times a day  - Stand patient 3 times a day  - Ambulate patient 3 times a day  - Out of bed to chair 3 times a day   - Out of bed for meals 3 times a day  - Out of bed for toileting  - Record patient progress and toleration of activity level   Outcome: Progressing     Problem: DISCHARGE PLANNING  Goal: Discharge to home or other facility with appropriate resources  Description: INTERVENTIONS:  - Identify barriers to discharge w/patient and caregiver  - Arrange for needed discharge resources and transportation as appropriate  - Identify discharge learning needs (meds, wound care, etc )  - Arrange for interpretive services to assist at discharge as needed  - Refer to Case Management Department for coordinating discharge planning if the patient needs post-hospital services based on physician/advanced practitioner order or complex needs related to functional status, cognitive ability, or social support system  Outcome: Progressing     Problem: Knowledge Deficit  Goal: Patient/family/caregiver demonstrates understanding of disease process, treatment plan, medications, and discharge instructions  Description: Complete learning assessment and assess knowledge base  Interventions:  - Provide teaching at level of understanding  - Provide teaching via preferred learning methods  Outcome: Progressing     Problem: Nutrition/Hydration-ADULT  Goal: Nutrient/Hydration intake appropriate for improving, restoring or maintaining nutritional needs  Description: Monitor and assess patient's nutrition/hydration status for malnutrition  Collaborate with interdisciplinary team and initiate plan and interventions as ordered  Monitor patient's weight and dietary intake as ordered or per policy  Utilize nutrition screening tool and intervene as necessary  Determine patient's food preferences and provide high-protein, high-caloric foods as appropriate  INTERVENTIONS:  - Monitor oral intake, urinary output, labs, and treatment plans  - Assess nutrition and hydration status and recommend course of action  - Evaluate amount of meals eaten  - Assist patient with eating if necessary   - Allow adequate time for meals  - Recommend/ encourage appropriate diets, oral nutritional supplements, and vitamin/mineral supplements  - Order, calculate, and assess calorie counts as needed  - Recommend, monitor, and adjust tube feedings and TPN/PPN based on assessed needs  - Assess need for intravenous fluids  - Provide specific nutrition/hydration education as appropriate  - Include patient/family/caregiver in decisions related to nutrition  Outcome: Progressing     Problem: MOBILITY - ADULT  Goal: Maintain or return to baseline ADL function  Description: INTERVENTIONS:  -  Assess patient's ability to carry out ADLs; assess patient's baseline for ADL function and identify physical deficits which impact ability to perform ADLs (bathing, care of mouth/teeth, toileting, grooming, dressing, etc )  - Assess/evaluate cause of self-care deficits   - Assess range of motion  - Assess patient's mobility; develop plan if impaired  - Assess patient's need for assistive devices and provide as appropriate  - Encourage maximum independence but intervene and supervise when necessary  - Involve family in performance of ADLs  - Assess for home care needs following discharge   - Consider OT consult to assist with ADL evaluation and planning for discharge  - Provide patient education as appropriate  Outcome: Progressing  Goal: Maintains/Returns to pre admission functional level  Description: INTERVENTIONS:  - Perform BMAT or MOVE assessment daily    - Set and communicate daily mobility goal to care team and patient/family/caregiver     - Collaborate with rehabilitation services on mobility goals if consulted  - Perform Range of Motion 3 times a day  - Reposition patient every 2 hours    - Dangle patient 3 times a day  - Stand patient 3 times a day  - Ambulate patient 3 times a day  - Out of bed to chair 3 times a day   - Out of bed for meals 3 times a day  - Out of bed for toileting  - Record patient progress and toleration of activity level   Outcome: Progressing

## 2023-05-05 NOTE — PLAN OF CARE
Problem: Prexisting or High Potential for Compromised Skin Integrity  Goal: Skin integrity is maintained or improved  Description: INTERVENTIONS:  - Identify patients at risk for skin breakdown  - Assess and monitor skin integrity  - Assess and monitor nutrition and hydration status  - Monitor labs   - Assess for incontinence   - Turn and reposition patient  - Assist with mobility/ambulation  - Relieve pressure over bony prominences  - Avoid friction and shearing  - Provide appropriate hygiene as needed including keeping skin clean and dry  - Evaluate need for skin moisturizer/barrier cream  - Collaborate with interdisciplinary team   - Patient/family teaching  - Consider wound care consult   Outcome: Progressing     Problem: PAIN - ADULT  Goal: Verbalizes/displays adequate comfort level or baseline comfort level  Description: Interventions:  - Encourage patient to monitor pain and request assistance  - Assess pain using appropriate pain scale  - Administer analgesics based on type and severity of pain and evaluate response  - Implement non-pharmacological measures as appropriate and evaluate response  - Consider cultural and social influences on pain and pain management  - Notify physician/advanced practitioner if interventions unsuccessful or patient reports new pain  Outcome: Progressing     Problem: INFECTION - ADULT  Goal: Absence or prevention of progression during hospitalization  Description: INTERVENTIONS:  - Assess and monitor for signs and symptoms of infection  - Monitor lab/diagnostic results  - Monitor all insertion sites, i e  indwelling lines, tubes, and drains  - Monitor endotracheal if appropriate and nasal secretions for changes in amount and color  - [place] appropriate cooling/warming therapies per order  - Administer medications as ordered  - Instruct and encourage patient and family to use good hand hygiene technique  - Identify and instruct in appropriate isolation precautions for identified infection/condition  Outcome: Progressing  Goal: Absence of fever/infection during neutropenic period  Description: INTERVENTIONS:  - Monitor WBC    Outcome: Progressing     Problem: SAFETY ADULT  Goal: Patient will remain free of falls  Description: INTERVENTIONS:  - Educate patient/family on patient safety including physical limitations  - Instruct patient to call for assistance with activity   - Consult OT/PT to assist with strengthening/mobility   - Keep Call bell within reach  - Keep bed low and locked with side rails adjusted as appropriate  - Keep care items and personal belongings within reach  - Initiate and maintain comfort rounds  - Make Fall Risk Sign visible to staff  - Offer Toileting every 2 Hours, in advance of need  - Initiate/Maintain bed/chair alarm  - Obtain necessary fall risk management equipment:   - Apply yellow socks and bracelet for high fall risk patients  - Consider moving patient to room near nurses station  Outcome: Progressing  Goal: Maintain or return to baseline ADL function  Description: INTERVENTIONS:  -  Assess patient's ability to carry out ADLs; assess patient's baseline for ADL function and identify physical deficits which impact ability to perform ADLs (bathing, care of mouth/teeth, toileting, grooming, dressing, etc )  - Assess/evaluate cause of self-care deficits   - Assess range of motion  - Assess patient's mobility; develop plan if impaired  - Assess patient's need for assistive devices and provide as appropriate  - Encourage maximum independence but intervene and supervise when necessary  - Involve family in performance of ADLs  - Assess for home care needs following discharge   - Consider OT consult to assist with ADL evaluation and planning for discharge  - Provide patient education as appropriate  Outcome: Progressing  Goal: Maintains/Returns to pre admission functional level  Description: INTERVENTIONS:  - Perform BMAT or MOVE assessment daily    - Set and communicate daily mobility goal to care team and patient/family/caregiver  - Collaborate with rehabilitation services on mobility goals if consulted  - Perform Range of Motion 3 times a day  - Reposition patient every 2 hours  - Dangle patient 3 times a day  - Stand patient 3 times a day  - Ambulate patient 3 times a day  - Out of bed to chair 3 times a day   - Out of bed for meals 3 times a day  - Out of bed for toileting  - Record patient progress and toleration of activity level   Outcome: Progressing     Problem: DISCHARGE PLANNING  Goal: Discharge to home or other facility with appropriate resources  Description: INTERVENTIONS:  - Identify barriers to discharge w/patient and caregiver  - Arrange for needed discharge resources and transportation as appropriate  - Identify discharge learning needs (meds, wound care, etc )  - Arrange for interpretive services to assist at discharge as needed  - Refer to Case Management Department for coordinating discharge planning if the patient needs post-hospital services based on physician/advanced practitioner order or complex needs related to functional status, cognitive ability, or social support system  Outcome: Progressing     Problem: Knowledge Deficit  Goal: Patient/family/caregiver demonstrates understanding of disease process, treatment plan, medications, and discharge instructions  Description: Complete learning assessment and assess knowledge base  Interventions:  - Provide teaching at level of understanding  - Provide teaching via preferred learning methods  Outcome: Progressing     Problem: Nutrition/Hydration-ADULT  Goal: Nutrient/Hydration intake appropriate for improving, restoring or maintaining nutritional needs  Description: Monitor and assess patient's nutrition/hydration status for malnutrition  Collaborate with interdisciplinary team and initiate plan and interventions as ordered  Monitor patient's weight and dietary intake as ordered or per policy  Utilize nutrition screening tool and intervene as necessary  Determine patient's food preferences and provide high-protein, high-caloric foods as appropriate       INTERVENTIONS:  - Monitor oral intake, urinary output, labs, and treatment plans  - Assess nutrition and hydration status and recommend course of action  - Evaluate amount of meals eaten  - Assist patient with eating if necessary   - Allow adequate time for meals  - Recommend/ encourage appropriate diets, oral nutritional supplements, and vitamin/mineral supplements  - Order, calculate, and assess calorie counts as needed  - Recommend, monitor, and adjust tube feedings and TPN/PPN based on assessed needs  - Assess need for intravenous fluids  - Provide specific nutrition/hydration education as appropriate  - Include patient/family/caregiver in decisions related to nutrition  Outcome: Progressing

## 2023-05-05 NOTE — PROGRESS NOTES
[address] [facility]  Progress Note  Name: Kimberly Helms  MRN: [id number]  [facility] Date of Admission: 5/2/2023   Date of Service: 5/5/2023 I Hospital Day: 3    Assessment/Plan   * Nausea  Assessment & Plan  Patient presents with nausea, early satiety and poor appetite for the past several weeks  Patient also reports significant weight loss but unable to quantify exactly how much over how long  Was found to have severe hyponatremia at 124 on outpatient labs and her nephrologist sent her to the ED for further evaluation  · CT abdomen and pelvis shows gastric distention with food debris  Gastric ileus or outlet obstruction is not excluded given asymmetric distention which is also visible on prior CT in December  · Has been tolerating oral diet without any vomiting  · GI consult appreciated, s/p EGD 5/4 which was normal  Biopsies pending  Planning for outpatient f/u to include gastric emptying study  · PT/OT- no rehab needs, lives at home alone but has caregivers 6 hours per day  · Rest of plan as below    Hyponatremia  Assessment & Plan  Improved with IVF bolus, 123-->130 but worsened again  · Continue to hold Torsemide  · Nephrology following  No improvement with additional IVF  Appreciate recommendations  · BMP in AM    Bladder wall thickening  Assessment & Plan  Mild diffuse bladder wall thickening on CT A/P    · No urinary complaints  · UA negative   · Follow up outpatient  Atrial fibrillation [facility])  Assessment & Plan  Patient has a history of atrial fibrillation she is not on anticoagulation after sustaining a fall with subdural hematoma  · Continue amiodarone and carvedilol     Primary hypertension  Assessment & Plan  Intermittent urgency noted  Continue Norvasc, Coreg and Lisinopril     · PRN Hydralazine  · Monitor     Chronic congestive heart failure (HCC)  Assessment & Plan  Wt Readings from Last 3 Encounters:   05/05/23 75 2 kg (165 lb 12 6 oz) 04/25/23 78 kg (172 lb)   01/24/23 77 1 kg (170 lb)     Patient with history of chronic diastolic congestive heart failure  · Appears euvolemic  · Holding Torsemide for now  · Continue BB  · I/O, daily weights, FR      Chronic kidney disease, stage 3b [facility])  Assessment & Plan  Lab Results   Component Value Date    EGFR 48 05/05/2023    EGFR 49 05/04/2023    EGFR 44 05/04/2023    CREATININE 1 02 05/05/2023    CREATININE 1 01 05/04/2023    CREATININE 1 10 05/04/2023   · Baseline appears to be around 1 1-1 3, currently stable  · Avoid nephrotoxins and hypotension   · Monitor       Renal cyst, left  Assessment & Plan  Noted on CT A/P-Left renal lesion may represent cyst and slightly smaller than prior study where it was more complicated  · Can f/u with PCP, Outpatient US recommended           VTE Pharmacologic Prophylaxis: VTE Score: 3 Moderate Risk (Score 3-4) - Pharmacological DVT Prophylaxis Ordered: heparin  Patient Centered Rounds: I performed bedside rounds with nursing staff today  Discussions with Specialists or Other Care Team Provider: nursing, case management     Education and Discussions with Family / Patient: Updated  (nephew) via phone  Total Time Spent on Date of Encounter in care of patient: 35 minutes This time was spent on one or more of the following: performing physical exam; counseling and coordination of care; obtaining or reviewing history; documenting in the medical record; reviewing/ordering tests, medications or procedures; communicating with other healthcare professionals and discussing with patient's family/caregivers  Current Length of Stay: 3 day(s)  Current Patient Status: Inpatient     Certification Statement: The patient will continue to require additional inpatient hospital stay due to monitor NA levels, nephro recommendations     Discharge Plan: Anticipate discharge in 24-48 hrs to home with home services      Code Status: Level 1 - Full Code    Subjective:   No complaints  Tolerating breakfast without any difficulty  Has had no nausea or vomiting  Objective:     Vitals:   Temp (24hrs), Av 7 °F (36 5 °C), Min:97 2 °F (36 2 °C), Max:98 3 °F (36 8 °C)    Temp:  [97 2 °F (36 2 °C)-98 3 °F (36 8 °C)] 98 °F (36 7 °C)  HR:  [49-59] 55  Resp:  [16-18] 16  BP: (101-184)/() 143/82  SpO2:  [94 %-100 %] 98 %  Body mass index is 31 58 kg/m²  Input and Output Summary (last 24 hours): Intake/Output Summary (Last 24 hours) at 2023 0913  Last data filed at 2023 0458  Gross per 24 hour   Intake --   Output 450 ml   Net -450 ml       Physical Exam:   Physical Exam  Vitals and nursing note reviewed  Constitutional:       General: She is not in acute distress  Cardiovascular:      Rate and Rhythm: Normal rate  Heart sounds: Murmur heard  Abdominal:      Tenderness: There is no abdominal tenderness  Musculoskeletal:         General: No swelling  Skin:     General: Skin is warm  Coloration: Skin is pale  Neurological:      Mental Status: She is alert and oriented to person, place, and time  Mental status is at baseline        Comments: Forgetful    Psychiatric:         Mood and Affect: Mood normal           Additional Data:     Labs:  Results from last 7 days   Lab Units 23  0440 23  0638   WBC Thousand/uL 8 00 5 13   HEMOGLOBIN g/dL 9 6* 10 0*   HEMATOCRIT % 28 7* 30 0*   PLATELETS Thousands/uL 161 160   NEUTROS PCT %  --  48   LYMPHS PCT %  --  33   MONOS PCT %  --  12   EOS PCT %  --  6     Results from last 7 days   Lab Units 23  0440 23  0638 23  0503   SODIUM mmol/L 126*   < > 130*   POTASSIUM mmol/L 4 1   < > 4 1   CHLORIDE mmol/L 96   < > 99   CO2 mmol/L 26   < > 26   BUN mg/dL 16   < > 19   CREATININE mg/dL 1 02   < > 1 19   ANION GAP mmol/L 4   < > 5   CALCIUM mg/dL 8 4   < > 8 7   ALBUMIN g/dL  --   --  2 8*   TOTAL BILIRUBIN mg/dL  --   --  0 41   ALK PHOS U/L  --   --  142*   ALT U/L  --   --  50   AST U/L  --   --  49*   GLUCOSE RANDOM mg/dL 122   < > 97    < > = values in this interval not displayed  Results from last 7 days   Lab Units 05/05/23  0705 05/04/23  2037 05/04/23  1602 05/04/23  1113 05/04/23  0818 05/03/23  2111 05/03/23  1614 05/03/23  1132 05/03/23  0738   POC GLUCOSE mg/dl 117 172* 128 109 116 124 124 139 107     Results from last 7 days   Lab Units 05/01/23  0950   HEMOGLOBIN A1C % 5 9*           Lines/Drains:  Invasive Devices     Peripheral Intravenous Line  Duration           Peripheral IV 05/02/23 Dorsal (posterior); Right Hand 2 days    Peripheral IV 05/02/23 Right Antecubital 2 days                      Imaging: Reviewed radiology reports from this admission including: procedure reports    Recent Cultures (last 7 days):         Last 24 Hours Medication List:   Current Facility-Administered Medications   Medication Dose Route Frequency Provider Last Rate   • amiodarone  100 mg Oral Daily Randolph Scott, DO     • amLODIPine  2 5 mg Oral Daily Randolph Scott, DO     • atorvastatin  80 mg Oral Daily Randolph Scott, DO     • carvedilol  25 mg Oral BID With Meals Randolph Scott, DO     • heparin (porcine)  5,000 Units Subcutaneous Q8H [facility] Randoplh Scott, DO     • hydrALAZINE  10 mg Intravenous Q6H PRN Randolph Scott, DO     • insulin lispro  1-6 Units Subcutaneous TID  Daniel Ashley PA-C     • lidocaine  1 patch Topical Daily Lilly Sandifer, CRNP     • lisinopril  10 mg Oral Daily CALVIN Maurice     • melatonin  3 mg Oral HS Lilly Sandifer, CRNP     • methocarbamol  500 mg Oral Q6H PRN Laurent G Tai, DO     • metoclopramide  10 mg Oral BID PRN Laurent G Tai, DO     • polyethylene glycol  17 g Oral Daily Nathaneil Curling, DO          Today, Patient Was Seen By: CALVIN Aldana    **Please Note: This note may have been constructed using a voice recognition system  **

## 2023-05-05 NOTE — ASSESSMENT & PLAN NOTE
Patient presents with nausea, early satiety and poor appetite for the past several weeks  Patient also reports significant weight loss but unable to quantify exactly how much over how long  Was found to have severe hyponatremia at 124 on outpatient labs and her nephrologist sent her to the ED for further evaluation  · CT abdomen and pelvis shows gastric distention with food debris  Gastric ileus or outlet obstruction is not excluded given asymmetric distention which is also visible on prior CT in December  · Has been tolerating oral diet without any vomiting  · GI consult appreciated, s/p EGD 5/4 which was normal  Biopsies pending  Planning for outpatient f/u to include gastric emptying study  · PT/OT- no rehab needs, lives at home alone but has caregivers 6 hours per day     · Rest of plan as below

## 2023-05-06 VITALS
BODY MASS INDEX: 31.13 KG/M2 | TEMPERATURE: 97.5 F | OXYGEN SATURATION: 97 % | RESPIRATION RATE: 16 BRPM | SYSTOLIC BLOOD PRESSURE: 163 MMHG | WEIGHT: 164.9 LBS | HEART RATE: 50 BPM | DIASTOLIC BLOOD PRESSURE: 64 MMHG | HEIGHT: 61 IN

## 2023-05-06 PROBLEM — R11.0 NAUSEA: Status: RESOLVED | Noted: 2023-05-03 | Resolved: 2023-05-06

## 2023-05-06 LAB
ANION GAP SERPL CALCULATED.3IONS-SCNC: 6 MMOL/L (ref 4–13)
BUN SERPL-MCNC: 19 MG/DL (ref 5–25)
CALCIUM SERPL-MCNC: 8.4 MG/DL (ref 8.3–10.1)
CHLORIDE SERPL-SCNC: 99 MMOL/L (ref 96–108)
CO2 SERPL-SCNC: 23 MMOL/L (ref 21–32)
CREAT SERPL-MCNC: 0.99 MG/DL (ref 0.6–1.3)
GFR SERPL CREATININE-BSD FRML MDRD: 50 ML/MIN/1.73SQ M
GLUCOSE SERPL-MCNC: 108 MG/DL (ref 65–140)
GLUCOSE SERPL-MCNC: 114 MG/DL (ref 65–140)
POTASSIUM SERPL-SCNC: 4.3 MMOL/L (ref 3.5–5.3)
SODIUM SERPL-SCNC: 128 MMOL/L (ref 135–147)

## 2023-05-06 RX ORDER — SODIUM CHLORIDE 1 G/1
1 TABLET ORAL
Qty: 90 TABLET | Refills: 0 | Status: SHIPPED | OUTPATIENT
Start: 2023-05-06

## 2023-05-06 RX ADMIN — ATORVASTATIN CALCIUM 80 MG: 80 TABLET, FILM COATED ORAL at 09:07

## 2023-05-06 RX ADMIN — SODIUM CHLORIDE 1 G: 1 TABLET ORAL at 09:07

## 2023-05-06 RX ADMIN — LISINOPRIL 10 MG: 10 TABLET ORAL at 09:06

## 2023-05-06 RX ADMIN — AMIODARONE HYDROCHLORIDE 100 MG: 200 TABLET ORAL at 09:06

## 2023-05-06 RX ADMIN — AMLODIPINE BESYLATE 2.5 MG: 2.5 TABLET ORAL at 09:06

## 2023-05-06 RX ADMIN — CARVEDILOL 25 MG: 25 TABLET, FILM COATED ORAL at 09:07

## 2023-05-06 RX ADMIN — HEPARIN SODIUM 5000 UNITS: 5000 INJECTION INTRAVENOUS; SUBCUTANEOUS at 06:47

## 2023-05-06 RX ADMIN — POLYETHYLENE GLYCOL 3350 17 G: 17 POWDER, FOR SOLUTION ORAL at 09:06

## 2023-05-06 NOTE — INCIDENTAL FINDINGS
The following findings require follow up:  Radiographic finding   Finding: Bladder wall thickening   Follow up required: follow up with primary doctor    Follow up should be done within 1 month(s)    Please notify the following clinician to assist with the follow up:   Dr Doug Vasques

## 2023-05-06 NOTE — QUICK NOTE
[id number] Suellen Estrada for d/c from [facility] perspective with repeat BMP early next week  Patient should go home on salt tabs 1g TID and hold torsemide/potassium  To see Dr Bandar Novoa in the office 5/23/23

## 2023-05-06 NOTE — DISCHARGE SUMMARY
Discharge Summary - [facility] Internal Medicine    Patient Information: Ashley Mota 80 y o  female MRN: [id number]  Unit/Bed#: [facility] 603-01 Encounter: [id number]    Discharging Physician / Practitioner: CALVIN Montes De Oca  PCP: Asya Trevino MD  Admission Date: 5/2/2023  Discharge Date: 05/06/23    Reason for Admission: Hyponatremia, gastric distention and nausea     Discharge Diagnoses:     Principal Problem (Resolved):    Nausea  Active Problems:    Hyponatremia    Bladder wall thickening    Atrial fibrillation (HCC)    Chronic congestive heart failure ([facility] )    Primary hypertension    Type 2 diabetes mellitus with diabetic chronic kidney disease ([facility] )    Chronic kidney disease, stage 3b ([facility] )    Renal cyst, left      Consultations During Hospital Stay:  · Gastroenterology  · Nephrology     Procedures Performed:     · EGD negative    Significant Findings / Test Results:     · CT chest abdomen and pelvis without contrast Gastric distention with food debris  Gastric ileus or outlet obstruction is not excluded given the asymmetric distention which is also visible on the prior CT of December  This suggests a persistent finding rather than transient distention  Large and small bowel are unremarkable  Further GI work-up should be considered  Mild diffuse bladder wall thickening should be correlated with urinalysis for any evidence of urinary tract infection  The lungs demonstrate a few scattered nodules which are stable  Left renal lesion may represent cyst and slightly smaller than prior study where it was more complicated  A confirmatory nonemergent ultrasound is suggested  · Na on arrival 123, 128 on day of discharge  · UA negative     Incidental Findings:   · CT A/P-Left renal lesion may represent cyst and slightly smaller than prior study where it was more complicated    Can f/u with PCP, Outpatient US recommended  · Bladder wall thickening, negative UA      Test Results Pending at Discharge (will require follow up):   · Biopsies from EGD     Outpatient Tests Requested:  · Outpatient f/u with PCP  · BMP on Tuesday with results to primary nephrologist   · Patient follow-up with gastroenterology    Complications: None    Hospital Course:     Lidia Gant is a 80 y o  female patient the past medical history of diabetes, atrial fibrillation, hypertension, diastolic heart failure, CKD stage III, hyponatremia left renal cyst Who originally presented to the hospital on 5/2/2023 due to hyponatremia, was referred to ED by her outpatient nephrologist   Sodium on admission was 123  Patient also complained of intermittent nausea, early satiety and poor appetite for the past several weeks  Also reporting weight loss  On admission CT abdomen pelvis showed gastric distention with food debris's  GI was consulted, patient underwent EGD which was normal   Biopsies were taken and are pending  They are recommending outpatient follow-up to include gastric emptying study, differential includes diabetic gastroparesis  In regards to her hyponatremia, initially improved with IV fluid bolus but again worsened  Nephrology was consulted, no improvement with additional IV fluids  Patient was started on sodium chloride tablets with improvement of sodium to 128  Discussed with nephrology team, stable for discharge home today, will continue sodium tablets 1 g 3 times daily and continue to hold torsemide/potassium tablets on discharge  Prescription given for BMP on Tuesday with results to primary nephrologist Dr Jen Velazquez  Plan of care including outpatient labs and f/u discussed with nephew over phone  Condition at Discharge: stable     Discharge Day Visit / Exam:     Subjective:  Patient offers no acute complaints  Discussed medication changes  Verbalized understanding    Vitals: Blood Pressure: 163/64 (05/06/23 0703)  Pulse: (!) 50 (05/06/23 0703)  Temperature: 97 5 °F (36 4 °C) (05/06/23 0703)  Temp Source: Tympanic (05/04/23 "1431)  Respirations: 16 (05/06/23 0703)  Height: 5' 0 75\" (154 3 cm) (05/02/23 2241)  Weight - Scale: 74 8 kg (164 lb 14 5 oz) (05/06/23 0600)  SpO2: 97 % (05/06/23 0703)  Exam:   Physical Exam  Vitals and nursing note reviewed  Constitutional:       General: She is not in acute distress  Cardiovascular:      Rate and Rhythm: Bradycardia present  Pulmonary:      Effort: No respiratory distress  Breath sounds: Decreased breath sounds present  Abdominal:      Palpations: Abdomen is soft  Tenderness: There is no abdominal tenderness  Musculoskeletal:         General: No swelling  Skin:     General: Skin is warm  Neurological:      Mental Status: She is alert and oriented to person, place, and time  Mental status is at baseline  Comments: ALESHIA   Psychiatric:         Mood and Affect: Mood normal          Discussion with Family: Patient and nephew over the phone    Discharge instructions/Information to patient and family:   See after visit summary for information provided to patient and family  Provisions for Follow-Up Care:  See after visit summary for information related to follow-up care and any pertinent home health orders  Disposition:     Home    For Discharges to [facility] SN[facility]:   · Not Applicable to this Patient - Not Applicable to this Patient    Planned Readmission: no     Discharge Statement:  I spent 40 minutes discharging the patient  This time was spent on the day of discharge  I had direct contact with the patient on the day of discharge  Greater than 50% of the total time was spent examining patient, answering all patient questions, arranging and discussing plan of care with patient as well as directly providing post-discharge instructions  Additional time then spent on discharge activities  Discharge Medications:  See after visit summary for reconciled discharge medications provided to patient and family        ** Please Note: This note has been " constructed using a voice recognition system **

## 2023-05-06 NOTE — DISCHARGE INSTR - AVS FIRST PAGE
Delma,    Please do not take your torsemide or potassium for now  These will need to be removed from your pill box  We have started you on salt tablets which you should take 3 times per day  I have provided a script for bloodwork for you to get on Tuesday with results to your primary kidney doctor Dr Myrtle Whittaker  You should call his office early next week to discuss your lab results and medications  You should weight yourself daily and notify your kidney doctor if you gain weight more than 3 pounds in 24 hours or 5 pounds in one week  You should also follow up with the stomach doctors for results of your biopsies they took during your scope and for further testing of your stomach         Thanks   Tyson Sun, [employer] [facility]  [phone]

## 2023-05-06 NOTE — PLAN OF CARE
Problem: Prexisting or High Potential for Compromised Skin Integrity  Goal: Skin integrity is maintained or improved  Description: INTERVENTIONS:  - Identify patients at risk for skin breakdown  - Assess and monitor skin integrity  - Assess and monitor nutrition and hydration status  - Monitor labs   - Assess for incontinence   - Turn and reposition patient  - Assist with mobility/ambulation  - Relieve pressure over bony prominences  - Avoid friction and shearing  - Provide appropriate hygiene as needed including keeping skin clean and dry  - Evaluate need for skin moisturizer/barrier cream  - Collaborate with interdisciplinary team   - Patient/family teaching  - Consider wound care consult   Outcome: Progressing     Problem: PAIN - ADULT  Goal: Verbalizes/displays adequate comfort level or baseline comfort level  Description: Interventions:  - Encourage patient to monitor pain and request assistance  - Assess pain using appropriate pain scale  - Administer analgesics based on type and severity of pain and evaluate response  - Implement non-pharmacological measures as appropriate and evaluate response  - Consider cultural and social influences on pain and pain management  - Notify physician/advanced practitioner if interventions unsuccessful or patient reports new pain  Outcome: Progressing     Problem: INFECTION - ADULT  Goal: Absence or prevention of progression during hospitalization  Description: INTERVENTIONS:  - Assess and monitor for signs and symptoms of infection  - Monitor lab/diagnostic results  - Monitor all insertion sites, i e  indwelling lines, tubes, and drains  - Monitor endotracheal if appropriate and nasal secretions for changes in amount and color  - [place] appropriate cooling/warming therapies per order  - Administer medications as ordered  - Instruct and encourage patient and family to use good hand hygiene technique  - Identify and instruct in appropriate isolation precautions for identified infection/condition  Outcome: Progressing  Goal: Absence of fever/infection during neutropenic period  Description: INTERVENTIONS:  - Monitor WBC    Outcome: Progressing     Problem: SAFETY ADULT  Goal: Patient will remain free of falls  Description: INTERVENTIONS:  - Educate patient/family on patient safety including physical limitations  - Instruct patient to call for assistance with activity   - Consult OT/PT to assist with strengthening/mobility   - Keep Call bell within reach  - Keep bed low and locked with side rails adjusted as appropriate  - Keep care items and personal belongings within reach  - Initiate and maintain comfort rounds  - Make Fall Risk Sign visible to staff  - Offer Toileting every 2 Hours, in advance of need  - Initiate/Maintain bed alarm  - Obtain necessary fall risk management equipment:   - Apply yellow socks and bracelet for high fall risk patients  - Consider moving patient to room near nurses station  Outcome: Progressing  Goal: Maintain or return to baseline ADL function  Description: INTERVENTIONS:  -  Assess patient's ability to carry out ADLs; assess patient's baseline for ADL function and identify physical deficits which impact ability to perform ADLs (bathing, care of mouth/teeth, toileting, grooming, dressing, etc )  - Assess/evaluate cause of self-care deficits   - Assess range of motion  - Assess patient's mobility; develop plan if impaired  - Assess patient's need for assistive devices and provide as appropriate  - Encourage maximum independence but intervene and supervise when necessary  - Involve family in performance of ADLs  - Assess for home care needs following discharge   - Consider OT consult to assist with ADL evaluation and planning for discharge  - Provide patient education as appropriate  Outcome: Progressing  Goal: Maintains/Returns to pre admission functional level  Description: INTERVENTIONS:  - Perform BMAT or MOVE assessment daily    - Set and communicate daily mobility goal to care team and patient/family/caregiver  - Collaborate with rehabilitation services on mobility goals if consulted  - Perform Range of Motion 3 times a day  - Reposition patient every 2 hours  - Dangle patient 3 times a day  - Stand patient 3 times a day  - Ambulate patient 3 times a day  - Out of bed to chair 3 times a day   - Out of bed for meals 3 times a day  - Out of bed for toileting  - Record patient progress and toleration of activity level   Outcome: Progressing     Problem: DISCHARGE PLANNING  Goal: Discharge to home or other facility with appropriate resources  Description: INTERVENTIONS:  - Identify barriers to discharge w/patient and caregiver  - Arrange for needed discharge resources and transportation as appropriate  - Identify discharge learning needs (meds, wound care, etc )  - Arrange for interpretive services to assist at discharge as needed  - Refer to Case Management Department for coordinating discharge planning if the patient needs post-hospital services based on physician/advanced practitioner order or complex needs related to functional status, cognitive ability, or social support system  Outcome: Progressing     Problem: Knowledge Deficit  Goal: Patient/family/caregiver demonstrates understanding of disease process, treatment plan, medications, and discharge instructions  Description: Complete learning assessment and assess knowledge base  Interventions:  - Provide teaching at level of understanding  - Provide teaching via preferred learning methods  Outcome: Progressing     Problem: Nutrition/Hydration-ADULT  Goal: Nutrient/Hydration intake appropriate for improving, restoring or maintaining nutritional needs  Description: Monitor and assess patient's nutrition/hydration status for malnutrition  Collaborate with interdisciplinary team and initiate plan and interventions as ordered  Monitor patient's weight and dietary intake as ordered or per policy  Utilize nutrition screening tool and intervene as necessary  Determine patient's food preferences and provide high-protein, high-caloric foods as appropriate       INTERVENTIONS:  - Monitor oral intake, urinary output, labs, and treatment plans  - Assess nutrition and hydration status and recommend course of action  - Evaluate amount of meals eaten  - Assist patient with eating if necessary   - Allow adequate time for meals  - Recommend/ encourage appropriate diets, oral nutritional supplements, and vitamin/mineral supplements  - Order, calculate, and assess calorie counts as needed  - Recommend, monitor, and adjust tube feedings and TPN/PPN based on assessed needs  - Assess need for intravenous fluids  - Provide specific nutrition/hydration education as appropriate  - Include patient/family/caregiver in decisions related to nutrition  Outcome: Progressing     Problem: MOBILITY - ADULT  Goal: Maintain or return to baseline ADL function  Description: INTERVENTIONS:  -  Assess patient's ability to carry out ADLs; assess patient's baseline for ADL function and identify physical deficits which impact ability to perform ADLs (bathing, care of mouth/teeth, toileting, grooming, dressing, etc )  - Assess/evaluate cause of self-care deficits   - Assess range of motion  - Assess patient's mobility; develop plan if impaired  - Assess patient's need for assistive devices and provide as appropriate  - Encourage maximum independence but intervene and supervise when necessary  - Involve family in performance of ADLs  - Assess for home care needs following discharge   - Consider OT consult to assist with ADL evaluation and planning for discharge  - Provide patient education as appropriate  Outcome: Progressing

## 2023-05-06 NOTE — INCIDENTAL FINDINGS
The following findings require follow up:  Radiographic finding   Finding: Left renal lesion may represent cyst and slightly smaller than prior study where it was more complicated     Follow up required: Renal ultrasound   Follow up should be done within 1 month(s)    Please notify the following clinician to assist with the follow up:   Dr Riddle Mode

## 2023-05-08 ENCOUNTER — TRANSITIONAL CARE MANAGEMENT (OUTPATIENT)
Dept: FAMILY MEDICINE CLINIC | Facility: CLINIC | Age: 88
End: 2023-05-08

## 2023-05-08 DIAGNOSIS — Z71.89 COORDINATION OF COMPLEX CARE: Primary | ICD-10-CM

## 2023-05-08 NOTE — UTILIZATION REVIEW
NOTIFICATION OF ADMISSION DISCHARGE   This is a Notification of Discharge from [facility]  Please be advised that this patient has been discharge from our facility  Below you will find the admission and discharge date and time including the patient’s disposition  UTILIZATION REVIEW CONTACT:  Kashif Sotelo  Utilization   Network Utilization Review Department  Phone: [phone] x carefully listen to the prompts  All voicemails are confidential   Email: [email or web address]     ADMISSION INFORMATION  PRESENTATION DATE: 5/2/2023  2:49 PM  OBERVATION ADMISSION DATE:   INPATIENT ADMISSION DATE: 5/2/23  4:02 PM   DISCHARGE DATE: 5/6/2023  6:40 PM   DISPOSITION:Home/Self Care    IMPORTANT INFORMATION:  Send all requests for admission clinical reviews, approved or denied determinations and any other requests to dedicated fax number below belonging to the campus where the patient is receiving treatment   List of dedicated fax numbers:  [address] DENIALS (Administrative/Medical Necessity) [phone]   [address] (Maternity/NICU/Pediatrics) [phone]   [facility] [phone]   [facility] 87 [phone]   [address] [phone]   220 [facility] [phone]   [address] [place] [address] [phone]   [address] [place] [address] 119 [phone]   [facility]  [phone]   4054 [facility] [phone]   412 [facility] 850 [facility] [phone]

## 2023-05-09 ENCOUNTER — APPOINTMENT (OUTPATIENT)
Dept: LAB | Facility: CLINIC | Age: 88
End: 2023-05-09

## 2023-05-09 ENCOUNTER — TELEPHONE (OUTPATIENT)
Dept: GASTROENTEROLOGY | Facility: CLINIC | Age: 88
End: 2023-05-09

## 2023-05-09 DIAGNOSIS — E87.1 HYPONATREMIA: ICD-10-CM

## 2023-05-09 DIAGNOSIS — A04.8 H. PYLORI INFECTION: Primary | ICD-10-CM

## 2023-05-09 LAB
ANION GAP SERPL CALCULATED.3IONS-SCNC: 3 MMOL/L (ref 4–13)
BUN SERPL-MCNC: 16 MG/DL (ref 5–25)
CALCIUM SERPL-MCNC: 9 MG/DL (ref 8.3–10.1)
CHLORIDE SERPL-SCNC: 101 MMOL/L (ref 96–108)
CO2 SERPL-SCNC: 24 MMOL/L (ref 21–32)
CREAT SERPL-MCNC: 1.15 MG/DL (ref 0.6–1.3)
GFR SERPL CREATININE-BSD FRML MDRD: 42 ML/MIN/1.73SQ M
GLUCOSE P FAST SERPL-MCNC: 122 MG/DL (ref 65–99)
POTASSIUM SERPL-SCNC: 5.2 MMOL/L (ref 3.5–5.3)
SODIUM SERPL-SCNC: 128 MMOL/L (ref 135–147)

## 2023-05-09 RX ORDER — TETRACYCLINE HYDROCHLORIDE 500 MG/1
500 CAPSULE ORAL 4 TIMES DAILY
Qty: 56 CAPSULE | Refills: 0 | Status: SHIPPED | OUTPATIENT
Start: 2023-05-09 | End: 2023-05-23

## 2023-05-09 RX ORDER — OMEPRAZOLE 40 MG/1
40 CAPSULE, DELAYED RELEASE ORAL 2 TIMES DAILY
Qty: 28 CAPSULE | Refills: 0 | Status: SHIPPED | OUTPATIENT
Start: 2023-05-09 | End: 2023-05-23

## 2023-05-09 RX ORDER — BISMUTH SUBSALICYLATE 262 MG/1
262 TABLET, CHEWABLE ORAL
Qty: 56 TABLET | Refills: 0 | Status: SHIPPED | OUTPATIENT
Start: 2023-05-09 | End: 2023-05-23

## 2023-05-09 RX ORDER — METRONIDAZOLE 250 MG/1
250 TABLET ORAL EVERY 6 HOURS SCHEDULED
Qty: 56 TABLET | Refills: 0 | Status: SHIPPED | OUTPATIENT
Start: 2023-05-09 | End: 2023-05-23

## 2023-05-09 NOTE — TELEPHONE ENCOUNTER
----- Message from Sosa Tran DO sent at 5/9/2023 10:27 AM EDT -----  Please call patient inform her that her biopsies show active infection in her stomach with a bacteria called H  pylori  She will require treatment  Please prescribe 2 weeks of quadruple therapy followed by repeat stool antigen off PPI 4 weeks after completion of antibiotics to confirm eradication  We will follow-up with her in the office

## 2023-05-09 NOTE — RESULT ENCOUNTER NOTE
Please call patient inform her that her biopsies show active infection in her stomach with a bacteria called H  pylori  She will require treatment  Please prescribe 2 weeks of quadruple therapy followed by repeat stool antigen off PPI 4 weeks after completion of antibiotics to confirm eradication  We will follow-up with her in the office

## 2023-05-12 ENCOUNTER — TELEPHONE (OUTPATIENT)
Dept: FAMILY MEDICINE CLINIC | Facility: CLINIC | Age: 88
End: 2023-05-12

## 2023-05-12 NOTE — TELEPHONE ENCOUNTER
Patient nephew called into the office stating his aunt has robbie gaining a lot of weight and she's been constipated for awhile now too he would like to know if that's normal to gain 18 pounds in a weeek  He also stated she was in the hospital recently and they were checking her salt levels and they also stop her water pills  Please advise  Patient does have a appointment scheduled for may 18 th for a tcm as well   Please advise  Thank You

## 2023-05-17 ENCOUNTER — PATIENT OUTREACH (OUTPATIENT)
Dept: FAMILY MEDICINE CLINIC | Facility: CLINIC | Age: 88
End: 2023-05-17

## 2023-05-18 ENCOUNTER — APPOINTMENT (OUTPATIENT)
Dept: LAB | Facility: CLINIC | Age: 88
End: 2023-05-18

## 2023-05-18 ENCOUNTER — OFFICE VISIT (OUTPATIENT)
Dept: FAMILY MEDICINE CLINIC | Facility: CLINIC | Age: 88
End: 2023-05-18

## 2023-05-18 VITALS
SYSTOLIC BLOOD PRESSURE: 134 MMHG | DIASTOLIC BLOOD PRESSURE: 80 MMHG | TEMPERATURE: 98.7 F | WEIGHT: 186 LBS | HEIGHT: 61 IN | HEART RATE: 92 BPM | BODY MASS INDEX: 35.12 KG/M2

## 2023-05-18 DIAGNOSIS — R60.0 BILATERAL LEG EDEMA: ICD-10-CM

## 2023-05-18 DIAGNOSIS — E87.1 HYPONATREMIA: Primary | ICD-10-CM

## 2023-05-18 DIAGNOSIS — E87.1 HYPONATREMIA: ICD-10-CM

## 2023-05-18 DIAGNOSIS — I10 PRIMARY HYPERTENSION: ICD-10-CM

## 2023-05-18 DIAGNOSIS — K29.70 GASTRITIS WITHOUT BLEEDING, UNSPECIFIED CHRONICITY, UNSPECIFIED GASTRITIS TYPE: ICD-10-CM

## 2023-05-18 PROBLEM — S02.2XXA CLOSED FRACTURE OF NASAL BONE: Status: RESOLVED | Noted: 2022-12-27 | Resolved: 2023-05-18

## 2023-05-18 LAB
ANION GAP SERPL CALCULATED.3IONS-SCNC: 2 MMOL/L (ref 4–13)
BASOPHILS # BLD AUTO: 0.07 THOUSANDS/ÂΜL (ref 0–0.1)
BASOPHILS NFR BLD AUTO: 1 % (ref 0–1)
BNP SERPL-MCNC: 771 PG/ML (ref 0–100)
BUN SERPL-MCNC: 13 MG/DL (ref 5–25)
CALCIUM SERPL-MCNC: 8.6 MG/DL (ref 8.3–10.1)
CHLORIDE SERPL-SCNC: 103 MMOL/L (ref 96–108)
CO2 SERPL-SCNC: 24 MMOL/L (ref 21–32)
CREAT SERPL-MCNC: 1.15 MG/DL (ref 0.6–1.3)
EOSINOPHIL # BLD AUTO: 0.16 THOUSAND/ÂΜL (ref 0–0.61)
EOSINOPHIL NFR BLD AUTO: 3 % (ref 0–6)
ERYTHROCYTE [DISTWIDTH] IN BLOOD BY AUTOMATED COUNT: 14.9 % (ref 11.6–15.1)
GFR SERPL CREATININE-BSD FRML MDRD: 42 ML/MIN/1.73SQ M
GLUCOSE SERPL-MCNC: 109 MG/DL (ref 65–140)
HCT VFR BLD AUTO: 30.6 % (ref 34.8–46.1)
HGB BLD-MCNC: 10.8 G/DL (ref 11.5–15.4)
IMM GRANULOCYTES # BLD AUTO: 0.02 THOUSAND/UL (ref 0–0.2)
IMM GRANULOCYTES NFR BLD AUTO: 0 % (ref 0–2)
LYMPHOCYTES # BLD AUTO: 1.14 THOUSANDS/ÂΜL (ref 0.6–4.47)
LYMPHOCYTES NFR BLD AUTO: 20 % (ref 14–44)
MCH RBC QN AUTO: 35 PG (ref 26.8–34.3)
MCHC RBC AUTO-ENTMCNC: 35.3 G/DL (ref 31.4–37.4)
MCV RBC AUTO: 99 FL (ref 82–98)
MONOCYTES # BLD AUTO: 0.47 THOUSAND/ÂΜL (ref 0.17–1.22)
MONOCYTES NFR BLD AUTO: 8 % (ref 4–12)
NEUTROPHILS # BLD AUTO: 3.78 THOUSANDS/ÂΜL (ref 1.85–7.62)
NEUTS SEG NFR BLD AUTO: 68 % (ref 43–75)
NRBC BLD AUTO-RTO: 0 /100 WBCS
PLATELET # BLD AUTO: 199 THOUSANDS/UL (ref 149–390)
PMV BLD AUTO: 11.3 FL (ref 8.9–12.7)
POTASSIUM SERPL-SCNC: 4.5 MMOL/L (ref 3.5–5.3)
RBC # BLD AUTO: 3.09 MILLION/UL (ref 3.81–5.12)
SODIUM SERPL-SCNC: 129 MMOL/L (ref 135–147)
WBC # BLD AUTO: 5.64 THOUSAND/UL (ref 4.31–10.16)

## 2023-05-18 NOTE — PROGRESS NOTES
Assessment & Plan     1  Hyponatremia  Assessment & Plan:  Await  lab    Orders:  -     Basic metabolic panel; Future    2  Bilateral leg edema  Assessment & Plan:  Await  bnp    Orders:  -     Basic metabolic panel; Future  -     B-Type Natriuretic Peptide(BNP); Future    3  Gastritis without bleeding, unspecified chronicity, unspecified gastritis type  -     CBC and differential; Future    4  Primary hypertension  Assessment & Plan:  BP stable         Subjective     Transitional Care Management Review:   Trinidad Quintana is a 80 y o  female here for TCM follow up  During the TCM phone call patient stated:  TCM Call     Date and time call was made  5/8/2023  4:23 PM    Hospital care reviewed  Records reviewed    Patient was hospitialized at  [facility]    Date of Admission  05/02/23    Date of discharge  05/06/23    Diagnosis  Nausea    Disposition  Home    Were the patients medications reviewed and updated  No    Current Symptoms  None      TCM Call     Post hospital issues  None    Should patient be enrolled in anticoag monitoring? No    Scheduled for follow up? Yes    Not clinically warranted  Patient will be following up with Nephrology and Gastroenterology  Did you obtain your prescribed medications  Yes    Do you need help managing your prescriptions or medications  No    Is transportation to your appointment needed  No    I have advised the patient to call PCP with any new or worsening symptoms  Jacy Echeverria, Practice Administrator        Here  For  F/u hyponatremia  And  Gastritis, gaining  Weight from lack of  Diuretics  And  Taking  Salt  Pills, no cp or  Sob, is retaining fluid a bit, on  2  Antibiotics  For  h  pylori    Review of Systems   Constitutional: Negative for activity change and appetite change  Respiratory: Negative for chest tightness and shortness of breath  Cardiovascular: Positive for leg swelling  Negative for chest pain     Neurological: Negative for dizziness and "headaches  Hematological: Negative for adenopathy  Psychiatric/Behavioral: The patient is not nervous/anxious  Objective     /80 (BP Location: Left arm, Patient Position: Sitting, Cuff Size: Standard)   Pulse 92   Temp 98 7 °F (37 1 °C) (Temporal)   Ht 5' 0 75\" (1 543 m) Comment: on file  Wt 84 4 kg (186 lb)   BMI 35 43 kg/m²      Physical Exam  Vitals reviewed  Constitutional:       Appearance: Normal appearance  She is obese  Cardiovascular:      Rate and Rhythm: Normal rate and regular rhythm  Pulses: Normal pulses  Heart sounds: Normal heart sounds  Pulmonary:      Effort: Pulmonary effort is normal       Breath sounds: Normal breath sounds  Musculoskeletal:      Right lower leg: Edema present  Left lower leg: Edema present  Comments: 2  Plus  edema   Lymphadenopathy:      Cervical: No cervical adenopathy  Neurological:      Mental Status: She is alert     Psychiatric:         Mood and Affect: Mood normal        Medications have been reviewed by provider in current encounter    Solis Gilbert MD       "

## 2023-05-19 ENCOUNTER — PATIENT OUTREACH (OUTPATIENT)
Dept: FAMILY MEDICINE CLINIC | Facility: CLINIC | Age: 88
End: 2023-05-19

## 2023-05-19 ENCOUNTER — TELEPHONE (OUTPATIENT)
Dept: FAMILY MEDICINE CLINIC | Facility: CLINIC | Age: 88
End: 2023-05-19

## 2023-05-19 NOTE — PROGRESS NOTES
Spoke with pt who complained of her 'new medicine' causing nausea  Encouraged pt to take with food  She states that she often does with applesauce and suggested crackers as well  There was a caregiver aide there as well and pt was on speaker  Pt had PCP appt yesterday and labs drawn  She is awaiting a call from PCP office regarding results and any suggestions moving forward  Pt's weight today was 186 lbs  The aide stated that she has had a 15 lb weight gain in a little over a week  Pt denies shortness of breath but swollen lower legs and feet  Encouraged her to elevate feet as high as her hips when she is reclining on her recliner to watch TV   Pt welcomes a follow up call next week for care management

## 2023-05-19 NOTE — TELEPHONE ENCOUNTER
----- Message from Linda Brennan MD sent at 5/19/2023  9:19 AM EDT -----  All lab stable, can't  stop salt pills  yet

## 2023-05-23 ENCOUNTER — OFFICE VISIT (OUTPATIENT)
Dept: NEPHROLOGY | Facility: CLINIC | Age: 88
End: 2023-05-23

## 2023-05-23 VITALS
HEIGHT: 61 IN | SYSTOLIC BLOOD PRESSURE: 130 MMHG | BODY MASS INDEX: 30.96 KG/M2 | WEIGHT: 164 LBS | DIASTOLIC BLOOD PRESSURE: 76 MMHG

## 2023-05-23 DIAGNOSIS — R60.0 BILATERAL LEG EDEMA: ICD-10-CM

## 2023-05-23 DIAGNOSIS — N25.81 SECONDARY HYPERPARATHYROIDISM OF RENAL ORIGIN (HCC): ICD-10-CM

## 2023-05-23 DIAGNOSIS — N18.32 STAGE 3B CHRONIC KIDNEY DISEASE (HCC): ICD-10-CM

## 2023-05-23 DIAGNOSIS — I12.9 BENIGN HYPERTENSION WITH CKD (CHRONIC KIDNEY DISEASE) STAGE III (HCC): Primary | ICD-10-CM

## 2023-05-23 DIAGNOSIS — N18.30 BENIGN HYPERTENSION WITH CKD (CHRONIC KIDNEY DISEASE) STAGE III (HCC): Primary | ICD-10-CM

## 2023-05-23 DIAGNOSIS — N28.1 RENAL CYST: ICD-10-CM

## 2023-05-23 DIAGNOSIS — N18.32 CHRONIC KIDNEY DISEASE, STAGE 3B (HCC): ICD-10-CM

## 2023-05-23 DIAGNOSIS — E87.1 HYPONATREMIA: ICD-10-CM

## 2023-05-23 PROBLEM — I10 PRIMARY HYPERTENSION: Status: RESOLVED | Noted: 2022-12-26 | Resolved: 2023-05-23

## 2023-05-23 RX ORDER — SODIUM CHLORIDE 1 G/1
2 TABLET ORAL 2 TIMES DAILY
Qty: 60 TABLET | Refills: 5 | Status: SHIPPED | OUTPATIENT
Start: 2023-05-23

## 2023-05-23 NOTE — PROGRESS NOTES
NEPHROLOGY OUTPATIENT PROGRESS NOTE   Lucia Henning 80 y o  female MRN: [id number]  DATE: 5/23/2023  Reason for visit:   Chief Complaint   Patient presents with   • Follow-up   • Chronic Kidney Disease     ASSESSMENT and PLAN:  CKD stage IIIB,  baseline creatinine 1 3 to 1 5 since early 2022, 1 1 to 1 4 since May 2021, prior 0 9 to 1 1  -creatinine 1 1 in May 2023 lower than her prior baseline  Decreased creatinine could be secondary to loss of muscle mass, she has lost about 15 pounds since last visit with recent hospitalizations   - UA bland in May 2023  -CKD likely secondary to long-term hypertension and diabetes  -avoid nephrotoxins or NSAIDs  -urine microalbumin/creatinine ratio nonsignificant in May 2023      Hypertension  -blood pressure acceptable in the office today    -she gets her BP checked with caregiver daily which is a wrist BP cuff in the 150s to 160s/70s  Concern if wrist BP cuff is accurate since her blood pressure is acceptable in the office today and also better controlled during her recent PCP office visit  Not change antihypertensive regimen for now  -Currently uses walker  Avoid relative hypotension   -currently on carvedilol 25 mg b i d , amlodipine 2 5 mg daily, lisinopril 10 mg daily  Remains off torsemide since recent hospitalization    -Prior she had issues with dry cough with lisinopril although currently tolerating well       Hyponatremia  -Recently had significant drop in serum sodium requiring hospitalization   -Most recent serum sodium is improving 129  -Work-up during hospitalization in May 2023 shows serum osmolality 264, urine sodium 41, urine osmolality 130   -Hyponatremia suspected to be secondary to component of hypovolemia, poor solute intake with low urine osmolality with SIADH component   -Continue fluid restriction less than 40 oz per day  -She was discharged on salt tablet 1 g p o  3 times daily  Will increase all tablet to 2 g p o  twice daily    She continued to remain off torsemide   -recommend to follow salt liberal diet  -Repeat labs in couple weeks  -CT scan of chest, abdomen, pelvis in May 2023 during hospitalization shows few reticular opacities in the lungs, granuloma in left upper lobe, right middle lobe groundglass nodule  Also shows left renal lesion likely represents cyst and slightly smaller than prior study  No other obvious evidence of malignancy  Renal cyst, CT scan recently shows left renal lesion may represent cyst and slightly smaller than prior study  Will do renal ultrasound before next office visit      Hypokalemia, resolved, since she remains off torsemide, continue to hold potassium chloride as well       Anemia in CKD, closely monitor hemoglobin, iron saturation 22% in May 2023       Secondary hyperparathyroidism, last PTH 68 in May 2023   continue current vitamin-D supplements       Leg edema, overall improved and stable    -He has lost about 15 pounds since last visit  Now remains off torsemide  Leg edema has much improved  Diagnoses and all orders for this visit:    Chronic kidney disease, stage 3b ([facility] )  -     Basic metabolic panel; Future  -     Osmolality, urine  -     Sodium, urine, random  -     Basic metabolic panel; Future  -     CBC; Future  -     Phosphorus; Future  -     PTH, intact; Future  -     Albumin / creatinine urine ratio; Future  -     Vitamin D 25 hydroxy; Future    Hyponatremia  -     sodium chloride 1 g tablet; Take 2 tablets (2 g total) by mouth 2 (two) times a day  -     Basic metabolic panel; Future  -     Osmolality, urine  -     Sodium, urine, random  -     Basic metabolic panel; Future    Secondary hyperparathyroidism of renal origin ([facility] )  -     Phosphorus; Future  -     PTH, intact; Future  -     Vitamin D 25 hydroxy; Future    Benign hypertension with CKD (chronic kidney disease) stage III (HCC)  -     Basic metabolic panel; Future  -     CBC; Future  -     Phosphorus;  Future  -     PTH, intact; "Future  -     Albumin / creatinine urine ratio; Future  -     Vitamin D 25 hydroxy; Future    Stage 3b chronic kidney disease ([facility] )  -     Basic metabolic panel; Future  -     CBC; Future  -     Phosphorus; Future  -     PTH, intact; Future  -     Albumin / creatinine urine ratio; Future  -     Vitamin D 25 hydroxy; Future    Bilateral leg edema    Renal cyst  -     US kidney and bladder; Future          SUBJECTIVE / HPI:  Patient is 15-year-old female with significant medical issues of hypertension for many years, prior history of long-term diabetes, CKD stage IIIB with baseline creatinine 1 3 to 1 5 since early 2022, 1 1 to 1 4 since May 2021: Prior 0 9 to 1, chronic back pain issues, hyponatremia, presents for regular follow-up of CKD   She gets her BP checked with caregiver daily it seems to be higher comparing to office readings  She confirms that it is by a wrist BP cuff  She is accompanied by her nephew  She was recently hospitalized in May 2023 due to nausea, hyponatremia  Was found to have gastric distention and on EGD was found to have H  pylori infection and was being treated with antibiotic which she is completing today  She has lost about 15 pounds since last visit  Leg edema has overall much improved  Torsemide remains on hold since discharge from the hospital   Also had significantly low sodium which seems to be slowly improving 129 after discharge from the hospital   Denies any worsening dyspnea   Denies any urinary complaint   No recent NSAID use  Patient has chronic back pain issues which is overall stable  Denies any chest pain or shortness of breath  REVIEW OF SYSTEMS:  More than 10 point review of systems were obtained and discussed in length with the patient  Complete review of systems were negative / unremarkable except mentioned above       PHYSICAL EXAM:  Vitals:    05/23/23 1432 05/23/23 1453   BP:  130/76   Weight: 74 4 kg (164 lb)    Height: 5' 0 75\" (1 543 m)      Body mass " index is 31 24 kg/m²  Physical Exam  Vitals reviewed  Constitutional:       Appearance: She is well-developed  HENT:      Head: Normocephalic and atraumatic  Right Ear: External ear normal       Left Ear: External ear normal    Eyes:      Conjunctiva/sclera: Conjunctivae normal    Cardiovascular:      Comments: No significant edema in legs  Pulmonary:      Effort: Pulmonary effort is normal       Breath sounds: Normal breath sounds  No wheezing or rales  Abdominal:      General: Bowel sounds are normal  There is no distension  Palpations: Abdomen is soft  Tenderness: There is no abdominal tenderness  Musculoskeletal:         General: No deformity  Lymphadenopathy:      Cervical: No cervical adenopathy  Skin:     Findings: No rash  Neurological:      Mental Status: She is alert and oriented to person, place, and time     Psychiatric:         Behavior: Behavior normal          PAST MEDICAL HISTORY:  Past Medical History:   Diagnosis Date   • Anemia    • Anxiety    • Arthritis    • CKD (chronic kidney disease), stage III ([facility] ) 11/29/2018   • Depression, recurrent ([facility] ) 6/14/2021   • Diabetes mellitus ([facility] )    • Diabetic peripheral neuropathy (HCC)    • HLD (hyperlipidemia)    • Hyperlipidemia    • Hypertension    • 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 8/23/2017   • Thyroid enlarged 2/23/2015   • Tinnitus    • Vertigo        PAST SURGICAL HISTORY:  Past Surgical History:   Procedure Laterality Date   • APPENDECTOMY     • EAR SURGERY Left    • KNEE SURGERY     • TONSILLECTOMY         SOCIAL HISTORY:  Social History     Substance and Sexual Activity   Alcohol Use Not Currently     Social History     Substance and Sexual Activity   Drug Use Never     Social History     Tobacco Use   Smoking Status Never   Smokeless Tobacco Never       FAMILY HISTORY:  Family History   Problem Relation Age of Onset • Heart disease Mother    • Diabetes Mother    • Heart disease Father    • Stroke Father    • Cancer Family    • Dementia Sister    • Liver disease Other        MEDICATIONS:    Current Outpatient Medications:   •  acetaminophen (TYLENOL) 325 mg tablet, Take 3 tablets (975 mg total) by mouth every 8 (eight) hours, Disp: , Rfl: 0  •  amLODIPine (NORVASC) 2 5 mg tablet, Take 1 tablet (2 5 mg total) by mouth daily, Disp: 30 tablet, Rfl: 5  •  atorvastatin (LIPITOR) 80 mg tablet, Take 80 mg by mouth daily, Disp: , Rfl:   •  Blood Glucose Monitoring Suppl (ONE TOUCH ULTRA 2) w/Device KIT, Use daily, Disp: 1 kit, Rfl: 0  •  carvedilol (COREG) 25 mg tablet, Take 1 tablet (25 mg total) by mouth 2 (two) times a day with meals, Disp: 180 tablet, Rfl: 1  •  Cholecalciferol 125 MCG (5000 UT) capsule, Take 1,000 Units by mouth daily, Disp: , Rfl:   •  dicyclomine (BENTYL) 10 mg capsule, Take 10 mg by mouth 2 (two) times a day as needed (cramping), Disp: , Rfl:   •  docusate sodium (COLACE) 100 mg capsule, Take 100 mg by mouth 2 (two) times a day, Disp: , Rfl:   •  glucose blood (OneTouch Ultra) test strip, Test daily                        DX: E11 9, Disp: 100 strip, Rfl: 1  •  lisinopril (ZESTRIL) 10 mg tablet, Take 10 mg by mouth daily, Disp: , Rfl:   •  Omega 3-6-9 Fatty Acids (OMEGA 3-6-9 PO), Take by mouth, Disp: , Rfl:   •  senna (SENOKOT) 8 6 mg, Take 2 tablets (17 2 mg total) by mouth daily as needed for constipation, Disp: , Rfl: 0  •  sodium chloride 1 g tablet, Take 2 tablets (2 g total) by mouth 2 (two) times a day, Disp: 60 tablet, Rfl: 5  •  tetracycline (ACHROMYCIN,SUMYCIN) 500 MG capsule, Take 1 capsule (500 mg total) by mouth 4 (four) times a day for 14 days, Disp: 56 capsule, Rfl: 0  •  amiodarone 100 mg tablet, Take 1 tablet (100 mg total) by mouth daily, Disp: 90 tablet, Rfl: 1  •  ASPIRIN 81 PO, Take by mouth, Disp: , Rfl:   •  Bacillus Coagulans-Inulin (PROBIOTIC FORMULA PO), Take by mouth 97mg two times a day for stomach health supplement, Disp: , Rfl:   •  bismuth subsalicylate (PEPTO BISMOL) 262 MG chewable tablet, Chew 1 tablet (262 mg total) 4 (four) times a day (before meals and at bedtime) for 14 days, Disp: 56 tablet, Rfl: 0  •  lidocaine (LIDODERM) 5 %, Apply 1 patch topically over 12 hours daily Remove & Discard patch within 12 hours or as directed by MD, Disp: 10 patch, Rfl: 0  •  metroNIDAZOLE (FLAGYL) 250 mg tablet, Take 1 tablet (250 mg total) by mouth every 6 (six) hours for 14 days, Disp: 56 tablet, Rfl: 0  •  Multiple Vitamins-Minerals (multivitamin with minerals) tablet, Take 1 tablet by mouth daily, Disp: , Rfl:   •  NON FORMULARY, Lung support 200mg once daily at nighttime, Disp: , Rfl:   •  NON FORMULARY, [place] 400mg daily at bedtime, Disp: , Rfl:   •  omeprazole (PriLOSEC) 40 MG capsule, Take 1 capsule (40 mg total) by mouth 2 (two) times a day for 14 days, Disp: 28 capsule, Rfl: 0  •  saccharomyces boulardii (FLORASTOR) 250 mg capsule, Take 250 mg by mouth 2 (two) times a day, Disp: , Rfl:     Lab Results:   Results for orders placed or performed in visit on 51/66/25   Basic metabolic panel   Result Value Ref Range    Sodium 129 (L) 135 - 147 mmol/L    Potassium 4 5 3 5 - 5 3 mmol/L    Chloride 103 96 - 108 mmol/L    CO2 24 21 - 32 mmol/L    ANION GAP 2 (L) 4 - 13 mmol/L    BUN 13 5 - 25 mg/dL    Creatinine 1 15 0 60 - 1 30 mg/dL    Glucose 109 65 - 140 mg/dL    Calcium 8 6 8 3 - 10 1 mg/dL    eGFR 42 ml/min/1 73sq m   CBC and differential   Result Value Ref Range    WBC 5 64 4 31 - 10 16 Thousand/uL    RBC 3 09 (L) 3 81 - 5 12 Million/uL    Hemoglobin 10 8 (L) 11 5 - 15 4 g/dL    Hematocrit 30 6 (L) 34 8 - 46 1 %    MCV 99 (H) 82 - 98 fL    MCH 35 0 (H) 26 8 - 34 3 pg    MCHC 35 3 31 4 - 37 4 g/dL    RDW 14 9 11 6 - 15 1 %    MPV 11 3 8 9 - 12 7 fL    Platelets 736 429 - 984 Thousands/uL    nRBC 0 /100 WBCs    Neutrophils Relative 68 43 - 75 %    Immat GRANS % 0 0 - 2 %    Lymphocytes Relative 20 14 - 44 %    Monocytes Relative 8 4 - 12 %    Eosinophils Relative 3 0 - 6 %    Basophils Relative 1 0 - 1 %    Neutrophils Absolute 3 78 1 85 - 7 62 Thousands/µL    Immature Grans Absolute 0 02 0 00 - 0 20 Thousand/uL    Lymphocytes Absolute 1 14 0 60 - 4 47 Thousands/µL    Monocytes Absolute 0 47 0 17 - 1 22 Thousand/µL    Eosinophils Absolute 0 16 0 00 - 0 61 Thousand/µL    Basophils Absolute 0 07 0 00 - 0 10 Thousands/µL   B-Type Natriuretic Peptide(BNP)   Result Value Ref Range     (H) 0 - 100 pg/mL

## 2023-05-24 ENCOUNTER — PATIENT OUTREACH (OUTPATIENT)
Dept: FAMILY MEDICINE CLINIC | Facility: CLINIC | Age: 88
End: 2023-05-24

## 2023-05-24 DIAGNOSIS — E11.9 TYPE 2 DIABETES MELLITUS WITHOUT COMPLICATION, WITHOUT LONG-TERM CURRENT USE OF INSULIN (HCC): ICD-10-CM

## 2023-05-24 RX ORDER — LANCETS
EACH MISCELLANEOUS
Qty: 100 EACH | Refills: 1 | Status: SHIPPED | OUTPATIENT
Start: 2023-05-24

## 2023-05-24 RX ORDER — BLOOD SUGAR DIAGNOSTIC
STRIP MISCELLANEOUS
Qty: 100 STRIP | Refills: 1 | Status: SHIPPED | OUTPATIENT
Start: 2023-05-24

## 2023-05-24 NOTE — PROGRESS NOTES
Spoke with pt on speaker and aide as well  Weight today was 164 lbs she states  Pt's complaints of nausea have dissipated  She was restarted on 'salt pills' as per nephrology visit  Pt states that shes doing fine  Labs to be drawn in 2 weeks  Would like a follow up call in a month

## 2023-05-24 NOTE — TELEPHONE ENCOUNTER
My name is Ciara Brooks, I'm calling for Courtneymami Daphne 1934  She recently saw last week Doctor Paramjit Lira and she is in the need of her diabetic equipment to check her sugar levels  I would like to know a prescription for one touch Linda plus sticker pen and a one touch Linda plus Lancets  And you can call me back at [phone] or else you can send those scripts right to the [facility] in [place], [place] and that's listed on [facility] as far as you know for scripts and that  OK  Thank you once again  My number is [phone]  Thank you   Bye

## 2023-06-06 ENCOUNTER — APPOINTMENT (OUTPATIENT)
Dept: LAB | Facility: CLINIC | Age: 88
End: 2023-06-06
Payer: COMMERCIAL

## 2023-06-06 ENCOUNTER — TELEPHONE (OUTPATIENT)
Dept: OTHER | Facility: HOSPITAL | Age: 88
End: 2023-06-06

## 2023-06-06 DIAGNOSIS — E87.1 HYPONATREMIA: ICD-10-CM

## 2023-06-06 DIAGNOSIS — R60.0 BILATERAL LEG EDEMA: Primary | ICD-10-CM

## 2023-06-06 DIAGNOSIS — N18.32 CHRONIC KIDNEY DISEASE, STAGE 3B (HCC): ICD-10-CM

## 2023-06-06 LAB
ANION GAP SERPL CALCULATED.3IONS-SCNC: 1 MMOL/L (ref 4–13)
BUN SERPL-MCNC: 15 MG/DL (ref 5–25)
CALCIUM SERPL-MCNC: 9 MG/DL (ref 8.3–10.1)
CHLORIDE SERPL-SCNC: 107 MMOL/L (ref 96–108)
CO2 SERPL-SCNC: 27 MMOL/L (ref 21–32)
CREAT SERPL-MCNC: 1.12 MG/DL (ref 0.6–1.3)
GFR SERPL CREATININE-BSD FRML MDRD: 43 ML/MIN/1.73SQ M
GLUCOSE P FAST SERPL-MCNC: 136 MG/DL (ref 65–99)
OSMOLALITY UR: 402 MMOL/KG
POTASSIUM SERPL-SCNC: 4.3 MMOL/L (ref 3.5–5.3)
SODIUM 24H UR-SCNC: 36 MOL/L
SODIUM SERPL-SCNC: 135 MMOL/L (ref 135–147)

## 2023-06-06 PROCEDURE — 80048 BASIC METABOLIC PNL TOTAL CA: CPT

## 2023-06-06 PROCEDURE — 36415 COLL VENOUS BLD VENIPUNCTURE: CPT

## 2023-06-06 RX ORDER — TORSEMIDE 10 MG/1
10 TABLET ORAL EVERY OTHER DAY
Qty: 15 TABLET | Refills: 3 | Status: SHIPPED | OUTPATIENT
Start: 2023-06-06

## 2023-06-06 RX ORDER — SODIUM CHLORIDE 1 G/1
1 TABLET ORAL 3 TIMES DAILY
Qty: 90 TABLET | Refills: 3 | Status: SHIPPED | OUTPATIENT
Start: 2023-06-06

## 2023-06-06 NOTE — TELEPHONE ENCOUNTER
Spoke to patient regarding her concern for significant weight gain  She is also concerned that she may be having somewhat more leg swelling  Since increasing salt tablet her sodium level has definitely improved but given her weight gain and slightly more leg swelling, will reduce salt tablet from 2 g twice daily to 1 g p o  3 times daily  Also start torsemide 10 mg every other day  Repeat BMP in 2 weeks

## 2023-06-06 NOTE — TELEPHONE ENCOUNTER
Please let her know sodium level improving 135  Continue current salt tablet and fluid restriction as discussed during office visit

## 2023-06-06 NOTE — TELEPHONE ENCOUNTER
Patient was advise  Dr Carlotta Morillo patient said after she stared taking salt tablet has gained weight- her weight now is 185 lbs  Patient is concerns of gained weight and is requesting a call back from Dr Carlotta Morillo

## 2023-06-21 ENCOUNTER — PATIENT OUTREACH (OUTPATIENT)
Dept: FAMILY MEDICINE CLINIC | Facility: CLINIC | Age: 88
End: 2023-06-21

## 2023-06-21 DIAGNOSIS — K29.70 GASTRITIS WITHOUT BLEEDING, UNSPECIFIED CHRONICITY, UNSPECIFIED GASTRITIS TYPE: Primary | ICD-10-CM

## 2023-06-21 RX ORDER — DICYCLOMINE HYDROCHLORIDE 10 MG/1
CAPSULE ORAL
Qty: 180 CAPSULE | Refills: 0 | Status: SHIPPED | OUTPATIENT
Start: 2023-06-21

## 2023-06-21 NOTE — PROGRESS NOTES
Spoke with pt for follow up  She had concerns over sodium replacement tabs and we discussed the message chain from Dr Sherri Del Real note  Pt verbalized understanding then didn't realize that she was to go for lab work as of yesterday or today  Pt will go in the AM and was appreciative of the call regarding BMP  We reviewed upcoming appt next month with Dr Mirian Andrew and pt commented that she hoped nothing else was found from that visit  Will place call again in a month post GI visit

## 2023-06-23 ENCOUNTER — APPOINTMENT (OUTPATIENT)
Dept: LAB | Facility: CLINIC | Age: 88
End: 2023-06-23
Payer: COMMERCIAL

## 2023-06-23 ENCOUNTER — TELEPHONE (OUTPATIENT)
Dept: NEPHROLOGY | Facility: HOSPITAL | Age: 88
End: 2023-06-23

## 2023-06-23 DIAGNOSIS — E87.1 HYPONATREMIA: ICD-10-CM

## 2023-06-23 DIAGNOSIS — N28.1 RENAL CYST, LEFT: Primary | ICD-10-CM

## 2023-06-23 LAB
ANION GAP SERPL CALCULATED.3IONS-SCNC: 4 MMOL/L
BUN SERPL-MCNC: 13 MG/DL (ref 5–25)
CALCIUM SERPL-MCNC: 8.7 MG/DL (ref 8.3–10.1)
CHLORIDE SERPL-SCNC: 107 MMOL/L (ref 96–108)
CO2 SERPL-SCNC: 29 MMOL/L (ref 21–32)
CREAT SERPL-MCNC: 1.12 MG/DL (ref 0.6–1.3)
GFR SERPL CREATININE-BSD FRML MDRD: 43 ML/MIN/1.73SQ M
GLUCOSE P FAST SERPL-MCNC: 124 MG/DL (ref 65–99)
POTASSIUM SERPL-SCNC: 4.2 MMOL/L (ref 3.5–5.3)
SODIUM SERPL-SCNC: 140 MMOL/L (ref 135–147)

## 2023-06-23 PROCEDURE — 80048 BASIC METABOLIC PNL TOTAL CA: CPT

## 2023-06-23 PROCEDURE — 36415 COLL VENOUS BLD VENIPUNCTURE: CPT

## 2023-06-23 NOTE — TELEPHONE ENCOUNTER
----- Message from Scott Preciado, [address] sent at 6/23/2023  3:07 PM EDT -----  Please let patient know that bmp reviehankswed and sodium better at 140  Have her decrease sodium salt tablets to 1 tablet two times daily  Get repeat BMP next Thursday

## 2023-06-23 NOTE — TELEPHONE ENCOUNTER
Called and spoke with patient  Patient aware bmp reviewed and sodium better at 140   Have her decrease sodium salt tablets to 1 tablet two times daily   Get repeat BMP next Thursday  Labs been placed

## 2023-06-29 ENCOUNTER — APPOINTMENT (OUTPATIENT)
Dept: LAB | Facility: CLINIC | Age: 88
End: 2023-06-29
Payer: COMMERCIAL

## 2023-06-29 DIAGNOSIS — E87.1 HYPONATREMIA: ICD-10-CM

## 2023-06-29 DIAGNOSIS — N28.1 RENAL CYST, LEFT: ICD-10-CM

## 2023-06-29 LAB
ANION GAP SERPL CALCULATED.3IONS-SCNC: 2 MMOL/L
BUN SERPL-MCNC: 16 MG/DL (ref 5–25)
CALCIUM SERPL-MCNC: 9 MG/DL (ref 8.3–10.1)
CHLORIDE SERPL-SCNC: 111 MMOL/L (ref 96–108)
CO2 SERPL-SCNC: 26 MMOL/L (ref 21–32)
CREAT SERPL-MCNC: 1.15 MG/DL (ref 0.6–1.3)
GFR SERPL CREATININE-BSD FRML MDRD: 42 ML/MIN/1.73SQ M
GLUCOSE P FAST SERPL-MCNC: 125 MG/DL (ref 65–99)
POTASSIUM SERPL-SCNC: 4.5 MMOL/L (ref 3.5–5.3)
SODIUM SERPL-SCNC: 139 MMOL/L (ref 135–147)

## 2023-06-29 PROCEDURE — 80048 BASIC METABOLIC PNL TOTAL CA: CPT

## 2023-06-29 PROCEDURE — 36415 COLL VENOUS BLD VENIPUNCTURE: CPT

## 2023-06-30 ENCOUNTER — TELEPHONE (OUTPATIENT)
Dept: NEPHROLOGY | Facility: HOSPITAL | Age: 88
End: 2023-06-30

## 2023-06-30 DIAGNOSIS — N28.1 RENAL CYST, LEFT: ICD-10-CM

## 2023-06-30 DIAGNOSIS — E87.6 HYPOKALEMIA: ICD-10-CM

## 2023-06-30 DIAGNOSIS — N18.32 CHRONIC KIDNEY DISEASE, STAGE 3B (HCC): Primary | ICD-10-CM

## 2023-06-30 NOTE — TELEPHONE ENCOUNTER
Called and spoke with patient  Patient aware patient's sodium is stable at 139, please assess how her swelling is a lil puffy on the new salt tablet regimen, we will plan to repeat a renal function panel in 2 weeks to monitor, we will decrease the frequency of these as her sodium level remained stable  Labs been placed

## 2023-06-30 NOTE — TELEPHONE ENCOUNTER
----- Message from Lang Aguayo, [address] sent at 6/30/2023 12:52 PM EDT -----  Please call patient let them know reviewed recent lab work, patient's sodium is stable at 139, please assess how her swelling is doing on the new salt tablet regimen, we will plan to repeat a renal function panel in 2 weeks to monitor, we will decrease the frequency of these as her sodium level remained stable

## 2023-07-14 ENCOUNTER — APPOINTMENT (OUTPATIENT)
Dept: LAB | Facility: CLINIC | Age: 88
End: 2023-07-14
Payer: COMMERCIAL

## 2023-07-14 ENCOUNTER — TELEPHONE (OUTPATIENT)
Dept: NEPHROLOGY | Facility: CLINIC | Age: 88
End: 2023-07-14

## 2023-07-14 DIAGNOSIS — E87.6 HYPOKALEMIA: ICD-10-CM

## 2023-07-14 DIAGNOSIS — N18.32 CHRONIC KIDNEY DISEASE, STAGE 3B (HCC): ICD-10-CM

## 2023-07-14 DIAGNOSIS — N28.1 RENAL CYST, LEFT: ICD-10-CM

## 2023-07-14 LAB
ALBUMIN SERPL BCP-MCNC: 3.5 G/DL (ref 3.5–5)
ANION GAP SERPL CALCULATED.3IONS-SCNC: 4 MMOL/L
BUN SERPL-MCNC: 16 MG/DL (ref 5–25)
CALCIUM SERPL-MCNC: 8.8 MG/DL (ref 8.3–10.1)
CHLORIDE SERPL-SCNC: 107 MMOL/L (ref 96–108)
CO2 SERPL-SCNC: 28 MMOL/L (ref 21–32)
CREAT SERPL-MCNC: 1.25 MG/DL (ref 0.6–1.3)
GFR SERPL CREATININE-BSD FRML MDRD: 38 ML/MIN/1.73SQ M
GLUCOSE P FAST SERPL-MCNC: 109 MG/DL (ref 65–99)
PHOSPHATE SERPL-MCNC: 3.8 MG/DL (ref 2.3–4.1)
POTASSIUM SERPL-SCNC: 3.6 MMOL/L (ref 3.5–5.3)
SODIUM SERPL-SCNC: 139 MMOL/L (ref 135–147)

## 2023-07-14 PROCEDURE — 36415 COLL VENOUS BLD VENIPUNCTURE: CPT

## 2023-07-14 PROCEDURE — 80069 RENAL FUNCTION PANEL: CPT

## 2023-07-14 NOTE — TELEPHONE ENCOUNTER
Pt's caregiver called and stated pt got labs done today at [address] in [facility], [facility]. They only got a renal function panel done but the lab took an extra vile of blood just in case pt needed to get her sodium checked. If the doctor wants her sodium to get check they would have to put in a lab script. Please call the lab or caregiver if the doctor wants to get the sodium check.

## 2023-07-14 NOTE — TELEPHONE ENCOUNTER
No need for repeat labs. Sodium level 139 normal and creatinine remained stable at baseline from today's labs.

## 2023-07-21 ENCOUNTER — OFFICE VISIT (OUTPATIENT)
Dept: GASTROENTEROLOGY | Facility: CLINIC | Age: 88
End: 2023-07-21

## 2023-07-21 VITALS
SYSTOLIC BLOOD PRESSURE: 128 MMHG | BODY MASS INDEX: 34.36 KG/M2 | HEIGHT: 61 IN | DIASTOLIC BLOOD PRESSURE: 70 MMHG | WEIGHT: 182 LBS

## 2023-07-21 DIAGNOSIS — A04.8 H. PYLORI INFECTION: Primary | ICD-10-CM

## 2023-07-21 RX ORDER — BISMUTH SUBSALICYLATE 262 MG/1
262 TABLET, CHEWABLE ORAL
Qty: 30 TABLET | Refills: 0 | Status: SHIPPED | OUTPATIENT
Start: 2023-07-21

## 2023-07-21 RX ORDER — OMEPRAZOLE 20 MG/1
20 CAPSULE, DELAYED RELEASE ORAL DAILY
Qty: 14 CAPSULE | Refills: 0 | Status: SHIPPED | OUTPATIENT
Start: 2023-07-21 | End: 2023-08-04

## 2023-07-21 RX ORDER — METRONIDAZOLE 250 MG/1
250 TABLET ORAL EVERY 8 HOURS SCHEDULED
Qty: 42 TABLET | Refills: 0 | Status: SHIPPED | OUTPATIENT
Start: 2023-07-21 | End: 2023-08-04

## 2023-07-21 RX ORDER — TETRACYCLINE HYDROCHLORIDE 500 MG/1
500 CAPSULE ORAL 4 TIMES DAILY
Qty: 56 CAPSULE | Refills: 0 | Status: SHIPPED | OUTPATIENT
Start: 2023-07-21 | End: 2023-08-04

## 2023-07-21 NOTE — PATIENT INSTRUCTIONS
In order to treat your H. pylori infection you will need to take medications for 2 weeks. Take omeprazole 20 mg twice a day, morning and night. Take metronidazole 250 mg 3 times a day, morning, lunch and dinner. Take tetracycline 500 mg 4 times a day. Take bismuth (Pepto-Bismol) 262 mg 4 times a day. You take his medications for total of 14 days and then stop.   A week after completing treatment he will need to be retested for H. pylori with a stool test.

## 2023-07-21 NOTE — PROGRESS NOTES
[facility] - Outpatient Consultation  Citlali Vila 80 y.o. female MRN: [id number]  Encounter: [id number]        ASSESSMENT AND PLAN     1. H. pylori infection  -Patient never received quadruple therapy for H. pylori. - We will plan for quadruple therapy with bismuth, tetracycline, metronidazole, PPI for 14 days and then a month after completion of therapy will repeat testing with H. pylori stool antigen test  - Discussed benefits and risks of treating and patient is agreeable  - bismuth subsalicylate (PEPTO BISMOL) 262 MG chewable tablet; Chew 1 tablet (262 mg total) 4 (four) times a day (before meals and at bedtime)  Dispense: 30 tablet; Refill: 0  - tetracycline (ACHROMYCIN,SUMYCIN) 500 MG capsule; Take 1 capsule (500 mg total) by mouth 4 (four) times a day for 14 days  Dispense: 56 capsule; Refill: 0  - metroNIDAZOLE (FLAGYL) 250 mg tablet; Take 1 tablet (250 mg total) by mouth every 8 (eight) hours for 14 days  Dispense: 42 tablet; Refill: 0  - omeprazole (PriLOSEC) 20 mg delayed release capsule; Take 1 capsule (20 mg total) by mouth daily for 14 days  Dispense: 14 capsule; Refill: 0  - H. pylori antigen, stool; Future      HISTORY OF PRESENT ILLNESS     41-year-old female with history of chronic constipation, type 2 diabetes, peripheral neuropathy, PAD, congestive heart failure, atrial fibrillation not on anticoagulation, CKD stage IIIb who presents as follow up for H pylori. She continues to experience early satiety, but denies N/V, reflux, abd pain. Her BM's are improving and now having a BM almost every other day since EGD. States she has lost ~35 lbs in 6 months, but has been diuresing well. She takes senna and Colace for constipation. She was hospitalized in May for nausea and had CT performed showing gastric distention. Given the combination of early satiety, nausea and weight loss she underwent EGD on 5/4. Endoscopically it was unremarkable.   Biopsy results showed no evidence of mucosa abnormality or Celiac, but were positive for H pylori. Patient reports she never received treatment for H. pylori. Denies fevers, chills, nausea, vomiting, dysphagia, odynophagia, abdominal pain. REVIEW OF SYSTEMS     CONSTITUTIONAL: Denies any fever, chills, rigors, and weight loss. HEENT: No earache or tinnitus. Denies hearing loss or visual disturbances. CARDIOVASCULAR: No chest pain or palpitations. RESPIRATORY: Denies any cough, hemoptysis, shortness of breath or dyspnea on exertion. GASTROINTESTINAL: As noted in the History of Present Illness. GENITOURINARY: No problems with urination. Denies any hematuria or dysuria. NEUROLOGIC: No dizziness or vertigo, denies headaches. MUSCULOSKELETAL: Denies any muscle or joint pain. SKIN: Denies skin rashes or itching. ENDOCRINE: Denies excessive thirst. Denies intolerance to heat or cold. PSYCHOSOCIAL: Denies depression or anxiety. Denies any recent memory loss.        Historical information     Past Medical History:   Diagnosis Date   • Anemia    • Anxiety    • Arthritis    • CKD (chronic kidney disease), stage III ([address]) 11/29/2018   • Depression, recurrent ([address]) 6/14/2021   • Diabetes mellitus ([address])    • Diabetic peripheral neuropathy (HCC)    • HLD (hyperlipidemia)    • Hyperlipidemia    • Hypertension    • 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 8/23/2017   • Thyroid enlarged 2/23/2015   • Tinnitus    • Vertigo      Past Surgical History:   Procedure Laterality Date   • APPENDECTOMY     • EAR SURGERY Left    • KNEE SURGERY     • TONSILLECTOMY     • UPPER GASTROINTESTINAL ENDOSCOPY       Social History   Social History     Substance and Sexual Activity   Alcohol Use Not Currently     Social History     Substance and Sexual Activity   Drug Use Never     Social History     Tobacco Use   Smoking Status Never   Smokeless Tobacco Never Family History   Problem Relation Age of Onset   • Heart disease Mother    • Diabetes Mother    • Heart disease Father    • Stroke Father    • Cancer Family    • Dementia Sister    • Liver disease Other        Allergies       Current Outpatient Medications:   •  acetaminophen (TYLENOL) 325 mg tablet  •  amiodarone 100 mg tablet  •  amLODIPine (NORVASC) 2.5 mg tablet  •  ASPIRIN 81 PO  •  atorvastatin (LIPITOR) 80 mg tablet  •  Bacillus Coagulans-Inulin (PROBIOTIC FORMULA PO)  •  bismuth subsalicylate (PEPTO BISMOL) 262 MG chewable tablet  •  Blood Glucose Monitoring Suppl (ONE TOUCH ULTRA 2) w/Device KIT  •  carvedilol (COREG) 25 mg tablet  •  Cholecalciferol 125 MCG (5000 UT) capsule  •  dicyclomine (BENTYL) 10 mg capsule  •  docusate sodium (COLACE) 100 mg capsule  •  glucose blood (OneTouch Ultra) test strip  •  Lancets (onetouch ultrasoft) lancets  •  lidocaine (LIDODERM) 5 %  •  lisinopril (ZESTRIL) 10 mg tablet  •  metroNIDAZOLE (FLAGYL) 250 mg tablet  •  Multiple Vitamins-Minerals (multivitamin with minerals) tablet  •  NON FORMULARY  •  NON FORMULARY  •  Omega 3-6-9 Fatty Acids (OMEGA 3-6-9 PO)  •  omeprazole (PriLOSEC) 20 mg delayed release capsule  •  saccharomyces boulardii (FLORASTOR) 250 mg capsule  •  senna (SENOKOT) 8.6 mg  •  sodium chloride 1 g tablet  •  tetracycline (ACHROMYCIN,SUMYCIN) 500 MG capsule  •  torsemide (DEMADEX) 10 mg tablet    Allergies   Allergen Reactions   • Hydrochlorothiazide      Hyponatremia, severe   • Hydrochlorothiazide Other (See Comments)     . • Statins Other (See Comments)     . • Statins            Objective assessment       Blood pressure 128/70, height 5' 0.75" (1.543 m), weight 82.6 kg (182 lb), not currently breastfeeding. Body mass index is 34.67 kg/m².         PHYSICAL EXAM:         Physical Exam:   GENERAL: NAD  HEENT:  NC/AT, MMM, no scleral icterus  CARDIAC:  Regular rate and rhythm  PULMONARY:  No respiratory distress, no wheezing/rales/rhonci, non-labored breathing  ABDOMEN:  Soft, NT/ND, +BS, no rebound/guarding/rigidity  Extremities:  No edema, cyanosis, or clubbing  NEUROLOGIC:  Alert/oriented x3. SKIN:  No rashes or erythema      Lab Results:      No visits with results within 1 Day(s) from this visit. Latest known visit with results is:   Appointment on 07/14/2023   Component Date Value   • Albumin 07/14/2023 3.5    • Calcium 07/14/2023 8.8    • Phosphorus 07/14/2023 3.8    • BUN 07/14/2023 16    • Creatinine 07/14/2023 1.25    • Sodium 07/14/2023 139    • Potassium 07/14/2023 3.6    • Chloride 07/14/2023 107    • CO2 07/14/2023 28    • ANION GAP 07/14/2023 4    • eGFR 07/14/2023 38    • Glucose, Fasting 07/14/2023 109 (H)          Radiology Results:      No results found.       Oswald Russell MD  [facility]

## 2023-07-24 ENCOUNTER — TELEPHONE (OUTPATIENT)
Age: 88
End: 2023-07-24

## 2023-07-24 NOTE — TELEPHONE ENCOUNTER
Returned patients nephews phone call, explained she should go to a [facility] lab where they will be able to provide the kit she needs. He understood and will have her do this.

## 2023-07-24 NOTE — TELEPHONE ENCOUNTER
Patients GI provider:  Dr. Umair Noonan    Number to return call: [phone]    Reason for call: Pt's nephew calling because his aunt was prescribed new meds at her last visit and she was told to pick those and a stool sample kit up at her pharmacy. She picked up the meds but they told her they do not carry stool sample kits and she now needs to know if that is something she gets from the office or if there is a different location she should go to?   Please call Joe(nephew) at above to advise    Scheduled procedure/appointment date if applicable: NA

## 2023-07-27 ENCOUNTER — RA CDI HCC (OUTPATIENT)
Dept: OTHER | Facility: HOSPITAL | Age: 88
End: 2023-07-27

## 2023-07-27 NOTE — PROGRESS NOTES
[address] [facility] coding opportunities          Chart Reviewed number of suggestions sent to Provider: 2   I13.0  E11.51    Patients Insurance     Medicare Insurance: [employer]age

## 2023-08-01 ENCOUNTER — OFFICE VISIT (OUTPATIENT)
Dept: FAMILY MEDICINE CLINIC | Facility: CLINIC | Age: 88
End: 2023-08-01
Payer: COMMERCIAL

## 2023-08-01 VITALS
SYSTOLIC BLOOD PRESSURE: 126 MMHG | WEIGHT: 177 LBS | DIASTOLIC BLOOD PRESSURE: 72 MMHG | HEIGHT: 61 IN | BODY MASS INDEX: 33.42 KG/M2 | OXYGEN SATURATION: 97 % | HEART RATE: 60 BPM

## 2023-08-01 DIAGNOSIS — M51.36 DEGENERATIVE DISC DISEASE, LUMBAR: ICD-10-CM

## 2023-08-01 DIAGNOSIS — R60.0 BILATERAL LEG EDEMA: ICD-10-CM

## 2023-08-01 DIAGNOSIS — N18.32 TYPE 2 DIABETES MELLITUS WITH STAGE 3B CHRONIC KIDNEY DISEASE, WITHOUT LONG-TERM CURRENT USE OF INSULIN (HCC): Primary | ICD-10-CM

## 2023-08-01 DIAGNOSIS — I12.9 BENIGN HYPERTENSION WITH CKD (CHRONIC KIDNEY DISEASE) STAGE III (HCC): ICD-10-CM

## 2023-08-01 DIAGNOSIS — E87.1 HYPONATREMIA: ICD-10-CM

## 2023-08-01 DIAGNOSIS — E87.6 HYPOKALEMIA: ICD-10-CM

## 2023-08-01 DIAGNOSIS — E11.22 TYPE 2 DIABETES MELLITUS WITH STAGE 3B CHRONIC KIDNEY DISEASE, WITHOUT LONG-TERM CURRENT USE OF INSULIN (HCC): Primary | ICD-10-CM

## 2023-08-01 DIAGNOSIS — N18.30 BENIGN HYPERTENSION WITH CKD (CHRONIC KIDNEY DISEASE) STAGE III (HCC): ICD-10-CM

## 2023-08-01 DIAGNOSIS — E78.5 HYPERLIPIDEMIA, UNSPECIFIED HYPERLIPIDEMIA TYPE: ICD-10-CM

## 2023-08-01 DIAGNOSIS — I48.91 ATRIAL FIBRILLATION, UNSPECIFIED TYPE (HCC): ICD-10-CM

## 2023-08-01 PROCEDURE — 99214 OFFICE O/P EST MOD 30 MIN: CPT | Performed by: FAMILY MEDICINE

## 2023-08-01 RX ORDER — AMLODIPINE BESYLATE 2.5 MG/1
2.5 TABLET ORAL DAILY
Qty: 90 TABLET | Refills: 1 | Status: SHIPPED | OUTPATIENT
Start: 2023-08-01

## 2023-08-01 RX ORDER — LISINOPRIL 10 MG/1
10 TABLET ORAL DAILY
Qty: 90 TABLET | Refills: 1 | Status: SHIPPED | OUTPATIENT
Start: 2023-08-01

## 2023-08-01 RX ORDER — SODIUM CHLORIDE 1 G/1
1 TABLET ORAL 3 TIMES DAILY
Qty: 180 TABLET | Refills: 1 | Status: SHIPPED | OUTPATIENT
Start: 2023-08-01

## 2023-08-01 RX ORDER — LIDOCAINE 50 MG/G
1 PATCH TOPICAL DAILY
Qty: 90 PATCH | Refills: 1 | Status: SHIPPED | OUTPATIENT
Start: 2023-08-01

## 2023-08-01 RX ORDER — TORSEMIDE 10 MG/1
10 TABLET ORAL EVERY OTHER DAY
Qty: 45 TABLET | Refills: 1 | Status: SHIPPED | OUTPATIENT
Start: 2023-08-01

## 2023-08-01 RX ORDER — ATORVASTATIN CALCIUM 80 MG/1
80 TABLET, FILM COATED ORAL DAILY
Qty: 90 TABLET | Refills: 1 | Status: SHIPPED | OUTPATIENT
Start: 2023-08-01

## 2023-08-01 NOTE — PATIENT INSTRUCTIONS
Lab due  1st week of  November  after Halloween , scripts  for 90  days  Obesity   AMBULATORY CARE:   Obesity  means your body mass index (BMI) is greater than 30. Your healthcare provider will use your age, height, and weight to measure your BMI. The risks of obesity include  many health problems, including injuries or physical disability. Diabetes (high blood sugar level)    High blood pressure or high cholesterol    Heart disease    Stroke    Gallbladder or liver disease    Cancer of the colon, breast, prostate, liver, or kidney    Sleep apnea    Arthritis or gout    Screening  is done to check for health conditions before you have signs or symptoms. If you are 28to 79years old, your blood sugar level may be checked every 3 years for signs of prediabetes or diabetes. Your healthcare provider will check your blood pressure at each visit. High blood pressure can lead to a stroke or other problems. Your provider may check for signs of heart disease, cancer, or other health problems. Seek care immediately if:   You have a severe headache, confusion, or difficulty speaking. You have weakness on one side of your body. You have chest pain, sweating, or shortness of breath. Call your doctor if:   You have symptoms of gallbladder or liver disease, such as pain in your upper abdomen. You have knee or hip pain and discomfort while walking. You have symptoms of diabetes, such as intense hunger and thirst, and frequent urination. You have symptoms of sleep apnea, such as snoring or daytime sleepiness. You have questions or concerns about your condition or care. Treatment for obesity  focuses on helping you lose weight to improve your health. Even a small decrease in BMI can reduce the risk for many health problems. Your healthcare provider will help you set a weight-loss goal.  Lifestyle changes  are the first step in treating obesity.  These include making healthy food choices and getting regular physical activity. Your healthcare provider may suggest a weight-loss program that involves coaching, education, and therapy. Medicine  may help you lose weight when it is used with a healthy foods and physical activity. Surgery  can help you lose weight if you have obesity along with other health problems. Several types of weight-loss surgery are available. Ask your healthcare provider for more information. Tips for safe weight loss:   Set small, realistic goals. An example of a small goal is to walk for 20 minutes 5 days a week. Anther goal is to lose 5% of your body weight. Ask for support. Tell friends, family members, and coworkers about your goals. Ask someone to lose weight with you. You may also want to join a weight-loss support group. Identify foods or triggers that may cause you to overeat. Remove tempting high-calorie foods from your home and workplace. Place a bowl of fresh fruit on your kitchen counter. If stress causes you to eat, find other ways to cope with stress. A counselor or therapist may be able to help you. Track your daily calories and activity. Write down what you eat and drink. Also write down how many minutes of physical activity you do each day. Track your weekly weight. Weigh yourself in the morning, before you eat or drink anything but after you use the bathroom. Use the same scale, in the same place, and in similar clothing each time. Only weigh yourself 1 to 2 times each week, or as directed. You may become discouraged if you weigh yourself every day. Eating changes: You will need to eat 500 to 1,000 fewer calories each day than you currently eat to lose 1 to 2 pounds a week. The following changes will help you cut calories:  Eat smaller portions. Use small plates, no larger than 9 inches in diameter. Fill your plate half full of fruits and vegetables. Measure your food using measuring cups until you know what a serving size looks like.          Eat 3 meals and 1 or 2 snacks each day. Plan your meals in advance. Jovan Filbert and eat at home most of the time. Eat slowly. Do not skip meals. Skipping meals can lead to overeating later in the day. This can make it harder for you to lose weight. Talk with a dietitian to help you make a meal plan and schedule that is right for you. Eat fruits and vegetables at every meal.  They are low in calories and high in fiber, which makes you feel full. Do not add butter, margarine, or cream sauce to vegetables. Use herbs to season steamed vegetables. Eat less fat and fewer fried foods. Eat more baked or grilled chicken and fish. These protein sources are lower in calories and fat than red meat. Limit fast food. Dress your salads with olive oil and vinegar instead of bottled dressing. Limit the amount of sugar you eat. Do not drink sugary beverages. Limit alcohol. Activity changes:  Physical activity is good for your body in many ways. It helps you burn calories and build strong muscles. It decreases stress and depression, and improves your mood. It can also help you sleep better. Talk to your healthcare provider before you begin an exercise program.  Exercise for at least 30 minutes 5 days a week. Start slowly. Set aside time each day for physical activity that you enjoy and that is convenient for you. It is best to do both weight training and an activity that increases your heart rate, such as walking, bicycling, or swimming. Find ways to be more active. Do yard work and housecleaning. Walk up the stairs instead of using elevators. Spend your leisure time going to events that require walking, such as outdoor festivals or fairs. This extra physical activity can help you lose weight and keep it off. Follow up with your doctor as directed: You may need to meet with a dietitian. Write down your questions so you remember to ask them during your visits.   © Copyright [employer] 2022 Information is for End User's use only and may not be sold, redistributed or otherwise used for commercial purposes. The above information is an  only. It is not intended as medical advice for individual conditions or treatments. Talk to your doctor, nurse or pharmacist before following any medical regimen to see if it is safe and effective for you. Weight Management   AMBULATORY CARE:   Why it is important to manage your weight:  Being overweight increases your risk of health conditions such as heart disease, high blood pressure, type 2 diabetes, and certain types of cancer. It can also increase your risk for osteoarthritis, sleep apnea, and other respiratory problems. Aim for a slow, steady weight loss. Even a small amount of weight loss can lower your risk of health problems. Risks of being overweight:  Extra weight can cause many health problems, including the following:  Diabetes (high blood sugar level)    High blood pressure or high cholesterol    Heart disease    Stroke    Gallbladder or liver disease    Cancer of the colon, breast, prostate, liver, or kidney    Sleep apnea    Arthritis or gout    Screening  is done to check for health conditions before you have signs or symptoms. If you are 28to 79years old, your blood sugar level may be checked every 3 years for signs of prediabetes or diabetes. Your healthcare provider will check your blood pressure at each visit. High blood pressure can lead to a stroke or other problems. Your provider may check for signs of heart disease, cancer, or other health problems. How to lose weight safely:  A safe and healthy way to lose weight is to eat fewer calories and get regular exercise. You can lose up about 1 pound a week by decreasing the number of calories you eat by 500 calories each day. You can decrease calories by eating smaller portion sizes or by cutting out high-calorie foods. Read labels to find out how many calories are in the foods you eat.          You can also burn calories with exercise such as walking, swimming, or biking. You will be more likely to keep weight off if you make these changes part of your lifestyle. Exercise at least 30 minutes per day on most days of the week. You can also fit in more physical activity by taking the stairs instead of the elevator or parking farther away from stores. Ask your healthcare provider about the best exercise plan for you. Healthy meal plan for weight management:  A healthy meal plan includes a variety of foods, contains fewer calories, and helps you stay healthy. A healthy meal plan includes the following:     Eat whole-grain foods more often. A healthy meal plan should contain fiber. Fiber is the part of grains, fruits, and vegetables that is not broken down by your body. Whole-grain foods are healthy and provide extra fiber in your diet. Some examples of whole-grain foods are whole-wheat breads and pastas, oatmeal, brown rice, and bulgur. Eat a variety of vegetables every day. Include dark, leafy greens such as spinach, kale, rajesh greens, and mustard greens. Eat yellow and orange vegetables such as carrots, sweet potatoes, and winter squash. Eat a variety of fruits every day. Choose fresh or canned fruit (canned in its own juice or light syrup) instead of juice. Fruit juice has very little or no fiber. Eat low-fat dairy foods. Drink fat-free (skim) milk or 1% milk. Eat fat-free yogurt and low-fat cottage cheese. Try low-fat cheeses such as mozzarella and other reduced-fat cheeses. Choose meat and other protein foods that are low in fat. Choose beans or other legumes such as split peas or lentils. Choose fish, skinless poultry (chicken or turkey), or lean cuts of red meat (beef or pork). Before you cook meat or poultry, cut off any visible fat. Use less fat and oil. Try baking foods instead of frying them.  Add less fat, such as margarine, sour cream, regular salad dressing and mayonnaise to foods. Eat fewer high-fat foods. Some examples of high-fat foods include french fries, doughnuts, ice cream, and cakes. Eat fewer sweets. Limit foods and drinks that are high in sugar. This includes candy, cookies, regular soda, and sweetened drinks. Ways to decrease calories:   Eat smaller portions. Use a small plate with smaller servings. Do not eat second helpings. When you eat at a restaurant, ask for a box and place half of your meal in the box before you eat. Share an entrée with someone else. Replace high-calorie snacks with healthy, low-calorie snacks. Choose fresh fruit, vegetables, fat-free rice cakes, or air-popped popcorn instead of potato chips, nuts, or chocolate. Choose water or calorie-free drinks instead of soda or sweetened drinks. Do not shop for groceries when you are hungry. You may be more likely to make unhealthy food choices. Take a grocery list of healthy foods and shop after you have eaten. Eat regular meals. Do not skip meals. Skipping meals can lead to overeating later in the day. This can make it harder for you to lose weight. Eat a healthy snack in place of a meal if you do not have time to eat a regular meal. Talk with a dietitian to help you create a meal plan and schedule that is right for you. Other things to consider as you try to lose weight:   Be aware of situations that may give you the urge to overeat, such as eating while watching television. Find ways to avoid these situations. For example, read a book, go for a walk, or do crafts. Meet with a weight loss support group or friends who are also trying to lose weight. This may help you stay motivated to continue working on your weight loss goals. © Copyright [employer] 2022 Information is for End User's use only and may not be sold, redistributed or otherwise used for commercial purposes. The above information is an  only.  It is not intended as medical advice for individual conditions or treatments. Talk to your doctor, nurse or pharmacist before following any medical regimen to see if it is safe and effective for you.

## 2023-08-01 NOTE — PROGRESS NOTES
Name: Yanely Espinoza      : 1934      MRN: [id number]  Encounter Provider: Issa Brooks MD  Encounter Date: 2023   Encounter department: [facility] PRIMARY CARE    Assessment & Plan     1. Type 2 diabetes mellitus with stage 3b chronic kidney disease, without long-term current use of insulin (HCC)  -     Hemoglobin A1C; Future    2. Atrial fibrillation, unspecified type ([address])    3. Hypokalemia  Assessment & Plan:  stable      4. Bilateral leg edema  Assessment & Plan:  stable    Orders:  -     torsemide (DEMADEX) 10 mg tablet; Take 1 tablet (10 mg total) by mouth every other day    5. Hyponatremia  Assessment & Plan:  Taking  2  Salt tabs/day    Orders:  -     torsemide (DEMADEX) 10 mg tablet; Take 1 tablet (10 mg total) by mouth every other day  -     sodium chloride 1 g tablet; Take 1 tablet (1 g total) by mouth 3 (three) times a day    6. Benign hypertension with CKD (chronic kidney disease) stage III ([facility])  -     amLODIPine (NORVASC) 2.5 mg tablet; Take 1 tablet (2.5 mg total) by mouth daily  -     lisinopril (ZESTRIL) 10 mg tablet; Take 1 tablet (10 mg total) by mouth daily    7. Degenerative disc disease, lumbar  -     lidocaine (LIDODERM) 5 %; Apply 1 patch topically over 12 hours daily Remove & Discard patch within 12 hours or as directed by MD    8. Hyperlipidemia, unspecified hyperlipidemia type  -     atorvastatin (LIPITOR) 80 mg tablet; Take 1 tablet (80 mg total) by mouth daily  -     Lipid panel; Future    9. BMI 33.0-33.9,adult         Subjective      Saw  GI doc, scope showed  H pylori, completing  Therapy with some  Diarrhea  And black stools, sodium, potassium, bp and  Kidney function all stable and acceptable, seeing cards and kidney doc later in month, feels  Well, no cp, no sob    Review of Systems   Constitutional: Negative for activity change, appetite change and fatigue. Respiratory: Negative for shortness of breath. Cardiovascular: Negative for chest pain. Neurological: Negative for dizziness and headaches. Hematological: Negative for adenopathy.        Current Outpatient Medications on File Prior to Visit   Medication Sig   • amiodarone 100 mg tablet Take 1 tablet (100 mg total) by mouth daily   • ASPIRIN 81 PO Take by mouth   • carvedilol (COREG) 25 mg tablet Take 1 tablet (25 mg total) by mouth 2 (two) times a day with meals   • Cholecalciferol 125 MCG (5000 UT) capsule Take 1,000 Units by mouth daily   • dicyclomine (BENTYL) 10 mg capsule Take 1 capsule by mouth twice daily   • metroNIDAZOLE (FLAGYL) 250 mg tablet Take 1 tablet (250 mg total) by mouth every 8 (eight) hours for 14 days   • Multiple Vitamins-Minerals (multivitamin with minerals) tablet Take 1 tablet by mouth daily   • NON FORMULARY Lung support 200mg once daily at nighttime   • NON FORMULARY Fosal [place] 400mg daily at bedtime   • Omega 3-6-9 Fatty Acids (OMEGA 3-6-9 PO) Take by mouth   • omeprazole (PriLOSEC) 20 mg delayed release capsule Take 1 capsule (20 mg total) by mouth daily for 14 days   • tetracycline (ACHROMYCIN,SUMYCIN) 500 MG capsule Take 1 capsule (500 mg total) by mouth 4 (four) times a day for 14 days   • [DISCONTINUED] amLODIPine (NORVASC) 2.5 mg tablet Take 1 tablet by mouth once daily   • [DISCONTINUED] atorvastatin (LIPITOR) 80 mg tablet Take 80 mg by mouth daily   • [DISCONTINUED] lidocaine (LIDODERM) 5 % Apply 1 patch topically over 12 hours daily Remove & Discard patch within 12 hours or as directed by MD   • [DISCONTINUED] lisinopril (ZESTRIL) 10 mg tablet Take 10 mg by mouth daily   • [DISCONTINUED] sodium chloride 1 g tablet Take 1 tablet (1 g total) by mouth 3 (three) times a day   • [DISCONTINUED] torsemide (DEMADEX) 10 mg tablet Take 1 tablet (10 mg total) by mouth every other day   • acetaminophen (TYLENOL) 325 mg tablet Take 3 tablets (975 mg total) by mouth every 8 (eight) hours (Patient not taking: Reported on 8/1/2023)   • Blood Glucose Monitoring Suppl (ONE TOUCH ULTRA 2) w/Device KIT Use daily   • docusate sodium (COLACE) 100 mg capsule Take 100 mg by mouth 2 (two) times a day   • glucose blood (OneTouch Ultra) test strip Test daily                        DX: E11.9   • Lancets (onetouch ultrasoft) lancets Test daily   • senna (SENOKOT) 8.6 mg Take 2 tablets (17.2 mg total) by mouth daily as needed for constipation (Patient not taking: Reported on 8/1/2023)   • [DISCONTINUED] Bacillus Coagulans-Inulin (PROBIOTIC FORMULA PO) Take by mouth 97mg two times a day for stomach health supplement (Patient not taking: Reported on 8/1/2023)   • [DISCONTINUED] bismuth subsalicylate (PEPTO BISMOL) 262 MG chewable tablet Chew 1 tablet (262 mg total) 4 (four) times a day (before meals and at bedtime) (Patient not taking: Reported on 8/1/2023)   • [DISCONTINUED] saccharomyces boulardii (FLORASTOR) 250 mg capsule Take 250 mg by mouth 2 (two) times a day (Patient not taking: Reported on 8/1/2023)       Objective     /72 (BP Location: Right arm, Patient Position: Sitting, Cuff Size: Large)   Pulse 60   Ht 5' 0.75" (1.543 m)   Wt 80.3 kg (177 lb)   SpO2 97%   BMI 33.72 kg/m²     Physical Exam  Vitals reviewed. Constitutional:       Appearance: Normal appearance. She is obese. Neck:      Vascular: No carotid bruit. Cardiovascular:      Rate and Rhythm: Normal rate and regular rhythm. Pulses: Normal pulses. no weak pulses          Dorsalis pedis pulses are 2+ on the right side and 2+ on the left side. Posterior tibial pulses are 2+ on the right side and 2+ on the left side. Heart sounds: Normal heart sounds. Pulmonary:      Effort: Pulmonary effort is normal.      Breath sounds: Normal breath sounds. Musculoskeletal:      Right lower leg: Edema present. Left lower leg: Edema present. Comments: 1  Plus edema   Feet:      Right foot:      Skin integrity: No ulcer, skin breakdown, erythema, warmth, callus or dry skin.       Left foot:      Skin integrity: No ulcer, skin breakdown, erythema, warmth, callus or dry skin. Lymphadenopathy:      Cervical: No cervical adenopathy. Neurological:      Mental Status: She is alert. Psychiatric:         Mood and Affect: Mood normal.      Patient's shoes and socks removed. Right Foot/Ankle   Right Foot Inspection  Skin Exam: skin normal and skin intact. No dry skin, no warmth, no callus, no erythema, no maceration, no abnormal color, no pre-ulcer, no ulcer and no callus. Toe Exam: ROM and strength within normal limits. Sensory   Monofilament testing: intact    Vascular  Capillary refills: < 3 seconds  The right DP pulse is 2+. The right PT pulse is 2+. Left Foot/Ankle  Left Foot Inspection  Skin Exam: skin normal and skin intact. No dry skin, no warmth, no erythema, no maceration, normal color, no pre-ulcer, no ulcer and no callus. Toe Exam: ROM and strength within normal limits. Sensory   Monofilament testing: intact    Vascular  Capillary refills: < 3 seconds  The left DP pulse is 2+. The left PT pulse is 2+. Assign Risk Category  No deformity present  No loss of protective sensation  No weak pulses  Risk: 0    Karel Portillo MD  BMI Counseling: Body mass index is 33.72 kg/m². The BMI is above normal. Nutrition recommendations include reducing portion sizes, decreasing overall calorie intake, 3-5 servings of fruits/vegetables daily, reducing fast food intake and consuming healthier snacks. Exercise recommendations include exercising 3-5 times per week.

## 2023-08-08 ENCOUNTER — TELEPHONE (OUTPATIENT)
Dept: FAMILY MEDICINE CLINIC | Facility: CLINIC | Age: 88
End: 2023-08-08

## 2023-08-08 ENCOUNTER — LAB (OUTPATIENT)
Dept: LAB | Facility: CLINIC | Age: 88
End: 2023-08-08
Payer: COMMERCIAL

## 2023-08-08 DIAGNOSIS — A04.8 H. PYLORI INFECTION: ICD-10-CM

## 2023-08-08 PROCEDURE — 87338 HPYLORI STOOL AG IA: CPT

## 2023-08-08 NOTE — TELEPHONE ENCOUNTER
To initiate a request for this medication, please contact [facility] ([facility]) at [phone].      Inna Zhang will call [facility]

## 2023-08-10 LAB — H PYLORI AG STL QL IA: NEGATIVE

## 2023-08-31 ENCOUNTER — PATIENT OUTREACH (OUTPATIENT)
Dept: FAMILY MEDICINE CLINIC | Facility: CLINIC | Age: 88
End: 2023-08-31

## 2023-08-31 NOTE — PROGRESS NOTES
Spoke with pt who states that all has been given a clear report from GI. She states that she is improved and would like if her balance and strength was better. Pt has been using instruction sheets with exercises from PT that were given post hospital discharge. She reports that she is taking her medications as prescribed with help from her home health aides. Pt checks her glucose each morning and it is usually in the low 100's with no reported sx of hypoglycemia ever. She has help from aides for part of the morning hours then someone returns in the evening from [facility]. The aides assist her in ADL's in the home. Pt's nephew takes her to dr aly. Pt would like a return call in 1-2 months to discuss if she has any new needs at that time. She was appreciative of the call .

## 2023-09-15 DIAGNOSIS — K29.70 GASTRITIS WITHOUT BLEEDING, UNSPECIFIED CHRONICITY, UNSPECIFIED GASTRITIS TYPE: ICD-10-CM

## 2023-09-18 RX ORDER — DICYCLOMINE HYDROCHLORIDE 10 MG/1
CAPSULE ORAL
Qty: 180 CAPSULE | Refills: 0 | Status: SHIPPED | OUTPATIENT
Start: 2023-09-18

## 2023-10-02 ENCOUNTER — APPOINTMENT (OUTPATIENT)
Dept: LAB | Facility: CLINIC | Age: 88
End: 2023-10-02
Payer: COMMERCIAL

## 2023-10-02 DIAGNOSIS — N18.32 CHRONIC KIDNEY DISEASE, STAGE 3B (HCC): ICD-10-CM

## 2023-10-02 DIAGNOSIS — E78.5 HYPERLIPIDEMIA, UNSPECIFIED HYPERLIPIDEMIA TYPE: ICD-10-CM

## 2023-10-02 DIAGNOSIS — N18.32 STAGE 3B CHRONIC KIDNEY DISEASE (HCC): ICD-10-CM

## 2023-10-02 DIAGNOSIS — I12.9 BENIGN HYPERTENSION WITH CKD (CHRONIC KIDNEY DISEASE) STAGE III (HCC): ICD-10-CM

## 2023-10-02 DIAGNOSIS — E11.22 TYPE 2 DIABETES MELLITUS WITH STAGE 3B CHRONIC KIDNEY DISEASE, WITHOUT LONG-TERM CURRENT USE OF INSULIN (HCC): ICD-10-CM

## 2023-10-02 DIAGNOSIS — N18.32 TYPE 2 DIABETES MELLITUS WITH STAGE 3B CHRONIC KIDNEY DISEASE, WITHOUT LONG-TERM CURRENT USE OF INSULIN (HCC): ICD-10-CM

## 2023-10-02 DIAGNOSIS — N25.81 SECONDARY HYPERPARATHYROIDISM OF RENAL ORIGIN (HCC): ICD-10-CM

## 2023-10-02 DIAGNOSIS — N18.30 BENIGN HYPERTENSION WITH CKD (CHRONIC KIDNEY DISEASE) STAGE III (HCC): ICD-10-CM

## 2023-10-02 DIAGNOSIS — E87.1 HYPONATREMIA: ICD-10-CM

## 2023-10-02 LAB
25(OH)D3 SERPL-MCNC: 55.5 NG/ML (ref 30–100)
ANION GAP SERPL CALCULATED.3IONS-SCNC: 9 MMOL/L
BUN SERPL-MCNC: 18 MG/DL (ref 5–25)
CALCIUM SERPL-MCNC: 8.8 MG/DL (ref 8.4–10.2)
CHLORIDE SERPL-SCNC: 103 MMOL/L (ref 96–108)
CHOLEST SERPL-MCNC: 129 MG/DL
CO2 SERPL-SCNC: 28 MMOL/L (ref 21–32)
CREAT SERPL-MCNC: 1.19 MG/DL (ref 0.6–1.3)
CREAT UR-MCNC: 69.9 MG/DL
ERYTHROCYTE [DISTWIDTH] IN BLOOD BY AUTOMATED COUNT: 17.4 % (ref 11.6–15.1)
EST. AVERAGE GLUCOSE BLD GHB EST-MCNC: 143 MG/DL
GFR SERPL CREATININE-BSD FRML MDRD: 40 ML/MIN/1.73SQ M
GLUCOSE P FAST SERPL-MCNC: 111 MG/DL (ref 65–99)
HBA1C MFR BLD: 6.6 %
HCT VFR BLD AUTO: 33.9 % (ref 34.8–46.1)
HDLC SERPL-MCNC: 63 MG/DL
HGB BLD-MCNC: 11.5 G/DL (ref 11.5–15.4)
LDLC SERPL CALC-MCNC: 54 MG/DL (ref 0–100)
MCH RBC QN AUTO: 32 PG (ref 26.8–34.3)
MCHC RBC AUTO-ENTMCNC: 33.9 G/DL (ref 31.4–37.4)
MCV RBC AUTO: 94 FL (ref 82–98)
MICROALBUMIN UR-MCNC: 9.2 MG/L
MICROALBUMIN/CREAT 24H UR: 13 MG/G CREATININE (ref 0–30)
NONHDLC SERPL-MCNC: 66 MG/DL
PHOSPHATE SERPL-MCNC: 4.2 MG/DL (ref 2.3–4.1)
PLATELET # BLD AUTO: 174 THOUSANDS/UL (ref 149–390)
PMV BLD AUTO: 11.3 FL (ref 8.9–12.7)
POTASSIUM SERPL-SCNC: 3.7 MMOL/L (ref 3.5–5.3)
PTH-INTACT SERPL-MCNC: 67.4 PG/ML (ref 12–88)
RBC # BLD AUTO: 3.59 MILLION/UL (ref 3.81–5.12)
SODIUM SERPL-SCNC: 140 MMOL/L (ref 135–147)
TRIGL SERPL-MCNC: 62 MG/DL
WBC # BLD AUTO: 5.19 THOUSAND/UL (ref 4.31–10.16)

## 2023-10-02 PROCEDURE — 82570 ASSAY OF URINE CREATININE: CPT

## 2023-10-02 PROCEDURE — 80061 LIPID PANEL: CPT

## 2023-10-02 PROCEDURE — 82043 UR ALBUMIN QUANTITATIVE: CPT

## 2023-10-02 PROCEDURE — 84100 ASSAY OF PHOSPHORUS: CPT

## 2023-10-02 PROCEDURE — 82306 VITAMIN D 25 HYDROXY: CPT

## 2023-10-02 PROCEDURE — 83970 ASSAY OF PARATHORMONE: CPT

## 2023-10-02 PROCEDURE — 85027 COMPLETE CBC AUTOMATED: CPT

## 2023-10-02 PROCEDURE — 83036 HEMOGLOBIN GLYCOSYLATED A1C: CPT

## 2023-10-02 PROCEDURE — 80048 BASIC METABOLIC PNL TOTAL CA: CPT

## 2023-10-02 PROCEDURE — 36415 COLL VENOUS BLD VENIPUNCTURE: CPT

## 2023-10-03 ENCOUNTER — TELEPHONE (OUTPATIENT)
Dept: NEPHROLOGY | Facility: CLINIC | Age: 88
End: 2023-10-03

## 2023-10-16 ENCOUNTER — PATIENT OUTREACH (OUTPATIENT)
Dept: FAMILY MEDICINE CLINIC | Facility: CLINIC | Age: 88
End: 2023-10-16

## 2023-10-16 NOTE — PROGRESS NOTES
Inbasket reminder as per pt request to follow up in months from last call.  Left message on pt voicemail requesting return call back

## 2023-10-23 ENCOUNTER — HOSPITAL ENCOUNTER (OUTPATIENT)
Dept: ULTRASOUND IMAGING | Facility: HOSPITAL | Age: 88
Discharge: HOME | End: 2023-10-23
Attending: INTERNAL MEDICINE
Payer: COMMERCIAL

## 2023-10-23 DIAGNOSIS — N28.1 RENAL CYST: ICD-10-CM

## 2023-10-23 PROCEDURE — 76775 US EXAM ABDO BACK WALL LIM: CPT

## 2023-10-24 ENCOUNTER — PATIENT OUTREACH (OUTPATIENT)
Dept: FAMILY MEDICINE CLINIC | Facility: CLINIC | Age: 88
End: 2023-10-24

## 2023-10-24 NOTE — PROGRESS NOTES
Pt reports that she is doing well at this time. She had a renal U/S yesterday. We reviewed upcoming appts. Pt has [address] aides that come in during the morning hours of 9-12 then again 4-7 PM. Pt's aides or her nephew take her to appts. Aides cook her meals and also pour her medications at times. Pt reports that her blood sugars have been between 100-140, she cecks her weight daily and denies any lower extremity edema. She declines outreach for care management.

## 2023-10-27 ENCOUNTER — PATIENT OUTREACH (OUTPATIENT)
Dept: FAMILY MEDICINE CLINIC | Facility: CLINIC | Age: 88
End: 2023-10-27

## 2023-10-27 NOTE — PROGRESS NOTES
Received call from pt and reopened her to outreach. Pt reports that since she had Renal u/s 3 days ago, she has not felt as if she is emptying er bladder or bowels. Her last BM was 2 days ago and she states that she isn't having the volume of urine with each void like she normally would. "I wonder if things got moved around inside". Pt denies pain, fever,denies swelling in abdomen or burning with her urination. She denies loss of appetite, denies nausea. She states it like "something down there is blocked". Pt is eating and drinking but does admit to holding back on fluid volume since she isnt emptying fully. She took 2 laxatives, she states, this morning because she stated that sometimes if her bowels are 'blocked' it will cause issues with urinating. Advised pt to continue to drink and eat normally. Weight today was 174 lbs, which she states is normal for her. Pt is taking diuretic as ordered. She admits to feeling of urgency but nothing happens but a couple drops. Discussed possibility of UTI. Recommended that if she develops burning with urination or feels like she can't void, she should go to an urgent care. Will forward to office clinical team and provider to make aware.

## 2023-10-27 NOTE — PROGRESS NOTES
Please see the note from RN care coordinator. Recommend follow-up with office visit sooner than December.

## 2023-10-30 ENCOUNTER — OFFICE VISIT (OUTPATIENT)
Dept: URGENT CARE | Facility: CLINIC | Age: 88
End: 2023-10-30
Payer: COMMERCIAL

## 2023-10-30 ENCOUNTER — TELEPHONE (OUTPATIENT)
Dept: FAMILY MEDICINE CLINIC | Facility: CLINIC | Age: 88
End: 2023-10-30

## 2023-10-30 VITALS
HEART RATE: 73 BPM | SYSTOLIC BLOOD PRESSURE: 110 MMHG | TEMPERATURE: 97.9 F | OXYGEN SATURATION: 100 % | DIASTOLIC BLOOD PRESSURE: 64 MMHG

## 2023-10-30 DIAGNOSIS — R31.9 URINARY TRACT INFECTION WITH HEMATURIA, SITE UNSPECIFIED: Primary | ICD-10-CM

## 2023-10-30 DIAGNOSIS — N39.0 URINARY TRACT INFECTION WITH HEMATURIA, SITE UNSPECIFIED: Primary | ICD-10-CM

## 2023-10-30 LAB
SL AMB  POCT GLUCOSE, UA: NEGATIVE
SL AMB LEUKOCYTE ESTERASE,UA: ABNORMAL
SL AMB POCT BILIRUBIN,UA: NEGATIVE
SL AMB POCT BLOOD,UA: ABNORMAL
SL AMB POCT CLARITY,UA: ABNORMAL
SL AMB POCT COLOR,UA: ABNORMAL
SL AMB POCT KETONES,UA: NEGATIVE
SL AMB POCT NITRITE,UA: NEGATIVE
SL AMB POCT PH,UA: 8.5
SL AMB POCT SPECIFIC GRAVITY,UA: 1.01
SL AMB POCT URINE PROTEIN: ABNORMAL
SL AMB POCT UROBILINOGEN: 0.2

## 2023-10-30 PROCEDURE — S9083 URGENT CARE CENTER GLOBAL: HCPCS | Performed by: PHYSICIAN ASSISTANT

## 2023-10-30 PROCEDURE — 81002 URINALYSIS NONAUTO W/O SCOPE: CPT | Performed by: PHYSICIAN ASSISTANT

## 2023-10-30 PROCEDURE — 99213 OFFICE O/P EST LOW 20 MIN: CPT | Performed by: PHYSICIAN ASSISTANT

## 2023-10-30 PROCEDURE — 87086 URINE CULTURE/COLONY COUNT: CPT | Performed by: PHYSICIAN ASSISTANT

## 2023-10-30 RX ORDER — CEPHALEXIN 250 MG/1
250 CAPSULE ORAL EVERY 12 HOURS SCHEDULED
Qty: 14 CAPSULE | Refills: 0 | Status: SHIPPED | OUTPATIENT
Start: 2023-10-30 | End: 2023-11-06

## 2023-10-30 NOTE — PROGRESS NOTES
[facility]        NAME: Hira Gay is a 80 y.o. female  : 1934    MRN: [id number]  DATE: 2023  TIME: 12:10 PM    Assessment and Plan   Urinary tract infection with hematuria, site unspecified [N39.0, R31.9]  1. Urinary tract infection with hematuria, site unspecified  POCT urine dip    Urine culture    cephalexin (KEFLEX) 250 mg capsule    Ambulatory Referral to Urology        Urinalysis- large blood, moderate leukocytes will send for culture. Patient Instructions     Patient was educated on UTI. Patient was educated on antibiotics. Patient was told to eat on antibiotics. Patient was given a referral to urology. Patient was told any trouble emptying her bladder go to ED immediately. Chief Complaint     Chief Complaint   Patient presents with    Urinary Frequency     Patient reports trouble urinating. Patient reports burning with urination. Patient reports symptoms started 1 week ago. History of Present Illness       Patient is here today with caregiver for urinary frequency and urgency for 7 days. Patient denies any history of kidney stones. Allergies were reviewed in chart. Last BM was two days ago. Patients caregiver reports history of H. Pylori. Urinary Frequency   Associated symptoms include frequency, hematuria and urgency. Review of Systems   Review of Systems   Constitutional: Negative. Respiratory: Negative. Cardiovascular: Negative. Genitourinary:  Positive for dysuria, frequency, hematuria and urgency. Psychiatric/Behavioral: Negative.            Current Medications       Current Outpatient Medications:     amiodarone 100 mg tablet, Take 1 tablet (100 mg total) by mouth daily, Disp: 90 tablet, Rfl: 1    amLODIPine (NORVASC) 2.5 mg tablet, Take 1 tablet (2.5 mg total) by mouth daily, Disp: 90 tablet, Rfl: 1    ASPIRIN 81 PO, Take by mouth, Disp: , Rfl:     atorvastatin (LIPITOR) 80 mg tablet, Take 1 tablet (80 mg total) by mouth daily, Disp: 90 tablet, Rfl: 1    Blood Glucose Monitoring Suppl (ONE TOUCH ULTRA 2) w/Device KIT, Use daily, Disp: 1 kit, Rfl: 0    carvedilol (COREG) 25 mg tablet, Take 1 tablet (25 mg total) by mouth 2 (two) times a day with meals, Disp: 180 tablet, Rfl: 1    cephalexin (KEFLEX) 250 mg capsule, Take 1 capsule (250 mg total) by mouth every 12 (twelve) hours for 7 days, Disp: 14 capsule, Rfl: 0    Cholecalciferol 125 MCG (5000 UT) capsule, Take 1,000 Units by mouth daily, Disp: , Rfl:     dicyclomine (BENTYL) 10 mg capsule, Take 1 capsule by mouth twice daily, Disp: 180 capsule, Rfl: 0    docusate sodium (COLACE) 100 mg capsule, Take 100 mg by mouth 2 (two) times a day, Disp: , Rfl:     glucose blood (OneTouch Ultra) test strip, Test daily                        DX: E11.9, Disp: 100 strip, Rfl: 1    Lancets (onetouch ultrasoft) lancets, Test daily, Disp: 100 each, Rfl: 1    lidocaine (LIDODERM) 5 %, Apply 1 patch topically over 12 hours daily Remove & Discard patch within 12 hours or as directed by MD, Disp: 90 patch, Rfl: 1    lisinopril (ZESTRIL) 10 mg tablet, Take 1 tablet (10 mg total) by mouth daily, Disp: 90 tablet, Rfl: 1    Multiple Vitamins-Minerals (multivitamin with minerals) tablet, Take 1 tablet by mouth daily, Disp: , Rfl:     NON FORMULARY, Lung support 200mg once daily at nighttime, Disp: , Rfl:     NON FORMULARY, Fosal [place] 400mg daily at bedtime, Disp: , Rfl:     Omega 3-6-9 Fatty Acids (OMEGA 3-6-9 PO), Take by mouth, Disp: , Rfl:     sodium chloride 1 g tablet, Take 1 tablet (1 g total) by mouth 3 (three) times a day, Disp: 180 tablet, Rfl: 1    acetaminophen (TYLENOL) 325 mg tablet, Take 3 tablets (975 mg total) by mouth every 8 (eight) hours (Patient not taking: Reported on 8/1/2023), Disp: , Rfl: 0    omeprazole (PriLOSEC) 20 mg delayed release capsule, Take 1 capsule (20 mg total) by mouth daily for 14 days, Disp: 14 capsule, Rfl: 0    senna (SENOKOT) 8.6 mg, Take 2 tablets (17.2 mg total) by mouth daily as needed for constipation (Patient not taking: Reported on 10/30/2023), Disp: , Rfl: 0    torsemide (DEMADEX) 10 mg tablet, Take 1 tablet (10 mg total) by mouth every other day (Patient not taking: Reported on 10/30/2023), Disp: 45 tablet, Rfl: 1    Current Allergies     Allergies as of 10/30/2023 - Reviewed 10/30/2023   Allergen Reaction Noted    Hydrochlorothiazide  07/30/2018    Hydrochlorothiazide Other (See Comments) 12/30/2022    Statins Other (See Comments) 12/30/2022    Statins  08/08/2014            The following portions of the patient's history were reviewed and updated as appropriate: allergies, current medications, past family history, past medical history, past social history, past surgical history and problem list.     Past Medical History:   Diagnosis Date    Anemia     Anxiety     Arthritis     CKD (chronic kidney disease), stage III ([address]) 11/29/2018    Depression, recurrent ([address]) 6/14/2021    Diabetes mellitus ([address])     Diabetic peripheral neuropathy ([address])     HLD (hyperlipidemia)     Hyperlipidemia     Hypertension     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 8/23/2017    Thyroid enlarged 2/23/2015    Tinnitus     Vertigo        Past Surgical History:   Procedure Laterality Date    APPENDECTOMY      EAR SURGERY Left     KNEE SURGERY      TONSILLECTOMY      UPPER GASTROINTESTINAL ENDOSCOPY         Family History   Problem Relation Age of Onset    Heart disease Mother     Diabetes Mother     Heart disease Father     Stroke Father     Cancer Family     Dementia Sister     Liver disease Other          Medications have been verified. Objective   /64   Pulse 73   Temp 97.9 °F (36.6 °C)   SpO2 100%   No LMP recorded. Patient is postmenopausal.       Physical Exam     Physical Exam  Vitals and nursing note reviewed. Constitutional:       Appearance: Normal appearance.    HENT: Head: Normocephalic. Cardiovascular:      Rate and Rhythm: Normal rate and regular rhythm. Heart sounds: Normal heart sounds. Pulmonary:      Breath sounds: Normal breath sounds. Abdominal:      Palpations: Abdomen is soft. Tenderness: There is no abdominal tenderness. There is no right CVA tenderness or left CVA tenderness. Neurological:      Mental Status: She is alert and oriented to person, place, and time.    Psychiatric:         Mood and Affect: Mood normal.         Behavior: Behavior normal.

## 2023-10-30 NOTE — PATIENT INSTRUCTIONS
Patient was educated on UTI. Patient was educated on antibiotics. Patient was told to eat on antibiotics. Patient was given a referral to urology. Patient was told any trouble emptying her bladder go to ED immediately. Urinary Tract Infection in Women   WHAT YOU NEED TO KNOW:   A urinary tract infection (UTI) is caused by bacteria that get inside your urinary tract. Your urinary tract includes your kidneys, ureters, bladder, and urethra. A UTI is more common in your lower urinary tract, which includes your bladder and urethra. DISCHARGE INSTRUCTIONS:   Return to the emergency department if:   You are urinating very little or not at all. You have a high fever with shaking chills. You have side or back pain that gets worse. Call your doctor if:   You have a fever. You do not feel better after 2 days of taking antibiotics. You have new symptoms, such as blood or pus in your urine. You are vomiting. You have questions or concerns about your condition or care. Medicines:   Antibiotics  treat a bacterial infection. If you have UTIs often (called recurrent UTIs), you may be given antibiotics to take regularly. You will be given directions for when and how to use antibiotics. The goal is to prevent UTIs but not cause antibiotic resistance by using antibiotics too often. Medicines  may be given to decrease pain and burning when you urinate. They will also help decrease the feeling that you need to urinate often. These medicines may make your urine orange or red. Take your medicine as directed. Contact your healthcare provider if you think your medicine is not helping or if you have side effects. Tell your provider if you are allergic to any medicine. Keep a list of the medicines, vitamins, and herbs you take. Include the amounts, and when and why you take them. Bring the list or the pill bottles to follow-up visits. Carry your medicine list with you in case of an emergency.     Follow up with your doctor as directed:  Write down your questions so you remember to ask them during your visits. Prevent another UTI:   Empty your bladder often. Urinate and empty your bladder as soon as you feel the need. Do not hold your urine for long periods of time. Wipe from front to back after you urinate or have a bowel movement. This will help prevent germs from getting into your urinary tract through your urethra. Drink liquids as directed. Ask how much liquid to drink each day and which liquids are best for you. You may need to drink more liquids than usual to help flush out the bacteria. Do not drink alcohol, caffeine, or citrus juices. These can irritate your bladder and increase your symptoms. Your healthcare provider may recommend cranberry juice to help prevent a UTI. Urinate before and after you have sex. This can help flush out bacteria passed during sex. Do not douche or use feminine deodorants. These can change the chemical balance in your vagina. Change sanitary pads or tampons often. This will help prevent germs from getting into your urinary tract. Talk to your healthcare provider about your birth control method. You may need to change your method if it is increasing your risk for UTIs. Wear cotton underwear and clothes that are loose. Tight pants and nylon underwear can trap moisture and cause bacteria to grow. Vaginal estrogen may be recommended. This medicine helps prevent UTIs in women who have gone through menopause or are in tanvir-menopause. Do pelvic muscle exercises often. Pelvic muscle exercises may help you start and stop urinating. Strong pelvic muscles may help you empty your bladder easier. Squeeze these muscles tightly for 5 seconds like you are trying to hold back urine. Then relax for 5 seconds. Gradually work up to squeezing for 10 seconds. Do 3 sets of 15 repetitions a day, or as directed.     © Copyright [employer] 2023 Information is for End User's use only and may not be sold, redistributed or otherwise used for commercial purposes. The above information is an  only. It is not intended as medical advice for individual conditions or treatments. Talk to your doctor, nurse or pharmacist before following any medical regimen to see if it is safe and effective for you.

## 2023-10-30 NOTE — TELEPHONE ENCOUNTER
Patient wanted to know if she needed anymore blood work done she found a order at home but the lab told her she didn't have any new orders in the system. She just had blood work done the beginning of October. Please let her know if she needs any more.

## 2023-10-31 DIAGNOSIS — I48.91 NEW ONSET A-FIB (HCC): ICD-10-CM

## 2023-10-31 DIAGNOSIS — I50.9 CHRONIC CONGESTIVE HEART FAILURE, UNSPECIFIED HEART FAILURE TYPE (HCC): ICD-10-CM

## 2023-10-31 LAB — BACTERIA UR CULT: NORMAL

## 2023-10-31 RX ORDER — CARVEDILOL 25 MG/1
25 TABLET ORAL 2 TIMES DAILY WITH MEALS
Qty: 180 TABLET | Refills: 0 | Status: SHIPPED | OUTPATIENT
Start: 2023-10-31

## 2023-11-01 ENCOUNTER — PATIENT OUTREACH (OUTPATIENT)
Dept: FAMILY MEDICINE CLINIC | Facility: CLINIC | Age: 88
End: 2023-11-01

## 2023-11-01 NOTE — PROGRESS NOTES
Follow up call to pt today to inquire about previous reported symptoms of fullness and feeling like unable to empty bladder. Pt reports that she feels much better since she had gone to Urgent Care and no longer has the sensation of feeling 'blocked'. Both her bowel and bladder are improved. She is taking prescribed antibiotic and declines future call but is appreciative of the check in

## 2023-11-02 ENCOUNTER — TELEPHONE (OUTPATIENT)
Dept: URGENT CARE | Facility: CLINIC | Age: 88
End: 2023-11-02

## 2023-11-02 NOTE — TELEPHONE ENCOUNTER
Urine culture- Mixed contaminants. Patient was on Keflex 250 mg BID For 7 days. Patient was told if she continues to have symptoms follow up with PCP Or Urology.

## 2023-11-14 ENCOUNTER — OFFICE VISIT (OUTPATIENT)
Dept: NEPHROLOGY | Facility: CLINIC | Age: 88
End: 2023-11-14
Payer: COMMERCIAL

## 2023-11-14 VITALS
HEIGHT: 61 IN | SYSTOLIC BLOOD PRESSURE: 160 MMHG | BODY MASS INDEX: 33.23 KG/M2 | DIASTOLIC BLOOD PRESSURE: 70 MMHG | WEIGHT: 176 LBS

## 2023-11-14 DIAGNOSIS — N28.1 RENAL CYST: ICD-10-CM

## 2023-11-14 DIAGNOSIS — N18.30 BENIGN HYPERTENSION WITH CKD (CHRONIC KIDNEY DISEASE) STAGE III (HCC): Primary | ICD-10-CM

## 2023-11-14 DIAGNOSIS — E87.1 HYPONATREMIA: ICD-10-CM

## 2023-11-14 DIAGNOSIS — I12.9 BENIGN HYPERTENSION WITH CKD (CHRONIC KIDNEY DISEASE) STAGE III (HCC): Primary | ICD-10-CM

## 2023-11-14 DIAGNOSIS — N25.81 SECONDARY HYPERPARATHYROIDISM OF RENAL ORIGIN (HCC): ICD-10-CM

## 2023-11-14 DIAGNOSIS — N18.32 CHRONIC KIDNEY DISEASE, STAGE 3B (HCC): ICD-10-CM

## 2023-11-14 PROCEDURE — 99214 OFFICE O/P EST MOD 30 MIN: CPT | Performed by: INTERNAL MEDICINE

## 2023-11-14 RX ORDER — SODIUM CHLORIDE 1 G/1
1 TABLET ORAL DAILY
Qty: 90 TABLET | Refills: 1 | Status: SHIPPED | OUTPATIENT
Start: 2023-11-14

## 2023-11-14 RX ORDER — LOSARTAN POTASSIUM 25 MG/1
25 TABLET ORAL DAILY
Qty: 90 TABLET | Refills: 3 | Status: SHIPPED | OUTPATIENT
Start: 2023-11-14

## 2023-11-14 NOTE — PATIENT INSTRUCTIONS
-discontinue lisinopril  -start losartan 25 mg one tablet daily  -reduce salt tablet 1 tablet daily  -fluid restriction 1.2L per day recommended

## 2023-11-14 NOTE — PROGRESS NOTES
NEPHROLOGY OUTPATIENT PROGRESS NOTE   Haylie Dumont 80 y.o. female MRN: [id number]  DATE: 11/14/2023  Reason for visit:   Chief Complaint   Patient presents with    Follow-up    Chronic Kidney Disease     ASSESSMENT and PLAN:  CKD stage IIIB,  baseline creatinine 1.3 to 1.5 since early 2022, 1.1 to 1.4 since May 2021, prior 0.9 to 1.1  -creatinine 1.1 in October 2023 stable. - UA shows trace proteinuria, large blood in October 2023 when she was suspected to have UTI and was given Keflex (noted urine culture was negative)  -CKD likely secondary to long-term hypertension and diabetes  -avoid nephrotoxins or NSAIDs  -UACR nonsignificant in October 2023. Hypertension  -blood pressure remains above goal in the office today although slightly improved on repeat check.    -Her BP is better controlled at home generally 140s SBP with occasional 150   -Noted she has better BP readings at different office visits.  -Currently uses walker. Avoid relative hypotension.  -currently on carvedilol 25 mg b.i.d., amlodipine 2.5 mg daily, torsemide every other day. -Due to dry cough, will discontinue lisinopril 10 mg daily and changed to losartan 25 mg daily. Hyponatremia  -Last serum sodium 140 within normal range in October 2023. -Work-up during hospitalization in May 2023 shows serum osmolality 264, urine sodium 41, urine osmolality 130.  -Hyponatremia suspected to be secondary to component of hypovolemia, poor solute intake with low urine osmolality with SIADH component.  -Continue fluid restriction less than 40 oz per day. -Currently remains on salt tablet 1 g p.o. twice daily, will decrease this further to 1 g daily. Continue torsemide every other day. -CT scan of chest, abdomen, pelvis in May 2023 during hospitalization shows few reticular opacities in the lungs, granuloma in left upper lobe, right middle lobe groundglass nodule.   Also shows left renal lesion likely represents cyst and slightly smaller than prior study. No other obvious evidence of malignancy. Renal cyst, CT scan recently shows left renal lesion may represent cyst and slightly smaller than prior study.   -Renal ultrasound in October 2023 shows stable echogenic focus in the midpole right kidney differential including small angiomyolipoma versus very stable cyst and requires no further additional follow-up. Anemia in CKD, closely monitor hemoglobin, iron saturation 22% in May 2023. Secondary hyperparathyroidism, last PTH 67, vitamin D 55 in October 2023 stable. continue current vitamin-D supplements. Leg edema, overall improved and stable. -She has gained about 10 pounds since last visit. Her home weight seems to be around 171 to 175 pounds. Denies any worsening dyspnea. Currently remains on torsemide 10 mg every other day. Diagnoses and all orders for this visit:    Chronic kidney disease, stage 3b ([address])  -     Basic metabolic panel; Future  -     CBC; Future  -     Albumin / creatinine urine ratio; Future  -     PTH, intact; Future  -     Vitamin D 25 hydroxy; Future  -     Phosphorus; Future    Hyponatremia  -     sodium chloride 1 g tablet; Take 1 tablet (1 g total) by mouth in the morning  -     Basic metabolic panel; Future    Benign hypertension with CKD (chronic kidney disease) stage III (HCC)  -     losartan (COZAAR) 25 mg tablet; Take 1 tablet (25 mg total) by mouth daily  -     Basic metabolic panel; Future  -     CBC; Future  -     Albumin / creatinine urine ratio; Future  -     PTH, intact; Future  -     Vitamin D 25 hydroxy; Future  -     Phosphorus; Future    Secondary hyperparathyroidism of renal origin ([address])  -     PTH, intact; Future  -     Vitamin D 25 hydroxy; Future  -     Phosphorus; Future    Other orders  -     Coenzyme Q10-Fish Oil-Vit E (CO-Q 10 OMEGA-3 FISH OIL PO);  Take by mouth          SUBJECTIVE / HPI:  Patient is 79-year-old female with significant medical issues of hypertension for many years, prior history of long-term diabetes, CKD stage IIIB with baseline creatinine 1.3 to 1.5 since early 2022, 1.1 to 1.4 since May 2021: Prior 0.9 to 1, chronic back pain issues, hyponatremia, presents for regular follow-up of CKD. She gets her BP checked with caregiver daily which is generally 140s SBP with occasional SBP in 150s. She is accompanied by her nephew. Overall feels well. She has gained some weight and more recently her weight seems to be around 171 to 175 pounds. She recently had suspected UTI and was given Keflex during her urgent care visit although urine culture was found to be unremarkable. Leg edema has overall much improved. Currently remains on salt tablet 1 g p.o. twice daily along with torsemide every other day. Denies any worsening dyspnea. Denies any urinary complaint. No recent NSAID use. Patient has chronic back pain issues which is overall stable. REVIEW OF SYSTEMS:  More than 10 point review of systems were obtained and discussed in length with the patient. Complete review of systems were negative / unremarkable except mentioned above. PHYSICAL EXAM:  Vitals:    11/14/23 1409 11/14/23 1424   BP: 170/72 160/70   BP Location: Right arm    Patient Position: Sitting    Cuff Size: Large    Weight: 79.8 kg (176 lb)    Height: 5' 0.75" (1.543 m)      Body mass index is 33.53 kg/m². Physical Exam  Vitals reviewed. Constitutional:       Appearance: She is well-developed. HENT:      Head: Normocephalic and atraumatic. Right Ear: External ear normal.      Left Ear: External ear normal.   Eyes:      Conjunctiva/sclera: Conjunctivae normal.   Cardiovascular:      Comments: No significant edema in legs  Pulmonary:      Effort: Pulmonary effort is normal.      Breath sounds: Normal breath sounds. No wheezing or rales. Abdominal:      General: Bowel sounds are normal. There is no distension. Palpations: Abdomen is soft. Tenderness:  There is no abdominal tenderness. Musculoskeletal:         General: No deformity. Lymphadenopathy:      Cervical: No cervical adenopathy. Skin:     Findings: No rash. Neurological:      Mental Status: She is alert and oriented to person, place, and time.    Psychiatric:         Behavior: Behavior normal.         PAST MEDICAL HISTORY:  Past Medical History:   Diagnosis Date    Anemia     Anxiety     Arthritis     CKD (chronic kidney disease), stage III ([address]) 11/29/2018    Depression, recurrent ([address]) 6/14/2021    Diabetes mellitus ([address])     Diabetic peripheral neuropathy (HCC)     HLD (hyperlipidemia)     Hyperlipidemia     Hypertension     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 8/23/2017    Thyroid enlarged 2/23/2015    Tinnitus     Vertigo        PAST SURGICAL HISTORY:  Past Surgical History:   Procedure Laterality Date    APPENDECTOMY      EAR SURGERY Left     KNEE SURGERY      TONSILLECTOMY      UPPER GASTROINTESTINAL ENDOSCOPY         SOCIAL HISTORY:  Social History     Substance and Sexual Activity   Alcohol Use Not Currently     Social History     Substance and Sexual Activity   Drug Use Never     Social History     Tobacco Use   Smoking Status Never   Smokeless Tobacco Never       FAMILY HISTORY:  Family History   Problem Relation Age of Onset    Heart disease Mother     Diabetes Mother     Heart disease Father     Stroke Father     Cancer Family     Dementia Sister     Liver disease Other        MEDICATIONS:    Current Outpatient Medications:     amiodarone 100 mg tablet, Take 1 tablet (100 mg total) by mouth daily, Disp: 90 tablet, Rfl: 1    amLODIPine (NORVASC) 2.5 mg tablet, Take 1 tablet (2.5 mg total) by mouth daily, Disp: 90 tablet, Rfl: 1    ASPIRIN 81 PO, Take by mouth, Disp: , Rfl:     atorvastatin (LIPITOR) 80 mg tablet, Take 1 tablet (80 mg total) by mouth daily, Disp: 90 tablet, Rfl: 1    Blood Glucose Monitoring Suppl (ONE TOUCH ULTRA 2) w/Device KIT, Use daily, Disp: 1 kit, Rfl: 0    carvedilol (COREG) 25 mg tablet, TAKE 1 TABLET BY MOUTH TWICE DAILY WITH MEALS, Disp: 180 tablet, Rfl: 0    Cholecalciferol 125 MCG (5000 UT) capsule, Take 1,000 Units by mouth daily, Disp: , Rfl:     Coenzyme Q10-Fish Oil-Vit E (CO-Q 10 OMEGA-3 FISH OIL PO), Take by mouth, Disp: , Rfl:     dicyclomine (BENTYL) 10 mg capsule, Take 1 capsule by mouth twice daily, Disp: 180 capsule, Rfl: 0    docusate sodium (COLACE) 100 mg capsule, Take 100 mg by mouth 2 (two) times a day, Disp: , Rfl:     glucose blood (OneTouch Ultra) test strip, Test daily                        DX: E11.9, Disp: 100 strip, Rfl: 1    Lancets (onetouch ultrasoft) lancets, Test daily, Disp: 100 each, Rfl: 1    lidocaine (LIDODERM) 5 %, Apply 1 patch topically over 12 hours daily Remove & Discard patch within 12 hours or as directed by MD, Disp: 90 patch, Rfl: 1    losartan (COZAAR) 25 mg tablet, Take 1 tablet (25 mg total) by mouth daily, Disp: 90 tablet, Rfl: 3    Multiple Vitamins-Minerals (multivitamin with minerals) tablet, Take 1 tablet by mouth daily, Disp: , Rfl:     NON FORMULARY, Lung support 200mg once daily at nighttime, Disp: , Rfl:     NON FORMULARY, [place] 400mg daily at bedtime, Disp: , Rfl:     senna (SENOKOT) 8.6 mg, Take 2 tablets (17.2 mg total) by mouth daily as needed for constipation, Disp: , Rfl: 0    sodium chloride 1 g tablet, Take 1 tablet (1 g total) by mouth in the morning, Disp: 90 tablet, Rfl: 1    torsemide (DEMADEX) 10 mg tablet, Take 1 tablet (10 mg total) by mouth every other day, Disp: 45 tablet, Rfl: 1    acetaminophen (TYLENOL) 325 mg tablet, Take 3 tablets (975 mg total) by mouth every 8 (eight) hours (Patient not taking: Reported on 8/1/2023), Disp: , Rfl: 0    Omega 3-6-9 Fatty Acids (OMEGA 3-6-9 PO), Take by mouth, Disp: , Rfl:     omeprazole (PriLOSEC) 20 mg delayed release capsule, Take 1 capsule (20 mg total) by mouth daily for 14 days, Disp: 14 capsule, Rfl: 0    Lab Results:   Results for orders placed or performed in visit on 10/30/23   Urine culture    Specimen: Urine, Clean Catch   Result Value Ref Range    Urine Culture <10,000 cfu/ml    POCT urine dip   Result Value Ref Range    LEUKOCYTE ESTERASE,UA Moderate     NITRITE,UA Negative     SL AMB POCT UROBILINOGEN 0.2     POCT URINE PROTEIN Trace      PH,UA 8.5     BLOOD,UA Large     SPECIFIC GRAVITY,UA 1.015     KETONES,UA Negative     BILIRUBIN,UA Negative     GLUCOSE, UA Negative      COLOR,UA Straw     CLARITY,UA hazy

## 2023-11-26 ENCOUNTER — RA CDI HCC (OUTPATIENT)
Dept: OTHER | Facility: HOSPITAL | Age: 88
End: 2023-11-26

## 2023-11-26 DIAGNOSIS — E11.9 TYPE 2 DIABETES MELLITUS WITHOUT COMPLICATION, WITHOUT LONG-TERM CURRENT USE OF INSULIN (HCC): ICD-10-CM

## 2023-11-26 NOTE — PROGRESS NOTES
E11.51, I13.0  [address] [facility] coding opportunities          Chart Reviewed number of suggestions sent to Provider: 2     Patients Insurance     Medicare Insurance: [id number]

## 2023-11-27 RX ORDER — LANCETS
EACH MISCELLANEOUS
Qty: 100 EACH | Refills: 0 | Status: SHIPPED | OUTPATIENT
Start: 2023-11-27

## 2023-12-04 ENCOUNTER — OFFICE VISIT (OUTPATIENT)
Dept: FAMILY MEDICINE CLINIC | Facility: CLINIC | Age: 88
End: 2023-12-04
Payer: COMMERCIAL

## 2023-12-04 VITALS
SYSTOLIC BLOOD PRESSURE: 128 MMHG | BODY MASS INDEX: 34.74 KG/M2 | HEIGHT: 61 IN | HEART RATE: 128 BPM | WEIGHT: 184 LBS | OXYGEN SATURATION: 99 % | DIASTOLIC BLOOD PRESSURE: 66 MMHG

## 2023-12-04 DIAGNOSIS — E78.5 HYPERLIPIDEMIA, UNSPECIFIED HYPERLIPIDEMIA TYPE: ICD-10-CM

## 2023-12-04 DIAGNOSIS — E11.9 TYPE 2 DIABETES MELLITUS WITHOUT COMPLICATION, WITHOUT LONG-TERM CURRENT USE OF INSULIN (HCC): ICD-10-CM

## 2023-12-04 DIAGNOSIS — N18.32 TYPE 2 DIABETES MELLITUS WITH STAGE 3B CHRONIC KIDNEY DISEASE, WITHOUT LONG-TERM CURRENT USE OF INSULIN (HCC): ICD-10-CM

## 2023-12-04 DIAGNOSIS — N18.32 CHRONIC KIDNEY DISEASE, STAGE 3B (HCC): ICD-10-CM

## 2023-12-04 DIAGNOSIS — E11.22 TYPE 2 DIABETES MELLITUS WITH STAGE 3B CHRONIC KIDNEY DISEASE, WITHOUT LONG-TERM CURRENT USE OF INSULIN (HCC): ICD-10-CM

## 2023-12-04 DIAGNOSIS — Z00.00 MEDICARE ANNUAL WELLNESS VISIT, SUBSEQUENT: Primary | ICD-10-CM

## 2023-12-04 DIAGNOSIS — R60.0 BILATERAL LEG EDEMA: ICD-10-CM

## 2023-12-04 DIAGNOSIS — I50.9 CHRONIC CONGESTIVE HEART FAILURE, UNSPECIFIED HEART FAILURE TYPE (HCC): ICD-10-CM

## 2023-12-04 DIAGNOSIS — Z23 ENCOUNTER FOR IMMUNIZATION: ICD-10-CM

## 2023-12-04 PROCEDURE — 99214 OFFICE O/P EST MOD 30 MIN: CPT | Performed by: FAMILY MEDICINE

## 2023-12-04 PROCEDURE — 90662 IIV NO PRSV INCREASED AG IM: CPT | Performed by: FAMILY MEDICINE

## 2023-12-04 PROCEDURE — G0008 ADMIN INFLUENZA VIRUS VAC: HCPCS | Performed by: FAMILY MEDICINE

## 2023-12-04 PROCEDURE — G0439 PPPS, SUBSEQ VISIT: HCPCS | Performed by: FAMILY MEDICINE

## 2023-12-04 NOTE — ASSESSMENT & PLAN NOTE
Lab Results   Component Value Date    HGBA1C 6.6 (H) 10/02/2023   Last blood sugar was good at 111 with an HbA1c of 6.6 continue present diet though she needs to watch sweets over the holidays.   89 she is not very physically active I suggested some exercising that she could do in her chair and that she may want to also  some shredding

## 2023-12-04 NOTE — PROGRESS NOTES
Assessment and Plan:     Problem List Items Addressed This Visit          Endocrine    Type 2 diabetes mellitus with diabetic chronic kidney disease ([address])       Lab Results   Component Value Date    HGBA1C 6.6 (H) 10/02/2023   Last blood sugar was good at 111 with an HbA1c of 6.6 continue present diet though she needs to watch sweets over the holidays. 89 she is not very physically active I suggested some exercising that she could do in her chair and that she may want to also  some shredding            Cardiovascular and Mediastinum    Chronic congestive heart failure ([address])     Wt Readings from Last 3 Encounters:   12/04/23 83.5 kg (184 lb)   11/14/23 79.8 kg (176 lb)   08/01/23 80.3 kg (177 lb)     Patient has gained some weight we will check a BNP with her blood work to make sure that there is no issues with CHF. Will await blood work before making any adjustments on medication             Relevant Orders    B-Type Natriuretic Peptide(BNP)       Genitourinary    Chronic kidney disease, stage 3b (HCC)     Lab Results   Component Value Date    EGFR 40 10/02/2023    EGFR 38 07/14/2023    EGFR 42 06/29/2023    CREATININE 1.19 10/02/2023    CREATININE 1.25 07/14/2023    CREATININE 1.15 06/29/2023   Kidney function has been stable and she saw the kidney doctor in the last couple months.   She did have her lisinopril stopping because of the cough and losartan started and that seems to be doing a better job with her blood pressure without cough            Other    Bilateral leg edema     Await bnp, adjust meds as needed         Hyperlipidemia    Relevant Orders    TSH, 3rd generation     Other Visit Diagnoses       Medicare annual wellness visit, subsequent    -  Primary    Encounter for immunization        Relevant Orders    influenza vaccine, high-dose, PF 0.7 mL (FLUZONE HIGH-DOSE) (Completed)    Type 2 diabetes mellitus without complication, without long-term current use of insulin ([address]) Preventive health issues were discussed with patient, and age appropriate screening tests were ordered as noted in patient's After Visit Summary. Personalized health advice and appropriate referrals for health education or preventive services given if needed, as noted in patient's After Visit Summary. History of Present Illness:     Patient presents for a Medicare Wellness Visit    Patient presents with: Follow-up: Patient present today for her 4 month follow up. Roland Farley is here for follow-up of her congestive heart failure diabetes hypokalemia hyponatremia and ambulatory dysfunction. She has gained some weight since her visit with a kidney doctor on 11/14 and is up to 184 pounds with some edema and some shortness of breath. She also says that she has not been sticking to her diet with Thanksgiving as much as she should. Her sugars from the home care people been running in in the 110s to 120s       Patient Care Team:  Antonio Mendoza MD as PCP - General (Family Medicine)  MD Yamini Martin MD (Nephrology)     Review of Systems:     Review of Systems   Constitutional:  Positive for unexpected weight change. Negative for activity change, appetite change and fatigue. 8 lb gain   HENT:  Negative for congestion. Respiratory:  Positive for shortness of breath. Cardiovascular:  Positive for leg swelling. Negative for chest pain. Neurological:  Negative for dizziness, light-headedness and headaches.         Problem List:     Patient Active Problem List   Diagnosis    Diabetic peripheral neuropathy (HCC)    Benign hypertension with CKD (chronic kidney disease) stage III (HCC)    Chronic constipation    Abdominal bloating    Chronic low back pain without sciatica    Hyperlipidemia    Platelets decreased (HCC)    Bilateral leg edema    PAD (peripheral artery disease) (HCC)    Chronic pain of both shoulders    Bilateral adhesive capsulitis of shoulders    Lumbar spondylosis    Degenerative disc disease, lumbar    Secondary hyperparathyroidism of renal origin (HCC)    Pain in both hands    Atrial fibrillation (HCC)    Type 2 diabetes mellitus with diabetic chronic kidney disease (HCC)    Hypokalemia    Chronic congestive heart failure (HCC)    Spinal stenosis of lumbar region without neurogenic claudication    Embolism and thrombosis of arteries of the lower extremities (HCC)    Chronic kidney disease, stage 3b (HCC)    Class 2 obesity due to excess calories 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 mass index (BMI) of 37.0 to 37.9 in adult    Hyponatremia    Bladder wall thickening    Renal cyst      Past Medical and Surgical History:     Past Medical History:   Diagnosis Date    Anemia     Anxiety     Arthritis     CKD (chronic kidney disease), stage III ([address]) 11/29/2018    Depression, recurrent ([address]) 6/14/2021    Diabetes mellitus (HCC)     Diabetic peripheral neuropathy (HCC)     HLD (hyperlipidemia)     Hyperlipidemia     Hypertension     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 8/23/2017    Thyroid enlarged 2/23/2015    Tinnitus     Vertigo      Past Surgical History:   Procedure Laterality Date    APPENDECTOMY      EAR SURGERY Left     KNEE SURGERY      TONSILLECTOMY      UPPER GASTROINTESTINAL ENDOSCOPY        Family History:     Family History   Problem Relation Age of Onset    Heart disease Mother     Diabetes Mother     Heart disease Father     Stroke Father     Cancer Family     Dementia Sister     Liver disease Other       Social History:     Social History     Socioeconomic History    Marital status: Single     Spouse name: None    Number of children: None    Years of education: None    Highest education level: None   Occupational History    None   Tobacco Use    Smoking status: Never    Smokeless tobacco: Never   Vaping Use    Vaping Use: Never used   Substance and Sexual Activity    Alcohol use: Not Currently    Drug use: Never    Sexual activity: Never     Partners: Male     Birth control/protection: None   Other Topics Concern    None   Social History Narrative    ** Merged History Encounter **         Active advance directive  Always uses seatbelt  Caffeine use  Exercise-walking  Lives with family, sister  [facility]  Supportive and safe       Social Determinants of Health     Financial Resource Strain: High Risk (12/4/2023)    Overall Financial Resource Strain (CARDIA)     Difficulty of Paying Living Expenses: Hard   Food Insecurity: No Food Insecurity (5/3/2023)    Hunger Vital Sign     Worried About Running Out of Food in the Last Year: Never true     Ran Out of Food in the Last Year: Never true   Transportation Needs: No Transportation Needs (12/4/2023)    PRAPARE - Transportation     Lack of Transportation (Medical): No     Lack of Transportation (Non-Medical):  No   Physical Activity: Not on file   Stress: Not on file   Social Connections: Not on file   Intimate Partner Violence: Not on file   Housing Stability: Low Risk  (5/3/2023)    Housing Stability Vital Sign     Unable to Pay for Housing in the Last Year: No     Number of Places Lived in the Last Year: 1     Unstable Housing in the Last Year: No      Medications and Allergies:     Current Outpatient Medications   Medication Sig Dispense Refill    amiodarone 100 mg tablet Take 1 tablet (100 mg total) by mouth daily 90 tablet 1    amLODIPine (NORVASC) 2.5 mg tablet Take 1 tablet (2.5 mg total) by mouth daily 90 tablet 1    ASPIRIN 81 PO Take by mouth      atorvastatin (LIPITOR) 80 mg tablet Take 1 tablet (80 mg total) by mouth daily 90 tablet 1    carvedilol (COREG) 25 mg tablet TAKE 1 TABLET BY MOUTH TWICE DAILY WITH MEALS 180 tablet 0    Cholecalciferol 125 MCG (5000 UT) capsule Take 1,000 Units by mouth daily      Coenzyme Q10-Fish Oil-Vit E (CO-Q 10 OMEGA-3 FISH OIL PO) Take by mouth      dicyclomine (BENTYL) 10 mg capsule Take 1 capsule by mouth twice daily 180 capsule 0    docusate sodium (COLACE) 100 mg capsule Take 100 mg by mouth 2 (two) times a day      losartan (COZAAR) 25 mg tablet Take 1 tablet (25 mg total) by mouth daily 90 tablet 3    Multiple Vitamins-Minerals (multivitamin with minerals) tablet Take 1 tablet by mouth daily      NON FORMULARY Lung support 200mg once daily at nighttime      NON FORMULARY [place] 400mg daily at bedtime      sodium chloride 1 g tablet Take 1 tablet (1 g total) by mouth in the morning 90 tablet 1    torsemide (DEMADEX) 10 mg tablet Take 1 tablet (10 mg total) by mouth every other day 45 tablet 1    acetaminophen (TYLENOL) 325 mg tablet Take 3 tablets (975 mg total) by mouth every 8 (eight) hours (Patient not taking: Reported on 8/1/2023)  0    Blood Glucose Monitoring Suppl (ONE TOUCH ULTRA 2) w/Device KIT Use daily 1 kit 0    glucose blood (OneTouch Ultra) test strip Test daily                        DX: E11.9 100 strip 1    Lancets (onetouch ultrasoft) lancets USE 1 NEW LANCET TO CHECK GLUCOSE ONCE DAILY 100 each 0    lidocaine (LIDODERM) 5 % Apply 1 patch topically over 12 hours daily Remove & Discard patch within 12 hours or as directed by MD (Patient not taking: Reported on 12/4/2023) 90 patch 1    Omega 3-6-9 Fatty Acids (OMEGA 3-6-9 PO) Take by mouth (Patient not taking: Reported on 12/4/2023)      omeprazole (PriLOSEC) 20 mg delayed release capsule Take 1 capsule (20 mg total) by mouth daily for 14 days 14 capsule 0    senna (SENOKOT) 8.6 mg Take 2 tablets (17.2 mg total) by mouth daily as needed for constipation  0     No current facility-administered medications for this visit. Allergies   Allergen Reactions    Hydrochlorothiazide      Hyponatremia, severe    Hydrochlorothiazide Other (See Comments)     . Statins Other (See Comments)     .     Statins       Immunizations:     Immunization History   Administered Date(s) Administered    COVID-19 PFIZER VACCINE 0.3 ML IM 02/14/2021, 03/05/2021, 11/30/2021    COVID-19 Pfizer Vac BIVALENT Scott-sucrose 12 Yr+ IM 10/14/2022, 10/14/2022    INFLUENZA 09/01/2016, 10/05/2016, 09/28/2017, 09/17/2020, 10/14/2022    Influenza Split High Dose Preservative Free IM 11/18/2014, 11/30/2015, 09/01/2016, 09/28/2017    Influenza, high dose seasonal 0.7 mL 09/24/2018, 09/17/2020, 09/15/2021, 10/14/2022, 12/04/2023    Influenza, seasonal, injectable 10/01/2011, 10/01/2012, 11/11/2013    Pneumococcal Conjugate 13-Valent 05/06/2019    Pneumococcal Polysaccharide PPV23 10/01/1997, 11/30/2015    Tdap 09/27/2019, 12/26/2022    Tuberculin Skin Test 12/29/2022, 01/07/2023    Zoster 10/07/2016    influenza, trivalent, adjuvanted 09/27/2019      Health Maintenance: There are no preventive care reminders to display for this patient. Topic Date Due    COVID-19 Vaccine (5 - Pfizer series) 02/14/2023      Medicare Screening Tests and Risk Assessments:     Kay Duenas is here for her Subsequent Wellness visit. Health Risk Assessment:   Patient rates overall health as fair. Patient feels that their physical health rating is slightly worse. Patient is satisfied with their life. Eyesight was rated as slightly worse. Hearing was rated as slightly worse. Patient feels that their emotional and mental health rating is same. Patients states they are never, rarely angry. Patient states they are often unusually tired/fatigued. Pain experienced in the last 7 days has been none. Patient states that she has experienced no weight loss or gain in last 6 months. Fall Risk Screening: In the past year, patient has experienced: history of falling in past year    Number of falls: 1  Injured during fall?: Yes    Feels unsteady when standing or walking?: Yes    Worried about falling?: Yes      Urinary Incontinence Screening:   Patient has leaked urine accidently in the last six months. Home Safety:  Patient has trouble with stairs inside or outside of their home.  Patient has working smoke alarms and has working carbon monoxide detector. Home safety hazards include: none. Nutrition:   Current diet is Diabetic, No Added Salt and Low Carb. Medications:   Patient is currently taking over-the-counter supplements. OTC medications include: see medication list. Patient is not able to manage medications. Caregiver helps her with meds     Activities of Daily Living (ADLs)/Instrumental Activities of Daily Living (IADLs):   Walk and transfer into and out of bed and chair?: No  Dress and groom yourself?: No    Bathe or shower yourself?: No    Feed yourself? Yes  Do your laundry/housekeeping?: No  Manage your money, pay your bills and track your expenses?: Yes  Make your own meals?: No    Do your own shopping?: No    ADL comments: Nephew and home care helpers    Previous Hospitalizations:   Any hospitalizations or ED visits within the last 12 months?: Yes    How many hospitalizations have you had in the last year?: 1-2    Advance Care Planning:   Living will: Yes    Durable POA for healthcare:  Yes    Advanced directive: Yes      Comments: Nephew is poa    Cognitive Screening:   Provider or family/friend/caregiver concerned regarding cognition?: No    PREVENTIVE SCREENINGS      Cardiovascular Screening:    General: Screening Not Indicated, History Lipid Disorder and Screening Current      Diabetes Screening:     General: History Diabetes and Screening Current      Colorectal Cancer Screening:     General: Screening Not Indicated      Breast Cancer Screening:     General: Screening Not Indicated      Cervical Cancer Screening:    General: Screening Not Indicated      Osteoporosis Screening:    General: Screening Not Indicated      Abdominal Aortic Aneurysm (AAA) Screening:        General: Screening Not Indicated      Lung Cancer Screening:     General: Screening Not Indicated    Screening, Brief Intervention, and Referral to Treatment (SBIRT)    Screening    Typical number of drinks in a week: 0    Single Item Drug Screening:  How often have you used an illegal drug (including marijuana) or a prescription medication for non-medical reasons in the past year? never    Single Item Drug Screen Score: 0  Interpretation: Negative screen for possible drug use disorder    No results found. Physical Exam:     /66 (BP Location: Right arm, Patient Position: Sitting, Cuff Size: Large)   Pulse (!) 128   Ht 5' 0.75" (1.543 m)   Wt 83.5 kg (184 lb)   SpO2 99%   BMI 35.05 kg/m²     Physical Exam  Vitals reviewed. Constitutional:       Appearance: Normal appearance. She is obese. Cardiovascular:      Rate and Rhythm: Normal rate and regular rhythm. Pulses: Normal pulses. Heart sounds: Normal heart sounds. Pulmonary:      Effort: Pulmonary effort is normal.      Breath sounds: Rales present. Comments: At  bases  Musculoskeletal:      Right lower leg: Edema present. Left lower leg: Edema present. Comments: 2 plus edema   Lymphadenopathy:      Cervical: No cervical adenopathy. Neurological:      Mental Status: She is alert.    Psychiatric:         Mood and Affect: Mood normal.          Reed Amin MD

## 2023-12-04 NOTE — PATIENT INSTRUCTIONS
Await  lab, okay  on  meds  Urinary Incontinence   AMBULATORY CARE:   Urinary incontinence (UI)  is loss of bladder control. UI develops because your bladder cannot store or empty urine properly. The 3 most common types of UI are stress incontinence, urge incontinence, or both. Common symptoms include the following: You feel like your bladder does not empty completely when you urinate. You urinate often and need to urinate immediately. You leak urine when you sleep, or you wake up with the urge to urinate. You leak urine when you cough, sneeze, exercise, or laugh. Seek care immediately if:   You have severe pain. You are confused or cannot think clearly. You see blood in your urine. You have pain when you urinate. You have new or worse pain, even after treatment. Call your doctor if:   You have a fever. Your mouth feels dry or you have vision changes. Your urine is cloudy or smells bad. You have questions or concerns about your condition or care. Treatment  may include any of the following:  Medicines  may be given to help strengthen your bladder control. Electrical stimulation  is used to send a small amount of electrical energy to your pelvic floor muscles. This helps control your bladder function. Electrodes may be placed outside your body or in your rectum. For women, the electrodes may be placed in the vagina. A bulking agent  may be injected into the wall of your urethra to make it thicker. This helps keep your urethra closed and decreases urine leakage. Devices  such as a clamp, pessary, or tampon may help stop urine leaks. Ask your healthcare provider for more information about these and other devices. Surgery  may be needed if other treatments do not work. Several types of surgery can help improve your bladder control. Ask your provider for more information about the surgery you may need. Self-care:   Keep a UI record.   Write down how often you leak urine and how much you leak. Make a note of what you were doing when you leaked urine. Drink liquids as directed. Ask your healthcare provider how much liquid to drink each day and which liquids are best for you. You may need to limit the amount of liquid you drink to help control your urine leakage. Do not drink any liquid right before you go to bed. Limit or do not have drinks that contain caffeine or alcohol. Prevent constipation. Eat a variety of high-fiber foods. Good examples are high-fiber cereals, beans, vegetables, and whole-grain breads. Prune juice may help make your bowel movement softer. Walking is the best way to trigger your intestines to have a bowel movement. Exercise regularly and maintain a healthy weight. Ask your provider how much you should weigh and about the best exercise plan for you. Weight loss and exercise will decrease pressure on your bladder and help you control your leakage. Ask your provider to help you create a weight loss plan, if needed. Use a catheter as directed  to help empty your bladder. A catheter is a tiny, plastic tube that is put into your bladder to drain your urine. Your provider may tell you to use a catheter to prevent your bladder from getting too full and leaking urine. Go to behavior therapy as directed. Behavior therapy may be used to help you learn to control your urge to urinate. Follow up with your doctor as directed:  Write down your questions so you remember to ask them during your visits. © Copyright [employer] 2023 Information is for End User's use only and may not be sold, redistributed or otherwise used for commercial purposes. The above information is an  only. It is not intended as medical advice for individual conditions or treatments. Talk to your doctor, nurse or pharmacist before following any medical regimen to see if it is safe and effective for you.     Kegel Exercises for Women   AMBULATORY CARE:   Kegel exercises  help strengthen your pelvic muscles. Pelvic muscles hold your pelvic organs, such as your bladder and uterus, in place. Kegel exercises help prevent or control certain conditions, such as urine incontinence (leakage) or uterine prolapse. Call your doctor or physical therapist if:   You cannot feel your muscles tighten or relax. You continue to leak urine. You have questions or concerns about your condition or care. Use the correct muscles:  Pelvic muscles are the muscles you use to control urine flow. To target these muscles, stop and start the flow of urine several times. This will help you become familiar with how it feels to tighten and relax these muscles. How to do Kegel exercises:   Get into a comfortable position. You may lie down, stand up, or sit down to do these exercises. When you first try to do these exercises, it may be easier if you lie down. Tighten or squeeze your pelvic muscles slowly. It may feel like you are trying to hold back urine or gas. Hold this position for 3 seconds. Relax for 3 seconds. Repeat this cycle 10 times. Do not hold your breath when you do Kegel exercises. Keep your stomach, back, and leg muscles relaxed. Do 10 sets of Kegel exercises, at least 3 times a day. When you know how to do Kegel exercises, use different positions. This will help to strengthen your pelvic muscles as much as possible. You can do these exercises while you lie on the floor, watch TV, or while you stand. Tighten your pelvic muscles before you sneeze, cough, or lift to prevent urine leakage. You may notice improved bladder control within about 6 weeks. Follow up with your doctor or physical therapist as directed:  Write down your questions so you remember to ask them during your visits. © Copyright [facility] 2023 Information is for End User's use only and may not be sold, redistributed or otherwise used for commercial purposes.   The above information is an  only. It is not intended as medical advice for individual conditions or treatments. Talk to your doctor, nurse or pharmacist before following any medical regimen to see if it is safe and effective for you.

## 2023-12-04 NOTE — ASSESSMENT & PLAN NOTE
Lab Results   Component Value Date    EGFR 40 10/02/2023    EGFR 38 07/14/2023    EGFR 42 06/29/2023    CREATININE 1.19 10/02/2023    CREATININE 1.25 07/14/2023    CREATININE 1.15 06/29/2023   Kidney function has been stable and she saw the kidney doctor in the last couple months.   She did have her lisinopril stopping because of the cough and losartan started and that seems to be doing a better job with her blood pressure without cough

## 2023-12-04 NOTE — ASSESSMENT & PLAN NOTE
Wt Readings from Last 3 Encounters:   12/04/23 83.5 kg (184 lb)   11/14/23 79.8 kg (176 lb)   08/01/23 80.3 kg (177 lb)       Patient has gained some weight we will check a BNP with her blood work to make sure that there is no issues with CHF.   Will await blood work before making any adjustments on medication

## 2023-12-07 ENCOUNTER — TELEPHONE (OUTPATIENT)
Dept: FAMILY MEDICINE CLINIC | Facility: CLINIC | Age: 88
End: 2023-12-07

## 2023-12-08 ENCOUNTER — APPOINTMENT (OUTPATIENT)
Dept: LAB | Facility: CLINIC | Age: 88
End: 2023-12-08
Payer: COMMERCIAL

## 2023-12-08 DIAGNOSIS — I50.9 CHRONIC CONGESTIVE HEART FAILURE, UNSPECIFIED HEART FAILURE TYPE (HCC): ICD-10-CM

## 2023-12-08 DIAGNOSIS — E78.5 HYPERLIPIDEMIA, UNSPECIFIED HYPERLIPIDEMIA TYPE: ICD-10-CM

## 2023-12-08 LAB
BNP SERPL-MCNC: 653 PG/ML (ref 0–100)
TSH SERPL DL<=0.05 MIU/L-ACNC: 1.93 UIU/ML (ref 0.45–4.5)

## 2023-12-08 PROCEDURE — 36415 COLL VENOUS BLD VENIPUNCTURE: CPT

## 2023-12-08 PROCEDURE — 84443 ASSAY THYROID STIM HORMONE: CPT

## 2023-12-08 PROCEDURE — 83880 ASSAY OF NATRIURETIC PEPTIDE: CPT

## 2023-12-11 ENCOUNTER — OFFICE VISIT (OUTPATIENT)
Dept: FAMILY MEDICINE CLINIC | Facility: CLINIC | Age: 88
End: 2023-12-11
Payer: COMMERCIAL

## 2023-12-11 VITALS
SYSTOLIC BLOOD PRESSURE: 126 MMHG | HEIGHT: 61 IN | HEART RATE: 74 BPM | WEIGHT: 192 LBS | BODY MASS INDEX: 36.25 KG/M2 | TEMPERATURE: 97.5 F | DIASTOLIC BLOOD PRESSURE: 74 MMHG

## 2023-12-11 DIAGNOSIS — L30.4 INTERTRIGO: ICD-10-CM

## 2023-12-11 DIAGNOSIS — I48.0 PAROXYSMAL ATRIAL FIBRILLATION (HCC): ICD-10-CM

## 2023-12-11 DIAGNOSIS — I50.9 CHRONIC CONGESTIVE HEART FAILURE, UNSPECIFIED HEART FAILURE TYPE (HCC): Primary | ICD-10-CM

## 2023-12-11 PROCEDURE — 93000 ELECTROCARDIOGRAM COMPLETE: CPT | Performed by: PHYSICIAN ASSISTANT

## 2023-12-11 PROCEDURE — 99214 OFFICE O/P EST MOD 30 MIN: CPT | Performed by: PHYSICIAN ASSISTANT

## 2023-12-11 RX ORDER — KETOCONAZOLE 20 MG/G
CREAM TOPICAL DAILY
Qty: 30 G | Refills: 0 | Status: SHIPPED | OUTPATIENT
Start: 2023-12-11

## 2023-12-11 NOTE — ASSESSMENT & PLAN NOTE
Diagnosis explained patient to use ketoconazole twice daily for up to 2 weeks and then for prevention needs to keep the area of her groin very clean and dry as possible.

## 2023-12-11 NOTE — ASSESSMENT & PLAN NOTE
Wt Readings from Last 3 Encounters:   12/11/23 87.1 kg (192 lb)   12/04/23 83.5 kg (184 lb)   11/14/23 79.8 kg (176 lb)     Patient has gained 20 pounds since roughly 1 month ago on Demadex 10 mg every other day. She does have a cardiologist at [facility].  BNP recently was in the [address] versus [address] in May after discharge.

## 2023-12-11 NOTE — ASSESSMENT & PLAN NOTE
It does appear the patient is in atrial fibrillation today based on auscultation. She does need to make an appointment with her cardiologist as she has been in sinus rhythm with only use of amiodarone since her cardioversion. She is not on any blood thinners. She needs to restart her baby aspirin once daily. Patient is set up with her [facility] cardiologist for tomorrow for an appointment. ER if any shortness of breath or chest pain.

## 2023-12-11 NOTE — PROGRESS NOTES
Name: Nicolas Estevez      : 1934      MRN: [id number]  Encounter Provider: Harris Vargas PA-C  Encounter Date: 2023   Encounter department: [facility] PRIMARY CARE    Assessment & Plan     1. Chronic congestive heart failure, unspecified heart failure type [facility])  Assessment & Plan:  Wt Readings from Last 3 Encounters:   23 87.1 kg (192 lb)   23 83.5 kg (184 lb)   23 79.8 kg (176 lb)     Patient has gained 20 pounds since roughly 1 month ago on Demadex 10 mg every other day. She does have a cardiologist at [facility].  BNP recently was in the [address] versus 7[address] in May after discharge. Orders:  -     POCT ECG    2. Intertrigo  Assessment & Plan:  Diagnosis explained patient to use ketoconazole twice daily for up to 2 weeks and then for prevention needs to keep the area of her groin very clean and dry as possible. Orders:  -     ketoconazole (NIZORAL) 2 % cream; Apply topically daily    3. Paroxysmal atrial fibrillation [facility])  Assessment & Plan:  It does appear the patient is in atrial fibrillation today based on auscultation. She does need to make an appointment with her cardiologist as she has been in sinus rhythm with only use of amiodarone since her cardioversion. She is not on any blood thinners. She needs to restart her baby aspirin once daily. Patient is set up with her [facility] cardiologist for tomorrow for an appointment. ER if any shortness of breath or chest pain. Depression Screening and Follow-up Plan: Patient was screened for depression during today's encounter. They screened negative with a PHQ-2 score of 0. Falls Plan of Care: balance, strength, and gait training instructions were provided. Subjective      Patient presents with:  Rash: Right side between stomach flap and pelvic area.    Weight Check: Gaining a pound a day per pt and has swelling to left ankle, would like to discuss test results MF ordered last week,. Patient here today accompanied by her nephew. She was found to have a rash in her right groin today during her weekly shower. Patient states she would never of note it was there otherwise no itching or pain. She did put antibiotic ointment on it. Also patient saw Dr. Marialuisa bates roughly 1 week ago and there was a concern for her weight gain. It does seem like she has gained 20 pounds in just the past 1 month. Patient does see [facility] cardiologist twice a year not scheduled to see them until February. BNP was elevated in the 600s however in May shortly after a admission it was in the 700s which appears to be her baseline. Echocardiogram has multiple age-related findings. Patient is not complaining of any shortness of breath or chest pain but is complaining of leg swelling and she really thinks that there is extra fluid in there. She is supposed be taking the Demadex every other day. History of atrial fibrillation converted with cardioversion and stable in sinus rhythm per cardiology note since last performed on just amiodarone alone. Review of Systems   Constitutional:  Positive for unexpected weight change. HENT: Negative. Eyes: Negative. Respiratory: Negative. Cardiovascular:  Positive for leg swelling. Gastrointestinal: Negative. Endocrine: Negative. Genitourinary: Negative. Musculoskeletal: Negative. Skin:  Positive for rash. Allergic/Immunologic: Negative. Neurological: Negative. Hematological: Negative. Psychiatric/Behavioral: Negative.          Current Outpatient Medications on File Prior to Visit   Medication Sig   • senna (SENOKOT) 8.6 mg Take 2 tablets (17.2 mg total) by mouth daily as needed for constipation   • sodium chloride 1 g tablet Take 1 tablet (1 g total) by mouth in the morning   • torsemide (DEMADEX) 10 mg tablet Take 1 tablet (10 mg total) by mouth every other day   • acetaminophen (TYLENOL) 325 mg tablet Take 3 tablets (975 mg total) by mouth every 8 (eight) hours (Patient not taking: Reported on 8/1/2023)   • amiodarone 100 mg tablet Take 1 tablet (100 mg total) by mouth daily   • amLODIPine (NORVASC) 2.5 mg tablet Take 1 tablet (2.5 mg total) by mouth daily   • ASPIRIN 81 PO Take by mouth   • atorvastatin (LIPITOR) 80 mg tablet Take 1 tablet (80 mg total) by mouth daily   • Blood Glucose Monitoring Suppl (ONE TOUCH ULTRA 2) w/Device KIT Use daily   • carvedilol (COREG) 25 mg tablet TAKE 1 TABLET BY MOUTH TWICE DAILY WITH MEALS   • Cholecalciferol 125 MCG (5000 UT) capsule Take 1,000 Units by mouth daily   • Coenzyme Q10-Fish Oil-Vit E (CO-Q 10 OMEGA-3 FISH OIL PO) Take by mouth   • dicyclomine (BENTYL) 10 mg capsule Take 1 capsule by mouth twice daily   • docusate sodium (COLACE) 100 mg capsule Take 100 mg by mouth 2 (two) times a day   • glucose blood (OneTouch Ultra) test strip Test daily                        DX: E11.9   • Lancets (onetouch ultrasoft) lancets USE 1 NEW LANCET TO CHECK GLUCOSE ONCE DAILY   • lidocaine (LIDODERM) 5 % Apply 1 patch topically over 12 hours daily Remove & Discard patch within 12 hours or as directed by MD (Patient not taking: Reported on 12/4/2023)   • losartan (COZAAR) 25 mg tablet Take 1 tablet (25 mg total) by mouth daily   • Multiple Vitamins-Minerals (multivitamin with minerals) tablet Take 1 tablet by mouth daily   • NON FORMULARY Lung support 200mg once daily at nighttime   • NON FORMULARY Fosal Point 400mg daily at bedtime   • Omega 3-6-9 Fatty Acids (OMEGA 3-6-9 PO) Take by mouth (Patient not taking: Reported on 12/4/2023)   • omeprazole (PriLOSEC) 20 mg delayed release capsule Take 1 capsule (20 mg total) by mouth daily for 14 days       Objective     /74 (BP Location: Left arm, Patient Position: Sitting, Cuff Size: Standard)   Pulse 74   Temp 97.5 °F (36.4 °C) (Temporal)   Ht 5' 0.75" (1.543 m)   Wt 87.1 kg (192 lb)   BMI 36.58 kg/m²     Physical Exam  Vitals and nursing note reviewed. Constitutional:       General: She is not in acute distress. Appearance: She is well-developed. She is not diaphoretic. HENT:      Head: Normocephalic and atraumatic. Nose: Nose normal.   Eyes:      General:         Right eye: No discharge. Left eye: No discharge. Conjunctiva/sclera: Conjunctivae normal.   Neck:      Vascular: No carotid bruit. Cardiovascular:      Rate and Rhythm: Normal rate. Rhythm irregularly irregular. Heart sounds: Normal heart sounds. No murmur heard. No friction rub. No gallop. Pulmonary:      Effort: Pulmonary effort is normal. No respiratory distress. Breath sounds: Normal breath sounds. No wheezing or rales. Musculoskeletal:      Cervical back: Neck supple. Right lower le+ Edema present. Left lower le+ Edema present. Skin:     General: Skin is warm and dry. Comments: Patient with a very well-demarcated erythematous right groin rash in the crease. Neurological:      Mental Status: She is alert and oriented to person, place, and time.    Psychiatric:         Judgment: Judgment normal.       Valentina West PA-C

## 2023-12-11 NOTE — PATIENT INSTRUCTIONS
Problem List Items Addressed This Visit          Cardiovascular and Mediastinum    Atrial fibrillation [facility])     It does appear the patient is in atrial fibrillation today based on auscultation. She does need to make an appointment with her cardiologist as she has been in sinus rhythm with only use of amiodarone since her cardioversion. She is not on any blood thinners. She needs to restart her baby aspirin once daily. Patient is set up with her [facility] cardiologist for tomorrow for an appointment. ER if any shortness of breath or chest pain. Chronic congestive heart failure (HCC) - Primary     Wt Readings from Last 3 Encounters:   12/11/23 87.1 kg (192 lb)   12/04/23 83.5 kg (184 lb)   11/14/23 79.8 kg (176 lb)   Patient has gained 20 pounds since roughly 1 month ago on Demadex 10 mg every other day. She does have a cardiologist at [facility].  BNP recently was in the [address] versus 7[address] in May after discharge. Relevant Orders    POCT ECG (Completed)       Musculoskeletal and Integument    Intertrigo     Diagnosis explained patient to use ketoconazole twice daily for up to 2 weeks and then for prevention needs to keep the area of her groin very clean and dry as possible.          Relevant Medications    ketoconazole (NIZORAL) 2 % cream

## 2023-12-19 DIAGNOSIS — K29.70 GASTRITIS WITHOUT BLEEDING, UNSPECIFIED CHRONICITY, UNSPECIFIED GASTRITIS TYPE: ICD-10-CM

## 2023-12-19 RX ORDER — DICYCLOMINE HYDROCHLORIDE 10 MG/1
CAPSULE ORAL
Qty: 180 CAPSULE | Refills: 0 | Status: SHIPPED | OUTPATIENT
Start: 2023-12-19

## 2023-12-26 DIAGNOSIS — E11.9 TYPE 2 DIABETES MELLITUS WITHOUT COMPLICATION, WITHOUT LONG-TERM CURRENT USE OF INSULIN (HCC): ICD-10-CM

## 2023-12-26 RX ORDER — BLOOD SUGAR DIAGNOSTIC
STRIP MISCELLANEOUS
Qty: 100 STRIP | Refills: 1 | Status: SHIPPED | OUTPATIENT
Start: 2023-12-26

## 2023-12-29 ENCOUNTER — APPOINTMENT (OUTPATIENT)
Dept: LAB | Facility: CLINIC | Age: 88
End: 2023-12-29
Payer: COMMERCIAL

## 2023-12-29 ENCOUNTER — TRANSCRIBE ORDERS (OUTPATIENT)
Dept: LAB | Facility: CLINIC | Age: 88
End: 2023-12-29

## 2023-12-29 DIAGNOSIS — I48.91 ATRIAL FIBRILLATION, UNSPECIFIED TYPE (HCC): Primary | ICD-10-CM

## 2023-12-29 DIAGNOSIS — I48.91 ATRIAL FIBRILLATION, UNSPECIFIED TYPE (HCC): ICD-10-CM

## 2023-12-29 DIAGNOSIS — R60.9 EDEMA, UNSPECIFIED TYPE: ICD-10-CM

## 2023-12-29 LAB
ANION GAP SERPL CALCULATED.3IONS-SCNC: 9 MMOL/L
BNP SERPL-MCNC: 479 PG/ML (ref 0–100)
BUN SERPL-MCNC: 25 MG/DL (ref 5–25)
CALCIUM SERPL-MCNC: 9.7 MG/DL (ref 8.4–10.2)
CHLORIDE SERPL-SCNC: 99 MMOL/L (ref 96–108)
CO2 SERPL-SCNC: 29 MMOL/L (ref 21–32)
CREAT SERPL-MCNC: 1.5 MG/DL (ref 0.6–1.3)
GFR SERPL CREATININE-BSD FRML MDRD: 30 ML/MIN/1.73SQ M
GLUCOSE P FAST SERPL-MCNC: 134 MG/DL (ref 65–99)
POTASSIUM SERPL-SCNC: 4 MMOL/L (ref 3.5–5.3)
SODIUM SERPL-SCNC: 137 MMOL/L (ref 135–147)

## 2023-12-29 PROCEDURE — 83880 ASSAY OF NATRIURETIC PEPTIDE: CPT

## 2023-12-29 PROCEDURE — 80048 BASIC METABOLIC PNL TOTAL CA: CPT

## 2023-12-29 PROCEDURE — 36415 COLL VENOUS BLD VENIPUNCTURE: CPT

## 2024-01-09 ENCOUNTER — NURSE TRIAGE (OUTPATIENT)
Age: 89
End: 2024-01-09

## 2024-01-09 ENCOUNTER — OFFICE VISIT (OUTPATIENT)
Dept: FAMILY MEDICINE CLINIC | Facility: CLINIC | Age: 89
End: 2024-01-09
Payer: COMMERCIAL

## 2024-01-09 ENCOUNTER — APPOINTMENT (OUTPATIENT)
Dept: RADIOLOGY | Facility: CLINIC | Age: 89
End: 2024-01-09
Payer: COMMERCIAL

## 2024-01-09 VITALS
HEIGHT: 61 IN | BODY MASS INDEX: 36.63 KG/M2 | DIASTOLIC BLOOD PRESSURE: 72 MMHG | TEMPERATURE: 98.7 F | SYSTOLIC BLOOD PRESSURE: 104 MMHG | WEIGHT: 194 LBS | OXYGEN SATURATION: 98 % | HEART RATE: 100 BPM

## 2024-01-09 DIAGNOSIS — N25.81 SECONDARY HYPERPARATHYROIDISM OF RENAL ORIGIN (HCC): ICD-10-CM

## 2024-01-09 DIAGNOSIS — N18.32 TYPE 2 DIABETES MELLITUS WITH STAGE 3B CHRONIC KIDNEY DISEASE, WITHOUT LONG-TERM CURRENT USE OF INSULIN (HCC): ICD-10-CM

## 2024-01-09 DIAGNOSIS — J40 BRONCHITIS: ICD-10-CM

## 2024-01-09 DIAGNOSIS — R06.2 WHEEZING: ICD-10-CM

## 2024-01-09 DIAGNOSIS — I50.9 CHRONIC CONGESTIVE HEART FAILURE, UNSPECIFIED HEART FAILURE TYPE (HCC): ICD-10-CM

## 2024-01-09 DIAGNOSIS — D69.6 PLATELETS DECREASED (HCC): ICD-10-CM

## 2024-01-09 DIAGNOSIS — R06.2 WHEEZING: Primary | ICD-10-CM

## 2024-01-09 DIAGNOSIS — I48.0 PAROXYSMAL ATRIAL FIBRILLATION (HCC): ICD-10-CM

## 2024-01-09 DIAGNOSIS — R63.5 WEIGHT GAIN: ICD-10-CM

## 2024-01-09 DIAGNOSIS — E11.22 TYPE 2 DIABETES MELLITUS WITH STAGE 3B CHRONIC KIDNEY DISEASE, WITHOUT LONG-TERM CURRENT USE OF INSULIN (HCC): ICD-10-CM

## 2024-01-09 LAB
SL AMB POCT RAPID FLU A: NORMAL
SL AMB POCT RAPID FLU B: NORMAL

## 2024-01-09 PROCEDURE — 87804 INFLUENZA ASSAY W/OPTIC: CPT | Performed by: FAMILY MEDICINE

## 2024-01-09 PROCEDURE — 99214 OFFICE O/P EST MOD 30 MIN: CPT | Performed by: FAMILY MEDICINE

## 2024-01-09 PROCEDURE — 71046 X-RAY EXAM CHEST 2 VIEWS: CPT

## 2024-01-09 RX ORDER — CEFUROXIME AXETIL 500 MG/1
500 TABLET ORAL EVERY 12 HOURS SCHEDULED
Qty: 20 TABLET | Refills: 0 | Status: SHIPPED | OUTPATIENT
Start: 2024-01-09 | End: 2024-01-13

## 2024-01-09 RX ORDER — IPRATROPIUM BROMIDE AND ALBUTEROL SULFATE 2.5; .5 MG/3ML; MG/3ML
3 SOLUTION RESPIRATORY (INHALATION)
Status: DISCONTINUED | OUTPATIENT
Start: 2024-01-09 | End: 2024-01-09

## 2024-01-09 RX ORDER — IPRATROPIUM BROMIDE AND ALBUTEROL 20; 100 UG/1; UG/1
1 SPRAY, METERED RESPIRATORY (INHALATION) 3 TIMES DAILY
Qty: 4 G | Refills: 5 | Status: SHIPPED | OUTPATIENT
Start: 2024-01-09 | End: 2024-01-09 | Stop reason: SDUPTHER

## 2024-01-09 RX ORDER — IPRATROPIUM BROMIDE AND ALBUTEROL 20; 100 UG/1; UG/1
1 SPRAY, METERED RESPIRATORY (INHALATION) 3 TIMES DAILY
Qty: 4 G | Refills: 5 | Status: SHIPPED | OUTPATIENT
Start: 2024-01-09 | End: 2024-01-13

## 2024-01-09 RX ORDER — CEFUROXIME AXETIL 500 MG/1
500 TABLET ORAL EVERY 12 HOURS SCHEDULED
Qty: 20 TABLET | Refills: 0 | Status: SHIPPED | OUTPATIENT
Start: 2024-01-09 | End: 2024-01-09 | Stop reason: SDUPTHER

## 2024-01-09 RX ADMIN — IPRATROPIUM BROMIDE AND ALBUTEROL SULFATE 3 ML: 2.5; .5 SOLUTION RESPIRATORY (INHALATION) at 16:27

## 2024-01-09 NOTE — TELEPHONE ENCOUNTER
Regarding: URI symptoms  ----- Message from Kenia Murray sent at 1/9/2024 11:12 AM EST -----  Patient nephew calling because patient has post nasal drip, nausea, feeling a little wheezy today.  Heart rate today is 90 which is elevated for her. (Normally in the 60's). OTC covid tests are 2/2 negative.

## 2024-01-09 NOTE — ASSESSMENT & PLAN NOTE
Lab Results   Component Value Date    HGBA1C 6.6 (H) 10/02/2023   Patient's diabetes has been under reasonable control with last HbA1c of 6.6 due for lab in February

## 2024-01-09 NOTE — PROGRESS NOTES
Name: Delma Oreilly      : 1934      MRN: [id number]  Encounter Provider: Melody Nicholson MD  Encounter Date: 2024   Encounter department: [facility] PRIMARY CARE    Assessment & Plan     1. Wheezing  -     POCT rapid flu A and B  -     XR chest pa & lateral; Future; Expected date: 2024  -     cefuroxime (CEFTIN) 500 mg tablet; Take 1 tablet (500 mg total) by mouth every 12 (twelve) hours for 10 days  -     ipratropium-albuterol (Combivent Respimat) inhaler; Inhale 1 puff 3 (three) times a day    2. Bronchitis  -     XR chest pa & lateral; Future; Expected date: 2024  -     cefuroxime (CEFTIN) 500 mg tablet; Take 1 tablet (500 mg total) by mouth every 12 (twelve) hours for 10 days  -     ipratropium-albuterol (Combivent Respimat) inhaler; Inhale 1 puff 3 (three) times a day    3. Chronic congestive heart failure, unspecified heart failure type (HCC)  Assessment & Plan:  Wt Readings from Last 3 Encounters:   24 88 kg (194 lb)   23 87.1 kg (192 lb)   23 83.5 kg (184 lb)       Her torsemide dose was increased at the last cardiology visit because of her heart failure we will check a chest x-ray and BNP        Orders:  -     B-Type Natriuretic Peptide(BNP); Future    4. Type 2 diabetes mellitus with stage 3b chronic kidney disease, without long-term current use of insulin ([facility])  Assessment & Plan:    Lab Results   Component Value Date    HGBA1C 6.6 (H) 10/02/2023   Patient's diabetes has been under reasonable control with last HbA1c of 6.6 due for lab in February      5. Paroxysmal atrial fibrillation (HCC)  Assessment & Plan:  Sees cardiology for her A-fib she is back in it and her amiodarone dose was increased      6. Secondary hyperparathyroidism of renal origin (HCC)  Assessment & Plan:  Sees kidney doctor PTH has been stable      7. Platelets decreased (HCC)  Assessment & Plan:  Recheck cbc    Orders:  -     CBC and differential; Future    8. Weight gain  -     TSH,  3rd generation; Future; Expected date: 04/09/2024           Subjective     Chief Complaint   Patient presents with    Cough     Cough and congestion. Onset Satruday.  Tested 2 times negative for covid    Shortness of Breath    Nausea    Eye Problem     Skin around eyes red. She canceled her appt with eye doctor due to being sick.        Patient presents with:  Cough: Cough and congestion. Onset Satruday.caregiver  was  sick  Tested 2 times negative for covid  Shortness of Breath, high pitched  breathing  Nausea, no vomiting  Eye Problem: Skin around eyes red. She canceled her appt with eye doctor due to being sick.         Review of Systems   Constitutional:  Positive for unexpected weight change. Negative for activity change, appetite change and fatigue.        20 lb  gain   HENT:  Positive for congestion. Negative for ear pain, postnasal drip, rhinorrhea and sinus pain.    Respiratory:  Positive for cough and wheezing.         Improved  after 2  nebulizers   Cardiovascular:  Negative for chest pain.   Neurological:  Negative for dizziness, light-headedness and headaches.   Hematological:  Negative for adenopathy.       Current Outpatient Medications on File Prior to Visit   Medication Sig    glucose blood (OneTouch Ultra) test strip Test daily                        DX: E11.9    acetaminophen (TYLENOL) 325 mg tablet Take 3 tablets (975 mg total) by mouth every 8 (eight) hours (Patient not taking: Reported on 8/1/2023)    amiodarone 100 mg tablet Take 1 tablet (100 mg total) by mouth daily    amLODIPine (NORVASC) 2.5 mg tablet Take 1 tablet (2.5 mg total) by mouth daily    ASPIRIN 81 PO Take by mouth    atorvastatin (LIPITOR) 80 mg tablet Take 1 tablet (80 mg total) by mouth daily    Blood Glucose Monitoring Suppl (ONE TOUCH ULTRA 2) w/Device KIT Use daily    carvedilol (COREG) 25 mg tablet TAKE 1 TABLET BY MOUTH TWICE DAILY WITH MEALS    Cholecalciferol 125 MCG (5000 UT) capsule Take 1,000 Units by mouth daily     "Coenzyme Q10-Fish Oil-Vit E (CO-Q 10 OMEGA-3 FISH OIL PO) Take by mouth    dicyclomine (BENTYL) 10 mg capsule Take 1 capsule by mouth twice daily    docusate sodium (COLACE) 100 mg capsule Take 100 mg by mouth 2 (two) times a day    ketoconazole (NIZORAL) 2 % cream Apply topically daily    Lancets (onetouch ultrasoft) lancets USE 1 NEW LANCET TO CHECK GLUCOSE ONCE DAILY    lidocaine (LIDODERM) 5 % Apply 1 patch topically over 12 hours daily Remove & Discard patch within 12 hours or as directed by MD (Patient not taking: Reported on 12/4/2023)    losartan (COZAAR) 25 mg tablet Take 1 tablet (25 mg total) by mouth daily    Multiple Vitamins-Minerals (multivitamin with minerals) tablet Take 1 tablet by mouth daily    NON FORMULARY Lung support 200mg once daily at nighttime    NON FORMULARY Fosal Point 400mg daily at bedtime    Omega 3-6-9 Fatty Acids (OMEGA 3-6-9 PO) Take by mouth (Patient not taking: Reported on 12/4/2023)    omeprazole (PriLOSEC) 20 mg delayed release capsule Take 1 capsule (20 mg total) by mouth daily for 14 days    senna (SENOKOT) 8.6 mg Take 2 tablets (17.2 mg total) by mouth daily as needed for constipation    sodium chloride 1 g tablet Take 1 tablet (1 g total) by mouth in the morning    torsemide (DEMADEX) 10 mg tablet Take 1 tablet (10 mg total) by mouth every other day       Objective     /72   Pulse 100   Temp 98.7 °F (37.1 °C)   Ht 5' 0.75\" (1.543 m)   Wt 88 kg (194 lb)   SpO2 98%   BMI 36.96 kg/m²     Physical Exam  Vitals reviewed.   Constitutional:       Appearance: Normal appearance. She is obese.   Cardiovascular:      Rate and Rhythm: Normal rate. Rhythm irregular.      Pulses: Normal pulses.      Heart sounds: Normal heart sounds.   Pulmonary:      Effort: Pulmonary effort is normal.      Breath sounds: Wheezing present.      Comments: High pitched  wheezing noises, improved after 2  nebulizer treatments  Lymphadenopathy:      Cervical: No cervical adenopathy. "   Neurological:      Mental Status: She is alert.       Melody Nicholson MD

## 2024-01-09 NOTE — ASSESSMENT & PLAN NOTE
Wt Readings from Last 3 Encounters:   01/09/24 88 kg (194 lb)   12/11/23 87.1 kg (192 lb)   12/04/23 83.5 kg (184 lb)       Her torsemide dose was increased at the last cardiology visit because of her heart failure we will check a chest x-ray and BNP

## 2024-01-09 NOTE — TELEPHONE ENCOUNTER
"CG called regarding patient's symptoms. Patient is having wheezing, nasal congestion, nausea and cough. Covid testing is negative. Patient was scheduled for this afternoon.   Reason for Disposition   MILD difficulty breathing (e.g., minimal/no SOB at rest, SOB with walking, pulse <100) and still present when not coughing    Answer Assessment - Initial Assessment Questions  1. ONSET: \"When did the cough begin?\"       2-3 days ago  2. SEVERITY: \"How bad is the cough today?\"       moderate  3. SPUTUM: \"Describe the color of your sputum\" (none, dry cough; clear, white, yellow, green)      Dry cough  4. HEMOPTYSIS: \"Are you coughing up any blood?\" If so ask: \"How much?\" (flecks, streaks, tablespoons, etc.)      N/A  5. DIFFICULTY BREATHING: \"Are you having difficulty breathing?\" If Yes, ask: \"How bad is it?\" (e.g., mild, moderate, severe)     - MILD: No SOB at rest, mild SOB with walking, speaks normally in sentences, can lay down, no retractions, pulse < 100.     - MODERATE: SOB at rest, SOB with minimal exertion and prefers to sit, cannot lie down flat, speaks in phrases, mild retractions, audible wheezing, pulse 100-120.     - SEVERE: Very SOB at rest, speaks in single words, struggling to breathe, sitting hunched forward, retractions, pulse > 120       Mildly SOB  6. FEVER: \"Do you have a fever?\" If Yes, ask: \"What is your temperature, how was it measured, and when did it start?\"      N/A  7. CARDIAC HISTORY: \"Do you have any history of heart disease?\" (e.g., heart attack, congestive heart failure)       N/A  8. LUNG HISTORY: \"Do you have any history of lung disease?\"  (e.g., pulmonary embolus, asthma, emphysema)      N/A  9. PE RISK FACTORS: \"Do you have a history of blood clots?\" (or: recent major surgery, recent prolonged travel, bedridden)      N/A  10. OTHER SYMPTOMS: \"Do you have any other symptoms?\" (e.g., runny nose, wheezing, chest pain)        Wheezing, some nausea  11. PREGNANCY: \"Is there any chance you " "are pregnant?\" \"When was your last menstrual period?\"        N/A  12. TRAVEL: \"Have you traveled out of the country in the last month?\" (e.g., travel history, exposures)        N/A    Protocols used: Cough-ADULT-[place]    "

## 2024-01-10 ENCOUNTER — TELEPHONE (OUTPATIENT)
Age: 89
End: 2024-01-10

## 2024-01-10 NOTE — TELEPHONE ENCOUNTER
Patient's nephew Jose Carlos called about the bloodwork that is scheduled, stated that the paper work has a date of 4/9/2024 on it for the CBC and TSH.  He wants to know if she should go for the TSH bloodwork right away and wait to do the CBC.  Asked if someone could please contact him to clarify at [phone]

## 2024-01-11 DIAGNOSIS — I48.0 PAROXYSMAL ATRIAL FIBRILLATION (HCC): Primary | ICD-10-CM

## 2024-01-11 NOTE — TELEPHONE ENCOUNTER
"Joe ramirez call back and has been informed of lab instructions.  Joe stated \" care give called him and told him Delma had a rough night\", did not the details of the rough night.    Lab orders TSH stated expected date for 4/9/24.    Needs new orders in order for lab to be done for TSH.    Please review and advise  Thank you.  "

## 2024-01-12 ENCOUNTER — TELEPHONE (OUTPATIENT)
Age: 89
End: 2024-01-12

## 2024-01-12 ENCOUNTER — NURSE TRIAGE (OUTPATIENT)
Age: 89
End: 2024-01-12

## 2024-01-12 ENCOUNTER — HOSPITAL ENCOUNTER (EMERGENCY)
Facility: HOSPITAL | Age: 89
End: 2024-01-12
Attending: EMERGENCY MEDICINE
Payer: COMMERCIAL

## 2024-01-12 DIAGNOSIS — I46.9 CARDIAC ARREST (HCC): Primary | ICD-10-CM

## 2024-01-12 PROCEDURE — 99285 EMERGENCY DEPT VISIT HI MDM: CPT

## 2024-01-12 PROCEDURE — 92950 HEART/LUNG RESUSCITATION CPR: CPT

## 2024-01-12 PROCEDURE — 99291 CRITICAL CARE FIRST HOUR: CPT | Performed by: EMERGENCY MEDICINE

## 2024-01-12 PROCEDURE — 92950 HEART/LUNG RESUSCITATION CPR: CPT | Performed by: EMERGENCY MEDICINE

## 2024-01-12 RX ORDER — EPINEPHRINE 0.1 MG/ML
INJECTION INTRAVENOUS CODE/TRAUMA/SEDATION MEDICATION
Status: COMPLETED | OUTPATIENT
Start: 2024-01-12 | End: 2024-01-12

## 2024-01-12 RX ADMIN — EPINEPHRINE 1 MG: 0.1 INJECTION INTRAVENOUS at 12:14

## 2024-01-12 NOTE — TELEPHONE ENCOUNTER
Regarding: SOB  ----- Message from Kenia Murray sent at 1/12/2024 10:08 AM EST -----  Patient nephew worried about his aunt due to her c/o SOB. Did see PCP and was neg for covid/flu in the office this week. She does have a breathing treatment but they do not think she is able to inhale it correctly. Please advise. Tried to warm transfer to triage line but all unavailable.

## 2024-01-12 NOTE — TELEPHONE ENCOUNTER
"Patient having difficulty breathing, was seen in office a few days ago and prescribed an inhaler. Per nephew she is asking for caregivers to spend the night b/c she feels so SOB. Does not think she is using the inhaler correctly. Blood sugar today is 330. OV made for today.      Reason for Disposition   MILD difficulty breathing (e.g., minimal/no SOB at rest, SOB with walking, pulse < 100) of new-onset or worse than normal    Answer Assessment - Initial Assessment Questions  1. RESPIRATORY STATUS: \"Describe your breathing?\" (e.g., wheezing, shortness of breath, unable to speak, severe coughing)       SOB  2. ONSET: \"When did this breathing problem begin?\"       Last week  3. PATTERN \"Does the difficult breathing come and go, or has it been constant since it started?\"       constant  4. SEVERITY: \"How bad is your breathing?\" (e.g., mild, moderate, severe)     - MILD: No SOB at rest, mild SOB with walking, speaks normally in sentences, can lay down, no retractions, pulse < 100.     - MODERATE: SOB at rest, SOB with minimal exertion and prefers to sit, cannot lie down flat, speaks in phrases, mild retractions, audible wheezing, pulse 100-120.     - SEVERE: Very SOB at rest, speaks in single words, struggling to breathe, sitting hunched forward, retractions, pulse > 120       Moderate  5. RECURRENT SYMPTOM: \"Have you had difficulty breathing before?\" If Yes, ask: \"When was the last time?\" and \"What happened that time?\"       yes  6. CARDIAC HISTORY: \"Do you have any history of heart disease?\" (e.g., heart attack, angina, bypass surgery, angioplasty)       Stage 3 liver disease  7. LUNG HISTORY: \"Do you have any history of lung disease?\"  (e.g., pulmonary embolus, asthma, emphysema)      no  8. CAUSE: \"What do you think is causing the breathing problem?\"       Unsure  9. OTHER SYMPTOMS: \"Do you have any other symptoms? (e.g., dizziness, runny nose, cough, chest pain, fever)      no  10. PREGNANCY: \"Is there any chance " "you are pregnant?\" \"When was your last menstrual period?\"        na  11. TRAVEL: \"Have you traveled out of the country in the last month?\" (e.g., travel history, exposures) no    Protocols used: Breathing Difficulty-ADULT-[place]    "

## 2024-01-12 NOTE — ED PROVIDER NOTES
History  Chief Complaint   Patient presents with    Cardiac Arrest     Pt a prehospital Cardiac arrest     HPI  Patient is a 89-year-old female brought in by EMS for cardiac arrest.  Per chart review, past medical history significant for PAF, type 2 diabetes, hyperparathyroidism, thrombocytopenia, CHF, bronchitis.  Per EMS and per patient's nephew, patient was walking down steps to go to a doctor's appointment when she spasmed and then fell to the ground and began gasping.  On arrival for EMS, patient was in cardiac arrest.  This occurred at approximately 1140-11:45 AM.  Call occurred at 11:40 AM, EMS arrived at 11:45 AM.  EMS states that when they arrived at the scene, patient was asystolic, apneic and in cardiac arrest.  Attempted to administer IV epinephrine, line blew, placed left tibial IO line and administered 1 dose of epi and placed a supraglottic airway device and began CPR.  Patient was asystolic with no return of spontaneous circulation tired time with EMS.  Epi was administered per ACLS protocol.  On arrival to the ER, patient was still in asystole, 1 dose of 1 mg epi given via IO, bedside echo did not show any cardiac activity.  It was determined at this time that any further resuscitative esters were futile.  Time of death noted to be at 12:17pm.     Prior to Admission Medications   Prescriptions Last Dose Informant Patient Reported? Taking?   ASPIRIN 81 PO  Self, Child Yes No   Sig: Take by mouth   Blood Glucose Monitoring Suppl (ONE TOUCH ULTRA 2) w/Device KIT  Self, Child No No   Sig: Use daily   Cholecalciferol 125 MCG (5000 UT) capsule  Self, Child Yes No   Sig: Take 1,000 Units by mouth daily   Coenzyme Q10-Fish Oil-Vit E (CO-Q 10 OMEGA-3 FISH OIL PO)   Yes No   Sig: Take by mouth   Lancets (onetouch ultrasoft) lancets   No No   Sig: USE 1 NEW LANCET TO CHECK GLUCOSE ONCE DAILY   Multiple Vitamins-Minerals (multivitamin with minerals) tablet  Self, Child Yes No   Sig: Take 1 tablet by mouth daily    NON FORMULARY  Self, Child Yes No   Sig: Lung support 200mg once daily at nighttime   NON FORMULARY  Self, Child Yes No   Sig: Fosal Point 400mg daily at bedtime   Omega 3-6-9 Fatty Acids (OMEGA 3-6-9 PO)  Self, Child Yes No   Sig: Take by mouth   Patient not taking: Reported on 12/4/2023   acetaminophen (TYLENOL) 325 mg tablet  Self, Child No No   Sig: Take 3 tablets (975 mg total) by mouth every 8 (eight) hours   Patient not taking: Reported on 8/1/2023   amLODIPine (NORVASC) 2.5 mg tablet   No No   Sig: Take 1 tablet (2.5 mg total) by mouth daily   amiodarone 100 mg tablet   No No   Sig: Take 1 tablet (100 mg total) by mouth daily   atorvastatin (LIPITOR) 80 mg tablet   No No   Sig: Take 1 tablet (80 mg total) by mouth daily   carvedilol (COREG) 25 mg tablet   No No   Sig: TAKE 1 TABLET BY MOUTH TWICE DAILY WITH MEALS   cefuroxime (CEFTIN) 500 mg tablet   No No   Sig: Take 1 tablet (500 mg total) by mouth every 12 (twelve) hours for 10 days   dicyclomine (BENTYL) 10 mg capsule   No No   Sig: Take 1 capsule by mouth twice daily   docusate sodium (COLACE) 100 mg capsule  Self, Child Yes No   Sig: Take 100 mg by mouth 2 (two) times a day   glucose blood (OneTouch Ultra) test strip   No No   Sig: Test daily                        DX: E11.9   ipratropium-albuterol (Combivent Respimat) inhaler   No No   Sig: Inhale 1 puff 3 (three) times a day   ketoconazole (NIZORAL) 2 % cream   No No   Sig: Apply topically daily   lidocaine (LIDODERM) 5 %   No No   Sig: Apply 1 patch topically over 12 hours daily Remove & Discard patch within 12 hours or as directed by MD   Patient not taking: Reported on 12/4/2023   losartan (COZAAR) 25 mg tablet   No No   Sig: Take 1 tablet (25 mg total) by mouth daily   omeprazole (PriLOSEC) 20 mg delayed release capsule   No No   Sig: Take 1 capsule (20 mg total) by mouth daily for 14 days   senna (SENOKOT) 8.6 mg  Self, Child No No   Sig: Take 2 tablets (17.2 mg total) by mouth daily as needed  for constipation   sodium chloride 1 g tablet   No No   Sig: Take 1 tablet (1 g total) by mouth in the morning   torsemide (DEMADEX) 10 mg tablet   No No   Sig: Take 1 tablet (10 mg total) by mouth every other day      Facility-Administered Medications: None       Past Medical History:   Diagnosis Date    Anemia     Anxiety     Arthritis     CKD (chronic kidney disease), stage III (HCC) 11/29/2018    Depression, recurrent (HCC) 6/14/2021    Diabetes mellitus (HCC)     Diabetic peripheral neuropathy (HCC)     HLD (hyperlipidemia)     Hyperlipidemia     Hypertension     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 8/23/2017    Thyroid enlarged 2/23/2015    Tinnitus     Vertigo        Past Surgical History:   Procedure Laterality Date    APPENDECTOMY      EAR SURGERY Left     KNEE SURGERY      TONSILLECTOMY      UPPER GASTROINTESTINAL ENDOSCOPY         Family History   Problem Relation Age of Onset    Heart disease Mother     Diabetes Mother     Heart disease Father     Stroke Father     Cancer Family     Dementia Sister     Liver disease Other      I have reviewed and agree with the history as documented.    E-Cigarette/Vaping    E-Cigarette Use Never User      E-Cigarette/Vaping Substances    Nicotine No     THC No     CBD No     Flavoring No     Other No     Unknown No      Social History     Tobacco Use    Smoking status: Never    Smokeless tobacco: Never   Vaping Use    Vaping status: Never Used   Substance Use Topics    Alcohol use: Not Currently    Drug use: Never        Review of Systems   Unable to perform ROS: Patient unresponsive       Physical Exam  ED Triage Vitals [01/12/24 1217]   Temp Pulse Respirations BP SpO2   -- (!) 0 (!) 0 -- --      Temp src Heart Rate Source Patient Position - Orthostatic VS BP Location FiO2 (%)   -- -- -- -- --      Pain Score       --             Orthostatic Vital Signs  Vitals:    01/12/24  1217   Pulse: (!) 0       Physical Exam  Vitals and nursing note reviewed.   Constitutional:       Comments: Patient presenting cardiac arrest, unresponsive, apneic and asystole.   HENT:      Head: Normocephalic.      Right Ear: External ear normal.      Left Ear: External ear normal.      Nose: Nose normal.      Mouth/Throat:      Mouth: Mucous membranes are moist.   Eyes:      Conjunctiva/sclera: Conjunctivae normal.      Comments: Pupils fixed and dilated.     Cardiovascular:      Comments: Asystole.  No pulses on palpation.  Pulmonary:      Comments: Supraglottic airway device in place.  Patient being actively ventilated being bagged.  No spontaneous respirations.  Abdominal:      General: Abdomen is flat.      Palpations: Abdomen is soft.   Musculoskeletal:      Cervical back: Neck supple.      Comments: Range of motion normal to passive range of motion.  Left tibial IO in place.   Neurological:      Comments: Patient unresponsive.         ED Medications  Medications   EPINEPHrine (ADRENALIN) injection (1 mg Intravenous Given 1/12/24 1214)       Diagnostic Studies  Results Reviewed       None                   No orders to display         Procedures  Procedures      ED Course                                       Medical Decision Making  Patient is a 89-year-old female presenting for cardiac arrest.  DDx: Cardiac arrest  Based on patient presentation and physical exam findings, ACLS protocol was followed.  Bedside echo did not reveal any cardiac activity after 1 dose of epi.  Given patient has been down for 30 minutes with no return of spontaneous circulation and no cardiac activity, no spontaneous respirations, was determined at that time that any further resuscitative efforts were futile.  Time of death was 12:17 PM on 1/12/2024.  -Case discussed with family's primary care physician.  Patient's primary care physician did not feel comfortable certifying cause of death given the circumstances.  -Informed  patient's nephew who was with her at the time of arrest of the patient's death and details of treatment/care provided.  Patient understands,  present, and had no further questions.    Problems Addressed:  Cardiac arrest (HCC): acute illness or injury    Risk  Prescription drug management.          Disposition  Final diagnoses:   Cardiac arrest (HCC)     Time reflects when diagnosis was documented in both MDM as applicable and the Disposition within this note       Time User Action Codes Description Comment    2024 12:21 PM Patrick Cruz [I46.9] Cardiac arrest (HCC)           ED Disposition       ED Disposition       Condition   --    Date/Time    12:21 PM    Comment   --             Follow-up Information    None       Date, Time and Cause of Death    Preliminary Cause of Death: Cardiac arrest (HCC)       Discharge Medication List as of 2024  3:07 PM        CONTINUE these medications which have NOT CHANGED    Details   glucose blood (OneTouch Ultra) test strip Test daily                        DX: E11.9, Normal      acetaminophen (TYLENOL) 325 mg tablet Take 3 tablets (975 mg total) by mouth every 8 (eight) hours, Starting 2022, No Print      amiodarone 100 mg tablet Take 1 tablet (100 mg total) by mouth daily, Starting 2023, No Print      amLODIPine (NORVASC) 2.5 mg tablet Take 1 tablet (2.5 mg total) by mouth daily, Starting 2023, Normal      ASPIRIN 81 PO Take by mouth, Historical Med      atorvastatin (LIPITOR) 80 mg tablet Take 1 tablet (80 mg total) by mouth daily, Starting 2023, Normal      Blood Glucose Monitoring Suppl (ONE TOUCH ULTRA 2) w/Device KIT Use daily, Starting Fri 10/29/2021, Normal      carvedilol (COREG) 25 mg tablet TAKE 1 TABLET BY MOUTH TWICE DAILY WITH MEALS, Starting Tue 10/31/2023, Normal      cefuroxime (CEFTIN) 500 mg tablet Take 1 tablet (500 mg total) by mouth every 12 (twelve) hours for 10 days, Starting Tue  1/9/2024, Until Fri 1/19/2024, Normal      Cholecalciferol 125 MCG (5000 UT) capsule Take 1,000 Units by mouth daily, Historical Med      Coenzyme Q10-Fish Oil-Vit E (CO-Q 10 OMEGA-3 FISH OIL PO) Take by mouth, Historical Med      dicyclomine (BENTYL) 10 mg capsule Take 1 capsule by mouth twice daily, Normal      docusate sodium (COLACE) 100 mg capsule Take 100 mg by mouth 2 (two) times a day, Historical Med      ipratropium-albuterol (Combivent Respimat) inhaler Inhale 1 puff 3 (three) times a day, Starting Tue 1/9/2024, Normal      ketoconazole (NIZORAL) 2 % cream Apply topically daily, Starting Mon 12/11/2023, Normal      Lancets (onetouch ultrasoft) lancets USE 1 NEW LANCET TO CHECK GLUCOSE ONCE DAILY, Normal      lidocaine (LIDODERM) 5 % Apply 1 patch topically over 12 hours daily Remove & Discard patch within 12 hours or as directed by MD, Starting Tue 8/1/2023, Normal      losartan (COZAAR) 25 mg tablet Take 1 tablet (25 mg total) by mouth daily, Starting Tue 11/14/2023, Normal      Multiple Vitamins-Minerals (multivitamin with minerals) tablet Take 1 tablet by mouth daily, Historical Med      !! NON FORMULARY Lung support 200mg once daily at nighttime, Historical Med      !! NON FORMULARY Fosal Point 400mg daily at bedtime, Historical Med      Omega 3-6-9 Fatty Acids (OMEGA 3-6-9 PO) Take by mouth, Historical Med      omeprazole (PriLOSEC) 20 mg delayed release capsule Take 1 capsule (20 mg total) by mouth daily for 14 days, Starting Fri 7/21/2023, Until Fri 8/4/2023, Normal      senna (SENOKOT) 8.6 mg Take 2 tablets (17.2 mg total) by mouth daily as needed for constipation, Starting Thu 12/29/2022, No Print      sodium chloride 1 g tablet Take 1 tablet (1 g total) by mouth in the morning, Starting Tue 11/14/2023, Normal      torsemide (DEMADEX) 10 mg tablet Take 1 tablet (10 mg total) by mouth every other day, Starting Tue 8/1/2023, Normal       !! - Potential duplicate medications found. Please discuss  with provider.        No discharge procedures on file.    PDMP Review         Value Time User    PDMP Reviewed  Yes 12/29/2022  1:13 PM Kiya Thomas PA-C             ED Provider  Attending physically available and evaluated Delma Oreilly. I managed the patient along with the ED Attending.    Electronically Signed by           Karlo Truong MD  01/12/24 [id number]

## 2024-01-12 NOTE — TELEPHONE ENCOUNTER
Pts Nephew called stating that Delma was taking by ambulance to the emergency. He called to cancel appt. But I don't see a communication consent. Informed nephew that when Delma gets well she can call and reschedule.

## 2024-01-12 NOTE — ED ATTENDING ATTESTATION
1/12/2024  I, Patrick Cruz DO, saw and evaluated the patient. I have discussed the patient with the resident/non-physician practitioner and agree with the resident's/non-physician practitioner's findings, Plan of Care, and MDM as documented in the resident's/non-physician practitioner's note, except where noted. All available labs and Radiology studies were reviewed.  I was present for key portions of any procedure(s) performed by the resident/non-physician practitioner and I was immediately available to provide assistance.       At this point I agree with the current assessment done in the Emergency Department.  I have conducted an independent evaluation of this patient a history and physical is as follows:    Patient is a 89-year-old female brought in by EMS and cardiac arrest.  Per review of records patient has a history of paroxysmal atrial fibrillation, type 2 diabetes, hyperparathyroidism, thrombocytopenia and CHF.  Per EMS, the patient's  says that the patient has been sick for couple days having some cough and congestion, he finally convinced her to let him take her to the doctors and they went in the car and at 11:40 AM she went completely unresponsive.  EMS was activated, they indicate they were paged out at 11:45 AM.  EMS indicates that they arrived on scene shortly afterwards, found the patient to be asystolic, apneic and in cardiac arrest.  They tried administer IV epinephrine, the line blew, they placed a left tibial IO line and administered epinephrine, placed a supraglottic airway device, and began CPR.  There has never been a return of spontaneous circulation, they administered epinephrine per ACLS protocol.  There was no noted trauma.    General:  Patient is unresponsive  Head:  Atraumatic  Eyes:  Conjunctiva pink, pupils fixed nonreactive  ENT:  Mucous membranes are moist, supraglottic airway device in place  Neck:  Supple  Cardiac: No cardiac activity  Lungs: No spontaneous  respirations  Abdomen:  Soft, nontender, no tympany, no rigidity, no guarding  Extremities:  Normal range of motion to passive range of motion, left tibial intraosseous needle in place  Neurologic: Completely unresponsive, no withdrawal to painful stimuli  Skin:  Pink, cool and dry      ED Course     Patient arrived in cardiac arrest, CPR continued, 1 mg epinephrine given, on reassessment patient still completely unresponsive, asystolic in multiple leads, bedside point-of-care cardiac ultrasound performed by Dr. Murillo, ED resident, shows no pericardial effusion, no cardiac activity at all.  Due to the prolonged downtime, initial presenting rhythm of asystole, no return of spontaneous circulation, the decision was made that further resuscitation efforts would be futile, resuscitation was terminated and time of death was 12:17 PM.    Patient's family arrived, details of the resuscitation and termination of resuscitation patient's death were discussed with them, they indicated they had no additional questions    Case discussed with the patient's primary care physician, they are unwilling to serve as the certifying physician on the death certificate.  I will be serving as the pronouncing physician on the death certificate but not the certifying physician      DIAGNOSIS:  Acute cardiac arrest, history of coronary disease    MEDICAL DECISION MAKING CODING    Patient presents with acute new problem with:  Threat to life or bodily function    Chronic conditions affecting care: As per HPI    COLLECTION AND INTERPRETATION OF DATA  Additional history obtained from: EMS  I reviewed prior external notes, including December 11, 2023 family medicine office visit            Critical Care Time  CriticalCare Time    Date/Time: 1/12/2024 1:29 PM    Performed by: Patrick Cruz DO  Authorized by: Patrick Cruz DO    Critical care provider statement:     Critical care time (minutes):  31    Critical care time was exclusive of:   Separately billable procedures and treating other patients and teaching time    Critical care was necessary to treat or prevent imminent or life-threatening deterioration of the following conditions:  Cardiac failure    Critical care was time spent personally by me on the following activities:  Obtaining history from patient or surrogate, discussions with primary provider, examination of patient, review of old charts, re-evaluation of patient's condition and ordering and performing treatments and interventions    I assumed direction of critical care for this patient from another provider in my specialty: no

## 2024-01-15 ENCOUNTER — TELEPHONE (OUTPATIENT)
Age: 89
End: 2024-01-15

## 2024-01-16 NOTE — TELEPHONE ENCOUNTER
pc from neph, pt having bleeding from ear for last 1/2 hour despite local pressure , did visualize ear canal after removing small amoun of wax and there was no bleeding, rec eval at urgent care
Negative

## 2024-01-16 NOTE — TELEPHONE ENCOUNTER
Ashley called again. Facesheet and medical worksheet was faxed to our centrally located fax yesterday in the [facility]. I confirmed correct fax number. It needs a doc signature and cause of death. Can we expedite this? [facility] needs death certificate to perform services on Thursday.

## 2024-01-18 NOTE — TELEPHONE ENCOUNTER
Medicare Wellness Visit  Plan for Preventive Care    A good way for you to stay healthy is to use preventive care.  Medicare covers many services that can help you stay healthy.* The goal of these services is to find any health problems as quickly as possible. Finding problems early can help make them easier to treat.  Your personal plan below lists the services you may need and when they are due.      Health Maintenance Summary     DM/CKD Microalbumin (Yearly)  Overdue - never done    Medicare Advantage- Medicare Wellness Visit (Yearly - January to December)  Due since 1/1/2024    DM/CKD GFR (Yearly)  Due since 1/6/2024    Depression Screening (Yearly)  Due since 1/12/2024    DTaP/Tdap/Td Vaccine (2 - Td or Tdap)  Next due on 1/7/2026    Hepatitis B Vaccine (For Physician/APC Discussion)   Completed    Pneumococcal Vaccine 65+   Completed    Shingles Vaccine   Completed    Influenza Vaccine   Completed    COVID-19 Vaccine   Completed    Meningococcal Vaccine   Aged Out    HPV Vaccine   Aged Out    Colorectal Cancer Risk - Colonoscopy   Discontinued           Preventive Care for Women and Men    Heart Screenings (Cardiovascular):  Blood tests are used to check your cholesterol, lipid and triglyceride levels. High levels can increase your risk for heart disease and stroke. High levels can be treated with medications, diet and exercise. Lowering your levels can help keep your heart and blood vessels healthy.  Your provider will order these tests if they are needed.    An ultrasound is done to see if you have an abdominal aortic aneurysm (AAA).  This is an enlargement of one of the main blood vessels that delivers blood to the body.   In the [place], 9,000 deaths are caused by AAA.  You may not even know you have this problem and as many as 1 in 3 people will have a serious problem if it is not treated.  Early diagnosis allows for more effective treatment and cure.  If you have a family history of AAA or are a  Reviewed results and meds and future stool testing with pts caregiver     meds sent male age 65-75 who has smoked, you are at higher risk of an AAA.  Your provider can order this test, if needed.    Colorectal Screening:  There are many tests that are used to check for cancer of your colon and rectum. You and your provider should discuss what test is best for you and when to have it done.  Options include:  Screening Colonoscopy: exam of the entire colon, seen through a flexible lighted tube.  Flexible Sigmoidoscopy: exam of the last third (sigmoid portion) of the colon and rectum, seen through a flexible lighted tube.  Cologuard DNA stool test: a sample of your stool is used to screen for cancer and unseen blood in your stool.  Fecal Occult Blood Test: a sample of your stool is studied to find any unseen blood    Flu Shot:  An immunization that helps to prevent influenza (the flu). You should get this every year. The best time to get the shot is in the fall.    Pneumococcal Shot:  Vaccines help prevent pneumococcal disease, which is any type of illness caused by Streptococcus pneumoniae bacteria. There are two kinds of pneumococcal vaccines available in the [place]:   Pneumococcal conjugate vaccines (PCV20 or Nimxpvj96®)  Pneumococcal polysaccharide vaccine (PPSV23 or Jcnreiglx60®)  For those who have never received any pneumococcal conjugate vaccine, CDC recommends PVC20 for adults 65 years or older and adults 19 through 64 years old with certain medical conditions or risk factors.   For those who have previously received PCV13, this should be followed by a dose of PPSV23.     Hepatitis B Shot:  An immunization that helps to protect people from getting Hepatitis B. Hepatitis B is a virus that spreads through contact with infected blood or body fluids. Many people with the virus do not have symptoms.  The virus can lead to serious problems, such as liver disease. Some people are at higher risk than others. Your doctor will tell you if you need this shot.     Diabetes Screening:  A test to  measure sugar (glucose) in your blood is called a fasting blood sugar. Fasting means you cannot have food or drink for at least 8 hours before the test. This test can detect diabetes long before you may notice symptoms.    Glaucoma Screening:  Glaucoma screening is performed by your eye doctor. The test measures the fluid pressure inside your eyes to determine if you have glaucoma.     Hepatitis C Screening:  A blood test to see if you have the hepatitis C virus.  Hepatitis C attacks the liver and is a major cause of chronic liver disease.  Medicare will cover a single screening for all adults born between 1945 & 1965, or high risk patients (people who have injected illegal drugs or people who have had blood transfusions).  High risk patients who continue to inject illegal drugs can be screened for Hepatitis C every year.    Smoking and Tobacco-Use Cessation Counseling:  Tobacco is the single greatest cause of disease and early death in our country today. Medication and counseling together can increase a person’s chance of quitting for good.   Medicare covers two quitting attempts per year, with four counseling sessions per attempt (eight sessions in a 12 month period)    Preventive Screening tests for Women    Screening Mammograms and Breast Exams:  An x-ray of your breasts to check for breast cancer before you or your doctor may be able to feel it.  If breast cancer is found early it can usually be treated with success.    Pelvic Exams and Pap Tests:  An exam to check for cervical and vaginal cancer. A Pap test is a lab test in which cells are taken from your cervix and sent to the lab to look for signs of cervical cancer. If cancer of the cervix is found early, chances for a cure are good. Testing can generally end at age 65, or if a woman has a hysterectomy for a benign condition. Your provider may recommend more frequent testing if certain abnormal results are found.    Bone Mass Measurements:  A painless x-ray  of your bone density to see if you are at risk for a broken bone. Bone density refers to the thickness of bones or how tightly the bone tissue is packed.    Preventive Screening tests for Men    Prostate Screening:  Should you have a prostate cancer test (PSA)?  It is up to you to decide if you want a prostate cancer test. Talk to your clinician to find out if the test is right for you.  Things for you to consider and talk about should include:  Benefits and harms of the test  Your family history  How your race/ethnicity may influence the test  If the test may impact other medical conditions you have  Your values on screenings and treatments    *Medicare pays for many preventive services to keep you healthy. For some of these services, you might have to pay a deductible, coinsurance, and / or copayment.  The amounts vary depending on the type of services you need and the kind of Medicare health plan you have.    For further details on screenings offered by Medicare please visit: https://www.medicare.gov/coverage/preventive-screening-services

## 2024-03-29 ENCOUNTER — TELEPHONE (OUTPATIENT)
Dept: FAMILY MEDICINE CLINIC | Facility: CLINIC | Age: 89
End: 2024-03-29

## 2024-03-29 NOTE — TELEPHONE ENCOUNTER
Pt's nephew dropped off form for MF to fill out regarding life insurance policy. Placed on MF's desk.     Please call nephew when complete.